# Patient Record
Sex: MALE | Race: WHITE | NOT HISPANIC OR LATINO | Employment: OTHER | URBAN - NONMETROPOLITAN AREA
[De-identification: names, ages, dates, MRNs, and addresses within clinical notes are randomized per-mention and may not be internally consistent; named-entity substitution may affect disease eponyms.]

---

## 2017-05-02 ENCOUNTER — OFFICE VISIT (OUTPATIENT)
Dept: CHIROPRACTIC MEDICINE | Facility: OTHER | Age: 77
End: 2017-05-02
Attending: CHIROPRACTOR
Payer: MEDICARE

## 2017-05-02 DIAGNOSIS — M99.03 SEGMENTAL AND SOMATIC DYSFUNCTION OF LUMBAR REGION: Primary | ICD-10-CM

## 2017-05-02 DIAGNOSIS — M54.50 ACUTE BILATERAL LOW BACK PAIN WITHOUT SCIATICA: ICD-10-CM

## 2017-05-02 DIAGNOSIS — M99.02 SEGMENTAL AND SOMATIC DYSFUNCTION OF THORACIC REGION: ICD-10-CM

## 2017-05-02 PROCEDURE — 98940 CHIROPRACT MANJ 1-2 REGIONS: CPT | Mod: AT | Performed by: CHIROPRACTOR

## 2017-05-02 NOTE — MR AVS SNAPSHOT
"              After Visit Summary   2017    Anthony Dyer    MRN: [id number]           Patient Information     Date Of Birth          1940        Visit Information        Provider Department      2017 10:40 AM Kurt Dye DC  [facility]        Today's Diagnoses     Segmental and somatic dysfunction of lumbar region    -  1    Acute bilateral low back pain without sciatica        Segmental and somatic dysfunction of thoracic region           Follow-ups after your visit        Who to contact     If you have questions or need follow up information about today's clinic visit or your schedule please contact  [facility] directly at [phone].  Normal or non-critical lab and imaging results will be communicated to you by Wattagehart, letter or phone within 4 business days after the clinic has received the results. If you do not hear from us within 7 days, please contact the clinic through Wattagehart or phone. If you have a critical or abnormal lab result, we will notify you by phone as soon as possible.  Submit refill requests through Genomera or call your pharmacy and they will forward the refill request to us. Please allow 3 business days for your refill to be completed.          Additional Information About Your Visit        MyChart Information     Genomera lets you send messages to your doctor, view your test results, renew your prescriptions, schedule appointments and more. To sign up, go to [email or web address] . Click on \"Log in\" on the left side of the screen, which will take you to the Welcome page. Then click on \"Sign up Now\" on the right side of the page.     You will be asked to enter the access code listed below, as well as some personal information. Please follow the directions to create your username and password.     Your access code is: [email or web address]  Expires: 2017 11:02 AM     Your access code will  in 90 days. If you need help or a new code, please call " your [place] clinic or [phone].        Care EveryWhere ID     This is your Care EveryWhere ID. This could be used by other organizations to access your [place] medical records  [id number]         Blood Pressure from Last 3 Encounters:   No data found for BP    Weight from Last 3 Encounters:   No data found for Wt              We Performed the Following     CHIROPRAC MANIP,SPINAL,1-2 REGIONS        Primary Care Provider Office Phone # Fax #    Blaze Prather [phone]       [facility] [address] [address]        Thank you!     Thank you for choosing  [facility]  for your care. Our goal is always to provide you with excellent care. Hearing back from our patients is one way we can continue to improve our services. Please take a few minutes to complete the written survey that you may receive in the mail after your visit with us. Thank you!             Your Updated Medication List - Protect others around you: Learn how to safely use, store and throw away your medicines at www.disposemymeds.org.      Notice  As of 5/2/2017 11:02 AM    You have not been prescribed any medications.

## 2017-10-25 ENCOUNTER — RESULTS ONLY (OUTPATIENT)
Dept: LAB | Age: 77
End: 2017-10-25

## 2017-10-25 LAB — NT-PROBNP SERPL-MCNC: 295 PG/ML (ref 0–450)

## 2017-10-25 PROCEDURE — 83880 ASSAY OF NATRIURETIC PEPTIDE: CPT | Performed by: INTERNAL MEDICINE

## 2017-11-14 ENCOUNTER — HOSPITAL ENCOUNTER (OUTPATIENT)
Dept: RESPIRATORY THERAPY | Facility: HOSPITAL | Age: 77
Discharge: HOME | End: 2017-11-14
Attending: INTERNAL MEDICINE | Admitting: INTERNAL MEDICINE
Payer: MEDICARE

## 2017-11-14 PROCEDURE — 25000125 ZZHC RX 250: Performed by: INTERNAL MEDICINE

## 2017-11-14 PROCEDURE — 94060 EVALUATION OF WHEEZING: CPT

## 2017-11-14 PROCEDURE — 94060 EVALUATION OF WHEEZING: CPT | Mod: 26 | Performed by: INTERNAL MEDICINE

## 2017-11-14 RX ORDER — ALBUTEROL SULFATE 0.83 MG/ML
2.5 SOLUTION RESPIRATORY (INHALATION)
Status: COMPLETED | OUTPATIENT
Start: 2017-11-14 | End: 2017-11-14

## 2017-11-14 RX ADMIN — ALBUTEROL SULFATE 2.5 MG: 2.5 SOLUTION RESPIRATORY (INHALATION) at 16:30

## 2018-02-05 ENCOUNTER — OFFICE VISIT (OUTPATIENT)
Dept: CHIROPRACTIC MEDICINE | Facility: OTHER | Age: 78
End: 2018-02-05
Attending: CHIROPRACTOR
Payer: MEDICARE

## 2018-02-05 DIAGNOSIS — M99.02 SEGMENTAL AND SOMATIC DYSFUNCTION OF THORACIC REGION: ICD-10-CM

## 2018-02-05 DIAGNOSIS — M99.03 SEGMENTAL AND SOMATIC DYSFUNCTION OF LUMBAR REGION: ICD-10-CM

## 2018-02-05 DIAGNOSIS — M54.2 CERVICALGIA: ICD-10-CM

## 2018-02-05 DIAGNOSIS — M99.01 SEGMENTAL AND SOMATIC DYSFUNCTION OF CERVICAL REGION: Primary | ICD-10-CM

## 2018-02-05 PROCEDURE — 98941 CHIROPRACT MANJ 3-4 REGIONS: CPT | Mod: AT | Performed by: CHIROPRACTOR

## 2018-02-05 NOTE — MR AVS SNAPSHOT
"              After Visit Summary   2018    Anthony Dyer    MRN: [id number]           Patient Information     Date Of Birth          1940        Visit Information        Provider Department      2018 11:20 AM Kurt Dye DC  [facility]        Today's Diagnoses     Segmental and somatic dysfunction of cervical region    -  1    Cervicalgia        Segmental and somatic dysfunction of lumbar region        Segmental and somatic dysfunction of thoracic region           Follow-ups after your visit        Who to contact     If you have questions or need follow up information about today's clinic visit or your schedule please contact  [facility]INE[facility] directly at [phone].  Normal or non-critical lab and imaging results will be communicated to you by Leadformancehart, letter or phone within 4 business days after the clinic has received the results. If you do not hear from us within 7 days, please contact the clinic through Leadformancehart or phone. If you have a critical or abnormal lab result, we will notify you by phone as soon as possible.  Submit refill requests through New Leaf Paper or call your pharmacy and they will forward the refill request to us. Please allow 3 business days for your refill to be completed.          Additional Information About Your Visit        MyChart Information     New Leaf Paper lets you send messages to your doctor, view your test results, renew your prescriptions, schedule appointments and more. To sign up, go to [email or web address] . Click on \"Log in\" on the left side of the screen, which will take you to the Welcome page. Then click on \"Sign up Now\" on the right side of the page.     You will be asked to enter the access code listed below, as well as some personal information. Please follow the directions to create your username and password.     Your access code is: [email or web address]  Expires: 2018 11:59 AM     Your access code will  in 90 days. If you need help or a " new code, please call your [place] clinic or [phone].        Care EveryWhere ID     This is your Care EveryWhere ID. This could be used by other organizations to access your [place] medical records  [id number]         Blood Pressure from Last 3 Encounters:   No data found for BP    Weight from Last 3 Encounters:   No data found for Wt              We Performed the Following     CHIROPRAC MANIP,SPINAL,3-4 REGIONS        Primary Care Provider Office Phone # Fax #    Blaze Prather [phone]       [facility] [address] [address]        Equal Access to Services     [facility]: Hadii aad ku hadasho Soomaali, waaxda luqadaha, qaybta kaalmada adeegyada, corry haider adelaura cuevas . So [facility] [place] [phone].    ATENCIÓN: Si habla español, tiene a weston disposición servicios gratuitos de asistencia lingüística. [facility] [phone].    We comply with applicable federal civil rights laws and [place] laws. We do not discriminate on the basis of race, color, national origin, age, disability, sex, sexual orientation, or gender identity.            Thank you!     Thank you for choosing  [facility] \[facility] [place] [facility]""[facility] [place]  for your care. Our goal is always to provide you with excellent care. Hearing back from our patients is one way we can continue to improve our services. Please take a few minutes to complete the written survey that you may receive in the mail after your visit with us. Thank you!             Your Updated Medication List - Protect others around you: Learn how to safely use, store and throw away your medicines at www.disposemymeds.org.      Notice  As of 2/5/2018 11:59 AM    You have not been prescribed any medications.

## 2018-02-05 NOTE — PROGRESS NOTES
Subjective Finding:    Chief compalint: Patient presents with:  Neck Pain: with elbow pain  Back Pain  , Pain Scale: 6/10, Intensity: sharp, Duration: 1 weeks, Radiating:     Date of injury:     Activities that the pain restricts:   Home/household/hobbies/social activities: no.  Work duties: no.  Sleep: no.  Makes symptoms better: rest.  Makes symptoms worse: activity.  Have you seen anyone else for the symptoms? no.  Work related: no.  Automobile related injury: no.    Objective and Assessment:    Posture Analysis:   High shoulder: .  Head tilt: .  High iliac crest: .  Head carriage: neutral.  Thoracic Kyphosis: neutral.  Lumbar Lordosis: forward.    Lumbar Range of Motion: .  Cervical Range of Motion: Rot dec  Thoracic Range of Motion: extension decreased.  Extremity Range of Motion: .    Palpation:   Trap    Segmental dysfunction pre-treatment and treatment area:   T3.  L3   C567    Assessment post-treatment:  Cervical: .  Thoracic: ROM increased.  Lumbar: ROM increased and pain and tenderness decreased.    Comments: .      Complicating Factors: .    Plan / Procedure:    Treatment plan: PRN.  Instructed patient: stretch as instructed at visit and walk 10 minutes.  Short term goals: reduce pain and increase ROM.  Long term goals: restore normal function.  Prognosis: excellent.

## 2018-12-19 ENCOUNTER — OFFICE VISIT (OUTPATIENT)
Dept: CHIROPRACTIC MEDICINE | Facility: OTHER | Age: 78
End: 2018-12-19
Attending: CHIROPRACTOR
Payer: MEDICARE

## 2018-12-19 DIAGNOSIS — M54.2 CERVICALGIA: ICD-10-CM

## 2018-12-19 DIAGNOSIS — M99.01 SEGMENTAL AND SOMATIC DYSFUNCTION OF CERVICAL REGION: Primary | ICD-10-CM

## 2018-12-19 DIAGNOSIS — M99.02 SEGMENTAL AND SOMATIC DYSFUNCTION OF THORACIC REGION: ICD-10-CM

## 2018-12-19 DIAGNOSIS — M99.03 SEGMENTAL AND SOMATIC DYSFUNCTION OF LUMBAR REGION: ICD-10-CM

## 2018-12-19 PROCEDURE — 98941 CHIROPRACT MANJ 3-4 REGIONS: CPT | Mod: AT | Performed by: CHIROPRACTOR

## 2018-12-19 NOTE — PROGRESS NOTES
Subjective Finding:    Chief compalint: Patient presents with:  Back Pain  Neck Pain: right elbow pain  , Pain Scale: 6/10, Intensity: sharp, Duration: 1 weeks, Radiating:     Date of injury:     Activities that the pain restricts:   Home/household/hobbies/social activities: no.  Work duties: no.  Sleep: no.  Makes symptoms better: rest.  Makes symptoms worse: activity.  Have you seen anyone else for the symptoms? no.  Work related: no.  Automobile related injury: no.    Objective and Assessment:    Posture Analysis:   High shoulder: .  Head tilt: .  High iliac crest: .  Head carriage: neutral.  Thoracic Kyphosis: neutral.  Lumbar Lordosis: forward.    Lumbar Range of Motion: .  Cervical Range of Motion: Rot dec  Thoracic Range of Motion: extension decreased.  Extremity Range of Motion: .    Palpation:   Trap    Segmental dysfunction pre-treatment and treatment area:   T3.  L3   C567    Assessment post-treatment:  Cervical: .  Thoracic: ROM increased.  Lumbar: ROM increased and pain and tenderness decreased.    Comments: .      Complicating Factors: .    Plan / Procedure:    Treatment plan: PRN.  Instructed patient: stretch as instructed at visit and walk 10 minutes.  Short term goals: reduce pain and increase ROM.  Long term goals: restore normal function.  Prognosis: excellent.

## 2019-09-18 ENCOUNTER — OFFICE VISIT (OUTPATIENT)
Dept: CHIROPRACTIC MEDICINE | Facility: OTHER | Age: 79
End: 2019-09-18
Attending: CHIROPRACTOR
Payer: MEDICARE

## 2019-09-18 DIAGNOSIS — M99.01 SEGMENTAL AND SOMATIC DYSFUNCTION OF CERVICAL REGION: Primary | ICD-10-CM

## 2019-09-18 DIAGNOSIS — M99.02 SEGMENTAL AND SOMATIC DYSFUNCTION OF THORACIC REGION: ICD-10-CM

## 2019-09-18 DIAGNOSIS — M54.2 CERVICALGIA: ICD-10-CM

## 2019-09-18 DIAGNOSIS — M99.03 SEGMENTAL AND SOMATIC DYSFUNCTION OF LUMBAR REGION: ICD-10-CM

## 2019-09-18 PROCEDURE — 98941 CHIROPRACT MANJ 3-4 REGIONS: CPT | Mod: AT | Performed by: CHIROPRACTOR

## 2019-09-18 NOTE — PROGRESS NOTES
Subjective Finding:    Chief compalint: Patient presents with:  Back Pain  , Pain Scale: 6/10, Intensity: sharp, Duration: 1 weeks, Radiating:     Date of injury:     Activities that the pain restricts:   Home/household/hobbies/social activities: no.  Work duties: no.  Sleep: no.  Makes symptoms better: rest.  Makes symptoms worse: activity.  Have you seen anyone else for the symptoms? no.  Work related: no.  Automobile related injury: no.    Objective and Assessment:    Posture Analysis:   High shoulder: .  Head tilt: .  High iliac crest: .  Head carriage: neutral.  Thoracic Kyphosis: neutral.  Lumbar Lordosis: forward.    Lumbar Range of Motion: .  Cervical Range of Motion: Rot dec  Thoracic Range of Motion: extension decreased.  Extremity Range of Motion: .    Palpation:   Trap    Segmental dysfunction pre-treatment and treatment area:   T3.  L3   C567    Assessment post-treatment:  Cervical: .  Thoracic: ROM increased.  Lumbar: ROM increased and pain and tenderness decreased.    Comments: .      Complicating Factors: .    Plan / Procedure:    Treatment plan: PRN.  Instructed patient: stretch as instructed at visit and walk 10 minutes.  Short term goals: reduce pain and increase ROM.  Long term goals: restore normal function.  Prognosis: excellent.

## 2021-04-14 ENCOUNTER — NURSE TRIAGE (OUTPATIENT)
Dept: FAMILY MEDICINE | Facility: OTHER | Age: 81
End: 2021-04-14

## 2021-04-14 ENCOUNTER — TELEPHONE (OUTPATIENT)
Dept: FAMILY MEDICINE | Facility: OTHER | Age: 81
End: 2021-04-14

## 2021-04-14 ENCOUNTER — OFFICE VISIT (OUTPATIENT)
Dept: FAMILY MEDICINE | Facility: OTHER | Age: 81
End: 2021-04-14
Attending: INTERNAL MEDICINE
Payer: MEDICARE

## 2021-04-14 DIAGNOSIS — Z20.822 COVID-19 RULED OUT: Primary | ICD-10-CM

## 2021-04-14 DIAGNOSIS — Z20.822 COVID-19 RULED OUT: ICD-10-CM

## 2021-04-14 LAB
SARS-COV-2 RNA RESP QL NAA+PROBE: NORMAL
SPECIMEN SOURCE: NORMAL

## 2021-04-14 PROCEDURE — U0003 INFECTIOUS AGENT DETECTION BY NUCLEIC ACID (DNA OR RNA); SEVERE ACUTE RESPIRATORY SYNDROME CORONAVIRUS 2 (SARS-COV-2) (CORONAVIRUS DISEASE [COVID-19]), AMPLIFIED PROBE TECHNIQUE, MAKING USE OF HIGH THROUGHPUT TECHNOLOGIES AS DESCRIBED BY CMS-2020-01-R: HCPCS | Mod: ZL | Performed by: FAMILY MEDICINE

## 2021-04-14 PROCEDURE — U0005 INFEC AGEN DETEC AMPLI PROBE: HCPCS | Mod: ZL | Performed by: FAMILY MEDICINE

## 2021-04-14 NOTE — TELEPHONE ENCOUNTER
When the results of the patients covid test comes back he would like to be notified by telephone call by calling cell phone number [phone].         Thank you    Alysha Perez

## 2021-04-14 NOTE — TELEPHONE ENCOUNTER
Experiencing chills, fatigue,body aches/weakness.     Fully vaccinated for covid 19. 2nd shot approximately x 1 month ago. Exposure exposure to positive Covid 19.    covid testing schedule:    Next 5 appointments (look out 90 days)    Apr 14, 2021 10:30 AM  (Arrive by 10:15 AM)  SHORT with HC COLLECTION [facility] [place] ([facility] [place] ) [address]  [place] [address]  [phone]          Reason for Disposition    COVID-19 Home Isolation, questions about    Additional Information    Negative: SEVERE difficulty breathing (e.g., struggling for each breath, speaks in single words)    Negative: Difficult to awaken or acting confused (e.g., disoriented, slurred speech)    Negative: Bluish (or gray) lips or face now    Negative: Shock suspected (e.g., cold/pale/clammy skin, too weak to stand, low BP, rapid pulse)    Negative: Sounds like a life-threatening emergency to the triager    Negative: [1] COVID-19 exposure AND [2] no symptoms    Negative: [1] Lives with someone known to have influenza (flu test positive) AND [2] flu-like symptoms (e.g., cough, runny nose, sore throat, SOB; with or without fever)    Negative: [1] Adult with possible COVID-19 symptoms AND [2] triager concerned about severity of symptoms or other causes    Negative: COVID-19 vaccine reaction suspected (e.g., fever, headache, muscle aches) occurring during days 1-3 after getting vaccine    Negative: COVID-19 vaccine, questions about    Negative: COVID-19 and breastfeeding, questions about    Negative: SEVERE or constant chest pain or pressure (Exception: mild central chest pain, present only when coughing)    Negative: MODERATE difficulty breathing (e.g., speaks in phrases, SOB even at rest, pulse 100-120)    Negative: [1] Headache AND [2] stiff neck (can't touch chin to chest)    Negative: MILD difficulty breathing (e.g., minimal/no SOB at rest, SOB with walking, pulse <100)    Negative:  "Chest pain or pressure    Negative: Patient sounds very sick or weak to the triager    Negative: Fever > 103 F (39.4 C)    Negative: [1] Fever > 101 F (38.3 C) AND [2] age > 60    Negative: [1] Fever > 100.0 F (37.8 C) AND [2] bedridden (e.g., nursing home patient, CVA, chronic illness, recovering from surgery)    Negative: [1] HIGH RISK patient (e.g., age > 64 years, diabetes, heart or lung disease, weak immune system) AND [2] new or worsening symptoms    Negative: [1] HIGH RISK patient AND [2] influenza is widespread in the community AND [3] ONE OR MORE respiratory symptoms: cough, sore throat, runny or stuffy nose    Negative: [1] HIGH RISK patient AND [2] influenza exposure within the last 7 days AND [3] ONE OR MORE respiratory symptoms: cough, sore throat, runny or stuffy nose    Negative: Fever present > 3 days (72 hours)    Negative: [1] Fever returns after gone for over 24 hours AND [2] symptoms worse or not improved    Negative: [1] Continuous (nonstop) coughing interferes with work or school AND [2] no improvement using cough treatment per protocol    Negative: [1] COVID-19 infection suspected by caller or triager AND [2] mild symptoms (cough, fever, or others) AND [3] no complications or SOB    Negative: Cough present > 3 weeks    Negative: [1] COVID-19 diagnosed by positive lab test AND [2] mild symptoms (e.g., cough, fever, others) AND [3] no complications or SOB    Negative: [1] COVID-19 diagnosed by HCP (doctor, NP or PA) AND [2] mild symptoms (e.g., cough, fever, others) AND [3] no complications or SOB    Answer Assessment - Initial Assessment Questions  1. COVID-19 DIAGNOSIS: \"Who made your Coronavirus (COVID-19) diagnosis?\" \"Was it confirmed by a positive lab test?\" If not diagnosed by a HCP, ask \"Are there lots of cases (community spread) where you live?\" (See public health department website, if unsure)      Patient has not been diagnosed with covid 19    2. COVID-19 EXPOSURE: \"Was there any " "known exposure to COVID before the symptoms began?\" [facility] Definition of close contact: within 6 feet (2 meters) for a total of 15 minutes or more over a 24-hour period.       Possible exposure, friend testing positive at bar patient was at    3. ONSET: \"When did the COVID-19 symptoms start?\"       4/13/21    4. WORST SYMPTOM: \"What is your worst symptom?\" (e.g., cough, fever, shortness of breath, muscle aches)      Weakness    5. COUGH: \"Do you have a cough?\" If so, ask: \"How bad is the cough?\"        No     6. FEVER: \"Do you have a fever?\" If so, ask: \"What is your temperature, how was it measured, and when did it start?\"      Unknown, patient reports chills. Temp has not been taken    7. RESPIRATORY STATUS: \"Describe your breathing?\" (e.g., shortness of breath, wheezing, unable to speak)       No     8. BETTER-SAME-WORSE: \"Are you getting better, staying the same or getting worse compared to yesterday?\"  If getting worse, ask, \"In what way?\"      Better     9. HIGH RISK DISEASE: \"Do you have any chronic medical problems?\" (e.g., asthma, heart or lung disease, weak immune system, obesity, etc.)      Diabetes Type 2 , Asthma     10. PREGNANCY: \"Is there any chance you are pregnant?\" \"When was your last menstrual period?\"        Na     11. OTHER SYMPTOMS: \"Do you have any other symptoms?\"  (e.g., chills, fatigue, headache, loss of smell or taste, muscle pain, sore throat; new loss of smell or taste especially support the diagnosis of COVID-19)        Chills, fatigue, body aches/weakness    Protocols used: CORONAVIRUS (COVID-19) DIAGNOSED OR [id number] 1.3      "

## 2021-04-15 ENCOUNTER — HOSPITAL ENCOUNTER (EMERGENCY)
Facility: HOSPITAL | Age: 81
Discharge: HOME | End: 2021-04-16
Attending: INTERNAL MEDICINE | Admitting: INTERNAL MEDICINE
Payer: MEDICARE

## 2021-04-15 ENCOUNTER — APPOINTMENT (OUTPATIENT)
Dept: GENERAL RADIOLOGY | Facility: HOSPITAL | Age: 81
End: 2021-04-15
Attending: INTERNAL MEDICINE
Payer: MEDICARE

## 2021-04-15 ENCOUNTER — APPOINTMENT (OUTPATIENT)
Dept: CT IMAGING | Facility: HOSPITAL | Age: 81
End: 2021-04-15
Attending: INTERNAL MEDICINE
Payer: MEDICARE

## 2021-04-15 DIAGNOSIS — R50.9 FEBRILE ILLNESS: ICD-10-CM

## 2021-04-15 DIAGNOSIS — R33.9 URINARY RETENTION: ICD-10-CM

## 2021-04-15 DIAGNOSIS — M62.81 GENERALIZED MUSCLE WEAKNESS: ICD-10-CM

## 2021-04-15 DIAGNOSIS — E86.0 DEHYDRATION: ICD-10-CM

## 2021-04-15 DIAGNOSIS — N30.90 CYSTITIS: ICD-10-CM

## 2021-04-15 LAB
ALBUMIN SERPL-MCNC: 3.5 G/DL (ref 3.4–5)
ALBUMIN UR-MCNC: 30 MG/DL
ALP SERPL-CCNC: 78 U/L (ref 40–150)
ALT SERPL W P-5'-P-CCNC: 34 U/L (ref 0–70)
ANION GAP SERPL CALCULATED.3IONS-SCNC: 6 MMOL/L (ref 3–14)
APPEARANCE UR: CLEAR
AST SERPL W P-5'-P-CCNC: 26 U/L (ref 0–45)
BACTERIA #/AREA URNS HPF: ABNORMAL /HPF
BASOPHILS # BLD AUTO: 0 10E9/L (ref 0–0.2)
BASOPHILS NFR BLD AUTO: 0.1 %
BILIRUB SERPL-MCNC: 0.8 MG/DL (ref 0.2–1.3)
BILIRUB UR QL STRIP: NEGATIVE
BUN SERPL-MCNC: 19 MG/DL (ref 7–30)
CALCIUM SERPL-MCNC: 8.5 MG/DL (ref 8.5–10.1)
CHLORIDE SERPL-SCNC: 106 MMOL/L (ref 94–109)
CO2 SERPL-SCNC: 25 MMOL/L (ref 20–32)
COLOR UR AUTO: YELLOW
CREAT SERPL-MCNC: 1.34 MG/DL (ref 0.66–1.25)
CRP SERPL-MCNC: 85.7 MG/L (ref 0–8)
DIFFERENTIAL METHOD BLD: ABNORMAL
EOSINOPHIL # BLD AUTO: 0 10E9/L (ref 0–0.7)
EOSINOPHIL NFR BLD AUTO: 0.4 %
ERYTHROCYTE [DISTWIDTH] IN BLOOD BY AUTOMATED COUNT: 12.9 % (ref 10–15)
GFR SERPL CREATININE-BSD FRML MDRD: 50 ML/MIN/{1.73_M2}
GLUCOSE SERPL-MCNC: 129 MG/DL (ref 70–99)
GLUCOSE UR STRIP-MCNC: 30 MG/DL
HCT VFR BLD AUTO: 42.2 % (ref 40–53)
HGB BLD-MCNC: 13.9 G/DL (ref 13.3–17.7)
HGB UR QL STRIP: NEGATIVE
IMM GRANULOCYTES # BLD: 0 10E9/L (ref 0–0.4)
IMM GRANULOCYTES NFR BLD: 0.2 %
KETONES UR STRIP-MCNC: NEGATIVE MG/DL
LABORATORY COMMENT REPORT: NORMAL
LACTATE BLD-SCNC: 1.2 MMOL/L (ref 0.7–2)
LEUKOCYTE ESTERASE UR QL STRIP: NEGATIVE
LYMPHOCYTES # BLD AUTO: 0.5 10E9/L (ref 0.8–5.3)
LYMPHOCYTES NFR BLD AUTO: 5.5 %
MCH RBC QN AUTO: 32 PG (ref 26.5–33)
MCHC RBC AUTO-ENTMCNC: 32.9 G/DL (ref 31.5–36.5)
MCV RBC AUTO: 97 FL (ref 78–100)
MONOCYTES # BLD AUTO: 0.7 10E9/L (ref 0–1.3)
MONOCYTES NFR BLD AUTO: 8.6 %
MUCOUS THREADS #/AREA URNS LPF: PRESENT /LPF
NEUTROPHILS # BLD AUTO: 7 10E9/L (ref 1.6–8.3)
NEUTROPHILS NFR BLD AUTO: 85.2 %
NITRATE UR QL: NEGATIVE
NRBC # BLD AUTO: 0 10*3/UL
NRBC BLD AUTO-RTO: 0 /100
PH UR STRIP: 6 PH (ref 4.7–8)
PLATELET # BLD AUTO: 150 10E9/L (ref 150–450)
POTASSIUM SERPL-SCNC: 3.9 MMOL/L (ref 3.4–5.3)
PROCALCITONIN SERPL-MCNC: 0.99 NG/ML
PROT SERPL-MCNC: 7 G/DL (ref 6.8–8.8)
RBC # BLD AUTO: 4.35 10E12/L (ref 4.4–5.9)
RBC #/AREA URNS AUTO: 2 /HPF (ref 0–2)
SARS-COV-2 RNA RESP QL NAA+PROBE: NEGATIVE
SODIUM SERPL-SCNC: 137 MMOL/L (ref 133–144)
SOURCE: ABNORMAL
SP GR UR STRIP: 1.02 (ref 1–1.03)
SPECIMEN SOURCE: NORMAL
SQUAMOUS #/AREA URNS AUTO: <1 /HPF (ref 0–1)
UROBILINOGEN UR STRIP-MCNC: NORMAL MG/DL (ref 0–2)
WBC # BLD AUTO: 8.2 10E9/L (ref 4–11)
WBC #/AREA URNS AUTO: 2 /HPF (ref 0–5)

## 2021-04-15 PROCEDURE — 250N000009 HC RX 250

## 2021-04-15 PROCEDURE — 71045 X-RAY EXAM CHEST 1 VIEW: CPT

## 2021-04-15 PROCEDURE — 96361 HYDRATE IV INFUSION ADD-ON: CPT

## 2021-04-15 PROCEDURE — 99285 EMERGENCY DEPT VISIT HI MDM: CPT | Performed by: INTERNAL MEDICINE

## 2021-04-15 PROCEDURE — 84145 PROCALCITONIN (PCT): CPT | Performed by: INTERNAL MEDICINE

## 2021-04-15 PROCEDURE — 86140 C-REACTIVE PROTEIN: CPT | Performed by: INTERNAL MEDICINE

## 2021-04-15 PROCEDURE — 87040 BLOOD CULTURE FOR BACTERIA: CPT | Performed by: INTERNAL MEDICINE

## 2021-04-15 PROCEDURE — 80053 COMPREHEN METABOLIC PANEL: CPT | Performed by: INTERNAL MEDICINE

## 2021-04-15 PROCEDURE — 87086 URINE CULTURE/COLONY COUNT: CPT | Performed by: INTERNAL MEDICINE

## 2021-04-15 PROCEDURE — 258N000003 HC RX IP 258 OP 636: Performed by: INTERNAL MEDICINE

## 2021-04-15 PROCEDURE — 99285 EMERGENCY DEPT VISIT HI MDM: CPT | Mod: 25

## 2021-04-15 PROCEDURE — 74177 CT ABD & PELVIS W/CONTRAST: CPT | Mod: ME

## 2021-04-15 PROCEDURE — 93010 ELECTROCARDIOGRAM REPORT: CPT | Performed by: INTERNAL MEDICINE

## 2021-04-15 PROCEDURE — 93005 ELECTROCARDIOGRAM TRACING: CPT

## 2021-04-15 PROCEDURE — 81001 URINALYSIS AUTO W/SCOPE: CPT | Performed by: INTERNAL MEDICINE

## 2021-04-15 PROCEDURE — 85025 COMPLETE CBC W/AUTO DIFF WBC: CPT | Performed by: INTERNAL MEDICINE

## 2021-04-15 PROCEDURE — 83605 ASSAY OF LACTIC ACID: CPT | Performed by: INTERNAL MEDICINE

## 2021-04-15 PROCEDURE — 36415 COLL VENOUS BLD VENIPUNCTURE: CPT | Performed by: INTERNAL MEDICINE

## 2021-04-15 RX ORDER — LIDOCAINE HYDROCHLORIDE 20 MG/ML
JELLY TOPICAL
Status: COMPLETED
Start: 2021-04-15 | End: 2021-04-15

## 2021-04-15 RX ORDER — METFORMIN HCL 500 MG
1000 TABLET, EXTENDED RELEASE 24 HR ORAL 2 TIMES DAILY WITH MEALS
COMMUNITY
Start: 2021-04-03 | End: 2022-01-13

## 2021-04-15 RX ORDER — GLIMEPIRIDE 2 MG/1
2 TABLET ORAL
COMMUNITY
Start: 2021-04-11 | End: 2022-03-14

## 2021-04-15 RX ORDER — ATORVASTATIN CALCIUM 80 MG/1
40 TABLET, FILM COATED ORAL EVERY EVENING
COMMUNITY
Start: 2021-04-13 | End: 2022-08-25

## 2021-04-15 RX ORDER — SILDENAFIL 100 MG/1
50-100 TABLET, FILM COATED ORAL DAILY PRN
Status: ON HOLD | COMMUNITY
Start: 2020-01-22 | End: 2021-04-26

## 2021-04-15 RX ORDER — AMLODIPINE BESYLATE 2.5 MG/1
2.5 TABLET ORAL DAILY
COMMUNITY
Start: 2021-04-12 | End: 2022-08-18

## 2021-04-15 RX ORDER — CARVEDILOL 25 MG/1
25 TABLET ORAL 2 TIMES DAILY
COMMUNITY
Start: 2021-04-03 | End: 2022-08-17

## 2021-04-15 RX ORDER — ALBUTEROL SULFATE 90 UG/1
2 AEROSOL, METERED RESPIRATORY (INHALATION) EVERY 4 HOURS PRN
COMMUNITY
Start: 2020-07-28 | End: 2024-03-25

## 2021-04-15 RX ORDER — SEMAGLUTIDE 1.34 MG/ML
0.5 INJECTION, SOLUTION SUBCUTANEOUS
COMMUNITY
Start: 2020-12-18 | End: 2022-01-14

## 2021-04-15 RX ORDER — ALLOPURINOL 100 MG/1
100 TABLET ORAL DAILY
COMMUNITY
Start: 2021-04-13 | End: 2022-10-12

## 2021-04-15 RX ORDER — IOPAMIDOL 612 MG/ML
100 INJECTION, SOLUTION INTRAVASCULAR ONCE
Status: COMPLETED | OUTPATIENT
Start: 2021-04-15 | End: 2021-04-16

## 2021-04-15 RX ORDER — TAMSULOSIN HYDROCHLORIDE 0.4 MG/1
0.4 CAPSULE ORAL EVERY EVENING
COMMUNITY
Start: 2021-02-09 | End: 2022-05-04

## 2021-04-15 RX ORDER — GABAPENTIN 300 MG/1
300 CAPSULE ORAL 3 TIMES DAILY
COMMUNITY
Start: 2020-11-26 | End: 2023-02-14

## 2021-04-15 RX ADMIN — SODIUM CHLORIDE 1000 ML: 9 INJECTION, SOLUTION INTRAVENOUS at 22:21

## 2021-04-15 RX ADMIN — LIDOCAINE HYDROCHLORIDE: 20 JELLY TOPICAL at 23:47

## 2021-04-15 ASSESSMENT — MIFFLIN-ST. JEOR: SCORE: 1707.83

## 2021-04-16 ENCOUNTER — APPOINTMENT (OUTPATIENT)
Dept: ULTRASOUND IMAGING | Facility: HOSPITAL | Age: 81
End: 2021-04-16
Attending: INTERNAL MEDICINE
Payer: MEDICARE

## 2021-04-16 VITALS
HEART RATE: 89 BPM | WEIGHT: 207 LBS | TEMPERATURE: 99.3 F | HEIGHT: 73 IN | OXYGEN SATURATION: 93 % | SYSTOLIC BLOOD PRESSURE: 118 MMHG | RESPIRATION RATE: 24 BRPM | BODY MASS INDEX: 27.43 KG/M2 | DIASTOLIC BLOOD PRESSURE: 72 MMHG

## 2021-04-16 PROCEDURE — 250N000011 HC RX IP 250 OP 636: Performed by: INTERNAL MEDICINE

## 2021-04-16 PROCEDURE — 76705 ECHO EXAM OF ABDOMEN: CPT

## 2021-04-16 PROCEDURE — 96365 THER/PROPH/DIAG IV INF INIT: CPT

## 2021-04-16 PROCEDURE — 255N000002 HC RX 255 OP 636: Performed by: INTERNAL MEDICINE

## 2021-04-16 PROCEDURE — 250N000013 HC RX MED GY IP 250 OP 250 PS 637: Performed by: INTERNAL MEDICINE

## 2021-04-16 PROCEDURE — 258N000003 HC RX IP 258 OP 636: Performed by: INTERNAL MEDICINE

## 2021-04-16 RX ORDER — LEVOFLOXACIN 250 MG/1
250 TABLET, FILM COATED ORAL DAILY
Qty: 5 TABLET | Refills: 0 | Status: ON HOLD | OUTPATIENT
Start: 2021-04-16 | End: 2021-04-22

## 2021-04-16 RX ORDER — LEVOFLOXACIN 500 MG/1
500 TABLET, FILM COATED ORAL DAILY
Qty: 7 TABLET | Refills: 0 | Status: SHIPPED | OUTPATIENT
Start: 2021-04-16 | End: 2021-04-16

## 2021-04-16 RX ORDER — ACETAMINOPHEN 325 MG/1
650 TABLET ORAL ONCE
Status: COMPLETED | OUTPATIENT
Start: 2021-04-16 | End: 2021-04-16

## 2021-04-16 RX ORDER — LEVOFLOXACIN 5 MG/ML
750 INJECTION, SOLUTION INTRAVENOUS ONCE
Status: COMPLETED | OUTPATIENT
Start: 2021-04-16 | End: 2021-04-16

## 2021-04-16 RX ADMIN — IOPAMIDOL 100 ML: 612 INJECTION, SOLUTION INTRAVENOUS at 00:36

## 2021-04-16 RX ADMIN — SODIUM CHLORIDE 1000 ML: 9 INJECTION, SOLUTION INTRAVENOUS at 01:54

## 2021-04-16 RX ADMIN — ACETAMINOPHEN 650 MG: 325 TABLET, FILM COATED ORAL at 01:53

## 2021-04-16 RX ADMIN — LEVOFLOXACIN 750 MG: 5 INJECTION, SOLUTION INTRAVENOUS at 01:54

## 2021-04-16 ASSESSMENT — ENCOUNTER SYMPTOMS
ABDOMINAL DISTENTION: 0
DIZZINESS: 0
ABDOMINAL PAIN: 0
COLOR CHANGE: 0
BACK PAIN: 0
DIAPHORESIS: 0
DYSURIA: 0
MYALGIAS: 0
VOMITING: 0
FREQUENCY: 0
CHILLS: 1
ANAL BLEEDING: 0
CHEST TIGHTNESS: 0
BLOOD IN STOOL: 0
PALPITATIONS: 0
CONFUSION: 0
HEADACHES: 0
WHEEZING: 0
FATIGUE: 1
FLANK PAIN: 0
LIGHT-HEADEDNESS: 0
NECK PAIN: 0
NUMBNESS: 0
FEVER: 1
COUGH: 0
SHORTNESS OF BREATH: 0
WEAKNESS: 1
SLEEP DISTURBANCE: 0
VOICE CHANGE: 0
NAUSEA: 0

## 2021-04-16 NOTE — ED NOTES
Pt given hard copy of antibiotic prescription. Discharge teaching done, AVS reviewed with pt, questions answered. Pt verbalized understanding and is agreeable with discharge plan. Pt discharged to home.

## 2021-04-16 NOTE — ED NOTES
Extensive teaching done with pt re: home care of to catheter, use of leg bag, cleaning of catheter bags and prevention of infection. Pt able to teach back.

## 2021-04-16 NOTE — ED NOTES
"Per Dr Taveras, pt got self out of bed ambulated in the leonardo with stand by assistance only, tolerated activity well, Dr Taveras aware. Pt requesting to be allowed to sleep for now and call his sister, \"in a little while.\"  "

## 2021-04-16 NOTE — ED TRIAGE NOTES
Found by neighbors today. Legs are weak. Patient was wearing 3 layers of sweatshirts. Normally moves around well. Having problems since Tuesday

## 2021-04-16 NOTE — ED PROVIDER NOTES
History     Chief Complaint   Patient presents with     Generalized Weakness     The history is provided by the patient.   Fatigue  Severity:  Severe  Onset quality:  Gradual  Duration:  3 days  Timing:  Constant  Progression:  Worsening  Chronicity:  New  Associated symptoms: fever    Associated symptoms: no abdominal pain, no chest pain, no cough, no dizziness, no dysuria, no frequency, no headaches, no myalgias, no nausea, no shortness of breath and no vomiting        Allergies:  Allergies   Allergen Reactions     Ace Inhibitors      Per [facility]: hyperkalemia.  Pt doesn't know if he is allergic     Ceclor [Cefaclor] Hives     Sulfa Drugs      Pt unsure.        Problem List:    Patient Active Problem List    Diagnosis Date Noted     Diabetes mellitus, type 2 (H) 04/20/2021     Priority: Medium     Urinary retention 04/19/2021     Priority: Medium     Generalized weakness 04/19/2021     Priority: Medium        Past Medical History:    Past Medical History:   Diagnosis Date     Diabetes (H)      Hypertension        Past Surgical History:    Past Surgical History:   Procedure Laterality Date     HERNIA REPAIR         Family History:    No family history on file.    Social History:  Marital Status:  Single [1]  Social History     Tobacco Use     Smoking status: Unknown If Ever Smoked   Substance Use Topics     Alcohol use: Not Currently     Drug use: Never        Medications:    No current outpatient medications on file.        Review of Systems   Constitutional: Positive for chills, fatigue and fever. Negative for diaphoresis.   HENT: Negative for voice change.    Eyes: Negative for visual disturbance.   Respiratory: Negative for cough, chest tightness, shortness of breath and wheezing.    Cardiovascular: Negative for chest pain, palpitations and leg swelling.   Gastrointestinal: Negative for abdominal distention, abdominal pain, anal bleeding, blood in stool, nausea and vomiting.   Genitourinary: Negative for  "decreased urine volume, dysuria, flank pain and frequency.   Musculoskeletal: Negative for back pain, gait problem, myalgias and neck pain.   Skin: Negative for color change, pallor and rash.   Neurological: Positive for weakness. Negative for dizziness, syncope, light-headedness, numbness and headaches.   Psychiatric/Behavioral: Negative for confusion, sleep disturbance and suicidal ideas.       Physical Exam   BP: 139/82  Pulse: 109  Temp: (!) 102.2  F (39  C)  Resp: 24  Height: 185.4 cm (6' 1\")  Weight: 93.9 kg (207 lb)  SpO2: 92 %      Physical Exam  Vitals signs and nursing note reviewed.   Constitutional:       Appearance: He is well-developed.   HENT:      Head: Normocephalic and atraumatic.   Eyes:      Conjunctiva/sclera: Conjunctivae normal.      Pupils: Pupils are equal, round, and reactive to light.   Neck:      Musculoskeletal: Normal range of motion and neck supple.      Thyroid: No thyromegaly.      Vascular: No JVD.      Trachea: No tracheal deviation.   Cardiovascular:      Rate and Rhythm: Regular rhythm. Tachycardia present.      Heart sounds: Normal heart sounds. No murmur. No gallop.    Pulmonary:      Effort: Pulmonary effort is normal. No respiratory distress.      Breath sounds: Normal breath sounds. No stridor. No wheezing or rales.   Chest:      Chest wall: No tenderness.   Abdominal:      General: Bowel sounds are normal. There is no distension.      Palpations: Abdomen is soft. There is no mass.      Tenderness: There is no abdominal tenderness. There is no guarding or rebound.   Musculoskeletal: Normal range of motion.         General: No tenderness.   Lymphadenopathy:      Cervical: No cervical adenopathy.   Skin:     General: Skin is warm.      Coloration: Skin is not pale.      Findings: No erythema or rash.   Neurological:      Mental Status: He is alert and oriented to person, place, and time.   Psychiatric:         Behavior: Behavior normal.         ED Course        Procedures      "              No results found for this or any previous visit (from the past 24 hour(s)).    Medications   0.9% sodium chloride BOLUS (0 mLs Intravenous Stopped 4/15/21 2321)   lidocaine (XYLOCAINE) 2 % external gel (  Given 4/15/21 2347)   iopamidol (ISOVUE-300) IV solution 61% 100 mL (100 mLs Intravenous Given 4/16/21 0036)   sodium chloride (PF) 0.9% PF flush 60 mL (60 mLs Intravenous Given 4/16/21 0036)   acetaminophen (TYLENOL) tablet 650 mg (650 mg Oral Given 4/16/21 0153)   0.9% sodium chloride BOLUS (0 mLs Intravenous Stopped 4/16/21 0248)   levofloxacin (LEVAQUIN) infusion 750 mg (0 mg Intravenous Stopped 4/16/21 0330)       Assessments & Plan (with Medical Decision Making)   Felt very weak for 3 days, febrile today, could not stand up due to severe weakness.   Febrile in ER  Sepsis workup was done  Labs reviewed  Pt was not able to void, bladder scanner showed more than 100  CT abd vrad: distended gallbladder, cystitis  US RUQ: no cholecystitis  Pt does not have any pain or tenderness on RUQ or abdomen.    Pt observed in ER, after receiving IVF and IV Abx he felt much better, he walked with walker in ER without problem  Fever most likely from UTI due to urinary retention that resolved after to placement.  I advised him to return to ER if felt weak, had fever, vomiting or any other unusual symptoms, follow-up with urology clinic and PCP for reevaluation.   He understood and agreed.     I have reviewed the nursing notes.    I have reviewed the findings, diagnosis, plan and need for follow up with the patient.      Discharge Medication List as of 4/16/2021  5:16 AM          Final diagnoses:   Urinary retention   Febrile illness   Generalized muscle weakness   Cystitis   Dehydration       4/15/2021   [facility]     Brandon Taveras MD  04/21/21 5195

## 2021-04-16 NOTE — ED NOTES
Post void residual bladder scan 975 ml, Dr Taveras notified, plan to cath insertion, pt agreeable.

## 2021-04-18 LAB
BACTERIA SPEC CULT: NORMAL
SPECIMEN SOURCE: NORMAL

## 2021-04-19 ENCOUNTER — TELEPHONE (OUTPATIENT)
Dept: UROLOGY | Facility: OTHER | Age: 81
End: 2021-04-19

## 2021-04-19 ENCOUNTER — HOSPITAL ENCOUNTER (OUTPATIENT)
Facility: HOSPITAL | Age: 81
Setting detail: OBSERVATION
Discharge: NURSING, REHAB OR LONG-TERM CARE | End: 2021-04-22
Attending: EMERGENCY MEDICINE | Admitting: INTERNAL MEDICINE
Payer: MEDICARE

## 2021-04-19 DIAGNOSIS — R53.1 GENERALIZED WEAKNESS: ICD-10-CM

## 2021-04-19 DIAGNOSIS — R33.9 URINARY RETENTION: ICD-10-CM

## 2021-04-19 LAB
ALBUMIN UR-MCNC: 30 MG/DL
ANION GAP SERPL CALCULATED.3IONS-SCNC: 8 MMOL/L (ref 3–14)
APPEARANCE UR: CLEAR
BACTERIA #/AREA URNS HPF: ABNORMAL /HPF
BASOPHILS # BLD AUTO: 0 10E9/L (ref 0–0.2)
BASOPHILS NFR BLD AUTO: 0.4 %
BILIRUB UR QL STRIP: NEGATIVE
BUN SERPL-MCNC: 24 MG/DL (ref 7–30)
CALCIUM SERPL-MCNC: 9 MG/DL (ref 8.5–10.1)
CHLORIDE SERPL-SCNC: 106 MMOL/L (ref 94–109)
CO2 SERPL-SCNC: 24 MMOL/L (ref 20–32)
COLOR UR AUTO: YELLOW
CREAT SERPL-MCNC: 1.39 MG/DL (ref 0.66–1.25)
DIFFERENTIAL METHOD BLD: ABNORMAL
EOSINOPHIL # BLD AUTO: 0.1 10E9/L (ref 0–0.7)
EOSINOPHIL NFR BLD AUTO: 1.1 %
ERYTHROCYTE [DISTWIDTH] IN BLOOD BY AUTOMATED COUNT: 12.5 % (ref 10–15)
GFR SERPL CREATININE-BSD FRML MDRD: 48 ML/MIN/{1.73_M2}
GLUCOSE SERPL-MCNC: 143 MG/DL (ref 70–99)
GLUCOSE UR STRIP-MCNC: NEGATIVE MG/DL
HCT VFR BLD AUTO: 41.3 % (ref 40–53)
HGB BLD-MCNC: 14 G/DL (ref 13.3–17.7)
HGB UR QL STRIP: ABNORMAL
HYALINE CASTS #/AREA URNS LPF: 1 /LPF
IMM GRANULOCYTES # BLD: 0 10E9/L (ref 0–0.4)
IMM GRANULOCYTES NFR BLD: 0.5 %
KETONES UR STRIP-MCNC: NEGATIVE MG/DL
LABORATORY COMMENT REPORT: NORMAL
LEUKOCYTE ESTERASE UR QL STRIP: ABNORMAL
LYMPHOCYTES # BLD AUTO: 0.6 10E9/L (ref 0.8–5.3)
LYMPHOCYTES NFR BLD AUTO: 6.4 %
MCH RBC QN AUTO: 32.6 PG (ref 26.5–33)
MCHC RBC AUTO-ENTMCNC: 33.9 G/DL (ref 31.5–36.5)
MCV RBC AUTO: 96 FL (ref 78–100)
MONOCYTES # BLD AUTO: 0.5 10E9/L (ref 0–1.3)
MONOCYTES NFR BLD AUTO: 5.7 %
MUCOUS THREADS #/AREA URNS LPF: PRESENT /LPF
NEUTROPHILS # BLD AUTO: 7.4 10E9/L (ref 1.6–8.3)
NEUTROPHILS NFR BLD AUTO: 85.9 %
NITRATE UR QL: NEGATIVE
NRBC # BLD AUTO: 0 10*3/UL
NRBC BLD AUTO-RTO: 0 /100
PH UR STRIP: 5.5 PH (ref 4.7–8)
PLATELET # BLD AUTO: 209 10E9/L (ref 150–450)
POTASSIUM SERPL-SCNC: 4.4 MMOL/L (ref 3.4–5.3)
RBC # BLD AUTO: 4.3 10E12/L (ref 4.4–5.9)
RBC #/AREA URNS AUTO: 93 /HPF (ref 0–2)
SARS-COV-2 RNA RESP QL NAA+PROBE: NEGATIVE
SODIUM SERPL-SCNC: 138 MMOL/L (ref 133–144)
SOURCE: ABNORMAL
SP GR UR STRIP: 1.03 (ref 1–1.03)
SPECIMEN SOURCE: NORMAL
SQUAMOUS #/AREA URNS AUTO: 0 /HPF (ref 0–1)
TROPONIN I SERPL-MCNC: <0.015 UG/L (ref 0–0.04)
UROBILINOGEN UR STRIP-MCNC: NORMAL MG/DL (ref 0–2)
WBC # BLD AUTO: 8.6 10E9/L (ref 4–11)
WBC #/AREA URNS AUTO: 5 /HPF (ref 0–5)

## 2021-04-19 PROCEDURE — 36415 COLL VENOUS BLD VENIPUNCTURE: CPT | Performed by: INTERNAL MEDICINE

## 2021-04-19 PROCEDURE — G0378 HOSPITAL OBSERVATION PER HR: HCPCS

## 2021-04-19 PROCEDURE — 96360 HYDRATION IV INFUSION INIT: CPT | Performed by: INTERNAL MEDICINE

## 2021-04-19 PROCEDURE — 81001 URINALYSIS AUTO W/SCOPE: CPT | Performed by: EMERGENCY MEDICINE

## 2021-04-19 PROCEDURE — 258N000003 HC RX IP 258 OP 636: Performed by: EMERGENCY MEDICINE

## 2021-04-19 PROCEDURE — C9803 HOPD COVID-19 SPEC COLLECT: HCPCS | Performed by: INTERNAL MEDICINE

## 2021-04-19 PROCEDURE — 93010 ELECTROCARDIOGRAM REPORT: CPT | Performed by: INTERNAL MEDICINE

## 2021-04-19 PROCEDURE — 51702 INSERT TEMP BLADDER CATH: CPT | Performed by: INTERNAL MEDICINE

## 2021-04-19 PROCEDURE — 80048 BASIC METABOLIC PNL TOTAL CA: CPT | Performed by: EMERGENCY MEDICINE

## 2021-04-19 PROCEDURE — U0005 INFEC AGEN DETEC AMPLI PROBE: HCPCS | Performed by: EMERGENCY MEDICINE

## 2021-04-19 PROCEDURE — U0003 INFECTIOUS AGENT DETECTION BY NUCLEIC ACID (DNA OR RNA); SEVERE ACUTE RESPIRATORY SYNDROME CORONAVIRUS 2 (SARS-COV-2) (CORONAVIRUS DISEASE [COVID-19]), AMPLIFIED PROBE TECHNIQUE, MAKING USE OF HIGH THROUGHPUT TECHNOLOGIES AS DESCRIBED BY CMS-2020-01-R: HCPCS | Performed by: EMERGENCY MEDICINE

## 2021-04-19 PROCEDURE — 99285 EMERGENCY DEPT VISIT HI MDM: CPT | Performed by: EMERGENCY MEDICINE

## 2021-04-19 PROCEDURE — 99285 EMERGENCY DEPT VISIT HI MDM: CPT | Mod: 25 | Performed by: INTERNAL MEDICINE

## 2021-04-19 PROCEDURE — 93005 ELECTROCARDIOGRAM TRACING: CPT | Mod: 59 | Performed by: INTERNAL MEDICINE

## 2021-04-19 PROCEDURE — 85025 COMPLETE CBC W/AUTO DIFF WBC: CPT | Performed by: EMERGENCY MEDICINE

## 2021-04-19 PROCEDURE — 84484 ASSAY OF TROPONIN QUANT: CPT | Performed by: EMERGENCY MEDICINE

## 2021-04-19 RX ADMIN — SODIUM CHLORIDE 500 ML: 9 INJECTION, SOLUTION INTRAVENOUS at 21:12

## 2021-04-19 NOTE — TELEPHONE ENCOUNTER
Pt's sister in law, Grazyna, called this morning stating pt was seen in the ER on 4/15/21 and is ready to have his catheter taken out. I informed her that I could not take out the catheter unless the pt was seen by Dr. De Leon first and assessed.  Grazyna understood and I made an appt for tomorrow 4/20/21 at 7:30 AM for pt to be seen.  Grazyna called back a few hours later stating that the pt had the catheter removed by a nurse at [facility] 2 hours ago and he cannot void.  I advised pt to push fluids, walk around and if unable to void, feeling distended and/or abdominal pain pt needs to go back into the ER.  Pt will still come see Dr. De Leon tomorrow morning for an assessment.  Grazyna verbalized understanding.  MICHELLE HILL, EJN

## 2021-04-20 ENCOUNTER — APPOINTMENT (OUTPATIENT)
Dept: OCCUPATIONAL THERAPY | Facility: HOSPITAL | Age: 81
End: 2021-04-20
Attending: INTERNAL MEDICINE
Payer: MEDICARE

## 2021-04-20 ENCOUNTER — APPOINTMENT (OUTPATIENT)
Dept: PHYSICAL THERAPY | Facility: HOSPITAL | Age: 81
End: 2021-04-20
Attending: INTERNAL MEDICINE
Payer: MEDICARE

## 2021-04-20 PROBLEM — E11.9 DIABETES MELLITUS, TYPE 2 (H): Status: ACTIVE | Noted: 2021-04-20

## 2021-04-20 LAB
ANION GAP SERPL CALCULATED.3IONS-SCNC: 6 MMOL/L (ref 3–14)
BUN SERPL-MCNC: 21 MG/DL (ref 7–30)
CALCIUM SERPL-MCNC: 9.1 MG/DL (ref 8.5–10.1)
CHLORIDE SERPL-SCNC: 106 MMOL/L (ref 94–109)
CO2 SERPL-SCNC: 25 MMOL/L (ref 20–32)
CREAT SERPL-MCNC: 1.26 MG/DL (ref 0.66–1.25)
ERYTHROCYTE [DISTWIDTH] IN BLOOD BY AUTOMATED COUNT: 12.5 % (ref 10–15)
GFR SERPL CREATININE-BSD FRML MDRD: 54 ML/MIN/{1.73_M2}
GLUCOSE BLDC GLUCOMTR-MCNC: 110 MG/DL (ref 70–99)
GLUCOSE BLDC GLUCOMTR-MCNC: 112 MG/DL (ref 70–99)
GLUCOSE BLDC GLUCOMTR-MCNC: 156 MG/DL (ref 70–99)
GLUCOSE SERPL-MCNC: 70 MG/DL (ref 70–99)
HCT VFR BLD AUTO: 38.2 % (ref 40–53)
HGB BLD-MCNC: 13.3 G/DL (ref 13.3–17.7)
MCH RBC QN AUTO: 32.8 PG (ref 26.5–33)
MCHC RBC AUTO-ENTMCNC: 34.8 G/DL (ref 31.5–36.5)
MCV RBC AUTO: 94 FL (ref 78–100)
PLATELET # BLD AUTO: 173 10E9/L (ref 150–450)
POTASSIUM SERPL-SCNC: 3.9 MMOL/L (ref 3.4–5.3)
RBC # BLD AUTO: 4.06 10E12/L (ref 4.4–5.9)
SODIUM SERPL-SCNC: 137 MMOL/L (ref 133–144)
WBC # BLD AUTO: 6.7 10E9/L (ref 4–11)

## 2021-04-20 PROCEDURE — G0378 HOSPITAL OBSERVATION PER HR: HCPCS

## 2021-04-20 PROCEDURE — 999N001017 HC STATISTIC GLUCOSE BY METER IP

## 2021-04-20 PROCEDURE — 85027 COMPLETE CBC AUTOMATED: CPT | Performed by: INTERNAL MEDICINE

## 2021-04-20 PROCEDURE — 97161 PT EVAL LOW COMPLEX 20 MIN: CPT | Mod: GP

## 2021-04-20 PROCEDURE — 99219 PR INITIAL OBSERVATION CARE,LEVEL II: CPT | Performed by: INTERNAL MEDICINE

## 2021-04-20 PROCEDURE — 80048 BASIC METABOLIC PNL TOTAL CA: CPT | Performed by: INTERNAL MEDICINE

## 2021-04-20 PROCEDURE — 36415 COLL VENOUS BLD VENIPUNCTURE: CPT | Performed by: INTERNAL MEDICINE

## 2021-04-20 PROCEDURE — 250N000013 HC RX MED GY IP 250 OP 250 PS 637: Performed by: INTERNAL MEDICINE

## 2021-04-20 PROCEDURE — 97530 THERAPEUTIC ACTIVITIES: CPT | Mod: GO

## 2021-04-20 PROCEDURE — 97165 OT EVAL LOW COMPLEX 30 MIN: CPT | Mod: GO

## 2021-04-20 RX ORDER — IBUPROFEN 400 MG/1
400 TABLET, FILM COATED ORAL EVERY 6 HOURS PRN
Status: DISCONTINUED | OUTPATIENT
Start: 2021-04-20 | End: 2021-04-22 | Stop reason: HOSPADM

## 2021-04-20 RX ORDER — ACETAMINOPHEN 325 MG/1
650 TABLET ORAL EVERY 4 HOURS PRN
Status: DISCONTINUED | OUTPATIENT
Start: 2021-04-20 | End: 2021-04-22 | Stop reason: HOSPADM

## 2021-04-20 RX ORDER — TAMSULOSIN HYDROCHLORIDE 0.4 MG/1
0.4 CAPSULE ORAL AT BEDTIME
Status: DISCONTINUED | OUTPATIENT
Start: 2021-04-20 | End: 2021-04-22 | Stop reason: HOSPADM

## 2021-04-20 RX ORDER — ONDANSETRON 4 MG/1
4 TABLET, ORALLY DISINTEGRATING ORAL EVERY 6 HOURS PRN
Status: DISCONTINUED | OUTPATIENT
Start: 2021-04-20 | End: 2021-04-22 | Stop reason: HOSPADM

## 2021-04-20 RX ORDER — MULTIVITAMIN,THERAPEUTIC
1 TABLET ORAL DAILY
COMMUNITY

## 2021-04-20 RX ORDER — ONDANSETRON 2 MG/ML
4 INJECTION INTRAMUSCULAR; INTRAVENOUS EVERY 6 HOURS PRN
Status: DISCONTINUED | OUTPATIENT
Start: 2021-04-20 | End: 2021-04-22 | Stop reason: HOSPADM

## 2021-04-20 RX ORDER — NICOTINE POLACRILEX 4 MG
15-30 LOZENGE BUCCAL
Status: DISCONTINUED | OUTPATIENT
Start: 2021-04-20 | End: 2021-04-22 | Stop reason: HOSPADM

## 2021-04-20 RX ORDER — ASPIRIN 325 MG
325 TABLET ORAL DAILY
Status: ON HOLD | COMMUNITY
End: 2021-04-29

## 2021-04-20 RX ORDER — FINASTERIDE 5 MG/1
5 TABLET, FILM COATED ORAL DAILY
Status: DISCONTINUED | OUTPATIENT
Start: 2021-04-21 | End: 2021-04-22 | Stop reason: HOSPADM

## 2021-04-20 RX ORDER — GABAPENTIN 300 MG/1
300 CAPSULE ORAL AT BEDTIME
Status: DISCONTINUED | OUTPATIENT
Start: 2021-04-20 | End: 2021-04-22 | Stop reason: HOSPADM

## 2021-04-20 RX ORDER — ACETAMINOPHEN 500 MG
1000 TABLET ORAL 2 TIMES DAILY
COMMUNITY

## 2021-04-20 RX ORDER — AMLODIPINE BESYLATE 2.5 MG/1
2.5 TABLET ORAL DAILY
Status: DISCONTINUED | OUTPATIENT
Start: 2021-04-20 | End: 2021-04-22 | Stop reason: HOSPADM

## 2021-04-20 RX ORDER — LEVOFLOXACIN 250 MG/1
250 TABLET, FILM COATED ORAL DAILY
Status: DISCONTINUED | OUTPATIENT
Start: 2021-04-20 | End: 2021-04-22 | Stop reason: HOSPADM

## 2021-04-20 RX ORDER — METFORMIN HCL 500 MG
500 TABLET, EXTENDED RELEASE 24 HR ORAL 2 TIMES DAILY WITH MEALS
Status: DISCONTINUED | OUTPATIENT
Start: 2021-04-20 | End: 2021-04-22 | Stop reason: HOSPADM

## 2021-04-20 RX ORDER — GLIMEPIRIDE 2 MG/1
2 TABLET ORAL
Status: DISCONTINUED | OUTPATIENT
Start: 2021-04-20 | End: 2021-04-22 | Stop reason: HOSPADM

## 2021-04-20 RX ORDER — CARVEDILOL 25 MG/1
25 TABLET ORAL 2 TIMES DAILY WITH MEALS
Status: DISCONTINUED | OUTPATIENT
Start: 2021-04-20 | End: 2021-04-22 | Stop reason: HOSPADM

## 2021-04-20 RX ORDER — DEXTROSE MONOHYDRATE 25 G/50ML
25-50 INJECTION, SOLUTION INTRAVENOUS
Status: DISCONTINUED | OUTPATIENT
Start: 2021-04-20 | End: 2021-04-22 | Stop reason: HOSPADM

## 2021-04-20 RX ORDER — ALLOPURINOL 100 MG/1
100 TABLET ORAL DAILY
Status: DISCONTINUED | OUTPATIENT
Start: 2021-04-20 | End: 2021-04-22 | Stop reason: HOSPADM

## 2021-04-20 RX ADMIN — ALLOPURINOL 100 MG: 100 TABLET ORAL at 08:53

## 2021-04-20 RX ADMIN — TAMSULOSIN HYDROCHLORIDE 0.4 MG: 0.4 CAPSULE ORAL at 18:21

## 2021-04-20 RX ADMIN — GABAPENTIN 300 MG: 300 CAPSULE ORAL at 00:30

## 2021-04-20 RX ADMIN — CARVEDILOL 25 MG: 25 TABLET, FILM COATED ORAL at 16:52

## 2021-04-20 RX ADMIN — METFORMIN ER 500 MG 500 MG: 500 TABLET ORAL at 13:05

## 2021-04-20 RX ADMIN — CARVEDILOL 25 MG: 25 TABLET, FILM COATED ORAL at 08:52

## 2021-04-20 RX ADMIN — AMLODIPINE BESYLATE 2.5 MG: 2.5 TABLET ORAL at 08:52

## 2021-04-20 RX ADMIN — METFORMIN ER 500 MG 500 MG: 500 TABLET ORAL at 16:52

## 2021-04-20 RX ADMIN — LEVOFLOXACIN 250 MG: 250 TABLET, FILM COATED ORAL at 16:51

## 2021-04-20 RX ADMIN — GLIMEPIRIDE 2 MG: 2 TABLET ORAL at 13:05

## 2021-04-20 RX ADMIN — GABAPENTIN 300 MG: 300 CAPSULE ORAL at 20:55

## 2021-04-20 ASSESSMENT — MIFFLIN-ST. JEOR: SCORE: 1649.88

## 2021-04-20 NOTE — PLAN OF CARE
Face to face report given with opportunity to observe patient.    Report given to Komal Parker RN   4/20/2021  3:25 PM

## 2021-04-20 NOTE — PROGRESS NOTES
"Inpatient Occupational Therapy Evaluation    Name: Anthony Dyer MRN# [id number]   Age: 80 year old YOB: 1940     Date of Consultation: 2021  Consultation is requested by: Dr. Bustos  Specific Consultation Request: Self-care deficits/confusion; discharge recommendations and therapy to facilitate a safe discharge  Primary care provider: Blaze Prather    General Information:   Onset Date: 2021    History of Current Problem/Admission: Urinary retention [R33.9]  Generalized weakness [R53.1]    Prior Level of Function: Pt reports he was previously independent in ADLs and used a cane for functional mobility. He recently had a to catheter placed due to retention, and his sister-in-law and girlfriend were managing the catheter.   Ambulation: 1 - Assistive Equipment   Transferrin - Independent   Toiletin - Independent    Bathin - Assistive Equipment   Dressin - Independent   Eatin - Independent   Communication: 0 - Understands/communicates without difficulty  Swallowin - swallows foods/liquids without difficulty  Cognition: 0 - no cognitive issues reported    Fall history within the last 6 months: No    Current Living Situation: Pt lives alone in a 1-story house with as basement. Pt reports \"a couple steps to enter\". Pt was going to the basement to do his laundry. Bathroom has a shower/tub combo with a chair. No grab bars in the BR. Toilet is regular height.      Current Equipment Used at Home: Walking stick, shower chair     Patient & Family Goals: Go home     Past Medical History:   Past Medical History:   Diagnosis Date     Diabetes (H)      Hypertension        Past Surgical History:  Past Surgical History:   Procedure Laterality Date     HERNIA REPAIR         Medications:   Current Facility-Administered Medications   Medication     acetaminophen (TYLENOL) tablet 650 mg     allopurinol (ZYLOPRIM) tablet 100 mg     amLODIPine (NORVASC) tablet 2.5 mg     " carvedilol (COREG) tablet 25 mg     glucose gel 15-30 g    Or     dextrose 50 % injection 25-50 mL    Or     glucagon injection 1 mg     [START ON 2021] finasteride (PROSCAR) tablet 5 mg     gabapentin (NEURONTIN) capsule 300 mg     glimepiride (AMARYL) tablet 2 mg     ibuprofen (ADVIL/MOTRIN) tablet 400 mg     magnesium hydroxide (MILK OF MAGNESIA) suspension 30 mL     melatonin tablet 5 mg     metFORMIN (GLUCOPHAGE-XR) 24 hr tablet 500 mg     ondansetron (ZOFRAN-ODT) ODT tab 4 mg    Or     ondansetron (ZOFRAN) injection 4 mg     tamsulosin (FLOMAX) capsule 0.4 mg       Weight Bearing Status: WBAT     Cognitive Status Examination:  Orientation: oriented to time, place and person  Level of Consciousness: alert  Follows Commands and Answers Questions: 100% of the time  Personal Safety and Judgement: At Risk Behaviors Demonstrated  Memory: Immediate recall intact  Comments: Pt demonstrated some safety concerns when performing ADLs, especially with functional transfers. Pt confused about catheter, too.    Pain:   Currently in pain? No  Pain Location?   Pain Ratin (No pain)    Occupational Therapy Evaluation:   Integumentary/Edema: No concerns noted  Range of Motion: BUE's WNL   Strength: BUE's grossly 4/5  Hand Strength: Bilateral gross grasp good  Muscle Tone Assessment: No issues observed  Coordination: Normal    Mobility:   Transfer Skills: CGA sit-stand with verbal cues for safe hand placement  Bed to Chair/Chair to Bed: CGA with use of FWW and verbal cues for safety when transitioning to sitting  Toilet Transfer: CGA sit-stand and verbal cues for safety   Balance: Fair with support from the walker     ADLs:   Lower Body Dressing: Pt able to doff socks with SBA/no issues. Pt unable to redon due to discomfort with catheter when bending, therefore requiring maxA  Toileting: N/A did not have to use the toilet with OT  Grooming: SBA standing at the sink to wash his hands    IADLs:   Previous Responsibilities  of the Patient: Meal Prep, Laundry, Shopping, Medication Management, Finances  and Driving   Comments: Pt has a  who also assists with yard work.      Activities of Daily Living Analysis:   Impairments Contributing to Impaired Activities of Daily Living: generalized weakness, impaired balance, decreased activity tolerance, impaired cognition    Occupational Therapy Interventions: ADL Retraining , IADL retraining, cognition , strengthening , progressive activity/exercise and risk factor education     Clinical Impressions:  Criteria for Skilled Therapeutic Intervention Met: Yes, treatment indicated  OT Diagnosis: Decreased safety and independence with ADLs  Influenced by the following impairments: weakness, impaired balance, decreased activity tolerance, impaired cognition   Functional limitations due to impairment: LB dressing, toileting/transfers, bathing, functional mobility   Clinical presentation: Stable/Uncomplicated  Clinical presentation rationale: Clinical judgement  Clinical Decision making (complexity): Low Complexity  Frequency: 5 times/week  Predicted Duration of Therapy Intervention (days/wks): 5 days    Anticipated Discharge Disposition: Home with Assist, Home with Home Therapy and Transitional Care Facility   Anticipated Equipment Needs at Discharge:  Risks and Benefits of therapy have been explained: Yes  Patient, Family & other staff in agreement with plan of care: Yes  Clinical Impression Comments: Pt demonstrates decreased safety and independence in ADLs due to weakness, impaired balance, decreased activity tolerance, and impaired cognition. Pt would benefit from SNF for short term rehab however when this was discussed, pt was not agreeable. Furthermore, discussed assistance at home due to safety concerns, but pt could not be sure that he was have all that much assist from family. Ultimately, pt would benefit for further skilled OT intervention to address his deficits and maximize his  safety and independence in ADLs/IADLs and 24 hour assist/supervision at first if discharged home. Plan to treat pt during his acute care stay to optimize safety and independence in ADLs.     Total Eval Time: 20

## 2021-04-20 NOTE — ED NOTES
Pt presents to ED after being discharged from hospital on Saturday.  Pt went to [facility] today and had a to catheter pulled, pt reports becoming more fatigued t/o the day.  Pt has been able to take in PO fluids but has not had urinated since 1100 today.  Pt is requesting to have to placed back in.  Pt denies pain, SOB, fevers.

## 2021-04-20 NOTE — ED PROVIDER NOTES
EMERGENCY DEPARTMENT ENCOUNTER      NAME: Anthony Dyer  AGE: 80 year old male  YOB: 1940  MRN: [id number]  EVALUATION DATE & TIME: 4/19/2021  8:29 PM    PCP: Blaze Prather    ED PROVIDER: Blaze Mcdowell D.O.      Chief Complaint   Patient presents with     Urinary Retention     Generalized Weakness         HPI    Patient information was obtained from: patient       Anthony Dyer is a 80 year old male with known history of stroke, hypertension, diabetes, who presents with complaint of urinary retention.  Patient had a urinary catheter removed today.  Has not been able to urinate since 11 AM today.  Was recently hospitalized for urinary retention with infection and weakness.  Patient was discharged 2 days ago based on the patient's report.  He reports that he is feeling somewhat tired unusually again today.  Patient denies any fevers.  He denies cough, congestion, sore throat, chest pain, shortness of breath, nausea, vomiting, diarrhea, abdominal pain, numbness, tingling, lightheadedness, change in vision, headache.      REVIEW OF SYSTEMS  Constitutional:  Denies fever, chills, weight loss, reports generalized tiredness feeling  Eyes:  No pain, discharge, redness  HENT:  Denies sore throat, ear pain, congestion  Respiratory: No SOB, wheeze or cough  Cardiovascular:  No CP, palpitations  GI:  Denies abdominal pain, nausea, vomiting, diarrhea  : Denies dysuria, hematuria, reports urinary retention  Musculoskeletal:  Denies any new muscle/joint pain, swelling or loss of function.  Skin:  Denies rash, pallor  Neurologic:  Denies headache, focal weakness or sensory changes  Lymph: Denies swollen nodes    All other systems negative unless noted in HPI.    PAST MEDICAL HISTORY:  Past Medical History:   Diagnosis Date     Diabetes (H)      Hypertension        PAST SURGICAL HISTORY:  Past Surgical History:   Procedure Laterality Date     HERNIA REPAIR           ALLERGIES:  Allergies   Allergen Reactions      Manisha [Cefaclor] Hives       FAMILY HISTORY:  History reviewed. No pertinent family history.    SOCIAL HISTORY:  Social History     Socioeconomic History     Marital status: Single     Spouse name: Not on file     Number of children: Not on file     Years of education: Not on file     Highest education level: Not on file   Occupational History     Not on file   Social Needs     Financial resource strain: Not on file     Food insecurity     Worry: Not on file     Inability: Not on file     Transportation needs     Medical: Not on file     Non-medical: Not on file   Tobacco Use     Smoking status: Not on file   Substance and Sexual Activity     Alcohol use: Not on file     Drug use: Not on file     Sexual activity: Not on file   Lifestyle     Physical activity     Days per week: Not on file     Minutes per session: Not on file     Stress: Not on file   Relationships     Social connections     Talks on phone: Not on file     Gets together: Not on file     Attends Zoroastrian service: Not on file     Active member of club or organization: Not on file     Attends meetings of clubs or organizations: Not on file     Relationship status: Not on file     Intimate partner violence     Fear of current or ex partner: Not on file     Emotionally abused: Not on file     Physically abused: Not on file     Forced sexual activity: Not on file   Other Topics Concern     Not on file   Social History Narrative     Not on file       VITALS:  Patient Vitals for the past 24 hrs:   BP Temp Temp src Pulse Resp SpO2   04/19/21 2200 127/78 -- -- 87 -- --   04/19/21 2130 122/76 -- -- 90 -- --   04/19/21 2115 123/76 -- -- 91 -- --   04/19/21 1922 123/77 99.4  F (37.4  C) Tympanic 98 16 95 %       PHYSICAL EXAM    VITAL SIGNS: /78   Pulse 87   Temp 99.4  F (37.4  C) (Tympanic)   Resp 16   SpO2 95%     General Appearance: Well-appearing, well-nourished, no acute distress   Head:  Normocephalic, without obvious abnormality,  atraumatic  Eyes:  PERRL, conjunctiva/corneas clear, EOM's intact,  ENT:  Lips, mucosa, and tongue normal, membranes are moist without pallor  Neck:  Normal ROM, symmetrical, trachea midline    Cardio:  Regular rate and rhythm, no murmur, rub or gallop, 2+ pulses symmetric in all extremities  Pulm:  Clear to auscultation bilaterally, respirations unlabored,  Abdomen:  Soft, non-tender, no rebound or guarding.  Musculoskeletal: Full ROM, no edema, no cyanosis, good ROM of major joints  Integument:  Warm, Dry, No erythema, No rash.    Neurologic:  Alert & oriented.  No focal deficits appreciated.  Ambulatory.  Psychiatric:  Affect normal, Judgment normal, Mood normal.      Results for orders placed or performed during the hospital encounter of 04/19/21 (from the past 24 hour(s))   CBC with platelets differential   Result Value Ref Range    WBC 8.6 4.0 - 11.0 10e9/L    RBC Count 4.30 (L) 4.4 - 5.9 10e12/L    Hemoglobin 14.0 13.3 - 17.7 g/dL    Hematocrit 41.3 40.0 - 53.0 %    MCV 96 78 - 100 fl    MCH 32.6 26.5 - 33.0 pg    MCHC 33.9 31.5 - 36.5 g/dL    RDW 12.5 10.0 - 15.0 %    Platelet Count 209 150 - 450 10e9/L    Diff Method Automated Method     % Neutrophils 85.9 %    % Lymphocytes 6.4 %    % Monocytes 5.7 %    % Eosinophils 1.1 %    % Basophils 0.4 %    % Immature Granulocytes 0.5 %    Nucleated RBCs 0 0 /100    Absolute Neutrophil 7.4 1.6 - 8.3 10e9/L    Absolute Lymphocytes 0.6 (L) 0.8 - 5.3 10e9/L    Absolute Monocytes 0.5 0.0 - 1.3 10e9/L    Absolute Eosinophils 0.1 0.0 - 0.7 10e9/L    Absolute Basophils 0.0 0.0 - 0.2 10e9/L    Abs Immature Granulocytes 0.0 0 - 0.4 10e9/L    Absolute Nucleated RBC 0.0    Basic metabolic panel   Result Value Ref Range    Sodium 138 133 - 144 mmol/L    Potassium 4.4 3.4 - 5.3 mmol/L    Chloride 106 94 - 109 mmol/L    Carbon Dioxide 24 20 - 32 mmol/L    Anion Gap 8 3 - 14 mmol/L    Glucose 143 (H) 70 - 99 mg/dL    Urea Nitrogen 24 7 - 30 mg/dL    Creatinine 1.39 (H) 0.66 - 1.25  mg/dL    GFR Estimate 48 (L) >60 mL/min/[1.73_m2]    GFR Estimate If Black 55 (L) >60 mL/min/[1.73_m2]    Calcium 9.0 8.5 - 10.1 mg/dL   Troponin I   Result Value Ref Range    Troponin I ES <0.015 0.000 - 0.045 ug/L   UA reflex to Microscopic and Culture    Specimen: Catheterized Urine   Result Value Ref Range    Color Urine Yellow     Appearance Urine Clear     Glucose Urine Negative NEG^Negative mg/dL    Bilirubin Urine Negative NEG^Negative    Ketones Urine Negative NEG^Negative mg/dL    Specific Gravity Urine 1.028 1.003 - 1.035    Blood Urine Moderate (A) NEG^Negative    pH Urine 5.5 4.7 - 8.0 pH    Protein Albumin Urine 30 (A) NEG^Negative mg/dL    Urobilinogen mg/dL Normal 0.0 - 2.0 mg/dL    Nitrite Urine Negative NEG^Negative    Leukocyte Esterase Urine Trace (A) NEG^Negative    Source Catheterized Urine     RBC Urine 93 (H) 0 - 2 /HPF    WBC Urine 5 0 - 5 /HPF    Bacteria Urine Few (A) NEG^Negative /HPF    Squamous Epithelial /HPF Urine 0 0 - 1 /HPF    Mucous Urine Present (A) NEG^Negative /LPF    Hyaline Casts 1 (A) OTO2^O - 2 /LPF         EKG:    Rate: 90 bpm  Rhythm: Normal Sinus Rhythm  Axis: Normal  Interval: Normal  Conduction: Normal  QRS: Normal  ST: Normal  T-wave: Normal  QT: Not prolonged  Comparison EKG: PVCs are no longer present when compared to 15 April 2021, otherwise no significant change  Impression:  No acute ischemic change   I have independently reviewed and interpreted today's EKG, pending Cardiologist read      FINAL IMPRESSION:  1. Urinary retention    2. Generalized weakness          ED COURSE & MEDICAL DECISION MAKIN:44 PM I met with the patient to gather history and to perform my initial exam. I discussed the plan for care while in the Emergency Department.  10:23 PM discussed admission versus discharge with the patient, patient reports that he is unable to do ADLs or care for himself at home based on his weakness, and therefore plan is for admission under observation with  hopeful discharge in the morning.  10:27 PM spoke with Dr. Bustos, Hospitalist, for observation admission        Pertinent Labs & Imaging studies reviewed. (See chart for details)  80 year old male presents to the Emergency Department for evaluation of urinary retention and fatigue.  Patient reports that he has had recently been treated for a urinary retention, and was discharged on antibiotics.  At this time he also had significant weakness.  Was seen in the emergency department, and discharged in the morning.  Returns today as the catheter was removed earlier today and has not been able to urinate since.  He has also complained of increased fatigue and weakness again.  The patient has no evidence of infection tonight.  Urinary catheter was replaced.  However the patient is still reporting significant weakness despite IV rehydration.  EKG did not show any evidence of ischemia, first troponin is negative.  No additional emergent process has been found on this patient.  However with this fatigue, I do not believe the patient can be discharged home as he is unable to perform ADLs.  Therefore after discussion with the hospitalist was decided the patient will be admitted under observation for further management and hopefully will be able to be discharged tomorrow.    At the conclusion of the encounter I discussed the results of all of the tests and the disposition. The questions were answered. The patient or family acknowledged understanding and was agreeable with the care plan.      MEDICATIONS GIVEN IN THE EMERGENCY:  Medications   0.9% sodium chloride BOLUS (0 mLs Intravenous Stopped 4/19/21 2216)       Blaze Mcdowell D.O.  Emergency Medicine         Blaze Mcdowell,   04/19/21 0932

## 2021-04-20 NOTE — PROGRESS NOTES
Pt and family state patient cannot go home due to weakness and to catheter.  Patient was provided with the Medicare Compare list for SNF.  Discussed associated Medicare star ratings to assist with choice for referrals/discharge planning Yes    Education was given to pt that star ratings are updated/maintained by Medicare and can be reviewed by visiting www.medicare.gov Yes    Patient/family choices for facility in priority are:   First Choice : Skilled Nursing Facility [facility]: [facility] [place]     [phone]    Second Choice: Skilled Nursing Facility [facility]    [phone]    Third Choice: Skilled Nursing Facility Oliver[facility]: [facility]         [phone]

## 2021-04-20 NOTE — PLAN OF CARE
[facility] Inpatient Admission Note:    Patient admitted to 3112/3112-1 at approximately 0000 via cart accompanied by transport tech from emergency room . Report received from Rosette in SBAR format at 2350 via telephone. Patient ambulated to bed via self.. Patient is alert and oriented X 3, denies pain; rates at 0 on 0-10 scale.  Patient oriented to room, unit, hourly rounding, and plan of care. Explained admission packet and patient handbook with patient bill of rights brochure. Will continue to monitor and document as needed.     Inpatient Nursing criteria listed below was met:    Health care directives status obtained and documented: Yes    Care Everywhere authorization obtained No    MRSA swab completed for patient 65 years and older: N/A    Patient identifies a surrogate decision maker: Yes If yes, who:Grazyna Contact Information:See facesheet     If initial lactic acid >2.0, repeat lactic acid drawn within one hour of arrival to unit: NA. If no, state reason: n/a    Vaccination assessment and education completed:  n/a   Vaccinations received prior to admission: Pneumovax n/a  Influenza(seasonal)  N/A   Vaccination(s) ordered: n/a    Clergy visit ordered if patient requests: N/A    Skin issues/needs documented: Yes    Isolation Patient: no Education given, correct sign in place and documentation row added to PCS:   n/a    Fall Prevention N/A: Care plan updated, education given and documented, sticker and magnet in place: N/A    Care Plan initiated: Yes    Education Documented (including assessment): Yes    Patient has discharge needs : Yes If yes, please explain:homecare    VSS. A1 for transfers d/t weakness. Schmitt patent, draining straw colored urine. A/O calls for assist appropriately

## 2021-04-20 NOTE — PLAN OF CARE
Patient is vitally stable. Denies pain. Urinary output adequate. Appetite good. There appears to be a knowledge deficit regarding the Schmitt catheter. Patient has been provided with written, verbal, and demonstrated information about caring for the catheter. He continues to need repeated instruction and has not fully demonstrated an understanding. Patient is pleasant and compliant. Makes his needs known well.

## 2021-04-20 NOTE — ED NOTES
Patient transferred to acute care via stretcher at this time. Personal belongings sent up with pt.

## 2021-04-20 NOTE — PLAN OF CARE
Up in chair with assist of 2. PAS on. Reminded pt to use call light if he needs assist. Was agreeable. 45 minutes later, pt noted to be in bed. Pt states his nephew helped him get into bed. Bed alarm was not on. Discussed with pt that nephew/visitors are not allowed to get pt out of snow and into bed.

## 2021-04-20 NOTE — PROGRESS NOTES
Patient was ready for discharge.  However his sister-in-law who basically looks after him will be unavailable help him out regarding his Schmitt catheter.  He is not capable of taking care of this by himself.  Therefore his discharge will be canceled.  He is requesting [facility] for temporary stay until he is seen by urology once again.  He will remain in the hospital this evening.  Case management is working on admission there tomorrow morning.

## 2021-04-20 NOTE — PLAN OF CARE
This writer long with student nurse did go in to give patient stringent education regarding to catheter care for at home.

## 2021-04-20 NOTE — PROGRESS NOTES
Assessment completed by Amanda Tejada RN with Anthony.    LOC: alert     Dx: urinary retention  Chronic Disease Management: Diabetes mellitus type II and HTN    Lives with: alone  Living at:  home  Transportation: YES Was driving independently    Primary PCP: Blaze Prather  Insurance:  Medicare and [facility]  Medicare RDZ letter reviewed with Anthony.    Support System:  Yes, sister and girlfriend  Homecare/PCA: Currently does not have any  /County Services:   No  : NO    Pharmacy: [facility]  Meds management: Ind    Adequate Resources for needs (housing, utilities, food/med): YES  Household chores: Ind  Work/community/social activity: YES     ADLs: Ind  Ambulation:Ind  Falls: Reports no recent falls  Nutrition: Reports no concerns  Sleep: Reports no concerns    Equipment used: None reported       Mental health: No diagnosis  Substance abuse: Denies  Exposure to violence/abuse: Denies  Stressors: No concerns noted    Able to Return to Prior Living Arrangements: YES    MARCELA: low    Plan: Return home upon discharge.  Patient states he will consider staying with sister in law.  Patient states he will consider homecare nursing, PT and OT as he is currently homebound.

## 2021-04-20 NOTE — ED NOTES
Face to face report given with opportunity to observe patient.    Report given to TONY Reynoso RN   4/19/2021  11:04 PM

## 2021-04-20 NOTE — PROGRESS NOTES
[facility]    Hospitalist Progress Note      Assessment & Plan   Anthony Dyer is a 80 year old male who was admitted on 4/19/2021.      1.  Urinary retention: Patient with a significant history of BPH.  Was started on Flomax by primary care provider while back.  He initially presented to the ER on 415 with inability to void.  Bladder scan showed roughly 100 L of fluid.  He had a Schmitt catheter placed.  Did have a fever of 102 degrees.  Was started on Levaquin.  Was given a dose of IV followed by 5 days of 250 mg daily.  The urine culture from that visit had just less than 10,000 colonies of mixed gen.  Blood cultures were unremarkable.  CT scan showed abnormally thickened bladder wall with enlarged prostate.  There were left sided renal calculi noted largest was 11 mm.  Patient apparently presented to the [facility] on Monday the 19th.  And had the Schmitt catheter removed.  He then presented to the ER later that day and was admitted because of inability to void had a repeat Schmitt catheter placed.  Urinalysis was unremarkable he had no fevers at all.  They do not say what his urine volume was at that time.  He was admitted due to weakness.  At this time he is not having any significant discomfort.  He has had no fevers.  White count this morning 6700.  Renal function is stable at 1.26 for his creatinine.  He had been set up for a urology appointment with Dr. Morataya this morning.  At this point I did let her staff know that the patient was here.  She recommended just just keeping the catheter in and follow-up within a week or 2 in the clinic for attempt at removal.  However I did warn the patient that this may not result in the catheter being discontinued.  If he is unable to void they will replace it and likely set him up for a follow-up procedure for possible TURP.  We will ambulate patient this morning if does well likely discharge later today.    2.  Generalized weakness: Patient feels better  currently.  With PT just evaluate him to see that he is safe to return home.  If so then will discharge later today.    3.  Diabetes mellitus type 2: He is on oral agents.  Sugar this morning was 70.  He did eat breakfast.  Currently he is getting Metformin and glipizide at home.  We will reorder both of them this morning.    4.  Hypertension: Blood pressures adequate currently.  We will continue with his Coreg and amlodipine.            Diet: Regular Diet Adult  Fluids: None    DVT Prophylaxis: Low Risk/Ambulatory with no VTE prophylaxis indicated  Code Status: Full Code    Disposition: Expected discharge for later today    Osmar Martins    Interval History   Patient alert pleasant no distress.  Somewhat disappointed that catheter will not be removed.  I explained in detail why.  Reluctantly agrees.  We will ambulate.  We will see how he does.  Hopefully discharge later today.    -Data reviewed today: I reviewed all new labs and imaging results over the last 24 hours. I personally reviewed no images or EKG's today.    Physical Exam   Temp: 98.6  F (37  C) Temp src: Tympanic BP: 153/81 Pulse: 97   Resp: 16 SpO2: 94 % O2 Device: None (Room air)    Vitals:    04/20/21 0017   Weight: 88.1 kg (194 lb 3.6 oz)     Vital Signs with Ranges  Temp:  [98.6  F (37  C)-99.4  F (37.4  C)] 98.6  F (37  C)  Pulse:  [87-98] 97  Resp:  [14-16] 16  BP: (122-153)/(72-92) 153/81  SpO2:  [94 %-96 %] 94 %  I/O last 3 completed shifts:  In: 500 [I.V.:500]  Out: 800 [Urine:800]    Peripheral IV 04/19/21 Right Wrist (Active)   Site Assessment WDL 04/20/21 0022   Line Status Saline locked 04/20/21 0022   Phlebitis Scale 0-->no symptoms 04/20/21 0022   Infiltration Scale 0 04/20/21 0022   Number of days: 1       Urethral Catheter Latex 16 fr (Active)   Tube Description Positional 04/20/21 0018   Collection Container Standard 04/20/21 0018   Securement Method Securing device (Describe) 04/20/21 0018   Rationale for Continued Use Retention  04/20/21 0018   Urine Output 150 mL 04/20/21 0621   Number of days: 1     No line/device    Constitutional: Alert and oriented x3. No distress    HEENT: Normocephalic/atraumatic, PERRL, EOMI, mouth moist, neck supple, no LN.     Cardiovascular: RRR.  No murmur, no  rubs, or gallops.  JVD flat.  Bruits no.  Pulses 2+    Respiratory: Clear to auscultation bilaterally    Abdomen: Soft, nontender, nondistended, no organomegaly. Bowel sounds present    Extremities: Warm/dry.  No edema    Neuro:   Non focal, cranial nerves intact, Moves all extremities.  Medications       allopurinol  100 mg Oral Daily     amLODIPine  2.5 mg Oral Daily     carvedilol  25 mg Oral BID w/meals     [START ON 4/21/2021] finasteride  5 mg Oral Daily     gabapentin  300 mg Oral At Bedtime     tamsulosin  0.4 mg Oral At Bedtime       Data   Recent Labs   Lab 04/20/21  0517 04/19/21  2111 04/15/21  2225   WBC 6.7 8.6 8.2   HGB 13.3 14.0 13.9   MCV 94 96 97    209 150    138 137   POTASSIUM 3.9 4.4 3.9   CHLORIDE 106 106 106   CO2 25 24 25   BUN 21 24 19   CR 1.26* 1.39* 1.34*   ANIONGAP 6 8 6   JODI 9.1 9.0 8.5   GLC 70 143* 129*   ALBUMIN  --   --  3.5   PROTTOTAL  --   --  7.0   BILITOTAL  --   --  0.8   ALKPHOS  --   --  78   ALT  --   --  34   AST  --   --  26   TROPI  --  <0.015  --        No results found for this or any previous visit (from the past 24 hour(s)).

## 2021-04-20 NOTE — PROGRESS NOTES
Inpatient Physical Therapy Evaluation    Name: Anthony Dyer MRN# [id number]   Age: 80 year old YOB: 1940     Date of Consultation: 2021  Consultation is requested by:  Dr. Martins  Specific Consultation Request:  Weakness, home safety  Primary care provider: Blaze Prather    General Information:   Onset Date: 21    History of Current Problem/Admission: Urinary retention [R33.9]  Generalized weakness [R53.1]    Prior Level of Function: Pt reports he is mod I with mobility at home using a walking stick   Ambulation: 1 - Assistive Equipment   Transferrin - Assistive Equipment   Toiletin - Independent    Bathin - Independent   Dressin - Not assessed  Eatin - Not assessed  Communication: 0 - Understands/communicates without difficulty  Swallowing:   Cognition: 0 - no cognitive issues reported    Fall history within the last 6 months: No    Current Living Situation: Patient reports he lives at home independently. Reports home has a small step to enter. Pt reports he has stayed with his sister in law before and she helps him if he needs it     Current Equipment Used at Home: walking stick     Patient & Family Goals: Go home     Past Medical History:   Past Medical History:   Diagnosis Date     Diabetes (H)      Hypertension        Past Surgical History:  Past Surgical History:   Procedure Laterality Date     HERNIA REPAIR         Medications:   Current Facility-Administered Medications   Medication     acetaminophen (TYLENOL) tablet 650 mg     allopurinol (ZYLOPRIM) tablet 100 mg     amLODIPine (NORVASC) tablet 2.5 mg     carvedilol (COREG) tablet 25 mg     [START ON 2021] finasteride (PROSCAR) tablet 5 mg     gabapentin (NEURONTIN) capsule 300 mg     glimepiride (AMARYL) tablet 2 mg     ibuprofen (ADVIL/MOTRIN) tablet 400 mg     magnesium hydroxide (MILK OF MAGNESIA) suspension 30 mL     melatonin tablet 5 mg     metFORMIN (GLUCOPHAGE-XR) 24 hr tablet 500 mg      ondansetron (ZOFRAN-ODT) ODT tab 4 mg    Or     ondansetron (ZOFRAN) injection 4 mg     tamsulosin (FLOMAX) capsule 0.4 mg       Weight Bearing Status: WBAT     Cognitive Status Examination:  Orientation: oriented to time, place and person  Level of Consciousness: alert  Follows Commands and Answers Questions: 100% of the time  Personal Safety and Judgement: At Risk Behaviors Demonstrated  Memory: Immediate recall intact  Comments: Pt had some confusion about his catheter. Thought it needed to be unattached before he could ambulate     Pain:   Currently in pain? No  Pain Location?   Pain Rating:     Physical Therapy Evaluation:   Integumentary/Edema: NT  ROM: WFL  Strength: BLE strength grossly 4/5   Bed Mobility: Independent with extra time  Transfers: CGA for sit to stand transfer  Gait: Initially tried ambulating with walking stick however pt was unsteady and reached for walls. Pt did much better with FWW and was able to ambulated 125' c FWW and SBA  Balance: Impaired, recommend pt use walker at all times when ambulating   Sensory: NT  Coordination: Normal    Physical Therapy Interventions: Balance, Bed Mobility, Gait Training , Strengthening and Progressive Activity/Exercise     Clinical Impressions:  Criteria for Skilled Therapeutic Intervention Met: Yes, treatment indicated  PT Diagnosis: Weakness  Influenced by the following impairments: LE weakness, impaired gait, impaired balance, decreased activity tolerance  Functional limitations due to impairment: increased risk for falls  Clinical presentation: Stable/Uncomplicated  Clinical presentation rationale: Clinical judgement  Clinical Decision making (complexity): Low Complexity  Frequency: 6 times/week  Predicted Duration of Therapy Intervention (days/wks): 5 days    Anticipated Discharge Disposition: Home with Assist, Home with Home Therapy and Transitional Care Facility   Anticipated Equipment Needs at Discharge: walker  Risks and Benefits of therapy  have been explained: Yes  Patient, Family & other staff in agreement with plan of care: Yes  Clinical Impression Comments: Pt evaluation completed for weakness and home safety. Pt demonstrates with LE weakness and impaired balance. Recommend pt use walker at all times at home and would also recommend 24hr supervision at first when pt discharges home. Discussed SNF with patient and he is not interested in that at this time. Home PT would be beneficial for pt for home safety assessment and to work on balance. Plan to treat pt during his stay.     Total Eval Time: 15          \

## 2021-04-20 NOTE — PLAN OF CARE
Prior to Admission Medication Reconciliation:     Medications added:   [] None  [x] As listed below:    Asa    Multivitamin    apap    b12    Medications deleted:   [x] None  [] As listed below:    Medications marked for review/removal by attending:  [x] None  [] As listed below:    Changes made to existing medications:   [] None  [x] As listed below:    Updated strengths, time of day and frequencies    Last times/dates taken verified with patient:  [x] Yes- completed myself  [] Prepared PTA medlist for review only. (will not be available to review personally)  [] Did not review with patient. Rx verification only. Review completed by nursing.    [] Nurse completed no changes made (double checked entries)  [] Unable to review with patient at this time:  [] Nurse completed/changes made:       Allergies listed at another location:  []Not applicable   [x]See below: [facility]  Active Allergy Reactions Severity Noted Date Comments   Ace Inhibitors Other High 10/24/2018 Hyperkalemia     Cefaclor Hives   02/16/2001     Sulfa Drugs     08/23/2006 UV rays- (Sun)       Allergy review:    []Did not review: reviewed by nursing  []Did not review: pt unable at this time  []Patient/MAR verified NKDA  [x]Reviewed with patient, input notes    Medication reconciliation sources:   [x]Patient  []Patient family member/emergency contact: **  [][facility] Report Review  []Epic Chart Review  [x]Care Everywhere review:  Medication Sig Dispensed Refills Start Date End Date Status   [x]MULTIVITAMINS OR TABS   one tablet one time a day   0 10/29/2004   Active   [x]acetaminophen (TYLENOL) 325 MG tablet   Take 2 Tabs by mouth every four hours as needed for Pain. Acetaminophen should be limited to 4000 mg per day. 100 Tab   0 05/23/2012   Active   [x]Cyanocobalamin (B-12) 250 MCG Tab   Take 1 Tab by mouth one time a day. 90 Tab   3 09/29/2016   Active   lancets (ACCU-CHEK SOFTCLIX)   Use to monitor blood sugars twice daily 100 Each   3 10/05/2016    Active   ACCU-CHEK GRECIA PLUS STRIP   Use to test blood sugars twice daily 100 Strip   3 10/05/2016   Active   Blood Glucose Monitoring Suppl (BLOOD GLUCOSE SYSTEM LUIS) Kit   by Does not apply route. Patient needs a new BG meter. Please supply him one that will be covered by his insurance 1 Kit   0 10/25/2017   Active   [x]aspirin 325 MG tablet   Take 0.5 Tabs by mouth one time a day. 100 Tab   3 01/24/2018   Active   Insulin Pen Needle (PEN NEEDLES) 31G X 6 MM device   For use on Ozempic pen 30 Box   2 10/23/2019   Active   [x]sildenafil citrate (Viagra) 100 MG tablet   Take 0.5-1 Tabs by mouth as needed for Erectile dysfunction. Take orally 30 minutes to 4 hours before sexual activity. 8 Tab     01/22/2020   Active   [x]albuterol HFA (Ventolin HFA) 108 (90 Base) MCG/ACT inhalation aerosol   Inhale 2 Puffs into the lungs every four hours as needed for Shortness of Breath. Shake before using. 18 g   11 07/28/2020   Active   [x]atorvaSTATin (Lipitor) 80 MG tablet   TAKE ONE-HALF TABLET BY MOUTH EVERY EVENING 45 Tab   3 08/30/2020   Active   [x]allopurinol (Zyloprim) 100 MG tablet   TAKE 1 TABLET BY MOUTH ONCE DAILY 90 Tab   2 11/26/2020   Active   [x]gabapentin (Neurontin) 300 MG capsule   Take 1 Cap by mouth three times a day. 270 Cap   3 11/26/2020   Active   [x]amLODIPine (Norvasc) 2.5 MG tablet   TAKE 1 TABLET BY MOUTH ONCE DAILY 90 Tab   2 11/26/2020   Active   [x]Ozempic 2 MG/1.5ML Solution Pen-injector   INJECT 0.5 MG UNDER THE SKIN EVERY SEVEN DAYS FOR 28 DAYS. 1.5 mL   11 12/18/2020   Active   [x]tamsulosin (Flomax) 0.4 MG 24 hour capsule   Take 1 Capsule by mouth one time a day. Capsules should be swallowed whole; do not crush, chew, or open 90 Capsule   3 02/09/2021   Active   [x]carvedilol (Coreg) 25 MG tablet   TAKE 1 TABLET BY MOUTH TWICE A  Tablet   3 04/03/2021   Active   glimepiride (Amaryl) 4 MG tablet   TAKE 1 TAB BY MOUTH EVERY MORNING WITH BREAKFAST. 90 Tablet   1 04/11/2021   Active    [x]metFORMIN-XR (Glucophage-XR) 500 MG 24 hour tablet   TAKE 2 TABLETS BY MOUTH TWICE DAILY WITH MEALS. SWALLOW TABLET WHOLE; DO NOT CRUSH, DIVIDE OR CHEW. 360 Tablet   1 04/03/2021   Active   [x]glimepiride (Amaryl) 4 MG tablet   Take 0.5 Tablets by mouth every morning with breakfast. 45 Tablet   1 04/11/2021   Active       []Pharmacy med list: **  []Pharmacy phone call  [x]Outside meds dispense report: see below  []Nursing home or Assisted Living MAR:  []Other: **    [facility]     Is patient on coumadin?  [x]No      Requests for consultation by provider or pharmacist:   [x] Patient understands why all of their meds were prescribed and how to take them. No questions.   [] Managing party has no questions.   [] Patient/ managing party has questions about the following:  [] Did not review with patient. Cannot assess.     Fill dates and reported compliancy:  [x] Fill dates coincide with compliancy for all/most maintenance meds.   [] Fill dates do not coincide with compliancy with maintenance meds. See notes in PTA medlist and in comments.    [x] Fill dates do not coincide with the following medications but pt reports compliancy: gabapentin- states he sometimes misses doses. Fill dates reflect non-compliancy  [] Did not review with patient. Cannot assess.     Historian accuracy:  [x] Excellent- alert and oriented, understands why meds were prescribed and how to take, able to answer specifics  [] Good- alert and oriented, understands why meds were prescribed and how to take, some confusion   [] Fair- alert and oriented, doesn't know medications without list, cannot answer specifics about medications, but has a decent process for which to take at home  [] Poor- does not know medications, may not have a process to take at home, may be cognitively unable to review at this time  []Medication management done by family member or facility, no concerns about historian accuracy.   [] Did not review  with patient. Cannot assess.     Medication Management:  [x] Manages meds independently  [] Family member/ other party manages meds:  [] Meds managed by staff at facility  [] Meds set up by home care, family/other party helps administer  [] Meds set up by home care, self administers  [] Did not review with patient. Cannot assess.     Other medications aside from PTA:  [x] Denies taking any medications aside from those listed in PTA meds  [] Reports taking another medication(s) but cannot recall the name(s)  [] Refuses to say.  [] Did not review with patient. Cannot assess.     Comments: none- gabapentin only medication that has fill dates that do not reflect compliancy.     Myriam Talley on 4/20/2021 at 9:48 AM       Discrepancies: [x] No []Not Applicable []Yes: listed below    Time spent on medication reconciliation:   []5-20 mins  []20-40 mins  []> 40 mins    Issues completing PTA medication reconciliation:  [] On hold for a long time  [] Waited for a call back  [] Fax didn't come through  [] Fax took a long time  [] Other:    Notifying appropriate party of changes/additions/discrepancies:  []No pertinent changes made, notification not necessary.   [x] Notified attending provider via text page/phone call  [] Notified attending provider in person  [] Notified pharmacy  [] Notified nurse  [] Attending provider not available, left detailed notes  [] Pt is not admitted to floor yet, PTA meds completed before admission.   Medications Prior to Admission   Medication Sig Dispense Refill Last Dose     acetaminophen (TYLENOL) 325 MG tablet Take 650 mg by mouth every 4 hours as needed for pain . Limit acetaminophen to 4000 mg per day from all sources.   4/19/2021 at Unknown time     albuterol (PROAIR HFA/PROVENTIL HFA/VENTOLIN HFA) 108 (90 Base) MCG/ACT inhaler Inhale 2 puffs into the lungs every 4 hours as needed   4/19/2021 at Unknown time     allopurinol (ZYLOPRIM) 100 MG tablet Take 100 mg by mouth daily    4/19/2021 at  am     amLODIPine (NORVASC) 2.5 MG tablet Take 2.5 mg by mouth daily   4/19/2021 at am     aspirin (ASA) 325 MG tablet Take 162.5 mg by mouth daily   4/19/2021 at am     atorvastatin (LIPITOR) 80 MG tablet Take 40 mg by mouth every evening    4/19/2021 at PM     carvedilol (COREG) 25 MG tablet Take 25 mg by mouth 2 times daily    4/19/2021 at PM     gabapentin (NEURONTIN) 300 MG capsule Take 300 mg by mouth 3 times daily    4/19/2021 at AM     glimepiride (AMARYL) 4 MG tablet Take 2 mg by mouth every morning (before breakfast)    4/19/2021 at AM     levofloxacin (LEVAQUIN) 250 MG tablet Take 1 tablet (250 mg) by mouth daily for 5 days 5 tablet 0 4/19/2021 at AM     metFORMIN (GLUCOPHAGE-XR) 500 MG 24 hr tablet Take 1,000 mg by mouth 2 times daily (with meals)    4/19/2021 at PM     multivitamin, therapeutic (THERA-VIT) TABS tablet Take 1 tablet by mouth daily   Past Week at Unknown time     tamsulosin (FLOMAX) 0.4 MG capsule Take 0.4 mg by mouth every evening    4/19/2021 at PM     vitamin B-12 (CYANOCOBALAMIN) 250 MCG tablet Take 250 mcg by mouth daily   4/19/2021 at AM     Semaglutide,0.25 or 0.5MG/DOS, (OZEMPIC, 0.25 OR 0.5 MG/DOSE,) 2 MG/1.5ML SOPN Inject 0.5 mg Subcutaneous every 7 days    4/13/2021     sildenafil (VIAGRA) 100 MG tablet Take  mg by mouth daily as needed (erectile dysfunction) . Take orally 30 minutes to 4 hours before sexual activity.   More than a month at Unknown time         Medication Dispense History (from 4/20/2020 to 4/19/2021)  Expand All  Collapse All  Albuterol Sulfate   Dispensed Days Supply Quantity Provider Pharmacy   ALBUTEROL HFA 90MCG/ACT INH 03/12/2021 [facility]...   ALBUTEROL HFA 90MCG/ACT INH 07/28/2020 17 8.5 g [facility]...   ALBUTEROL HFA 90MCG/ACT INH 06/17/2020 17 8.5 g MCKENNA CHERY [facility]...         Allopurinol   Dispensed Days Supply Quantity Provider Pharmacy   ALLOPURINOL 100MG  TABLET [facility]13/2021 [facility]   ALLOPURINOL 100MG TABLET 01/14/2021 30 30 Units [facility]""FAVIAN [facility]...   ALLOPURINOL 100MG TABLET 01/13/2021 90 90 Units \[facility]\""FAVIAN [facility]...   ALLOPURINOL  TAB 100MG 09/04/2020 90 90 each MON[place]NA,MCKENNA [facility]...   ALLOPURINOL 100MG TABLET 09/04/2020 90 90 Units [facility]...   ALLOPURINOL  TAB 100MG 06/07/2020 90 90 each MONTANA,MCKENNA [facility]...   ALLOPURINOL 100MG TABLET 06/07/2020 90 90 Units [facility]TANA,MCKENNA [facility] - Fa...         Amoxicillin   Dispensed Days Supply Quantity Provider Pharmacy   AMOXICILLIN 500MG CAPSULE 03/05/2021 7 21 Units ASHWINI JACOBSON JR [facility]...   AMOXICILLIN 500MG CAPSULE 02/24/2021 7 28 Units ASHWINI NAILS JR [facility]...         Atorvastatin Calcium   Dispensed Days Supply Quantity Provider Pharmacy   ATORVASTATIN 80MG TABLET 04/13/2021 90 45 Units MONTANA,MCKENNA [facility] - Fa...   ATORVASTATIN TAB 80MG 04/13/2021 90 45 each MONTANA,MCKENNA [facility]...   ATORVASTATIN 80MG TABLET 01/20/2021 90 45 Units MONTANA,MCKENNA [facility]...   ATORVASTATIN 80MG TABLET 09/04/2020 90 45 Units MONTANA,MCKENNA [facility] - Fa...   ATORVASTATIN TAB 80MG 09/04/2020 90 45 each MONTANA,MCKENNA [facility]...   ATORVASTATIN 80MG TABLET 06/07/2020 90 45 Units [facility]...   ATORVASTATIN TAB 80MG 06/07/2020 90 45 each MONTANA,MCKENNA [facility]...         Carvedilol   Dispensed Days Supply Quantity Provider Pharmacy   CARVEDILOL   TAB 25MG 04/13/2021 90 180 each MONTANA,MCKENNA [facility]...   CARVEDILOL 25MG TABLET 04/13/2021 90 180 Units MONTANA,MCKENNA [facility] - Fa...   CARVEDILOL 25MG TABLET 01/20/2021 90 180 Units [facility]NA,MCKENNA [facility]...   CARVEDILOL   TAB 25MG 09/04/2020 90 180 each  MONTANA,MCKENNA [facility]...   CARVEDILOL 25MG TABLET 09/04/2020 90 180 Units MONTANA,MCKENNA [facility] #[facility] - Fa...   CARVEDILOL   TAB 25MG 06/07/2020 90 180 each MONTANA,MCKENNA [facility]...   CARVEDILOL 25MG TABLET 06/07/2020 90 180 Units [facility] #61 - Fa...         Gabapentin   Dispensed Days Supply Quantity Provider Pharmacy   GABAPENTIN   CAP 300MG 09/04/2020 90 270 each MONTANA,MCKENNA [facility]...   GABAPENTIN 300MG CAPSULE 09/04/2020 90 270 Units MONTANA,MCKENNA [facility] #61 - Fa...   GABAPENTIN   CAP 300MG 06/07/2020 90 270 each MONTANA,MCKENNA [facility]...   GABAPENTIN 300MG CAPSULE 06/07/2020 90 270 Units [facility]...         Glimepiride   Dispensed Days Supply Quantity Provider Pharmacy   GLIMEPIRIDE  TAB 4MG 04/13/2021 90 90 each MONTANA,MCKENNA [facility]...   GLIMEPIRIDE 4MG TABLET 04/13/2021 90 90 Units [facility] #61 - Fa...   GLIMEPIRIDE 4MG TABLET 01/20/2021 90 90 Units MONTANA,MCKENNA [facility]...   GLIMEPIRIDE  TAB 4MG 09/04/2020 90 90 each MONTANA,MCKENNA [facility]...   GLIMEPIRIDE 4MG TABLET 09/04/2020 90 90 Units [facility] Fa...   GLIMEPIRIDE  TAB 4MG 06/07/2020 90 90 each MONTANA,MCKENNA [facility]...   GLIMEPIRIDE 4MG TABLET 06/07/2020 90 90 Units MONTANA,MCKENNA [facility] - Fa...         Semaglutide   Dispensed Days Supply Quantity Provider Pharmacy   OZEMPIC      INJ 2/1.5ML 04/19/2021 28 1.5 each MONTANA,MCKENNA [facility]...   OZEMPIC      INJ 2/1.5ML 03/22/2021 28 1.5 each MONTANA,MCKENNA [facility]...   OZEMPIC 0.25 OR 0.50/DOSE INJ 03/22/2021 28 1.5 mL MCKENNA [facility] #[facility] - Fa...   OZEMPIC      INJ 2/1.5ML 02/22/2021 28 1.5 each MCKENNA CHERY Pharmacy...   OZEMPIC 0.25 OR 0.50/DOSE INJ 02/22/2021 28 1.5 mL MCKENNA [facility] #[facility] - Fa...   OZEMPIC       INJ 2/1.5ML 01/25/2021 28 1.5 each MONTANA,MCKENNA [facility]...   OZEMPIC 0.25 OR 0.50/DOSE INJ 01/25/2021 28 1.5 mL MONTANA,MCKENNA [facility] - Fa...   OZEMPIC      INJ 2/1.5ML 12/26/2020 28 1.5 each MONTANA,MCKENNA [facility]...   OZEMPIC 0.25 OR 0.50/DOSE INJ 12/26/2020 28 1.5 mL MONTANA,MCKENNA [facility] - Fa...   OZEMPIC      INJ 2/1.5ML 11/27/2020 28 1.5 each [facility]   OZEMPIC 0.25 OR 0.50/DOSE INJ 11/27/2020 28 1.5 mL MONTANA,MCKENNA [facility] - Fa...   OZEMPIC      INJ 2/1.5ML 10/30/2020 28 1.5 each [facility]NA,[facility]CK[facility]NA [facility]...   OZEMPIC 0.25 OR 0.50/DOSE INJ 10/30/2020 28 1.5 mL MONTANA,MCKENNA [facility] - Fa...   OZEMPIC 0.25 OR 0.50/DOSE INJ 10/27/2020 28 1.5 mL MONTANA,MCKENNA [facility] - Fa...   OZEMPIC 0.25 OR 0.50/DOSE INJ 10/09/2020 28 1.5 mL [facility]NA,MCKENNA [facility]...   OZEMPIC 0.25 OR 0.50/DOSE INJ 09/10/2020 28 1.5 mL MONTANA,MCKENNA [facility]...   OZEMPIC 0.25 OR 0.50/DOSE INJ 08/05/2020 28 1.5 mL MONTANA,MCKENNA [facility]...   OZEMPIC 0.25 OR 0.50/DOSE INJ 08/03/2020 28 1.5 mL MONTANA,MCKENNA [facility]...   OZEMPIC 0.25 OR 0.50/DOSE INJ 07/15/2020 28 1.5 mL MONTANA,MCKENNA [facility]...   OZEMPIC 0.25 OR 0.50/DOSE INJ 06/17/2020 28 1.5 mL [facility]...   OZEMPIC 0.25 OR 0.50/DOSE INJ 05/20/2020 28 1.5 mL [facility]...         Tamsulosin HCl   Dispensed Days Supply Quantity Provider Pharmacy   TAMSULOSIN 0.4MG CAPSULE 03/12/2021 90 90 Units [facility]...   TAMSULOSIN 0.4MG CAPSULE 02/09/2021 34 34 Units [facility]...         amLODIPine Besylate   Dispensed Days Supply Quantity Provider Pharmacy   AMLODIPINE 2.5MG TABLET 04/12/2021 90 90 Units FAVIAN WILSON [facility]...   AMLODIPINE 2.5MG TABLET 01/20/2021 90 90 Units FAVIAN WILSON  [facility]...   AMLODIPINE   TAB 2.5MG 09/04/2020 90 90 each MONTANA,MCKENNA [facility]...   AMLODIPINE 2.5MG TABLET 09/04/2020 90 90 Units MON[facility]NAMCKENNA [facility] - Fa...   AMLODIPINE   TAB 2.5MG 06/07/2020 90 90 each MONTANA,MCKENNA [facility]...   AMLODIPINE 2.5MG TABLET 06/07/2020 90 90 Units MONTANAMCKENNA [facility] - Fa...         levoFLOXacin   Dispensed Days Supply Quantity Provider Pharmacy   LEVOFLOXACIN 250MG TABLET 04/16/2021 5 5 Units [facility]...         metFORMIN HCl   Dispensed Days Supply Quantity Provider Pharmacy   METFORMIN    TAB 500MG ER 04/13/2021 90 360 each MONTANA,MCKENNA [facility]...   METFORMIN 500MG ER TABLET 04/13/2021 90 360 Units [facility]NAMCKENNA [facility] - Fa...   METFORMIN 500MG ER TABLET 01/20/2021 90 360 Units [facility]NA,MCKENNA [facility]...   METFORMIN    TAB 500MG ER 09/04/2020 90 360 each MONTANA,MCKENNA [facility]...   METFORMIN 500MG ER TABLET 09/04/2020 90 360 Units MON[facility]NAMCKENNA #61 - Fa...   METFORMIN    TAB 500MG ER 06/07/2020 90 360 each [facility]NAMCKENNA Pharmacy...   METFORMIN 500MG ER TABLET 06/07/2020 90 360 Units MONTANAMCKENNA [facility] - Fa...

## 2021-04-20 NOTE — PLAN OF CARE
Face to face report given with opportunity to observe patient.    Report given to Karely William RN   4/20/2021  6:55 AM

## 2021-04-20 NOTE — PROGRESS NOTES
04/20/21 1500   Signing Clinician's Name / Credentials   Signing clinician's name / credentials Rebekah Jones, OTR/L   Quick Adds   Rehab Discipline OT   Quick Adds Therapeutic Activity   Therapeutic Activity   Minutes of Treatment 10 minutes   Symptoms Noted During/After Treatment Fatigue   Treatment Detail Pt transferred off the toilet with CGA. He went to the sink and washed his hands in standing with SBA. Pt ambulated to the chair using FWW with CGA-SBA. He transferred sit-stand from the chair with CGA and verbal cues for safety. Pt educated in current performance and d/c recommendations. Pt wants to return home, however, was not able to confirm how much assist his family can provide. Pt requested to lie down. He transferred chair to bed using FWW with CGA and verbal cues for safety. Pt able to transfer sit to supine with SBA, mild difficulties. Pt also required assist to hold/move/manage his catheter. Pt was left resting in the bed with the call light within reach and bed alarm activated.   OT Discharge Planning    OT Discharge Recommendation (DC Rec) Transitional Care Facility;Home with assist;home with home care occupational therapy   OT Rationale for DC Rec Pt demonstrates decreased safety and independence in ADLs due to weakness, impaired balance, decreased activity tolerance, and impaired cognition. Pt would benefit from SNF for short term rehab however when this was discussed, pt was not agreeable. Furthermore, discussed assistance at home due to safety concerns, but pt could not be sure that he was have all that much assist from family. Ultimately, pt would benefit for further skilled OT intervention to address his deficits and maximize his safety and independence in ADLs/IADLs and 24 hour assist/supervision at first if discharged home. Plan to treat pt during his acute care stay to optimize safety and independence in ADLs.   OT Brief overview of current status  Pt required CGA to transfer sit<>stand  from the chair with verbal cues for safe hand placement. He transferred chair to bed with CGA with use of FWW and verbal cues for safety when transitioning to sitting. He transferred sit to supine with SBA but mild difficulties. Pt was able to complete a toilet transfer with CGA but also cues for safety. Pt ambulated to/from the BR using FWW with CGA-SBA. Pt was able to doff socks with SBA/no issues. Pt unable to redon due to discomfort with catheter when bending, therefore requiring maxA. Lastly, pt stood at the sink to wash his hands with SBA. Pt also needed assist to move/hold/manage positioning of catheter during entire OT assessment.   Additional Documentation   Rehab Comments Pt presents with weakness, impaired balance, decreased activity tolerance, and decreased cognition all presenting safety concerns with d/cing home alone. At time of OT assessment, pt was not wanting to go to SNF nor was able to confirm with complete certaintly that family can provide much assist.   OT Plan Progress safety and independence in ADLs, as able   Total Session Time   Total Session Time (minutes) 10 minutes

## 2021-04-20 NOTE — ED NOTES
Update given to Grazyna at [phone], Grazyna would like to be called when disposition is decided.

## 2021-04-20 NOTE — PHARMACY-MEDICATION REGIMEN REVIEW
Pharmacy Antimicrobial Stewardship Assessment     Current Antimicrobial Therapy:  Anti-infectives (From now, onward)    Start     Dose/Rate Route Frequency Ordered Stop    04/20/21 1530  levofloxacin (LEVAQUIN) tablet 250 mg      250 mg Oral DAILY 04/20/21 1521            Indication: UTI    Days of Therapy: 5     Pertinent Labs:  WBC   Date Value Ref Range Status   04/20/2021 6.7 4.0 - 11.0 10e9/L Final   04/19/2021 8.6 4.0 - 11.0 10e9/L Final   04/15/2021 8.2 4.0 - 11.0 10e9/L Final     Procalcitonin   Date Value Ref Range Status   04/15/2021 0.99 ng/ml Final     Comment:     0.50-1.99 ng/ml  Moderate risk of systemic infection.  Recommendation:   Recommend antibiotics. Evaluate culture results and clinical features to   target antibacterial therapy. Obtain blood cultures and other relevant   cultures if not done.  If empiric antibiotics were started, recheck PCT in: 2 days to guide   antibiotic de-escalation.  Discontinue or de-escalate antibiotics when PCT   concentration is <80% of peak or abs PCT <0.5. If empiric antibiotics were NOT   started, recheck PCT in 6-24 hours to re-evaluate need for antibiotics.       CRP Inflammation   Date Value Ref Range Status   04/15/2021 85.7 (H) 0.0 - 8.0 mg/L Final       Culture Results:   4/15/21 Urine = <10k mixed gen    4/16/21: Levaquin started as outpatient x 5 days     Recommendations/Interventions:  1. Stop antibiotics as urine culture negative       Kapil Dave, [facility]  April 20, 2021

## 2021-04-20 NOTE — PLAN OF CARE
This writer did call patients sister in law to update on the plan to discharge, patients sister in law stated patient is unable to care for himself independently and requested patient go to the nursing home, MD was updated, and the sister in law requested to speak with MD, her number was given to Dr. Martins, and he stated he would contact the sister in law, discharge plan is suspended at this time.

## 2021-04-20 NOTE — ED TRIAGE NOTES
Pt states he had a to removed at 1100 today and has not been able to void since. C/O feeling very weak.

## 2021-04-20 NOTE — H&P
[facility]    History and Physical - Hospitalist Service       Date of Admission:  4/19/2021    Assessment & Plan   Anthony Dyer is a 80 year old male admitted on 4/19/2021.     Urinary retention: Schmitt replaced after urination trial failure; continue Flomax, start Proscar    Weakness: this might be related to the retention and the Levaquin that was prescribed when the Schmitt was first placed, 4/15/21, at this ER     Diet: Regular Diet Adult    DVT Prophylaxis: Pneumatic Compression Devices  Schmitt Catheter: in place, indication:    Code Status: Full Code           Disposition Plan   Expected discharge: Tomorrow, recommended to prior living arrangement once therapy evaluation is satisfactory for discharge.  Entered: Markell Bustos DO 04/20/2021, 12:23 AM     The patient's care was discussed with the Patient.    Markell Bustos DO  [facility]  Contact information available via [facility] Paging/Directory      ______________________________________________________________________    Chief Complaint   Unable to urinate and leg weakness    History is obtained from the patient, ER Provider, EHR review    History of Present Illness   Anthony Dyer is a 80 year old male who presented similarly to this ER 4/15/21 and was found to have urinary retention (975 ml bladder scan). He slept overnight in the ER and was prescribed Levaquin for a possible UTI    He presented to his clinic today and had the Schmitt removed at about 11:00 am. He went home and was not able to urinate and he became weak.    ER Course: he had a low-grade fever (99.4); otherwise unremarkable vitals. He appeared tired and dry (oral mucosa). Lab diagnostics resulted an unremarkable heme profile and a chemistry profile with preserved stage 3 renal function. The repeat UA showed no infection, similar to the UA on 4/15. When the Schmitt ws placed, there was large return. As with the ER visit 4/15, he was too weak to return directly to  home.        Review of Systems       Constitutional: No fever or chills,  Generalized leg weakness, no unintentional weight change, less appetite  Ears, Nose & Throat: no sore throat, no nasal drainage, no congestion. No ear pain, no ear drainage, no particular change in hearing  Eyes: no particular change in vision, no redness, no drainage  Cardiovascular: No chest pain at rest, no chest pain with familiar activities.  Pulmonary: No cough, no particular change in work of breathing, no particular change in shortness of breath with position changes or familiar activites  Gastrointestinal: No abdominal pain, no nausea, no vomiting, no diarrhea. No particular change in bowel movement pattern, no black stools, no bloody stools  Genitourinary:  Unable to urinate  Skin: No particular change in bruising, no rashes  Musculoskeletal: no particular change in strength, no particular change in muscle development  Neurological: no numbness and tingling, no headache, no particular change in balance, no dizziness  Psychologic: No particular change in depression and/or anxiety  Endocrine: No particular change in heat or cold intolerance        Past Medical History    I have reviewed this patient's medical history and updated it with pertinent information if needed.   Past Medical History:   Diagnosis Date     Diabetes (H)      Hypertension        Past Surgical History   I have reviewed this patient's surgical history and updated it with pertinent information if needed.  Past Surgical History:   Procedure Laterality Date     HERNIA REPAIR         Social History   I have reviewed this patient's social history and updated it with pertinent information if needed.  Social History     Tobacco Use     Smoking status: Unknown If Ever Smoked   Substance Use Topics     Alcohol use: Not Currently     Drug use: Never       Family History         Prior to Admission Medications   Prior to Admission Medications   Prescriptions Last Dose Informant  "Patient Reported? Taking?   Semaglutide,0.25 or 0.5MG/DOS, (OZEMPIC, 0.25 OR 0.5 MG/DOSE,) 2 MG/1.5ML SOPN   Yes No   Sig: INJECT 0.5 MG UNDER THE SKIN EVERY SEVEN DAYS FOR 28 DAYS.   albuterol (PROAIR HFA/PROVENTIL HFA/VENTOLIN HFA) 108 (90 Base) MCG/ACT inhaler   Yes No   Sig: Inhale 2 puffs into the lungs every 4 hours as needed   allopurinol (ZYLOPRIM) 100 MG tablet   Yes No   Sig: Take 100 mg by mouth daily    amLODIPine (NORVASC) 2.5 MG tablet   Yes No   Sig: Take 2.5 mg by mouth daily   atorvastatin (LIPITOR) 80 MG tablet   Yes No   Sig: TAKE ONE-HALF TABLET BY MOUTH EVERY EVENING   carvedilol (COREG) 25 MG tablet   Yes No   Sig: TAKE 1 TABLET BY MOUTH TWICE A DAY   gabapentin (NEURONTIN) 300 MG capsule   Yes No   Sig: Take 300 mg by mouth   glimepiride (AMARYL) 4 MG tablet   Yes No   Si/2 tab daily   levofloxacin (LEVAQUIN) 250 MG tablet   No No   Sig: Take 1 tablet (250 mg) by mouth daily for 5 days   metFORMIN (GLUCOPHAGE-XR) 500 MG 24 hr tablet   Yes No   Sig: TAKE 2 TABLETS BY MOUTH TWICE DAILY WITH MEALS. SWALLOW TABLET WHOLE; DO NOT CRUSH, DIVIDE OR CHEW.   sildenafil (VIAGRA) 100 MG tablet   Yes No   Sig: Take  mg by mouth   tamsulosin (FLOMAX) 0.4 MG capsule   Yes No   Sig: Take 0.4 mg by mouth      Facility-Administered Medications: None     Allergies   Allergies   Allergen Reactions     Ceclor [Cefaclor] Hives       Physical Exam   Vital Signs: Temp: 98.6  F (37  C) Temp src: Tympanic BP: 147/81 Pulse: 88   Resp: 16 SpO2: 96 % O2 Device: None (Room air)    Weight: 194 lbs 3.6 oz     Case reviewed with the ER Provider, EHR reviewed; patient seen in ER room 1    Vital signs:  Temp: 98.6  F (37  C) Temp src: Tympanic BP: 147/81 Pulse: 88   Resp: 16 SpO2: 96 % O2 Device: None (Room air)   Height: 185.4 cm (6' 1\") Weight: 88.1 kg (194 lb 3.6 oz)  Estimated body mass index is 25.62 kg/m  as calculated from the following:    Height as of this encounter: 1.854 m (6' 1\").    Weight as of this " encounter: 88.1 kg (194 lb 3.6 oz).      General: No distress, interactive  Head: normocephalic, no obvious trauma  Eyes: Gaze directed normally, sclera clear, no discharge, no abnormal ocular movements  Ears: Normal appearing age-related external ears, no discharge, stable hearing acuity loss  Nose: Normal age-related appearance  Mouth: Normal appearing oral mucosa, Gums and throat appear age-related normal  Neck: Normal age-related appearance, age-related range of motion, supple, no adenopathy  Pulmonary: Normal work of breathing, no expiratory delay, no coarseness, no wheezing  Cardiovascular: Distant heart sounds, regular rhythm   Abdomen: No obvious distention, soft, bowel sounds present with normal frequency and pitch  Rectal: Deferred  Back: Age-related normal  Skin: Age-related normal, no rashes  Extremities: Not tender, no lower extremity edema. Moving upper and lower extremities  Neurological: Grossly in tact  Psychiatric: Mood is stable, appropriately interactive          Data   Data reviewed today: I reviewed all medications, new labs and imaging results over the last 24 hours.

## 2021-04-21 ENCOUNTER — APPOINTMENT (OUTPATIENT)
Dept: OCCUPATIONAL THERAPY | Facility: HOSPITAL | Age: 81
End: 2021-04-21
Payer: MEDICARE

## 2021-04-21 ENCOUNTER — APPOINTMENT (OUTPATIENT)
Dept: PHYSICAL THERAPY | Facility: HOSPITAL | Age: 81
End: 2021-04-21
Payer: MEDICARE

## 2021-04-21 LAB
BACTERIA SPEC CULT: NORMAL
BACTERIA SPEC CULT: NORMAL
GLUCOSE BLDC GLUCOMTR-MCNC: 136 MG/DL (ref 70–99)
GLUCOSE BLDC GLUCOMTR-MCNC: 189 MG/DL (ref 70–99)
SPECIMEN SOURCE: NORMAL
SPECIMEN SOURCE: NORMAL

## 2021-04-21 PROCEDURE — 250N000013 HC RX MED GY IP 250 OP 250 PS 637: Performed by: INTERNAL MEDICINE

## 2021-04-21 PROCEDURE — 999N001017 HC STATISTIC GLUCOSE BY METER IP

## 2021-04-21 PROCEDURE — 97530 THERAPEUTIC ACTIVITIES: CPT | Mod: GP | Performed by: PHYSICAL THERAPIST

## 2021-04-21 PROCEDURE — 97110 THERAPEUTIC EXERCISES: CPT | Mod: GO

## 2021-04-21 PROCEDURE — 99225 PR SUBSEQUENT OBSERVATION CARE,LEVEL II: CPT | Performed by: INTERNAL MEDICINE

## 2021-04-21 PROCEDURE — G0378 HOSPITAL OBSERVATION PER HR: HCPCS

## 2021-04-21 RX ADMIN — CARVEDILOL 25 MG: 25 TABLET, FILM COATED ORAL at 08:55

## 2021-04-21 RX ADMIN — GABAPENTIN 300 MG: 300 CAPSULE ORAL at 20:39

## 2021-04-21 RX ADMIN — GLIMEPIRIDE 2 MG: 2 TABLET ORAL at 08:55

## 2021-04-21 RX ADMIN — LEVOFLOXACIN 250 MG: 250 TABLET, FILM COATED ORAL at 08:55

## 2021-04-21 RX ADMIN — METFORMIN ER 500 MG 500 MG: 500 TABLET ORAL at 17:31

## 2021-04-21 RX ADMIN — CARVEDILOL 25 MG: 25 TABLET, FILM COATED ORAL at 17:31

## 2021-04-21 RX ADMIN — METFORMIN ER 500 MG 500 MG: 500 TABLET ORAL at 08:55

## 2021-04-21 RX ADMIN — FINASTERIDE 5 MG: 5 TABLET, FILM COATED ORAL at 08:55

## 2021-04-21 RX ADMIN — ALLOPURINOL 100 MG: 100 TABLET ORAL at 08:55

## 2021-04-21 RX ADMIN — AMLODIPINE BESYLATE 2.5 MG: 2.5 TABLET ORAL at 08:55

## 2021-04-21 RX ADMIN — TAMSULOSIN HYDROCHLORIDE 0.4 MG: 0.4 CAPSULE ORAL at 17:31

## 2021-04-21 NOTE — PLAN OF CARE
Patient has been alert and oriented today. VSS. Declines pain. He is very fixated on his Schmitt catheter and concerned about moving with it. With a lot of encouragement throughout the day, the patient has been more mobile. He was able to ambulate in the hallway with PT and in his room to use the commode with assist x1, gait belt, and walker. Patient did not want lunch this afternoon. He uses his call light and makes his needs known appropriately.

## 2021-04-21 NOTE — PLAN OF CARE
No falls or injuries this shift. Up in chair brief time for dinner, then returned to bed by family member. Worries about catheter. Plan for NHP but did not screen with any nursing home this shift. Bed alarm on at all times.     Face to face report given with opportunity to observe patient.    Report given to Iva Toro RN   4/20/2021  11:06 PM

## 2021-04-21 NOTE — PROGRESS NOTES
"   04/21/21 1143   Signing Clinician's Name / Credentials   Signing clinician's name / credentials Rebekah Jones, OTR/L   Quick Adds   Rehab Discipline OT   Quick Adds Therapeutic Exercise   Therapeutic Exercise   Minutes of Treatment 10   Symptoms Noted During/After Treatment fatigue   Treatment Detail Pt sitting in the chair upon OT arrival. He reports just working with PT and already completing his morning ADLs. Pt asked, \"do you remember I have a catheter?\" Pt reassured yes and how that won't affect UB exercises, which he was then agreeable to as long as he did not have to get up. Pt completed 15 reps of the following: shoulder flexion, punch outs, elbow flex/ext, forearm pro/sup, wrist flex/ext, and finger flex/ext. Lastly, pt completed chair pushups x5 reps, again with reassurance that OT would monitor catheter/tubing as pt was worried about it being pulled. Pt declined completing a 2nd set of exercises. Pt left resting in the chair with the call light within reach and clip alert attached.   OT Discharge Planning    OT Discharge Recommendation (DC Rec) Transitional Care Facility   OT Rationale for DC Rec Pt demonstrates decreased safety and independence in ADLs due to weakness, impaired balance, decreased activity tolerance, and impaired cognition/safety. Pt would benefit from SNF for short term rehab to address his deficits and maximize his safety and independence in ADLs/IADLs. If pt were to return home, he would at minimum require 24 hour assist/supervision. Pt would also be at risk for further functional decline if he were to return home, as he is worried/nervous to mobilize with the catheter.   OT Brief overview of current status  Pt sitting in the chair upon OT arrival. He reports just working with PT and already completing his morning ADLs. Pt asked, \"do you remember I have a catheter?\" Pt reassured yes and how that won't affect UB exercises, which he was then agreeable to as long as he did not have to " get up. Pt completed 15 reps of the following: shoulder flexion, punch outs, elbow flex/ext, forearm pro/sup, wrist flex/ext, and finger flex/ext. Lastly, pt completed chair pushups x5 reps, again with reassurance that OT would monitor catheter/tubing as pt was worried about it being pulled. Pt declined completing a 2nd set of exercises. Pt left resting in the chair with the call light within reach and clip alert attached.   Additional Documentation   Rehab Comments Pt continues to demonstrates significant concerns with the catheter, even moving around with having it. Pt would be at risk for functional decline if he returns home.   OT Plan Progress safety and independence in ADLs, as able   Total Session Time   Total Session Time (minutes) 10 minutes

## 2021-04-21 NOTE — PROGRESS NOTES
[facility]    Hospitalist Progress Note      Assessment & Plan   Anthony Dyer is a 80 year old male who was admitted on 4/19/2021.      1.  Urinary retention: Schmitt catheter in place draining without difficulty.  Plan is to send until sees Dr. De Leon in follow-up for trial of removal in the clinic.  No evidence of any infection.  Did have initial fever treated empirically with Levaquin.    2.  Generalized weakness: Feels better PT is working with him he has some pulse of movements.  Feel that he should only be up with walker at this point.  We will get physical therapy at discharge to the nursing home.    3.  Diabetes mellitus type 2.  Sugars in acceptable range.  He is on his usual oral medications.    4.  Hypertension on amlodipine and Coreg.  Pressures adequate    5.  Disposition: Patient was declined by [facility].  Has been accepted to [facility].  Tentative plan is for discharge tomorrow morning.    Diet: Regular Diet Adult  Diet  Fluids: None    DVT Prophylaxis: Pneumatic Compression Devices  Code Status: Full Code    [facility]    Interval History   Patient feels well.  No shortness of breath chest pain nausea vomiting.  Treatment dictation from Schmitt catheter.  Declined by [facility].  Will be going to [facility] tomorrow.  Physical therapy will be important part of that hospital stay.    -Data reviewed today: I reviewed all new labs and imaging results over the last 24 hours. I personally reviewed no images or EKG's today.    Physical Exam   Temp: 99.1  F (37.3  C) Temp src: Tympanic BP: 138/73 Pulse: 93   Resp: 16 SpO2: 94 % O2 Device: None (Room air)    Vitals:    04/20/21 0017   Weight: 88.1 kg (194 lb 3.6 oz)     Vital Signs with Ranges  Temp:  [97.6  F (36.4  C)-99.5  F (37.5  C)] 99.1  F (37.3  C)  Pulse:  [89-93] 93  Resp:  [16] 16  BP: (134-164)/(73-82) 138/73  SpO2:  [93 %-95 %] 94 %  I/O last 3 completed shifts:  In:  720 [P.O.:720]  Out: 2100 [Urine:2100]    Peripheral IV 04/19/21 Right Wrist (Active)   Site Assessment WDL 04/21/21 0759   Line Status Saline locked 04/21/21 0759   Phlebitis Scale 0-->no symptoms 04/21/21 0759   Infiltration Scale 0 04/21/21 0759   Number of days: 2       Urethral Catheter Latex 16 fr (Active)   Tube Description Positional 04/21/21 0759   Catheter Care Catheter wipes 04/20/21 1630   Collection Container Standard 04/21/21 0759   Securement Method Securing device (Describe) 04/21/21 0759   Rationale for Continued Use Retention 04/21/21 0759   Urine Output 350 mL 04/21/21 1445   Number of days: 2     No line/device    Constitutional: Alert and oriented x3. No distress    HEENT: Normocephalic/atraumatic, PERRL, EOMI, mouth moist, neck supple, no LN.     Cardiovascular: RRR. no Murmur, no  rubs, or gallops.  JVD no.  Bruits no.  Pulses 3+    Respiratory: Clear to auscultation bilaterally    Abdomen: Soft, nontender, nondistended, no organomegaly. Bowel sounds present    Extremities: Warm/dry. No edema    Neuro:   Non focal, cranial nerves intact, Moves all extremities.  Medications       allopurinol  100 mg Oral Daily     amLODIPine  2.5 mg Oral Daily     carvedilol  25 mg Oral BID w/meals     finasteride  5 mg Oral Daily     gabapentin  300 mg Oral At Bedtime     glimepiride  2 mg Oral Daily with breakfast     levofloxacin  250 mg Oral Daily     metFORMIN  500 mg Oral BID w/meals     tamsulosin  0.4 mg Oral At Bedtime       Data   Recent Labs   Lab 04/20/21  0517 04/19/21  2111 04/15/21  2225   WBC 6.7 8.6 8.2   HGB 13.3 14.0 13.9   MCV 94 96 97    209 150    138 137   POTASSIUM 3.9 4.4 3.9   CHLORIDE 106 106 106   CO2 25 24 25   BUN 21 24 19   CR 1.26* 1.39* 1.34*   ANIONGAP 6 8 6   JODI 9.1 9.0 8.5   GLC 70 143* 129*   ALBUMIN  --   --  3.5   PROTTOTAL  --   --  7.0   BILITOTAL  --   --  0.8   ALKPHOS  --   --  78   ALT  --   --  34   AST  --   --  26   TROPI  --  <0.015  --        No  results found for this or any previous visit (from the past 24 hour(s)).

## 2021-04-21 NOTE — PROGRESS NOTES
04/21/21 1104   Signing Clinician's Name / Credentials   Signing clinician's name / credentials Janette Bella DPT   Quick Adds   Rehab Discipline PT   Therapeutic Activity   Minutes of Treatment 24 minutes   Symptoms Noted During/After Treatment Fatigue   Treatment Detail Pt resting in bed upon PT arrival, agrees to PT with encouragement and reassurance that we would monitor catheter closely.  Pt transferred sup>sit min A needing verbal cues on how to sequence.  Pt fixated on placement of catheter while seated at EOB.  Pt transferred sit>stand CGA from EOB with cues for safe hand placement.  Pt ambulated 150' with FWW CGA, needs verbal cues for safety to avoid objects in hallway at times.  Upon returning to room with chair, pt needing cues to position self safely in front of chair before sitting as he was attempting to transfer unsafely.  Once seated pt completed repeated sit<>stands x10 reps with use of UEs.  Did discuss short term rehab and benefit of that for strengthening and safety with catheter.     PT Discharge Planning    PT Discharge Recommendation (DC Rec) Transitional Care Facility   PT Rationale for DC Rec Pt demonstrates impaired safety awareness in regards to transfers and mobility.  Pt also very fixated on catheter.  Pt would require at a minimum 24 hour supervision but ultimately would benefit from short term rehab to work on strengthening as well as provide safety due to impaired cognition.   PT Brief overview of current status  Pt resting in bed upon PT arrival, agrees to PT with encouragement and reassurance that we would monitor catheter closely.  Pt transferred sup>sit min A needing verbal cues on how to sequence.  Pt fixated on placement of catheter while seated at EOB.  Pt transferred sit>stand CGA from EOB with cues for safe hand placement.  Pt ambulated 150' with FWW CGA, needs verbal cues for safety to avoid objects in hallway at times.  Upon returning to room with chair, pt needing  cues to position self safely in front of chair before sitting as he was attempting to transfer unsafely.  Once seated pt completed repeated sit<>stands x10 reps with use of UEs.  Did discuss short term rehab and benefit of that for strengthening and safety with catheter.     Additional Documentation   Rehab Comments impaired safety with mobility, requires cues   PT Plan Progress strength and mobility   Total Session Time   Total Session Time (minutes) 24 minutes

## 2021-04-21 NOTE — PLAN OF CARE
VSS. Schmitt patent draining straw colored urine. Encouraged fluid intake. A/O but some forgetfulness noted, concerned about catheter and about getting ride to SNF in AM, reassured everything would be taken care of.

## 2021-04-21 NOTE — UTILIZATION REVIEW
Concurrent stay review; Secondary Review Determination     Under the authority of the Utilization Management Committee, the utilization review process indicated a secondary review on the above patient.  The review outcome is based on review of the medical records, discussions with staff, and applying clinical experience noted on the date of the review.          (x) Observation Status Appropriate - Concurrent stay review    RATIONALE FOR DETERMINATION   79 yo man with weakness found to have urinary retention. Schmitt catheter placed. Arrangements have been made for him to discharge to a transitional care facility when a bed is available tomorrow.      Patient is clinically improving and there is no clear indication to change patient's status to inpatient. The severity of illness, intensity of service provided, expected LOS and risk for adverse outcome make the care appropriate for observation.    This document was produced using voice recognition software     The information on this document is developed by the utilization review team in order for the business office to ensure compliance.  This only denotes the appropriateness of proper admission status and does not reflect the quality of care rendered.         The definitions of Inpatient Status and Observation Status used in making the determination above are those provided in the CMS Coverage Manual, Chapter 1 and Chapter 6, section 70.4.      Sincerely,   Cassi Gomez MD  Utilization Review  Physician Advisor  [facility].

## 2021-04-21 NOTE — PROGRESS NOTES
Pt refused PT this morning due to waiting for meds breakfast and he is leaving today. Pt. States this is not a good time for me right now very upset about the catheter in. States he does not want to get up and walk or sit in the recliner.

## 2021-04-21 NOTE — PLAN OF CARE
Face to face report given with opportunity to observe patient.    Report given to Karely William RN   4/21/2021  7:01 AM

## 2021-04-21 NOTE — PROGRESS NOTES
"   04/21/21 1143   Signing Clinician's Name / Credentials   Signing clinician's name / credentials Rebekah Jones, OTR/L   Quick Adds   Rehab Discipline OT   Quick Adds Therapeutic Exercise   Therapeutic Exercise   Minutes of Treatment 10   Symptoms Noted During/After Treatment fatigue   Treatment Detail Pt sitting in the chair upon OT arrival. He reports just working with PT and already completing his morning ADLs. Pt asked, \"do you remember I have a catheter?\" Pt reassured yes and how that won't affect UB exercises, which he was then agreeable to as long as he did not have to get up. Pt completed 15 reps of the following: shoulder flexion, punch outs, elbow flex/ext, forearm pro/sup, wrist flex/ext, and finger flex/ext. Lastly, pt completed chair pushups x5 reps, again with reassurance that OT would monitor catheter/tubing as pt was worried about it being pulled. Pt declined completing a 2nd set of exercises. Pt left resting in the chair with the call light within reach and clip alert attached.   OT Discharge Planning    OT Discharge Recommendation (DC Rec) Transitional Care Facility;Home with assist;home with home care occupational therapy   OT Rationale for DC Rec Pt demonstrates decreased safety and independence in ADLs due to weakness, impaired balance, decreased activity tolerance, and impaired cognition/safety. Pt would benefit from SNF for short term rehab to address his deficits and maximize his safety and independence in ADLs/IADLs. If pt were to return home, he would at minimum require 24 hour assist/supervision. Pt would also be at risk for further functional decline if he were to return home, as he is worried/nervous to mobilize with the catheter.   OT Brief overview of current status  Pt sitting in the chair upon OT arrival. He reports just working with PT and already completing his morning ADLs. Pt asked, \"do you remember I have a catheter?\" Pt reassured yes and how that won't affect UB exercises, " which he was then agreeable to as long as he did not have to get up. Pt completed 15 reps of the following: shoulder flexion, punch outs, elbow flex/ext, forearm pro/sup, wrist flex/ext, and finger flex/ext. Lastly, pt completed chair pushups x5 reps, again with reassurance that OT would monitor catheter/tubing as pt was worried about it being pulled. Pt declined completing a 2nd set of exercises. Pt left resting in the chair with the call light within reach and clip alert attached.   Additional Documentation   Rehab Comments Pt continues to demonstrates significant concerns with the catheter, even moving around with having it. Pt would be at risk for functional decline if he returns home.   OT Plan Progress safety and independence in ADLs, as able   Total Session Time   Total Session Time (minutes) 10 minutes

## 2021-04-21 NOTE — PROGRESS NOTES
Referrals:  [facility] denied due to concerns of receiving payment.  [facility] accepted placement for 4/22.   Patient will be transferred to [facility] at 1330 on 4/22/21 via [facility] transportation.

## 2021-04-21 NOTE — PLAN OF CARE
Face to face report given with opportunity to observe patient.    Report given to Komal Parker RN   4/21/2021  3:13 PM

## 2021-04-22 VITALS
SYSTOLIC BLOOD PRESSURE: 155 MMHG | RESPIRATION RATE: 16 BRPM | HEIGHT: 73 IN | DIASTOLIC BLOOD PRESSURE: 80 MMHG | OXYGEN SATURATION: 93 % | BODY MASS INDEX: 25.74 KG/M2 | WEIGHT: 194.22 LBS | TEMPERATURE: 97.9 F | HEART RATE: 90 BPM

## 2021-04-22 PROCEDURE — 250N000013 HC RX MED GY IP 250 OP 250 PS 637: Performed by: INTERNAL MEDICINE

## 2021-04-22 PROCEDURE — G0378 HOSPITAL OBSERVATION PER HR: HCPCS

## 2021-04-22 PROCEDURE — 99217 PR OBSERVATION CARE DISCHARGE: CPT | Performed by: INTERNAL MEDICINE

## 2021-04-22 RX ADMIN — CARVEDILOL 25 MG: 25 TABLET, FILM COATED ORAL at 07:29

## 2021-04-22 RX ADMIN — FINASTERIDE 5 MG: 5 TABLET, FILM COATED ORAL at 07:28

## 2021-04-22 RX ADMIN — ALLOPURINOL 100 MG: 100 TABLET ORAL at 07:29

## 2021-04-22 RX ADMIN — ACETAMINOPHEN 650 MG: 325 TABLET, FILM COATED ORAL at 09:51

## 2021-04-22 RX ADMIN — GLIMEPIRIDE 2 MG: 2 TABLET ORAL at 07:28

## 2021-04-22 RX ADMIN — METFORMIN ER 500 MG 500 MG: 500 TABLET ORAL at 07:29

## 2021-04-22 RX ADMIN — AMLODIPINE BESYLATE 2.5 MG: 2.5 TABLET ORAL at 07:29

## 2021-04-22 RX ADMIN — LEVOFLOXACIN 250 MG: 250 TABLET, FILM COATED ORAL at 07:29

## 2021-04-22 NOTE — DISCHARGE INSTRUCTIONS
You have a hospital follow up appointment scheduled for you on Tuesday April 27 at 10:40 AM with Dr. Prather.   You have a follow up urology appointment with Dr. De Leon scheduled for you on Wednesday April 28 at 8:15 AM.

## 2021-04-22 NOTE — PLAN OF CARE
Patient discharged at 1:22 PM via wheel chair accompanied by staff. Prescriptions sent with patient to fill . All belongings sent with patient.     Discharge instructions reviewed with [facility]. Listed belongings gathered and returned to patient. yes    Patient discharged to [facility].   Report called to Nursing Home:  [facility]    Core Measures and Vaccines  Core Measures applicable during stay: No. If yes, state diagnosis: n/a  Pneumonia and Influenza given prior to discharge, if indicated: N/A    Surgical Patient   Surgical Procedures during stay: no  Did patient receive discharge instruction on wound care and recognition of infection symptoms? N/A    MISC  Follow up appointment made:  Yes  Home and hospital aquired medications returned to patient: Yes  Patient reports pain was well managed at discharge: Yes

## 2021-04-22 NOTE — PROGRESS NOTES
PAS-RR    Per DHS regulation, CTS team completed and submitted PAS-RR to [place] [employer] via the [employer]. CTS team advised SNF and they are aware a PAS-RR has been submitted.     CTS team reviewed with pt or health care agent that they may be contacted for a follow up appointment within 10 days of hospital discharge if SNF stay is <30 days. Contact information for [employer] was also provided.     Pt or health care agent verbalized understanding.     PAS-RR # [id number]

## 2021-04-22 NOTE — PLAN OF CARE
Occupational Therapy Discharge Summary    Reason for therapy discharge:    Discharged to transitional care facility.    Progress towards therapy goal(s). See goals on Care Plan in [facility] electronic health record for goal details.  Goals partially met.  Barriers to achieving goals:   discharge from facility.    Therapy recommendation(s):    Continued therapy is recommended.  Rationale/Recommendations:  to improve pt's safety and independence in ADLs.

## 2021-04-22 NOTE — PLAN OF CARE
VSS. To patent, draining clear yellow urine. Alert et orientated with some short term forgetfulness noted, becomes obsessive with placement et status of to et process of getting to SNF. Pt pleasant et cooperative with cares et assessments.

## 2021-04-22 NOTE — PHARMACY-MEDICATION REGIMEN REVIEW
Pharmacy Antimicrobial Stewardship Assessment     Current Antimicrobial Therapy:  Anti-infectives (From now, onward)    Start     Dose/Rate Route Frequency Ordered Stop    04/20/21 1530  levofloxacin (LEVAQUIN) tablet 250 mg      250 mg Oral DAILY 04/20/21 1521             Indication: UTI     Days of Therapy: 7 [started course PTA]     Pertinent Labs:  WBC   Date Value Ref Range Status   04/20/2021 6.7 4.0 - 11.0 10e9/L Final   04/19/2021 8.6 4.0 - 11.0 10e9/L Final   04/15/2021 8.2 4.0 - 11.0 10e9/L Final     Procalcitonin   Date Value Ref Range Status   04/15/2021 0.99 ng/ml Final     Comment:     0.50-1.99 ng/ml  Moderate risk of systemic infection.  Recommendation:   Recommend antibiotics. Evaluate culture results and clinical features to   target antibacterial therapy. Obtain blood cultures and other relevant   cultures if not done.  If empiric antibiotics were started, recheck PCT in: 2 days to guide   antibiotic de-escalation.  Discontinue or de-escalate antibiotics when PCT   concentration is <80% of peak or abs PCT <0.5. If empiric antibiotics were NOT   started, recheck PCT in 6-24 hours to re-evaluate need for antibiotics.       CRP Inflammation   Date Value Ref Range Status   04/15/2021 85.7 (H) 0.0 - 8.0 mg/L Final     Culture Results:   4/19/21 COVID = negative  4/15/21 Blood = negative  4/15/21 Urine = <10k mixed gen  4/14/21 COVID = negative     4/16/21: Levaquin started as outpatient x 5 days  3/5/21: Amoxicillin course     Recommendations/Interventions:  1. Stop antibiotics as urine culture negative and today is Day #7 of antibiotics    Kapil Dave, [facility]  April 22, 2021

## 2021-04-22 NOTE — PLAN OF CARE
Has remained free from falls/injuries this shift.Obsesses about to catheter. Cooperative with cares. SL intact. Plan: [facility] tomorrow at 1:30pm    Face to face report given with opportunity to observe patient.    Report given to Iva Toro RN   4/21/2021  11:20 PM

## 2021-04-23 NOTE — DISCHARGE SUMMARY
[facility]  Hospitalist Discharge Summary      Date of Admission:  4/19/2021  Date of Discharge:  4/22/2021  1:25 PM  Discharging Provider: Osmar Martins MD      Discharge Diagnoses     Urinary retention  Benign prostatic hypertrophy  Diabetes mellitus type 2  Generalized weakness  History of stroke  Hypertension  Chronic kidney disease stage III    Follow-ups Needed After Discharge   Follow-up Appointments     Follow Up and recommended labs and tests      Follow up with Nursing home physician.  No follow up labs or test are   needed.         Follow-up and recommended labs and tests       Follow up with primary care provider, Blaze Prather, within 7 -14days   for hospital follow- up.  No follow up labs or test are needed.      Please make a follow-up appointment with Dr. De Leon for removal of Schmitt   catheter plan 7-14 days         None    Unresulted Labs Ordered in the Past 30 Days of this Admission     No orders found from 3/20/2021 to 4/20/2021.      These results will be followed up by not needed    Discharge Disposition   Discharged to nursing home  Condition at discharge: Stable      Hospital Course   Patient is a 80-year-old gentleman has significant history of diabetes with previous stroke in the past also from BPH.  He initially presented to the emergency room on 4/15 with inability to void.  A Schmitt catheter was placed.  He did initially have a fever of 102 degrees.  Was started on IV Levaquin and then 5 days of 20 mg daily.  His urine culture eventually grew out just 10,000 colonies of mixed gen.  Blood cultures were negative.  CT scan at that time showed just a thickened bladder wall with enlarged prostate.  Did have some left-sided renal calculi largest 11 mm.  But no signs of hydronephrosis.  Patient did have some difficulty with handling his Schmitt catheter.  Likely there was a friend of his who did look things up on YouTube and follow how to change the bag for him.  But then he  basically demanded to have the catheter removed he went into the [facility] on Monday the 19th and the catheter was removed.  He presented later in the day with inability to void.  A Schmitt catheter was replaced.  He had no signs of infection.  No fevers at all.  Renal function stable at 1.26.  He was admitted to the hospital.  He had had an appointment scheduled with urology Dr. Frazier actually at same morning for evaluation.  However after discussing with urology they felt that we should just leave the catheter in and follow-up as an outpatient within the next 7 to 10 days.  An attempt at removal be time.  Initially we had thought he was going to be able to return home with help from family however that was not feasible as family members will be unavailable.  Patient had a lot of reservations about the catheter.  And after observing him it was felt that he would be unable to handle this independently.  Reluctantly agreed to go to a structured nursing facility.  It took couple of days to find an accepting facility.  And finally [facility] accepted the patient.  Expect this could be a short-term stay until his Schmitt catheter is removed.    Patient just has had generalized weakness he does have a previous history of a stroke in the distant past.  His ambulatory capabilities was somewhat marginal.  He did do relatively well with a walker.  However given the fact he is going to a nursing home I think physical therapy would be of markedly beneficial for the patient.    Diabetes mellitus type 2 on oral agents.  We continued those medications during his stay his sugars were adequately controlled.    Hypertension blood pressure stable continue with this his Coreg and amlodipine.    Consultations This Hospital Stay   PHYSICAL THERAPY ADULT IP CONSULT  OCCUPATIONAL THERAPY ADULT IP CONSULT  PHYSICAL THERAPY ADULT IP CONSULT  PHYSICAL THERAPY ADULT IP CONSULT  OCCUPATIONAL THERAPY ADULT IP CONSULT    Code Status   Full  Code    Time Spent on this Encounter   I, Osmar Martins MD, personally saw the patient today and spent greater than 30 minutes discharging this patient.       Osmar Martins MD  [facility]  [address] [place] [id number]  Phone: [phone]  Fax: [phone]  ______________________________________________________________________    Physical Exam   Vital Signs: Temp: 97.9  F (36.6  C) Temp src: Tympanic BP: 155/80 Pulse: 90   Resp: 16 SpO2: 93 %      Weight: 194 lbs 3.6 oz  Constitutional: awake, alert, cooperative, no apparent distress, and appears stated age  Respiratory: No increased work of breathing, good air exchange, clear to auscultation bilaterally, no crackles or wheezing  Cardiovascular: Normal apical impulse, regular rate and rhythm, normal S1 and S2, no S3 or S4, and no murmur noted  GI: No scars, normal bowel sounds, soft, non-distended, non-tender, no masses palpated, no hepatosplenomegally  Genitounirinary: {To catheter in place  Musculoskeletal: There is no redness, warmth, or swelling of the joints.  Full range of motion noted.  Motor strength is 5 out of 5 all extremities bilaterally.  Tone is normal.       Primary Care Physician   Blaze Prather    Discharge Orders      Reason for your hospital stay    Patient admitted with inability to void.  To catheter placed.  No signs of infection.     Activity    Your activity upon discharge: activity as tolerated up with walker     Tubes and drains    You are going home with the following tubes or drains: to catheter.  Tube cares per hospital or home care instructions     General info for SNF    Length of Stay Estimate: Short Term Care: Estimated # of Days <30  Condition at Discharge: Stable  Level of care:skilled   Rehabilitation Potential: Excellent  Admission H&P remains valid and up-to-date: Yes  Recent Chemotherapy: N/A  Use Nursing Home Standing Orders: Yes     Reason for your hospital stay    Patient was admitted  with urinary retention requiring Schmitt cath placement.  Unable to care for catheter at this time.  No family members are available.  Not felt to be safe to return home.     Schmitt catheter    To straight gravity drainage. Change catheter every 2 weeks and PRN for leaking or decreased uring output with signs of bladder distention. DO NOT change catheter without a specific MD order IF diagnosis of benign prostatic hypertrophy (BPH), neurogenic bladder, or other urological conditions     Follow Up and recommended labs and tests    Follow up with Nursing home physician.  No follow up labs or test are needed.     Activity - Up with nursing assistance     Follow-up and recommended labs and tests     Follow up with primary care provider, Blaze Prather, within 7 -14days for hospital follow- up.  No follow up labs or test are needed.      Please make a follow-up appointment with Dr. De Leon for removal of Schmitt catheter plan 7-14 days     Full Code     Physical Therapy Adult Consult    Evaluate and treat as clinically indicated.    Reason: Weakness     Occupational Therapy Adult Consult    Evaluate and treat as clinically indicated.    Reason: Weakness     Diet    Follow this diet upon discharge: Orders Placed This Encounter      Moderate consistent carb       Significant Results and Procedures   Most Recent 3 CBC's:  Recent Labs   Lab Test 04/20/21  0517 04/19/21  2111 04/15/21  2225   WBC 6.7 8.6 8.2   HGB 13.3 14.0 13.9   MCV 94 96 97    209 150     Most Recent 3 BMP's:  Recent Labs   Lab Test 04/20/21  0517 04/19/21  2111 04/15/21  2225    138 137   POTASSIUM 3.9 4.4 3.9   CHLORIDE 106 106 106   CO2 25 24 25   BUN 21 24 19   CR 1.26* 1.39* 1.34*   ANIONGAP 6 8 6   JODI 9.1 9.0 8.5   GLC 70 143* 129*     Most Recent 2 LFT's:  Recent Labs   Lab Test 04/15/21  2225   AST 26   ALT 34   ALKPHOS 78   BILITOTAL 0.8     Most Recent 6 Bacteria Isolates From Any Culture (See EPIC Reports for Culture Details):  Recent  Labs   Lab Test 04/15/21  2320 04/15/21  2224   CULT <10,000 colonies/mL  mixed urogenital gen  No further identification or sensitivity done   No growth after 6 days  No growth after 6 days     Most Recent 6 glucoses:  Recent Labs   Lab Test 04/20/21  0517 04/19/21  2111 04/15/21  2225   GLC 70 143* 129*     Most Recent Urinalysis:  Recent Labs   Lab Test 04/19/21 2114   COLOR Yellow   APPEARANCE Clear   URINEGLC Negative   URINEBILI Negative   URINEKETONE Negative   SG 1.028   UBLD Moderate*   URINEPH 5.5   PROTEIN 30*   NITRITE Negative   LEUKEST Trace*   RBCU 93*   WBCU 5   ,   Results for orders placed or performed during the hospital encounter of 04/15/21   XR Chest Port 1 View    Narrative    XR CHEST PORT 1 VW    HISTORY: 80 yearsMale fever    TECHNIQUE: A single view of the chest was performed    COMPARISON: None    FINDINGS: Heart size and pulmonary vascularity are within normal  limits. Lungs are clear. No consolidating airspace opacities are  present.        Impression    IMPRESSION: Clear chest    ROMARIO BARRERA MD   Abd/pelvis CT - IV contrast only TRAUMA / AAA    Narrative    EXAMINATION: CT ABDOMEN PELVIS W CONTRAST, 4/16/2021 12:54 AM    TECHNIQUE:  Helical CT images from the lung bases through the  symphysis pubis were obtained  with IV contrast. Contrast dose: Isovue  300 (100 mL)    COMPARISON: none    HISTORY: Abdominal pain, fever    FINDINGS:    There is dependent atelectasis at the lung bases.    The liver is free of masses or biliary ductal enlargement. There is  mild fatty infiltration of the liver. No calcified gallstones are  seen.    The the spleen and pancreas appear normal.    The adrenal glands are normal.    There are benign appearing cysts seen in both kidneys. There are left  intrarenal calculi noted. The largest lies within the left renal  pelvis and measures 11 mm in maximal diameter. No ureteric calculi are  seen.    The periaortic lymph nodes are normal in caliber.    No  intraperitoneal masses or inflammatory changes are noted.    There is a Schmitt catheter seen within the bladder. The bladder wall  appears thickened. Prostate is enlarged. The rectum appears normal.    The regional skeleton is intact      Impression    IMPRESSION: Left intrarenal calculi. Abnormally thickened bladder  wall. A Schmitt catheter seen within the bladder. The prostate is  enlarged    No intraperitoneal masses or inflammatory changes are noted     ANGELINA MACKEY MD   Abdomen US, limited (RUQ only)    Narrative    PROCEDURES: US ABDOMEN LIMITED    HISTORY: ruq pain, ruq pain    TECHNIQUE: Grayscale ultrasound of the upper abdomen was performed.    COMPARISON: none     FINDINGS:    MEASUREMENTS:   Liver length:  16 cm.   Common duct diameter:  0.6 cm.    LIVER: Liver is free of masses or biliary ductal enlargement.    GALLBLADDER: No gallstones are seen.    ABDOMINAL AORTA AND IVC: The abdominal aorta is normally tapering. The  inferior vena cava is patent.    PANCREAS: The pancreas was largely obscured by bowel gas.    Right KIDNEY: There is a benign-appearing cyst seen in the right  kidney. No hydronephrosis is noted        Impression    IMPRESSION:     No gallstones seen    ANGELINA MACKEY MD       Discharge Medications   Discharge Medication List as of 4/22/2021  1:08 PM      CONTINUE these medications which have NOT CHANGED    Details   acetaminophen (TYLENOL) 325 MG tablet Take 650 mg by mouth every 4 hours as needed for pain . Limit acetaminophen to 4000 mg per day from all sources., Historical      albuterol (PROAIR HFA/PROVENTIL HFA/VENTOLIN HFA) 108 (90 Base) MCG/ACT inhaler Inhale 2 puffs into the lungs every 4 hours as needed, HistoricalPharmacy may dispense brand covered by insurance (Proair, or proventil or ventolin or generic albuterol inhaler)      allopurinol (ZYLOPRIM) 100 MG tablet Take 100 mg by mouth daily , Historical      amLODIPine (NORVASC) 2.5 MG tablet Take 2.5 mg by mouth daily,  Historical      aspirin (ASA) 325 MG tablet Take 162.5 mg by mouth daily, Historical      atorvastatin (LIPITOR) 80 MG tablet Take 40 mg by mouth every evening , Historical      carvedilol (COREG) 25 MG tablet Take 25 mg by mouth 2 times daily , Historical      gabapentin (NEURONTIN) 300 MG capsule Take 300 mg by mouth 3 times daily , Historical      glimepiride (AMARYL) 4 MG tablet Take 2 mg by mouth every morning (before breakfast) , Historical      metFORMIN (GLUCOPHAGE-XR) 500 MG 24 hr tablet Take 1,000 mg by mouth 2 times daily (with meals) , Historical      multivitamin, therapeutic (THERA-VIT) TABS tablet Take 1 tablet by mouth daily, Historical      tamsulosin (FLOMAX) 0.4 MG capsule Take 0.4 mg by mouth every evening , Historical      vitamin B-12 (CYANOCOBALAMIN) 250 MCG tablet Take 250 mcg by mouth daily, Historical      Semaglutide,0.25 or 0.5MG/DOS, (OZEMPIC, 0.25 OR 0.5 MG/DOSE,) 2 MG/1.5ML SOPN Inject 0.5 mg Subcutaneous every 7 days , Historical      sildenafil (VIAGRA) 100 MG tablet Take  mg by mouth daily as needed (erectile dysfunction) . Take orally 30 minutes to 4 hours before sexual activity., Historical         STOP taking these medications       levofloxacin (LEVAQUIN) 250 MG tablet Comments:   Reason for Stopping:             Allergies   Allergies   Allergen Reactions     Ace Inhibitors      Per [facility]: hyperkalemia.  Pt doesn't know if he is allergic     Ceclor [Cefaclor] Hives     Sulfa Drugs      Pt unsure.

## 2021-04-26 ENCOUNTER — HOSPITAL ENCOUNTER (INPATIENT)
Facility: HOSPITAL | Age: 81
LOS: 3 days | Discharge: NURSING, REHAB OR LONG-TERM CARE | DRG: 175 | End: 2021-04-29
Attending: EMERGENCY MEDICINE | Admitting: INTERNAL MEDICINE
Payer: MEDICARE

## 2021-04-26 ENCOUNTER — APPOINTMENT (OUTPATIENT)
Dept: GENERAL RADIOLOGY | Facility: HOSPITAL | Age: 81
DRG: 175 | End: 2021-04-26
Attending: EMERGENCY MEDICINE
Payer: MEDICARE

## 2021-04-26 ENCOUNTER — HOSPITAL ENCOUNTER (INPATIENT)
Dept: CARDIOLOGY | Facility: HOSPITAL | Age: 81
DRG: 175 | End: 2021-04-26
Attending: INTERNAL MEDICINE
Payer: MEDICARE

## 2021-04-26 ENCOUNTER — APPOINTMENT (OUTPATIENT)
Dept: CT IMAGING | Facility: HOSPITAL | Age: 81
DRG: 175 | End: 2021-04-26
Attending: EMERGENCY MEDICINE
Payer: MEDICARE

## 2021-04-26 DIAGNOSIS — R09.02 HYPOXIA: ICD-10-CM

## 2021-04-26 DIAGNOSIS — R06.00 DYSPNEA, UNSPECIFIED TYPE: ICD-10-CM

## 2021-04-26 DIAGNOSIS — R00.0 TACHYCARDIA: ICD-10-CM

## 2021-04-26 DIAGNOSIS — I26.09 OTHER ACUTE PULMONARY EMBOLISM WITH ACUTE COR PULMONALE (H): ICD-10-CM

## 2021-04-26 LAB
ALBUMIN UR-MCNC: 30 MG/DL
ANION GAP SERPL CALCULATED.3IONS-SCNC: 6 MMOL/L (ref 3–14)
APPEARANCE UR: CLEAR
BACTERIA #/AREA URNS HPF: ABNORMAL /HPF
BASOPHILS # BLD AUTO: 0 10E9/L (ref 0–0.2)
BASOPHILS NFR BLD AUTO: 0.2 %
BILIRUB UR QL STRIP: NEGATIVE
BUN SERPL-MCNC: 29 MG/DL (ref 7–30)
CALCIUM SERPL-MCNC: 9.6 MG/DL (ref 8.5–10.1)
CHLORIDE SERPL-SCNC: 102 MMOL/L (ref 94–109)
CO2 SERPL-SCNC: 26 MMOL/L (ref 20–32)
COLOR UR AUTO: YELLOW
CREAT SERPL-MCNC: 1.54 MG/DL (ref 0.66–1.25)
DIFFERENTIAL METHOD BLD: ABNORMAL
EOSINOPHIL # BLD AUTO: 0.1 10E9/L (ref 0–0.7)
EOSINOPHIL NFR BLD AUTO: 0.4 %
ERYTHROCYTE [DISTWIDTH] IN BLOOD BY AUTOMATED COUNT: 12 % (ref 10–15)
FLUAV RNA RESP QL NAA+PROBE: NEGATIVE
FLUBV RNA RESP QL NAA+PROBE: NEGATIVE
GFR SERPL CREATININE-BSD FRML MDRD: 42 ML/MIN/{1.73_M2}
GLUCOSE BLDC GLUCOMTR-MCNC: 131 MG/DL (ref 70–99)
GLUCOSE SERPL-MCNC: 301 MG/DL (ref 70–99)
GLUCOSE UR STRIP-MCNC: 300 MG/DL
GRAN CASTS #/AREA URNS LPF: 6 /LPF
HCT VFR BLD AUTO: 43.9 % (ref 40–53)
HGB BLD-MCNC: 14.9 G/DL (ref 13.3–17.7)
HGB UR QL STRIP: ABNORMAL
HYALINE CASTS #/AREA URNS LPF: 16 /LPF
IMM GRANULOCYTES # BLD: 0.1 10E9/L (ref 0–0.4)
IMM GRANULOCYTES NFR BLD: 0.6 %
KETONES UR STRIP-MCNC: NEGATIVE MG/DL
LABORATORY COMMENT REPORT: NORMAL
LACTATE BLD-SCNC: 2.1 MMOL/L (ref 0.7–2)
LACTATE BLD-SCNC: 2.6 MMOL/L (ref 0.7–2)
LEUKOCYTE ESTERASE UR QL STRIP: NEGATIVE
LYMPHOCYTES # BLD AUTO: 0.7 10E9/L (ref 0.8–5.3)
LYMPHOCYTES NFR BLD AUTO: 4.3 %
MCH RBC QN AUTO: 32.5 PG (ref 26.5–33)
MCHC RBC AUTO-ENTMCNC: 33.9 G/DL (ref 31.5–36.5)
MCV RBC AUTO: 96 FL (ref 78–100)
MONOCYTES # BLD AUTO: 0.7 10E9/L (ref 0–1.3)
MONOCYTES NFR BLD AUTO: 4.2 %
MUCOUS THREADS #/AREA URNS LPF: PRESENT /LPF
NEUTROPHILS # BLD AUTO: 14.1 10E9/L (ref 1.6–8.3)
NEUTROPHILS NFR BLD AUTO: 90.3 %
NITRATE UR QL: NEGATIVE
NRBC # BLD AUTO: 0 10*3/UL
NRBC BLD AUTO-RTO: 0 /100
NT-PROBNP SERPL-MCNC: 1237 PG/ML (ref 0–1800)
PH UR STRIP: 5.5 PH (ref 4.7–8)
PLATELET # BLD AUTO: 261 10E9/L (ref 150–450)
POTASSIUM SERPL-SCNC: 4.9 MMOL/L (ref 3.4–5.3)
PROCALCITONIN SERPL-MCNC: <0.05 NG/ML
RADIOLOGIST FLAGS: ABNORMAL
RBC # BLD AUTO: 4.58 10E12/L (ref 4.4–5.9)
RBC #/AREA URNS AUTO: 12 /HPF (ref 0–2)
RSV RNA SPEC QL NAA+PROBE: NEGATIVE
SARS-COV-2 RNA RESP QL NAA+PROBE: NEGATIVE
SODIUM SERPL-SCNC: 134 MMOL/L (ref 133–144)
SOURCE: ABNORMAL
SP GR UR STRIP: 1.03 (ref 1–1.03)
SPECIMEN SOURCE: NORMAL
SQUAMOUS #/AREA URNS AUTO: 0 /HPF (ref 0–1)
TROPONIN I SERPL-MCNC: 0.11 UG/L (ref 0–0.04)
UFH PPP CHRO-ACNC: 0.55 IU/ML
UFH PPP CHRO-ACNC: 0.66 IU/ML
UROBILINOGEN UR STRIP-MCNC: NORMAL MG/DL (ref 0–2)
WBC # BLD AUTO: 15.6 10E9/L (ref 4–11)
WBC #/AREA URNS AUTO: 6 /HPF (ref 0–5)

## 2021-04-26 PROCEDURE — 85520 HEPARIN ASSAY: CPT | Performed by: INTERNAL MEDICINE

## 2021-04-26 PROCEDURE — 250N000013 HC RX MED GY IP 250 OP 250 PS 637: Performed by: INTERNAL MEDICINE

## 2021-04-26 PROCEDURE — 250N000011 HC RX IP 250 OP 636: Performed by: RADIOLOGY

## 2021-04-26 PROCEDURE — 99223 1ST HOSP IP/OBS HIGH 75: CPT | Mod: AI | Performed by: INTERNAL MEDICINE

## 2021-04-26 PROCEDURE — 258N000003 HC RX IP 258 OP 636: Performed by: INTERNAL MEDICINE

## 2021-04-26 PROCEDURE — 81001 URINALYSIS AUTO W/SCOPE: CPT | Performed by: EMERGENCY MEDICINE

## 2021-04-26 PROCEDURE — 71275 CT ANGIOGRAPHY CHEST: CPT | Mod: ME

## 2021-04-26 PROCEDURE — 83880 ASSAY OF NATRIURETIC PEPTIDE: CPT | Performed by: EMERGENCY MEDICINE

## 2021-04-26 PROCEDURE — 200N000001 HC R&B ICU

## 2021-04-26 PROCEDURE — 71045 X-RAY EXAM CHEST 1 VIEW: CPT

## 2021-04-26 PROCEDURE — 80048 BASIC METABOLIC PNL TOTAL CA: CPT | Performed by: EMERGENCY MEDICINE

## 2021-04-26 PROCEDURE — 93005 ELECTROCARDIOGRAM TRACING: CPT

## 2021-04-26 PROCEDURE — 84145 PROCALCITONIN (PCT): CPT | Performed by: EMERGENCY MEDICINE

## 2021-04-26 PROCEDURE — 255N000002 HC RX 255 OP 636: Performed by: INTERNAL MEDICINE

## 2021-04-26 PROCEDURE — 83605 ASSAY OF LACTIC ACID: CPT | Performed by: INTERNAL MEDICINE

## 2021-04-26 PROCEDURE — 85025 COMPLETE CBC W/AUTO DIFF WBC: CPT | Performed by: EMERGENCY MEDICINE

## 2021-04-26 PROCEDURE — 87636 SARSCOV2 & INF A&B AMP PRB: CPT | Performed by: EMERGENCY MEDICINE

## 2021-04-26 PROCEDURE — 99285 EMERGENCY DEPT VISIT HI MDM: CPT | Mod: 25

## 2021-04-26 PROCEDURE — 93306 TTE W/DOPPLER COMPLETE: CPT | Mod: 26 | Performed by: INTERNAL MEDICINE

## 2021-04-26 PROCEDURE — 83605 ASSAY OF LACTIC ACID: CPT | Performed by: EMERGENCY MEDICINE

## 2021-04-26 PROCEDURE — 96372 THER/PROPH/DIAG INJ SC/IM: CPT

## 2021-04-26 PROCEDURE — 36415 COLL VENOUS BLD VENIPUNCTURE: CPT | Performed by: INTERNAL MEDICINE

## 2021-04-26 PROCEDURE — 999N001017 HC STATISTIC GLUCOSE BY METER IP

## 2021-04-26 PROCEDURE — 99291 CRITICAL CARE FIRST HOUR: CPT | Performed by: EMERGENCY MEDICINE

## 2021-04-26 PROCEDURE — 84484 ASSAY OF TROPONIN QUANT: CPT | Performed by: EMERGENCY MEDICINE

## 2021-04-26 PROCEDURE — C9803 HOPD COVID-19 SPEC COLLECT: HCPCS

## 2021-04-26 PROCEDURE — 250N000011 HC RX IP 250 OP 636: Performed by: EMERGENCY MEDICINE

## 2021-04-26 PROCEDURE — 999N000157 HC STATISTIC RCP TIME EA 10 MIN

## 2021-04-26 RX ORDER — ACETAMINOPHEN 325 MG/1
650-975 TABLET ORAL EVERY 4 HOURS PRN
Status: DISCONTINUED | OUTPATIENT
Start: 2021-04-26 | End: 2021-04-29 | Stop reason: HOSPADM

## 2021-04-26 RX ORDER — NALOXONE HYDROCHLORIDE 0.4 MG/ML
0.2 INJECTION, SOLUTION INTRAMUSCULAR; INTRAVENOUS; SUBCUTANEOUS
Status: DISCONTINUED | OUTPATIENT
Start: 2021-04-26 | End: 2021-04-29 | Stop reason: HOSPADM

## 2021-04-26 RX ORDER — ONDANSETRON 2 MG/ML
4 INJECTION INTRAMUSCULAR; INTRAVENOUS EVERY 6 HOURS PRN
Status: DISCONTINUED | OUTPATIENT
Start: 2021-04-26 | End: 2021-04-29 | Stop reason: HOSPADM

## 2021-04-26 RX ORDER — MORPHINE SULFATE 2 MG/ML
1 INJECTION, SOLUTION INTRAMUSCULAR; INTRAVENOUS
Status: DISCONTINUED | OUTPATIENT
Start: 2021-04-26 | End: 2021-04-29 | Stop reason: HOSPADM

## 2021-04-26 RX ORDER — POLYETHYLENE GLYCOL 3350 17 G/17G
17 POWDER, FOR SOLUTION ORAL DAILY PRN
Status: DISCONTINUED | OUTPATIENT
Start: 2021-04-26 | End: 2021-04-29 | Stop reason: HOSPADM

## 2021-04-26 RX ORDER — NALOXONE HYDROCHLORIDE 0.4 MG/ML
0.4 INJECTION, SOLUTION INTRAMUSCULAR; INTRAVENOUS; SUBCUTANEOUS
Status: DISCONTINUED | OUTPATIENT
Start: 2021-04-26 | End: 2021-04-29 | Stop reason: HOSPADM

## 2021-04-26 RX ORDER — ATORVASTATIN CALCIUM 40 MG/1
40 TABLET, FILM COATED ORAL EVERY EVENING
Status: DISCONTINUED | OUTPATIENT
Start: 2021-04-26 | End: 2021-04-29 | Stop reason: HOSPADM

## 2021-04-26 RX ORDER — DEXTROSE MONOHYDRATE 25 G/50ML
25-50 INJECTION, SOLUTION INTRAVENOUS
Status: DISCONTINUED | OUTPATIENT
Start: 2021-04-26 | End: 2021-04-29 | Stop reason: HOSPADM

## 2021-04-26 RX ORDER — IOPAMIDOL 755 MG/ML
100 INJECTION, SOLUTION INTRAVASCULAR ONCE
Status: COMPLETED | OUTPATIENT
Start: 2021-04-26 | End: 2021-04-26

## 2021-04-26 RX ORDER — LIDOCAINE 40 MG/G
CREAM TOPICAL
Status: DISCONTINUED | OUTPATIENT
Start: 2021-04-26 | End: 2021-04-29 | Stop reason: HOSPADM

## 2021-04-26 RX ORDER — NICOTINE POLACRILEX 4 MG
15-30 LOZENGE BUCCAL
Status: DISCONTINUED | OUTPATIENT
Start: 2021-04-26 | End: 2021-04-29 | Stop reason: HOSPADM

## 2021-04-26 RX ORDER — TAMSULOSIN HYDROCHLORIDE 0.4 MG/1
0.4 CAPSULE ORAL EVERY EVENING
Status: DISCONTINUED | OUTPATIENT
Start: 2021-04-26 | End: 2021-04-29 | Stop reason: HOSPADM

## 2021-04-26 RX ORDER — SODIUM CHLORIDE 9 MG/ML
INJECTION, SOLUTION INTRAVENOUS CONTINUOUS
Status: DISCONTINUED | OUTPATIENT
Start: 2021-04-26 | End: 2021-04-28

## 2021-04-26 RX ORDER — CARVEDILOL 25 MG/1
25 TABLET ORAL 2 TIMES DAILY
Status: DISCONTINUED | OUTPATIENT
Start: 2021-04-26 | End: 2021-04-29 | Stop reason: HOSPADM

## 2021-04-26 RX ORDER — ALLOPURINOL 100 MG/1
100 TABLET ORAL DAILY
Status: DISCONTINUED | OUTPATIENT
Start: 2021-04-27 | End: 2021-04-29 | Stop reason: HOSPADM

## 2021-04-26 RX ORDER — GABAPENTIN 300 MG/1
300 CAPSULE ORAL 3 TIMES DAILY
Status: DISCONTINUED | OUTPATIENT
Start: 2021-04-26 | End: 2021-04-29 | Stop reason: HOSPADM

## 2021-04-26 RX ORDER — ONDANSETRON 4 MG/1
4 TABLET, ORALLY DISINTEGRATING ORAL EVERY 6 HOURS PRN
Status: DISCONTINUED | OUTPATIENT
Start: 2021-04-26 | End: 2021-04-29 | Stop reason: HOSPADM

## 2021-04-26 RX ADMIN — CARVEDILOL 25 MG: 25 TABLET, FILM COATED ORAL at 20:18

## 2021-04-26 RX ADMIN — IOPAMIDOL 100 ML: 755 INJECTION, SOLUTION INTRAVENOUS at 12:07

## 2021-04-26 RX ADMIN — SODIUM CHLORIDE: 9 INJECTION, SOLUTION INTRAVENOUS at 17:06

## 2021-04-26 RX ADMIN — GABAPENTIN 300 MG: 300 CAPSULE ORAL at 20:18

## 2021-04-26 RX ADMIN — TAMSULOSIN HYDROCHLORIDE 0.4 MG: 0.4 CAPSULE ORAL at 20:18

## 2021-04-26 RX ADMIN — ENOXAPARIN SODIUM 90 MG: 100 INJECTION SUBCUTANEOUS at 12:40

## 2021-04-26 RX ADMIN — ATORVASTATIN CALCIUM 40 MG: 40 TABLET, FILM COATED ORAL at 20:18

## 2021-04-26 RX ADMIN — ACETAMINOPHEN 650 MG: 325 TABLET, FILM COATED ORAL at 17:07

## 2021-04-26 RX ADMIN — PERFLUTREN 2 ML: 6.52 INJECTION, SUSPENSION INTRAVENOUS at 15:29

## 2021-04-26 ASSESSMENT — ACTIVITIES OF DAILY LIVING (ADL)
DRESSING/BATHING: BATHING DIFFICULTY, ASSISTANCE 1 PERSON;BATHING DIFFICULTY, REQUIRES EQUIPMENT;DRESSING DIFFICULTY, REQUIRES EQUIPMENT;DRESSING DIFFICULTY, ASSISTANCE 1 PERSON
WALKING_OR_CLIMBING_STAIRS: AMBULATION DIFFICULTY, REQUIRES EQUIPMENT
ADLS_ACUITY_SCORE: 18
TOILETING_ISSUES: YES
WALKING_OR_CLIMBING_STAIRS_DIFFICULTY: YES
EQUIPMENT_CURRENTLY_USED_AT_HOME: WALKER, STANDARD
ADLS_ACUITY_SCORE: 18
WHICH_OF_THE_ABOVE_FUNCTIONAL_RISKS_HAD_A_RECENT_ONSET_OR_CHANGE?: AMBULATION;TRANSFERRING;TOILETING;BATHING;DRESSING
TOILETING_ASSISTANCE: TOILETING DIFFICULTY, ASSISTANCE 1 PERSON;TOILETING DIFFICULTY, REQUIRES EQUIPMENT
DRESSING/BATHING_DIFFICULTY: YES

## 2021-04-26 ASSESSMENT — MIFFLIN-ST. JEOR: SCORE: 1634.88

## 2021-04-26 NOTE — ED TRIAGE NOTES
Pt sent in from  in [place] for evaluation of syncopal episode this am. Pt reports he was attempting to have a BM and just after he experienced sx when up with therapy. Pt alert and oriented. Denies dizziness at this time. Reports chronic arthritis pain at this time.

## 2021-04-26 NOTE — ED NOTES
Pt transferred to ICU via this RN while being monitored. Report given. Belongings sent home with pt's family friend per his request.

## 2021-04-26 NOTE — PLAN OF CARE
Updated Komal on pt status and admission to the ICU. Komal and Grazyna will be discussing who the designated visitor will be. Notified that only one of them is able to be the visitor for the entire stay.

## 2021-04-26 NOTE — ED NOTES
DATE:  4/26/2021   TIME OF RECEIPT FROM LAB:  1112  LAB TEST:  lactate  LAB VALUE:  2.6  RESULTS GIVEN WITH READ-BACK TO (PROVIDER):  Data Unavailable  TIME LAB VALUE REPORTED TO PROVIDER:   1114

## 2021-04-26 NOTE — ED NOTES
Nursing home called for an update on patient as a family friend had called stating the patient would be staying. Jacqui asking for an update from nurse.   Phone # [phone]

## 2021-04-26 NOTE — PLAN OF CARE
[facility] Inpatient Admission Note:    Patient admitted to 3128/3128-1 at approximately 1430 via cart accompanied by nurse from emergency room . Report received from TONY Carballo in SBAR format at 1400 via telephone. Patient transferred to bed via slide board.. Patient is alert and oriented X 3, denies pain; rates at 0 on 0-10 scale.  Patient oriented to room, unit, hourly rounding, and plan of care. Explained admission packet and patient handbook with patient bill of rights brochure. Will continue to monitor and document as needed.     Inpatient Nursing criteria listed below was met:    Health care directives status obtained and documented: No    Care Everywhere authorization obtained Yes    MRSA swab completed for patient 65 years and older: N/A    Patient identifies a surrogate decision maker: Yes If yes, who:Grazyna Dyer and Komal Porter Contact Information: See facesheet     If initial lactic acid >2.0, repeat lactic acid drawn within one hour of arrival to unit: NA. If no, state reason:     Vaccination assessment and education completed: Yes   Vaccinations received prior to admission: Pneumovax yes  Influenza(seasonal)  YES   Vaccination(s) ordered: not given today because already vaccinated    Clergy visit ordered if patient requests: No    Skin issues/needs documented: No    Isolation Patient: no Education given, correct sign in place and documentation row added to PCS:  No    Fall Prevention Yes: Care plan updated, education given and documented, sticker and magnet in place: Yes    Care Plan initiated: Yes    Education Documented (including assessment): Yes    Patient has discharge needs : No If yes, please explain:

## 2021-04-26 NOTE — ED NOTES
Notified Healther Odalys WILL on the floor.  Someone from Care Transitions will follow on the floor.    RIYA Regan

## 2021-04-26 NOTE — ED NOTES
Met with patient.  Signed authorization to release info to Grazyna Dyer, Komal Porter, and rubi macias    Form copied and sent to scanning and copy will go to the floor.    RIYA Regan

## 2021-04-26 NOTE — PLAN OF CARE
Prior to Admission Medication Reconciliation:     Medications added:   [x] None  [] As listed below:    Medications deleted:   [x] None  [] As listed below:    Medications marked for review/removal by attending:  [x] None  [] As listed below:    Changes made to existing medications:   [x] None  [] As listed below:    Last times/dates taken verified with patient:  [x] Yes- completed myself  [] Prepared PTA medlist for review only. (will not be available to review personally)  [] Did not review with patient. Rx verification only. Review completed by nursing.    [] Nurse completed no changes made (double checked entries)  [] Unable to review with patient at this time:  [] Nurse completed/changes made:     Allergies listed at another location:  []Not applicable   [x]See below:ace inhibitors, ceclor, sulfa    Allergy review:    []Did not review: reviewed by nursing  []Did not review: pt unable at this time  []Patient/MAR verified NKDA  [x]Patient/MAR verified current existing allergies: no changes made    Medication reconciliation sources:   []Patient  []Patient family member/emergency contact: **  [][facility] Report Review  []Epic Chart Review  []Care Everywhere review  [x][facility]   Name Strength Instructions Last Fill Date QTY/DS Notes   [x] Allopurinol  100 mg Daily at 0800 4/25/21 0800    [x] amaryl  2 mg  daily at 0700 4/25/21 0700    [x] amlodipine 2.5 mg  Daily at 0800 4/25/21 0800    [x] Asa  325 mg daily at 0800 4/25/21 0800    [x] atorvastatin 40 mg  Daily at 1600 4/25/21 1600    [x] carvedilol 25 mg  BID  4/25/21 2000    [x] Metformin  ER 500mg  2 tabs BID 4/25/21 1600    [x] multivitamin  Daily  4/25/21 0800    [x] Gabapentin  300 mg  TID  4/25/21 2000    [x] tamsulosin 0.4 mg  At bedtime  4/25/21 2000    [x] Vit B 12  250 mcg  Daily  4/25/21 0800    [x] apap  325 mg  2 tabs q4h prn 4/22/21     [x] proair 90 mcg  2 puffs q4h prn      [x] ozempic  0.5 mg weekly Thurs 4/26/21 0/800    []Pharmacy phone  call  [x]Outside meds dispense report: see below  []Nursing home or Assisted Living MAR:  []Other: **    Pharmacy desired at discharge: [facility] while at facility    Is patient on coumadin?  [x]No      Requests for consultation by provider or pharmacist:   [] Patient understands why all of their meds were prescribed and how to take them. No questions.   [x] Managing party has no questions.   [] Patient/ managing party has questions about the following:  [] Did not review with patient. Cannot assess.     Fill dates and reported compliancy:  [x] Fill dates coincide with compliancy for all/most maintenance meds.   [] Fill dates do not coincide with compliancy with maintenance meds. See notes in PTA medlist and in comments.    [] Fill dates do not coincide with the following medications but pt reports compliancy:  [] Did not review with patient. Cannot assess.     Historian accuracy:  [] Excellent- alert and oriented, understands why meds were prescribed and how to take, able to answer specifics  [] Good- alert and oriented, understands why meds were prescribed and how to take, some confusion   [] Fair- alert and oriented, doesn't know medications without list, cannot answer specifics about medications, but has a decent process for which to take at home  [] Poor- does not know medications, may not have a process to take at home, may be cognitively unable to review at this time  [x]Medication management done by family member or facility, no concerns about historian accuracy.   [] Did not review with patient. Cannot assess.     Medication Management:  [] Manages meds independently  [] Family member/ other party manages meds:  [x] Meds managed by staff at facility  [] Meds set up by home care, family/other party helps administer  [] Meds set up by home care, self administers  [] Did not review with patient. Cannot assess.     Other medications aside from PTA:  [x] Denies taking any medications aside from those listed in  PTA meds  [] Reports taking another medication(s) but cannot recall the name(s)  [] Refuses to say.  [] Did not review with patient. Cannot assess.     Comments: none.     Myriam Talley on 4/26/2021 at 2:41 PM       Discrepancies: [x] No []Not Applicable []Yes: listed below    Time spent on medication reconciliation:   []5-20 mins  [x]20-40 mins  []> 40 mins    Issues completing PTA medication reconciliation:  [] On hold for a long time  [] Waited for a call back  [] Fax didn't come through  [] Fax took a long time  [] Other:    Notifying appropriate party of changes/additions/discrepancies:  [x]No pertinent changes made, notification not necessary.   [] Notified attending provider via text page/phone call  [] Notified attending provider in person  [] Notified pharmacy  [] Notified nurse  [] Attending provider not available, left detailed notes  [] Pt is not admitted to floor yet, PTA meds completed before admission.     Medication Dispense History (from 4/26/2020 to 4/26/2021)  Expand All  Collapse All  Albuterol Sulfate   Dispensed Days Supply Quantity Provider Pharmacy   ALBUTEROL HFA 90MCG/ACT INH [facility]20[facility]   ALBUTEROL HFA 90MCG/ACT INH 07/28/2020 17 8.5 g MONTANA,MCKENNA [facility]...   ALBUTEROL HFA 90MCG/ACT INH 06/17/2020 17 8.5 g MONTANA,MCKENNA [facility]...         Allopurinol   Dispensed Days Supply Quantity Provider Pharmacy   ALLOPURINOL 100MG TABLET 04/13/2021 30 30 Units FAVIAN WILSON #61 - Fa...   ALLOPURINOL 100MG TABLET 01/14/2021 30 30 Units FAVIAN WILSON Pharmacy...   ALLOPURINOL 100MG TABLET 01/13/2021 90 90 Units FAVIAN WILSON [facility]...   ALLOPURINOL  TAB 100MG 09/04/2020 90 90 each MONTANAMCKENNA Pharmacy...   ALLOPURINOL 100MG TABLET [facility]04/2020 [facility]   ALLOPURINOL  TAB 100MG 06/07/2020 90 90 each MCKENNA CHERY  [place] [facility]...   ALLOPURINOL 100MG TABLET 06/07/2020 90 90 Units MONTANA,MCKENNA [facility] #61 - Fa...         Amoxicillin   Dispensed Days Supply Quantity Provider Pharmacy   AMOXICILLIN 500MG CAPSULE 03/05/2021 7 21 Units ASHWINI JACOBSON JR [facility]...   AMOXICILLIN 500MG CAPSULE 02/24/2021 7 28 Units ASHWINI JACOBSON JR [facility]...         Atorvastatin Calcium   Dispensed Days Supply Quantity Provider Pharmacy   ATORVASTATIN 80MG TABLET 04/13/2021 90 45 Units MONTANA,MCKENNA [facility] - Fa...   ATORVASTATIN TAB 80MG 04/13/2021 90 45 each MONTANA,MCKENNA [facility]...   ATORVASTATIN 80MG TABLET 01/20/2021 90 45 Units MONTANA,MCKENNA [facility]...   ATORVASTATIN 80MG TABLET 09/04/2020 90 45 Units MONTANA,MCKENNA [facility] #61 - Fa...   ATORVASTATIN TAB 80MG 09/04/2020 90 45 each MONTANA,MCKENNA [facility]...   ATORVASTATIN 80MG TABLET 06/07/2020 90 45 Units MONTANA,MCKENNA [facility] #61 - Fa...   ATORVASTATIN TAB 80MG 06/07/2020 90 45 each [facility]...         Carvedilol   Dispensed Days Supply Quantity Provider Pharmacy   CARVEDILOL   TAB 25MG 04/13/2021 90 180 each MONTANA,MCKENNA [facility]...   CARVEDILOL 25MG TABLET 04/13/2021 90 180 Units [facility] [place] [facility]...   CARVEDILOL 25MG TABLET 01/20/2021 90 180 Units MONTANA,MCKENNA [facility]...   CARVEDILOL   TAB 25MG 09/04/2020 90 180 each [facility]...   CARVEDILOL 25MG TABLET 09/04/2020 90 180 Units [facility]...   CARVEDILOL   TAB 25MG 06/07/2020 90 180 each MONTANA,MCKENNA [facility]...   CARVEDILOL 25MG TABLET 06/07/2020 90 180 Units MONTANA,MCKENNA [facility] #61 - Fa...         Gabapentin   Dispensed Days Supply Quantity Provider Pharmacy   GABAPENTIN   CAP 300MG 09/04/2020 90 865 each MONTANAMCKENNA [place] [facility]...   GABAPENTIN 300MG CAPSULE 09/04/2020  90 270 Units MONTANAMCKENNA #61 - Fa...   GABAPENTIN   CAP 300MG 06/07/2020 90 270 each MONTANA,MCKENNA [facility]...   GABAPENTIN 300MG CAPSULE 06/07/2020 90 270 Units MONTANAMCKENNA #61 - Fa...         Glimepiride   Dispensed Days Supply Quantity Provider Pharmacy   GLIMEPIRIDE  TAB 4MG 04/13/2021 90 90 each MONTANA,MCKENNA [facility]...   GLIMEPIRIDE 4MG TABLET 04/13/2021 90 90 Units MONTANA,MCKENNA [facility] - Fa...   GLIMEPIRIDE 4MG TABLET 01/20/2021 90 90 Units MONTANA,MCKENNA [facility]...   GLIMEPIRIDE  TAB 4MG 09/04/2020 90 90 each MONTANA,MCKENNA [facility]...   GLIMEPIRIDE 4MG TABLET 09/04/2020 90 90 Units MONTANAMCKENNA #61 - Fa...   GLIMEPIRIDE  TAB 4MG 06/07/2020 90 90 each MONTANA,MCKENNA [facility]...   GLIMEPIRIDE 4MG TABLET 06/07/2020 90 90 Units MONTANA,MCKENNA [facility] #61 - Fa...         Semaglutide   Dispensed Days Supply Quantity Provider Pharmacy   OZEMPIC      INJ 2/1.5ML 04/19/2021 28 1.5 each MONTANAMCKENNA Pharmacy...   OZEMPIC 0.25 OR 0.50/DOSE INJ 04/19/2021 28 1.5 mL MONTANAMCKENNA #61 - Fa...   OZEMPIC      INJ 2/1.5ML 03/22/2021 28 1.5 each MONTANA,MCKENNA [facility]...   OZEMPIC 0.25 OR 0.50/DOSE INJ 03/22/2021 28 1.5 mL MONTANAMCKENNA #61 - Fa...   OZEMPIC      INJ 2/1.5ML 02/22/2021 28 1.5 each MONTANAMCKENNA Pharmacy...   OZEMPIC 0.25 OR 0.50/DOSE INJ 02/22/2021 28 1.5 mL MONTANAMCKENNA #61 - Fa...   OZEMPIC      INJ 2/1.5ML 01/25/2021 28 1.5 each MONTANAMCKENNA Pharmacy...   OZEMPIC 0.25 OR 0.50/DOSE INJ 01/25/2021 28 1.5 mL MONTANAMCKENNA #61 - Fa...   OZEMPIC      INJ 2/1.5ML 12/26/2020 28 1.5 each MONTANAMCKENNA Pharmacy...   OZEMPIC 0.25 OR 0.50/DOSE INJ 12/26/2020 28 1.5 mL MONTANAMCKENNA #61 - Fa...   OZEMPIC      INJ 2/1.5ML 11/27/2020 28 1.5 each  [facility]...   OZEMPIC 0.25 OR 0.50/DOSE INJ 11/27/2020 28 1.5 mL MONTANA,MCKENNA [facility] #61 - Fa...   OZEMPIC      INJ 2/1.5ML 10/30/2020 28 1.5 each [facility]...   OZEMPIC 0.25 OR 0.50/DOSE INJ 10/30/2020 28 1.5 mL MONTANA,MCKENNA [facility] #61 - Fa...   OZEMPIC 0.25 OR 0.50/DOSE INJ 10/27/2020 28 1.5 mL MONTANA,MCKENNA [facility] #61 - Fa...   OZEMPIC 0.25 OR 0.50/DOSE INJ 10/09/2020 28 1.5 mL [facility] Pharm[facility]...   OZEMPIC 0.25 OR 0.50/DOSE INJ 09/10/2020 28 1.5 mL [facility]...   OZEMPIC 0.25 OR 0.50/DOSE INJ 08/05/2020 28 1.5 mL [facility]...   OZEMPIC 0.25 OR 0.50/DOSE INJ 08/03/2020 28 1.5 mL [facility] Pharm[facility]...   OZEMPIC 0.25 OR 0.50/DOSE INJ 07/15/2020 28 1.5 mL [facility] Pharm[facility]...   OZEMPIC 0.25 OR 0.50/DOSE INJ 06/17/2020 28 1.5 mL [facility] Pharm[facility]...   OZEMPIC 0.25 OR 0.50/DOSE INJ 05/20/2020 28 1.5 mL [facility] Pharm[facility]...         Tamsulosin HCl   Dispensed Days Supply Quantity Provider Pharmacy   TAMSULOSIN 0.4MG CAPSULE 03/12/2021 90 90 Units MONTANA,MCKENNA [facility]...   TAMSULOSIN 0.4MG CAPSULE 02/09/2021 34 34 Units [facility]...         amLODIPine Besylate   Dispensed Days Supply Quantity Provider Pharmacy   AMLODIPINE 2.5MG TABLET 04/12/2021 90 90 Units FAVIAN WILSON [facility]...   AMLODIPINE 2.5MG TABLET 01/20/2021 90 [facility]   AMLODIPINE   TAB 2.5MG 09/04/2020 90 90 [facility]   AMLODIPINE 2.5MG TABLET 09/04/2020 90 90 Units MONTANAMCKENNA #61 - Fa...   AMLODIPINE   TAB 2.5MG 06/07/2020 90 90 each MONTANAMCKENNA Pharmacy...   AMLODIPINE 2.5MG TABLET 06/07/2020 90 90 Units MONTANAMCKENNA #61 - Fa...         levoFLOXacin    Dispensed Days Supply Quantity Provider Pharmacy   LEVOFLOXACIN 250MG TABLET 04/16/2021 5 5 Units YAZDAN,BERONICA [facility]...         metFORMIN HCl   Dispensed Days Supply Quantity Provider Pharmacy   METFORMIN    TAB 500MG ER 04/13/2021 90 360 each MONTANA,MCKENNA [facility]...   METFORMIN 500MG ER TABLET 04/13/2021 90 360 Units MONTANAMCKENNA #61 - Fa...   METFORMIN 500MG ER TABLET 01/20/2021 90 360 Units MONTANA,MCKENNA [facility]...   METFORMIN    TAB 500MG ER 09/04/2020 90 360 each MONTANA,MCKENNA [facility]...   METFORMIN 500MG ER TABLET 09/04/2020 90 360 Units MONTANAMCKENNA #61 - Fa...   METFORMIN    TAB 500MG ER 06/07/2020 90 360 each MONTANA,MCKENNA [facility]...   METFORMIN 500MG ER TABLET 06/07/2020 90 360 Units MONTANAMCKENNA #61 - Fa...

## 2021-04-26 NOTE — PLAN OF CARE
Pt alert and oriented. Admitted this afternoon for P.E's, receiving lovenox. Blood pressure stable. Has been sinus tachycardic 110-120s with frequent PVCs. SpO2 93-94% on airvo at 25lpm and 73%. Lung sounds diminished. Chronic to catheter in place with adequate urine output. Buttock red and blanchable. Turn and reposition. IV infusing NS at 50ml/hr. Tylenol given once for pain.     Face to face report given with opportunity to observe patient.    Report given to TONY Ham RN   4/26/2021  7:17 PM

## 2021-04-26 NOTE — ED PROVIDER NOTES
History     Chief Complaint   Patient presents with     Syncope     HPI  Anthony Dyer is a 80 year old male who presents with shortness of breath, requiring oxygen, syncopal event and tachycardia, recent hospitalization, indwelling Schmitt, no fever, no chills, sudden onset, no chest pain, no shortness of breath reported though is short of breath when off oxygen.  Duration ongoing.  Character persistent.  Onset this morning.  No other associated symptoms.    Allergies:  Allergies   Allergen Reactions     Ace Inhibitors      Per [facility]: hyperkalemia.  Pt doesn't know if he is allergic     Ceclor [Cefaclor] Hives     Sulfa Drugs      Pt unsure.        Problem List:    Patient Active Problem List    Diagnosis Date Noted     Diabetes mellitus, type 2 (H) 04/20/2021     Priority: Medium     Urinary retention 04/19/2021     Priority: Medium     Generalized weakness 04/19/2021     Priority: Medium        Past Medical History:    Past Medical History:   Diagnosis Date     Diabetes (H)      Hypertension        Past Surgical History:    Past Surgical History:   Procedure Laterality Date     HERNIA REPAIR         Family History:    No family history on file.    Social History:  Marital Status:  Single [1]  Social History     Tobacco Use     Smoking status: Unknown If Ever Smoked   Substance Use Topics     Alcohol use: Not Currently     Drug use: Never        Medications:    acetaminophen (TYLENOL) 325 MG tablet  albuterol (PROAIR HFA/PROVENTIL HFA/VENTOLIN HFA) 108 (90 Base) MCG/ACT inhaler  allopurinol (ZYLOPRIM) 100 MG tablet  amLODIPine (NORVASC) 2.5 MG tablet  aspirin (ASA) 325 MG tablet  atorvastatin (LIPITOR) 80 MG tablet  carvedilol (COREG) 25 MG tablet  gabapentin (NEURONTIN) 300 MG capsule  glimepiride (AMARYL) 4 MG tablet  metFORMIN (GLUCOPHAGE-XR) 500 MG 24 hr tablet  multivitamin, therapeutic (THERA-VIT) TABS tablet  Semaglutide,0.25 or 0.5MG/DOS, (OZEMPIC, 0.25 OR 0.5 MG/DOSE,) 2 MG/1.5ML SOPN  sildenafil  (VIAGRA) 100 MG tablet  tamsulosin (FLOMAX) 0.4 MG capsule  vitamin B-12 (CYANOCOBALAMIN) 250 MCG tablet          Review of Systems   Respiratory per HPI.  Cardiovascular per HPI.  GI denies.   denies.  Neurologic the patient notes he did pass out but is unsure for how long.  Remainder of complete 10 point review of systems negative.    Physical Exam   BP: 109/75  Pulse: 119  Temp: 99.2  F (37.3  C)  Resp: 18  SpO2: (!) 83 %      Physical Exam  Constitutional:       Appearance: Normal appearance.   HENT:      Head: Normocephalic and atraumatic.      Nose: Nose normal. No congestion.   Eyes:      Extraocular Movements: Extraocular movements intact.      Conjunctiva/sclera: Conjunctivae normal.      Pupils: Pupils are equal, round, and reactive to light.   Neck:      Musculoskeletal: Normal range of motion and neck supple.   Cardiovascular:      Rate and Rhythm: Tachycardia present.      Pulses: Normal pulses.   Pulmonary:      Effort: Pulmonary effort is normal. No respiratory distress.      Breath sounds: No wheezing.   Abdominal:      General: There is no distension.      Palpations: Abdomen is soft.      Tenderness: There is no abdominal tenderness.   Genitourinary:     Comments: Indwelling Schmitt catheter  Musculoskeletal: Normal range of motion.   Skin:     General: Skin is warm and dry.   Neurological:      General: No focal deficit present.      Mental Status: He is alert and oriented to person, place, and time.   Psychiatric:         Mood and Affect: Mood normal.       EKG read in real-time by the emergency physician shows sinus tachycardia.  No A. fib.  Rate 121, AL interval 170, QRS 98, .  No acute ST-T wave changes.       Results for orders placed or performed during the hospital encounter of 04/26/21 (from the past 24 hour(s))   CBC with platelets differential   Result Value Ref Range    WBC 15.6 (H) 4.0 - 11.0 10e9/L    RBC Count 4.58 4.4 - 5.9 10e12/L    Hemoglobin 14.9 13.3 - 17.7 g/dL     Hematocrit 43.9 40.0 - 53.0 %    MCV 96 78 - 100 fl    MCH 32.5 26.5 - 33.0 pg    MCHC 33.9 31.5 - 36.5 g/dL    RDW 12.0 10.0 - 15.0 %    Platelet Count 261 150 - 450 10e9/L    Diff Method Automated Method     % Neutrophils 90.3 %    % Lymphocytes 4.3 %    % Monocytes 4.2 %    % Eosinophils 0.4 %    % Basophils 0.2 %    % Immature Granulocytes 0.6 %    Nucleated RBCs 0 0 /100    Absolute Neutrophil 14.1 (H) 1.6 - 8.3 10e9/L    Absolute Lymphocytes 0.7 (L) 0.8 - 5.3 10e9/L    Absolute Monocytes 0.7 0.0 - 1.3 10e9/L    Absolute Eosinophils 0.1 0.0 - 0.7 10e9/L    Absolute Basophils 0.0 0.0 - 0.2 10e9/L    Abs Immature Granulocytes 0.1 0 - 0.4 10e9/L    Absolute Nucleated RBC 0.0    Basic metabolic panel   Result Value Ref Range    Sodium 134 133 - 144 mmol/L    Potassium 4.9 3.4 - 5.3 mmol/L    Chloride 102 94 - 109 mmol/L    Carbon Dioxide 26 20 - 32 mmol/L    Anion Gap 6 3 - 14 mmol/L    Glucose 301 (H) 70 - 99 mg/dL    Urea Nitrogen 29 7 - 30 mg/dL    Creatinine 1.54 (H) 0.66 - 1.25 mg/dL    GFR Estimate 42 (L) >60 mL/min/[1.73_m2]    GFR Estimate If Black 49 (L) >60 mL/min/[1.73_m2]    Calcium 9.6 8.5 - 10.1 mg/dL   Lactate for Sepsis Protocol   Result Value Ref Range    Lactate for Sepsis Protocol 2.6 (H) 0.7 - 2.0 mmol/L   Procalcitonin   Result Value Ref Range    Procalcitonin <0.05 ng/ml   Troponin I   Result Value Ref Range    Troponin I ES 0.109 (H) 0.000 - 0.045 ug/L   Nt probnp inpatient   Result Value Ref Range    N-Terminal Pro BNP Inpatient 1,237 0 - 1,800 pg/mL   XR Chest Port 1 View    Narrative    PROCEDURE:  XR CHEST PORT 1 VIEW    HISTORY:  sob.     COMPARISON:  April 15, 2021    FINDINGS:   The cardiac silhouette is normal in size. The pulmonary vasculature is  normal.  The lungs are clear. No pleural effusion or pneumothorax.      Impression    IMPRESSION:  No acute cardiopulmonary disease.      ANGELINA MACKEY MD   CTA Chest with Contrast   Result Value Ref Range    Radiologist flags Pulmonary  emboli (AA)     Narrative    PROCEDURE: CTA CHEST WITH CONTRAST 4/26/2021 12:13 PM    HISTORY: PE suspected, high prob chest pain    COMPARISONS: None.    Meds/Dose Given: Isovue 370, 100 ml    TECHNIQUE: CT angiogram the chest with sagittal coronal mip  reconstructions    FINDINGS: There are large bilateral pulmonary emboli. There is right  ventricular strain. The thoracic aorta has some atherosclerotic  plaquing.    There is dependent atelectasis at the lung bases. Axillary and  supraclavicular lymph nodes appear normal. The hilar and mediastinal  lymph nodes appear normal. The upper portion of the liver spleen and  pancreas appear normal. The adrenal glands are normal. Degenerative  changes are present in the thoracic spine.         Impression    IMPRESSION: Large bilateral pulmonary emboli with right ventricular  strain   [Critical Result: Pulmonary emboli]    Finding was identified on 4/26/2021 12:22 PM.     The emergency room was contacted by me on 4/26/2021 12:27 PM and  verbalized understanding of the critical result.     ANGELINA MACKEY MD       Medications   sodium chloride (PF) 0.9% PF flush 100 mL (100 mLs Intravenous Given 4/26/21 1207)   iopamidol (ISOVUE-370) solution 100 mL (100 mLs Intravenous Given 4/26/21 1207)       Assessments & Plan (with Medical Decision Making)   80-year-old presenting with tachycardia, hypoxia and syncope status post recent hospitalization with mildly elevated troponin and significant bilateral pulmonary embolisms on CTA with suggestion of right heart strain.  Patient maintaining oxygen saturations with O2.  Conversant.  Discussed plan of care with hospitalist and plan for admission.  Patient initiated on 1 mg/kg twice daily dosing of Lovenox.  90 mg given.  No history of bleeding per report.  Patient to be admitted to the intensive care unit.    Critical care time: 50 minutes.  No procedures    New Prescriptions    No medications on file       Final diagnoses:   Other  acute pulmonary embolism with acute cor pulmonale (H)   Dyspnea, unspecified type   Hypoxia   Tachycardia       4/26/2021   [facility]     Larry Willson MD  04/26/21 [id number]

## 2021-04-26 NOTE — ED NOTES
Care Transitions focused note:      Chart reviewed, patient is accompanied by a friend Ricardo Porter [phone].  Ricardo stated that patient has a sister in law, Grazyna Dyer in the area and a sister Sheila Carl.    Ricardo stated she is the one who sets up his medications and helps with his appointments catheter etc. She stated she would like a person to person release signed as to be able to assist with patient's medical issues.  Also want Grazyna on it as well.    Unable to see patient as he was in multiple scans, lab, tests etc.  Pt will be admitted to the hospital, so I will have care transitions follow up with Ricardo.    Pt also will need to establish care with Dr Orourke at discharge as he is following him at the nursing home and he agreed to take him on as a patient.  Pt sees Dr Prather at [facility] but he only comes once a month to the [facility] and primarily practices in [place].      Will send release up to be completed on the floor.    RIYA Regan

## 2021-04-27 LAB
ALBUMIN SERPL-MCNC: 3 G/DL (ref 3.4–5)
ALP SERPL-CCNC: 93 U/L (ref 40–150)
ALT SERPL W P-5'-P-CCNC: 25 U/L (ref 0–70)
ANION GAP SERPL CALCULATED.3IONS-SCNC: 4 MMOL/L (ref 3–14)
AST SERPL W P-5'-P-CCNC: 19 U/L (ref 0–45)
BASOPHILS # BLD AUTO: 0 10E9/L (ref 0–0.2)
BASOPHILS NFR BLD AUTO: 0.4 %
BILIRUB SERPL-MCNC: 0.7 MG/DL (ref 0.2–1.3)
BUN SERPL-MCNC: 30 MG/DL (ref 7–30)
CALCIUM SERPL-MCNC: 9.1 MG/DL (ref 8.5–10.1)
CHLORIDE SERPL-SCNC: 108 MMOL/L (ref 94–109)
CO2 SERPL-SCNC: 25 MMOL/L (ref 20–32)
CREAT SERPL-MCNC: 1.27 MG/DL (ref 0.66–1.25)
DIFFERENTIAL METHOD BLD: ABNORMAL
EOSINOPHIL # BLD AUTO: 0.1 10E9/L (ref 0–0.7)
EOSINOPHIL NFR BLD AUTO: 1.3 %
ERYTHROCYTE [DISTWIDTH] IN BLOOD BY AUTOMATED COUNT: 12.1 % (ref 10–15)
GFR SERPL CREATININE-BSD FRML MDRD: 53 ML/MIN/{1.73_M2}
GLUCOSE BLDC GLUCOMTR-MCNC: 123 MG/DL (ref 70–99)
GLUCOSE BLDC GLUCOMTR-MCNC: 160 MG/DL (ref 70–99)
GLUCOSE BLDC GLUCOMTR-MCNC: 227 MG/DL (ref 70–99)
GLUCOSE SERPL-MCNC: 134 MG/DL (ref 70–99)
HCT VFR BLD AUTO: 41 % (ref 40–53)
HGB BLD-MCNC: 13.6 G/DL (ref 13.3–17.7)
IMM GRANULOCYTES # BLD: 0 10E9/L (ref 0–0.4)
IMM GRANULOCYTES NFR BLD: 0.4 %
LYMPHOCYTES # BLD AUTO: 1.1 10E9/L (ref 0.8–5.3)
LYMPHOCYTES NFR BLD AUTO: 11.7 %
MCH RBC QN AUTO: 31.9 PG (ref 26.5–33)
MCHC RBC AUTO-ENTMCNC: 33.2 G/DL (ref 31.5–36.5)
MCV RBC AUTO: 96 FL (ref 78–100)
MONOCYTES # BLD AUTO: 0.7 10E9/L (ref 0–1.3)
MONOCYTES NFR BLD AUTO: 7.2 %
NEUTROPHILS # BLD AUTO: 7.5 10E9/L (ref 1.6–8.3)
NEUTROPHILS NFR BLD AUTO: 79 %
NRBC # BLD AUTO: 0 10*3/UL
NRBC BLD AUTO-RTO: 0 /100
PLATELET # BLD AUTO: 221 10E9/L (ref 150–450)
POTASSIUM SERPL-SCNC: 4.3 MMOL/L (ref 3.4–5.3)
PROT SERPL-MCNC: 6.8 G/DL (ref 6.8–8.8)
RBC # BLD AUTO: 4.26 10E12/L (ref 4.4–5.9)
SODIUM SERPL-SCNC: 137 MMOL/L (ref 133–144)
UFH PPP CHRO-ACNC: 1.07 IU/ML
WBC # BLD AUTO: 9.5 10E9/L (ref 4–11)

## 2021-04-27 PROCEDURE — 120N000001 HC R&B MED SURG/OB

## 2021-04-27 PROCEDURE — 250N000011 HC RX IP 250 OP 636: Performed by: INTERNAL MEDICINE

## 2021-04-27 PROCEDURE — 85520 HEPARIN ASSAY: CPT | Performed by: INTERNAL MEDICINE

## 2021-04-27 PROCEDURE — 250N000012 HC RX MED GY IP 250 OP 636 PS 637: Performed by: INTERNAL MEDICINE

## 2021-04-27 PROCEDURE — 36415 COLL VENOUS BLD VENIPUNCTURE: CPT | Performed by: INTERNAL MEDICINE

## 2021-04-27 PROCEDURE — 250N000013 HC RX MED GY IP 250 OP 250 PS 637: Performed by: INTERNAL MEDICINE

## 2021-04-27 PROCEDURE — 80053 COMPREHEN METABOLIC PANEL: CPT | Performed by: INTERNAL MEDICINE

## 2021-04-27 PROCEDURE — 99233 SBSQ HOSP IP/OBS HIGH 50: CPT | Performed by: INTERNAL MEDICINE

## 2021-04-27 PROCEDURE — 85025 COMPLETE CBC W/AUTO DIFF WBC: CPT | Performed by: INTERNAL MEDICINE

## 2021-04-27 PROCEDURE — 999N001017 HC STATISTIC GLUCOSE BY METER IP

## 2021-04-27 PROCEDURE — 258N000003 HC RX IP 258 OP 636: Performed by: INTERNAL MEDICINE

## 2021-04-27 RX ORDER — METFORMIN HCL 500 MG
1000 TABLET, EXTENDED RELEASE 24 HR ORAL 2 TIMES DAILY WITH MEALS
Status: DISCONTINUED | OUTPATIENT
Start: 2021-04-27 | End: 2021-04-29 | Stop reason: HOSPADM

## 2021-04-27 RX ORDER — GLIMEPIRIDE 2 MG/1
2 TABLET ORAL
Status: DISCONTINUED | OUTPATIENT
Start: 2021-04-27 | End: 2021-04-29 | Stop reason: HOSPADM

## 2021-04-27 RX ADMIN — GABAPENTIN 300 MG: 300 CAPSULE ORAL at 21:14

## 2021-04-27 RX ADMIN — GABAPENTIN 300 MG: 300 CAPSULE ORAL at 08:11

## 2021-04-27 RX ADMIN — METFORMIN HYDROCHLORIDE 1000 MG: 500 TABLET, EXTENDED RELEASE ORAL at 08:11

## 2021-04-27 RX ADMIN — CARVEDILOL 25 MG: 25 TABLET, FILM COATED ORAL at 08:11

## 2021-04-27 RX ADMIN — GLIMEPIRIDE 2 MG: 2 TABLET ORAL at 08:11

## 2021-04-27 RX ADMIN — ENOXAPARIN SODIUM 90 MG: 100 INJECTION SUBCUTANEOUS at 00:18

## 2021-04-27 RX ADMIN — METFORMIN HYDROCHLORIDE 1000 MG: 500 TABLET, EXTENDED RELEASE ORAL at 17:10

## 2021-04-27 RX ADMIN — TAMSULOSIN HYDROCHLORIDE 0.4 MG: 0.4 CAPSULE ORAL at 21:13

## 2021-04-27 RX ADMIN — ALLOPURINOL 100 MG: 100 TABLET ORAL at 08:11

## 2021-04-27 RX ADMIN — SODIUM CHLORIDE: 9 INJECTION, SOLUTION INTRAVENOUS at 11:46

## 2021-04-27 RX ADMIN — CARVEDILOL 25 MG: 25 TABLET, FILM COATED ORAL at 21:13

## 2021-04-27 RX ADMIN — ATORVASTATIN CALCIUM 40 MG: 40 TABLET, FILM COATED ORAL at 21:13

## 2021-04-27 RX ADMIN — ENOXAPARIN SODIUM 90 MG: 100 INJECTION SUBCUTANEOUS at 12:07

## 2021-04-27 RX ADMIN — GABAPENTIN 300 MG: 300 CAPSULE ORAL at 13:20

## 2021-04-27 RX ADMIN — INSULIN ASPART 2 UNITS: 100 INJECTION, SOLUTION INTRAVENOUS; SUBCUTANEOUS at 12:05

## 2021-04-27 ASSESSMENT — ACTIVITIES OF DAILY LIVING (ADL)
ADLS_ACUITY_SCORE: 18
ADLS_ACUITY_SCORE: 19
ADLS_ACUITY_SCORE: 19
ADLS_ACUITY_SCORE: 18

## 2021-04-27 ASSESSMENT — MIFFLIN-ST. JEOR: SCORE: 1629.88

## 2021-04-27 NOTE — H&P
"[facility]    History and Physical  Hospitalist       Date of Admission:  4/26/2021  Date of Service (when I saw the patient): 04/26/21    Assessment & Plan   Anthony Dyer is a 80 year old man who presents from [facility] after he had an episode of syncope while attempting a bowel movement after some dizziness doing physical therapy.  On evaluation emergency department he was noted to be mildly hypoxemic requiring high flow oxygen with tachycardia.  Further evaluation with CT of the chest and \"angiogram\" protocol demonstrated pulmonary embolism.  He was treated with subcutaneous enoxaparin and admitted for further evaluation and management.  Recent history is significant for several episodes of urinary retention beginning 4/15.  After an attempt at catheter removal at his request after several days he again developed retention and had been at [facility] for management of his Schmitt catheter.  He was admitted for further evaluation and management.    1.  Acute pulmonary embolism  Sources not certain although overall his physical activity likely has been decreased over the past week or so.  On presentation imaging did confirm pulmonary embolism.  Echocardiogram obtained at the bedside on his arrival to ICU which I was able to review does show right sided chamber enlargement.  He has moderate sinus tachycardia somewhat moderated after initial treatment.  He does show persistent hypoxemia requiring relatively high oxygen flow.  This would be consistent with elevated dead space.  Have begun treatment with enoxaparin as benefit is likely as great as with alternative agents with red less risk of bleeding.  10 a level after a number of hours has already risen although not to the level desired for higher intensity anticoagulation.  We will repeat this in another 4 hours to ensure there is can continue to increase.  He does show cardiac dysfunction this not unexpected.  Blood pressure however is well " maintained.  While he is at risk of decompensation he does not meet criteria for thrombolysis.  2.  Elevated lactate  Almost certainly on the basis of right heart strain.  There is no evidence of any infectious process.  Serial measurements will not be of benefit.  3.  Urinary retention  This is been a persistent problem for this patient with several ER visits or hospitalizations because of that.  He does have an appointment to see Dr. De Leon of urology in several days; discussed with him that almost certainly this will need to be deferred until other more acute issues are resolved.  4.  Hypoxemic respiratory failure  Primarily on the basis of pulmonary embolism.  Suspect there may be some component of chronic airflow obstruction as well.  Bronchodilators if required.  5.  Leukocytosis  Doubt this represents an infectious process.  Inflammation alone.  6.  Acute kidney injury  Chronic kidney disease  Chronic kidney disease approximately 3 be at baseline.  Some increase in his creatinine which likely represents decreased perfusion as well as some volume shifts.  Continue usual monitoring and ensure volume is adequate however he is at high risk of overloading his right ventricle and caution with fluid repletion is warranted.            DVT Prophylaxis: Enoxaparin subcutaneously; full dose anticoagulation  Code Status: Full Code; he does not wish any prolonged efforts but is agreeable at initial resuscitation.  He wishes no prolonged mechanical ventilatory support but short course of mechanical ventilation would be in accord with his wishes.    Disposition: Expected discharge in 3-4 days depending on his course.    Jabari Kingsley    Primary Care Physician   Blaze Prather    Chief Complaint   Dizziness and subsequent syncope this morning at his skilled nursing facility    History is obtained from the patient and review of available record    History of Present Illness   Anthony Dyer is a 80 year old man who  "presents after developing syncope while attempting a bowel movement this morning.  Shortly before that during physical therapy had noted that he was dizzier than usual.  Emergency department evaluation including CT in \"angiogram\" protocol demonstrated pulmonary embolism.  On presentation to emergency department he was tachycardic and hypoxemic relatively resistant to moderate flow oxygen.  Blood pressure however was preserved.  Treated with enoxaparin and admitted for further evaluation and management.  He does not recall any other prodromal symptoms.  He has had no particular dyspnea although does note that he has some degree of baseline dyspnea and occasionally uses albuterol.  No fevers or chills.  He has had no leg pains or swelling.  No chest pain.  No unilateral weakness or paresthesias.  Interval history is significant for urinary retention beginning approximately 4/15 when he presented to emergency department for evaluation.  Schmitt catheter was placed.  At that time he had a fever of 102.  He was preemptively treated with intravenous and then oral levofloxacin although culture was subsequently unrevealing showing only mixed gen.  He presented to clinic at [facility] insisting on removal of his catheter but then returned to the emergency department here later that day because of recurrent urinary retention.  Since then Schmitt catheter has remained in place.  Plans for urologic of reevaluation were in place in several days.  He has been at [facility] because of difficulties on the wise in management of his Schmitt catheter.    Past Medical History    I have reviewed this patient's medical history and updated it with pertinent information if needed.   Past Medical History:   Diagnosis Date     Diabetes (H)      Hypertension        Past Surgical History   I have reviewed this patient's surgical history and updated it with pertinent information if needed.  Past Surgical History:   Procedure Laterality Date     " HERNIA REPAIR         Prior to Admission Medications   Prior to Admission Medications   Prescriptions Last Dose Informant Patient Reported? Taking?   Semaglutide,0.25 or 0.5MG/DOS, (OZEMPIC, 0.25 OR 0.5 MG/DOSE,) 2 MG/1.5ML SOPN 4/23/2021 at 0800  Yes No   Sig: Inject 0.5 mg Subcutaneous every 7 days on Thursdays.   acetaminophen (TYLENOL) 325 MG tablet 4/22/2021  Yes No   Sig: Take 650 mg by mouth every 4 hours as needed for pain . Limit acetaminophen to 4000 mg per day from all sources.   albuterol (PROAIR HFA/PROVENTIL HFA/VENTOLIN HFA) 108 (90 Base) MCG/ACT inhaler Unknown at Unknown time  Yes No   Sig: Inhale 2 puffs into the lungs every 4 hours as needed   allopurinol (ZYLOPRIM) 100 MG tablet 4/25/2021 at 0800  Yes Yes   Sig: Take 100 mg by mouth daily    amLODIPine (NORVASC) 2.5 MG tablet 4/25/2021 at 0800  Yes Yes   Sig: Take 2.5 mg by mouth daily   aspirin (ASA) 325 MG tablet 4/25/2021 at 0800  Yes Yes   Sig: Take 325 mg by mouth daily    atorvastatin (LIPITOR) 80 MG tablet 4/25/2021 at 1600  Yes Yes   Sig: Take 40 mg by mouth every evening    carvedilol (COREG) 25 MG tablet 4/25/2021 at 2000  Yes Yes   Sig: Take 25 mg by mouth 2 times daily    gabapentin (NEURONTIN) 300 MG capsule 4/25/2021 at 2000  Yes Yes   Sig: Take 300 mg by mouth 3 times daily    glimepiride (AMARYL) 2 MG tablet 4/25/2021 at 0700  Yes Yes   Sig: Take 2 mg by mouth every morning (before breakfast)    metFORMIN (GLUCOPHAGE-XR) 500 MG 24 hr tablet 4/25/2021 at 1600  Yes Yes   Sig: Take 1,000 mg by mouth 2 times daily (with meals)    multivitamin, therapeutic (THERA-VIT) TABS tablet 4/25/2021 at 0800  Yes Yes   Sig: Take 1 tablet by mouth daily   tamsulosin (FLOMAX) 0.4 MG capsule 4/25/2021 at 2000  Yes Yes   Sig: Take 0.4 mg by mouth every evening    vitamin B-12 (CYANOCOBALAMIN) 250 MCG tablet 4/25/2021 at 0800  Yes Yes   Sig: Take 250 mcg by mouth daily      Facility-Administered Medications: None     Allergies   Allergies   Allergen  Reactions     Ace Inhibitors      Per [facility]: hyperkalemia.  Pt doesn't know if he is allergic     Ceclor [Cefaclor] Hives     Sulfa Drugs      Pt unsure.        Social History   I have reviewed this patient's social history and updated it with pertinent information if needed. Anthony Dyer  reports previous alcohol use. He reports that he does not use drugs.    Family History   I have reviewed this patient's family history and updated it with pertinent information if needed.   No family history on file.    Review of Systems   The 10 point Review of Systems is negative other than noted in the HPI.    Physical Exam   Temp: 98.9  F (37.2  C) Temp src: Tympanic BP: 124/82 Pulse: 117   Resp: 21 SpO2: 92 % O2 Device: Other (Comments)(airvo) Oxygen Delivery: 25 LPM  Vital Signs with Ranges  Temp:  [98.9  F (37.2  C)-99.2  F (37.3  C)] 98.9  F (37.2  C)  Pulse:  [114-122] 117  Resp:  [9-34] 21  BP: (109-146)/(75-97) 124/82  FiO2 (%):  [73 %] 73 %  SpO2:  [83 %-94 %] 92 %  190 lbs 14.69 oz      Awake, alert, somewhat apprehensive man lying in bed in ICU.  Speech is clear.  HEENT: Pupils equal, conjugate. No icterus or nystagmus. Oral mucosa moist. No facial asymmetry.   Neck: Supple, jugular veins 2 cm of water. Trachea midline   Chest: No chest wall movement asymmetry. Aeration globally diminished. Accessory muscles not in use. Expiratory time not increased. No tidal wheezes. No rhonchi. No discrete crackles.   Cardiac: PMI not displaced. S1, S2 unremarkable. No S3, S4. P2 markedly accentuated. No murmurs. Carotid upstroke preserved. Carotid amplitude preserved.   Abdomen: Soft. No palpation or percussion tenderness. No distention. Normoactive bowel sounds. Liver and spleen not increased in size. No bruits, masses, or pulsations.   Extremities: No lower extremity edema.  Minimally cool distally.  Brisk capillary refill.  No eccymoses, clubbing.   Neurologic: Mental state above. Motor 5/5 and bilaterally equal. Tone  "preserved. No fasiculations or tremors. Sensation intact to light touch. DTR 2/4 and bilaterally equal.   Data   Data reviewed today:  I personally reviewed CT of the chest and \"angiogram\" protocol showing significant clot burden in pulmonary vasculature.  EKG demonstrates sinus tachycardia.  Recent Labs   Lab 04/26/21  1033 04/20/21  0517 04/19/21  2111   WBC 15.6* 6.7 8.6   HGB 14.9 13.3 14.0   MCV 96 94 96    173 209    137 138   POTASSIUM 4.9 3.9 4.4   CHLORIDE 102 106 106   CO2 26 25 24   BUN 29 21 24   CR 1.54* 1.26* 1.39*   ANIONGAP 6 6 8   JODI 9.6 9.1 9.0   * 70 143*   TROPI 0.109*  --  <0.015       Lactic Acid   Date Value Ref Range Status   04/15/2021 1.2 0.7 - 2.0 mmol/L Final       Recent Results (from the past 24 hour(s))   XR Chest Port 1 View    Narrative    PROCEDURE:  XR CHEST PORT 1 VIEW    HISTORY:  sob.     COMPARISON:  April 15, 2021    FINDINGS:   The cardiac silhouette is normal in size. The pulmonary vasculature is  normal.  The lungs are clear. No pleural effusion or pneumothorax.      Impression    IMPRESSION:  No acute cardiopulmonary disease.      ANGELINA MACKEY MD   CTA Chest with Contrast   Result Value    Radiologist flags Pulmonary emboli (AA)    Narrative    PROCEDURE: CTA CHEST WITH CONTRAST 4/26/2021 12:13 PM    HISTORY: PE suspected, high prob chest pain    COMPARISONS: None.    Meds/Dose Given: Isovue 370, 100 ml    TECHNIQUE: CT angiogram the chest with sagittal coronal mip  reconstructions    FINDINGS: There are large bilateral pulmonary emboli. There is right  ventricular strain. The thoracic aorta has some atherosclerotic  plaquing.    There is dependent atelectasis at the lung bases. Axillary and  supraclavicular lymph nodes appear normal. The hilar and mediastinal  lymph nodes appear normal. The upper portion of the liver spleen and  pancreas appear normal. The adrenal glands are normal. Degenerative  changes are present in the thoracic spine.         " Impression    IMPRESSION: Large bilateral pulmonary emboli with right ventricular  strain   [Critical Result: Pulmonary emboli]    Finding was identified on 2021 12:22 PM.     The emergency room was contacted by me on 2021 12:27 PM and  verbalized understanding of the critical result.     ANGELINA MACKEY MD   Echocardiogram Complete    Narrative    [id number]  [id number]  [id number]  [id number]^PAULO^HARMAN^NOAM     [facility]  Echocardiography Laboratory  [address] [place] [id number]     Name: AJMES HUSSEIN  MRN: [id number]  : 1940  Study Date: 2021 03:01 PM  Age: 80 yrs  Gender: Male  [facility]  Reason For Study: Pulmonary Emboli  History: PE  Ordering Physician: HARMAN BATES  Referring Physician: HARMAN BATES  Performed By: Ellen Sun RDCS     BSA: 2.1 m2  Height: 73 in  Weight: 190 lb  ______________________________________________________________________________  Procedure  Echocardiogram with two-dimensional, color and spectral Doppler performed.  Contrast Definity.  ______________________________________________________________________________  Interpretation Summary  Mild concentric wall thickening consistent with left ventricular hypertrophy  is present.  Abnormal non-specific septal motion is present.  Moderate right ventricular dilation is present.  Global right ventricular function is mildly reduced.  Mild right atrial enlargement is present.  Aortic valve is normal in structure and function.  Mild tricuspid insufficiency is present.  Right ventricular systolic pressure is 46mmHg above the right atrial pressure.  All of the segments of the aorta are normal except sinuses of Valsalva.  Global and regional left ventricular function is hyperkinetic with an EF of  65-70%.  ______________________________________________________________________________  Left Ventricle  Left ventricular size is normal. Global and regional left  ventricular function  is hyperkinetic with an EF of 65-70%. Mild concentric wall thickening  consistent with left ventricular hypertrophy is present. Abnormal non-specific  septal motion is present.     Right Ventricle  Moderate right ventricular dilation is present. Global right ventricular  function is mildly reduced.     Atria  Mild right atrial enlargement is present.     Aortic Valve  Aortic valve is normal in structure and function. The mean AoV pressure  gradient is 2.0 mmHg. The peak AoV pressure gradient is 3.3 mmHg.     Tricuspid Valve  Mild tricuspid insufficiency is present. Mild (pulmonary artery systolic  pressure<50mmHg) pulmonary hypertension is present. Right ventricular systolic  pressure is 46mmHg above the right atrial pressure.     Pulmonic Valve  The pulmonic valve is normal.     Vessels  All of the segments of the aorta are normal except sinuses of Valsalva.     Pericardium  No pericardial effusion is present.  ______________________________________________________________________________  MMode/2D Measurements & Calculations  IVSd: 1.3 cm  LVIDd: 3.9 cm  LVIDs: 2.5 cm  LVPWd: 1.2 cm  FS: 35.9 %  LV mass(C)d: 174.8 grams  LV mass(C)dI: 83.0 grams/m2  Ao root diam: 4.1 cm  LVOT diam: 2.4 cm  LVOT area: 4.5 cm2  RWT: 0.62     Time Measurements  Aortic HR: 342.0 BPM     Doppler Measurements & Calculations  Ao V2 max: 90.5 cm/sec  Ao max PG: 3.3 mmHg  Ao V2 mean: 66.2 cm/sec  Ao mean P.0 mmHg  Ao V2 VTI: 11.6 cm  GAGANDEEP(I,D): 2.8 cm2  GAGANDEEP(V,D): 2.5 cm2  LV V1 max P.0 mmHg  LV V1 max: 50.9 cm/sec  LV V1 VTI: 7.1 cm  CO(LVOT): 10.9 l/min  CI(LVOT): 5.2 l/min/m2  SV(LVOT): 31.9 ml  SI(LVOT): 15.2 ml/m2  TR max edmar: 337.6 cm/sec  TR max P.6 mmHg  AV Edmar Ratio (DI): 0.56  GAGANDEEP Index (cm2/m2): 1.3     ______________________________________________________________________________  Report approved by: Alethea Finch 2021 06:43 PM

## 2021-04-27 NOTE — PLAN OF CARE
Pt is alert and oriented, forgetful. VSS to baseline/afebrile. Requires 2 LPM via NC to maintain sats > 92%. RR- 20's, breathing unlabored, reports SALOMON. Lungs are dim/clear throughout. Denies pain. Tele reading SR 90's. Up with assist of 2 using GB/walker. Did ambulate about 50 ft in hallway. Caretaker, Komal, at bedside several times through shift. Per pt, okay to give pt information to Komal. Advanced to consistent carb diet per MD order, tolerating well. Schmitt patent with adequate output. NS infusing @ 50 ml/hr. Pt transferred to step down status this afternoon. Pt makes needs known. Will continue to monitor.

## 2021-04-27 NOTE — PROGRESS NOTES
Assessment completed by Amanda Tejada, RN with Anthony.    LOC: alert and oriented    Dx: PE  Chronic Disease Management: Urinary retention and Diabetes Mellitus type II    Lives with: was living alone at home prior to NH admission  Living at:  [facility]  Transportation: YES     Primary PCP: Blaze Prather - Patient requesting to switch to Dr. Orourke who has agreed to take patient on  Insurance:  Medicare and [facility]  Medicare inpatient letter reviewed with Anthony.    Support System:  Yes, friend and FREDRICK  Homecare/PCA: No  /County Services:   No  : NO     Health Care Directive: No  Guardian: No  POA: No    Pharmacy: [facility] while at [facility]  Meds management: Staff currently manage    Adequate Resources for needs (housing, utilities, food/med): YES  Household chores: Staff currently manage  Work/community/social activity: YES     ADLs: Assist of 1-2  Ambulation:Assist of 1 with walker  Falls: Reports fall with the syncopal event yesterday  Nutrition: Reports no concerns  Sleep: Reports no concerns    Equipment used: Walker       Mental health: Reports no concerns  Substance abuse: Denies  Exposure to violence/abuse: Denies  Stressors: Worried about missing his appt tomorrow with Dr. De Leon     Able to Return to Prior Living Arrangements: YES    Choice of Vendor: Would like to go back to [facility]    Barriers: None noted    MARCELA: low    Plan: Return to [facility] upon discharge

## 2021-04-27 NOTE — PROGRESS NOTES
"CLINICAL NUTRITION SERVICES  -  ASSESSMENT NOTE    Anthony Dyer : MST with a score of 2 or greater. Score 3, 14-23 lb loss in 6 months, decreased appetite.     80 yom admitted for tachycardia. Pt has a hx of type 2 diabetes, CKD stage 3, HTN, HLD, CVA. Most recent A1C was 6.6 on 2/9/21 indicating good control. At home he is on Ozempic 2mg every 7 days, Metformin- XR 1000 mg BID and Glimepiride 4 mg with breakfast. Noted on 2/9/21 office visit pt was working on having smaller portions, reducing alcohol intake and increasing exercise to promote weight loss. Pt reports he tends to have smaller portions but no change in intake. He has no concerns about his nutrition.     Diet Order: Moderate CHO  Intake: no intake documented this admission so far. Last admission earlier this month intake was 100% of 5 meals documented    Height: 6' 1\"  Weight: 189 lbs 13.06 oz  Body mass index is 25.04 kg/m .  Weight Status:  Overweight BMI 25-29.9  IBW: 79.9 kg (176 lb 2.4 oz)  Weight History: 2.5% weight loss in 1 week-  Wt Readings from Last 10 Encounters:   04/27/21 86.1 kg (189 lb 13.1 oz)- bed scale   04/20/21 88.1 kg (194 lb 3.6 oz)- standing scale   04/15/21 93.9 kg (207 lb)- estimated   02/09/21 92.2 kg (203 lb 4.2 oz)*   07/28/20 96.8 kg (213 lb 6.5 oz)*   01/22/20 96.5 kg (212 lb 11.9 oz)*   * weight from care everywhere    Malnutrition Diagnosis: None suspected    NUTRITION RECOMMENDATIONS  - Encourage intake as needed    MONITORING AND EVALUATION:  RD will monitor intake, weight, labs  "

## 2021-04-27 NOTE — PROGRESS NOTES
[facility]    Hospitalist Progress Note      Assessment & Plan   Anthony Dyer is a 80 year old male who was admitted on 4/26/2021. Patient was at nursing home after having Schmitt catheter placed for significant urinary retention. He presented with acute shortness of breath hypoxia tachycardia and syncope. His heart rate on presentation was 120, blood pressure 110/70, 83% saturations on 4 L. Afebrile. Chest x-ray was unremarkable. CT angiogram showed significant bilateral pulmonary emboli with some right ventricular strain noted.    1. Acute pulmonary embolus: Patient presented with some tachycardia significant hypotension. No infiltrates however bilateral PEs. Been started on Lovenox. Right ventricle enlarged on echocardiogram. Normal LV function. Perhaps some mild evidence of flattening of the interventricular septum. He did require initially Airvo, however that now is been weaned down to 6 L of nasal cannula with O2 sats at 97 percent. We will see how he does through today we will likely switch over to oral anti-coagulant.    2. Acute respiratory failure with hypoxia: All appears to be due to pulmonary embolism. He required high flow O2 for a while however is back to nasal cannula. We will ultrasound his lower extremities just given the significant clot that he had.  No complaints of dyspnea currently.  Getting up in the chair and moving and see how he does.    3. Urinary retention: Schmitt cath remains in place with adequate urine output renal function at his baseline. No bleeding at this time.  This will need to be in place until he can be evaluated by urology at a later date.    4. Diabetes mellitus type 2.:  We will advance his diet to a consistent carb.  Restart his Metformin and glipizide.   Ozempic is due for him today.  At this point given his current status we will just hold sugars have been acceptable.    5.  Hypertension: Continue his usual medications blood pressure stable.  Diet: Advance  Diet as Tolerated: Clear Liquid Diet  Fluids: Saline 50 cc    DVT Prophylaxis: Enoxaparin (Lovenox) SQ  Code Status: Full Code    Disposition: Expected discharge in 2 to 3 days    [facility]    Interval History   Patient is alert.  Just feels tired.  No shortness of breath chest pain nausea vomiting.  He has not had any peripheral edema or calf pain at all.  No hematuria with Schmitt catheter in.  Oxygenation has improved and is down to 6 L.  Is currently tolerating Lovenox.  We will get him up out of bed.  Ambulate see how he feels.  Ultrasound his legs.  Continue to wean oxygen.  Will probably switch to oral anticoagulant today or tomorrow we will see how he does this morning.    -Data reviewed today: I reviewed all new labs and imaging results over the last 24 hours. I personally reviewed no images or EKG's today.    Physical Exam   Temp: 98.7  F (37.1  C) Temp src: Tympanic BP: 116/77 Pulse: 97   Resp: 18 SpO2: 92 % O2 Device: Nasal cannula Oxygen Delivery: 6 LPM  Vitals:    04/26/21 1430 04/27/21 0542   Weight: 86.6 kg (190 lb 14.7 oz) 86.1 kg (189 lb 13.1 oz)     Vital Signs with Ranges  Temp:  [98.7  F (37.1  C)-99.6  F (37.6  C)] 98.7  F (37.1  C)  Pulse:  [] 97  Resp:  [9-34] 18  BP: (102-146)/(74-97) 116/77  FiO2 (%):  [50 %-77 %] 50 %  SpO2:  [83 %-99 %] 92 %  I/O last 3 completed shifts:  In: 240 [P.O.:240]  Out: 335 [Urine:335]    Peripheral IV 04/26/21 Left Upper forearm (Active)   Site Assessment WDL 04/27/21 0400   Line Status Infusing 04/27/21 0400   Phlebitis Scale 0-->no symptoms 04/27/21 0400   Infiltration Scale 0 04/27/21 0400   Number of days: 1       Urethral Catheter (Active)   Tube Description Positional 04/27/21 0455   Catheter Care Done 04/27/21 0020   Collection Container Standard 04/27/21 0455   Securement Method Securing device (Describe) 04/27/21 0453   Rationale for Continued Use Retention 04/27/21 6985   Urine Output 40 mL 04/27/21 0588   Number of days:      No  line/device    Constitutional: Alert and oriented x3. No distress    HEENT: Normocephalic/atraumatic, PERRL, EOMI, mouth moist, neck supple, no LN.     Cardiovascular: RRR. no Murmur, no  rubs, or gallops.  JVD elevated.  Bruits no.  Pulses 3+    Respiratory: Clear to auscultation bilaterally    Abdomen: Soft, nontender, nondistended, no organomegaly. Bowel sounds present    Extremities: Warm/dry. No calf pain or edema    Neuro:   Non focal, cranial nerves intact, Moves all extremities.  Medications     - MEDICATION INSTRUCTIONS -       sodium chloride 50 mL/hr at 04/26/21 1706       allopurinol  100 mg Oral Daily     atorvastatin  40 mg Oral QPM     carvedilol  25 mg Oral BID     enoxaparin ANTICOAGULANT  1 mg/kg Subcutaneous Q12H     gabapentin  300 mg Oral TID     insulin aspart  1-7 Units Subcutaneous TID AC     insulin aspart  1-5 Units Subcutaneous At Bedtime     sodium chloride (PF)  3 mL Intracatheter Q8H     tamsulosin  0.4 mg Oral QPM       Data   Recent Labs   Lab 04/27/21  0450 04/26/21  1033   WBC 9.5 15.6*   HGB 13.6 14.9   MCV 96 96    261    134   POTASSIUM 4.3 4.9   CHLORIDE 108 102   CO2 25 26   BUN 30 29   CR 1.27* 1.54*   ANIONGAP 4 6   JODI 9.1 9.6   * 301*   ALBUMIN 3.0*  --    PROTTOTAL 6.8  --    BILITOTAL 0.7  --    ALKPHOS 93  --    ALT 25  --    AST 19  --    TROPI  --  0.109*       Recent Results (from the past 24 hour(s))   XR Chest Port 1 View    Narrative    PROCEDURE:  XR CHEST PORT 1 VIEW    HISTORY:  sob.     COMPARISON:  April 15, 2021    FINDINGS:   The cardiac silhouette is normal in size. The pulmonary vasculature is  normal.  The lungs are clear. No pleural effusion or pneumothorax.      Impression    IMPRESSION:  No acute cardiopulmonary disease.      ANGELINA MACKEY MD   CTA Chest with Contrast   Result Value    Radiologist flags Pulmonary emboli (AA)    Narrative    PROCEDURE: CTA CHEST WITH CONTRAST 4/26/2021 12:13 PM    HISTORY: PE suspected, high prob  chest pain    COMPARISONS: None.    Meds/Dose Given: Isovue 370, 100 ml    TECHNIQUE: CT angiogram the chest with sagittal coronal mip  reconstructions    FINDINGS: There are large bilateral pulmonary emboli. There is right  ventricular strain. The thoracic aorta has some atherosclerotic  plaquing.    There is dependent atelectasis at the lung bases. Axillary and  supraclavicular lymph nodes appear normal. The hilar and mediastinal  lymph nodes appear normal. The upper portion of the liver spleen and  pancreas appear normal. The adrenal glands are normal. Degenerative  changes are present in the thoracic spine.         Impression    IMPRESSION: Large bilateral pulmonary emboli with right ventricular  strain   [Critical Result: Pulmonary emboli]    Finding was identified on 2021 12:22 PM.     The emergency room was contacted by me on 2021 12:27 PM and  verbalized understanding of the critical result.     ANGELINA MACKEY MD   Echocardiogram Complete    Narrative    [id number]  [id number]  [id number]  [id number]^PAULO^HARMAN^NOAM     [facility]  Echocardiography Laboratory  [address] [place] [address]     Name: JAMES HUSSEIN  MRN: [id number]  : 1940  Study Date: 2021 03:01 PM  Age: 80 yrs  Gender: Male  Patient Location: [facility]  Reason For Study: Pulmonary Emboli  History: PE  Ordering Physician: HARMAN BATES  Referring Physician: HARMAN BATES  Performed By: Ellen Sun RDCS     BSA: 2.1 m2  Height: 73 in  Weight: 190 lb  ______________________________________________________________________________  Procedure  Echocardiogram with two-dimensional, color and spectral Doppler performed.  Contrast Definity.  ______________________________________________________________________________  Interpretation Summary  Mild concentric wall thickening consistent with left ventricular hypertrophy  is present.  Abnormal non-specific septal motion is  present.  Moderate right ventricular dilation is present.  Global right ventricular function is mildly reduced.  Mild right atrial enlargement is present.  Aortic valve is normal in structure and function.  Mild tricuspid insufficiency is present.  Right ventricular systolic pressure is 46mmHg above the right atrial pressure.  All of the segments of the aorta are normal except sinuses of Valsalva.  Global and regional left ventricular function is hyperkinetic with an EF of  65-70%.  ______________________________________________________________________________  Left Ventricle  Left ventricular size is normal. Global and regional left ventricular function  is hyperkinetic with an EF of 65-70%. Mild concentric wall thickening  consistent with left ventricular hypertrophy is present. Abnormal non-specific  septal motion is present.     Right Ventricle  Moderate right ventricular dilation is present. Global right ventricular  function is mildly reduced.     Atria  Mild right atrial enlargement is present.     Aortic Valve  Aortic valve is normal in structure and function. The mean AoV pressure  gradient is 2.0 mmHg. The peak AoV pressure gradient is 3.3 mmHg.     Tricuspid Valve  Mild tricuspid insufficiency is present. Mild (pulmonary artery systolic  pressure<50mmHg) pulmonary hypertension is present. Right ventricular systolic  pressure is 46mmHg above the right atrial pressure.     Pulmonic Valve  The pulmonic valve is normal.     Vessels  All of the segments of the aorta are normal except sinuses of Valsalva.     Pericardium  No pericardial effusion is present.  ______________________________________________________________________________  MMode/2D Measurements & Calculations  IVSd: 1.3 cm  LVIDd: 3.9 cm  LVIDs: 2.5 cm  LVPWd: 1.2 cm  FS: 35.9 %  LV mass(C)d: 174.8 grams  LV mass(C)dI: 83.0 grams/m2  Ao root diam: 4.1 cm  LVOT diam: 2.4 cm  LVOT area: 4.5 cm2  RWT: 0.62     Time Measurements  Aortic HR: 342.0  BPM     Doppler Measurements & Calculations  Ao V2 max: 90.5 cm/sec  Ao max PG: 3.3 mmHg  Ao V2 mean: 66.2 cm/sec  Ao mean P.0 mmHg  Ao V2 VTI: 11.6 cm  GAGANDEEP(I,D): 2.8 cm2  GAGANDEEP(V,D): 2.5 cm2  LV V1 max P.0 mmHg  LV V1 max: 50.9 cm/sec  LV V1 VTI: 7.1 cm  CO(LVOT): 10.9 l/min  CI(LVOT): 5.2 l/min/m2  SV(LVOT): 31.9 ml  SI(LVOT): 15.2 ml/m2  TR max edmar: 337.6 cm/sec  TR max P.6 mmHg  AV Edmar Ratio (DI): 0.56  GAGANDEEP Index (cm2/m2): 1.3     ______________________________________________________________________________  Report approved by: Alethea Finch 2021 06:43 PM

## 2021-04-27 NOTE — PLAN OF CARE
Face to face report given with opportunity to observe patient.    Report given to TONY Matos   4/27/2021  7:13 AM

## 2021-04-27 NOTE — PLAN OF CARE
Pt A&O x4, VSS on Airvo throughout the night at 20LPM and 50% FiO2, just placed the pt on a NC at 6lpm. Denies any pain. HRR, tele reading SR-ST 90s-110s with PVCs. Lungs are clear and diminished. BS active, denies any N&V. Pt not oob overnight, have offered turns. He is able to weight shift himself in bed. Schmitt in place that has been chronic for retention and output is adequate. IV running NS at 50mls/hr. Clear liquid diet in place. Pt slept throughout the night. No other significant events.

## 2021-04-28 ENCOUNTER — APPOINTMENT (OUTPATIENT)
Dept: PHYSICAL THERAPY | Facility: HOSPITAL | Age: 81
DRG: 175 | End: 2021-04-28
Attending: INTERNAL MEDICINE
Payer: MEDICARE

## 2021-04-28 ENCOUNTER — OFFICE VISIT (OUTPATIENT)
Dept: UROLOGY | Facility: OTHER | Age: 81
DRG: 175 | End: 2021-04-28
Attending: UROLOGY
Payer: MEDICARE

## 2021-04-28 VITALS
RESPIRATION RATE: 20 BRPM | SYSTOLIC BLOOD PRESSURE: 110 MMHG | DIASTOLIC BLOOD PRESSURE: 68 MMHG | OXYGEN SATURATION: 97 % | TEMPERATURE: 97.4 F | HEART RATE: 93 BPM

## 2021-04-28 DIAGNOSIS — R33.9 URINARY RETENTION: Primary | ICD-10-CM

## 2021-04-28 LAB
ALBUMIN UR-MCNC: 30 MG/DL
ANION GAP SERPL CALCULATED.3IONS-SCNC: 3 MMOL/L (ref 3–14)
APPEARANCE UR: CLEAR
BACTERIA #/AREA URNS HPF: ABNORMAL /HPF
BILIRUB UR QL STRIP: NEGATIVE
BUN SERPL-MCNC: 31 MG/DL (ref 7–30)
CALCIUM SERPL-MCNC: 8.6 MG/DL (ref 8.5–10.1)
CHLORIDE SERPL-SCNC: 109 MMOL/L (ref 94–109)
CO2 SERPL-SCNC: 26 MMOL/L (ref 20–32)
COLOR UR AUTO: YELLOW
CREAT SERPL-MCNC: 1.32 MG/DL (ref 0.66–1.25)
ERYTHROCYTE [DISTWIDTH] IN BLOOD BY AUTOMATED COUNT: 12.2 % (ref 10–15)
GFR SERPL CREATININE-BSD FRML MDRD: 51 ML/MIN/{1.73_M2}
GLUCOSE BLDC GLUCOMTR-MCNC: 107 MG/DL (ref 70–99)
GLUCOSE BLDC GLUCOMTR-MCNC: 133 MG/DL (ref 70–99)
GLUCOSE BLDC GLUCOMTR-MCNC: 160 MG/DL (ref 70–99)
GLUCOSE BLDC GLUCOMTR-MCNC: 55 MG/DL (ref 70–99)
GLUCOSE BLDC GLUCOMTR-MCNC: 75 MG/DL (ref 70–99)
GLUCOSE SERPL-MCNC: 119 MG/DL (ref 70–99)
GLUCOSE UR STRIP-MCNC: NEGATIVE MG/DL
HCT VFR BLD AUTO: 36.9 % (ref 40–53)
HGB BLD-MCNC: 12.3 G/DL (ref 13.3–17.7)
HGB UR QL STRIP: ABNORMAL
HYALINE CASTS #/AREA URNS LPF: 1 /LPF
KETONES UR STRIP-MCNC: NEGATIVE MG/DL
LEUKOCYTE ESTERASE UR QL STRIP: NEGATIVE
MCH RBC QN AUTO: 32.1 PG (ref 26.5–33)
MCHC RBC AUTO-ENTMCNC: 33.3 G/DL (ref 31.5–36.5)
MCV RBC AUTO: 96 FL (ref 78–100)
MUCOUS THREADS #/AREA URNS LPF: PRESENT /LPF
NITRATE UR QL: NEGATIVE
PH UR STRIP: 5.5 PH (ref 4.7–8)
PLATELET # BLD AUTO: 224 10E9/L (ref 150–450)
POTASSIUM SERPL-SCNC: 4.3 MMOL/L (ref 3.4–5.3)
RBC # BLD AUTO: 3.83 10E12/L (ref 4.4–5.9)
RBC #/AREA URNS AUTO: 53 /HPF (ref 0–2)
SODIUM SERPL-SCNC: 138 MMOL/L (ref 133–144)
SOURCE: ABNORMAL
SP GR UR STRIP: 1.02 (ref 1–1.03)
SQUAMOUS #/AREA URNS AUTO: 0 /HPF (ref 0–1)
UROBILINOGEN UR STRIP-MCNC: NORMAL MG/DL (ref 0–2)
WBC # BLD AUTO: 8.5 10E9/L (ref 4–11)
WBC #/AREA URNS AUTO: 2 /HPF (ref 0–5)

## 2021-04-28 PROCEDURE — 81001 URINALYSIS AUTO W/SCOPE: CPT | Performed by: INTERNAL MEDICINE

## 2021-04-28 PROCEDURE — 250N000011 HC RX IP 250 OP 636: Performed by: INTERNAL MEDICINE

## 2021-04-28 PROCEDURE — 250N000013 HC RX MED GY IP 250 OP 250 PS 637: Performed by: INTERNAL MEDICINE

## 2021-04-28 PROCEDURE — 97530 THERAPEUTIC ACTIVITIES: CPT | Mod: GP

## 2021-04-28 PROCEDURE — 999N001017 HC STATISTIC GLUCOSE BY METER IP: Mod: ZL

## 2021-04-28 PROCEDURE — 99232 SBSQ HOSP IP/OBS MODERATE 35: CPT | Performed by: INTERNAL MEDICINE

## 2021-04-28 PROCEDURE — 80048 BASIC METABOLIC PNL TOTAL CA: CPT | Performed by: INTERNAL MEDICINE

## 2021-04-28 PROCEDURE — G0463 HOSPITAL OUTPT CLINIC VISIT: HCPCS

## 2021-04-28 PROCEDURE — 52000 CYSTOURETHROSCOPY: CPT

## 2021-04-28 PROCEDURE — 97161 PT EVAL LOW COMPLEX 20 MIN: CPT | Mod: GP

## 2021-04-28 PROCEDURE — 36415 COLL VENOUS BLD VENIPUNCTURE: CPT | Performed by: INTERNAL MEDICINE

## 2021-04-28 PROCEDURE — 250N000013 HC RX MED GY IP 250 OP 250 PS 637: Performed by: UROLOGY

## 2021-04-28 PROCEDURE — 99204 OFFICE O/P NEW MOD 45 MIN: CPT | Mod: 25 | Performed by: UROLOGY

## 2021-04-28 PROCEDURE — 120N000001 HC R&B MED SURG/OB

## 2021-04-28 PROCEDURE — 52000 CYSTOURETHROSCOPY: CPT | Performed by: UROLOGY

## 2021-04-28 PROCEDURE — 85027 COMPLETE CBC AUTOMATED: CPT | Performed by: INTERNAL MEDICINE

## 2021-04-28 RX ORDER — FINASTERIDE 5 MG/1
5 TABLET, FILM COATED ORAL DAILY
Qty: 30 TABLET | Refills: 11 | Status: SHIPPED | OUTPATIENT
Start: 2021-04-28 | End: 2022-05-26

## 2021-04-28 RX ORDER — CIPROFLOXACIN 500 MG/1
500 TABLET, FILM COATED ORAL ONCE
Status: DISCONTINUED | OUTPATIENT
Start: 2021-04-28 | End: 2021-04-28

## 2021-04-28 RX ORDER — FINASTERIDE 5 MG/1
5 TABLET, FILM COATED ORAL DAILY
Status: DISCONTINUED | OUTPATIENT
Start: 2021-04-28 | End: 2021-04-29 | Stop reason: HOSPADM

## 2021-04-28 RX ADMIN — CARVEDILOL 25 MG: 25 TABLET, FILM COATED ORAL at 08:15

## 2021-04-28 RX ADMIN — ATORVASTATIN CALCIUM 40 MG: 40 TABLET, FILM COATED ORAL at 20:30

## 2021-04-28 RX ADMIN — GABAPENTIN 300 MG: 300 CAPSULE ORAL at 08:07

## 2021-04-28 RX ADMIN — GABAPENTIN 300 MG: 300 CAPSULE ORAL at 12:43

## 2021-04-28 RX ADMIN — RIVAROXABAN 15 MG: 15 TABLET, FILM COATED ORAL at 12:44

## 2021-04-28 RX ADMIN — METFORMIN HYDROCHLORIDE 1000 MG: 500 TABLET, EXTENDED RELEASE ORAL at 16:19

## 2021-04-28 RX ADMIN — GLIMEPIRIDE 2 MG: 2 TABLET ORAL at 08:14

## 2021-04-28 RX ADMIN — FINASTERIDE 5 MG: 5 TABLET, FILM COATED ORAL at 16:19

## 2021-04-28 RX ADMIN — GABAPENTIN 300 MG: 300 CAPSULE ORAL at 20:30

## 2021-04-28 RX ADMIN — CIPROFLOXACIN HYDROCHLORIDE 500 MG: 500 TABLET, FILM COATED ORAL at 11:30

## 2021-04-28 RX ADMIN — TAMSULOSIN HYDROCHLORIDE 0.4 MG: 0.4 CAPSULE ORAL at 20:30

## 2021-04-28 RX ADMIN — ENOXAPARIN SODIUM 90 MG: 100 INJECTION SUBCUTANEOUS at 00:07

## 2021-04-28 RX ADMIN — METFORMIN HYDROCHLORIDE 1000 MG: 500 TABLET, EXTENDED RELEASE ORAL at 08:15

## 2021-04-28 RX ADMIN — RIVAROXABAN 15 MG: 15 TABLET, FILM COATED ORAL at 22:15

## 2021-04-28 RX ADMIN — ACETAMINOPHEN 650 MG: 325 TABLET, FILM COATED ORAL at 12:47

## 2021-04-28 RX ADMIN — ACETAMINOPHEN 650 MG: 325 TABLET, FILM COATED ORAL at 00:07

## 2021-04-28 RX ADMIN — ALLOPURINOL 100 MG: 100 TABLET ORAL at 08:15

## 2021-04-28 RX ADMIN — CARVEDILOL 25 MG: 25 TABLET, FILM COATED ORAL at 20:30

## 2021-04-28 ASSESSMENT — ACTIVITIES OF DAILY LIVING (ADL)
ADLS_ACUITY_SCORE: 18

## 2021-04-28 ASSESSMENT — MIFFLIN-ST. JEOR: SCORE: 1660.88

## 2021-04-28 ASSESSMENT — ENCOUNTER SYMPTOMS
WEAKNESS: 1
SHORTNESS OF BREATH: 1
DIFFICULTY URINATING: 1

## 2021-04-28 ASSESSMENT — PAIN SCALES - GENERAL: PAINLEVEL: NO PAIN (0)

## 2021-04-28 NOTE — PROGRESS NOTES
04/28/21 1400   Signing Clinician's Name / Credentials   Signing clinician's name / credentials Kishan Causey DPT   Quick Adds   Rehab Discipline PT   Therapeutic Activity   Minutes of Treatment 15 minutes   Symptoms Noted During/After Treatment Fatigue   Treatment Detail Patient in bed at start of therapy. Patient agreeable to Tx. Patient performed bed mobility CGA x 1 to sit EOB. Patient then ambulated roughly 150 feet Kofi x 1 with w/c follow if needing to sit. Patient reports feeling fatigued with ambulation. No LOB with ambulation. Patient ended therapy in chair with call light in reach.   PT Discharge Planning    PT Discharge Recommendation (DC Rec) Transitional Care Facility   PT Rationale for DC Rec Patient currently needing assist with functional movement and ambulation at this time. Not safe to return to prior living situation. Patient also in agreement that short term rehab would be safest option at this time.   PT Brief overview of current status  Patient in bed at start of therapy. Patient agreeable to Tx. Patient performed bed mobility CGA x 1 to sit EOB. Patient then ambulated roughly 150 feet Kofi x 1 with w/c follow if needing to sit. Patient reports feeling fatigued with ambulation. No LOB with ambulation. Patient ended therapy in chair with call light in reach.   Additional Documentation   Rehab Comments Reduced strength from baseline   PT Plan Progress strength and functional ability   Total Session Time   Total Session Time (minutes) 15 minutes

## 2021-04-28 NOTE — NURSING NOTE
"Chief Complaint   Patient presents with     Cystoscopy       Initial /68   Pulse 93   Temp 97.4  F (36.3  C) (Tympanic)   Resp 20   SpO2 97%  Estimated body mass index is 25.94 kg/m  as calculated from the following:    Height as of 4/26/21: 1.854 m (6' 1\").    Weight as of an earlier encounter on 4/28/21: 89.2 kg (196 lb 10.4 oz).  Medication Reconciliation: complete  Carolina Gray LPN    "

## 2021-04-28 NOTE — PLAN OF CARE
Pt A&O x4, VSS on 1/2L of O2 via NC. C/o 5/10 lower back pain, prn tylenol given x1. HRR, tele reading SR-ST 80s-100s with PVCs. Lungs are clear and dim in the bases. BS active, denies any N&V. Schmitt in place and output is adequate. Pt up in chair in beginning of shift. Offered turns while he was in bed. Pt slept throughout the night. No other significant events.

## 2021-04-28 NOTE — PROGRESS NOTES
Inpatient Physical Therapy Evaluation    Name: Anthony Dyer MRN# [id number]   Age: 80 year old YOB: 1940     Date of Consultation: 2021  Consultation is requested by:  Dr. Martins  Specific Consultation Request:  Discharge recommendations  Primary care provider: George Orourke    General Information:   Onset Date: 21    History of Current Problem/Admission: Tachycardia [R00.0]  Hypoxia [R09.02]  Other acute pulmonary embolism with acute cor pulmonale (H) [I26.09]  Dyspnea, unspecified type [R06.00]    Prior Level of Function: Patient reports that he was previously using a SPC for ambulation at home. Does have stairs in his home down into his basement and a couple of small stoop steps up to his home  Ambulation: 1 - Assistive Equipment   Transferrin - Assistive Equipment   Toiletin - Independent    Bathin - Independent   Dressin - Independent   Eatin - Independent   Communication: 0 - Understands/communicates without difficulty  Swallowin - swallows foods/liquids without difficulty  Cognition: 0 - no cognitive issues reported    Fall history within the last 6 months: No    Current Living Situation: Reports that he lives at home independently. Does have small step to enter his home.    Current Equipment Used at Home: walking stick/cane     Patient & Family Goals: Return home     Past Medical History:   Past Medical History:   Diagnosis Date     Diabetes (H)      Hypertension        Past Surgical History:  Past Surgical History:   Procedure Laterality Date     HERNIA REPAIR         Medications:   Current Facility-Administered Medications   Medication     acetaminophen (TYLENOL) tablet 650-975 mg     allopurinol (ZYLOPRIM) tablet 100 mg     atorvastatin (LIPITOR) tablet 40 mg     carvedilol (COREG) tablet 25 mg     glucose gel 15-30 g    Or     dextrose 50 % injection 25-50 mL    Or     glucagon injection 1 mg     gabapentin (NEURONTIN) capsule 300 mg      glimepiride (AMARYL) tablet 2 mg     insulin aspart (NovoLOG) injection (RAPID ACTING)     insulin aspart (NovoLOG) injection (RAPID ACTING)     lidocaine (LMX4) kit     lidocaine 1 % 0.1-1 mL     melatonin tablet 1 mg     metFORMIN (GLUCOPHAGE-XR) 24 hr tablet 1,000 mg     morphine (PF) injection 1 mg     naloxone (NARCAN) injection 0.2 mg    Or     naloxone (NARCAN) injection 0.4 mg    Or     naloxone (NARCAN) injection 0.2 mg    Or     naloxone (NARCAN) injection 0.4 mg     ondansetron (ZOFRAN-ODT) ODT tab 4 mg    Or     ondansetron (ZOFRAN) injection 4 mg     Patient is already receiving anticoagulation with heparin, enoxaparin (LOVENOX), warfarin (COUMADIN)  or other anticoagulant medication     polyethylene glycol (MIRALAX) Packet 17 g     rivaroxaban ANTICOAGULANT (XARELTO) tablet 15 mg     sodium chloride (PF) 0.9% PF flush 3 mL     sodium chloride (PF) 0.9% PF flush 3 mL     tamsulosin (FLOMAX) capsule 0.4 mg       Weight Bearing Status: no restrictions noted     Cognitive Status Examination:  Orientation: oriented to time, place and person  Level of Consciousness: alert  Follows Commands and Answers Questions: 100% of the time  Personal Safety and Judgement: Intact  Memory: Immediate recall intact, Short-term memory intact and Long-term memory intact  Comments:     Pain:   Currently in pain? No    Physical Therapy Evaluation:   Integumentary/Edema: NT  ROM: WFL  Strength: BLE strength 4/5 MMT  Bed Mobility: CGA x 1 for bed mobility  Transfers: Kofi x 1 for sit to stand  Gait: min A x 1 for ambulation roughly 150 feet  Stairs: NT  Balance: NT  Sensory: NT  Coordination: NT    Physical Therapy Interventions: Gait Training , Strengthening and Transfer Training    Clinical Impressions:  Criteria for Skilled Therapeutic Intervention Met: Yes, treatment indicated  PT Diagnosis: LE weakness and deconditioning  Influenced by the following impairments: LE weakness, impaired gait, impaired balacne  Functional  limitations due to impairment: increased risk for falls  Clinical presentation: Stable/Uncomplicated  Clinical presentation rationale: clinical judgement  Clinical Decision making (complexity): Low Complexity  Frequency: 6 times/week  Predicted Duration of Therapy Intervention (days/wks): 5 days  Anticipated Discharge Disposition: Transitional Care Facility   Anticipated Equipment Needs at Discharge: walker  Risks and Benefits of therapy have been explained: Yes  Patient, Family & other staff in agreement with plan of care: Yes  Clinical Impression Comments: Patient currently needing assist with functional movement and ambulation at this time. Not safe to return to prior living situation. Patient also in agreement that short term rehab would be safest option at this time.    Total Eval Time: 20 minutes

## 2021-04-28 NOTE — PLAN OF CARE
VSS. No acute changes this shift. Remains on RA with SaO2 mid 90's. Mild SALOMON persists. Down to urology this shift, to was unable to be removed. Draining adequate amounts of clear yellow urine. Pt is alert and oriented but is forgetful. Requires heavy assist of 1, walker, and gait belt for transfers, as well as frequent cues to complete transfers. Refused lunch. BG low at 55 before dinner. Pt refused glucose gel, requested to eat dinner tray instead. Recheck after dinner 75, then recheck 1 hour later 133. Will monitor.     Face to face report given with opportunity to observe patient.    Report given to TONY Jade RN   4/28/2021  6:55 PM

## 2021-04-28 NOTE — PROGRESS NOTES
[facility]    Hospitalist Progress Note      Assessment & Plan   Anthony Dyer is a 80 year old male who was admitted on 4/26/2021.      1.  Acute pulmonary embolus: Currently doing well in terms of oxygenation.  No complaints of dyspnea.  Currently he is on Lovenox every 12 hours.  We will switch him over to Xarelto.  Likely be anywhere between 3 to 6 months treatment.  Hemodynamically stable.    2.  Acute respiratory failure with hypoxia: This was all secondary to his PE.  Currently on room air.    3.  Urinary retention: Patient's had Schmitt catheter in.  He was scheduled to see Dr. De Leon today in the clinic for voiding trial.  I did contact her that he was in the hospital.  And she is actually seeing him currently for evaluation.    4.  Diabetes mellitus type 2: Sugars are fine he is on his usual medications.  Unable to get his Ozempic currently.    5.  Hypertension: Vital signs stable on his current medications.    6.  Disposition: Patient was hoping to return home.  He was at the nursing home due to the fact that he could not really handle his Schmitt catheter and no family was available.  But given his current weakness.  Unlikely he will be able to safely return home.  Culture at this point if he will still have the catheter in until Dr. De Leon is finished with him.  Anticipate he will probably return either later today or tomorrow.  Pending possible voiding trial.    Diet: Advance Diet as Tolerated: Fully Advanced to diet(s) per Provider order; 8934-7639 Calories: Moderate Consistent CHO (4-6 CHO units/meal)  Fluids: none    DVT Prophylaxis: Enoxaparin (Lovenox) SQ  Code Status: Full Code    Disposition: Expected discharge either today or tomorrow  Osmar Martins    Interval History   Patient is alert pleasant no complaints no dyspnea at all.  Stable sats on room air.  No evidence of any bleeding from the Lovenox.  Schmitt catheter without difficulty.  UA is negative.  We will plan on switching him  over to Xarelto.  Also is getting a voiding trial through urology currently.  Waiting the results whether or not he will continue with the catheter.  He is extremely weak.  I think he will need to return to the nursing home short-term longer-term if has a catheter.    -Data reviewed today: I reviewed all new labs and imaging results over the last 24 hours. I personally reviewed no images or EKG's today.    Physical Exam   Temp: 98.5  F (36.9  C) Temp src: Tympanic BP: 112/69 Pulse: 91   Resp: 18 SpO2: 94 % O2 Device: None (Room air) Oxygen Delivery: 1/2 LPM  Vitals:    04/26/21 1430 04/27/21 0542 04/28/21 0527   Weight: 86.6 kg (190 lb 14.7 oz) 86.1 kg (189 lb 13.1 oz) 89.2 kg (196 lb 10.4 oz)     Vital Signs with Ranges  Temp:  [97.2  F (36.2  C)-99.3  F (37.4  C)] 97.4  F (36.3  C)  Pulse:  [] 93  Resp:  [10-22] 20  BP: (106-131)/(64-81) 110/68  SpO2:  [93 %-97 %] 97 %  I/O last 3 completed shifts:  In: 1046 [P.O.:480; I.V.:566]  Out: 850 [Urine:850]    Peripheral IV 04/26/21 Left Upper forearm (Active)   Site Assessment WDL 04/28/21 0900   Line Status Infusing 04/28/21 0900   Phlebitis Scale 0-->no symptoms 04/28/21 0900   Infiltration Scale 0 04/28/21 0900   Number of days: 2       Urethral Catheter (Active)   Tube Description Positional 04/28/21 0810   Catheter Care Done;Catheter wipes 04/28/21 0810   Collection Container Standard 04/28/21 0810   Securement Method Securing device (Describe) 04/28/21 0810   Rationale for Continued Use Retention 04/28/21 0810   Urine Output 150 mL 04/28/21 0530   Number of days:      No line/device    Constitutional: Alert and oriented x3. No distress    HEENT: Normocephalic/atraumatic, PERRL, EOMI, mouth moist, neck supple, no LN.     Cardiovascular: RRR. no Murmur, no  rubs, or gallops.  JVD no.  Bruits no.  Pulses 2+    Respiratory: Clear to auscultation bilaterally    Abdomen: Soft, nontender, nondistended, no organomegaly. Bowel sounds present    Extremities: Warm/dry.  No edema    Neuro:   Non focal, cranial nerves intact, Moves all extremities.  Medications     - MEDICATION INSTRUCTIONS -       sodium chloride 50 mL/hr at 04/27/21 1146       allopurinol  100 mg Oral Daily     atorvastatin  40 mg Oral QPM     carvedilol  25 mg Oral BID     [Held by provider] enoxaparin ANTICOAGULANT  1 mg/kg Subcutaneous Q12H     gabapentin  300 mg Oral TID     glimepiride  2 mg Oral Daily with breakfast     insulin aspart  1-7 Units Subcutaneous TID AC     insulin aspart  1-5 Units Subcutaneous At Bedtime     metFORMIN  1,000 mg Oral BID w/meals     sodium chloride (PF)  3 mL Intracatheter Q8H     tamsulosin  0.4 mg Oral QPM       Data   Recent Labs   Lab 04/28/21  0532 04/27/21  0450 04/26/21  1033   WBC 8.5 9.5 15.6*   HGB 12.3* 13.6 14.9   MCV 96 96 96    221 261    137 134   POTASSIUM 4.3 4.3 4.9   CHLORIDE 109 108 102   CO2 26 25 26   BUN 31* 30 29   CR 1.32* 1.27* 1.54*   ANIONGAP 3 4 6   JODI 8.6 9.1 9.6   * 134* 301*   ALBUMIN  --  3.0*  --    PROTTOTAL  --  6.8  --    BILITOTAL  --  0.7  --    ALKPHOS  --  93  --    ALT  --  25  --    AST  --  19  --    TROPI  --   --  0.109*       No results found for this or any previous visit (from the past 24 hour(s)).

## 2021-04-28 NOTE — PLAN OF CARE
UA obtained, Dr. De eLon updated that results are available. Plan for patient to be transported to urology for consultation later today.     1020: Dr. Martins updated that pt will be leaving floor for urology consult.     1028: Pt transported to urology via w/c.

## 2021-04-28 NOTE — PROGRESS NOTES
[facility]  Catheter insertion documentation on 4/28/2021:    Anthony Dyer presents to the clinic for catheter insertion.  Reason for insertion: replacement  Order has been verified. Yes  Catheter successfully inserted into the urethral meatus in the usual sterile fashion without immediate complication.  Type of catheter placed: 18 Ecuadorean Coude catheter  Urine is clear in color.  400 cc's of urine output returned.  Balloon was filled with 10 cc's of sterile water.  Securement device placed for the catheter.  The patient tolerated the procedure and was instructed to monitor for catheter dysfunction, monitor for pain or discomfort, return or call for pain, fever, leakage or decreased urine flow, watch for signs of infection and return to the clinic for follow up appt.    MICHELLE HILL LPN

## 2021-04-28 NOTE — PLAN OF CARE
Face to face report given with opportunity to observe patient.    Report given to TONY Mcdonald   4/28/2021  6:52 AM

## 2021-04-28 NOTE — PROGRESS NOTES
History     Chief Complaint:      Cystoscopy      HPI   Anthony Dyer is a 80 year old male who presents with a recent history of urinary retention.  Anthony was seen initially in the emergency room on 4/15/2021 and a Schmitt catheter was placed.  My nurse received a phone call about 4 days later from family members of Anthony who wanted his catheter removed.  Because we have not seen him she did not feel comfortable doing that.  The patient ended up going to [facility] and they removed his catheter and then he was unable to urinate and had to be readmitted on 4/19/2021 and have the catheter replaced.  At that time he was treated for possible urosepsis.  His urine culture grew out 10,000 colonies of mixed gen and blood cultures were negative.  He did have a CT scan that showed a thickened bladder but also an 11 mm stone in the left kidney and he was scheduled to have a follow-up with me in approximately 7 to 10 days.  He was discharged to nursing home because he was quite weak and he was not able to manage his catheter.  He was readmitted on 4/26/2021 with an acute pulmonary emboli and is now on anticoagulation.  The plan is to discharge him today.  Since he is in the hospital rather than bring him back for urologic evaluation we elected to see him today for his urinary retention.  This is the first time he has had problems with urinary retention and he also has a CT scan showing a large 11 mm stone in the left renal pelvis which is quite concerning but not obstructing at this time.  He has no history of having a previous kidney stone.  Given his acute pulmonary emboli he is not a surgical candidate.  We did do a urinalysis today and there was no evidence of bacteria so we elected to proceed with his evaluation down in the urology department.    Allergies:    Allergies   Allergen Reactions     Ace Inhibitors      [facility]: hyperkalemia.  Pt doesn't know if he is allergic     Ceclor [Cefaclor] Hives     Sulfa Drugs       Pt unsure.         Medications:      No current outpatient medications on file.      Problem List:      Patient Active Problem List    Diagnosis Date Noted     Tachycardia 04/26/2021     Priority: Medium     Hypoxia 04/26/2021     Priority: Medium     Other acute pulmonary embolism with acute cor pulmonale (H) 04/26/2021     Priority: Medium     Dyspnea, unspecified type 04/26/2021     Priority: Medium     Diabetes mellitus, type 2 (H) 04/20/2021     Priority: Medium     Urinary retention 04/19/2021     Priority: Medium     Generalized weakness 04/19/2021     Priority: Medium        Past Medical History:      Past Medical History:   Diagnosis Date     Diabetes (H)      Hypertension        Past Surgical History:      Past Surgical History:   Procedure Laterality Date     HERNIA REPAIR         Family History:      History reviewed. No pertinent family history.    Social History:    Marital Status:  Single [1]  Social History     Tobacco Use     Smoking status: Never Smoker     Smokeless tobacco: Former User     Types: Chew   Substance Use Topics     Alcohol use: Not Currently     Drug use: Never        Review of Systems   Respiratory: Positive for shortness of breath.    Genitourinary: Positive for difficulty urinating.   Neurological: Positive for weakness.   All other systems reviewed and are negative.        Physical Exam   Vitals:  /68   Pulse 93   Temp 97.4  F (36.3  C) (Tympanic)   Resp 20   SpO2 97%       Physical Exam  Constitutional:       Appearance: He is normal weight. He is ill-appearing.   Pulmonary:      Effort: Pulmonary effort is normal.   Abdominal:      General: Abdomen is flat. There is no distension.      Palpations: Abdomen is soft. There is no mass.      Tenderness: There is no abdominal tenderness.      Hernia: No hernia is present.   Genitourinary:     Comments: There is an indwelling Schmitt catheter and there is some light-colored cherry urine coming out of the tubing.  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  The right testicle is smooth normal without any mass and there is no right inguinal hernia.  External rectal area is normal normal rectal tone prostate is probably 40 g for the most part feels smooth I do not feel anything overly concerning for us of prostate cancer.  Neurological:      Mental Status: He is alert.     Cystoscopy: Patient received 500 of ciprofloxacin.  Patient's Schmitt catheter was removed and he was prepped and draped sterilely lidocaine jelly was injected into the urethra.  The flexible cystoscope was inserted into the urethra.  The anterior urethra is normal posterior urethra shows some bilobar hypertrophy with an elevated bladder neck.  Once inside the bladder both ureteral orifice ease are seen but there is severe catheter cystitis throughout the bladder.  I do not see any bladder tumors or stones and when you retroflexed the scope the bladder neck shows a small amount of intravesicular prostate but no significant median lobe.  Patient's bladder was filled with about 450 to 500 mL of fluid.  Cystoscope was then removed and we tried to get the patient to void but he was unable to void.  He had the urge to have a bowel movement so he was placed on the toilet and he still was unable to void so we replaced a 18 Guyanese coudé catheter into the bladder without any difficulty.    Results for orders placed or performed during the hospital encounter of 04/26/21   XR Chest Port 1 View     Status: None    Narrative    PROCEDURE:  XR CHEST PORT 1 VIEW    HISTORY:  sob.     COMPARISON:  April 15, 2021    FINDINGS:   The cardiac silhouette is normal in size. The pulmonary vasculature is  normal.  The lungs are clear. No pleural effusion or pneumothorax.      Impression    IMPRESSION:  No acute cardiopulmonary disease.      ANGELINA MACKEY MD   CTA Chest with Contrast     Status: Abnormal   Result Value Ref Range     Radiologist flags Pulmonary emboli (AA)     Narrative    PROCEDURE: CTA CHEST WITH CONTRAST 4/26/2021 12:13 PM    HISTORY: PE suspected, high prob chest pain    COMPARISONS: None.    Meds/Dose Given: Isovue 370, 100 ml    TECHNIQUE: CT angiogram the chest with sagittal coronal mip  reconstructions    FINDINGS: There are large bilateral pulmonary emboli. There is right  ventricular strain. The thoracic aorta has some atherosclerotic  plaquing.    There is dependent atelectasis at the lung bases. Axillary and  supraclavicular lymph nodes appear normal. The hilar and mediastinal  lymph nodes appear normal. The upper portion of the liver spleen and  pancreas appear normal. The adrenal glands are normal. Degenerative  changes are present in the thoracic spine.         Impression    IMPRESSION: Large bilateral pulmonary emboli with right ventricular  strain   [Critical Result: Pulmonary emboli]    Finding was identified on 4/26/2021 12:22 PM.     The emergency room was contacted by me on 4/26/2021 12:27 PM and  verbalized understanding of the critical result.     ANGELINA MACKEY MD   CBC with platelets differential     Status: Abnormal   Result Value Ref Range    WBC 15.6 (H) 4.0 - 11.0 10e9/L    RBC Count 4.58 4.4 - 5.9 10e12/L    Hemoglobin 14.9 13.3 - 17.7 g/dL    Hematocrit 43.9 40.0 - 53.0 %    MCV 96 78 - 100 fl    MCH 32.5 26.5 - 33.0 pg    MCHC 33.9 31.5 - 36.5 g/dL    RDW 12.0 10.0 - 15.0 %    Platelet Count 261 150 - 450 10e9/L    Diff Method Automated Method     % Neutrophils 90.3 %    % Lymphocytes 4.3 %    % Monocytes 4.2 %    % Eosinophils 0.4 %    % Basophils 0.2 %    % Immature Granulocytes 0.6 %    Nucleated RBCs 0 0 /100    Absolute Neutrophil 14.1 (H) 1.6 - 8.3 10e9/L    Absolute Lymphocytes 0.7 (L) 0.8 - 5.3 10e9/L    Absolute Monocytes 0.7 0.0 - 1.3 10e9/L    Absolute Eosinophils 0.1 0.0 - 0.7 10e9/L    Absolute Basophils 0.0 0.0 - 0.2 10e9/L    Abs Immature Granulocytes 0.1 0 - 0.4 10e9/L     Absolute Nucleated RBC 0.0    Basic metabolic panel     Status: Abnormal   Result Value Ref Range    Sodium 134 133 - 144 mmol/L    Potassium 4.9 3.4 - 5.3 mmol/L    Chloride 102 94 - 109 mmol/L    Carbon Dioxide 26 20 - 32 mmol/L    Anion Gap 6 3 - 14 mmol/L    Glucose 301 (H) 70 - 99 mg/dL    Urea Nitrogen 29 7 - 30 mg/dL    Creatinine 1.54 (H) 0.66 - 1.25 mg/dL    GFR Estimate 42 (L) >60 mL/min/[1.73_m2]    GFR Estimate If Black 49 (L) >60 mL/min/[1.73_m2]    Calcium 9.6 8.5 - 10.1 mg/dL   UA reflex to Microscopic and Culture     Status: Abnormal    Specimen: Catheterized Urine   Result Value Ref Range    Color Urine Yellow     Appearance Urine Clear     Glucose Urine 300 (A) NEG^Negative mg/dL    Bilirubin Urine Negative NEG^Negative    Ketones Urine Negative NEG^Negative mg/dL    Specific Gravity Urine 1.032 1.003 - 1.035    Blood Urine Small (A) NEG^Negative    pH Urine 5.5 4.7 - 8.0 pH    Protein Albumin Urine 30 (A) NEG^Negative mg/dL    Urobilinogen mg/dL Normal 0.0 - 2.0 mg/dL    Nitrite Urine Negative NEG^Negative    Leukocyte Esterase Urine Negative NEG^Negative    Source Catheterized Urine     RBC Urine 12 (H) 0 - 2 /HPF    WBC Urine 6 (H) 0 - 5 /HPF    Bacteria Urine Few (A) NEG^Negative /HPF    Squamous Epithelial /HPF Urine 0 0 - 1 /HPF    Mucous Urine Present (A) NEG^Negative /LPF    Hyaline Casts 16 (A) OTO2^O - 2 /LPF    Granular Casts 6 (A) NEG^Negative /LPF   Lactate for Sepsis Protocol     Status: Abnormal   Result Value Ref Range    Lactate for Sepsis Protocol 2.6 (H) 0.7 - 2.0 mmol/L   Procalcitonin     Status: None   Result Value Ref Range    Procalcitonin <0.05 ng/ml   Troponin I     Status: Abnormal   Result Value Ref Range    Troponin I ES 0.109 (H) 0.000 - 0.045 ug/L   Nt probnp inpatient     Status: None   Result Value Ref Range    N-Terminal Pro BNP Inpatient 1,237 0 - 1,800 pg/mL   Symptomatic Influenza A/B & SARS-CoV2 (COVID-19) Virus PCR Multiplex     Status: None    Specimen:  Nasopharyngeal   Result Value Ref Range    Flu A/B & SARS-COV-2 PCR Source Nasopharyngeal     SARS-CoV-2 PCR Result NEGATIVE     Influenza A PCR Negative NEG^Negative    Influenza B PCR Negative NEG^Negative    Respiratory Syncytial Virus PCR Negative NEG^Negative    Flu A/B & SARS-CoV-2 PCR Comment       Testing was performed using the Xpert Xpress SARS-CoV2/Flu/RSV Assay on the In Flow   GeneXpert Instrument. Additional information about the Emergency Use Authorization (EUA)   assay can be found via the Lab Guide.     Heparin Unfractionated Anti Xa Level     Status: None   Result Value Ref Range    Heparin Unfractionated Anti Xa Level 0.66 IU/mL   Lactic acid level STAT     Status: Abnormal   Result Value Ref Range    Lactate for Sepsis Protocol 2.1 (H) 0.7 - 2.0 mmol/L   Heparin Unfractionated Anti Xa Level     Status: None   Result Value Ref Range    Heparin Unfractionated Anti Xa Level 0.55 IU/mL   Comprehensive metabolic panel     Status: Abnormal   Result Value Ref Range    Sodium 137 133 - 144 mmol/L    Potassium 4.3 3.4 - 5.3 mmol/L    Chloride 108 94 - 109 mmol/L    Carbon Dioxide 25 20 - 32 mmol/L    Anion Gap 4 3 - 14 mmol/L    Glucose 134 (H) 70 - 99 mg/dL    Urea Nitrogen 30 7 - 30 mg/dL    Creatinine 1.27 (H) 0.66 - 1.25 mg/dL    GFR Estimate 53 (L) >60 mL/min/[1.73_m2]    GFR Estimate If Black 62 >60 mL/min/[1.73_m2]    Calcium 9.1 8.5 - 10.1 mg/dL    Bilirubin Total 0.7 0.2 - 1.3 mg/dL    Albumin 3.0 (L) 3.4 - 5.0 g/dL    Protein Total 6.8 6.8 - 8.8 g/dL    Alkaline Phosphatase 93 40 - 150 U/L    ALT 25 0 - 70 U/L    AST 19 0 - 45 U/L   CBC with platelets differential     Status: Abnormal   Result Value Ref Range    WBC 9.5 4.0 - 11.0 10e9/L    RBC Count 4.26 (L) 4.4 - 5.9 10e12/L    Hemoglobin 13.6 13.3 - 17.7 g/dL    Hematocrit 41.0 40.0 - 53.0 %    MCV 96 78 - 100 fl    MCH 31.9 26.5 - 33.0 pg    MCHC 33.2 31.5 - 36.5 g/dL    RDW 12.1 10.0 - 15.0 %    Platelet Count 221 150 - 450 10e9/L    Diff  Method Automated Method     % Neutrophils 79.0 %    % Lymphocytes 11.7 %    % Monocytes 7.2 %    % Eosinophils 1.3 %    % Basophils 0.4 %    % Immature Granulocytes 0.4 %    Nucleated RBCs 0 0 /100    Absolute Neutrophil 7.5 1.6 - 8.3 10e9/L    Absolute Lymphocytes 1.1 0.8 - 5.3 10e9/L    Absolute Monocytes 0.7 0.0 - 1.3 10e9/L    Absolute Eosinophils 0.1 0.0 - 0.7 10e9/L    Absolute Basophils 0.0 0.0 - 0.2 10e9/L    Abs Immature Granulocytes 0.0 0 - 0.4 10e9/L    Absolute Nucleated RBC 0.0    Heparin Unfractionated Anti Xa Level     Status: None   Result Value Ref Range    Heparin Unfractionated Anti Xa Level 1.07 IU/mL   Glucose by meter     Status: Abnormal   Result Value Ref Range    Glucose 227 (H) 70 - 99 mg/dL   Glucose by meter     Status: Abnormal   Result Value Ref Range    Glucose 123 (H) 70 - 99 mg/dL   Glucose by meter     Status: Abnormal   Result Value Ref Range    Glucose 160 (H) 70 - 99 mg/dL   Basic metabolic panel     Status: Abnormal   Result Value Ref Range    Sodium 138 133 - 144 mmol/L    Potassium 4.3 3.4 - 5.3 mmol/L    Chloride 109 94 - 109 mmol/L    Carbon Dioxide 26 20 - 32 mmol/L    Anion Gap 3 3 - 14 mmol/L    Glucose 119 (H) 70 - 99 mg/dL    Urea Nitrogen 31 (H) 7 - 30 mg/dL    Creatinine 1.32 (H) 0.66 - 1.25 mg/dL    GFR Estimate 51 (L) >60 mL/min/[1.73_m2]    GFR Estimate If Black 59 (L) >60 mL/min/[1.73_m2]    Calcium 8.6 8.5 - 10.1 mg/dL   CBC with platelets     Status: Abnormal   Result Value Ref Range    WBC 8.5 4.0 - 11.0 10e9/L    RBC Count 3.83 (L) 4.4 - 5.9 10e12/L    Hemoglobin 12.3 (L) 13.3 - 17.7 g/dL    Hematocrit 36.9 (L) 40.0 - 53.0 %    MCV 96 78 - 100 fl    MCH 32.1 26.5 - 33.0 pg    MCHC 33.3 31.5 - 36.5 g/dL    RDW 12.2 10.0 - 15.0 %    Platelet Count 224 150 - 450 10e9/L   UA with Microscopic reflex to Culture     Status: Abnormal    Specimen: Catheterized Urine   Result Value Ref Range    Color Urine Yellow     Appearance Urine Clear     Glucose Urine Negative  NEG^Negative mg/dL    Bilirubin Urine Negative NEG^Negative    Ketones Urine Negative NEG^Negative mg/dL    Specific Gravity Urine 1.025 1.003 - 1.035    Blood Urine Moderate (A) NEG^Negative    pH Urine 5.5 4.7 - 8.0 pH    Protein Albumin Urine 30 (A) NEG^Negative mg/dL    Urobilinogen mg/dL Normal 0.0 - 2.0 mg/dL    Nitrite Urine Negative NEG^Negative    Leukocyte Esterase Urine Negative NEG^Negative    Source Catheterized Urine     WBC Urine 2 0 - 5 /HPF    RBC Urine 53 (H) 0 - 2 /HPF    Bacteria Urine None (A) NEG^Negative /HPF    Squamous Epithelial /HPF Urine 0 0 - 1 /HPF    Mucous Urine Present (A) NEG^Negative /LPF    Hyaline Casts 1 (A) OTO2^O - 2 /LPF   Echocardiogram Complete     Status: None    Narrative    [id number]  [id number]  [id number]^[email or web address]^NOAM     [facility]  Echocardiography Laboratory  [address] [place] [address]     Name: JAMES HUSSEIN  MRN: [id number]  : 1940  Study Date: 2021 03:01 PM  Age: 80 yrs  Gender: Male  Patient Location: [facility]  Reason For Study: Pulmonary Emboli  History: PE  Ordering Physician: HARMAN BATES  Referring Physician: HARMAN BATES  Performed By: Ellen Sun RDCS     BSA: 2.1 m2  Height: 73 in  Weight: 190 lb  ______________________________________________________________________________  Procedure  Echocardiogram with two-dimensional, color and spectral Doppler performed.  Contrast Definity.  ______________________________________________________________________________  Interpretation Summary  Mild concentric wall thickening consistent with left ventricular hypertrophy  is present.  Abnormal non-specific septal motion is present.  Moderate right ventricular dilation is present.  Global right ventricular function is mildly reduced.  Mild right atrial enlargement is present.  Aortic valve is normal in structure and function.  Mild tricuspid insufficiency is present.  Right ventricular systolic  pressure is 46mmHg above the right atrial pressure.  All of the segments of the aorta are normal except sinuses of Valsalva.  Global and regional left ventricular function is hyperkinetic with an EF of  65-70%.  ______________________________________________________________________________  Left Ventricle  Left ventricular size is normal. Global and regional left ventricular function  is hyperkinetic with an EF of 65-70%. Mild concentric wall thickening  consistent with left ventricular hypertrophy is present. Abnormal non-specific  septal motion is present.     Right Ventricle  Moderate right ventricular dilation is present. Global right ventricular  function is mildly reduced.     Atria  Mild right atrial enlargement is present.     Aortic Valve  Aortic valve is normal in structure and function. The mean AoV pressure  gradient is 2.0 mmHg. The peak AoV pressure gradient is 3.3 mmHg.     Tricuspid Valve  Mild tricuspid insufficiency is present. Mild (pulmonary artery systolic  pressure<50mmHg) pulmonary hypertension is present. Right ventricular systolic  pressure is 46mmHg above the right atrial pressure.     Pulmonic Valve  The pulmonic valve is normal.     Vessels  All of the segments of the aorta are normal except sinuses of Valsalva.     Pericardium  No pericardial effusion is present.  ______________________________________________________________________________  MMode/2D Measurements & Calculations  IVSd: 1.3 cm  LVIDd: 3.9 cm  LVIDs: 2.5 cm  LVPWd: 1.2 cm  FS: 35.9 %  LV mass(C)d: 174.8 grams  LV mass(C)dI: 83.0 grams/m2  Ao root diam: 4.1 cm  LVOT diam: 2.4 cm  LVOT area: 4.5 cm2  RWT: 0.62     Time Measurements  Aortic HR: 342.0 BPM     Doppler Measurements & Calculations  Ao V2 max: 90.5 cm/sec  Ao max PG: 3.3 mmHg  Ao V2 mean: 66.2 cm/sec  Ao mean P.0 mmHg  Ao V2 VTI: 11.6 cm  GAGANDEEP(I,D): 2.8 cm2  GAGANDEEP(V,D): 2.5 cm2  LV V1 max P.0 mmHg  LV V1 max: 50.9 cm/sec  LV V1 VTI: 7.1 cm  CO(LVOT): 10.9  l/min  CI(LVOT): 5.2 l/min/m2  SV(LVOT): 31.9 ml  SI(LVOT): 15.2 ml/m2  TR max edmar: 337.6 cm/sec  TR max P.6 mmHg  AV Edmar Ratio (DI): 0.56  GAGANDEEP Index (cm2/m2): 1.3     ______________________________________________________________________________  Report approved by: Alethea Finch 2021 06:43 PM             Impression: #1 urinary retention #2 left nephrolithiasis #3 hematuria    Plan   Plan: At this time the patient just needs to get stronger.  The catheter will remain indwelling and we will see him back in about 3 to 4 weeks and we can talk about another voiding trial.  Nursing home can also do a voiding trial if they elect to do that if his strength increases significantly.  I am can add some finasteride at this time.  He does have a large kidney stone in the left renal pelvis which is very concerning location.  There is no obstruction at this time but at any time that stone could move and obstruct his left ureteral pelvic junction.  At some point this is going to have to get managed but again with his acute pulmonary emboli he has not a surgical candidate at this time.  Follow-up in 1 month      No follow-ups on file.    Indu De Leon MD  [facility]

## 2021-04-28 NOTE — PLAN OF CARE
Face to face report given with opportunity to observe patient.    Report given to Jitendra Chao, TONY   4/27/2021  7:09 PM

## 2021-04-29 VITALS
HEIGHT: 73 IN | TEMPERATURE: 98.6 F | WEIGHT: 196.65 LBS | DIASTOLIC BLOOD PRESSURE: 62 MMHG | HEART RATE: 74 BPM | RESPIRATION RATE: 20 BRPM | OXYGEN SATURATION: 94 % | BODY MASS INDEX: 26.06 KG/M2 | SYSTOLIC BLOOD PRESSURE: 158 MMHG

## 2021-04-29 PROBLEM — J96.01 ACUTE RESPIRATORY FAILURE WITH HYPOXIA (H): Status: ACTIVE | Noted: 2021-04-29

## 2021-04-29 PROBLEM — N40.1 BENIGN PROSTATIC HYPERPLASIA WITH URINARY RETENTION: Status: ACTIVE | Noted: 2021-04-29

## 2021-04-29 PROBLEM — R33.8 BENIGN PROSTATIC HYPERPLASIA WITH URINARY RETENTION: Status: ACTIVE | Noted: 2021-04-29

## 2021-04-29 LAB
ANION GAP SERPL CALCULATED.3IONS-SCNC: 7 MMOL/L (ref 3–14)
BUN SERPL-MCNC: 19 MG/DL (ref 7–30)
CALCIUM SERPL-MCNC: 8.7 MG/DL (ref 8.5–10.1)
CHLORIDE SERPL-SCNC: 112 MMOL/L (ref 94–109)
CO2 SERPL-SCNC: 23 MMOL/L (ref 20–32)
CREAT SERPL-MCNC: 1.06 MG/DL (ref 0.66–1.25)
ERYTHROCYTE [DISTWIDTH] IN BLOOD BY AUTOMATED COUNT: 12 % (ref 10–15)
GFR SERPL CREATININE-BSD FRML MDRD: 66 ML/MIN/{1.73_M2}
GLUCOSE BLDC GLUCOMTR-MCNC: 147 MG/DL (ref 70–99)
GLUCOSE BLDC GLUCOMTR-MCNC: 95 MG/DL (ref 70–99)
GLUCOSE SERPL-MCNC: 96 MG/DL (ref 70–99)
HCT VFR BLD AUTO: 37 % (ref 40–53)
HGB BLD-MCNC: 12.3 G/DL (ref 13.3–17.7)
MCH RBC QN AUTO: 31.8 PG (ref 26.5–33)
MCHC RBC AUTO-ENTMCNC: 33.2 G/DL (ref 31.5–36.5)
MCV RBC AUTO: 96 FL (ref 78–100)
PLATELET # BLD AUTO: 200 10E9/L (ref 150–450)
POTASSIUM SERPL-SCNC: 3.9 MMOL/L (ref 3.4–5.3)
RBC # BLD AUTO: 3.87 10E12/L (ref 4.4–5.9)
SODIUM SERPL-SCNC: 142 MMOL/L (ref 133–144)
WBC # BLD AUTO: 7.1 10E9/L (ref 4–11)

## 2021-04-29 PROCEDURE — 250N000013 HC RX MED GY IP 250 OP 250 PS 637: Performed by: INTERNAL MEDICINE

## 2021-04-29 PROCEDURE — 36415 COLL VENOUS BLD VENIPUNCTURE: CPT | Performed by: INTERNAL MEDICINE

## 2021-04-29 PROCEDURE — 85027 COMPLETE CBC AUTOMATED: CPT | Performed by: INTERNAL MEDICINE

## 2021-04-29 PROCEDURE — 80048 BASIC METABOLIC PNL TOTAL CA: CPT | Performed by: INTERNAL MEDICINE

## 2021-04-29 PROCEDURE — 999N001017 HC STATISTIC GLUCOSE BY METER IP: Mod: ZL

## 2021-04-29 PROCEDURE — 99239 HOSP IP/OBS DSCHRG MGMT >30: CPT | Performed by: INTERNAL MEDICINE

## 2021-04-29 RX ADMIN — GABAPENTIN 300 MG: 300 CAPSULE ORAL at 07:56

## 2021-04-29 RX ADMIN — ALLOPURINOL 100 MG: 100 TABLET ORAL at 07:56

## 2021-04-29 RX ADMIN — METFORMIN HYDROCHLORIDE 1000 MG: 500 TABLET, EXTENDED RELEASE ORAL at 07:56

## 2021-04-29 RX ADMIN — CARVEDILOL 25 MG: 25 TABLET, FILM COATED ORAL at 07:56

## 2021-04-29 RX ADMIN — RIVAROXABAN 15 MG: 15 TABLET, FILM COATED ORAL at 07:57

## 2021-04-29 RX ADMIN — FINASTERIDE 5 MG: 5 TABLET, FILM COATED ORAL at 07:57

## 2021-04-29 RX ADMIN — GLIMEPIRIDE 2 MG: 2 TABLET ORAL at 07:56

## 2021-04-29 ASSESSMENT — ACTIVITIES OF DAILY LIVING (ADL)
ADLS_ACUITY_SCORE: 18

## 2021-04-29 NOTE — DISCHARGE SUMMARY
[facility]  Hospitalist Discharge Summary      Date of Admission:  4/26/2021  Date of Discharge:  4/29/2021  Discharging Provider: Osmar Martins MD      Discharge Diagnoses      Acute respiratory failure with hypoxia  Acute pulmonary embolus with cor pulmonale  BPH with urinary retention  Diabetes mellitus type 2  Hypertension      Follow-ups Needed After Discharge   Follow-up Appointments     Follow Up and recommended labs and tests      Follow up with Nursing home physician.  No follow up labs or test are   needed.    Patient should have follow-up with Dr. De Leon in 4 weeks         None    Unresulted Labs Ordered in the Past 30 Days of this Admission     No orders found from 3/27/2021 to 4/27/2021.      These results will be followed up by hospitalist    Discharge Disposition   Discharged to nursing home  Condition at discharge: Stable      Hospital Course   Patient is a 80-year-old gentleman who was currently residing at [facility].  He was there because of significant urinary retention Schmitt requirements.  He apparently was attempting a bowel movement had an episode of syncope.  He was brought to the ER for evaluation.  He was mildly hypoxic hemodynamically stable slightly tachycardia.  CT angiogram was performed and did show significant bilateral pulmonary emboli.  He was hemodynamically stable.  Bedside echo in the ER did show some right ventricular enlargement.    He was started on subcu Lovenox.  Admitted to our ICU.  Had moderate hypoxia initially.  However by the next day he was back down to room air.  His echo showed normal systolic function.  There was some right ventricular enlargement with no obvious flattening of the diaphragm.There was some evidence of cor pulmonale.   He was maintained on Lovenox.  Eventually was on room air did well decide to switch him over from Lovenox to Xarelto.  He will be on the Xarelto 15 mg twice daily for the next 20 days.  And then after that 30 mg  daily.  Likely etiology of this is due to his more sedentary lifestyle since his urinary retention.  Terms of how long to keep him on this it may be anywhere between other 3 months to 6 months.    Chronic kidney disease stage III: Baseline been between 1.26 and 1.5.  Came in at 1.54 at discharge he is down to 1.06.    His diabetes was well controlled during his hospital stay we will continue with his glipizide Ozempic and Metformin.    Urinary retention: Patient has significant BPH.  Schmitt catheter is been in place.  Was seen by urology Dr. De Leon she attempted a trial of pulling the catheter.  I was unsuccessful.  The catheter was replaced.  He has been started on Proscar will be seen back in the clinic in roughly 30 days for another attempt.    Hypertension: Maintained on his usual medications blood pressure has been good 140/80.  Heart rates in the 90s.    Consultations This Hospital Stay   CARE MANAGEMENT / SOCIAL WORK IP CONSULT  PHYSICAL THERAPY ADULT IP CONSULT  PHYSICAL THERAPY ADULT IP CONSULT  OCCUPATIONAL THERAPY ADULT IP CONSULT  PHYSICAL THERAPY ADULT IP CONSULT    Code Status   Full Code    Time Spent on this Encounter   I, Osmar Martins MD, personally saw the patient today and spent greater than 30 minutes discharging this patient.       Osmar Martins MD  [facility]  [address] [place] [id number]  Phone: [phone]  Fax: [phone]  ______________________________________________________________________    Physical Exam   Vital Signs: Temp: 98.1  F (36.7  C) Temp src: Tympanic BP: 148/83 Pulse: 93   Resp: 18 SpO2: 95 % O2 Device: None (Room air)    Weight: 196 lbs 10.41 oz  Constitutional: awake, alert, cooperative, no apparent distress, and appears stated age  Hematologic / Lymphatic: no cervical lymphadenopathy and no supraclavicular lymphadenopathy  Respiratory: No increased work of breathing, good air exchange, clear to auscultation bilaterally, no crackles or  wheezing  Cardiovascular: Normal apical impulse, regular rate and rhythm, normal S1 and S2, no S3 or S4, and no murmur noted  Musculoskeletal: There is no redness, warmth, or swelling of the joints.  Full range of motion noted.  Motor strength is 5 out of 5 all extremities bilaterally.  Tone is normal.       Primary Care Physician   George Orourke    Discharge Orders      General info for SNF    Length of Stay Estimate: Short Term Care: Estimated # of Days 31-90  Condition at Discharge: Stable  Level of care:skilled   Rehabilitation Potential: Good  Admission H&P remains valid and up-to-date: Yes  Recent Chemotherapy: N/A  Use Nursing Home Standing Orders: Yes     Reason for your hospital stay    He came in with basically syncopal episode and dyspnea was rather hypoxic.  Found to have bilateral pulmonary emboli.  Was hemodynamically stable.  Started on anticoagulation     Glucose monitor nursing POCT    Before meals and at bedtime     Schmitt catheter    To straight gravity drainage. Change catheter every 2 weeks and PRN for leaking or decreased uring output with signs of bladder distention. DO NOT change catheter without a specific MD order IF diagnosis of benign prostatic hypertrophy (BPH), neurogenic bladder, or other urological conditions     Follow Up and recommended labs and tests    Follow up with Nursing home physician.  No follow up labs or test are needed.    Patient should have follow-up with Dr. De Leon in 4 weeks     Activity - Up with nursing assistance     Activity - Ambulate in hallway    Every shift     Full Code     Physical Therapy Adult Consult    Evaluate and treat as clinically indicated.    Reason:  weakness     Occupational Therapy Adult Consult    Evaluate and treat as clinically indicated.    Reason:  weakness     Advance Diet as Tolerated    Follow this diet upon discharge: Orders Placed This Encounter      Advance Diet as Tolerated: Fully Advanced to diet(s) per Provider order; 9023-1715  Calories: Moderate Consistent CHO (4-6 CHO units/meal)       Significant Results and Procedures   Most Recent 3 CBC's:  Recent Labs   Lab Test 04/29/21  0528 04/28/21  0532 04/27/21  0450   WBC 7.1 8.5 9.5   HGB 12.3* 12.3* 13.6   MCV 96 96 96    224 221     Most Recent 3 BMP's:  Recent Labs   Lab Test 04/29/21  0528 04/28/21  0532 04/27/21  0450    138 137   POTASSIUM 3.9 4.3 4.3   CHLORIDE 112* 109 108   CO2 23 26 25   BUN 19 31* 30   CR 1.06 1.32* 1.27*   ANIONGAP 7 3 4   JODI 8.7 8.6 9.1   GLC 96 119* 134*     Most Recent 2 LFT's:  Recent Labs   Lab Test 04/27/21  0450 04/15/21  2225   AST 19 26   ALT 25 34   ALKPHOS 93 78   BILITOTAL 0.7 0.8     Most Recent 3 Troponin's:  Recent Labs   Lab Test 04/26/21  1033 04/19/21  2111 03/10/15  1021   TROPI 0.109* <0.015 <0.015  The 99th percentile for upper reference range is 0.045 ug/L.  Troponin values in   the range of 0.045 - 0.120 ug/L may be associated with risks of adverse   clinical events.   Effective 7/30/2014, the reference range for this assay has changed to reflect   new instrumentation/methodology.       Most Recent 3 BNP's:  Recent Labs   Lab Test 04/26/21  1033 10/25/17  1145 03/10/15  1021   NTBNPI 1,237  --   --    NTBNP  --  295 194*     Most Recent D-dimer:No lab results found.  Most Recent 6 Bacteria Isolates From Any Culture (See EPIC Reports for Culture Details):  Recent Labs   Lab Test 04/15/21  2320 04/15/21  2224   CULT <10,000 colonies/mL  mixed urogenital gen  No further identification or sensitivity done   No growth after 6 days  No growth after 6 days     Most Recent TSH and T4:No lab results found.  Most Recent Urinalysis:  Recent Labs   Lab Test 04/28/21  0922   COLOR Yellow   APPEARANCE Clear   URINEGLC Negative   URINEBILI Negative   URINEKETONE Negative   SG 1.025   UBLD Moderate*   URINEPH 5.5   PROTEIN 30*   NITRITE Negative   LEUKEST Negative   RBCU 53*   WBCU 2     Most Recent ESR & CRP:  Recent Labs   Lab Test  04/15/21  2225   CRP 85.7*   ,   Results for orders placed or performed during the hospital encounter of 21   XR Chest Port 1 View    Narrative    PROCEDURE:  XR CHEST PORT 1 VIEW    HISTORY:  sob.     COMPARISON:  April 15, 2021    FINDINGS:   The cardiac silhouette is normal in size. The pulmonary vasculature is  normal.  The lungs are clear. No pleural effusion or pneumothorax.      Impression    IMPRESSION:  No acute cardiopulmonary disease.      ANGELINA MACKEY MD   CTA Chest with Contrast     Value    Radiologist flags Pulmonary emboli (AA)    Narrative    PROCEDURE: CTA CHEST WITH CONTRAST 2021 12:13 PM    HISTORY: PE suspected, high prob chest pain    COMPARISONS: None.    Meds/Dose Given: Isovue 370, 100 ml    TECHNIQUE: CT angiogram the chest with sagittal coronal mip  reconstructions    FINDINGS: There are large bilateral pulmonary emboli. There is right  ventricular strain. The thoracic aorta has some atherosclerotic  plaquing.    There is dependent atelectasis at the lung bases. Axillary and  supraclavicular lymph nodes appear normal. The hilar and mediastinal  lymph nodes appear normal. The upper portion of the liver spleen and  pancreas appear normal. The adrenal glands are normal. Degenerative  changes are present in the thoracic spine.         Impression    IMPRESSION: Large bilateral pulmonary emboli with right ventricular  strain   [Critical Result: Pulmonary emboli]    Finding was identified on 2021 12:22 PM.     The emergency room was contacted by me on 2021 12:27 PM and  verbalized understanding of the critical result.     ANGELINA MACKEY MD   Echocardiogram Complete    Narrative    [id number]  [id number]  [id number]  [id number]^PAULO^HARMAN^NOAM     [facility]  Echocardiography Laboratory  [address] [place] [id number]     Name: JAMES HUSSEIN  MRN: [id number]  : 1940  Study Date: 2021 03:01 PM  Age: 80 yrs  Gender: Male  Patient  Location: [facility]  Reason For Study: Pulmonary Emboli  History: PE  Ordering Physician: HARMAN BATES  Referring Physician: HARMAN BATES  Performed By: Ellen Sun RDCS     BSA: 2.1 m2  Height: 73 in  Weight: 190 lb  ______________________________________________________________________________  Procedure  Echocardiogram with two-dimensional, color and spectral Doppler performed.  Contrast Definity.  ______________________________________________________________________________  Interpretation Summary  Mild concentric wall thickening consistent with left ventricular hypertrophy  is present.  Abnormal non-specific septal motion is present.  Moderate right ventricular dilation is present.  Global right ventricular function is mildly reduced.  Mild right atrial enlargement is present.  Aortic valve is normal in structure and function.  Mild tricuspid insufficiency is present.  Right ventricular systolic pressure is 46mmHg above the right atrial pressure.  All of the segments of the aorta are normal except sinuses of Valsalva.  Global and regional left ventricular function is hyperkinetic with an EF of  65-70%.  ______________________________________________________________________________  Left Ventricle  Left ventricular size is normal. Global and regional left ventricular function  is hyperkinetic with an EF of 65-70%. Mild concentric wall thickening  consistent with left ventricular hypertrophy is present. Abnormal non-specific  septal motion is present.     Right Ventricle  Moderate right ventricular dilation is present. Global right ventricular  function is mildly reduced.     Atria  Mild right atrial enlargement is present.     Aortic Valve  Aortic valve is normal in structure and function. The mean AoV pressure  gradient is 2.0 mmHg. The peak AoV pressure gradient is 3.3 mmHg.     Tricuspid Valve  Mild tricuspid insufficiency is present. Mild (pulmonary artery systolic  pressure<50mmHg) pulmonary  hypertension is present. Right ventricular systolic  pressure is 46mmHg above the right atrial pressure.     Pulmonic Valve  The pulmonic valve is normal.     Vessels  All of the segments of the aorta are normal except sinuses of Valsalva.     Pericardium  No pericardial effusion is present.  ______________________________________________________________________________  MMode/2D Measurements & Calculations  IVSd: 1.3 cm  LVIDd: 3.9 cm  LVIDs: 2.5 cm  LVPWd: 1.2 cm  FS: 35.9 %  LV mass(C)d: 174.8 grams  LV mass(C)dI: 83.0 grams/m2  Ao root diam: 4.1 cm  LVOT diam: 2.4 cm  LVOT area: 4.5 cm2  RWT: 0.62     Time Measurements  Aortic HR: 342.0 BPM     Doppler Measurements & Calculations  Ao V2 max: 90.5 cm/sec  Ao max PG: 3.3 mmHg  Ao V2 mean: 66.2 cm/sec  Ao mean P.0 mmHg  Ao V2 VTI: 11.6 cm  GAGANDEEP(I,D): 2.8 cm2  GAGANDEEP(V,D): 2.5 cm2  LV V1 max P.0 mmHg  LV V1 max: 50.9 cm/sec  LV V1 VTI: 7.1 cm  CO(LVOT): 10.9 l/min  CI(LVOT): 5.2 l/min/m2  SV(LVOT): 31.9 ml  SI(LVOT): 15.2 ml/m2  TR max edmar: 337.6 cm/sec  TR max P.6 mmHg  AV Edmar Ratio (DI): 0.56  GAGANDEEP Index (cm2/m2): 1.3     ______________________________________________________________________________  Report approved by: Alethea Finch 2021 06:43 PM               Discharge Medications   Current Discharge Medication List      START taking these medications    Details   !! rivaroxaban ANTICOAGULANT (XARELTO) 15 MG TABS tablet Take 1 tablet (15 mg) by mouth 2 times daily (with meals) for 20 days  Qty: 40 tablet, Refills: 0    Associated Diagnoses: Other acute pulmonary embolism with acute cor pulmonale (H)      !! rivaroxaban ANTICOAGULANT (XARELTO) 20 MG TABS tablet Take 1 tablet (20 mg) by mouth daily (with dinner)  Qty: 30 tablet, Refills: 3    Associated Diagnoses: Other acute pulmonary embolism with acute cor pulmonale (H)       !! - Potential duplicate medications found. Please discuss with provider.      CONTINUE these medications which  have NOT CHANGED    Details   allopurinol (ZYLOPRIM) 100 MG tablet Take 100 mg by mouth daily       amLODIPine (NORVASC) 2.5 MG tablet Take 2.5 mg by mouth daily      atorvastatin (LIPITOR) 80 MG tablet Take 40 mg by mouth every evening       carvedilol (COREG) 25 MG tablet Take 25 mg by mouth 2 times daily       gabapentin (NEURONTIN) 300 MG capsule Take 300 mg by mouth 3 times daily       glimepiride (AMARYL) 2 MG tablet Take 2 mg by mouth every morning (before breakfast)       metFORMIN (GLUCOPHAGE-XR) 500 MG 24 hr tablet Take 1,000 mg by mouth 2 times daily (with meals)       multivitamin, therapeutic (THERA-VIT) TABS tablet Take 1 tablet by mouth daily      tamsulosin (FLOMAX) 0.4 MG capsule Take 0.4 mg by mouth every evening       vitamin B-12 (CYANOCOBALAMIN) 250 MCG tablet Take 250 mcg by mouth daily      acetaminophen (TYLENOL) 325 MG tablet Take 650 mg by mouth every 4 hours as needed for pain . Limit acetaminophen to 4000 mg per day from all sources.      albuterol (PROAIR HFA/PROVENTIL HFA/VENTOLIN HFA) 108 (90 Base) MCG/ACT inhaler Inhale 2 puffs into the lungs every 4 hours as needed    Comments: Pharmacy may dispense brand covered by insurance (Proair, or proventil or ventolin or generic albuterol inhaler)      finasteride (PROSCAR) 5 MG tablet Take 1 tablet (5 mg) by mouth daily  Qty: 30 tablet, Refills: 11    Associated Diagnoses: Urinary retention      Semaglutide,0.25 or 0.5MG/DOS, (OZEMPIC, 0.25 OR 0.5 MG/DOSE,) 2 MG/1.5ML SOPN Inject 0.5 mg Subcutaneous every 7 days on Thursdays.         STOP taking these medications       aspirin (ASA) 325 MG tablet Comments:   Reason for Stopping:             Allergies   Allergies   Allergen Reactions     Ace Inhibitors      Per [facility]: hyperkalemia.  Pt doesn't know if he is allergic     Ceclor [Cefaclor] Hives     Sulfa Drugs      Pt unsure.

## 2021-04-29 NOTE — PLAN OF CARE
VSS. Remains on RA. Mild SALOMON. Remains alert and oriented, pleasant.Schmitt patent with adequate output. Denies pain this shift. BG's as charted. Refused lunch so Novolog not administered. Calls appropriately. Discharging back to [facility].

## 2021-04-29 NOTE — PLAN OF CARE
VSS on room air. Denies pain. Lung sounds diminished and clear.  before bed. Snack given with xarelto before bed. BG 95 at 0330. Pt alert and oriented but forgetful. Schmitt catheter remains in place with adequate urine output.     Face to face report given with opportunity to observe patient.    Report given to TONY Mcdonald RN   4/29/2021  7:02 AM

## 2021-04-29 NOTE — PLAN OF CARE
Report called to Bryson at [facility]. Pt refusing to order lunch, [facility] made aware of this.

## 2021-04-29 NOTE — PLAN OF CARE
Physical Therapy Discharge Summary    Reason for therapy discharge:    Discharged to transitional care facility.    Progress towards therapy goal(s). See goals on Care Plan in [facility] electronic health record for goal details.  Goals not met.  Barriers to achieving goals:   discharge from facility.    Therapy recommendation(s):    Continued therapy is recommended.  Rationale/Recommendations:  to resume short term rehab to improve functional mobility and independence.

## 2021-04-29 NOTE — PLAN OF CARE
Patient discharged at 1335 PM via wheel chair accompanied by staff. Prescriptions sent to patients preferred pharmacy (nursing home patient) All belongings sent with patient.     Discharge instructions reviewed with [facility] and patient. Listed belongings gathered and returned to patient.     Patient discharged to [facility].   Report called to Nursing Home:  [facility]    Core Measures and Vaccines  Core Measures applicable during stay: No. If yes, state diagnosis: n/a  Pneumonia and Influenza given prior to discharge, if indicated: N/A    Surgical Patient   Surgical Procedures during stay:   Did patient receive discharge instruction on wound care and recognition of infection symptoms? N/A    MISC  Follow up appointment made:  Yes  Home and hospital aquired medications returned to patient: N/A  Patient reports pain was well managed at discharge: Yes

## 2021-04-29 NOTE — PROGRESS NOTES
Name: Anthony Dyer    MRN#: [id number]    Reason for Hospitalization: Tachycardia [R00.0]  Hypoxia [R09.02]  Other acute pulmonary embolism with acute cor pulmonale (H) [I26.09]  Dyspnea, unspecified type [R06.00]    Discharge Date: 4/29/2021    Patient / Family response to discharge plan: accepting    Follow-Up Appt:   Future Appointments   Date Time Provider Department Center   5/25/2021  8:30 AM Indu De Leon MD [facility]       Other Providers (Care Coordinator, County Services, PCA services etc): No    Discharge Disposition: transitional care unit Vipul Tejada CM

## 2021-05-03 ENCOUNTER — DOCUMENTATION ONLY (OUTPATIENT)
Dept: OTHER | Facility: CLINIC | Age: 81
End: 2021-05-03

## 2021-05-05 ENCOUNTER — TELEPHONE (OUTPATIENT)
Dept: FAMILY MEDICINE | Facility: OTHER | Age: 81
End: 2021-05-05

## 2021-05-06 ENCOUNTER — TRANSFERRED RECORDS (OUTPATIENT)
Dept: HEALTH INFORMATION MANAGEMENT | Facility: CLINIC | Age: 81
End: 2021-05-06

## 2021-05-06 LAB — HBA1C MFR BLD: 6.5 % (ref 4–5.6)

## 2021-05-21 PROBLEM — I51.89 DIASTOLIC DYSFUNCTION WITHOUT HEART FAILURE: Status: RESOLVED | Noted: 2017-11-22 | Resolved: 2021-05-21

## 2021-05-21 PROBLEM — E11.40 CHRONIC PAINFUL DIABETIC NEUROPATHY (H): Status: ACTIVE | Noted: 2018-10-24

## 2021-05-21 PROBLEM — I51.7 MILD CONCENTRIC LEFT VENTRICULAR HYPERTROPHY (LVH): Status: ACTIVE | Noted: 2017-11-22

## 2021-05-21 PROBLEM — F33.1 MODERATE EPISODE OF RECURRENT MAJOR DEPRESSIVE DISORDER (H): Status: ACTIVE | Noted: 2018-10-24

## 2021-05-21 PROBLEM — Z78.9 NURSING HOME RESIDENT: Status: ACTIVE | Noted: 2021-05-21

## 2021-05-21 PROBLEM — I51.89 DIASTOLIC DYSFUNCTION WITHOUT HEART FAILURE: Status: ACTIVE | Noted: 2017-11-22

## 2021-05-21 PROBLEM — J96.01 ACUTE RESPIRATORY FAILURE WITH HYPOXIA (H): Status: RESOLVED | Noted: 2021-04-29 | Resolved: 2021-05-21

## 2021-05-22 ENCOUNTER — HEALTH MAINTENANCE LETTER (OUTPATIENT)
Age: 81
End: 2021-05-22

## 2021-05-25 ENCOUNTER — OFFICE VISIT (OUTPATIENT)
Dept: UROLOGY | Facility: OTHER | Age: 81
End: 2021-05-25
Attending: UROLOGY
Payer: MEDICARE

## 2021-05-25 VITALS
WEIGHT: 194.8 LBS | HEART RATE: 93 BPM | DIASTOLIC BLOOD PRESSURE: 66 MMHG | SYSTOLIC BLOOD PRESSURE: 138 MMHG | HEIGHT: 71 IN | OXYGEN SATURATION: 96 % | TEMPERATURE: 97.8 F | BODY MASS INDEX: 27.27 KG/M2

## 2021-05-25 DIAGNOSIS — R33.9 URINARY RETENTION: Primary | ICD-10-CM

## 2021-05-25 PROCEDURE — 51700 IRRIGATION OF BLADDER: CPT | Performed by: UROLOGY

## 2021-05-25 PROCEDURE — 99213 OFFICE O/P EST LOW 20 MIN: CPT | Mod: 25 | Performed by: UROLOGY

## 2021-05-25 PROCEDURE — G0463 HOSPITAL OUTPT CLINIC VISIT: HCPCS | Mod: 25

## 2021-05-25 PROCEDURE — 51700 IRRIGATION OF BLADDER: CPT

## 2021-05-25 ASSESSMENT — MIFFLIN-ST. JEOR: SCORE: 1620.74

## 2021-05-25 ASSESSMENT — PAIN SCALES - GENERAL: PAINLEVEL: NO PAIN (0)

## 2021-05-25 ASSESSMENT — ENCOUNTER SYMPTOMS: DIFFICULTY URINATING: 1

## 2021-05-25 NOTE — PROGRESS NOTES
n    History     Chief Complaint:      Procedure (Voiding Trial)      ASNA   Anthony Dyer is a 80 year old male who presents for voiding trial today.  I initially saw Anthony about a month ago.  He had urinary retention and then also suffered an acute pulmonary emboli and is now on anticoagulation.  Patient underwent cystoscopy which showed some bilobar hypertrophy with an elevated bladder neck.  We filled his bladder to 450 mL and the patient was unable to void so thus a catheter was placed.  According to the patient the nursing home change the catheter about 2 weeks ago.  He is scheduled to possibly go home soon.  He has gotten stronger and we elected to do a voiding trial today.  She is already is on maximum medical therapy.    Allergies:    Allergies   Allergen Reactions     Ace Inhibitors      Per [facility]: hyperkalemia.  Pt doesn't know if he is allergic     Ceclor [Cefaclor] Hives     Sulfa Drugs      Pt unsure.         Medications:      acetaminophen (TYLENOL) 325 MG tablet  albuterol (PROAIR HFA/PROVENTIL HFA/VENTOLIN HFA) 108 (90 Base) MCG/ACT inhaler  allopurinol (ZYLOPRIM) 100 MG tablet  amLODIPine (NORVASC) 2.5 MG tablet  atorvastatin (LIPITOR) 80 MG tablet  carvedilol (COREG) 25 MG tablet  finasteride (PROSCAR) 5 MG tablet  gabapentin (NEURONTIN) 300 MG capsule  glimepiride (AMARYL) 2 MG tablet  metFORMIN (GLUCOPHAGE-XR) 500 MG 24 hr tablet  multivitamin, therapeutic (THERA-VIT) TABS tablet  rivaroxaban ANTICOAGULANT (XARELTO) 20 MG TABS tablet  Semaglutide,0.25 or 0.5MG/DOS, (OZEMPIC, 0.25 OR 0.5 MG/DOSE,) 2 MG/1.5ML SOPN  tamsulosin (FLOMAX) 0.4 MG capsule  vitamin B-12 (CYANOCOBALAMIN) 250 MCG tablet        Problem List:      Patient Active Problem List    Diagnosis Date Noted     Nursing Home Visit 05/21/2021     Priority: Medium     Benign prostatic hyperplasia with urinary retention 04/29/2021     Priority: Medium     Tachycardia 04/26/2021     Priority: Medium     Hypoxia 04/26/2021     Priority:  Medium     Other acute pulmonary embolism with acute cor pulmonale (H) 04/26/2021     Priority: Medium     Dyspnea, unspecified type 04/26/2021     Priority: Medium     Diabetes mellitus, type 2 (H) 04/20/2021     Priority: Medium     Urinary retention 04/19/2021     Priority: Medium     Generalized weakness 04/19/2021     Priority: Medium     Chronic painful diabetic neuropathy (H) 10/24/2018     Priority: Medium     Moderate episode of recurrent major depressive disorder (H) 10/24/2018     Priority: Medium     Formatting of this note might be different from the original.  Patient does not want anti-depressant medication       Chronic obstructive lung disease (H) 11/22/2017     Priority: Medium     Formatting of this note might be different from the original.  Mild       Mild concentric left ventricular hypertrophy (LVH) 11/22/2017     Priority: Medium     Uncomplicated alcohol dependence (H) 09/28/2016     Priority: Medium     IBS (irritable bowel syndrome) 12/23/2015     Priority: Medium     Spinal stenosis of lumbar region without neurogenic claudication 12/23/2015     Priority: Medium     Erectile dysfunction 05/28/2014     Priority: Medium     Chronic kidney disease, stage III (moderate) 05/22/2013     Priority: Medium     Formatting of this note might be different from the original.  Since 2011       History of CVA (cerebrovascular accident) 02/03/2011     Priority: Medium     Formatting of this note might be different from the original.  Jan 2011  IMPRESSION: Interval development of a diffusion abnormality consistent with acute to early subacute infarct of less than 3 days of age in the left thalamus and the medial left cerebral peduncle. No associated abnormal enhancement or hydrocephalus.       Hyperlipidemia 02/03/2011     Priority: Medium     Essential hypertension 11/28/2006     Priority: Medium     Gout, unspecified 02/16/2001     Priority: Medium     Formatting of this note might be different from the  "original.  On Allopurinol       Lumbar spondylosis 02/22/1995     Priority: Medium     Formatting of this note might be different from the original.  S/P RFN L2-L5 facets          Past Medical History:      Past Medical History:   Diagnosis Date     Diabetes (H)      Hypertension        Past Surgical History:      Past Surgical History:   Procedure Laterality Date     HERNIA REPAIR         Family History:      History reviewed. No pertinent family history.    Social History:    Marital Status:  Single [1]  Social History     Tobacco Use     Smoking status: Never Smoker     Smokeless tobacco: Former User     Types: Chew   Substance Use Topics     Alcohol use: Not Currently     Drug use: Never        Review of Systems   Genitourinary: Positive for difficulty urinating.   Musculoskeletal: Positive for gait problem.   All other systems reviewed and are negative.        Physical Exam   Vitals:  /66 (Cuff Size: Adult Regular)   Pulse 93   Temp 97.8  F (36.6  C) (Tympanic)   Ht 1.803 m (5' 11\")   Wt 88.4 kg (194 lb 12.8 oz)   SpO2 96%   BMI 27.17 kg/m        Physical Exam    Procedure: Eating trial: Patient's bladder was instilled to 300 mL of sterile water.  His catheter was removed and the patient was able to void only about 50 mL.  After discussing this with the patient we elected to replace the catheter at this time.  A 16 Citizen of [place] catheter was inserted by nursing staff without any difficulty.    Impression: Urinary retention with a failed voiding trial  Plan   Plan: Catheter is replaced at this time.  We will continue to follow Anthony.  He is not a surgical candidate because of the acute pulmonary emboli and his anticoagulation status.  He wants to continue to try again so we will see him back in a month for another voiding trial.  I do not think he is going to be able to do intermittent catheterizations at home.  Follow-up in 1 month. 25 minutes spent in discussing current status and options of intervention " as well as followup plans chart review and documentation.       No follow-ups on file.    Indu De Leon MD  [facility]

## 2021-05-25 NOTE — NURSING NOTE
"Chief Complaint   Patient presents with     Procedure     Voiding Trial       Initial /66 (Cuff Size: Adult Regular)   Pulse 93   Temp 97.8  F (36.6  C) (Tympanic)   Ht 1.803 m (5' 11\")   Wt 88.4 kg (194 lb 12.8 oz)   SpO2 96%   BMI 27.17 kg/m   Estimated body mass index is 27.17 kg/m  as calculated from the following:    Height as of this encounter: 1.803 m (5' 11\").    Weight as of this encounter: 88.4 kg (194 lb 12.8 oz).  Medication Reconciliation: complete   Josefina Amador LPN      Review of Systems:    Weight loss:    No     Recent fever/chills:  No   Night sweats:   No  Current skin rash:  No   Recent hair loss:  No  Heat intolerance:  No   Cold intolerance:  No  Chest pain:   No   Palpitations:   No  Shortness of breath:  No   Wheezing:   No  Constipation:    No   Diarrhea:   No   Nausea:   No   Vomiting:   No   Kidney/side pain:  No   Back pain:   No  Frequent headaches:  No   Dizziness:     No  Leg swelling:   No   Calf pain:    No    Parents, brothers or sisters with history of kidney cancer:   No  Parents, brothers or sisters with history of bladder cancer: No      "

## 2021-05-25 NOTE — LETTER
5/25/2021      RE: Anthony Dyer  [address] [place] [address]       n    History     Chief Complaint:      Procedure (Voiding Trial)      HPI   Anthony Dyer is a 80 year old male who presents for voiding trial today.  I initially saw Anthony about a month ago.  He had urinary retention and then also suffered an acute pulmonary emboli and is now on anticoagulation.  Patient underwent cystoscopy which showed some bilobar hypertrophy with an elevated bladder neck.  We filled his bladder to 450 mL and the patient was unable to void so thus a catheter was placed.  According to the patient the nursing home change the catheter about 2 weeks ago.  He is scheduled to possibly go home soon.  He has gotten stronger and we elected to do a voiding trial today.  She is already is on maximum medical therapy.    Allergies:    Allergies   Allergen Reactions     Ace Inhibitors      Per [facility]: hyperkalemia.  Pt doesn't know if he is allergic     Ceclor [Cefaclor] Hives     Sulfa Drugs      Pt unsure.         Medications:      acetaminophen (TYLENOL) 325 MG tablet  albuterol (PROAIR HFA/PROVENTIL HFA/VENTOLIN HFA) 108 (90 Base) MCG/ACT inhaler  allopurinol (ZYLOPRIM) 100 MG tablet  amLODIPine (NORVASC) 2.5 MG tablet  atorvastatin (LIPITOR) 80 MG tablet  carvedilol (COREG) 25 MG tablet  finasteride (PROSCAR) 5 MG tablet  gabapentin (NEURONTIN) 300 MG capsule  glimepiride (AMARYL) 2 MG tablet  metFORMIN (GLUCOPHAGE-XR) 500 MG 24 hr tablet  multivitamin, therapeutic (THERA-VIT) TABS tablet  rivaroxaban ANTICOAGULANT (XARELTO) 20 MG TABS tablet  Semaglutide,0.25 or 0.5MG/DOS, (OZEMPIC, 0.25 OR 0.5 MG/DOSE,) 2 MG/1.5ML SOPN  tamsulosin (FLOMAX) 0.4 MG capsule  vitamin B-12 (CYANOCOBALAMIN) 250 MCG tablet        Problem List:      Patient Active Problem List    Diagnosis Date Noted     Nursing Home Visit 05/21/2021     Priority: Medium     Benign prostatic hyperplasia with urinary retention 04/29/2021     Priority: Medium      Tachycardia 04/26/2021     Priority: Medium     Hypoxia 04/26/2021     Priority: Medium     Other acute pulmonary embolism with acute cor pulmonale (H) 04/26/2021     Priority: Medium     Dyspnea, unspecified type 04/26/2021     Priority: Medium     Diabetes mellitus, type 2 (H) 04/20/2021     Priority: Medium     Urinary retention 04/19/2021     Priority: Medium     Generalized weakness 04/19/2021     Priority: Medium     Chronic painful diabetic neuropathy (H) 10/24/2018     Priority: Medium     Moderate episode of recurrent major depressive disorder (H) 10/24/2018     Priority: Medium     Formatting of this note might be different from the original.  Patient does not want anti-depressant medication       Chronic obstructive lung disease (H) 11/22/2017     Priority: Medium     Formatting of this note might be different from the original.  Mild       Mild concentric left ventricular hypertrophy (LVH) 11/22/2017     Priority: Medium     Uncomplicated alcohol dependence (H) 09/28/2016     Priority: Medium     IBS (irritable bowel syndrome) 12/23/2015     Priority: Medium     Spinal stenosis of lumbar region without neurogenic claudication 12/23/2015     Priority: Medium     Erectile dysfunction 05/28/2014     Priority: Medium     Chronic kidney disease, stage III (moderate) 05/22/2013     Priority: Medium     Formatting of this note might be different from the original.  Since 2011       History of CVA (cerebrovascular accident) 02/03/2011     Priority: Medium     Formatting of this note might be different from the original.  Jan 2011  IMPRESSION: Interval development of a diffusion abnormality consistent with acute to early subacute infarct of less than 3 days of age in the left thalamus and the medial left cerebral peduncle. No associated abnormal enhancement or hydrocephalus.       Hyperlipidemia 02/03/2011     Priority: Medium     Essential hypertension 11/28/2006     Priority: Medium     Gout, unspecified  "02/16/2001     Priority: Medium     Formatting of this note might be different from the original.  On Allopurinol       Lumbar spondylosis 02/22/1995     Priority: Medium     Formatting of this note might be different from the original.  S/P RFN L2-L5 facets          Past Medical History:      Past Medical History:   Diagnosis Date     Diabetes (H)      Hypertension        Past Surgical History:      Past Surgical History:   Procedure Laterality Date     HERNIA REPAIR         Family History:      History reviewed. No pertinent family history.    Social History:    Marital Status:  Single [1]  Social History     Tobacco Use     Smoking status: Never Smoker     Smokeless tobacco: Former User     Types: Chew   Substance Use Topics     Alcohol use: Not Currently     Drug use: Never        Review of Systems   Genitourinary: Positive for difficulty urinating.   Musculoskeletal: Positive for gait problem.   All other systems reviewed and are negative.        Physical Exam   Vitals:  /66 (Cuff Size: Adult Regular)   Pulse 93   Temp 97.8  F (36.6  C) (Tympanic)   Ht 1.803 m (5' 11\")   Wt 88.4 kg (194 lb 12.8 oz)   SpO2 96%   BMI 27.17 kg/m        Physical Exam    Procedure: Eating trial: Patient's bladder was instilled to 300 mL of sterile water.  His catheter was removed and the patient was able to void only about 50 mL.  After discussing this with the patient we elected to replace the catheter at this time.  A 16 [place] catheter was inserted by nursing staff without any difficulty.    Impression: Urinary retention with a failed voiding trial  Plan   Plan: Catheter is replaced at this time.  We will continue to follow Anthony.  He is not a surgical candidate because of the acute pulmonary emboli and his anticoagulation status.  He wants to continue to try again so we will see him back in a month for another voiding trial.  I do not think he is going to be able to do intermittent catheterizations at home.  Follow-up " in 1 month. 25 minutes spent in discussing current status and options of intervention as well as followup plans chart review and documentation.       No follow-ups on file.    Indu De Leon MD  [facility]              Indu De Leon MD

## 2021-05-27 ENCOUNTER — TELEPHONE (OUTPATIENT)
Dept: FAMILY MEDICINE | Facility: OTHER | Age: 81
End: 2021-05-27

## 2021-05-27 NOTE — TELEPHONE ENCOUNTER
[facility] for [facility] to admit patient to agency and for PT and OT evaluation once patient is admitted. PcP to advise.    Ellyn with [facility]:  [phone]

## 2021-06-01 ENCOUNTER — TELEPHONE (OUTPATIENT)
Dept: FAMILY MEDICINE | Facility: OTHER | Age: 81
End: 2021-06-01

## 2021-06-01 NOTE — TELEPHONE ENCOUNTER
Mykel from [facility] called, requesting verbal orders for skilled nursing for 9 weeks. Please advise. Thanks!    Mykel's phone number is [phone]

## 2021-06-01 NOTE — TELEPHONE ENCOUNTER
[facility] manager called waiting for these orders to be approved.  Call Mykel with [facility] phone number is [phone]

## 2021-06-01 NOTE — TELEPHONE ENCOUNTER
Mykel called back regarding verbal orders. Also need clarification on whether indwelling to should be changed every two weeks. Patient does have appt on 6/30 with Dr De Leon for voiding trial. Please advise. Thank you!

## 2021-06-02 NOTE — TELEPHONE ENCOUNTER
I spoke with Mykel and informed her that Anthony's catheter does not need to be changed every two weeks.  I placed this catheter on 5/25/21 and it should stay in place until pt is seeing in clinic on 6/30/21.  Mykel verbalized understanding.  MICHELLE HILL, ARTURO

## 2021-06-02 NOTE — TELEPHONE ENCOUNTER
Dr. De Leon and/or Urology nurse to address the following question.  Mykel needing clarification on whether indwelling to should be changed every two weeks? Patient does have appt on 6/30 with Dr De Leon for voiding trial. Should catheter be changed prior to this appt?     Call Mykel  phone number is [phone].

## 2021-06-02 NOTE — TELEPHONE ENCOUNTER
George Orourke MD  You 15 hours ago (5:01 PM)     Approve    Message text      Mykel called and updated.

## 2021-06-07 ENCOUNTER — TELEPHONE (OUTPATIENT)
Dept: FAMILY MEDICINE | Facility: OTHER | Age: 81
End: 2021-06-07

## 2021-06-07 ENCOUNTER — NURSE TRIAGE (OUTPATIENT)
Dept: FAMILY MEDICINE | Facility: OTHER | Age: 81
End: 2021-06-07

## 2021-06-07 DIAGNOSIS — R31.9 HEMATURIA, UNSPECIFIED TYPE: Primary | ICD-10-CM

## 2021-06-07 DIAGNOSIS — N30.01 ACUTE CYSTITIS WITH HEMATURIA: Primary | ICD-10-CM

## 2021-06-07 DIAGNOSIS — R31.9 HEMATURIA, UNSPECIFIED TYPE: ICD-10-CM

## 2021-06-07 DIAGNOSIS — R82.90 ABNORMAL URINE FINDINGS: Primary | ICD-10-CM

## 2021-06-07 LAB
ALBUMIN UR-MCNC: 100 MG/DL
APPEARANCE UR: ABNORMAL
BACTERIA #/AREA URNS HPF: ABNORMAL /HPF
BILIRUB UR QL STRIP: NEGATIVE
COLOR UR AUTO: YELLOW
GLUCOSE UR STRIP-MCNC: NEGATIVE MG/DL
HGB UR QL STRIP: ABNORMAL
KETONES UR STRIP-MCNC: NEGATIVE MG/DL
LEUKOCYTE ESTERASE UR QL STRIP: ABNORMAL
NITRATE UR QL: NEGATIVE
PH UR STRIP: 5 PH (ref 4.7–8)
RBC #/AREA URNS AUTO: 46 /HPF (ref 0–2)
SOURCE: ABNORMAL
SP GR UR STRIP: 1.01 (ref 1–1.03)
SQUAMOUS #/AREA URNS AUTO: 0 /HPF (ref 0–1)
UROBILINOGEN UR STRIP-MCNC: NORMAL MG/DL (ref 0–2)
WBC #/AREA URNS AUTO: 144 /HPF (ref 0–5)
WBC CLUMPS #/AREA URNS HPF: PRESENT /HPF

## 2021-06-07 PROCEDURE — 87088 URINE BACTERIA CULTURE: CPT | Mod: ZL | Performed by: FAMILY MEDICINE

## 2021-06-07 PROCEDURE — 81001 URINALYSIS AUTO W/SCOPE: CPT | Mod: ZL | Performed by: FAMILY MEDICINE

## 2021-06-07 PROCEDURE — 87186 SC STD MICRODIL/AGAR DIL: CPT | Mod: ZL | Performed by: FAMILY MEDICINE

## 2021-06-07 PROCEDURE — 87086 URINE CULTURE/COLONY COUNT: CPT | Mod: ZL | Performed by: FAMILY MEDICINE

## 2021-06-07 RX ORDER — LEVOFLOXACIN 500 MG/1
500 TABLET, FILM COATED ORAL DAILY
Qty: 5 TABLET | Refills: 0 | Status: SHIPPED | OUTPATIENT
Start: 2021-06-07 | End: 2021-06-12

## 2021-06-07 NOTE — TELEPHONE ENCOUNTER
"Mykel [phone] let her know if they can bring in a sample for UA    Reason for Disposition    Side (flank) or back pain present    Answer Assessment - Initial Assessment Questions  1. COLOR of URINE: \"Describe the color of the urine.\"  (e.g., tea-colored, pink, red, blood clots, bloody)      Orange tinge  2. ONSET: \"When did the bleeding start?\"       3 days ago  3. EPISODES: \"How many times has there been blood in the urine?\" or \"How many times today?\"      Every time he goes he has a catheter  4. PAIN with URINATION: \"Is there any pain with passing your urine?\" If so, ask: \"How bad is the pain?\"  (Scale 1-10; or mild, moderate, severe)     - MILD - complains slightly about urination hurting     - MODERATE - interferes with normal activities       - SEVERE - excruciating, unwilling or unable to urinate because of the pain       Back pain  5. FEVER: \"Do you have a fever?\" If so, ask: \"What is your temperature, how was it measured, and when did it start?\"      no  6. ASSOCIATED SYMPTOMS: \"Are you passing urine more frequently than usual?\"      Has a catheter  7. OTHER SYMPTOMS: \"Do you have any other symptoms?\" (e.g., back/flank pain, abdominal pain, vomiting)      Back paib  8. PREGNANCY: \"Is there any chance you are pregnant?\" \"When was your last menstrual period?\"      no    Protocols used: URINE - BLOOD IN-A-OH      "

## 2021-06-07 NOTE — TELEPHONE ENCOUNTER
Patient's sister in law, Grazyna, calling in regards to UA results. Wanting to know if patient will be prescribed medication.  States patient uses [facility] in [place] for pharmacy. Please advise, thank you.    Grazyna's phone number is [phone]

## 2021-06-08 ENCOUNTER — MEDICAL CORRESPONDENCE (OUTPATIENT)
Dept: HEALTH INFORMATION MANAGEMENT | Facility: CLINIC | Age: 81
End: 2021-06-08

## 2021-06-08 DIAGNOSIS — Z53.9 PERSONS ENCOUNTERING HEALTH SERVICES FOR SPECIFIC PROCEDURES, NOT CARRIED OUT: Primary | ICD-10-CM

## 2021-06-08 PROCEDURE — G0180 MD CERTIFICATION HHA PATIENT: HCPCS | Performed by: FAMILY MEDICINE

## 2021-06-09 DIAGNOSIS — N30.00 ACUTE CYSTITIS WITHOUT HEMATURIA: Primary | ICD-10-CM

## 2021-06-09 LAB
BACTERIA SPEC CULT: ABNORMAL
SPECIMEN SOURCE: ABNORMAL

## 2021-06-09 RX ORDER — NITROFURANTOIN 25; 75 MG/1; MG/1
100 CAPSULE ORAL 2 TIMES DAILY
Qty: 14 CAPSULE | Refills: 0 | Status: SHIPPED | OUTPATIENT
Start: 2021-06-09 | End: 2021-06-16

## 2021-06-23 ENCOUNTER — HOSPITAL ENCOUNTER (EMERGENCY)
Facility: HOSPITAL | Age: 81
Discharge: HOME | End: 2021-06-23
Attending: NURSE PRACTITIONER | Admitting: NURSE PRACTITIONER
Payer: MEDICARE

## 2021-06-23 VITALS
DIASTOLIC BLOOD PRESSURE: 76 MMHG | TEMPERATURE: 97.7 F | SYSTOLIC BLOOD PRESSURE: 116 MMHG | OXYGEN SATURATION: 97 % | RESPIRATION RATE: 18 BRPM | HEART RATE: 99 BPM

## 2021-06-23 DIAGNOSIS — T83.9XXA FOLEY CATHETER PROBLEM (H): Primary | ICD-10-CM

## 2021-06-23 DIAGNOSIS — I26.09 OTHER ACUTE PULMONARY EMBOLISM WITH ACUTE COR PULMONALE (H): ICD-10-CM

## 2021-06-23 PROCEDURE — 51702 INSERT TEMP BLADDER CATH: CPT

## 2021-06-23 PROCEDURE — 51702 INSERT TEMP BLADDER CATH: CPT | Performed by: NURSE PRACTITIONER

## 2021-06-23 PROCEDURE — 999N000104 HC STATISTIC NO CHARGE

## 2021-06-23 RX ORDER — RIVAROXABAN 20 MG/1
TABLET, FILM COATED ORAL
Qty: 30 TABLET | Refills: 0 | Status: SHIPPED | OUTPATIENT
Start: 2021-06-23 | End: 2021-06-30

## 2021-06-23 ASSESSMENT — ENCOUNTER SYMPTOMS
SHORTNESS OF BREATH: 0
VOMITING: 0
NAUSEA: 0
DIFFICULTY URINATING: 1
FLANK PAIN: 0
HEMATURIA: 0
CHILLS: 0
DIZZINESS: 0
PSYCHIATRIC NEGATIVE: 1
FEVER: 0
DIARRHEA: 0

## 2021-06-23 NOTE — ED TRIAGE NOTES
Patient presents with complaints of catheter pain, states he thinks the catheter has come out some. Is unable to tell me if it's still emptying.

## 2021-06-23 NOTE — ED PROVIDER NOTES
History     Chief Complaint   Patient presents with     Catheter Problem     HPI  Anthony Dyer is a 80 year old male who presents to urgent care today (ambulatory) accompanied by sister-in-law for complaints of a catheter problem. Patient states it feels like it got pulled out and finds that the taping has been an issue with keeping it in the correct placement.  Patients next follow up appointment with Dr. De Leon on Wednesday 6/30/2021.  Denies any fever, chills, nausea, vomiting, diarrhea, SOB or chest pain.  Denies pain.  Denies any hematuria.  No other concerns.       Allergies:  Allergies   Allergen Reactions     Ace Inhibitors      Per [facility]: hyperkalemia.  Pt doesn't know if he is allergic     Ceclor [Cefaclor] Hives     Sulfa Drugs      Pt unsure.        Problem List:    Patient Active Problem List    Diagnosis Date Noted     Nursing Home Visit 05/21/2021     Priority: Medium     Benign prostatic hyperplasia with urinary retention 04/29/2021     Priority: Medium     Tachycardia 04/26/2021     Priority: Medium     Hypoxia 04/26/2021     Priority: Medium     Other acute pulmonary embolism with acute cor pulmonale (H) 04/26/2021     Priority: Medium     Dyspnea, unspecified type 04/26/2021     Priority: Medium     Diabetes mellitus, type 2 (H) 04/20/2021     Priority: Medium     Urinary retention 04/19/2021     Priority: Medium     Generalized weakness 04/19/2021     Priority: Medium     Chronic painful diabetic neuropathy (H) 10/24/2018     Priority: Medium     Moderate episode of recurrent major depressive disorder (H) 10/24/2018     Priority: Medium     Formatting of this note might be different from the original.  Patient does not want anti-depressant medication       Chronic obstructive lung disease (H) 11/22/2017     Priority: Medium     Formatting of this note might be different from the original.  Mild       Mild concentric left ventricular hypertrophy (LVH) 11/22/2017     Priority: Medium      Uncomplicated alcohol dependence (H) 09/28/2016     Priority: Medium     IBS (irritable bowel syndrome) 12/23/2015     Priority: Medium     Spinal stenosis of lumbar region without neurogenic claudication 12/23/2015     Priority: Medium     Erectile dysfunction 05/28/2014     Priority: Medium     Chronic kidney disease, stage III (moderate) 05/22/2013     Priority: Medium     Formatting of this note might be different from the original.  Since 2011       History of CVA (cerebrovascular accident) 02/03/2011     Priority: Medium     Formatting of this note might be different from the original.  Jan 2011  IMPRESSION: Interval development of a diffusion abnormality consistent with acute to early subacute infarct of less than 3 days of age in the left thalamus and the medial left cerebral peduncle. No associated abnormal enhancement or hydrocephalus.       Hyperlipidemia 02/03/2011     Priority: Medium     Essential hypertension 11/28/2006     Priority: Medium     Gout, unspecified 02/16/2001     Priority: Medium     Formatting of this note might be different from the original.  On Allopurinol       Lumbar spondylosis 02/22/1995     Priority: Medium     Formatting of this note might be different from the original.  S/P RFN L2-L5 facets          Past Medical History:    Past Medical History:   Diagnosis Date     Diabetes (H)      Hypertension        Past Surgical History:    Past Surgical History:   Procedure Laterality Date     HERNIA REPAIR         Family History:    No family history on file.    Social History:  Marital Status:  Single [1]  Social History     Tobacco Use     Smoking status: Never Smoker     Smokeless tobacco: Former User     Types: Chew   Substance Use Topics     Alcohol use: Not Currently     Drug use: Never        Medications:    acetaminophen (TYLENOL) 325 MG tablet  albuterol (PROAIR HFA/PROVENTIL HFA/VENTOLIN HFA) 108 (90 Base) MCG/ACT inhaler  allopurinol (ZYLOPRIM) 100 MG tablet  amLODIPine  (NORVASC) 2.5 MG tablet  atorvastatin (LIPITOR) 80 MG tablet  carvedilol (COREG) 25 MG tablet  finasteride (PROSCAR) 5 MG tablet  gabapentin (NEURONTIN) 300 MG capsule  glimepiride (AMARYL) 2 MG tablet  metFORMIN (GLUCOPHAGE-XR) 500 MG 24 hr tablet  multivitamin, therapeutic (THERA-VIT) TABS tablet  rivaroxaban ANTICOAGULANT (XARELTO) 20 MG TABS tablet  Semaglutide,0.25 or 0.5MG/DOS, (OZEMPIC, 0.25 OR 0.5 MG/DOSE,) 2 MG/1.5ML SOPN  tamsulosin (FLOMAX) 0.4 MG capsule  vitamin B-12 (CYANOCOBALAMIN) 250 MCG tablet      Review of Systems   Constitutional: Negative for chills and fever.   Respiratory: Negative for shortness of breath.    Cardiovascular: Negative for chest pain.   Gastrointestinal: Negative for diarrhea, nausea and vomiting.   Genitourinary: Positive for difficulty urinating (Has to catheter placed). Negative for decreased urine volume, discharge, flank pain, hematuria, penile pain, penile swelling, scrotal swelling and testicular pain.   Musculoskeletal: Positive for gait problem (ambulates with cane).   Neurological: Negative for dizziness.   Psychiatric/Behavioral: Negative.      Physical Exam   BP: 116/76  Pulse: 99  Temp: 97.7  F (36.5  C)  Resp: 18  SpO2: 97 %    Physical Exam  Vitals signs and nursing note reviewed.   Constitutional:       General: He is not in acute distress.     Appearance: He is not ill-appearing.   Cardiovascular:      Rate and Rhythm: Normal rate and regular rhythm.      Pulses: Normal pulses.      Heart sounds: Normal heart sounds.   Pulmonary:      Effort: Pulmonary effort is normal.      Breath sounds: Normal breath sounds.   Genitourinary:     Penis: Normal.       Testes: Normal.   Neurological:      Mental Status: He is alert.   Psychiatric:         Mood and Affect: Mood normal.       ED Course     No results found for this or any previous visit (from the past 24 hour(s)).    Medications - No data to display    Assessments & Plan (with Medical Decision Making)     I  have reviewed the nursing notes.    I have reviewed the findings, diagnosis, plan and need for follow up with the patient.  (T83.9XXA) To catheter problem (H)  (primary encounter diagnosis)  Plan:  To catheter got semidislodged.  New to catheter inserted.  Patient denies any pulling/tugging.  Denies any pain.  Tube is patent and free-flowing clear yellow urine into the leg bag.  Patient has no other questions or concerns at this time.  Patient to follow-up with Dr. Morataya for scheduled appointment.  Patient to return to urgent care-ED with any worsening in condition or additional concerns. Patient denies any fever, chills, nausea, vomiting, diarrhea, shortness of breath or chest pain.  No other questions/concerns.  Patient in agreement with treatment plan.    New Prescriptions    No medications on file     Final diagnoses:   To catheter problem (H)     6/23/2021   [facility]     [place] Ob[place] NP  06/23/21 1101

## 2021-06-23 NOTE — DISCHARGE INSTRUCTIONS
Follow up with Dr. De Leon at next scheduled appointment and return to urgent care/ED with  any worsening in condition or additional concerns.

## 2021-06-23 NOTE — ED TRIAGE NOTES
Pt presents with tenderness in penis. Pt states that the tubing has slid out about an inch. Pain started this morning. Pt states that it got pulled but unsure how. Schmitt is a 16 but unable to see balloon size. States he is having a little bit of output. Pt has had tylenol and is on blood thinners.

## 2021-06-23 NOTE — TELEPHONE ENCOUNTER
XARELTO 20MG TABLET  Last Written Prescription Date:  5/20/21  Last Fill Quantity: 30,   # refills: 3  Last Office Visit: 5/25/21  Future Office visit:    Next 5 appointments (look out 90 days)    Jun 30, 2021  8:00 AM  (Arrive by 7:45 AM)  Return Visit with Indu De Leon MD  [facility] ([facility] [place] ) [address]  [place] [address]  [phone]   Jun 30, 2021 10:00 AM  (Arrive by 9:45 AM)  SHORT with George Orourke MD  [facility] ([facility] [place] ) [address]  [place] [address]  [phone]           Routing refill request to provider for review/approval because:

## 2021-06-28 NOTE — PROGRESS NOTES
Assessment & Plan     Other acute pulmonary embolism with acute cor pulmonale (H)  Discussed ongoing management and follow up.  Continue Xarelto.  Follow up one month  - rivaroxaban ANTICOAGULANT (XARELTO ANTICOAGULANT) 20 MG TABS tablet; TAKE 1 TABLET DAILY BY MOUTH WITH DINNER    Diabetic polyneuropathy associated with type 2 diabetes mellitus (H)  Recommend [facility] referral.  Appointment with Podiatry scheduled for evaluation and recommendations for further management.   - Orthopedic  Referral; Future    Benign prostatic hyperplasia with urinary retention  Reviewed Urology notes.  UA order placed should he develop urinary symptoms.   - UA reflex to Microscopic and Culture - ALEXA; [id number]     See Patient Instructions    No follow-ups on file.    George Orourke MD  [facility] - ALEXA Cruz is a 80 year old who presents for the following health issues     HPI     New Patient/Transfer of Care  Diabetes Follow-up      How often are you checking your blood sugar? Not at all    What concerns do you have today about your diabetes? None     Do you have any of these symptoms? (Select all that apply)  Numbness in feet and Burning in feet    Have you had a diabetic eye exam in the last 12 months? No          Hyperlipidemia Follow-Up      Are you regularly taking any medication or supplement to lower your cholesterol?   Yes- atorvastatin 80 mg daily    Are you having muscle aches or other side effects that you think could be caused by your cholesterol lowering medication?  No    Hypertension Follow-up      Do you check your blood pressure regularly outside of the clinic? No     Are you following a low salt diet? Yes    Are your blood pressures ever more than 140 on the top number (systolic) OR more   than 90 on the bottom number (diastolic), for example 140/90? No    BP Readings from Last 2 Encounters:   06/30/21 122/50   06/30/21 124/60     Hemoglobin  "A1C (%)   Date Value   05/06/2021 6.5 (H)     Review of Systems   Constitutional, HEENT, cardiovascular, pulmonary, gi and gu systems are negative, except as otherwise noted.      Objective    /50 (BP Location: Right arm, Patient Position: Sitting, Cuff Size: Adult Regular)   Pulse 86   Temp 98.1  F (36.7  C) (Tympanic)   Resp 17   Ht 1.854 m (6' 1\")   Wt 80.7 kg (178 lb)   SpO2 96%   BMI 23.48 kg/m    Body mass index is 23.48 kg/m .  Physical Exam  Vitals signs and nursing note reviewed.   Constitutional:       Appearance: He is well-developed.   Neurological:      Mental Status: He is alert and oriented to person, place, and time.   Psychiatric:         Mood and Affect: Mood normal.         Behavior: Behavior normal.         Thought Content: Thought content normal.      Other exam not completed    Orders Only on 06/07/2021   Component Date Value Ref Range Status     Color Urine 06/07/2021 Yellow   Final     Appearance Urine 06/07/2021 Slightly Cloudy   Final     Glucose Urine 06/07/2021 Negative  NEG^Negative mg/dL Final     Bilirubin Urine 06/07/2021 Negative  NEG^Negative Final     Ketones Urine 06/07/2021 Negative  NEG^Negative mg/dL Final     Specific Gravity Urine 06/07/2021 1.013  1.003 - 1.035 Final     Blood Urine 06/07/2021 Large* NEG^Negative Final     pH Urine 06/07/2021 5.0  4.7 - 8.0 pH Final     Protein Albumin Urine 06/07/2021 100* NEG^Negative mg/dL Final     Urobilinogen mg/dL 06/07/2021 Normal  0.0 - 2.0 mg/dL Final     Nitrite Urine 06/07/2021 Negative  NEG^Negative Final     Leukocyte Esterase Urine 06/07/2021 Large* NEG^Negative Final     Source 06/07/2021 Midstream Urine   Final     RBC Urine 06/07/2021 46* 0 - 2 /HPF Final     WBC Urine 06/07/2021 144* 0 - 5 /HPF Final     WBC Clumps 06/07/2021 Present* NEG^Negative /HPF Final     Bacteria Urine 06/07/2021 Few* NEG^Negative /HPF Final     Squamous Epithelial /HPF Urine 06/07/2021 0  0 - 1 /HPF Final     Specimen Description " 06/07/2021 Midstream Urine   Final     Culture Micro 06/07/2021 *  Final                    Value:>100,000 colonies/mL  Enterococcus faecalis

## 2021-06-30 ENCOUNTER — OFFICE VISIT (OUTPATIENT)
Dept: FAMILY MEDICINE | Facility: OTHER | Age: 81
End: 2021-06-30
Attending: FAMILY MEDICINE
Payer: MEDICARE

## 2021-06-30 ENCOUNTER — OFFICE VISIT (OUTPATIENT)
Dept: UROLOGY | Facility: OTHER | Age: 81
End: 2021-06-30
Attending: UROLOGY
Payer: MEDICARE

## 2021-06-30 VITALS
WEIGHT: 178 LBS | DIASTOLIC BLOOD PRESSURE: 60 MMHG | TEMPERATURE: 97.4 F | BODY MASS INDEX: 23.59 KG/M2 | OXYGEN SATURATION: 96 % | HEART RATE: 51 BPM | SYSTOLIC BLOOD PRESSURE: 124 MMHG | HEIGHT: 73 IN

## 2021-06-30 VITALS
TEMPERATURE: 98.1 F | RESPIRATION RATE: 17 BRPM | BODY MASS INDEX: 23.59 KG/M2 | OXYGEN SATURATION: 96 % | HEIGHT: 73 IN | HEART RATE: 86 BPM | DIASTOLIC BLOOD PRESSURE: 50 MMHG | SYSTOLIC BLOOD PRESSURE: 122 MMHG | WEIGHT: 178 LBS

## 2021-06-30 DIAGNOSIS — I26.09 OTHER ACUTE PULMONARY EMBOLISM WITH ACUTE COR PULMONALE (H): ICD-10-CM

## 2021-06-30 DIAGNOSIS — R33.8 BENIGN PROSTATIC HYPERPLASIA WITH URINARY RETENTION: ICD-10-CM

## 2021-06-30 DIAGNOSIS — E11.42 DIABETIC POLYNEUROPATHY ASSOCIATED WITH TYPE 2 DIABETES MELLITUS (H): Primary | ICD-10-CM

## 2021-06-30 DIAGNOSIS — N40.1 BENIGN PROSTATIC HYPERPLASIA WITH URINARY RETENTION: ICD-10-CM

## 2021-06-30 DIAGNOSIS — R33.9 URINARY RETENTION: Primary | ICD-10-CM

## 2021-06-30 PROBLEM — L60.8 PINCER NAIL DEFORMITY: Status: ACTIVE | Noted: 2021-02-09

## 2021-06-30 PROBLEM — B35.1 ONYCHOMYCOSIS OF LEFT GREAT TOE: Status: ACTIVE | Noted: 2021-02-09

## 2021-06-30 PROCEDURE — 99213 OFFICE O/P EST LOW 20 MIN: CPT | Mod: 25 | Performed by: UROLOGY

## 2021-06-30 PROCEDURE — G0463 HOSPITAL OUTPT CLINIC VISIT: HCPCS

## 2021-06-30 PROCEDURE — 51702 INSERT TEMP BLADDER CATH: CPT | Performed by: UROLOGY

## 2021-06-30 PROCEDURE — 99214 OFFICE O/P EST MOD 30 MIN: CPT | Performed by: FAMILY MEDICINE

## 2021-06-30 PROCEDURE — 51700 IRRIGATION OF BLADDER: CPT | Performed by: UROLOGY

## 2021-06-30 PROCEDURE — G0463 HOSPITAL OUTPT CLINIC VISIT: HCPCS | Mod: 25

## 2021-06-30 ASSESSMENT — ANXIETY QUESTIONNAIRES
3. WORRYING TOO MUCH ABOUT DIFFERENT THINGS: NOT AT ALL
4. TROUBLE RELAXING: NOT AT ALL
IF YOU CHECKED OFF ANY PROBLEMS ON THIS QUESTIONNAIRE, HOW DIFFICULT HAVE THESE PROBLEMS MADE IT FOR YOU TO DO YOUR WORK, TAKE CARE OF THINGS AT HOME, OR GET ALONG WITH OTHER PEOPLE: SOMEWHAT DIFFICULT
2. NOT BEING ABLE TO STOP OR CONTROL WORRYING: SEVERAL DAYS
GAD7 TOTAL SCORE: 8
1. FEELING NERVOUS, ANXIOUS, OR ON EDGE: MORE THAN HALF THE DAYS
7. FEELING AFRAID AS IF SOMETHING AWFUL MIGHT HAPPEN: SEVERAL DAYS
6. BECOMING EASILY ANNOYED OR IRRITABLE: NEARLY EVERY DAY
5. BEING SO RESTLESS THAT IT IS HARD TO SIT STILL: SEVERAL DAYS

## 2021-06-30 ASSESSMENT — PATIENT HEALTH QUESTIONNAIRE - PHQ9: SUM OF ALL RESPONSES TO PHQ QUESTIONS 1-9: 7

## 2021-06-30 ASSESSMENT — ENCOUNTER SYMPTOMS
UNEXPECTED WEIGHT CHANGE: 1
BACK PAIN: 1
DIFFICULTY URINATING: 1
SHORTNESS OF BREATH: 1

## 2021-06-30 ASSESSMENT — PAIN SCALES - GENERAL
PAINLEVEL: NO PAIN (0)
PAINLEVEL: NO PAIN (0)

## 2021-06-30 ASSESSMENT — MIFFLIN-ST. JEOR
SCORE: 1576.28
SCORE: 1576.28

## 2021-06-30 NOTE — LETTER
My Depression Action Plan  Name: Anthony Dyer   Date of Birth 1940  Date: 6/30/2021    My doctor: George Orourke   My clinic: [facility] [place]  [address] [place] [address]  [phone]          GREEN    ZONE   Good Control    What it looks like:     Things are going generally well. You have normal ups and downs. You may even feel depressed from time to time, but bad moods usually last less than a day.   What you need to do:  1. Continue to care for yourself (see self care plan)  2. Check your depression survival kit and update it as needed  3. Follow your physician s recommendations including any medication.  4. Do not stop taking medication unless you consult with your physician first.           YELLOW         ZONE Getting Worse    What it looks like:     Depression is starting to interfere with your life.     It may be hard to get out of bed; you may be starting to isolate yourself from others.    Symptoms of depression are starting to last most all day and this has happened for several days.     You may have suicidal thoughts but they are not constant.   What you need to do:     1. Call your care team. Your response to treatment will improve if you keep your care team informed of your progress. Yellow periods are signs an adjustment may need to be made.     2. Continue your self-care.  Just get dressed and ready for the day.  Don't give yourself time to talk yourself out of it.    3. Talk to someone in your support network.    4. Open up your Depression Self-Care Plan/Wellness Kit.           RED    ZONE Medical Alert - Get Help    What it looks like:     Depression is seriously interfering with your life.     You may experience these or other symptoms: You can t get out of bed most days, can t work or engage in other necessary activities, you have trouble taking care of basic hygiene, or basic responsibilities, thoughts of suicide or death that will not go away,  self-injurious behavior.     What you need to do:  1. Call your care team and request a same-day appointment. If they are not available (weekends or after hours) call your local crisis line, emergency room or 911.          Depression Self-Care Plan / Wellness Kit    Many people find that medication and therapy are helpful treatments for managing depression. In addition, making small changes to your everyday life can help to boost your mood and improve your wellbeing. Below are some tips for you to consider. Be sure to talk with your medical provider and/or behavioral health consultant if your symptoms are worsening or not improving.     Sleep   Sleep hygiene  means all of the habits that support good, restful sleep. It includes maintaining a consistent bedtime and wake time, using your bedroom only for sleeping or sex, and keeping the bedroom dark and free of distractions like a computer, smartphone, or television.     Develop a Healthy Routine  Maintain good hygiene. Get out of bed in the morning, make your bed, brush your teeth, take a shower, and get dressed. Don t spend too much time viewing media that makes you feel stressed. Find time to relax each day.    Exercise  Get some form of exercise every day. This will help reduce pain and release endorphins, the  feel good  chemicals in your brain. It can be as simple as just going for a walk or doing some gardening, anything that will get you moving.      Diet  Strive to eat healthy foods, including fruits and vegetables. Drink plenty of water. Avoid excessive sugar, caffeine, alcohol, and other mood-altering substances.     Stay Connected with Others  Stay in touch with friends and family members.    Manage Your Mood  Try deep breathing, massage therapy, biofeedback, or meditation. Take part in fun activities when you can. Try to find something to smile about each day.     Psychotherapy  Be open to working with a therapist if your provider recommends it.      Medication  Be sure to take your medication as prescribed. Most anti-depressants need to be taken every day. It usually takes several weeks for medications to work. Not all medicines work for all people. It is important to follow-up with your provider to make sure you have a treatment plan that is working for you. Do not stop your medication abruptly without first discussing it with your provider.    Crisis Resources   These hotlines are for both adults and children. They and are open 24 hours a day, 7 days a week unless noted otherwise.      National Suicide Prevention Lifeline   6-922-623-TALK (5956)      Crisis Text Line    www.crisistextline.org  Text HOME to 596294 from anywhere in the [place], anytime, about any type of crisis. A live, trained crisis counselor will receive the text and respond quickly.      Be Lifeline for LGBTQ Youth  A national crisis intervention and suicide lifeline for LGBTQ youth under 25. Provides a safe place to talk without judgement. Call 1-458.538.6507; text START to 097790 or visit www.thetrevorproject.org to talk to a trained counselor.      For [facility] crisis numbers, visit the [facility] website at:  https://[place].gov/dhs/people-we-serve/adults/health-care/mental-health/resources/crisis-contacts.jsp

## 2021-06-30 NOTE — NURSING NOTE
"Chief Complaint   Patient presents with     RECHECK     Voiding trial-Dr Orourke is primary       Initial /60 (BP Location: Left arm, Patient Position: Chair, Cuff Size: Adult Large)   Pulse 51   Temp 97.4  F (36.3  C) (Tympanic)   Ht 1.854 m (6' 1\")   Wt 80.7 kg (178 lb)   SpO2 96%   BMI 23.48 kg/m   Estimated body mass index is 23.48 kg/m  as calculated from the following:    Height as of this encounter: 1.854 m (6' 1\").    Weight as of this encounter: 80.7 kg (178 lb).  Medication Reconciliation: complete  GREGORY WRAY LPN    Review of Systems:    Weight loss:    yes     Recent fever/chills:  No   Night sweats:   No  Current skin rash:  No   Recent hair loss:  No  Heat intolerance:  No   Cold intolerance:  No  Chest pain:   No   Palpitations:   No  Shortness of breath:  yes   Wheezing:   No  Constipation:    No   Diarrhea:   No   Nausea:   No   Vomiting:   No   Kidney/side pain:  No   Back pain:   yes  Frequent headaches:  No   Dizziness:     No  Leg swelling:   No   Calf pain:    No    Parents, brothers or sisters with history of kidney cancer?   No  Parents, brothers or sisters with history of bladder cancer: No    "

## 2021-06-30 NOTE — PROGRESS NOTES
[facility]  Catheter insertion documentation on 6/30/2021:    Anthony Dyer presents to the clinic for catheter insertion.  Reason for insertion: scheduled insertion  Order has been verified. Yes  Catheter successfully inserted into the urethral meatus in the usual sterile fashion without immediate complication.  Type of catheter placed: 16 German Coude catheter  Urine is yellow in color.  150 cc's of urine output returned.  Balloon was filled with 10 cc's of sterile water.  Securement device placed for the catheter.  The patient tolerated the procedure and was instructed to monitor for catheter dysfunction, monitor for pain or discomfort, return or call for pain, fever, leakage or decreased urine flow, watch for signs of infection and return to the clinic in one month.    MICHELLE HILL, EJN

## 2021-06-30 NOTE — LETTER
6/30/2021      RE: Anthony Dyer  [address] [place] [address]         History     Chief Complaint:      RECHECK (Voiding trial-Dr Orourke is primary)      HPI   Anthony Dyer is a 80 year old male who presents for further discussion of his urinary retention.  Anthony went into urinary retention about mid April and then he subsequently suffered a pulmonary emboli the end of April for which she is on anticoagulation.  He has had an indwelling catheter since the end of April and has failed multiple voiding trials.  He is currently out of the nursing home and now living at home but really frustrated with the fact that he has to have this catheter.    Allergies:    Allergies   Allergen Reactions     Ace Inhibitors      Per [facility]: hyperkalemia.  Pt doesn't know if he is allergic     Ceclor [Cefaclor] Hives     Sulfa Drugs      Pt unsure.         Medications:      acetaminophen (TYLENOL) 325 MG tablet  albuterol (PROAIR HFA/PROVENTIL HFA/VENTOLIN HFA) 108 (90 Base) MCG/ACT inhaler  allopurinol (ZYLOPRIM) 100 MG tablet  amLODIPine (NORVASC) 2.5 MG tablet  atorvastatin (LIPITOR) 80 MG tablet  carvedilol (COREG) 25 MG tablet  finasteride (PROSCAR) 5 MG tablet  gabapentin (NEURONTIN) 300 MG capsule  glimepiride (AMARYL) 2 MG tablet  metFORMIN (GLUCOPHAGE-XR) 500 MG 24 hr tablet  multivitamin, therapeutic (THERA-VIT) TABS tablet  Semaglutide,0.25 or 0.5MG/DOS, (OZEMPIC, 0.25 OR 0.5 MG/DOSE,) 2 MG/1.5ML SOPN  tamsulosin (FLOMAX) 0.4 MG capsule  vitamin B-12 (CYANOCOBALAMIN) 250 MCG tablet  XARELTO ANTICOAGULANT 20 MG TABS tablet        Problem List:      Patient Active Problem List    Diagnosis Date Noted     Nursing Home Visit 05/21/2021     Priority: Medium     Benign prostatic hyperplasia with urinary retention 04/29/2021     Priority: Medium     Tachycardia 04/26/2021     Priority: Medium     Hypoxia 04/26/2021     Priority: Medium     Other acute pulmonary embolism with acute cor pulmonale (H) 04/26/2021      Priority: Medium     Dyspnea, unspecified type 04/26/2021     Priority: Medium     Diabetes mellitus, type 2 (H) 04/20/2021     Priority: Medium     Urinary retention 04/19/2021     Priority: Medium     Generalized weakness 04/19/2021     Priority: Medium     Chronic painful diabetic neuropathy (H) 10/24/2018     Priority: Medium     Moderate episode of recurrent major depressive disorder (H) 10/24/2018     Priority: Medium     Formatting of this note might be different from the original.  Patient does not want anti-depressant medication       Chronic obstructive lung disease (H) 11/22/2017     Priority: Medium     Formatting of this note might be different from the original.  Mild       Mild concentric left ventricular hypertrophy (LVH) 11/22/2017     Priority: Medium     Uncomplicated alcohol dependence (H) 09/28/2016     Priority: Medium     IBS (irritable bowel syndrome) 12/23/2015     Priority: Medium     Spinal stenosis of lumbar region without neurogenic claudication 12/23/2015     Priority: Medium     Erectile dysfunction 05/28/2014     Priority: Medium     Chronic kidney disease, stage III (moderate) 05/22/2013     Priority: Medium     Formatting of this note might be different from the original.  Since 2011       History of CVA (cerebrovascular accident) 02/03/2011     Priority: Medium     Formatting of this note might be different from the original.  Jan 2011  IMPRESSION: Interval development of a diffusion abnormality consistent with acute to early subacute infarct of less than 3 days of age in the left thalamus and the medial left cerebral peduncle. No associated abnormal enhancement or hydrocephalus.       Hyperlipidemia 02/03/2011     Priority: Medium     Essential hypertension 11/28/2006     Priority: Medium     Gout, unspecified 02/16/2001     Priority: Medium     Formatting of this note might be different from the original.  On Allopurinol       Lumbar spondylosis 02/22/1995     Priority: Medium     " Formatting of this note might be different from the original.  S/P RFN L2-L5 facets          Past Medical History:      Past Medical History:   Diagnosis Date     Diabetes (H)      Hypertension        Past Surgical History:      Past Surgical History:   Procedure Laterality Date     HERNIA REPAIR         Family History:      History reviewed. No pertinent family history.    Social History:    Marital Status:  Single [1]  Social History     Tobacco Use     Smoking status: Never Smoker     Smokeless tobacco: Former User     Types: Chew   Substance Use Topics     Alcohol use: Not Currently     Drug use: Never        Review of Systems   Constitutional: Positive for unexpected weight change.   Respiratory: Positive for shortness of breath.    Genitourinary: Positive for difficulty urinating.   Musculoskeletal: Positive for back pain.   All other systems reviewed and are negative.        Physical Exam   Vitals:  /60 (BP Location: Left arm, Patient Position: Chair, Cuff Size: Adult Large)   Pulse 51   Temp 97.4  F (36.3  C) (Tympanic)   Ht 1.854 m (6' 1\")   Wt 80.7 kg (178 lb)   SpO2 96%   BMI 23.48 kg/m        Physical Exam  Voiding trial: My nurse instilled 250 mL of sterile water into the bladder and the catheter was removed.  Patient was unable to void.  Catheter was replaced.    Impression: Urinary retention, recent acute pulmonary emboli    Plan   Plan: Again I discussed with Anthony that the risk is too high for surgical intervention at this time because of the recent pulmonary emboli so a catheter is replaced and we will see him in a month for another voiding trial and then at that point we can discuss this further with his primary care and pulmonology care to evaluate him for possible surgical intervention.  He also has a large stone in the left renal pelvis that is 11 mm that likely would need to be addressed at the same time.      No follow-ups on file.    Indu De Leon MD  [facility] - " ALEXA De Leon MD

## 2021-06-30 NOTE — PROGRESS NOTES
History     Chief Complaint:      RECHECK (Voiding trial-Dr Orourke is primary)      HPI   Anthony Dyer is a 80 year old male who presents for further discussion of his urinary retention.  Anthony went into urinary retention about mid April and then he subsequently suffered a pulmonary emboli the end of April for which she is on anticoagulation.  He has had an indwelling catheter since the end of April and has failed multiple voiding trials.  He is currently out of the nursing home and now living at home but really frustrated with the fact that he has to have this catheter.    Allergies:    Allergies   Allergen Reactions     Ace Inhibitors      Per [facility]: hyperkalemia.  Pt doesn't know if he is allergic     Ceclor [Cefaclor] Hives     Sulfa Drugs      Pt unsure.         Medications:      acetaminophen (TYLENOL) 325 MG tablet  albuterol (PROAIR HFA/PROVENTIL HFA/VENTOLIN HFA) 108 (90 Base) MCG/ACT inhaler  allopurinol (ZYLOPRIM) 100 MG tablet  amLODIPine (NORVASC) 2.5 MG tablet  atorvastatin (LIPITOR) 80 MG tablet  carvedilol (COREG) 25 MG tablet  finasteride (PROSCAR) 5 MG tablet  gabapentin (NEURONTIN) 300 MG capsule  glimepiride (AMARYL) 2 MG tablet  metFORMIN (GLUCOPHAGE-XR) 500 MG 24 hr tablet  multivitamin, therapeutic (THERA-VIT) TABS tablet  Semaglutide,0.25 or 0.5MG/DOS, (OZEMPIC, 0.25 OR 0.5 MG/DOSE,) 2 MG/1.5ML SOPN  tamsulosin (FLOMAX) 0.4 MG capsule  vitamin B-12 (CYANOCOBALAMIN) 250 MCG tablet  XARELTO ANTICOAGULANT 20 MG TABS tablet        Problem List:      Patient Active Problem List    Diagnosis Date Noted     Nursing Home Visit 05/21/2021     Priority: Medium     Benign prostatic hyperplasia with urinary retention 04/29/2021     Priority: Medium     Tachycardia 04/26/2021     Priority: Medium     Hypoxia 04/26/2021     Priority: Medium     Other acute pulmonary embolism with acute cor pulmonale (H) 04/26/2021     Priority: Medium     Dyspnea, unspecified type 04/26/2021     Priority: Medium      Diabetes mellitus, type 2 (H) 04/20/2021     Priority: Medium     Urinary retention 04/19/2021     Priority: Medium     Generalized weakness 04/19/2021     Priority: Medium     Chronic painful diabetic neuropathy (H) 10/24/2018     Priority: Medium     Moderate episode of recurrent major depressive disorder (H) 10/24/2018     Priority: Medium     Formatting of this note might be different from the original.  Patient does not want anti-depressant medication       Chronic obstructive lung disease (H) 11/22/2017     Priority: Medium     Formatting of this note might be different from the original.  Mild       Mild concentric left ventricular hypertrophy (LVH) 11/22/2017     Priority: Medium     Uncomplicated alcohol dependence (H) 09/28/2016     Priority: Medium     IBS (irritable bowel syndrome) 12/23/2015     Priority: Medium     Spinal stenosis of lumbar region without neurogenic claudication 12/23/2015     Priority: Medium     Erectile dysfunction 05/28/2014     Priority: Medium     Chronic kidney disease, stage III (moderate) 05/22/2013     Priority: Medium     Formatting of this note might be different from the original.  Since 2011       History of CVA (cerebrovascular accident) 02/03/2011     Priority: Medium     Formatting of this note might be different from the original.  Jan 2011  IMPRESSION: Interval development of a diffusion abnormality consistent with acute to early subacute infarct of less than 3 days of age in the left thalamus and the medial left cerebral peduncle. No associated abnormal enhancement or hydrocephalus.       Hyperlipidemia 02/03/2011     Priority: Medium     Essential hypertension 11/28/2006     Priority: Medium     Gout, unspecified 02/16/2001     Priority: Medium     Formatting of this note might be different from the original.  On Allopurinol       Lumbar spondylosis 02/22/1995     Priority: Medium     Formatting of this note might be different from the original.  S/P RFN L2-L5  "facets          Past Medical History:      Past Medical History:   Diagnosis Date     Diabetes (H)      Hypertension        Past Surgical History:      Past Surgical History:   Procedure Laterality Date     HERNIA REPAIR         Family History:      History reviewed. No pertinent family history.    Social History:    Marital Status:  Single [1]  Social History     Tobacco Use     Smoking status: Never Smoker     Smokeless tobacco: Former User     Types: Chew   Substance Use Topics     Alcohol use: Not Currently     Drug use: Never        Review of Systems   Constitutional: Positive for unexpected weight change.   Respiratory: Positive for shortness of breath.    Genitourinary: Positive for difficulty urinating.   Musculoskeletal: Positive for back pain.   All other systems reviewed and are negative.        Physical Exam   Vitals:  /60 (BP Location: Left arm, Patient Position: Chair, Cuff Size: Adult Large)   Pulse 51   Temp 97.4  F (36.3  C) (Tympanic)   Ht 1.854 m (6' 1\")   Wt 80.7 kg (178 lb)   SpO2 96%   BMI 23.48 kg/m        Physical Exam  Voiding trial: My nurse instilled 250 mL of sterile water into the bladder and the catheter was removed.  Patient was unable to void.  Catheter was replaced.    Impression: Urinary retention, recent acute pulmonary emboli    Plan   Plan: Again I discussed with Anthony that the risk is too high for surgical intervention at this time because of the recent pulmonary emboli so a catheter is replaced and we will see him in a month for another voiding trial and then at that point we can discuss this further with his primary care and pulmonology care to evaluate him for possible surgical intervention.  He also has a large stone in the left renal pelvis that is 11 mm that likely would need to be addressed at the same time.      No follow-ups on file.    Indu De Leon MD  [facility] [place]          "

## 2021-07-01 ASSESSMENT — ANXIETY QUESTIONNAIRES: GAD7 TOTAL SCORE: 8

## 2021-07-06 DIAGNOSIS — N40.1 BENIGN PROSTATIC HYPERPLASIA WITH URINARY RETENTION: ICD-10-CM

## 2021-07-06 DIAGNOSIS — R33.8 BENIGN PROSTATIC HYPERPLASIA WITH URINARY RETENTION: ICD-10-CM

## 2021-07-06 DIAGNOSIS — R82.90 ABNORMAL URINE FINDINGS: Primary | ICD-10-CM

## 2021-07-06 LAB
ALBUMIN UR-MCNC: 30 MG/DL
APPEARANCE UR: ABNORMAL
BACTERIA #/AREA URNS HPF: ABNORMAL /HPF
BILIRUB UR QL STRIP: NEGATIVE
COLOR UR AUTO: YELLOW
GLUCOSE UR STRIP-MCNC: NEGATIVE MG/DL
HGB UR QL STRIP: ABNORMAL
KETONES UR STRIP-MCNC: NEGATIVE MG/DL
LEUKOCYTE ESTERASE UR QL STRIP: ABNORMAL
MUCOUS THREADS #/AREA URNS LPF: PRESENT /LPF
NITRATE UR QL: NEGATIVE
PH UR STRIP: 5.5 PH (ref 4.7–8)
RBC #/AREA URNS AUTO: >182 /HPF (ref 0–2)
SOURCE: ABNORMAL
SP GR UR STRIP: 1.02 (ref 1–1.03)
SQUAMOUS #/AREA URNS AUTO: 0 /HPF (ref 0–1)
UROBILINOGEN UR STRIP-MCNC: NORMAL MG/DL (ref 0–2)
WBC #/AREA URNS AUTO: >182 /HPF (ref 0–5)
WBC CLUMPS #/AREA URNS HPF: PRESENT /HPF

## 2021-07-06 PROCEDURE — 81001 URINALYSIS AUTO W/SCOPE: CPT | Mod: ZL | Performed by: FAMILY MEDICINE

## 2021-07-06 PROCEDURE — 87088 URINE BACTERIA CULTURE: CPT | Mod: ZL | Performed by: FAMILY MEDICINE

## 2021-07-06 PROCEDURE — 87186 SC STD MICRODIL/AGAR DIL: CPT | Mod: ZL | Performed by: FAMILY MEDICINE

## 2021-07-06 PROCEDURE — 87086 URINE CULTURE/COLONY COUNT: CPT | Mod: ZL | Performed by: FAMILY MEDICINE

## 2021-07-07 ENCOUNTER — TELEPHONE (OUTPATIENT)
Dept: FAMILY MEDICINE | Facility: OTHER | Age: 81
End: 2021-07-07

## 2021-07-07 DIAGNOSIS — N39.0 URINARY TRACT INFECTION WITHOUT HEMATURIA, SITE UNSPECIFIED: Primary | ICD-10-CM

## 2021-07-07 RX ORDER — CEPHALEXIN 500 MG/1
500 CAPSULE ORAL 2 TIMES DAILY
Qty: 10 CAPSULE | Refills: 0 | Status: SHIPPED | OUTPATIENT
Start: 2021-07-07 | End: 2021-07-12

## 2021-07-07 NOTE — TELEPHONE ENCOUNTER
Call from Grazyna, Sister in law. Consent to Communicate reviewed and is on file. Inquiring on UA results, if patient will be in need of antibiotic.     Notified Grazyna Orourke has reviewed UA result and is awaiting culture to result, a message will be sent to Dr. Orourke to notify Grazyna of the results when culture result is received.     Grazyna verbalized understanding.

## 2021-07-07 NOTE — TELEPHONE ENCOUNTER
Preliminary results are back. Start empiric abx as below. May need to be switched based on sensitivities. Alternative would be to wait until tomorrow for final read.     Cathy Hirsch MD

## 2021-07-07 NOTE — TELEPHONE ENCOUNTER
Patient's sister in law, Grazyna, calling back for results of urine culture. Requesting if the results could be looked at and medication prescribed before clinic is closed. Patient uses [facility] for pharmacy. Grazyna's phone number is [phone].

## 2021-07-08 ENCOUNTER — TELEPHONE (OUTPATIENT)
Dept: UROLOGY | Facility: OTHER | Age: 81
End: 2021-07-08

## 2021-07-08 LAB
BACTERIA SPEC CULT: ABNORMAL
SPECIMEN SOURCE: ABNORMAL

## 2021-07-08 NOTE — TELEPHONE ENCOUNTER
Call from patients sister in law, Grazyna.     Patient in need of a new urinary leg bag kit.     DME: [facility] - Paul Geronimo can be reached at [phone].    Call placed to Margaertte in Urology. Margarette will leave a new leg bag kit at the  for patient to .     Margarette notified.

## 2021-07-30 ENCOUNTER — OFFICE VISIT (OUTPATIENT)
Dept: UROLOGY | Facility: OTHER | Age: 81
End: 2021-07-30
Attending: UROLOGY
Payer: MEDICARE

## 2021-07-30 ENCOUNTER — MYC MEDICAL ADVICE (OUTPATIENT)
Dept: FAMILY MEDICINE | Facility: OTHER | Age: 81
End: 2021-07-30

## 2021-07-30 VITALS
OXYGEN SATURATION: 96 % | DIASTOLIC BLOOD PRESSURE: 60 MMHG | BODY MASS INDEX: 25.06 KG/M2 | HEART RATE: 92 BPM | HEIGHT: 72 IN | SYSTOLIC BLOOD PRESSURE: 110 MMHG | TEMPERATURE: 97.8 F | WEIGHT: 185 LBS

## 2021-07-30 DIAGNOSIS — E11.42 DIABETIC POLYNEUROPATHY ASSOCIATED WITH TYPE 2 DIABETES MELLITUS (H): Primary | ICD-10-CM

## 2021-07-30 DIAGNOSIS — R33.9 URINARY RETENTION: Primary | ICD-10-CM

## 2021-07-30 PROCEDURE — 51702 INSERT TEMP BLADDER CATH: CPT | Performed by: UROLOGY

## 2021-07-30 PROCEDURE — G0463 HOSPITAL OUTPT CLINIC VISIT: HCPCS | Mod: 25

## 2021-07-30 PROCEDURE — 51700 IRRIGATION OF BLADDER: CPT

## 2021-07-30 PROCEDURE — 99213 OFFICE O/P EST LOW 20 MIN: CPT | Mod: 25 | Performed by: UROLOGY

## 2021-07-30 PROCEDURE — 51700 IRRIGATION OF BLADDER: CPT | Performed by: UROLOGY

## 2021-07-30 ASSESSMENT — ENCOUNTER SYMPTOMS: DIFFICULTY URINATING: 1

## 2021-07-30 ASSESSMENT — MIFFLIN-ST. JEOR: SCORE: 1592.94

## 2021-07-30 ASSESSMENT — PAIN SCALES - GENERAL: PAINLEVEL: NO PAIN (0)

## 2021-07-30 NOTE — PROGRESS NOTES
[facility]  Catheter insertion documentation on 7/30/2021:    Anthony Dyer presents to the clinic for catheter insertion.  Reason for insertion: urinary retention  Order has been verified. Yes  Catheter successfully inserted into the urethral meatus in the usual sterile fashion without immediate complication.  Type of catheter placed: 18 [place]ian Coude catheter  Urine is yellow in color.  300 cc's of urine output returned.  Balloon was filled with 10 cc's of sterile water.  Securement device placed for the catheter.  The patient tolerated the procedure and was instructed to monitor for catheter dysfunction, monitor for pain or discomfort, return or call for pain, fever, leakage or decreased urine flow and watch for signs of infection.    MICHELLE HILL, EJN

## 2021-07-30 NOTE — PROGRESS NOTES
History     Chief Complaint:      Urinary Retention (voiding trial)      HPI   Anthony Dyer is a 80 year old male who presents for a voiding trial.  Anthony has been in urinary retention since suffering a pulmonary emboli approximately 3 months ago.  He is currently on anticoagulation and not a surgical candidate because of his acute pulmonary emboli.  He really is frustrated with this catheter and every time he comes in for a catheter change wants to do a voiding trial to see if he can urinate on his own.  He has not been able to do the intermittent catheterizations.    Allergies:    Allergies   Allergen Reactions     Ace Inhibitors      Per [facility]: hyperkalemia.  Pt doesn't know if he is allergic     Ceclor [Cefaclor] Hives     Sulfa Drugs      Pt unsure.         Medications:      acetaminophen (TYLENOL) 325 MG tablet  albuterol (PROAIR HFA/PROVENTIL HFA/VENTOLIN HFA) 108 (90 Base) MCG/ACT inhaler  allopurinol (ZYLOPRIM) 100 MG tablet  amLODIPine (NORVASC) 2.5 MG tablet  atorvastatin (LIPITOR) 80 MG tablet  carvedilol (COREG) 25 MG tablet  finasteride (PROSCAR) 5 MG tablet  gabapentin (NEURONTIN) 300 MG capsule  glimepiride (AMARYL) 2 MG tablet  metFORMIN (GLUCOPHAGE-XR) 500 MG 24 hr tablet  multivitamin, therapeutic (THERA-VIT) TABS tablet  nitroFURantoin macrocrystal-monohydrate (MACROBID) 100 MG capsule  rivaroxaban ANTICOAGULANT (XARELTO ANTICOAGULANT) 20 MG TABS tablet  Semaglutide,0.25 or 0.5MG/DOS, (OZEMPIC, 0.25 OR 0.5 MG/DOSE,) 2 MG/1.5ML SOPN  tamsulosin (FLOMAX) 0.4 MG capsule  vitamin B-12 (CYANOCOBALAMIN) 250 MCG tablet        Problem List:      Patient Active Problem List    Diagnosis Date Noted     Nursing Home Visit 05/21/2021     Priority: Medium     Benign prostatic hyperplasia with urinary retention 04/29/2021     Priority: Medium     Tachycardia 04/26/2021     Priority: Medium     Hypoxia 04/26/2021     Priority: Medium     Other acute pulmonary embolism with acute cor pulmonale (H)  04/26/2021     Priority: Medium     Dyspnea, unspecified type 04/26/2021     Priority: Medium     Urinary retention 04/19/2021     Priority: Medium     Generalized weakness 04/19/2021     Priority: Medium     Onychomycosis of left great toe 02/09/2021     Priority: Medium     Pincer nail deformity 02/09/2021     Priority: Medium     Chronic painful diabetic neuropathy (H) 10/24/2018     Priority: Medium     Moderate episode of recurrent major depressive disorder (H) 10/24/2018     Priority: Medium     Formatting of this note might be different from the original.  Patient does not want anti-depressant medication       Chronic obstructive lung disease (H) 11/22/2017     Priority: Medium     Formatting of this note might be different from the original.  Mild       Mild concentric left ventricular hypertrophy (LVH) 11/22/2017     Priority: Medium     Uncomplicated alcohol dependence (H) 09/28/2016     Priority: Medium     IBS (irritable bowel syndrome) 12/23/2015     Priority: Medium     Spinal stenosis of lumbar region without neurogenic claudication 12/23/2015     Priority: Medium     Erectile dysfunction 05/28/2014     Priority: Medium     Chronic kidney disease, stage III (moderate) 05/22/2013     Priority: Medium     Formatting of this note might be different from the original.  Since 2011       History of CVA (cerebrovascular accident) 02/03/2011     Priority: Medium     Formatting of this note might be different from the original.  Jan 2011  IMPRESSION: Interval development of a diffusion abnormality consistent with acute to early subacute infarct of less than 3 days of age in the left thalamus and the medial left cerebral peduncle. No associated abnormal enhancement or hydrocephalus.       Hyperlipidemia 02/03/2011     Priority: Medium     Essential hypertension 11/28/2006     Priority: Medium     Type 2 diabetes mellitus with stage 3 chronic kidney disease, without long-term current use of insulin (H)  "02/16/2001     Priority: Medium     Gout, unspecified 02/16/2001     Priority: Medium     Formatting of this note might be different from the original.  On Allopurinol       Lumbar spondylosis 02/22/1995     Priority: Medium     Formatting of this note might be different from the original.  S/P RFN L2-L5 facets          Past Medical History:      Past Medical History:   Diagnosis Date     Diabetes (H)      Hypertension        Past Surgical History:      Past Surgical History:   Procedure Laterality Date     HERNIA REPAIR         Family History:      History reviewed. No pertinent family history.    Social History:    Marital Status:  Single [1]  Social History     Tobacco Use     Smoking status: Never Smoker     Smokeless tobacco: Former User     Types: Chew   Substance Use Topics     Alcohol use: Not Currently     Drug use: Never        Review of Systems   Genitourinary: Positive for difficulty urinating.   All other systems reviewed and are negative.        Physical Exam   Vitals:  /60 (BP Location: Right arm, Patient Position: Chair, Cuff Size: Adult Regular)   Pulse 92   Temp 97.8  F (36.6  C) (Tympanic)   Ht 1.83 m (6' 0.05\")   Wt 83.9 kg (185 lb)   SpO2 96%   BMI 25.06 kg/m        Physical Exam  Constitutional:       Appearance: Normal appearance. He is normal weight.   Pulmonary:      Effort: Pulmonary effort is normal.   Abdominal:      General: Abdomen is flat. There is no distension.      Palpations: Abdomen is soft. There is no mass.      Tenderness: There is no abdominal tenderness.   Genitourinary:     Comments: Penis is an indwelling Schmitt catheter otherwise is unchanged from previous exam.  Neurological:      Mental Status: He is alert.       Voiding trial: 375 mL of fluid was instilled into the bladder and patient was able to void 50 mL.  18 Bhutanese coudé catheter was replaced and put to drainage.    Impression: Urinary retention with failed voiding trial    Plan   Plan: Patient is very " frustrated with this catheter so I will reach out to his primary care to see when or if he can be a surgical candidate for a TURP.  We will tentatively schedule him for follow-up in a month.  He continues on Proscar and tamsulosin.      No follow-ups on file.    Indu De Leon MD  [facility]

## 2021-08-05 DIAGNOSIS — E11.42 DIABETIC POLYNEUROPATHY ASSOCIATED WITH TYPE 2 DIABETES MELLITUS (H): ICD-10-CM

## 2021-08-05 LAB
ANION GAP SERPL CALCULATED.3IONS-SCNC: 7 MMOL/L (ref 3–14)
BASOPHILS # BLD AUTO: 0 10E3/UL (ref 0–0.2)
BASOPHILS NFR BLD AUTO: 0 %
BUN SERPL-MCNC: 14 MG/DL (ref 7–30)
CALCIUM SERPL-MCNC: 9.1 MG/DL (ref 8.5–10.1)
CHLORIDE BLD-SCNC: 107 MMOL/L (ref 94–109)
CO2 SERPL-SCNC: 24 MMOL/L (ref 20–32)
CREAT SERPL-MCNC: 1.09 MG/DL (ref 0.66–1.25)
EOSINOPHIL # BLD AUTO: 0.1 10E3/UL (ref 0–0.7)
EOSINOPHIL NFR BLD AUTO: 1 %
ERYTHROCYTE [DISTWIDTH] IN BLOOD BY AUTOMATED COUNT: 13.7 % (ref 10–15)
EST. AVERAGE GLUCOSE BLD GHB EST-MCNC: 105 MG/DL
GFR SERPL CREATININE-BSD FRML MDRD: 64 ML/MIN/1.73M2
GLUCOSE BLD-MCNC: 131 MG/DL (ref 70–99)
HBA1C MFR BLD: 5.3 % (ref 0–5.6)
HCT VFR BLD AUTO: 42.3 % (ref 40–53)
HGB BLD-MCNC: 14 G/DL (ref 13.3–17.7)
IMM GRANULOCYTES # BLD: 0 10E3/UL
IMM GRANULOCYTES NFR BLD: 0 %
LYMPHOCYTES # BLD AUTO: 1.1 10E3/UL (ref 0.8–5.3)
LYMPHOCYTES NFR BLD AUTO: 15 %
MCH RBC QN AUTO: 31.5 PG (ref 26.5–33)
MCHC RBC AUTO-ENTMCNC: 33.1 G/DL (ref 31.5–36.5)
MCV RBC AUTO: 95 FL (ref 78–100)
MONOCYTES # BLD AUTO: 0.5 10E3/UL (ref 0–1.3)
MONOCYTES NFR BLD AUTO: 7 %
NEUTROPHILS # BLD AUTO: 5.7 10E3/UL (ref 1.6–8.3)
NEUTROPHILS NFR BLD AUTO: 77 %
NRBC # BLD AUTO: 0 10E3/UL
NRBC BLD AUTO-RTO: 0 /100
PLATELET # BLD AUTO: 256 10E3/UL (ref 150–450)
POTASSIUM BLD-SCNC: 4.6 MMOL/L (ref 3.4–5.3)
RBC # BLD AUTO: 4.45 10E6/UL (ref 4.4–5.9)
SODIUM SERPL-SCNC: 138 MMOL/L (ref 133–144)
WBC # BLD AUTO: 7.5 10E3/UL (ref 4–11)

## 2021-08-05 PROCEDURE — 36415 COLL VENOUS BLD VENIPUNCTURE: CPT | Mod: ZL

## 2021-08-05 PROCEDURE — 80048 BASIC METABOLIC PNL TOTAL CA: CPT | Mod: ZL

## 2021-08-05 PROCEDURE — 85025 COMPLETE CBC W/AUTO DIFF WBC: CPT | Mod: ZL

## 2021-08-05 PROCEDURE — 83036 HEMOGLOBIN GLYCOSYLATED A1C: CPT | Mod: ZL

## 2021-08-17 ENCOUNTER — OFFICE VISIT (OUTPATIENT)
Dept: PODIATRY | Facility: OTHER | Age: 81
End: 2021-08-17
Attending: FAMILY MEDICINE
Payer: MEDICARE

## 2021-08-17 VITALS
OXYGEN SATURATION: 95 % | WEIGHT: 185 LBS | DIASTOLIC BLOOD PRESSURE: 60 MMHG | HEART RATE: 106 BPM | BODY MASS INDEX: 25.06 KG/M2 | SYSTOLIC BLOOD PRESSURE: 122 MMHG | TEMPERATURE: 98.2 F | HEIGHT: 72 IN

## 2021-08-17 DIAGNOSIS — E11.9 DIABETES MELLITUS TYPE 2, NONINSULIN DEPENDENT (H): ICD-10-CM

## 2021-08-17 DIAGNOSIS — L85.3 XEROSIS OF SKIN: ICD-10-CM

## 2021-08-17 DIAGNOSIS — R20.8 LOSS OF PROTECTIVE SENSATION OF SKIN OF DEFORMED FOOT: ICD-10-CM

## 2021-08-17 DIAGNOSIS — E11.42 DIABETIC POLYNEUROPATHY ASSOCIATED WITH TYPE 2 DIABETES MELLITUS (H): ICD-10-CM

## 2021-08-17 DIAGNOSIS — M21.969 LOSS OF PROTECTIVE SENSATION OF SKIN OF DEFORMED FOOT: ICD-10-CM

## 2021-08-17 DIAGNOSIS — L84 CALLUS OF FOOT: ICD-10-CM

## 2021-08-17 DIAGNOSIS — M47.816 LUMBAR SPONDYLOSIS: ICD-10-CM

## 2021-08-17 DIAGNOSIS — L60.3 ONYCHODYSTROPHY: Primary | ICD-10-CM

## 2021-08-17 PROCEDURE — G0463 HOSPITAL OUTPT CLINIC VISIT: HCPCS

## 2021-08-17 PROCEDURE — 11055 PARING/CUTG B9 HYPRKER LES 1: CPT | Performed by: PODIATRIST

## 2021-08-17 PROCEDURE — 11721 DEBRIDE NAIL 6 OR MORE: CPT | Mod: 59 | Performed by: PODIATRIST

## 2021-08-17 PROCEDURE — G0463 HOSPITAL OUTPT CLINIC VISIT: HCPCS | Mod: 25

## 2021-08-17 PROCEDURE — 99203 OFFICE O/P NEW LOW 30 MIN: CPT | Mod: 25 | Performed by: PODIATRIST

## 2021-08-17 ASSESSMENT — MIFFLIN-ST. JEOR: SCORE: 1592.94

## 2021-08-17 ASSESSMENT — PAIN SCALES - GENERAL: PAINLEVEL: NO PAIN (0)

## 2021-08-17 NOTE — PROGRESS NOTES
"Chief complaint: Patient presents with:  dfe      History of Present Illness: This 80 year old NIDDM II male is seen at the request of Dr. Orourke for evaluation and suggestions of management of elongated toenails. He says he has difficulty trimming his toenails and that his family encouraged him to see podiatry. He gets burning, tingling, and numbness in his feet every once in awhile. He does not have DM shoes. He says he has had painful calluses in the past.    Last HbA1C was 5.3% on 08/05/2021.        No further pedal complaints today.         /60   Pulse 106   Temp 98.2  F (36.8  C) (Tympanic)   Ht 1.83 m (6' 0.05\")   Wt 83.9 kg (185 lb)   SpO2 95%   BMI 25.06 kg/m      Patient Active Problem List   Diagnosis     Urinary retention     Generalized weakness     Type 2 diabetes mellitus with stage 3 chronic kidney disease, without long-term current use of insulin (H)     Tachycardia     Hypoxia     Other acute pulmonary embolism with acute cor pulmonale (H)     Dyspnea, unspecified type     Benign prostatic hyperplasia with urinary retention     Chronic kidney disease, stage III (moderate)     Chronic obstructive lung disease (H)     Chronic painful diabetic neuropathy (H)     Erectile dysfunction     Essential hypertension     Gout, unspecified     History of CVA (cerebrovascular accident)     Hyperlipidemia     IBS (irritable bowel syndrome)     Lumbar spondylosis     Mild concentric left ventricular hypertrophy (LVH)     Moderate episode of recurrent major depressive disorder (H)     Spinal stenosis of lumbar region without neurogenic claudication     Uncomplicated alcohol dependence (H)     Nursing Home Visit     Onychomycosis of left great toe     Pincer nail deformity       Past Surgical History:   Procedure Laterality Date     HERNIA REPAIR         Current Outpatient Medications   Medication     acetaminophen (TYLENOL) 325 MG tablet     albuterol (PROAIR HFA/PROVENTIL HFA/VENTOLIN HFA) 108 (90 " Base) MCG/ACT inhaler     allopurinol (ZYLOPRIM) 100 MG tablet     amLODIPine (NORVASC) 2.5 MG tablet     atorvastatin (LIPITOR) 80 MG tablet     carvedilol (COREG) 25 MG tablet     finasteride (PROSCAR) 5 MG tablet     gabapentin (NEURONTIN) 300 MG capsule     glimepiride (AMARYL) 2 MG tablet     metFORMIN (GLUCOPHAGE-XR) 500 MG 24 hr tablet     multivitamin, therapeutic (THERA-VIT) TABS tablet     nitroFURantoin macrocrystal-monohydrate (MACROBID) 100 MG capsule     rivaroxaban ANTICOAGULANT (XARELTO ANTICOAGULANT) 20 MG TABS tablet     Semaglutide,0.25 or 0.5MG/DOS, (OZEMPIC, 0.25 OR 0.5 MG/DOSE,) 2 MG/1.5ML SOPN     tamsulosin (FLOMAX) 0.4 MG capsule     vitamin B-12 (CYANOCOBALAMIN) 250 MCG tablet     No current facility-administered medications for this visit.          Allergies   Allergen Reactions     Ace Inhibitors      Per [facility]: hyperkalemia.  Pt doesn't know if he is allergic     Ceclor [Cefaclor] Hives     Sulfa Drugs      Pt unsure.        History reviewed. No pertinent family history.    Social History     Socioeconomic History     Marital status: Single     Spouse name: None     Number of children: None     Years of education: None     Highest education level: None   Occupational History     None   Tobacco Use     Smoking status: Never Smoker     Smokeless tobacco: Former User     Types: Chew   Substance and Sexual Activity     Alcohol use: Not Currently     Drug use: Never     Sexual activity: None   Other Topics Concern     Parent/sibling w/ CABG, MI or angioplasty before 65F 55M? Not Asked   Social History Narrative     None     Social Determinants of Health     Financial Resource Strain:      Difficulty of Paying Living Expenses:    Food Insecurity:      Worried About Running Out of Food in the Last Year:      Ran Out of Food in the Last Year:    Transportation Needs:      Lack of Transportation (Medical):      Lack of Transportation (Non-Medical):    Physical Activity:      Days of Exercise  per Week:      Minutes of Exercise per Session:    Stress:      Feeling of Stress :    Social Connections:      Frequency of Communication with Friends and Family:      Frequency of Social Gatherings with Friends and Family:      Attends Jain Services:      Active Member of Clubs or Organizations:      Attends Club or Organization Meetings:      Marital Status:    Intimate Partner Violence:      Fear of Current or Ex-Partner:      Emotionally Abused:      Physically Abused:      Sexually Abused:        ROS: 10 point ROS neg other than the symptoms noted above in the HPI.  EXAM  Constitutional: healthy, alert and no distress    Psychiatric: mentation appears normal and affect normal/bright    VASCULAR:  -Dorsalis pedis pulse +2/4 b/l  -Posterior tibial pulse +1/4 b/l  -Capillary refill time < 3 seconds to b/l hallux  -Hair growth absent to b/l anterior legs and ankles  NEURO:  -Protective sensation diminished with SWM +0/10 RIGHT and +1/10 LEFT on 08/17/2021  -Light touch sensation intact to b/l plantar forefoot  DERM:  -Skin temperature within normal limits to bilateral foot  -Moderately dry skin with flaking of skin to bilateral plantar foot and mild rubor to bilateral plantar foot  -Toenails elongated, thickened, dystrophic and discolored x 10  ---Moderate incurvation of bilateral nail borders of the RIGHT hallux without a break in the skin, without an open wound, and without drainage  MSK:  -Muscle strength of ankles +5/5 for dorsiflexion, plantarflexion, ABDUction and ADDuction b/l    ============================================================    ASSESSMENT:  (L60.3) Onychodystrophy  (primary encounter diagnosis)    (L84) Callus of foot    (L85.3) Xerosis of skin    (E11.9) Diabetes mellitus type 2, noninsulin dependent (H)    (E11.42) Diabetic polyneuropathy associated with type 2 diabetes mellitus (H)    (M21.969,  R20.8) Loss of protective sensation of skin of deformed foot    (M47.816) Lumbar  spondylosis      PLAN:  -Patient evaluated and examined. Treatment options discussed with no educational barriers noted.    -High risk toenail debridement x 10 toenails without incident    -Callus pared x 1 to the RIGHT lateral 5th digit without incident  ---Patient reminded that the callus will likely return due to the underlying, prominent bone causing the callus while the patient is walking.    -Diabetic Foot Education provided. This included checking the feet daily looking for new new blisters or wounds, wearing shoes at all times when walking including around the house, and avoiding lotion application between the toes. If there are any signs of infection, the patient should present to the ED as soon as possible. Infections of the foot can be life threatening or lead to amputations of the foot or leg.    -DM shoes:  ---Discussed DM shoes and advantages to DM shoes. DM shoes have a thicker sole which helps protect the feet from puncture wounds of sharp objects that can puncture through shoes. They also have more depth to the toe box and a wider toe box in attempt to prevent the shoes from rubbing on the toes and causing blisters/wounds. Additionally, the shoes come with a custom molded insert that is designed to also attempt to prevent blisters or ulcers of the foot. Blisters and ulcers can still occur while wearing DM shoes (espeically when the shoes are new), but they are aimed at helping prevent blisters and ulcers.   ---Patient has declined this option at this time. He says he may consider this after he no longer has a colostomy because it is one too many appointments for him right now.    -Patient in agreement with the above treatment plan and all of patient's questions were answered.      RTC 63+ days for diabetic foot exam and high risk nail debridement and callus ang Orourke DPM

## 2021-08-17 NOTE — NURSING NOTE
"Chief Complaint   Patient presents with     dfe       Initial /60   Pulse 106   Temp 98.2  F (36.8  C) (Tympanic)   Ht 1.83 m (6' 0.05\")   Wt 83.9 kg (185 lb)   SpO2 95%   BMI 25.06 kg/m   Estimated body mass index is 25.06 kg/m  as calculated from the following:    Height as of this encounter: 1.83 m (6' 0.05\").    Weight as of this encounter: 83.9 kg (185 lb).  Medication Reconciliation: complete  Gilda Khanna LPN    "

## 2021-08-18 ENCOUNTER — TRANSFERRED RECORDS (OUTPATIENT)
Dept: HEALTH INFORMATION MANAGEMENT | Facility: CLINIC | Age: 81
End: 2021-08-18

## 2021-08-18 LAB — RETINOPATHY: NEGATIVE

## 2021-09-01 ENCOUNTER — OFFICE VISIT (OUTPATIENT)
Dept: UROLOGY | Facility: OTHER | Age: 81
End: 2021-09-01
Attending: UROLOGY
Payer: MEDICARE

## 2021-09-01 VITALS
HEIGHT: 73 IN | WEIGHT: 185 LBS | OXYGEN SATURATION: 90 % | HEART RATE: 105 BPM | TEMPERATURE: 97.2 F | SYSTOLIC BLOOD PRESSURE: 120 MMHG | DIASTOLIC BLOOD PRESSURE: 50 MMHG | BODY MASS INDEX: 24.52 KG/M2

## 2021-09-01 DIAGNOSIS — R33.9 URINARY RETENTION: Primary | ICD-10-CM

## 2021-09-01 PROCEDURE — G0463 HOSPITAL OUTPT CLINIC VISIT: HCPCS | Mod: 25

## 2021-09-01 PROCEDURE — G0463 HOSPITAL OUTPT CLINIC VISIT: HCPCS

## 2021-09-01 PROCEDURE — 51700 IRRIGATION OF BLADDER: CPT | Performed by: UROLOGY

## 2021-09-01 PROCEDURE — 99213 OFFICE O/P EST LOW 20 MIN: CPT | Mod: 25 | Performed by: UROLOGY

## 2021-09-01 PROCEDURE — 51700 IRRIGATION OF BLADDER: CPT

## 2021-09-01 ASSESSMENT — ENCOUNTER SYMPTOMS: SHORTNESS OF BREATH: 1

## 2021-09-01 ASSESSMENT — PAIN SCALES - GENERAL: PAINLEVEL: NO PAIN (0)

## 2021-09-01 ASSESSMENT — MIFFLIN-ST. JEOR: SCORE: 1608.03

## 2021-09-01 NOTE — LETTER
9/1/2021      RE: Anthony Dyer  [address] [place] [address]         History     Chief Complaint:      Urinary Retention      HPI   Anthony Dyer is a 80 year old male who presents for the treatment of his urinary retention.  Anthony had an acute onset of urinary retention in mid April 2021.  He also suffered a pulmonary emboli and has been on anticoagulation since that time.  He comes in monthly for catheter changes and really is frustrated with the fact that he has this catheter in.  Each time we do a voiding trial and he has been unsuccessful in being able to void.  He is on maximum medical therapy for his prostate and he continues on Xarelto.  He is now about 4-1/2 months post pulmonary emboli.    Allergies:    Allergies   Allergen Reactions     Ace Inhibitors      Per [facility]: hyperkalemia.  Pt doesn't know if he is allergic     Ceclor [Cefaclor] Hives     Sulfa Drugs      Pt unsure.         Medications:      acetaminophen (TYLENOL) 325 MG tablet  albuterol (PROAIR HFA/PROVENTIL HFA/VENTOLIN HFA) 108 (90 Base) MCG/ACT inhaler  allopurinol (ZYLOPRIM) 100 MG tablet  amLODIPine (NORVASC) 2.5 MG tablet  atorvastatin (LIPITOR) 80 MG tablet  carvedilol (COREG) 25 MG tablet  finasteride (PROSCAR) 5 MG tablet  gabapentin (NEURONTIN) 300 MG capsule  glimepiride (AMARYL) 2 MG tablet  metFORMIN (GLUCOPHAGE-XR) 500 MG 24 hr tablet  multivitamin, therapeutic (THERA-VIT) TABS tablet  nitroFURantoin macrocrystal-monohydrate (MACROBID) 100 MG capsule  rivaroxaban ANTICOAGULANT (XARELTO ANTICOAGULANT) 20 MG TABS tablet  Semaglutide,0.25 or 0.5MG/DOS, (OZEMPIC, 0.25 OR 0.5 MG/DOSE,) 2 MG/1.5ML SOPN  tamsulosin (FLOMAX) 0.4 MG capsule  vitamin B-12 (CYANOCOBALAMIN) 250 MCG tablet        Problem List:      Patient Active Problem List    Diagnosis Date Noted     Nursing Home Visit 05/21/2021     Priority: Medium     Benign prostatic hyperplasia with urinary retention 04/29/2021     Priority: Medium     Tachycardia  04/26/2021     Priority: Medium     Hypoxia 04/26/2021     Priority: Medium     Other acute pulmonary embolism with acute cor pulmonale (H) 04/26/2021     Priority: Medium     Dyspnea, unspecified type 04/26/2021     Priority: Medium     Urinary retention 04/19/2021     Priority: Medium     Generalized weakness 04/19/2021     Priority: Medium     Onychomycosis of left great toe 02/09/2021     Priority: Medium     Pincer nail deformity 02/09/2021     Priority: Medium     Chronic painful diabetic neuropathy (H) 10/24/2018     Priority: Medium     Moderate episode of recurrent major depressive disorder (H) 10/24/2018     Priority: Medium     Formatting of this note might be different from the original.  Patient does not want anti-depressant medication       Chronic obstructive lung disease (H) 11/22/2017     Priority: Medium     Formatting of this note might be different from the original.  Mild       Mild concentric left ventricular hypertrophy (LVH) 11/22/2017     Priority: Medium     Uncomplicated alcohol dependence (H) 09/28/2016     Priority: Medium     IBS (irritable bowel syndrome) 12/23/2015     Priority: Medium     Spinal stenosis of lumbar region without neurogenic claudication 12/23/2015     Priority: Medium     Erectile dysfunction 05/28/2014     Priority: Medium     Chronic kidney disease, stage III (moderate) 05/22/2013     Priority: Medium     Formatting of this note might be different from the original.  Since 2011       History of CVA (cerebrovascular accident) 02/03/2011     Priority: Medium     Formatting of this note might be different from the original.  Jan 2011  IMPRESSION: Interval development of a diffusion abnormality consistent with acute to early subacute infarct of less than 3 days of age in the left thalamus and the medial left cerebral peduncle. No associated abnormal enhancement or hydrocephalus.       Hyperlipidemia 02/03/2011     Priority: Medium     Essential hypertension 11/28/2006  "    Priority: Medium     Type 2 diabetes mellitus with stage 3 chronic kidney disease, without long-term current use of insulin (H) 02/16/2001     Priority: Medium     Gout, unspecified 02/16/2001     Priority: Medium     Formatting of this note might be different from the original.  On Allopurinol       Lumbar spondylosis 02/22/1995     Priority: Medium     Formatting of this note might be different from the original.  S/P RFN L2-L5 facets          Past Medical History:      Past Medical History:   Diagnosis Date     Diabetes (H)      Hypertension        Past Surgical History:      Past Surgical History:   Procedure Laterality Date     HERNIA REPAIR         Family History:      History reviewed. No pertinent family history.    Social History:    Marital Status:  Single [1]  Social History     Tobacco Use     Smoking status: Never Smoker     Smokeless tobacco: Former User     Types: Chew   Substance Use Topics     Alcohol use: Not Currently     Drug use: Never        Review of Systems   Respiratory: Positive for shortness of breath.    All other systems reviewed and are negative.        Physical Exam   Vitals:  /50 (BP Location: Left arm, Patient Position: Chair, Cuff Size: Adult Regular)   Pulse 105   Temp 97.2  F (36.2  C) (Tympanic)   Ht 1.854 m (6' 1\")   Wt 83.9 kg (185 lb)   SpO2 90%   BMI 24.41 kg/m        Physical Exam  Voiding trial: Patient's bladder was filled until the patient expressed urgency to void.  The catheter was removed and the patient was unable to void.  The catheter was replaced without any difficulty.    Impression: Urinary retention    Plan   Plan: Anthony barone is asking about when he can have surgery for his prostate.  I will reach out to his primary care to see when that is possible to get him off of his anticoagulation safely because of his recent pulmonary emboli.  I went over the procedure of transurethral resection of the prostate.  We discussed risks of bleeding, infection, " transfusion, urinary incontinence injury to bladder bowel.  We also discussed cardiac and pulmonary complications including blood clots and recurrent pulmonary emboli that can lead to death.  Patient had his catheter in and we will tentatively schedule him for a follow-up in a month for another catheter change and I will await any further instructions or recommendations from Dr. Orourke.  30 minutes were spent in counseling, coordination of care and documentation.      No follow-ups on file.    Indu De Leon MD  [facility] [place]              Indu De Leon MD

## 2021-09-01 NOTE — PROGRESS NOTES
[facility]  Catheter insertion documentation on 9/1/2021:    Anthony Dyer presents to the clinic for catheter insertion.  Reason for insertion: replacement  Order has been verified. Yes  Catheter successfully inserted into the urethral meatus in the usual sterile fashion without immediate complication.  Type of catheter placed: 18 Polish Coude catheter  Urine is yellow in color.  100 cc's of urine output returned.  Balloon was filled with 10 cc's of sterile water.  Securement device placed for the catheter.  The patient tolerated the procedure and was instructed to monitor for catheter dysfunction, monitor for pain or discomfort, return or call for pain, fever, leakage or decreased urine flow, watch for signs of infection and return to clinic in one month for catheter change.    MICHELLE HILL LPN      Voiding trial performed per Dr. De Leon, 300 CC of sterile water inserted into the bladder.  Pt only able to void out 25 CC of urine.  Catheter replaced.  MICHELLE HILL LPN

## 2021-09-01 NOTE — NURSING NOTE
"Chief Complaint   Patient presents with     Urinary Retention       Initial /50 (BP Location: Left arm, Patient Position: Chair, Cuff Size: Adult Regular)   Pulse 105   Temp 97.2  F (36.2  C) (Tympanic)   Ht 1.854 m (6' 1\")   Wt 83.9 kg (185 lb)   SpO2 90%   BMI 24.41 kg/m   Estimated body mass index is 24.41 kg/m  as calculated from the following:    Height as of this encounter: 1.854 m (6' 1\").    Weight as of this encounter: 83.9 kg (185 lb).  Medication Reconciliation: complete  MICHELLE HILL LPN      Review of Systems:    Weight loss:    No     Recent fever/chills:  No   Night sweats:   No  Current skin rash:  No   Recent hair loss:  No  Heat intolerance:  No   Cold intolerance:  No  Chest pain:   No   Palpitations:   No  Shortness of breath:  yes   Wheezing:   No  Constipation:    No   Diarrhea:   No   Nausea:   No   Vomiting:   No   Kidney/side pain:  No   Back pain:   No  Frequent headaches:  No   Dizziness:     No  Leg swelling:   No   Calf pain:    No    MICHELLE HILL LPN      "

## 2021-09-01 NOTE — PROGRESS NOTES
History     Chief Complaint:      Urinary Retention      HPI   Anthony Dyer is a 80 year old male who presents for the treatment of his urinary retention.  Anthony had an acute onset of urinary retention in mid April 2021.  He also suffered a pulmonary emboli and has been on anticoagulation since that time.  He comes in monthly for catheter changes and really is frustrated with the fact that he has this catheter in.  Each time we do a voiding trial and he has been unsuccessful in being able to void.  He is on maximum medical therapy for his prostate and he continues on Xarelto.  He is now about 4-1/2 months post pulmonary emboli.    Allergies:    Allergies   Allergen Reactions     Ace Inhibitors      Per [facility]: hyperkalemia.  Pt doesn't know if he is allergic     Ceclor [Cefaclor] Hives     Sulfa Drugs      Pt unsure.         Medications:      acetaminophen (TYLENOL) 325 MG tablet  albuterol (PROAIR HFA/PROVENTIL HFA/VENTOLIN HFA) 108 (90 Base) MCG/ACT inhaler  allopurinol (ZYLOPRIM) 100 MG tablet  amLODIPine (NORVASC) 2.5 MG tablet  atorvastatin (LIPITOR) 80 MG tablet  carvedilol (COREG) 25 MG tablet  finasteride (PROSCAR) 5 MG tablet  gabapentin (NEURONTIN) 300 MG capsule  glimepiride (AMARYL) 2 MG tablet  metFORMIN (GLUCOPHAGE-XR) 500 MG 24 hr tablet  multivitamin, therapeutic (THERA-VIT) TABS tablet  nitroFURantoin macrocrystal-monohydrate (MACROBID) 100 MG capsule  rivaroxaban ANTICOAGULANT (XARELTO ANTICOAGULANT) 20 MG TABS tablet  Semaglutide,0.25 or 0.5MG/DOS, (OZEMPIC, 0.25 OR 0.5 MG/DOSE,) 2 MG/1.5ML SOPN  tamsulosin (FLOMAX) 0.4 MG capsule  vitamin B-12 (CYANOCOBALAMIN) 250 MCG tablet        Problem List:      Patient Active Problem List    Diagnosis Date Noted     Nursing Home Visit 05/21/2021     Priority: Medium     Benign prostatic hyperplasia with urinary retention 04/29/2021     Priority: Medium     Tachycardia 04/26/2021     Priority: Medium     Hypoxia 04/26/2021     Priority: Medium      Other acute pulmonary embolism with acute cor pulmonale (H) 04/26/2021     Priority: Medium     Dyspnea, unspecified type 04/26/2021     Priority: Medium     Urinary retention 04/19/2021     Priority: Medium     Generalized weakness 04/19/2021     Priority: Medium     Onychomycosis of left great toe 02/09/2021     Priority: Medium     Pincer nail deformity 02/09/2021     Priority: Medium     Chronic painful diabetic neuropathy (H) 10/24/2018     Priority: Medium     Moderate episode of recurrent major depressive disorder (H) 10/24/2018     Priority: Medium     Formatting of this note might be different from the original.  Patient does not want anti-depressant medication       Chronic obstructive lung disease (H) 11/22/2017     Priority: Medium     Formatting of this note might be different from the original.  Mild       Mild concentric left ventricular hypertrophy (LVH) 11/22/2017     Priority: Medium     Uncomplicated alcohol dependence (H) 09/28/2016     Priority: Medium     IBS (irritable bowel syndrome) 12/23/2015     Priority: Medium     Spinal stenosis of lumbar region without neurogenic claudication 12/23/2015     Priority: Medium     Erectile dysfunction 05/28/2014     Priority: Medium     Chronic kidney disease, stage III (moderate) 05/22/2013     Priority: Medium     Formatting of this note might be different from the original.  Since 2011       History of CVA (cerebrovascular accident) 02/03/2011     Priority: Medium     Formatting of this note might be different from the original.  Jan 2011  IMPRESSION: Interval development of a diffusion abnormality consistent with acute to early subacute infarct of less than 3 days of age in the left thalamus and the medial left cerebral peduncle. No associated abnormal enhancement or hydrocephalus.       Hyperlipidemia 02/03/2011     Priority: Medium     Essential hypertension 11/28/2006     Priority: Medium     Type 2 diabetes mellitus with stage 3 chronic kidney  "disease, without long-term current use of insulin (H) 02/16/2001     Priority: Medium     Gout, unspecified 02/16/2001     Priority: Medium     Formatting of this note might be different from the original.  On Allopurinol       Lumbar spondylosis 02/22/1995     Priority: Medium     Formatting of this note might be different from the original.  S/P RFN L2-L5 facets          Past Medical History:      Past Medical History:   Diagnosis Date     Diabetes (H)      Hypertension        Past Surgical History:      Past Surgical History:   Procedure Laterality Date     HERNIA REPAIR         Family History:      History reviewed. No pertinent family history.    Social History:    Marital Status:  Single [1]  Social History     Tobacco Use     Smoking status: Never Smoker     Smokeless tobacco: Former User     Types: Chew   Substance Use Topics     Alcohol use: Not Currently     Drug use: Never        Review of Systems   Respiratory: Positive for shortness of breath.    All other systems reviewed and are negative.        Physical Exam   Vitals:  /50 (BP Location: Left arm, Patient Position: Chair, Cuff Size: Adult Regular)   Pulse 105   Temp 97.2  F (36.2  C) (Tympanic)   Ht 1.854 m (6' 1\")   Wt 83.9 kg (185 lb)   SpO2 90%   BMI 24.41 kg/m        Physical Exam  Voiding trial: Patient's bladder was filled with approximately 300 mL of sterile water.  Until the patient expressed urgency to void.  The catheter was removed and the patient was able to void approximately 50 mL..  The catheter was replaced without any difficulty.    Impression: Urinary retention    Plan   Plan: Anthony again is asking about when he can have surgery for his prostate.  I will reach out to his primary care to see when that is possible to get him off of his anticoagulation safely because of his recent pulmonary emboli.  I went over the procedure of transurethral resection of the prostate.  We discussed risks of bleeding, infection, transfusion, " urinary incontinence injury to bladder bowel.  We also discussed cardiac and pulmonary complications including blood clots and recurrent pulmonary emboli that can lead to death.  Patient had his catheter in and we will tentatively schedule him for a follow-up in a month for another catheter change and I will await any further instructions or recommendations from Dr. Orourke.  30 minutes were spent in counseling, coordination of care and documentation.      No follow-ups on file.    Indu De Leon MD  [facility]

## 2021-09-11 ENCOUNTER — HEALTH MAINTENANCE LETTER (OUTPATIENT)
Age: 81
End: 2021-09-11

## 2021-10-04 ENCOUNTER — OFFICE VISIT (OUTPATIENT)
Dept: UROLOGY | Facility: OTHER | Age: 81
End: 2021-10-04
Attending: UROLOGY
Payer: MEDICARE

## 2021-10-04 VITALS
DIASTOLIC BLOOD PRESSURE: 68 MMHG | WEIGHT: 185 LBS | BODY MASS INDEX: 24.52 KG/M2 | OXYGEN SATURATION: 95 % | HEART RATE: 89 BPM | HEIGHT: 73 IN | SYSTOLIC BLOOD PRESSURE: 124 MMHG | TEMPERATURE: 97.4 F

## 2021-10-04 DIAGNOSIS — R33.9 URINARY RETENTION: Primary | ICD-10-CM

## 2021-10-04 PROCEDURE — 87086 URINE CULTURE/COLONY COUNT: CPT | Mod: ZL | Performed by: UROLOGY

## 2021-10-04 PROCEDURE — 51702 INSERT TEMP BLADDER CATH: CPT | Performed by: UROLOGY

## 2021-10-04 PROCEDURE — G0463 HOSPITAL OUTPT CLINIC VISIT: HCPCS

## 2021-10-04 ASSESSMENT — MIFFLIN-ST. JEOR: SCORE: 1595.09

## 2021-10-04 ASSESSMENT — ENCOUNTER SYMPTOMS
BACK PAIN: 1
DIFFICULTY URINATING: 1

## 2021-10-04 ASSESSMENT — PAIN SCALES - GENERAL: PAINLEVEL: NO PAIN (0)

## 2021-10-04 NOTE — LETTER
10/4/2021      RE: Anthony Dyer  [address] [place] [address]         History     Chief Complaint:      RECHECK (Cath change and discuss TURP)      HPI   Anthony Dyer is a 81 year old male who presents for further discussion of a transurethral resection of the prostate.  Anthony has been in urinary retention since he struggled with bilateral pulmonary emboli which occurred in April 2021.  He has been on Xarelto since that time.  He has been extremely frustrated and has failed multiple voiding trials.  He really does not like his catheter and wants to get this out.  I have discussed this with primary care and at this point they feel we can move forward.  He would need to be off of his Xarelto for the surgery and then restarted after is its recommended he be on this for a year.  He is here to discuss this today.    Allergies:    Allergies   Allergen Reactions     Ace Inhibitors      Per [facility]: hyperkalemia.  Pt doesn't know if he is allergic     Ceclor [Cefaclor] Hives     Sulfa Drugs      Pt unsure.         Medications:      acetaminophen (TYLENOL) 325 MG tablet  albuterol (PROAIR HFA/PROVENTIL HFA/VENTOLIN HFA) 108 (90 Base) MCG/ACT inhaler  allopurinol (ZYLOPRIM) 100 MG tablet  amLODIPine (NORVASC) 2.5 MG tablet  atorvastatin (LIPITOR) 80 MG tablet  carvedilol (COREG) 25 MG tablet  finasteride (PROSCAR) 5 MG tablet  gabapentin (NEURONTIN) 300 MG capsule  glimepiride (AMARYL) 2 MG tablet  metFORMIN (GLUCOPHAGE-XR) 500 MG 24 hr tablet  multivitamin, therapeutic (THERA-VIT) TABS tablet  rivaroxaban ANTICOAGULANT (XARELTO ANTICOAGULANT) 20 MG TABS tablet  Semaglutide,0.25 or 0.5MG/DOS, (OZEMPIC, 0.25 OR 0.5 MG/DOSE,) 2 MG/1.5ML SOPN  tamsulosin (FLOMAX) 0.4 MG capsule  vitamin B-12 (CYANOCOBALAMIN) 250 MCG tablet        Problem List:      Patient Active Problem List    Diagnosis Date Noted     Nursing Home Visit 05/21/2021     Priority: Medium     Benign prostatic hyperplasia with urinary retention  04/29/2021     Priority: Medium     Tachycardia 04/26/2021     Priority: Medium     Hypoxia 04/26/2021     Priority: Medium     Other acute pulmonary embolism with acute cor pulmonale (H) 04/26/2021     Priority: Medium     Dyspnea, unspecified type 04/26/2021     Priority: Medium     Urinary retention 04/19/2021     Priority: Medium     Generalized weakness 04/19/2021     Priority: Medium     Onychomycosis of left great toe 02/09/2021     Priority: Medium     Pincer nail deformity 02/09/2021     Priority: Medium     Chronic painful diabetic neuropathy (H) 10/24/2018     Priority: Medium     Moderate episode of recurrent major depressive disorder (H) 10/24/2018     Priority: Medium     Formatting of this note might be different from the original.  Patient does not want anti-depressant medication       Chronic obstructive lung disease (H) 11/22/2017     Priority: Medium     Formatting of this note might be different from the original.  Mild       Mild concentric left ventricular hypertrophy (LVH) 11/22/2017     Priority: Medium     Uncomplicated alcohol dependence (H) 09/28/2016     Priority: Medium     IBS (irritable bowel syndrome) 12/23/2015     Priority: Medium     Spinal stenosis of lumbar region without neurogenic claudication 12/23/2015     Priority: Medium     Erectile dysfunction 05/28/2014     Priority: Medium     Chronic kidney disease, stage III (moderate) (H) 05/22/2013     Priority: Medium     Formatting of this note might be different from the original.  Since 2011       History of CVA (cerebrovascular accident) 02/03/2011     Priority: Medium     Formatting of this note might be different from the original.  Jan 2011  IMPRESSION: Interval development of a diffusion abnormality consistent with acute to early subacute infarct of less than 3 days of age in the left thalamus and the medial left cerebral peduncle. No associated abnormal enhancement or hydrocephalus.       Hyperlipidemia 02/03/2011      "Priority: Medium     Essential hypertension 11/28/2006     Priority: Medium     Type 2 diabetes mellitus with stage 3 chronic kidney disease, without long-term current use of insulin (H) 02/16/2001     Priority: Medium     Gout, unspecified 02/16/2001     Priority: Medium     Formatting of this note might be different from the original.  On Allopurinol       Lumbar spondylosis 02/22/1995     Priority: Medium     Formatting of this note might be different from the original.  S/P RFN L2-L5 facets          Past Medical History:      Past Medical History:   Diagnosis Date     Diabetes (H)      Hypertension        Past Surgical History:      Past Surgical History:   Procedure Laterality Date     HERNIA REPAIR         Family History:      No family history on file.    Social History:    Marital Status:  Single [1]  Social History     Tobacco Use     Smoking status: Never Smoker     Smokeless tobacco: Former User     Types: Chew   Substance Use Topics     Alcohol use: Not Currently     Drug use: Never        Review of Systems   Genitourinary: Positive for difficulty urinating.   Musculoskeletal: Positive for back pain.   All other systems reviewed and are negative.        Physical Exam   Vitals:  /68 (BP Location: Left arm, Patient Position: Chair, Cuff Size: Adult Large)   Pulse 89   Temp 97.4  F (36.3  C) (Tympanic)   Ht 1.842 m (6' 0.5\")   Wt 83.9 kg (185 lb)   SpO2 95%   BMI 24.75 kg/m        Physical Exam    Impression: Urinary retention  Plan   Plan: I talked with Anthony and we can go ahead and get him scheduled for transurethral resection of the prostate.  He will need a covid test, preop.  We will get a urine culture 10 days prior.  If we can get him scheduled for October 15 we can get the urine culture today.  I went over the risks of bleeding, transfusion, infection, urinary incontinence, injury to bladder bowel which is a rare complication, we also discussed complications of the anesthesia, pulmonary " emboli, cardiac complications.  He is aware that there is a 30% chance that this may not work and he will still require a catheter after that.  We did attempt to do intermittent catheterizations and he was never really able to do that.  He understands all of these risks and is willing to proceed.  25 minutes spent on counseling, coordination of care and documentation.      No follow-ups on file.    Indu De Leon MD  [facility] [place]              Indu De Leon MD

## 2021-10-04 NOTE — PROGRESS NOTES
[facility]  Catheter insertion documentation on 10/4/2021:    Anthony Dyer presents to the clinic for catheter insertion.  Reason for insertion: replacement  Order has been verified. Yes  Catheter successfully inserted into the urethral meatus in the usual sterile fashion without immediate complication.  Type of catheter placed: 18 Filipino Coude catheter  Urine is yellow in color.  50 cc's of urine output returned.  Balloon was filled with 10 cc's of sterile water.  Securement device placed for the catheter.  The patient tolerated the procedure and was instructed to monitor for catheter dysfunction, monitor for pain or discomfort, return or call for pain, fever, leakage or decreased urine flow and watch for signs of infection.    MICHELLE HILL, [place]

## 2021-10-04 NOTE — PROGRESS NOTES
History     Chief Complaint:      RECHECK (Cath change and discuss TURP)      HPI   Anthony Dyer is a 81 year old male who presents for further discussion of a transurethral resection of the prostate.  Anthony has been in urinary retention since he struggled with bilateral pulmonary emboli which occurred in April 2021.  He has been on Xarelto since that time.  He has been extremely frustrated and has failed multiple voiding trials.  He really does not like his catheter and wants to get this out.  I have discussed this with primary care and at this point they feel we can move forward.  He would need to be off of his Xarelto for the surgery and then restarted after is its recommended he be on this for a year.  He is here to discuss this today.    Allergies:    Allergies   Allergen Reactions     Ace Inhibitors      Per [facility]: hyperkalemia.  Pt doesn't know if he is allergic     Ceclor [Cefaclor] Hives     Sulfa Drugs      Pt unsure.         Medications:      acetaminophen (TYLENOL) 325 MG tablet  albuterol (PROAIR HFA/PROVENTIL HFA/VENTOLIN HFA) 108 (90 Base) MCG/ACT inhaler  allopurinol (ZYLOPRIM) 100 MG tablet  amLODIPine (NORVASC) 2.5 MG tablet  atorvastatin (LIPITOR) 80 MG tablet  carvedilol (COREG) 25 MG tablet  finasteride (PROSCAR) 5 MG tablet  gabapentin (NEURONTIN) 300 MG capsule  glimepiride (AMARYL) 2 MG tablet  metFORMIN (GLUCOPHAGE-XR) 500 MG 24 hr tablet  multivitamin, therapeutic (THERA-VIT) TABS tablet  rivaroxaban ANTICOAGULANT (XARELTO ANTICOAGULANT) 20 MG TABS tablet  Semaglutide,0.25 or 0.5MG/DOS, (OZEMPIC, 0.25 OR 0.5 MG/DOSE,) 2 MG/1.5ML SOPN  tamsulosin (FLOMAX) 0.4 MG capsule  vitamin B-12 (CYANOCOBALAMIN) 250 MCG tablet        Problem List:      Patient Active Problem List    Diagnosis Date Noted     Nursing Home Visit 05/21/2021     Priority: Medium     Benign prostatic hyperplasia with urinary retention 04/29/2021     Priority: Medium     Tachycardia 04/26/2021     Priority: Medium      Hypoxia 04/26/2021     Priority: Medium     Other acute pulmonary embolism with acute cor pulmonale (H) 04/26/2021     Priority: Medium     Dyspnea, unspecified type 04/26/2021     Priority: Medium     Urinary retention 04/19/2021     Priority: Medium     Generalized weakness 04/19/2021     Priority: Medium     Onychomycosis of left great toe 02/09/2021     Priority: Medium     Pincer nail deformity 02/09/2021     Priority: Medium     Chronic painful diabetic neuropathy (H) 10/24/2018     Priority: Medium     Moderate episode of recurrent major depressive disorder (H) 10/24/2018     Priority: Medium     Formatting of this note might be different from the original.  Patient does not want anti-depressant medication       Chronic obstructive lung disease (H) 11/22/2017     Priority: Medium     Formatting of this note might be different from the original.  Mild       Mild concentric left ventricular hypertrophy (LVH) 11/22/2017     Priority: Medium     Uncomplicated alcohol dependence (H) 09/28/2016     Priority: Medium     IBS (irritable bowel syndrome) 12/23/2015     Priority: Medium     Spinal stenosis of lumbar region without neurogenic claudication 12/23/2015     Priority: Medium     Erectile dysfunction 05/28/2014     Priority: Medium     Chronic kidney disease, stage III (moderate) (H) 05/22/2013     Priority: Medium     Formatting of this note might be different from the original.  Since 2011       History of CVA (cerebrovascular accident) 02/03/2011     Priority: Medium     Formatting of this note might be different from the original.  Jan 2011  IMPRESSION: Interval development of a diffusion abnormality consistent with acute to early subacute infarct of less than 3 days of age in the left thalamus and the medial left cerebral peduncle. No associated abnormal enhancement or hydrocephalus.       Hyperlipidemia 02/03/2011     Priority: Medium     Essential hypertension 11/28/2006     Priority: Medium     Type 2  "diabetes mellitus with stage 3 chronic kidney disease, without long-term current use of insulin (H) 02/16/2001     Priority: Medium     Gout, unspecified 02/16/2001     Priority: Medium     Formatting of this note might be different from the original.  On Allopurinol       Lumbar spondylosis 02/22/1995     Priority: Medium     Formatting of this note might be different from the original.  S/P RFN L2-L5 facets          Past Medical History:      Past Medical History:   Diagnosis Date     Diabetes (H)      Hypertension        Past Surgical History:      Past Surgical History:   Procedure Laterality Date     HERNIA REPAIR         Family History:      No family history on file.    Social History:    Marital Status:  Single [1]  Social History     Tobacco Use     Smoking status: Never Smoker     Smokeless tobacco: Former User     Types: Chew   Substance Use Topics     Alcohol use: Not Currently     Drug use: Never        Review of Systems   Genitourinary: Positive for difficulty urinating.   Musculoskeletal: Positive for back pain.   All other systems reviewed and are negative.        Physical Exam   Vitals:  /68 (BP Location: Left arm, Patient Position: Chair, Cuff Size: Adult Large)   Pulse 89   Temp 97.4  F (36.3  C) (Tympanic)   Ht 1.842 m (6' 0.5\")   Wt 83.9 kg (185 lb)   SpO2 95%   BMI 24.75 kg/m        Physical Exam    Impression: Urinary retention  Plan   Plan: I talked with Anthony and we can go ahead and get him scheduled for transurethral resection of the prostate.  He will need a covid test, preop.  We will get a urine culture 10 days prior.  If we can get him scheduled for October 15 we can get the urine culture today.  I went over the risks of bleeding, transfusion, infection, urinary incontinence, injury to bladder bowel which is a rare complication, we also discussed complications of the anesthesia, pulmonary emboli, cardiac complications.  He is aware that there is a 30% chance that this may not " work and he will still require a catheter after that.  We did attempt to do intermittent catheterizations and he was never really able to do that.  He understands all of these risks and is willing to proceed.  25 minutes spent on counseling, coordination of care and documentation.      No follow-ups on file.    Indu De Leon MD  [facility]

## 2021-10-04 NOTE — NURSING NOTE
"Chief Complaint   Patient presents with     RECHECK     Cath change and discuss TURP       Initial /68 (BP Location: Left arm, Patient Position: Chair, Cuff Size: Adult Large)   Pulse 89   Temp 97.4  F (36.3  C) (Tympanic)   Ht 1.842 m (6' 0.5\")   Wt 83.9 kg (185 lb)   SpO2 95%   BMI 24.75 kg/m   Estimated body mass index is 24.75 kg/m  as calculated from the following:    Height as of this encounter: 1.842 m (6' 0.5\").    Weight as of this encounter: 83.9 kg (185 lb).  Medication Reconciliation: complete  GREGORY WRAY LPN    Review of Systems:    Weight loss:    No     Recent fever/chills:  No   Night sweats:   No  Current skin rash:  No   Recent hair loss:  No  Heat intolerance:  No   Cold intolerance:  No  Chest pain:   No   Palpitations:   No  Shortness of breath:  No   Wheezing:   No  Constipation:    No   Diarrhea:   No   Nausea:   No   Vomiting:   No   Kidney/side pain:  No   Back pain:   yes  Frequent headaches:  No   Dizziness:     No  Leg swelling:   No   Calf pain:    No    Parents, brothers or sisters with history of kidney cancer?   No  Parents, brothers or sisters with history of bladder cancer: No      "

## 2021-10-06 ENCOUNTER — OFFICE VISIT (OUTPATIENT)
Dept: FAMILY MEDICINE | Facility: OTHER | Age: 81
End: 2021-10-06
Attending: FAMILY MEDICINE
Payer: MEDICARE

## 2021-10-06 VITALS
OXYGEN SATURATION: 97 % | SYSTOLIC BLOOD PRESSURE: 134 MMHG | DIASTOLIC BLOOD PRESSURE: 62 MMHG | BODY MASS INDEX: 24.48 KG/M2 | HEART RATE: 82 BPM | WEIGHT: 183 LBS | TEMPERATURE: 97.1 F

## 2021-10-06 DIAGNOSIS — E11.22 TYPE 2 DIABETES MELLITUS WITH STAGE 3 CHRONIC KIDNEY DISEASE, WITHOUT LONG-TERM CURRENT USE OF INSULIN, UNSPECIFIED WHETHER STAGE 3A OR 3B CKD (H): ICD-10-CM

## 2021-10-06 DIAGNOSIS — R33.8 BENIGN PROSTATIC HYPERPLASIA WITH URINARY RETENTION: ICD-10-CM

## 2021-10-06 DIAGNOSIS — E78.5 HYPERLIPIDEMIA, UNSPECIFIED HYPERLIPIDEMIA TYPE: ICD-10-CM

## 2021-10-06 DIAGNOSIS — Z01.818 PREOP GENERAL PHYSICAL EXAM: Primary | ICD-10-CM

## 2021-10-06 DIAGNOSIS — N18.30 TYPE 2 DIABETES MELLITUS WITH STAGE 3 CHRONIC KIDNEY DISEASE, WITHOUT LONG-TERM CURRENT USE OF INSULIN, UNSPECIFIED WHETHER STAGE 3A OR 3B CKD (H): ICD-10-CM

## 2021-10-06 DIAGNOSIS — Z79.01 CHRONIC ANTICOAGULATION: ICD-10-CM

## 2021-10-06 DIAGNOSIS — Z86.711 HISTORY OF PULMONARY EMBOLISM: ICD-10-CM

## 2021-10-06 DIAGNOSIS — N40.1 BENIGN PROSTATIC HYPERPLASIA WITH URINARY RETENTION: ICD-10-CM

## 2021-10-06 DIAGNOSIS — I10 ESSENTIAL HYPERTENSION: ICD-10-CM

## 2021-10-06 DIAGNOSIS — M10.9 GOUT, UNSPECIFIED CAUSE, UNSPECIFIED CHRONICITY, UNSPECIFIED SITE: ICD-10-CM

## 2021-10-06 LAB
ALBUMIN SERPL-MCNC: 3.6 G/DL (ref 3.4–5)
ALP SERPL-CCNC: 85 U/L (ref 40–150)
ALT SERPL W P-5'-P-CCNC: 18 U/L (ref 0–70)
ANION GAP SERPL CALCULATED.3IONS-SCNC: 3 MMOL/L (ref 3–14)
AST SERPL W P-5'-P-CCNC: 15 U/L (ref 0–45)
BASOPHILS # BLD AUTO: 0 10E3/UL (ref 0–0.2)
BASOPHILS NFR BLD AUTO: 0 %
BILIRUB SERPL-MCNC: 0.7 MG/DL (ref 0.2–1.3)
BUN SERPL-MCNC: 13 MG/DL (ref 7–30)
CALCIUM SERPL-MCNC: 9.4 MG/DL (ref 8.5–10.1)
CHLORIDE BLD-SCNC: 106 MMOL/L (ref 94–109)
CO2 SERPL-SCNC: 29 MMOL/L (ref 20–32)
CREAT SERPL-MCNC: 1.16 MG/DL (ref 0.66–1.25)
CREAT UR-MCNC: 115 MG/DL
EOSINOPHIL # BLD AUTO: 0.1 10E3/UL (ref 0–0.7)
EOSINOPHIL NFR BLD AUTO: 1 %
ERYTHROCYTE [DISTWIDTH] IN BLOOD BY AUTOMATED COUNT: 13.1 % (ref 10–15)
GFR SERPL CREATININE-BSD FRML MDRD: 59 ML/MIN/1.73M2
GLUCOSE BLD-MCNC: 191 MG/DL (ref 70–99)
HCT VFR BLD AUTO: 42.1 % (ref 40–53)
HGB BLD-MCNC: 14 G/DL (ref 13.3–17.7)
IMM GRANULOCYTES # BLD: 0 10E3/UL
IMM GRANULOCYTES NFR BLD: 0 %
LYMPHOCYTES # BLD AUTO: 0.8 10E3/UL (ref 0.8–5.3)
LYMPHOCYTES NFR BLD AUTO: 10 %
MCH RBC QN AUTO: 31.7 PG (ref 26.5–33)
MCHC RBC AUTO-ENTMCNC: 33.3 G/DL (ref 31.5–36.5)
MCV RBC AUTO: 96 FL (ref 78–100)
MICROALBUMIN UR-MCNC: 916 MG/L
MICROALBUMIN/CREAT UR: 796.52 MG/G CR (ref 0–17)
MONOCYTES # BLD AUTO: 0.5 10E3/UL (ref 0–1.3)
MONOCYTES NFR BLD AUTO: 6 %
NEUTROPHILS # BLD AUTO: 6.2 10E3/UL (ref 1.6–8.3)
NEUTROPHILS NFR BLD AUTO: 83 %
NRBC # BLD AUTO: 0 10E3/UL
NRBC BLD AUTO-RTO: 0 /100
PLATELET # BLD AUTO: 196 10E3/UL (ref 150–450)
POTASSIUM BLD-SCNC: 4.8 MMOL/L (ref 3.4–5.3)
PROT SERPL-MCNC: 7.2 G/DL (ref 6.8–8.8)
RBC # BLD AUTO: 4.41 10E6/UL (ref 4.4–5.9)
SODIUM SERPL-SCNC: 138 MMOL/L (ref 133–144)
URATE SERPL-MCNC: 5.3 MG/DL (ref 3.5–7.2)
WBC # BLD AUTO: 7.5 10E3/UL (ref 4–11)

## 2021-10-06 PROCEDURE — 82040 ASSAY OF SERUM ALBUMIN: CPT | Mod: ZL | Performed by: FAMILY MEDICINE

## 2021-10-06 PROCEDURE — 84550 ASSAY OF BLOOD/URIC ACID: CPT | Mod: ZL | Performed by: FAMILY MEDICINE

## 2021-10-06 PROCEDURE — 85004 AUTOMATED DIFF WBC COUNT: CPT | Mod: ZL | Performed by: FAMILY MEDICINE

## 2021-10-06 PROCEDURE — 99215 OFFICE O/P EST HI 40 MIN: CPT | Performed by: FAMILY MEDICINE

## 2021-10-06 PROCEDURE — 93005 ELECTROCARDIOGRAM TRACING: CPT

## 2021-10-06 PROCEDURE — 36415 COLL VENOUS BLD VENIPUNCTURE: CPT | Mod: ZL | Performed by: FAMILY MEDICINE

## 2021-10-06 PROCEDURE — 82043 UR ALBUMIN QUANTITATIVE: CPT | Mod: ZL | Performed by: FAMILY MEDICINE

## 2021-10-06 PROCEDURE — G0463 HOSPITAL OUTPT CLINIC VISIT: HCPCS

## 2021-10-06 PROCEDURE — 93010 ELECTROCARDIOGRAM REPORT: CPT | Performed by: INTERNAL MEDICINE

## 2021-10-06 ASSESSMENT — PAIN SCALES - GENERAL: PAINLEVEL: NO PAIN (0)

## 2021-10-06 NOTE — PATIENT INSTRUCTIONS
Hold your Xarelto 1 day before surgery (don't take it day before surgery or day of surgery).  Hold your diabetic medications - Metformin/Glucophage and Amaryl/Glimepiride day of surgery - not eating that morning.  Victoza can be continued, as long is you do not do the injection the day of your surgery.  Hold vitamins/supplements now until after surgery.    COVID testing as scheduled.    Results for orders placed or performed in visit on 10/06/21 (from the past 24 hour(s))   Extra Tube    Narrative    The following orders were created for panel order Extra Tube.  Procedure                               Abnormality         Status                     ---------                               -----------         ------                     Extra Green Top (Lithium...[[id number]]                                                 Extra Purple Top Tube[[id number]]                                                       Extra Urine Collection[[id number]]                                                        Please view results for these tests on the individual orders.   CBC with platelets and differential    Narrative    The following orders were created for panel order CBC with platelets and differential.  Procedure                               Abnormality         Status                     ---------                               -----------         ------                     CBC with platelets and d...[[id number]]                      Final result                 Please view results for these tests on the individual orders.   Comprehensive metabolic panel (BMP + Alb, Alk Phos, ALT, AST, Total. Bili, TP)   Result Value Ref Range    Sodium 138 133 - 144 mmol/L    Potassium 4.8 3.4 - 5.3 mmol/L    Chloride 106 94 - 109 mmol/L    Carbon Dioxide (CO2) 29 20 - 32 mmol/L    Anion Gap 3 3 - 14 mmol/L    Urea Nitrogen 13 7 - 30 mg/dL    Creatinine 1.16 0.66 - 1.25 mg/dL    Calcium 9.4 8.5 - 10.1 mg/dL    Glucose 191 (H) 70 - 99 mg/dL     Alkaline Phosphatase 85 40 - 150 U/L    AST 15 0 - 45 U/L    ALT 18 0 - 70 U/L    Protein Total 7.2 6.8 - 8.8 g/dL    Albumin 3.6 3.4 - 5.0 g/dL    Bilirubin Total 0.7 0.2 - 1.3 mg/dL    GFR Estimate 59 (L) >60 mL/min/1.73m2   Uric acid   Result Value Ref Range    Uric Acid 5.3 3.5 - 7.2 mg/dL   CBC with platelets and differential   Result Value Ref Range    WBC Count 7.5 4.0 - 11.0 10e3/uL    RBC Count 4.41 4.40 - 5.90 10e6/uL    Hemoglobin 14.0 13.3 - 17.7 g/dL    Hematocrit 42.1 40.0 - 53.0 %    MCV 96 78 - 100 fL    MCH 31.7 26.5 - 33.0 pg    MCHC 33.3 31.5 - 36.5 g/dL    RDW 13.1 10.0 - 15.0 %    Platelet Count 196 150 - 450 10e3/uL    % Neutrophils 83 %    % Lymphocytes 10 %    % Monocytes 6 %    % Eosinophils 1 %    % Basophils 0 %    % Immature Granulocytes 0 %    NRBCs per 100 WBC 0 <1 /100    Absolute Neutrophils 6.2 1.6 - 8.3 10e3/uL    Absolute Lymphocytes 0.8 0.8 - 5.3 10e3/uL    Absolute Monocytes 0.5 0.0 - 1.3 10e3/uL    Absolute Eosinophils 0.1 0.0 - 0.7 10e3/uL    Absolute Basophils 0.0 0.0 - 0.2 10e3/uL    Absolute Immature Granulocytes 0.0 <=0.0 10e3/uL    Absolute NRBCs 0.0 10e3/uL       Preparing for Your Surgery  Getting started  A nurse will call you to review your health history and instructions. They will give you an arrival time based on your scheduled surgery time.  Please be ready to share the following:    Your doctor's clinic name and phone number    Your medical, surgical and anesthesia history    A list of allergies and sensitivities    A list of medicines, including herbal treatments and over-the-counter drugs    Whether the patient has a legal guardian (ask how to send us the papers in advance)  If you have a child who's having surgery, please ask for a copy of Preparing for Your Child's Surgery.    Preparing for surgery    Within 30 days of surgery: Have a pre-op exam (sometimes called an H&P, or History and Physical). This can be done at a clinic or pre-operative center.  ? If  you're having a , you may not need this exam. Talk to your care team    At your pre-op exam, talk to your care team about all medicines you take. If you need to stop any medicines before surgery, ask when to start taking them again.  ? We do this for your safety. Many medicines can make you bleed too much during surgery. Some change how well surgery (anesthesia) drugs work.    Call your insurance company to let them know you're having surgery. (If you don't have insurance, call [phone].)    Call your clinic if there's any change in your health. This includes signs of a cold or flu (sore throat, runny nose, cough, rash, fever). It also includes a scrape or scratch near the surgery site.    If you have questions on the day of surgery, call your hospital or surgery center.  Eating and drinking guidelines  For your safety: Unless your surgeon tells you otherwise, follow the guidelines below.    Eat and drink as usual until 8 hours before surgery. After that, no food or milk.    Drink clear liquids until 2 hours before surgery. These are liquids you can see through, like water, Gatorade and Propel Water. You may also have black coffee and tea (no cream or milk).    Nothing by mouth within 2 hours of surgery. This includes gum, candy and breath mints.    If you drink, stop drinking alcohol the night before surgery.    If your care team tells you to take medicine on the morning of surgery, it's okay to take it with a sip of water.  Preventing infection    Shower or bathe the night before and morning of your surgery. Follow the instructions your clinic gave you. (If no instructions, use regular soap.)    Don't shave or clip hair near your surgery site. We'll remove the hair if needed.    Don't smoke or vape the morning of surgery. You may chew nicotine gum up to 2 hours before surgery. A nicotine patch is okay.  ? Note: Some surgeries require you to completely quit smoking and nicotine. Check with your  surgeon.    Your care team will make every effort to keep you safe from infection. We will:  ? Clean our hands often with soap and water (or an alcohol-based hand rub).  ? Clean the skin at your surgery site with a special soap that kills germs.  ? Give you a special gown to keep you warm. (Cold raises the risk of infection.)  ? Wear special hair covers, masks, gowns and gloves during surgery.  ? Give antibiotic medicine, if prescribed. Not all surgeries need antibiotics.  What to bring on the day of surgery    Photo ID and insurance card    Copy of your health care directive, if you have one    Glasses and hearing aides (bring cases)  ? You can't wear contacts during surgery    Inhaler and eye drops, if you use them (tell us about these when you arrive)    CPAP machine or breathing device, if you use them    A few personal items, if spending the night    If you have . . .  ? A pacemaker or ICD (cardiac defibrillator): Bring the ID card.  ? An implanted stimulator: Bring the remote control.  ? A legal guardian: Bring a copy of the certified (court-stamped) guardianship papers.  Please remove any jewelry, including body piercings. Leave jewelry and other valuables at home.  If you're going home the day of surgery  Important: If you don't follow the rules below, we must cancel your surgery.     Arrange for someone to drive you home after surgery. You may not drive, take a taxi or take public transportation by yourself (unless you'll have local anesthesia only).    Arrange for a responsible adult to stay with you overnight. If you don't, we may keep you in the hospital overnight, and you may need to pay the costs yourself.  Questions?   If you have any questions for your care team, list them here:  _________________________________________________________________________________________________________________________________________________________________________________________________________________________________________________________________________________________________________________________  For informational purposes only. Not to replace the advice of your health care provider. Copyright   2003, 2019 [facility]. All rights reserved. Clinically reviewed by Madonna Johnson MD. [employer] [id number] - REV 4/20.

## 2021-10-06 NOTE — H&P (VIEW-ONLY)
[facility] - [place]  [address]  [place] [address]  Phone: [phone]  Primary Provider: George Orourke  Pre-op Performing Provider: TAMIE RIBEIRO      PREOPERATIVE EVALUATION:  Today's date: 10/6/2021    Anthony Dyer is a 81 year old male who presents for a preoperative evaluation.    Surgical Information:  Surgery/Procedure: TURP = transurethral resection of the prostate  Surgery Location: [facility]  Surgeon: Dr De Leon  Surgery Date: 10/15/21  Time of Surgery: TBD  Where patient plans to recover: At home with family after night in hospital; sister will be in town from [place]; lives alone in house  Fax number for surgical facility: Note does not need to be faxed, will be available electronically in Epic.    Type of Anesthesia Anticipated: to be determined    Assessment & Plan     The proposed surgical procedure is considered INTERMEDIATE risk.    Preop general physical exam  Proceed as planned.  - EKG 12-lead complete w/read - (Clinic Performed)  - CBC with platelets and differential; Future  - Comprehensive metabolic panel (BMP + Alb, Alk Phos, ALT, AST, Total. Bili, TP); Future  - Albumin Random Urine Quantitative with Creat Ratio; Future  - Uric acid; Future  - CBC with platelets and differential  - Comprehensive metabolic panel (BMP + Alb, Alk Phos, ALT, AST, Total. Bili, TP)  - Albumin Random Urine Quantitative with Creat Ratio  - Uric acid    Benign prostatic hyperplasia with urinary retention  Planning TURP.    Type 2 diabetes mellitus with stage 3 chronic kidney disease, without long-term current use of insulin, unspecified whether stage 3a or 3b CKD (H)  Stable.  a1c at goal.    Hyperlipidemia, unspecified hyperlipidemia type  Not fasting today - due for fasting labs.  Continue statin.    Gout, unspecified cause, unspecified chronicity, unspecified site  No recent flares.  Uric acid level <6.     Essential hypertension  At goal.  Continue current regimen.      History of  pulmonary embolism  Chronic anticoagulation  As above - bilateral PE - on Xarelto.  With low risk procedure, and CrCl >50, plan to hold at least 24 hours prior.              Risks and Recommendations:  The patient has the following additional risks and recommendations for perioperative complications:   - Consult Hospitalist / IM to assist with post-op medical management   - History of urinary retention  Diabetes:  - Patient is not on insulin therapy: regular NPO guidelines can be followed.     Medication Instructions:   - apixaban (Eliquis), edoxaban (Savaysa), rivaroxaban (Xarelto): Bleeding risk is low for this procedure AND CrCl (>=) 50 mL/min. HOLD 1 day before surgery.     - Beta Blockers: Continue taking on the day of surgery.   - Calcium Channel Blockers: May be continued on the day of surgery.   - Statins: Continue taking on the day of surgery.    - GLP-1 Injectable (exenitide, liraglutide, semaglutide, dulaglutide, etc.): HOLD day of surgery    - pregabalin, gabapentin: Continue without modification.   - rescue Inhaler: Continue PRN. Bring to hospital on the day of surgery.    RECOMMENDATION:  APPROVAL GIVEN to proceed with proposed procedure, without further diagnostic evaluation.    46 minutes spent on the date of the encounter doing chart review, history and exam, documentation and further activities per the note        Subjective     HPI related to upcoming procedure: Patient with urinary retention.  Has failed multiple voiding trials.  Does not like having catheter in place.  Planning TURP.      Preop Questions 10/6/2021   1. Have you ever had a heart attack or stroke? YES - stroke in 2011   2. Have you ever had surgery on your heart or blood vessels, such as a stent placement, a coronary artery bypass, or surgery on an artery in your head, neck, heart, or legs? No   3. Do you have chest pain with activity? No   4. Do you have a history of  heart failure? No   5. Do you currently have a cold,  bronchitis or symptoms of other infection? No   6. Do you have a cough, shortness of breath, or wheezing? No   7. Do you or anyone in your family have previous history of blood clots? YES - patient and brother had PE; patients was 4/26/21   8. Do you or does anyone in your family have a serious bleeding problem such as prolonged bleeding following surgeries or cuts? No   9. Have you ever had problems with anemia or been told to take iron pills? No   10. Have you had any abnormal blood loss such as black, tarry or bloody stools? No   11. Have you ever had a blood transfusion? No   12. Are you willing to have a blood transfusion if it is medically needed before, during, or after your surgery? Yes   13. Have you or any of your relatives ever had problems with anesthesia? No   14. Do you have sleep apnea, excessive snoring or daytime drowsiness? No   15. Do you have any artifical heart valves or other implanted medical devices like a pacemaker, defibrillator, or continuous glucose monitor? No   16. Do you have artificial joints? No   17. Are you allergic to latex? No     Health Care Directive:  Patient has a Health Care Directive on file    Preoperative Review of :   reviewed - no record of controlled substances prescribed.      Status of Chronic Conditions:  See problem list for active medical problems.  Problems all longstanding and stable, except as noted/documented.  See ROS for pertinent symptoms related to these conditions.    COPD - Patient has a longstanding history of moderate-severe COPD . Patient has been doing well overall noting NO SYMPTOMS and continues on medication regimen consisting of Albuterol without adverse reactions or side effects.  Uses sporadically.    DEPRESSION -historical - not active.    DIABETES - Patient has a longstanding history of DiabetesType Type II . Patient is being treated with diet, oral agents and insulin injections and denies significant side effects. Control has been  good. Complicating factors include but are not limited to: hypertension, hyperlipidemia and neuropathy. ozdempic on Tuesdays.     HYPERLIPIDEMIA - Patient has a long history of significant Hyperlipidemia requiring medication for treatment with recent unable to assess control. No recent lipid panel.    Patient reports no problems or side effects with the medication.   Had cereal, oranges, almond yamil.    HYPERTENSION - Patient has longstanding history of HTN , currently denies any symptoms referable to elevated blood pressure. Specifically denies chest pain, palpitations, dyspnea, orthopnea, PND or peripheral edema. Blood pressure readings have been in normal range. Current medication regimen is as listed below. Patient denies any side effects of medication.     CHRONIC ANTICOAGULATION - Xarelto for PE.  4/26/21 large bilateral PE with RV strain.  Felt to be related to limited activity since developing urinary retention.  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  Estimated Creatinine Clearance: 59.6 mL/min (based on SCr of 1.16 mg/dL).  No other prior clots.  Unclear duration - hospital discharge 3-6 months.    Per Dr Orourke, case was discussed with Dr Seo, cardiology, as well.  Ok to hold for procedure and then when resume - plan for total of 1 year.    BPH - see above - reason for surgery/procedure    GOUT - stable on allopurinol; no recent flares    Estimated Creatinine Clearance: 59.6 mL/min (based on SCr of 1.16 mg/dL).    Review of Systems  Constitutional, neuro, ENT, endocrine, pulmonary, cardiac, gastrointestinal, genitourinary, musculoskeletal, integument and psychiatric systems are negative, except as otherwise noted.    Patient Active Problem List    Diagnosis Date Noted     Nursing Home Visit 05/21/2021     Priority: Medium     Benign prostatic hyperplasia with urinary retention 04/29/2021     Priority: Medium     Tachycardia 04/26/2021     Priority: Medium     Hypoxia 04/26/2021     Priority: Medium      Other acute pulmonary embolism with acute cor pulmonale (H) 04/26/2021     Priority: Medium     Dyspnea, unspecified type 04/26/2021     Priority: Medium     Urinary retention 04/19/2021     Priority: Medium     Generalized weakness 04/19/2021     Priority: Medium     Onychomycosis of left great toe 02/09/2021     Priority: Medium     Pincer nail deformity 02/09/2021     Priority: Medium     Chronic painful diabetic neuropathy (H) 10/24/2018     Priority: Medium     Moderate episode of recurrent major depressive disorder (H) 10/24/2018     Priority: Medium     Formatting of this note might be different from the original.  Patient does not want anti-depressant medication       Chronic obstructive lung disease (H) 11/22/2017     Priority: Medium     Formatting of this note might be different from the original.  Mild       Mild concentric left ventricular hypertrophy (LVH) 11/22/2017     Priority: Medium     Uncomplicated alcohol dependence (H) 09/28/2016     Priority: Medium     IBS (irritable bowel syndrome) 12/23/2015     Priority: Medium     Spinal stenosis of lumbar region without neurogenic claudication 12/23/2015     Priority: Medium     Erectile dysfunction 05/28/2014     Priority: Medium     Chronic kidney disease, stage III (moderate) (H) 05/22/2013     Priority: Medium     Formatting of this note might be different from the original.  Since 2011       History of CVA (cerebrovascular accident) 02/03/2011     Priority: Medium     Formatting of this note might be different from the original.  Jan 2011  IMPRESSION: Interval development of a diffusion abnormality consistent with acute to early subacute infarct of less than 3 days of age in the left thalamus and the medial left cerebral peduncle. No associated abnormal enhancement or hydrocephalus.       Hyperlipidemia 02/03/2011     Priority: Medium     Essential hypertension 11/28/2006     Priority: Medium     Type 2 diabetes mellitus with stage 3 chronic kidney  disease, without long-term current use of insulin (H) 02/16/2001     Priority: Medium     Gout, unspecified 02/16/2001     Priority: Medium     Formatting of this note might be different from the original.  On Allopurinol       Lumbar spondylosis 02/22/1995     Priority: Medium     Formatting of this note might be different from the original.  S/P RFN L2-L5 facets        Past Medical History:   Diagnosis Date     Diabetes (H)      Hypertension      Past Surgical History:   Procedure Laterality Date     HERNIA REPAIR       Current Outpatient Medications   Medication Sig Dispense Refill     acetaminophen (TYLENOL) 325 MG tablet Take 650 mg by mouth every 4 hours as needed for pain . Limit acetaminophen to 4000 mg per day from all sources.       albuterol (PROAIR HFA/PROVENTIL HFA/VENTOLIN HFA) 108 (90 Base) MCG/ACT inhaler Inhale 2 puffs into the lungs every 4 hours as needed       allopurinol (ZYLOPRIM) 100 MG tablet Take 100 mg by mouth daily        amLODIPine (NORVASC) 2.5 MG tablet Take 2.5 mg by mouth daily       atorvastatin (LIPITOR) 80 MG tablet Take 40 mg by mouth every evening        carvedilol (COREG) 25 MG tablet Take 25 mg by mouth 2 times daily        finasteride (PROSCAR) 5 MG tablet Take 1 tablet (5 mg) by mouth daily 30 tablet 11     gabapentin (NEURONTIN) 300 MG capsule Take 300 mg by mouth 3 times daily        glimepiride (AMARYL) 2 MG tablet Take 2 mg by mouth every morning (before breakfast)        metFORMIN (GLUCOPHAGE-XR) 500 MG 24 hr tablet Take 1,000 mg by mouth 2 times daily (with meals)        multivitamin, therapeutic (THERA-VIT) TABS tablet Take 1 tablet by mouth daily       rivaroxaban ANTICOAGULANT (XARELTO ANTICOAGULANT) 20 MG TABS tablet TAKE 1 TABLET DAILY BY MOUTH WITH DINNER 30 tablet 10     Semaglutide,0.25 or 0.5MG/DOS, (OZEMPIC, 0.25 OR 0.5 MG/DOSE,) 2 MG/1.5ML SOPN Inject 0.5 mg Subcutaneous every 7 days on Thursdays.       tamsulosin (FLOMAX) 0.4 MG capsule Take 0.4 mg by mouth  every evening        vitamin B-12 (CYANOCOBALAMIN) 250 MCG tablet Take 250 mcg by mouth daily         Allergies   Allergen Reactions     Ace Inhibitors      Per [facility]: hyperkalemia.  Pt doesn't know if he is allergic     Ceclor [Cefaclor] Hives     Sulfa Drugs      Pt unsure.         Social History     Tobacco Use     Smoking status: Never Smoker     Smokeless tobacco: Former User     Types: Chew   Substance Use Topics     Alcohol use: Not Currently     History reviewed. No pertinent family history.  History   Drug Use Unknown         Objective     /62   Pulse 82   Temp 97.1  F (36.2  C)   Wt 83 kg (183 lb)   SpO2 97%   BMI 24.48 kg/m      Physical Exam    GENERAL APPEARANCE: healthy, alert and no distress     EYES: EOMI,  PERRL     HENT: ear canals and TM's normal and nose and mouth without ulcers or lesions     NECK: no adenopathy, no asymmetry, masses, or scars and thyroid normal to palpation     RESP: lungs clear to auscultation - no rales, rhonchi or wheezes     CV: irregular regular rhythm, rate, normal S1 S2, no S3 or S4 and no murmur, click or rub     ABDOMEN:  soft, nontender, no HSM or masses and bowel sounds normal  : catheter with leg bag in place     MS: extremities normal- no gross deformities noted, no evidence of inflammation in joints, FROM in all extremities.     SKIN: no suspicious lesions or rashes     NEURO: Normal strength and tone, sensory exam grossly normal, mentation intact and speech normal     PSYCH: mentation appears normal. and affect normal/bright     LYMPHATICS: No cervical adenopathy    Recent Labs   Lab Test 08/05/21  1055 05/06/21  0000 04/29/21  0528   HGB 14.0  --  12.3*     --  200     --  142   POTASSIUM 4.6  --  3.9   CR 1.09  --  1.06   A1C 5.3 6.5*  --         Diagnostics:  Recent Results (from the past 24 hour(s))   Comprehensive metabolic panel (BMP + Alb, Alk Phos, ALT, AST, Total. Bili, TP)    Collection Time: 10/06/21  9:43 AM   Result Value  Ref Range    Sodium 138 133 - 144 mmol/L    Potassium 4.8 3.4 - 5.3 mmol/L    Chloride 106 94 - 109 mmol/L    Carbon Dioxide (CO2) 29 20 - 32 mmol/L    Anion Gap 3 3 - 14 mmol/L    Urea Nitrogen 13 7 - 30 mg/dL    Creatinine 1.16 0.66 - 1.25 mg/dL    Calcium 9.4 8.5 - 10.1 mg/dL    Glucose 191 (H) 70 - 99 mg/dL    Alkaline Phosphatase 85 40 - 150 U/L    AST 15 0 - 45 U/L    ALT 18 0 - 70 U/L    Protein Total 7.2 6.8 - 8.8 g/dL    Albumin 3.6 3.4 - 5.0 g/dL    Bilirubin Total 0.7 0.2 - 1.3 mg/dL    GFR Estimate 59 (L) >60 mL/min/1.73m2   Uric acid    Collection Time: 10/06/21  9:43 AM   Result Value Ref Range    Uric Acid 5.3 3.5 - 7.2 mg/dL   CBC with platelets and differential    Collection Time: 10/06/21  9:43 AM   Result Value Ref Range    WBC Count 7.5 4.0 - 11.0 10e3/uL    RBC Count 4.41 4.40 - 5.90 10e6/uL    Hemoglobin 14.0 13.3 - 17.7 g/dL    Hematocrit 42.1 40.0 - 53.0 %    MCV 96 78 - 100 fL    MCH 31.7 26.5 - 33.0 pg    MCHC 33.3 31.5 - 36.5 g/dL    RDW 13.1 10.0 - 15.0 %    Platelet Count 196 150 - 450 10e3/uL    % Neutrophils 83 %    % Lymphocytes 10 %    % Monocytes 6 %    % Eosinophils 1 %    % Basophils 0 %    % Immature Granulocytes 0 %    NRBCs per 100 WBC 0 <1 /100    Absolute Neutrophils 6.2 1.6 - 8.3 10e3/uL    Absolute Lymphocytes 0.8 0.8 - 5.3 10e3/uL    Absolute Monocytes 0.5 0.0 - 1.3 10e3/uL    Absolute Eosinophils 0.1 0.0 - 0.7 10e3/uL    Absolute Basophils 0.0 0.0 - 0.2 10e3/uL    Absolute Immature Granulocytes 0.0 <=0.0 10e3/uL    Absolute NRBCs 0.0 10e3/uL      EKG: appears normal, NSR, normal axis, normal intervals, no acute ST/T changes c/w ischemia, no LVH by voltage criteria, unchanged from previous tracings, 4/2021    Revised Cardiac Risk Index (RCRI):  The patient has the following serious cardiovascular risks for perioperative complications:   - No serious cardiac risks = 0 points     RCRI Interpretation: 0 points: Class I (very low risk - 0.4% complication rate)           Signed  Electronically by: Zoila Azul MD  Copy of this evaluation report is provided to requesting physician.

## 2021-10-06 NOTE — NURSING NOTE
"Chief Complaint   Patient presents with     Pre-Op Exam       Initial /62   Pulse 82   Temp 97.1  F (36.2  C)   Wt 83 kg (183 lb)   SpO2 97%   BMI 24.48 kg/m   Estimated body mass index is 24.48 kg/m  as calculated from the following:    Height as of 10/4/21: 1.842 m (6' 0.5\").    Weight as of this encounter: 83 kg (183 lb).  Medication Reconciliation: complete  Kait Chan LPN  "

## 2021-10-06 NOTE — PROGRESS NOTES
[facility] [place]  [address]  [place] [address]  Phone: [phone]  Primary Provider: George Orourke  Pre-op Performing Provider: TAMIE RIBEIRO      PREOPERATIVE EVALUATION:  Today's date: 10/6/2021    Anthony Dyer is a 81 year old male who presents for a preoperative evaluation.    Surgical Information:  Surgery/Procedure: TURP = transurethral resection of the prostate  Surgery Location: [facility]  Surgeon: Dr De Leon  Surgery Date: 10/15/21  Time of Surgery: TBD  Where patient plans to recover: At home with family after night in hospital; sister will be in town from [place]; lives alone in house  Fax number for surgical facility: Note does not need to be faxed, will be available electronically in Epic.    Type of Anesthesia Anticipated: to be determined    Assessment & Plan     The proposed surgical procedure is considered INTERMEDIATE risk.    Preop general physical exam  Proceed as planned.  - EKG 12-lead complete w/read - (Clinic Performed)  - CBC with platelets and differential; Future  - Comprehensive metabolic panel (BMP + Alb, Alk Phos, ALT, AST, Total. Bili, TP); Future  - Albumin Random Urine Quantitative with Creat Ratio; Future  - Uric acid; Future  - CBC with platelets and differential  - Comprehensive metabolic panel (BMP + Alb, Alk Phos, ALT, AST, Total. Bili, TP)  - Albumin Random Urine Quantitative with Creat Ratio  - Uric acid    Benign prostatic hyperplasia with urinary retention  Planning TURP.    Type 2 diabetes mellitus with stage 3 chronic kidney disease, without long-term current use of insulin, unspecified whether stage 3a or 3b CKD (H)  Stable.  a1c at goal.    Hyperlipidemia, unspecified hyperlipidemia type  Not fasting today - due for fasting labs.  Continue statin.    Gout, unspecified cause, unspecified chronicity, unspecified site  No recent flares.  Uric acid level <6.     Essential hypertension  At goal.  Continue current regimen.      History of  pulmonary embolism  Chronic anticoagulation  As above - bilateral PE - on Xarelto.  With low risk procedure, and CrCl >50, plan to hold at least 24 hours prior.              Risks and Recommendations:  The patient has the following additional risks and recommendations for perioperative complications:   - Consult Hospitalist / IM to assist with post-op medical management   - History of urinary retention  Diabetes:  - Patient is not on insulin therapy: regular NPO guidelines can be followed.     Medication Instructions:   - apixaban (Eliquis), edoxaban (Savaysa), rivaroxaban (Xarelto): Bleeding risk is low for this procedure AND CrCl (>=) 50 mL/min. HOLD 1 day before surgery.     - Beta Blockers: Continue taking on the day of surgery.   - Calcium Channel Blockers: May be continued on the day of surgery.   - Statins: Continue taking on the day of surgery.    - GLP-1 Injectable (exenitide, liraglutide, semaglutide, dulaglutide, etc.): HOLD day of surgery    - pregabalin, gabapentin: Continue without modification.   - rescue Inhaler: Continue PRN. Bring to hospital on the day of surgery.    RECOMMENDATION:  APPROVAL GIVEN to proceed with proposed procedure, without further diagnostic evaluation.    46 minutes spent on the date of the encounter doing chart review, history and exam, documentation and further activities per the note        Subjective     HPI related to upcoming procedure: Patient with urinary retention.  Has failed multiple voiding trials.  Does not like having catheter in place.  Planning TURP.      Preop Questions 10/6/2021   1. Have you ever had a heart attack or stroke? YES - stroke in 2011   2. Have you ever had surgery on your heart or blood vessels, such as a stent placement, a coronary artery bypass, or surgery on an artery in your head, neck, heart, or legs? No   3. Do you have chest pain with activity? No   4. Do you have a history of  heart failure? No   5. Do you currently have a cold,  bronchitis or symptoms of other infection? No   6. Do you have a cough, shortness of breath, or wheezing? No   7. Do you or anyone in your family have previous history of blood clots? YES - patient and brother had PE; patients was 4/26/21   8. Do you or does anyone in your family have a serious bleeding problem such as prolonged bleeding following surgeries or cuts? No   9. Have you ever had problems with anemia or been told to take iron pills? No   10. Have you had any abnormal blood loss such as black, tarry or bloody stools? No   11. Have you ever had a blood transfusion? No   12. Are you willing to have a blood transfusion if it is medically needed before, during, or after your surgery? Yes   13. Have you or any of your relatives ever had problems with anesthesia? No   14. Do you have sleep apnea, excessive snoring or daytime drowsiness? No   15. Do you have any artifical heart valves or other implanted medical devices like a pacemaker, defibrillator, or continuous glucose monitor? No   16. Do you have artificial joints? No   17. Are you allergic to latex? No     Health Care Directive:  Patient has a Health Care Directive on file    Preoperative Review of :   reviewed - no record of controlled substances prescribed.      Status of Chronic Conditions:  See problem list for active medical problems.  Problems all longstanding and stable, except as noted/documented.  See ROS for pertinent symptoms related to these conditions.    COPD - Patient has a longstanding history of moderate-severe COPD . Patient has been doing well overall noting NO SYMPTOMS and continues on medication regimen consisting of Albuterol without adverse reactions or side effects.  Uses sporadically.    DEPRESSION -historical - not active.    DIABETES - Patient has a longstanding history of DiabetesType Type II . Patient is being treated with diet, oral agents and insulin injections and denies significant side effects. Control has been  good. Complicating factors include but are not limited to: hypertension, hyperlipidemia and neuropathy. ozdempic on Tuesdays.     HYPERLIPIDEMIA - Patient has a long history of significant Hyperlipidemia requiring medication for treatment with recent unable to assess control. No recent lipid panel.    Patient reports no problems or side effects with the medication.   Had cereal, oranges, almond yamil.    HYPERTENSION - Patient has longstanding history of HTN , currently denies any symptoms referable to elevated blood pressure. Specifically denies chest pain, palpitations, dyspnea, orthopnea, PND or peripheral edema. Blood pressure readings have been in normal range. Current medication regimen is as listed below. Patient denies any side effects of medication.     CHRONIC ANTICOAGULATION - Xarelto for PE.  4/26/21 large bilateral PE with RV strain.  Felt to be related to limited activity since developing urinary retention.  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  Estimated Creatinine Clearance: 59.6 mL/min (based on SCr of 1.16 mg/dL).  No other prior clots.  Unclear duration - hospital discharge 3-6 months.    Per Dr Orourke, case was discussed with Dr Seo, cardiology, as well.  Ok to hold for procedure and then when resume - plan for total of 1 year.    BPH - see above - reason for surgery/procedure    GOUT - stable on allopurinol; no recent flares    Estimated Creatinine Clearance: 59.6 mL/min (based on SCr of 1.16 mg/dL).    Review of Systems  Constitutional, neuro, ENT, endocrine, pulmonary, cardiac, gastrointestinal, genitourinary, musculoskeletal, integument and psychiatric systems are negative, except as otherwise noted.    Patient Active Problem List    Diagnosis Date Noted     Nursing Home Visit 05/21/2021     Priority: Medium     Benign prostatic hyperplasia with urinary retention 04/29/2021     Priority: Medium     Tachycardia 04/26/2021     Priority: Medium     Hypoxia 04/26/2021     Priority: Medium      Other acute pulmonary embolism with acute cor pulmonale (H) 04/26/2021     Priority: Medium     Dyspnea, unspecified type 04/26/2021     Priority: Medium     Urinary retention 04/19/2021     Priority: Medium     Generalized weakness 04/19/2021     Priority: Medium     Onychomycosis of left great toe 02/09/2021     Priority: Medium     Pincer nail deformity 02/09/2021     Priority: Medium     Chronic painful diabetic neuropathy (H) 10/24/2018     Priority: Medium     Moderate episode of recurrent major depressive disorder (H) 10/24/2018     Priority: Medium     Formatting of this note might be different from the original.  Patient does not want anti-depressant medication       Chronic obstructive lung disease (H) 11/22/2017     Priority: Medium     Formatting of this note might be different from the original.  Mild       Mild concentric left ventricular hypertrophy (LVH) 11/22/2017     Priority: Medium     Uncomplicated alcohol dependence (H) 09/28/2016     Priority: Medium     IBS (irritable bowel syndrome) 12/23/2015     Priority: Medium     Spinal stenosis of lumbar region without neurogenic claudication 12/23/2015     Priority: Medium     Erectile dysfunction 05/28/2014     Priority: Medium     Chronic kidney disease, stage III (moderate) (H) 05/22/2013     Priority: Medium     Formatting of this note might be different from the original.  Since 2011       History of CVA (cerebrovascular accident) 02/03/2011     Priority: Medium     Formatting of this note might be different from the original.  Jan 2011  IMPRESSION: Interval development of a diffusion abnormality consistent with acute to early subacute infarct of less than 3 days of age in the left thalamus and the medial left cerebral peduncle. No associated abnormal enhancement or hydrocephalus.       Hyperlipidemia 02/03/2011     Priority: Medium     Essential hypertension 11/28/2006     Priority: Medium     Type 2 diabetes mellitus with stage 3 chronic kidney  disease, without long-term current use of insulin (H) 02/16/2001     Priority: Medium     Gout, unspecified 02/16/2001     Priority: Medium     Formatting of this note might be different from the original.  On Allopurinol       Lumbar spondylosis 02/22/1995     Priority: Medium     Formatting of this note might be different from the original.  S/P RFN L2-L5 facets        Past Medical History:   Diagnosis Date     Diabetes (H)      Hypertension      Past Surgical History:   Procedure Laterality Date     HERNIA REPAIR       Current Outpatient Medications   Medication Sig Dispense Refill     acetaminophen (TYLENOL) 325 MG tablet Take 650 mg by mouth every 4 hours as needed for pain . Limit acetaminophen to 4000 mg per day from all sources.       albuterol (PROAIR HFA/PROVENTIL HFA/VENTOLIN HFA) 108 (90 Base) MCG/ACT inhaler Inhale 2 puffs into the lungs every 4 hours as needed       allopurinol (ZYLOPRIM) 100 MG tablet Take 100 mg by mouth daily        amLODIPine (NORVASC) 2.5 MG tablet Take 2.5 mg by mouth daily       atorvastatin (LIPITOR) 80 MG tablet Take 40 mg by mouth every evening        carvedilol (COREG) 25 MG tablet Take 25 mg by mouth 2 times daily        finasteride (PROSCAR) 5 MG tablet Take 1 tablet (5 mg) by mouth daily 30 tablet 11     gabapentin (NEURONTIN) 300 MG capsule Take 300 mg by mouth 3 times daily        glimepiride (AMARYL) 2 MG tablet Take 2 mg by mouth every morning (before breakfast)        metFORMIN (GLUCOPHAGE-XR) 500 MG 24 hr tablet Take 1,000 mg by mouth 2 times daily (with meals)        multivitamin, therapeutic (THERA-VIT) TABS tablet Take 1 tablet by mouth daily       rivaroxaban ANTICOAGULANT (XARELTO ANTICOAGULANT) 20 MG TABS tablet TAKE 1 TABLET DAILY BY MOUTH WITH DINNER 30 tablet 10     Semaglutide,0.25 or 0.5MG/DOS, (OZEMPIC, 0.25 OR 0.5 MG/DOSE,) 2 MG/1.5ML SOPN Inject 0.5 mg Subcutaneous every 7 days on Thursdays.       tamsulosin (FLOMAX) 0.4 MG capsule Take 0.4 mg by mouth  every evening        vitamin B-12 (CYANOCOBALAMIN) 250 MCG tablet Take 250 mcg by mouth daily         Allergies   Allergen Reactions     Ace Inhibitors      Per [facility]: hyperkalemia.  Pt doesn't know if he is allergic     Ceclor [Cefaclor] Hives     Sulfa Drugs      Pt unsure.         Social History     Tobacco Use     Smoking status: Never Smoker     Smokeless tobacco: Former User     Types: Chew   Substance Use Topics     Alcohol use: Not Currently     History reviewed. No pertinent family history.  History   Drug Use Unknown         Objective     /62   Pulse 82   Temp 97.1  F (36.2  C)   Wt 83 kg (183 lb)   SpO2 97%   BMI 24.48 kg/m      Physical Exam    GENERAL APPEARANCE: healthy, alert and no distress     EYES: EOMI,  PERRL     HENT: ear canals and TM's normal and nose and mouth without ulcers or lesions     NECK: no adenopathy, no asymmetry, masses, or scars and thyroid normal to palpation     RESP: lungs clear to auscultation - no rales, rhonchi or wheezes     CV: irregular regular rhythm, rate, normal S1 S2, no S3 or S4 and no murmur, click or rub     ABDOMEN:  soft, nontender, no HSM or masses and bowel sounds normal  : catheter with leg bag in place     MS: extremities normal- no gross deformities noted, no evidence of inflammation in joints, FROM in all extremities.     SKIN: no suspicious lesions or rashes     NEURO: Normal strength and tone, sensory exam grossly normal, mentation intact and speech normal     PSYCH: mentation appears normal. and affect normal/bright     LYMPHATICS: No cervical adenopathy    Recent Labs   Lab Test 08/05/21  1055 05/06/21  0000 04/29/21  0528   HGB 14.0  --  12.3*     --  200     --  142   POTASSIUM 4.6  --  3.9   CR 1.09  --  1.06   A1C 5.3 6.5*  --         Diagnostics:  Recent Results (from the past 24 hour(s))   Comprehensive metabolic panel (BMP + Alb, Alk Phos, ALT, AST, Total. Bili, TP)    Collection Time: 10/06/21  9:43 AM   Result Value  Ref Range    Sodium 138 133 - 144 mmol/L    Potassium 4.8 3.4 - 5.3 mmol/L    Chloride 106 94 - 109 mmol/L    Carbon Dioxide (CO2) 29 20 - 32 mmol/L    Anion Gap 3 3 - 14 mmol/L    Urea Nitrogen 13 7 - 30 mg/dL    Creatinine 1.16 0.66 - 1.25 mg/dL    Calcium 9.4 8.5 - 10.1 mg/dL    Glucose 191 (H) 70 - 99 mg/dL    Alkaline Phosphatase 85 40 - 150 U/L    AST 15 0 - 45 U/L    ALT 18 0 - 70 U/L    Protein Total 7.2 6.8 - 8.8 g/dL    Albumin 3.6 3.4 - 5.0 g/dL    Bilirubin Total 0.7 0.2 - 1.3 mg/dL    GFR Estimate 59 (L) >60 mL/min/1.73m2   Uric acid    Collection Time: 10/06/21  9:43 AM   Result Value Ref Range    Uric Acid 5.3 3.5 - 7.2 mg/dL   CBC with platelets and differential    Collection Time: 10/06/21  9:43 AM   Result Value Ref Range    WBC Count 7.5 4.0 - 11.0 10e3/uL    RBC Count 4.41 4.40 - 5.90 10e6/uL    Hemoglobin 14.0 13.3 - 17.7 g/dL    Hematocrit 42.1 40.0 - 53.0 %    MCV 96 78 - 100 fL    MCH 31.7 26.5 - 33.0 pg    MCHC 33.3 31.5 - 36.5 g/dL    RDW 13.1 10.0 - 15.0 %    Platelet Count 196 150 - 450 10e3/uL    % Neutrophils 83 %    % Lymphocytes 10 %    % Monocytes 6 %    % Eosinophils 1 %    % Basophils 0 %    % Immature Granulocytes 0 %    NRBCs per 100 WBC 0 <1 /100    Absolute Neutrophils 6.2 1.6 - 8.3 10e3/uL    Absolute Lymphocytes 0.8 0.8 - 5.3 10e3/uL    Absolute Monocytes 0.5 0.0 - 1.3 10e3/uL    Absolute Eosinophils 0.1 0.0 - 0.7 10e3/uL    Absolute Basophils 0.0 0.0 - 0.2 10e3/uL    Absolute Immature Granulocytes 0.0 <=0.0 10e3/uL    Absolute NRBCs 0.0 10e3/uL      EKG: appears normal, NSR, normal axis, normal intervals, no acute ST/T changes c/w ischemia, no LVH by voltage criteria, unchanged from previous tracings, 4/2021    Revised Cardiac Risk Index (RCRI):  The patient has the following serious cardiovascular risks for perioperative complications:   - No serious cardiac risks = 0 points     RCRI Interpretation: 0 points: Class I (very low risk - 0.4% complication rate)           Signed  Electronically by: Zoila Azul MD  Copy of this evaluation report is provided to requesting physician.

## 2021-10-08 ENCOUNTER — PREP FOR PROCEDURE (OUTPATIENT)
Dept: UROLOGY | Facility: OTHER | Age: 81
End: 2021-10-08

## 2021-10-08 DIAGNOSIS — R33.9 URINARY RETENTION: Primary | ICD-10-CM

## 2021-10-08 DIAGNOSIS — N30.00 ACUTE CYSTITIS WITHOUT HEMATURIA: Primary | ICD-10-CM

## 2021-10-08 LAB
BACTERIA UR CULT: ABNORMAL

## 2021-10-08 RX ORDER — CIPROFLOXACIN 2 MG/ML
400 INJECTION, SOLUTION INTRAVENOUS EVERY 12 HOURS
Status: CANCELLED | OUTPATIENT
Start: 2021-10-15

## 2021-10-08 RX ORDER — AMOXICILLIN 500 MG/1
500 CAPSULE ORAL 2 TIMES DAILY
Qty: 14 CAPSULE | Refills: 0 | Status: ON HOLD | OUTPATIENT
Start: 2021-10-08 | End: 2021-10-15

## 2021-10-11 ENCOUNTER — OFFICE VISIT (OUTPATIENT)
Dept: FAMILY MEDICINE | Facility: OTHER | Age: 81
End: 2021-10-11
Attending: UROLOGY
Payer: MEDICARE

## 2021-10-11 DIAGNOSIS — R33.9 URINARY RETENTION: ICD-10-CM

## 2021-10-11 PROCEDURE — U0005 INFEC AGEN DETEC AMPLI PROBE: HCPCS | Mod: ZL

## 2021-10-12 ENCOUNTER — ANESTHESIA EVENT (OUTPATIENT)
Dept: SURGERY | Facility: HOSPITAL | Age: 81
DRG: 713 | End: 2021-10-12
Payer: MEDICARE

## 2021-10-12 LAB — SARS-COV-2 RNA RESP QL NAA+PROBE: NEGATIVE

## 2021-10-13 ASSESSMENT — COPD QUESTIONNAIRES: COPD: 1

## 2021-10-13 ASSESSMENT — LIFESTYLE VARIABLES: TOBACCO_USE: 1

## 2021-10-13 NOTE — ANESTHESIA PREPROCEDURE EVALUATION
Anesthesia Pre-Procedure Evaluation    Patient: Anthony Dyer   MRN: [id number] : 1940        Preoperative Diagnosis: Urinary retention [R33.9]    Procedure : Procedure(s):  CYSTOSCOPY, WITH TRANSURETHRAL RESECTION PROSTATE          Past Medical History:   Diagnosis Date     Diabetes (H)      Hypertension       Past Surgical History:   Procedure Laterality Date     HERNIA REPAIR        Allergies   Allergen Reactions     Ace Inhibitors      Per [facility]: hyperkalemia.  Pt doesn't know if he is allergic     Ceclor [Cefaclor] Hives     Sulfa Drugs      Pt unsure.       Social History     Tobacco Use     Smoking status: Never Smoker     Smokeless tobacco: Former User     Types: Chew   Substance Use Topics     Alcohol use: Not Currently      Wt Readings from Last 1 Encounters:   10/06/21 83 kg (183 lb)        Anesthesia Evaluation   Pt has had prior anesthetic. Type: General.    No history of anesthetic complications       ROS/MED HX  ENT/Pulmonary: Comment: Former tobacco chew user    (+) tobacco use, Past use, mild,  COPD,     Neurologic:     (+) CVA, date: , without deficits,     Cardiovascular:     (+) Dyslipidemia hypertension-----Previous cardiac testing   Echo: Date: 21 Results:  EF 65-70%  Stress Test: Date: Results:    ECG Reviewed: Date: 10/06/21 Results:  SR  Cath: Date: Results:      METS/Exercise Tolerance: 3 - Able to walk 1-2 blocks without stopping    Hematologic: Comments: Xarelto to be held.  Acute PE 21 this year with cor pulmonale.  3-6 month hospital stay, unclear exact duration of hospital stay.    (+) History of blood clots, pt is anticoagulated,     Musculoskeletal: Comment: Generalized weakness  Lumbar spondylosis with neurogenic claudication      GI/Hepatic: Comment: IBS      Renal/Genitourinary: Comment: Most recent GFR 59, Creatinine WNL  ED    (+) renal disease, type: CRI, BPH,     Endo:     (+) type II DM, Not using insulin, - not using insulin pump. Diabetic  complications: neuropathy.     Psychiatric/Substance Use: Comment: Doesn't drink currently    (+) psychiatric history depression alcohol abuse Recreational drug usage: Cannabis (not for years).    Infectious Disease:  - neg infectious disease ROS     Malignancy:  - neg malignancy ROS     Other: Comment: Gout  Onychomycosis left great toe - neg other ROS          Physical Exam    Airway        Mallampati: III   TM distance: > 3 FB   Neck ROM: full   Mouth opening: < 3 cm    Respiratory Devices and Support         Dental       (+) missing      Cardiovascular          Rhythm and rate: irregular and normal     Pulmonary           (+) decreased breath sounds           OUTSIDE LABS:  CBC:   Lab Results   Component Value Date    WBC 7.5 10/06/2021    WBC 7.5 08/05/2021    HGB 14.0 10/06/2021    HGB 14.0 08/05/2021    HCT 42.1 10/06/2021    HCT 42.3 08/05/2021     10/06/2021     08/05/2021     BMP:   Lab Results   Component Value Date     10/06/2021     08/05/2021    POTASSIUM 4.8 10/06/2021    POTASSIUM 4.6 08/05/2021    CHLORIDE 106 10/06/2021    CHLORIDE 107 08/05/2021    CO2 29 10/06/2021    CO2 24 08/05/2021    BUN 13 10/06/2021    BUN 14 08/05/2021    CR 1.16 10/06/2021    CR 1.09 08/05/2021     (H) 10/06/2021     (H) 08/05/2021     COAGS: No results found for: PTT, INR, FIBR  POC:   Lab Results   Component Value Date     (H) 04/29/2021     HEPATIC:   Lab Results   Component Value Date    ALBUMIN 3.6 10/06/2021    PROTTOTAL 7.2 10/06/2021    ALT 18 10/06/2021    AST 15 10/06/2021    ALKPHOS 85 10/06/2021    BILITOTAL 0.7 10/06/2021     OTHER:   Lab Results   Component Value Date    LACT 1.2 04/15/2021    A1C 5.3 08/05/2021    JODI 9.4 10/06/2021    CRP 85.7 (H) 04/15/2021       Anesthesia Plan    ASA Status:  3   NPO Status:  NPO Appropriate    Anesthesia Type: General.     - Airway: ETT   Induction: Propofol, Intravenous.   Maintenance: Balanced.         Consents    Anesthesia Plan(s) and associated risks, benefits, and realistic alternatives discussed. Questions answered and patient/representative(s) expressed understanding.     - Discussed with:  Patient      - Extended Intubation/Ventilatory Support Discussed: Yes.      - Patient is DNR/DNI Status: No    Use of blood products discussed: No .     Postoperative Care    Pain management: Oral pain medications, IV analgesics, Multi-modal analgesia.   PONV prophylaxis: Ondansetron (or other 5HT-3)     Comments:                MARTELL Rodriguez CRNA

## 2021-10-15 ENCOUNTER — ANESTHESIA (OUTPATIENT)
Dept: SURGERY | Facility: HOSPITAL | Age: 81
DRG: 713 | End: 2021-10-15
Payer: MEDICARE

## 2021-10-15 ENCOUNTER — HOSPITAL ENCOUNTER (INPATIENT)
Facility: HOSPITAL | Age: 81
LOS: 3 days | Discharge: HOME | DRG: 713 | End: 2021-10-20
Attending: UROLOGY | Admitting: HOSPITALIST
Payer: MEDICARE

## 2021-10-15 DIAGNOSIS — R31.9 URINARY TRACT INFECTION WITH HEMATURIA, SITE UNSPECIFIED: ICD-10-CM

## 2021-10-15 DIAGNOSIS — I26.09 OTHER ACUTE PULMONARY EMBOLISM WITH ACUTE COR PULMONALE (H): Primary | ICD-10-CM

## 2021-10-15 DIAGNOSIS — N39.0 URINARY TRACT INFECTION WITH HEMATURIA, SITE UNSPECIFIED: ICD-10-CM

## 2021-10-15 DIAGNOSIS — R33.9 URINARY RETENTION: ICD-10-CM

## 2021-10-15 DIAGNOSIS — R53.1 GENERALIZED WEAKNESS: ICD-10-CM

## 2021-10-15 LAB
ANION GAP SERPL CALCULATED.3IONS-SCNC: 5 MMOL/L (ref 3–14)
BUN SERPL-MCNC: 17 MG/DL (ref 7–30)
CALCIUM SERPL-MCNC: 8.7 MG/DL (ref 8.5–10.1)
CHLORIDE BLD-SCNC: 110 MMOL/L (ref 94–109)
CO2 SERPL-SCNC: 24 MMOL/L (ref 20–32)
CREAT SERPL-MCNC: 0.98 MG/DL (ref 0.66–1.25)
GFR SERPL CREATININE-BSD FRML MDRD: 73 ML/MIN/1.73M2
GLUCOSE BLD-MCNC: 147 MG/DL (ref 70–99)
HGB BLD-MCNC: 13.4 G/DL (ref 13.3–17.7)
POTASSIUM BLD-SCNC: 4 MMOL/L (ref 3.4–5.3)
SODIUM SERPL-SCNC: 139 MMOL/L (ref 133–144)

## 2021-10-15 PROCEDURE — 250N000013 HC RX MED GY IP 250 OP 250 PS 637: Performed by: HOSPITALIST

## 2021-10-15 PROCEDURE — 258N000003 HC RX IP 258 OP 636: Performed by: NURSE ANESTHETIST, CERTIFIED REGISTERED

## 2021-10-15 PROCEDURE — 85018 HEMOGLOBIN: CPT | Performed by: UROLOGY

## 2021-10-15 PROCEDURE — 88305 TISSUE EXAM BY PATHOLOGIST: CPT | Mod: TC | Performed by: UROLOGY

## 2021-10-15 PROCEDURE — 80048 BASIC METABOLIC PNL TOTAL CA: CPT | Performed by: UROLOGY

## 2021-10-15 PROCEDURE — 250N000013 HC RX MED GY IP 250 OP 250 PS 637: Performed by: UROLOGY

## 2021-10-15 PROCEDURE — 370N000017 HC ANESTHESIA TECHNICAL FEE, PER MIN: Performed by: UROLOGY

## 2021-10-15 PROCEDURE — 99100 ANES PT EXTEME AGE<1 YR&>70: CPT | Performed by: NURSE ANESTHETIST, CERTIFIED REGISTERED

## 2021-10-15 PROCEDURE — 272N000001 HC OR GENERAL SUPPLY STERILE: Performed by: UROLOGY

## 2021-10-15 PROCEDURE — 0VT08ZZ RESECTION OF PROSTATE, VIA NATURAL OR ARTIFICIAL OPENING ENDOSCOPIC: ICD-10-PCS | Performed by: UROLOGY

## 2021-10-15 PROCEDURE — 710N000010 HC RECOVERY PHASE 1, LEVEL 2, PER MIN: Performed by: UROLOGY

## 2021-10-15 PROCEDURE — 250N000025 HC SEVOFLURANE, PER MIN: Performed by: UROLOGY

## 2021-10-15 PROCEDURE — 999N000141 HC STATISTIC PRE-PROCEDURE NURSING ASSESSMENT: Performed by: UROLOGY

## 2021-10-15 PROCEDURE — 250N000009 HC RX 250: Performed by: NURSE ANESTHETIST, CERTIFIED REGISTERED

## 2021-10-15 PROCEDURE — 52500 TRURL RESECTION BLADDER NECK: CPT | Performed by: NURSE ANESTHETIST, CERTIFIED REGISTERED

## 2021-10-15 PROCEDURE — 360N000076 HC SURGERY LEVEL 3, PER MIN: Performed by: UROLOGY

## 2021-10-15 PROCEDURE — 250N000009 HC RX 250: Performed by: UROLOGY

## 2021-10-15 PROCEDURE — 250N000011 HC RX IP 250 OP 636: Performed by: UROLOGY

## 2021-10-15 PROCEDURE — 250N000011 HC RX IP 250 OP 636: Performed by: NURSE ANESTHETIST, CERTIFIED REGISTERED

## 2021-10-15 PROCEDURE — 52601 PROSTATECTOMY (TURP): CPT | Performed by: UROLOGY

## 2021-10-15 PROCEDURE — 99222 1ST HOSP IP/OBS MODERATE 55: CPT | Mod: 24 | Performed by: HOSPITALIST

## 2021-10-15 PROCEDURE — 36415 COLL VENOUS BLD VENIPUNCTURE: CPT | Performed by: UROLOGY

## 2021-10-15 PROCEDURE — C1758 CATHETER, URETERAL: HCPCS | Performed by: UROLOGY

## 2021-10-15 RX ORDER — SODIUM CHLORIDE, SODIUM LACTATE, POTASSIUM CHLORIDE, CALCIUM CHLORIDE 600; 310; 30; 20 MG/100ML; MG/100ML; MG/100ML; MG/100ML
INJECTION, SOLUTION INTRAVENOUS CONTINUOUS
Status: DISCONTINUED | OUTPATIENT
Start: 2021-10-15 | End: 2021-10-15 | Stop reason: HOSPADM

## 2021-10-15 RX ORDER — TAMSULOSIN HYDROCHLORIDE 0.4 MG/1
0.4 CAPSULE ORAL EVERY EVENING
Status: DISCONTINUED | OUTPATIENT
Start: 2021-10-15 | End: 2021-10-20 | Stop reason: HOSPADM

## 2021-10-15 RX ORDER — ONDANSETRON 2 MG/ML
4 INJECTION INTRAMUSCULAR; INTRAVENOUS EVERY 30 MIN PRN
Status: DISCONTINUED | OUTPATIENT
Start: 2021-10-15 | End: 2021-10-15 | Stop reason: HOSPADM

## 2021-10-15 RX ORDER — FENTANYL CITRATE 50 UG/ML
INJECTION, SOLUTION INTRAMUSCULAR; INTRAVENOUS PRN
Status: DISCONTINUED | OUTPATIENT
Start: 2021-10-15 | End: 2021-10-15

## 2021-10-15 RX ORDER — CIPROFLOXACIN 2 MG/ML
400 INJECTION, SOLUTION INTRAVENOUS EVERY 12 HOURS
Status: DISCONTINUED | OUTPATIENT
Start: 2021-10-15 | End: 2021-10-15 | Stop reason: HOSPADM

## 2021-10-15 RX ORDER — CARVEDILOL 12.5 MG/1
25 TABLET ORAL 2 TIMES DAILY
Status: DISCONTINUED | OUTPATIENT
Start: 2021-10-15 | End: 2021-10-20 | Stop reason: HOSPADM

## 2021-10-15 RX ORDER — NALOXONE HYDROCHLORIDE 0.4 MG/ML
0.4 INJECTION, SOLUTION INTRAMUSCULAR; INTRAVENOUS; SUBCUTANEOUS
Status: DISCONTINUED | OUTPATIENT
Start: 2021-10-15 | End: 2021-10-20 | Stop reason: HOSPADM

## 2021-10-15 RX ORDER — ONDANSETRON 4 MG/1
4 TABLET, ORALLY DISINTEGRATING ORAL EVERY 30 MIN PRN
Status: DISCONTINUED | OUTPATIENT
Start: 2021-10-15 | End: 2021-10-15 | Stop reason: HOSPADM

## 2021-10-15 RX ORDER — ACETAMINOPHEN 325 MG/1
325 TABLET ORAL EVERY 4 HOURS PRN
Status: DISCONTINUED | OUTPATIENT
Start: 2021-10-15 | End: 2021-10-15 | Stop reason: DRUGHIGH

## 2021-10-15 RX ORDER — SODIUM CHLORIDE, CALCIUM CHLORIDE, AND POTASSIUM CHLORIDE .86; .033; .03 G/100ML; G/100ML; G/100ML
INJECTION, SOLUTION INTRAVENOUS CONTINUOUS
Status: DISCONTINUED | OUTPATIENT
Start: 2021-10-15 | End: 2021-10-20 | Stop reason: HOSPADM

## 2021-10-15 RX ORDER — ACETAMINOPHEN 325 MG/1
650 TABLET ORAL EVERY 4 HOURS PRN
Status: DISCONTINUED | OUTPATIENT
Start: 2021-10-15 | End: 2021-10-17

## 2021-10-15 RX ORDER — LIDOCAINE HYDROCHLORIDE 20 MG/ML
INJECTION, SOLUTION INFILTRATION; PERINEURAL PRN
Status: DISCONTINUED | OUTPATIENT
Start: 2021-10-15 | End: 2021-10-15

## 2021-10-15 RX ORDER — GABAPENTIN 300 MG/1
300 CAPSULE ORAL 3 TIMES DAILY
Status: DISCONTINUED | OUTPATIENT
Start: 2021-10-15 | End: 2021-10-20 | Stop reason: HOSPADM

## 2021-10-15 RX ORDER — NALOXONE HYDROCHLORIDE 0.4 MG/ML
0.2 INJECTION, SOLUTION INTRAMUSCULAR; INTRAVENOUS; SUBCUTANEOUS
Status: DISCONTINUED | OUTPATIENT
Start: 2021-10-15 | End: 2021-10-20 | Stop reason: HOSPADM

## 2021-10-15 RX ORDER — ATORVASTATIN CALCIUM 40 MG/1
40 TABLET, FILM COATED ORAL EVERY EVENING
Status: DISCONTINUED | OUTPATIENT
Start: 2021-10-16 | End: 2021-10-20 | Stop reason: HOSPADM

## 2021-10-15 RX ORDER — AMLODIPINE BESYLATE 2.5 MG/1
2.5 TABLET ORAL DAILY
Status: DISCONTINUED | OUTPATIENT
Start: 2021-10-16 | End: 2021-10-20 | Stop reason: HOSPADM

## 2021-10-15 RX ORDER — HYDROMORPHONE HYDROCHLORIDE 1 MG/ML
0.4 INJECTION, SOLUTION INTRAMUSCULAR; INTRAVENOUS; SUBCUTANEOUS EVERY 5 MIN PRN
Status: DISCONTINUED | OUTPATIENT
Start: 2021-10-15 | End: 2021-10-15 | Stop reason: HOSPADM

## 2021-10-15 RX ORDER — ALLOPURINOL 100 MG/1
100 TABLET ORAL DAILY
Status: DISCONTINUED | OUTPATIENT
Start: 2021-10-15 | End: 2021-10-20 | Stop reason: HOSPADM

## 2021-10-15 RX ORDER — PROPOFOL 10 MG/ML
INJECTION, EMULSION INTRAVENOUS PRN
Status: DISCONTINUED | OUTPATIENT
Start: 2021-10-15 | End: 2021-10-15

## 2021-10-15 RX ORDER — DEXAMETHASONE SODIUM PHOSPHATE 10 MG/ML
INJECTION, SOLUTION INTRAMUSCULAR; INTRAVENOUS PRN
Status: DISCONTINUED | OUTPATIENT
Start: 2021-10-15 | End: 2021-10-15

## 2021-10-15 RX ORDER — FINASTERIDE 5 MG/1
5 TABLET, FILM COATED ORAL DAILY
Status: DISCONTINUED | OUTPATIENT
Start: 2021-10-15 | End: 2021-10-20 | Stop reason: HOSPADM

## 2021-10-15 RX ORDER — ATROPA BELLADONNA AND OPIUM 16.2; 3 MG/1; MG/1
30 SUPPOSITORY RECTAL EVERY 6 HOURS PRN
Status: DISCONTINUED | OUTPATIENT
Start: 2021-10-15 | End: 2021-10-20 | Stop reason: HOSPADM

## 2021-10-15 RX ORDER — FENTANYL CITRATE 50 UG/ML
50 INJECTION, SOLUTION INTRAMUSCULAR; INTRAVENOUS EVERY 5 MIN PRN
Status: DISCONTINUED | OUTPATIENT
Start: 2021-10-15 | End: 2021-10-15 | Stop reason: HOSPADM

## 2021-10-15 RX ORDER — CALCIUM CARBONATE/VITAMIN D3 600 MG-10
250 TABLET ORAL DAILY
Status: DISCONTINUED | OUTPATIENT
Start: 2021-10-15 | End: 2021-10-20 | Stop reason: HOSPADM

## 2021-10-15 RX ORDER — OXYCODONE HYDROCHLORIDE 5 MG/1
5 TABLET ORAL EVERY 4 HOURS PRN
Status: DISCONTINUED | OUTPATIENT
Start: 2021-10-15 | End: 2021-10-15

## 2021-10-15 RX ORDER — NITROFURANTOIN 25; 75 MG/1; MG/1
100 CAPSULE ORAL EVERY 12 HOURS SCHEDULED
Status: DISCONTINUED | OUTPATIENT
Start: 2021-10-15 | End: 2021-10-18

## 2021-10-15 RX ORDER — LIDOCAINE 40 MG/G
CREAM TOPICAL
Status: DISCONTINUED | OUTPATIENT
Start: 2021-10-15 | End: 2021-10-15 | Stop reason: HOSPADM

## 2021-10-15 RX ORDER — ALBUTEROL SULFATE 90 UG/1
2 AEROSOL, METERED RESPIRATORY (INHALATION) EVERY 4 HOURS PRN
Status: DISCONTINUED | OUTPATIENT
Start: 2021-10-15 | End: 2021-10-20 | Stop reason: HOSPADM

## 2021-10-15 RX ORDER — ONDANSETRON 2 MG/ML
INJECTION INTRAMUSCULAR; INTRAVENOUS PRN
Status: DISCONTINUED | OUTPATIENT
Start: 2021-10-15 | End: 2021-10-15

## 2021-10-15 RX ADMIN — FINASTERIDE 5 MG: 5 TABLET, FILM COATED ORAL at 17:01

## 2021-10-15 RX ADMIN — CARVEDILOL 25 MG: 12.5 TABLET, FILM COATED ORAL at 20:23

## 2021-10-15 RX ADMIN — Medication 100 MG: at 08:02

## 2021-10-15 RX ADMIN — GABAPENTIN 300 MG: 300 CAPSULE ORAL at 20:23

## 2021-10-15 RX ADMIN — GABAPENTIN 300 MG: 300 CAPSULE ORAL at 17:01

## 2021-10-15 RX ADMIN — ROCURONIUM BROMIDE 10 MG: 10 INJECTION INTRAVENOUS at 08:02

## 2021-10-15 RX ADMIN — SODIUM CHLORIDE, POTASSIUM CHLORIDE, SODIUM LACTATE AND CALCIUM CHLORIDE: 600; 310; 30; 20 INJECTION, SOLUTION INTRAVENOUS at 07:46

## 2021-10-15 RX ADMIN — LIDOCAINE HYDROCHLORIDE 40 MG: 20 INJECTION, SOLUTION INFILTRATION; PERINEURAL at 08:02

## 2021-10-15 RX ADMIN — SODIUM CHLORIDE, CALCIUM CHLORIDE, AND POTASSIUM CHLORIDE: .86; .033; .03 INJECTION, SOLUTION INTRAVENOUS at 12:51

## 2021-10-15 RX ADMIN — DEXAMETHASONE SODIUM PHOSPHATE 10 MG: 10 INJECTION, SOLUTION INTRAMUSCULAR; INTRAVENOUS at 08:10

## 2021-10-15 RX ADMIN — ONDANSETRON 4 MG: 2 INJECTION INTRAMUSCULAR; INTRAVENOUS at 09:22

## 2021-10-15 RX ADMIN — SODIUM CHLORIDE, POTASSIUM CHLORIDE, SODIUM LACTATE AND CALCIUM CHLORIDE: 600; 310; 30; 20 INJECTION, SOLUTION INTRAVENOUS at 09:23

## 2021-10-15 RX ADMIN — CIPROFLOXACIN 400 MG: 2 INJECTION, SOLUTION INTRAVENOUS at 07:47

## 2021-10-15 RX ADMIN — ALLOPURINOL 100 MG: 100 TABLET ORAL at 17:01

## 2021-10-15 RX ADMIN — ATROPA BELLADONNA AND OPIUM 1 SUPPOSITORY: 16.2; 3 SUPPOSITORY RECTAL at 11:37

## 2021-10-15 RX ADMIN — SODIUM CHLORIDE, POTASSIUM CHLORIDE, SODIUM LACTATE AND CALCIUM CHLORIDE: 600; 310; 30; 20 INJECTION, SOLUTION INTRAVENOUS at 07:20

## 2021-10-15 RX ADMIN — FENTANYL CITRATE 50 MCG: 50 INJECTION, SOLUTION INTRAMUSCULAR; INTRAVENOUS at 08:02

## 2021-10-15 RX ADMIN — SODIUM CHLORIDE, CALCIUM CHLORIDE, AND POTASSIUM CHLORIDE: .86; .033; .03 INJECTION, SOLUTION INTRAVENOUS at 22:26

## 2021-10-15 RX ADMIN — PROPOFOL 150 MG: 10 INJECTION, EMULSION INTRAVENOUS at 08:02

## 2021-10-15 RX ADMIN — NITROFURANTOIN (MONOHYDRATE/MACROCRYSTALS) 100 MG: 75; 25 CAPSULE ORAL at 12:51

## 2021-10-15 RX ADMIN — FENTANYL CITRATE 50 MCG: 50 INJECTION, SOLUTION INTRAMUSCULAR; INTRAVENOUS at 07:56

## 2021-10-15 RX ADMIN — Medication 250 MCG: at 17:01

## 2021-10-15 RX ADMIN — NITROFURANTOIN (MONOHYDRATE/MACROCRYSTALS) 100 MG: 75; 25 CAPSULE ORAL at 20:23

## 2021-10-15 RX ADMIN — TAMSULOSIN HYDROCHLORIDE 0.4 MG: 0.4 CAPSULE ORAL at 20:23

## 2021-10-15 ASSESSMENT — COPD QUESTIONNAIRES: CAT_SEVERITY: MILD

## 2021-10-15 ASSESSMENT — MIFFLIN-ST. JEOR: SCORE: 1595.09

## 2021-10-15 NOTE — PROVIDER NOTIFICATION
Spoke to Dr De Leon regarding patient activity order; MD verbally stated patient can be up as tolerated.

## 2021-10-15 NOTE — PLAN OF CARE
Temp: 97.9  F (36.6  C) Temp src: Tympanic BP: 126/61 Pulse: 102   Resp: 18 SpO2: 92 % O2 Device: None (Room air)     Patient A&O x 4; up SBA to commode. Full assessment as charted and unremarkable. Urine output remains clear to dark pink. Dr. De Leon informed that there is bleeding @ meatus; unconcerned. IV patent fluids @ 100. Call light remains in reach @ all times; no events issues this shift.     Face to face report given with opportunity to observe patient.    Report given to TONY Mesa RN   10/15/2021  7:22 PM

## 2021-10-15 NOTE — ANESTHESIA POSTPROCEDURE EVALUATION
Patient: Anthony Dyer    Procedure: Procedure(s):  CYSTOSCOPY, WITH TRANSURETHRAL RESECTION PROSTATE       Diagnosis:Urinary retention [R33.9]  Diagnosis Additional Information: No value filed.    Anesthesia Type:  General    Note:  Disposition: Outpatient   Postop Pain Control: Uneventful            Sign Out: Well controlled pain   PONV: No   Neuro/Psych: Uneventful            Sign Out: Acceptable/Baseline neuro status   Airway/Respiratory: Uneventful            Sign Out: Acceptable/Baseline resp. status   CV/Hemodynamics: Uneventful            Sign Out: Acceptable CV status; No obvious hypovolemia; No obvious fluid overload   Other NRE: NONE   DID A NON-ROUTINE EVENT OCCUR? No           Last vitals:  Vitals Value Taken Time   /77 10/15/21 1046   Temp 97.9  F (36.6  C) 10/15/21 0938   Pulse 81 10/15/21 1046   Resp 16 10/15/21 1045   SpO2 92 % 10/15/21 1047   Vitals shown include unvalidated device data.    Electronically Signed By: MARTELL Rodriguez CRNA  October 15, 2021  12:14 PM

## 2021-10-15 NOTE — PLAN OF CARE
Prior to Admission Medication Reconciliation:     Medications added:   [x] None  [] As listed below:    Medications deleted:   [] None  [x] As listed below:    Amoxicillin- therapy completed yesterday    Medications marked for review/removal by attending:  [x] None  [] As listed below:    Changes made to existing medications:   [] None  [] Updated strengths and frequencies to most current  [x] As listed below:    ozempic- pt takes on Tuesdays now    Last times/dates taken verified with patient:  [] Yes- completed myself  [] Prepared PTA medlist for review only. (will not be available to review personally)  [] Did not review with patient. Rx verification only. Review completed by nursing.    [] Nurse completed no changes made (double checked entries)  [] Unable to review with patient at this time:  [x] Nurse completed/changes made:     Input am/pm    Allergies listed at another location:  []Allergies match allergies listed in Epic  []Other allergies listed:    Allergy review:    [x]Did not review: reviewed by nursing  []Did not review: pt unable at this time  []Patient/MAR verified NKDA  []Patient/MAR verified current existing allergies: no changes made  []Patient confirmed current existing allergies and new allergies added:    Medication reconciliation sources:   [x]Patient  []Patient family member/emergency contact: **  [x][facility] Report Review  [x]Epic Chart Review  [x]Care Everywhere review  []Pharmacy med list: **  []Pharmacy phone call  [x]Outside meds dispense report: see below  []Nursing home or Assisted Living MAR:  []Other: **    Pharmacy [facility] at discharge[facility]    Is patient on coumadin?  [x]No    Requests for consultation by provider or pharmacist:   [x] Patient understands why all of their meds were prescribed and how to take them. No questions.   [] Managing party has no questions.   [] Patient/ managing party has questions about the following:  [] Did not review with patient. Cannot assess.      Fill dates and reported compliancy:  [x] Fill dates coincide with reported compliancy for all/most maintenance meds.   [] Fill dates do not coincide with compliancy with maintenance meds. See notes in PTA medlist and in comments.    [x] Fill dates do not coincide with the following medications but pt reports compliancy: glimepiride  [] Did not review with patient. Cannot assess.     Historian accuracy:  [] Excellent- alert and oriented, understands why meds were prescribed and how to take, able to answer specifics  [x] Good- alert and oriented, understands why meds were prescribed and how to take, some confusion   [] Fair- alert and oriented, doesn't know medications without list, cannot answer specifics about medications, but has a decent process for which to take at home  [] Poor- does not know medications, may not have a process to take at home, may be cognitively unable to review at this time  []Medication management done by family member or facility, no concerns about historian accuracy.   [] Did not review with patient. Cannot assess.     Medication Management:  [x] Manages meds independently  [] Family member/ other party manages meds/assists:  [] Meds managed by staff at facility  [] Meds set up by home care, family/other party helps administer  [] Meds set up by home care, self administers  [] Did not review with patient. Cannot assess.     Other medications aside from PTA:  [x] Denies taking any medications aside from those listed in PTA meds  [] Reports taking another medication(s) but cannot recall the name(s)  [] Refuses to say.  [] Did not review with patient. Cannot assess.     Comments: none    Myriam Talley on 10/15/2021 at 1:38 PM       Discrepancies: [x] No []Not Applicable [x]Yes: listed below    Issues completing PTA medication reconciliation:  [] On hold for a long time  [] Waited for a call back  [] Fax didn't come through  [] Fax took a long time  [] Other:    Notifying appropriate party  of changes/additions/discrepancies:  [x]No pertinent changes made, notification not necessary.   [] Notified attending provider via text page/phone call  [] Notified attending provider in person  [] Notified pharmacy  [] Notified nurse  [] Medications have not been reconciled by a provider yet, notification not necessary  [] Pt is not admitted to floor yet, PTA meds completed before admission.         Medications Prior to Admission   Medication Sig Dispense Refill Last Dose     acetaminophen (TYLENOL) 325 MG tablet Take 650 mg by mouth every 4 hours as needed for pain . Limit acetaminophen to 4000 mg per day from all sources.   10/14/2021 at Unknown time     albuterol (PROAIR HFA/PROVENTIL HFA/VENTOLIN HFA) 108 (90 Base) MCG/ACT inhaler Inhale 2 puffs into the lungs every 4 hours as needed   Past Week at Unknown time     allopurinol (ZYLOPRIM) 100 MG tablet Take 100 mg by mouth daily    10/14/2021 at AM     amLODIPine (NORVASC) 2.5 MG tablet Take 2.5 mg by mouth daily   10/15/2021 at 0500     atorvastatin (LIPITOR) 80 MG tablet Take 40 mg by mouth every evening    10/15/2021 at 0500     carvedilol (COREG) 25 MG tablet Take 25 mg by mouth 2 times daily    10/14/2021 at 2000     finasteride (PROSCAR) 5 MG tablet Take 1 tablet (5 mg) by mouth daily 30 tablet 11 10/14/2021 at AM     gabapentin (NEURONTIN) 300 MG capsule Take 300 mg by mouth 3 times daily    10/14/2021     glimepiride (AMARYL) 2 MG tablet Take 2 mg by mouth every morning (before breakfast)    10/14/2021 at AM     metFORMIN (GLUCOPHAGE-XR) 500 MG 24 hr tablet Take 1,000 mg by mouth 2 times daily (with meals)    10/14/2021 at AM/PM     multivitamin, therapeutic (THERA-VIT) TABS tablet Take 1 tablet by mouth daily   Past Month at Unknown time     rivaroxaban ANTICOAGULANT (XARELTO ANTICOAGULANT) 20 MG TABS tablet TAKE 1 TABLET DAILY BY MOUTH WITH DINNER 30 tablet 10 10/13/2021 at Unknown time     Semaglutide,0.25 or 0.5MG/DOS, (OZEMPIC, 0.25 OR 0.5  MG/DOSE,) 2 MG/1.5ML SOPN Inject 0.5 mg Subcutaneous every 7 days on Tuesdays   10/12/2021 at Unknown time     tamsulosin (FLOMAX) 0.4 MG capsule Take 0.4 mg by mouth every evening    10/14/2021 at PM     vitamin B-12 (CYANOCOBALAMIN) 250 MCG tablet Take 250 mcg by mouth daily   Past Month at Unknown time           Medication Dispense History (from 10/15/2020 to 10/15/2021)  Expand All  Collapse All    Acetaminophen     Dispensed Days Supply Quantity Provider Pharmacy   ACETAMINOPHEN 325MG TABLET 05/28/2021 9 100 Units TC HARRISON [facility]...           Albuterol Sulfate     Dispensed Days Supply Quantity Provider Pharmacy   ALBUTEROL HFA 90MCG/ACT INH 05/28/2021 17 8.5 g MONTANAMCKENNA [facility]...   ALBUTEROL HFA 90MCG/ACT INH [facility]           Allopurinol     Dispensed Days Supply Quantity Provider Pharmacy   ALLOPURINOL 100MG TABLET [facility]19/2021 [facility]   ALLOPURINOL 100MG TABLET 05/28/2021 [facility]   ALLOPURINOL 100MG TABLET 05/12/2021 30 30 [facility] Fa...   ALLOPURINOL 100MG TABLET 04/13/2021 30 30 [facility] Fa...   ALLOPURINOL 100MG TABLET 01/14/2021 30 [facility]   ALLOPURINOL 100MG TABLET 01/13/2021 90 90 [facility]           Amoxicillin     Dispensed Days Supply Quantity Provider Pharmacy   AMOXICILLIN 500MG CAPSULE 10/08/2021 7 14 Units TUCKER LUNA [facility]...   AMOXICILLIN 500MG CAPSULE 03/05/2021 7 21 Units ASHWINI JACOBSON JR [facility]...   AMOXICILLIN 500MG CAPSULE 02/24/2021 7 28 Units ASHWINI JACOBSON JR [facility]...           Atorvastatin Calcium     Dispensed Days Supply Quantity Provider Pharmacy   ATORVASTATIN 80MG TABLET 08/09/2021 90 45 Units MCKENNA CHERY  [facility]...   ATORVASTATIN 80MG TABLET 05/28/2021 90 45 Units [facility]...   ATORVASTATIN 80MG TABLET 04/13/2021 90 45 Units [facility]61 - Fa...   ATORVASTATIN TAB 80MG 04/13/2021 90 45 each [facility]...   ATORVASTATIN 80MG TABLET 01/20/2021 90 45 Units [facility]...           Carvedilol     Dispensed Days Supply Quantity Provider Pharmacy   CARVEDILOL   TAB 25M 08/18/2021 90 180 tablet [facility]...   CARVEDILOL 25MG TABLET 08/18/2021 90 180 Units [facility] - Fa...   CARVEDILOL 25MG TABLET 05/28/2021 90 180 Units [facility]...   CARVEDILOL   TAB 25MG 04/13/2021 90 180 each [facility]...   CARVEDILOL 25MG TABLET 04/13/2021 90 180 Units [facility]61 - Fa...   CARVEDILOL 25MG TABLET 01/20/2021 90 180 Units [facility]...           Cephalexin     Dispensed Days Supply Quantity Provider Pharmacy   CEPHALEXIN 500MG CAPSULE 07/07/2021 5 10 Units EDGAR ARROYO [facility]...           Cyanocobalamin     Dispensed Days Supply Quantity Provider Pharmacy   VITAMIN B-12 250MCG TABLET 05/28/2021 30 30 Units TC HARRISON [facility]...           Finasteride     Dispensed Days Supply Quantity Provider Pharmacy   FINASTERIDE  TAB 5MG 09/18/2021 30 30 tablet CHERYLTUCKER [facility]...   FINASTERIDE 5MG TABLET 09/18/2021 30 30 Units TUCKER LUNA #61 - Fa...   FINASTERIDE  TAB 5MG 08/18/2021 30 30 tablet TUCKER LUNA [facility]...   FINASTERIDE 5MG TABLET 08/18/2021 30 30 Units TUCKER LUNA [facility] #61 - Fa...   FINASTERIDE  TAB 5MG 07/20/2021 30 30 tablet TUCKER LUNA Pharmacy...   FINASTERIDE 5MG TABLET 07/20/2021 30 30 Units TUCKER LUNA #61 - Fa...   FINASTERIDE  TAB 5MG  06/20/2021 30 30 tablet [facility]...   FINASTERIDE 5MG TABLET 06/20/2021 30 30 Units [facility] #61 - Fa...   FINASTERIDE 5MG TABLET 05/28/2021 30 30 Units [facility]...   FINASTERIDE 5MG TABLET 04/28/2021 30 30 Units [facility]...           Gabapentin     Dispensed Days Supply Quantity Provider Pharmacy   GABAPENTIN    08/18/2021 90 270 capsule [facility]...   GABAPENTIN 300MG CAPSULE 08/18/2021 90 270 Units [facility] #61 - Fa...   GABAPENTIN 300MG CAPSULE 05/28/2021 90 270 Units [facility]...           Glimepiride     Dispensed Days Supply Quantity Provider Pharmacy   GLIMEPIRIDE 2MG TABLET 05/28/2021 30 30 Units SALMATrumanTC [facility]...   GLIMEPIRIDE  TAB 4MG 04/13/2021 90 90 each [facility]...   GLIMEPIRIDE 4MG TABLET 04/13/2021 90 90 Units [facility]61 - Fa...   GLIMEPIRIDE 4MG TABLET 01/20/2021 90 90 Units [facility]...           Ketorolac Tromethamine     Dispensed Days Supply Quantity Provider Pharmacy   KETOROLAC 0.5% OPHTH SOLUTION 10/06/2021 45 10 mL THO GARCIA Pharmacy...           Magnesium Hydroxide     Dispensed Days Supply Quantity Provider Pharmacy   MILK OF MAG 400MG/5ML SUSP 05/28/2021 16 473 mL [facility]           Moxifloxacin HCl     Dispensed Days Supply Quantity Provider Pharmacy   MOXIFLOXACIN 0.5% OPHTH SOLN 10/06/2021 10 3 mL [facility]           Multiple Vitamin     Dispensed Days Supply Quantity Provider Pharmacy   TAB-A-REGINA MULTIVITAMIN TABLET 05/28/2021 30 30 Units TC HARRISON [facility]...           Nitrofurantoin Monohyd Macro     Dispensed Days Supply Quantity Provider Pharmacy   NITROFURANTOIN M/M 100MG CAP 07/09/2021 7 14 Units  TC HARRISON [facility]...   NITROFURANTOIN M/M 100MG CAP 06/09/2021 7 14 Units MISHA BONDS [facility]...           Rivaroxaban     Dispensed Days Supply Quantity Provider Pharmacy   XARELTO      TAB 20M 09/18/2021 30 30 tablet TC HARRISON [facility]   XARELTO 20MG TABLET 09/18/2021 30 30 Units TC HARRISON #61 - Fa...   XARELTO      TAB 20M 08/18/2021 30 30 tablet TC HARRISON [facility]   XARELTO 20MG TABLET 08/18/2021 30 30 Units TC HARRISON #61 - Fa...   XARELTO 20MG TABLET 07/21/2021 30 30 Units TC HARRISON [facility]   XARELTO 20MG TABLET 06/24/2021 30 30 Units TAMIE RIBEIRO [facility]...   XARELTO 20MG TABLET 05/28/2021 30 30 Units TC HARRISON [facility]...           Semaglutide     Dispensed Days Supply Quantity Provider Pharmacy   OZEMPIC      INJ 2/1 10/12/2021 28 1.5 mL MONTANA,MCKENNA [facility]...   OZEMPIC 0.25 OR 0.50/DOSE INJ 10/12/2021 28 1.5 mL MONTANA,MCKENNA [facility] #61 - Fa...   OZEMPIC      INJ 2/1 09/15/2021 28 1.5 mL MONTANA,MCKENNA [facility]...   OZEMPIC 0.25 OR 0.50/DOSE INJ 09/15/2021 28 1.5 mL MONTANA,MCKENNA [facility] #61 - Fa...   OZEMPIC      INJ 2/1 08/18/2021 28 1.5 mL MONTANA,MCKENNA [facility]...   OZEMPIC 0.25 OR 0.50/DOSE INJ 08/18/2021 28 1.5 mL MONTANA,MCKENNA [facility] #61 - Fa...   OZEMPIC      INJ 2/1 07/15/2021 28 1.5 mL MCKENNA CHERY Pharmacy...   OZEMPIC 0.25 OR 0.50/DOSE INJ 07/15/2021 28 1.5 mL MCKENNA CH[facility] #61 - Fa...   OZEMPIC      INJ 2/1.5ML 06/17/2021 28 1.5 mL MONTANAMCKENNA Pharmacy...   OZEMPIC 0.25 OR 0.50/DOSE INJ 06/17/2021 28 1.5 mL MCKENNA CH[facility] #61 - Fa...   OZEMPIC      INJ 2/1.5ML 05/17/2021 28 1.5 each MONTANAMCKENNA Pharmacy...   OZEMPIC 0.25 OR 0.50/DOSE INJ 05/17/2021 28 1.5 mL  MONTANAMCKENNAynessp [facility] - Fa...   OZEMPIC      INJ 2/1.5ML 04/19/2021 28 1.5 each MONTANA,MCKENNA [facility]   OZEMPIC 0.25 OR 0.50/DOSE INJ 04/19/2021 28 1.5 mL MONTANA,MCKENNA [facility] - Fa...   OZEMPIC      INJ 2/1.5ML 03/22/2021 28 1.5 each MONTANA,MCEKNNA [facility]...   OZEMPIC 0.25 OR 0.50/DOSE INJ 03/22/2021 28 1.5 mL MONTANA,MCKENNA [facility] - Fa...   OZEMPIC      INJ 2/1.5ML 02/22/2021 28 1.5 each MONTANA,MCKENNA [facility]   OZEMPIC 0.25 OR 0.50/DOSE INJ 02/22/2021 28 1.5 mL MONTANA,MCKENNA [facility] - Fa...   OZEMPIC      INJ 2/1.5ML 01/25/2021 28 1.5 each MONTANA,MCKENNA [facility]...   OZEMPIC 0.25 OR 0.50/DOSE INJ 01/25/2021 28 1.5 mL MONTANA,MCKENNA Bl[facility]ela [facility] - Fa...   OZEMPIC      INJ 2/1.5ML 12/26/2020 28 1.5 each MONTANA,MCKENNA [facility]...   OZEMPIC 0.25 OR 0.50/DOSE INJ 12/26/2020 28 1.5 mL MONTANA,MCKENNA [facility] - Fa...   OZEMPIC      INJ 2/1.5ML 11/27/2020 28 1.5 each MONTANA,MCKENNA [facility]...   OZEMPIC 0.25 OR 0.50/DOSE INJ 11/27/2020 28 1.5 mL MONTANA,MCKENNA [facility] - Fa...   OZEMPIC      INJ 2/1.5ML 10/30/2020 28 1.5 each MONTANA,MCKENNA [facility]...   OZEMPIC 0.25 OR 0.50/DOSE INJ 10/30/2020 28 1.5 mL MONTANAMCKENNA #61 - Fa...   OZEMPIC 0.25 OR 0.50/DOSE INJ 10/27/2020 28 1.5 mL MCKENNA CHERY #61 - Fa...           Tamsulosin HCl     Dispensed Days Supply Quantity Provider Pharmacy   TAMSULOSIN   CAP 0.4 08/18/2021 90 90 capsule MCKENNA CHERY Pharmacy...   TAMSULOSIN 0.4MG CAPSULE 08/18/2021 90 90 Units MCKENNA CHERY #61 - Fa...   TAMSULOSIN 0.4MG CAPSULE 05/28/2021 90 90 Units MCKENNA CHERY Pharmacy...   TAMSULOSIN 0.4MG CAPSULE 03/12/2021 90 90 Units MCKENNA CHERY Pharmacy...   TAMSULOSIN 0.4MG CAPSULE 02/09/2021 34 34 Units MCKENNA CHERY Pharmacy...            amLODIPine Besylate     Dispensed Days Supply Quantity Provider Pharmacy   AMLODIPINE   TAB 2.5 08/18/2021 90 90 tablet [facility]...   AMLODIPINE 2.5MG TABLET 08/18/2021 90 90 Units [facility] #61 - Fa...   AMLODIPINE 2.5MG TABLET 05/28/2021 90 90 [facility]   AMLODIPINE 2.5MG TABLET 04/12/2021 90 90 [facility]   AMLODIPINE 2.5MG TABLET 01/20/2021 90 90 Units [facility]...           levoFLOXacin     Dispensed Days Supply Quantity Provider Pharmacy   LEVOFLOXACIN 500MG TABLET 06/07/2021 5 5 Units MISHA BONDS [facility]...   LEVOFLOXACIN 250MG TABLET 04/16/2021 5 5 Units BERONICA KWON [facility]...           metFORMIN HCl     Dispensed Days Supply Quantity Provider Pharmacy   METFORMIN     08/18/2021 90 360 tablet [facility]...   METFORMIN 500MG ER TABLET 08/18/2021 90 360 Units [facility] #61 - Fa...   METFORMIN 500MG ER TABLET 05/28/2021 90 360 Units [facility]...   METFORMIN    TAB 500MG ER 04/13/2021 90 360 each [facility]...   METFORMIN 500MG ER TABLET 04/13/2021 90 360 Units MONTANA,MCKENNA [facility] #61 - Fa...   METFORMIN 500MG ER TABLET 01/20/2021 90 360 Units [facility]...           prednisoLONE Acetate     Dispensed Days Supply Quantity Provider Pharmacy   PREDNISOLONE 1% OPHTH SUSP 10/06/2021 42 15 mL THO GARCIA [facility]...           Disclaimer    Certain dispenses may not be available or accurate in this report, including over-the-counter medications, low cost prescriptions, prescriptions paid for by the patient or non-participating sources, or errors in insurance claims information. The provider should independently verify medication history with the patient.     External Sources

## 2021-10-15 NOTE — ANESTHESIA PROCEDURE NOTES
Airway       Patient location during procedure: OR       Procedure Start/Stop Times: 10/15/2021 8:02 AM and 10/15/2021 8:04 AM  Staff -        CRNA: Aleksey Pedroza APRN CRNA       Performed By: CRNA       Referred By: Dr. De Leon  Consent for Airway        Urgency: elective  Indications and Patient Condition       Indications for airway management: edin-procedural       Induction type:intravenous       Mask difficulty assessment: 1 - vent by mask    Final Airway Details       Final airway type: endotracheal airway       Successful airway: ETT - single  Endotracheal Airway Details        ETT size (mm): 8.0       Cuffed: yes       Successful intubation technique: direct laryngoscopy       DL Blade Type: MAC 3       Grade View of Cords: 1       Adjucts: stylet       Position: Center       Measured from: lips       Secured at (cm): 24    Post intubation assessment        Placement verified by: capnometry, equal breath sounds and chest rise        Number of attempts at approach: 1       Secured with: plastic tape       Ease of procedure: easy       Dentition: Intact and Unchanged

## 2021-10-15 NOTE — CONSULTS
[facility]  Consult Note - Hospitalist Service     Date of Admission:  10/15/2021  Consult Requested by:  Dr De Loen   Reason for Consult: Medical management     Assessment & Plan   Anthony Dyer is a 81 year old male admitted on 10/15/2021.      Urinary retention  BPH  S/P TURP on 10/15 with Dr De Leon  -Management as per urology  -CBI  -On flomax and finasteride     CKD stage III  -Daily labs  -avoid nephrotic meds     COPD  -home prn albuterol     PE diagnosis 4/26/2021   Xarelto held for surgery should be restarted when able   Will continue to hold    History of CVA  -continue home medications    Irritable bowel syndrome  -continue home diet    Essential hypertension  -continue home medications    Depression   -continue home mediations        The patient's care was discussed with the Primary team.    Jennifer Awan, [facility]  Securely message with the Vocera Web Console (learn more here)  Text page via [employer] Paging/Directory      # Coagulation Defect: home medication list includes an anticoagulant medication       ______________________________________________________________________    Chief Complaint    BPH  S/P TURP    History is obtained from the patient and Dr De Leon    History of Present Illness   Anthony Dyer is a 81 year old male who medical history of BPH, urinary retention.  Patient also had a diagnosis of pulmonary embolism with clot burden in April 2021.  Patient has been on Xarelto this was held on 1013 as he was having TURP done today.  Patient had minimal blood loss and will be monitored overnight in the hospital.  Hospital medicine was consulted for medication management.   Patient is Full Code     Review of Systems   The 10 point Review of Systems is negative other than noted in the HPI or here.      Past Medical History    I have reviewed this patient's medical history and updated it with pertinent information if needed.   Past Medical History:   Diagnosis  Date     Diabetes (H)      Hypertension        Past Surgical History   I have reviewed this patient's surgical history and updated it with pertinent information if needed.  Past Surgical History:   Procedure Laterality Date     HERNIA REPAIR         Social History   I have reviewed this patient's social history and updated it with pertinent information if needed.  Social History     Tobacco Use     Smoking status: Never Smoker     Smokeless tobacco: Former User     Types: Chew   Substance Use Topics     Alcohol use: Not Currently     Drug use: Never       Family History     No significant family history     Medications   I have reviewed this patient's current medications    Allergies   Allergies   Allergen Reactions     Ace Inhibitors      Per [facility]: hyperkalemia.  Pt doesn't know if he is allergic     Ceclor [Cefaclor] Hives     Sulfa Drugs      Pt unsure.        Physical Exam   Vital Signs: Temp: 97.9  F (36.6  C) Temp src: Temporal BP: 134/77 Pulse: 81   Resp: 16 SpO2: 92 % O2 Device: None (Room air) Oxygen Delivery: 3 LPM  Weight: 185 lbs 0 oz    General Appearance: frail appearing stated age male  Eyes: EOMI  HEENT: clear mucous membranes   Respiratory: CTA b/l  Cardiovascular: RRR  GI: no abd distention  Gu has to cath with CBI, pink tinged urine   Skin: no rashes   Musculoskeletal: no swelling   Neurologic: alert and oriented       Data   Most Recent 3 CBC's:Recent Labs   Lab Test 10/15/21  1212 10/06/21  0943 08/05/21  1055 04/29/21  0528   WBC  --  7.5 7.5 7.1   HGB 13.4 14.0 14.0 12.3*   MCV  --  96 95 96   PLT  --  196 256 200     Most Recent 3 BMP's:Recent Labs   Lab Test 10/15/21  1212 10/06/21  0943 08/05/21  1055    138 138   POTASSIUM 4.0 4.8 4.6   CHLORIDE 110* 106 107   CO2 24 29 24   BUN 17 13 14   CR 0.98 1.16 1.09   ANIONGAP 5 3 7   JODI 8.7 9.4 9.1   * 191* 131*

## 2021-10-15 NOTE — OP NOTE
Preoperative diagnosis: Urinary retention    Postoperative diagnosis: Same    Procedure: TURP    Surgeon: LANNY MCKINNEY    Indications for procedure: This is an 81-year-old male who is been in urinary retention since being hospitalized for pulmonary emboli approximately 6 months ago.  Because of his acute hypercoagulability and being on anticoagulation we have delayed any surgical intervention until this time.  He now presents for a TURP.  Risks and benefits were all discussed in the office he appeared to understand agreed to proceed.    After adequate consent was obtained from the patient he was brought to the operative theater.  After adequate general anesthesia was induced he was placed in the dorsolithotomy position genitalia were prepped and draped in usual sterile fashion.  A 22 Vietnamese cystoscopy sheath with a 12 degree lens was inserted into the urethra the anterior urethra is normal posterior urethra shows bilobar hypertrophy with an elevated bladder neck once inside the bladder the bladder is trabeculated with small crystals of stone material in the base of the bladder from having a chronic catheter both ureteral orifices are identified and no other abnormalities are noted.  I then removed the cystoscope and went in with a 26 continuous-flow resectoscope sheath and using a bipolar resectoscope I started resecting at the bladder neck at the 6 o'clock position from just inside the bladder neck to in front of the verumontanum and I resected from 6:00 to 12:00 on the right side and then I went from 12:00 to 6:00 on the left side again from just inside the bladder neck to in front of the verumontanum.  Both ureteral orifice ease were identified throughout the procedure and were unharmed.  Once I had completed the resection I went in and evacuated out all of the tissue.  I went back and looked at the prostate fossa any further resection was done all of those chips were retrieved.  I felt I had a nice open fossa and then  I went with a rollerball and cauterized the entire prostate fossa.  Blood loss was approximately 150 mL.  The scope was removed and a 22 Urdu three-way irrigating catheter was easily placed in the bladder and 40 mL of sterile water placed in the balloon.  It irrigated fine and the patient was awakened and taken recovery in stable condition no complications.

## 2021-10-15 NOTE — ANESTHESIA CARE TRANSFER NOTE
Patient: Anthony Dyer    Procedure: Procedure(s):  CYSTOSCOPY, WITH TRANSURETHRAL RESECTION PROSTATE       Diagnosis: Urinary retention [R33.9]  Diagnosis Additional Information: No value filed.    Anesthesia Type:   General     Note:    Oropharynx: spontaneously breathing  Level of Consciousness: awake and drowsy  Oxygen Supplementation: nasal cannula    Independent Airway: airway patency satisfactory and stable  Dentition: dentition unchanged  Vital Signs Stable: post-procedure vital signs reviewed and stable  Report to RN Given: handoff report given  Patient transferred to: PACU    Handoff Report: Identifed the Patient, Identified the Reponsible Provider, Reviewed the pertinent medical history, Discussed the surgical course, Reviewed Intra-OP anesthesia mangement and issues during anesthesia, Set expectations for post-procedure period and Allowed opportunity for questions and acknowledgement of understanding      Vitals:  Vitals Value Taken Time   /97 10/15/21 0945   Temp 97.9  F (36.6  C) 10/15/21 0938   Pulse 80 10/15/21 0948   Resp 16 10/15/21 0940   SpO2 93 % 10/15/21 0948   Vitals shown include unvalidated device data.    Electronically Signed By: MARTELL Jean CRNA  October 15, 2021  9:49 AM

## 2021-10-15 NOTE — OR NURSING
Patient transferred to Acute Care room 3216 via cart. Mariano score 10. Pain level 2/10. Continuous bladder irrigation going, urine light pink in color. Hand off report given to TONY Shipman.

## 2021-10-16 ENCOUNTER — APPOINTMENT (OUTPATIENT)
Dept: GENERAL RADIOLOGY | Facility: HOSPITAL | Age: 81
DRG: 713 | End: 2021-10-16
Attending: INTERNAL MEDICINE
Payer: MEDICARE

## 2021-10-16 LAB
HGB BLD-MCNC: 12.6 G/DL (ref 13.3–17.7)
LACTATE SERPL-SCNC: 1.9 MMOL/L (ref 0.7–2)

## 2021-10-16 PROCEDURE — 83605 ASSAY OF LACTIC ACID: CPT | Performed by: UROLOGY

## 2021-10-16 PROCEDURE — 36415 COLL VENOUS BLD VENIPUNCTURE: CPT | Performed by: HOSPITALIST

## 2021-10-16 PROCEDURE — 36415 COLL VENOUS BLD VENIPUNCTURE: CPT | Performed by: UROLOGY

## 2021-10-16 PROCEDURE — G0378 HOSPITAL OBSERVATION PER HR: HCPCS

## 2021-10-16 PROCEDURE — 99232 SBSQ HOSP IP/OBS MODERATE 35: CPT | Mod: 24 | Performed by: HOSPITALIST

## 2021-10-16 PROCEDURE — U0005 INFEC AGEN DETEC AMPLI PROBE: HCPCS | Performed by: INTERNAL MEDICINE

## 2021-10-16 PROCEDURE — 250N000013 HC RX MED GY IP 250 OP 250 PS 637: Performed by: UROLOGY

## 2021-10-16 PROCEDURE — 71045 X-RAY EXAM CHEST 1 VIEW: CPT

## 2021-10-16 PROCEDURE — 250N000013 HC RX MED GY IP 250 OP 250 PS 637: Performed by: HOSPITALIST

## 2021-10-16 PROCEDURE — 85018 HEMOGLOBIN: CPT | Performed by: HOSPITALIST

## 2021-10-16 RX ORDER — LIDOCAINE 40 MG/G
CREAM TOPICAL
Status: DISCONTINUED | OUTPATIENT
Start: 2021-10-16 | End: 2021-10-20 | Stop reason: HOSPADM

## 2021-10-16 RX ADMIN — CARVEDILOL 25 MG: 12.5 TABLET, FILM COATED ORAL at 20:32

## 2021-10-16 RX ADMIN — Medication 250 MCG: at 10:04

## 2021-10-16 RX ADMIN — ACETAMINOPHEN 650 MG: 325 TABLET, FILM COATED ORAL at 17:18

## 2021-10-16 RX ADMIN — NITROFURANTOIN (MONOHYDRATE/MACROCRYSTALS) 100 MG: 75; 25 CAPSULE ORAL at 20:31

## 2021-10-16 RX ADMIN — CARVEDILOL 25 MG: 12.5 TABLET, FILM COATED ORAL at 10:04

## 2021-10-16 RX ADMIN — ACETAMINOPHEN 650 MG: 325 TABLET, FILM COATED ORAL at 07:03

## 2021-10-16 RX ADMIN — ALLOPURINOL 100 MG: 100 TABLET ORAL at 10:06

## 2021-10-16 RX ADMIN — GABAPENTIN 300 MG: 300 CAPSULE ORAL at 10:05

## 2021-10-16 RX ADMIN — ACETAMINOPHEN 650 MG: 325 TABLET, FILM COATED ORAL at 21:24

## 2021-10-16 RX ADMIN — NITROFURANTOIN (MONOHYDRATE/MACROCRYSTALS) 100 MG: 75; 25 CAPSULE ORAL at 10:05

## 2021-10-16 RX ADMIN — ATORVASTATIN CALCIUM 40 MG: 40 TABLET, FILM COATED ORAL at 20:31

## 2021-10-16 RX ADMIN — FINASTERIDE 5 MG: 5 TABLET, FILM COATED ORAL at 10:07

## 2021-10-16 RX ADMIN — TAMSULOSIN HYDROCHLORIDE 0.4 MG: 0.4 CAPSULE ORAL at 20:31

## 2021-10-16 RX ADMIN — GABAPENTIN 300 MG: 300 CAPSULE ORAL at 14:24

## 2021-10-16 RX ADMIN — AMLODIPINE BESYLATE 2.5 MG: 2.5 TABLET ORAL at 10:05

## 2021-10-16 RX ADMIN — SODIUM CHLORIDE, CALCIUM CHLORIDE, AND POTASSIUM CHLORIDE: .86; .033; .03 INJECTION, SOLUTION INTRAVENOUS at 07:06

## 2021-10-16 RX ADMIN — GABAPENTIN 300 MG: 300 CAPSULE ORAL at 20:31

## 2021-10-16 NOTE — PROVIDER NOTIFICATION
Pt requiring oxygen while sleeping as SaO2 dipping into 80s on RA. MD notified, orders for oxygen placed.

## 2021-10-16 NOTE — PROGRESS NOTES
This writer spoke with patient about potential to receive hospital bill due to pt classification status. Pt verbalized understanding and states that he prefers to wait until tomorrow to ensure he is not bleeding or having blood clots from surgical procedure. Re-assured pt that nursing would do some education prior to going home.

## 2021-10-16 NOTE — PLAN OF CARE
"Temp: 97.7  F (36.5  C) Temp src: Tympanic BP: 101/62 Pulse: 77   Resp: 18 SpO2: 95 % O2 Device: None (Room air)     Patient A&O x 4; up SBA to commode d/t tubes (however used to having a to intact; had indwelling for 6 months prior to this procedure). Patient was cleared to go home from surgeon. VSS, afebrile, labs WDL; denies pain- except when to tube gets repositioned. Urine return clear yellow with tiny clots, cleared up from pink/and larger clots this morning. Patient stated he did not feel \"ready\" to go home today; hospitalist agreed to keep him for observation one more night. Care management discussed possibility of 2nd night not being covered by insurance - patient reported no concerns with this. No events/issues this shift. Call light remains in reach @ all times.     Face to face report given with opportunity to observe patient.    Report given to Marjan Webber RN   10/16/2021  3:34 PM      "

## 2021-10-16 NOTE — PLAN OF CARE
Face to face report given with opportunity to observe patient.    Report given to OTNY Abraham RN   10/16/2021  5:52 PM

## 2021-10-16 NOTE — PLAN OF CARE
"/63   Pulse 97   Temp 97.6  F (36.4  C) (Tympanic)   Resp 16   Ht 1.842 m (6' 0.5\")   Wt 83.9 kg (185 lb)   SpO2 94%   BMI 24.75 kg/m      PT A&O but forgetful at times. Denied pain. CBI ran through night with light pink colored output, no blood clots noted. Stopped this AM per orders, urine red in color once CBI stopped. VSS. Tolerating sips of water. IV running at 100 mL/hr. Pt requiring 1L of O2 via nasal cannula due to dipping into 80s while asleep.     Face to face report given with opportunity to observe patient.    Report given to Tierra Iverson RN   10/16/2021  7:12 AM      "

## 2021-10-16 NOTE — PROGRESS NOTES
Assessment completed by face to face with patient.    LOC: alert and oriented    Dx: Urinary retention  Chronic Disease Management: Generalized weakness, DM II, CKD, stage III, HTN, GOUT, HX of CVA, BPH w/urinary retention    Lives with: alone  Living at:  Home in [place]  Transportation: YES , drives independently    Primary PCP: George Orourke  Insurance:  Medicare, BCBS  Medicare IMM letter reviewed with patient.    Support System: Family, girlfriend  Homecare/PCA: not connected  /County Services:   Not connected  [place]: NO      How was the VA notification completed: N/A    Health Care Directive: not discussed  Guardian: N/A  POA: N/A    Pharmacy: [facility], [place]  Meds management: states independently    Adequate Resources for needs (housing, utilities, food/med): YES  Household chores: recieves help from Grazyna/sister-in-law and Komal/girlfriend  Work/community/social activity: YES     ADLs: independent  Ambulation:Uses a cane  Falls: Denies any in last three months    Equipment used: cane      Oxygen supplier: N/A      Does the supplier have valid oxygen orders: N/A    Mental health: states has general anxiety and depression due to medical concerns  Substance abuse: states that he has one shot of alcohol daily  Exposure to violence/abuse: no concerns expressed  Stressors: catheter    Able to Return to Prior Living Arrangements: YES    Choice of Vendor: N/A    Barriers: none anticipated      Plan: Return to home via sister/Sheila to provide personal vehicle transportation.

## 2021-10-16 NOTE — PLAN OF CARE
Pt A&O, forgetful. VSS on RA. Denies pain. CBI at moderate rate to keep urine pink, no clots seen. LR at 100ml/hr to PIV. Turn self in bed, able to make needs known, call light in reach. Tolerating lots of sips of water. Denies nausea.     Face to face report given with opportunity to observe patient.    Report given to TONY Pierce RN   10/16/2021  3:13 AM

## 2021-10-16 NOTE — PROGRESS NOTES
[facility]    Medicine Progress Note - Hospitalist Service       Date of Admission:  10/15/2021    Assessment & Plan               Anthony Dyer is a 81 year old male admitted on 10/15/2021.      Urinary retention  BPH  S/P TURP on 10/15 with Dr De Leon  -Management as per urology  -CBI  -On flomax and finasteride    -holding xarelto     CKD stage III  -Daily labs  -avoid nephrotic meds      COPD  -home prn albuterol      PE diagnosis 4/26/2021   Xarelto held for surgery should be restarted when able   Will continue to hold     History of CVA  -continue home medications     Irritable bowel syndrome  -continue home diet     Essential hypertension  -continue home medications     Depression   -continue home mediations            The patient's care was discussed with the Primary team.     Jennifer Awan, [facility]  Securely message with the Vocera Web Console (learn more here)  Text page via [employer] Paging/Directory       # Coagulation Defect: home medication list includes an anticoagulant medication        ______________________________________________________________________    Interval History    patient seen today for medical rounds, patient did not feel safe discharging, his urine did clear up and I did not see any clots. No fevers chills or sweating.     Data reviewed today: I reviewed all medications, new labs and imaging results over the last 24 hours. I personally reviewed no images or EKG's today.    Physical Exam   Vital Signs: Temp: 97.7  F (36.5  C) Temp src: Tympanic BP: 101/62 Pulse: 77   Resp: 18 SpO2: 95 % O2 Device: None (Room air) Oxygen Delivery: 1 LPM  Weight: 185 lbs 0 oz  Physical Exam  Constitutional:       Comments: Frail appearing stated age male    HENT:      Head: Normocephalic.      Mouth/Throat:      Pharynx: Oropharynx is clear.   Eyes:      Conjunctiva/sclera: Conjunctivae normal.   Cardiovascular:      Rate and Rhythm: Normal rate.   Pulmonary:       Effort: Pulmonary effort is normal.   Abdominal:      General: Abdomen is flat.   Genitourinary:     Comments: Schmitt cath present, has some pink urine but improved   Musculoskeletal:         General: Normal range of motion.   Skin:     General: Skin is warm.   Neurological:      Mental Status: Mental status is at baseline.           Data   Recent Labs   Lab 10/16/21  0946 10/15/21  1212   HGB 12.6* 13.4   NA  --  139   POTASSIUM  --  4.0   CHLORIDE  --  110*   CO2  --  24   BUN  --  17   CR  --  0.98   ANIONGAP  --  5   JODI  --  8.7   GLC  --  147*     No results found for this or any previous visit (from the past 24 hour(s)).  Medications     ringers Stopped (10/16/21 1200)     sodium chloride 0.9% (bag) Stopped (10/16/21 0630)       allopurinol  100 mg Oral Daily     amLODIPine  2.5 mg Oral Daily     atorvastatin  40 mg Oral QPM     carvedilol  25 mg Oral BID     finasteride  5 mg Oral Daily     gabapentin  300 mg Oral TID     nitroFURantoin macrocrystal-monohydrate  100 mg Oral Q12H MARIA M     tamsulosin  0.4 mg Oral QPM     vitamin B-12  250 mcg Oral Daily

## 2021-10-16 NOTE — SIGNIFICANT EVENT
Significant Event Note    Time of event: 3 AM    Description of event:  Pt was noted with nocturnal hypoxia    Plan:  Supplemental oxygen  Will check ABG on room air for possible hypoventilation syndrome   Sleep apnea? --> may need a referral for sleep study    Discussed with: bedside nurse and Dr. Awan.    Hair Heath MD

## 2021-10-16 NOTE — PROGRESS NOTES
S:  Pod #1 TURP.  Patient without any sig. C/o pain.  Some meatal discomfort  Reports from nursing staff regarding some desaturation during sleep.     O:    10/14 0700   10/15 0659 10/15 0700   10/16 0659 10/16 0700   10/16 1151  Most Recent    Temp ( F)   97.2-98.6 97.7  97.7 (36.5)    Pulse    77-93  77    Resp   16-20 18  18    BP   111//97 101//55  101/62    SpO2 (%)   88Important -99 95-96        Abd. Soft and non tender.  Urine is pink in the tubing without CBI running.  LE's: no pain or edema in lower extremities            HB.6  Sodium 139       Potassium 4.0       Chloride 110High        Carbon Dioxide 24       Urea Nitrogen 17       Creatinine 0.98        Imp: POD #1 TURP  Plan: patient doing well.  Urine currently pink.  Recommend holding xerelto until Monday if it is recommended he restart. Dischg. Home with the catheter and followup on wed. For a voiding trial.  Encourage PO fluids. Home if OK with the hospitalist.

## 2021-10-17 LAB
ABO/RH(D): NORMAL
ALBUMIN SERPL-MCNC: 2.8 G/DL (ref 3.4–5)
ALBUMIN UR-MCNC: 100 MG/DL
ALP SERPL-CCNC: 60 U/L (ref 40–150)
ALT SERPL W P-5'-P-CCNC: 14 U/L (ref 0–70)
ANION GAP SERPL CALCULATED.3IONS-SCNC: 5 MMOL/L (ref 3–14)
ANTIBODY SCREEN: NEGATIVE
APPEARANCE UR: ABNORMAL
AST SERPL W P-5'-P-CCNC: 11 U/L (ref 0–45)
BASE EXCESS BLDV CALC-SCNC: 3.4 MMOL/L (ref -7.7–1.9)
BASOPHILS # BLD AUTO: 0 10E3/UL (ref 0–0.2)
BASOPHILS NFR BLD AUTO: 0 %
BILIRUB SERPL-MCNC: 0.4 MG/DL (ref 0.2–1.3)
BILIRUB UR QL STRIP: NEGATIVE
BUN SERPL-MCNC: 11 MG/DL (ref 7–30)
CALCIUM SERPL-MCNC: 7.7 MG/DL (ref 8.5–10.1)
CHLORIDE BLD-SCNC: 110 MMOL/L (ref 94–109)
CO2 SERPL-SCNC: 27 MMOL/L (ref 20–32)
COLOR UR AUTO: ABNORMAL
CREAT SERPL-MCNC: 1.11 MG/DL (ref 0.66–1.25)
EOSINOPHIL # BLD AUTO: 0.1 10E3/UL (ref 0–0.7)
EOSINOPHIL NFR BLD AUTO: 1 %
ERYTHROCYTE [DISTWIDTH] IN BLOOD BY AUTOMATED COUNT: 13.2 % (ref 10–15)
GFR SERPL CREATININE-BSD FRML MDRD: 62 ML/MIN/1.73M2
GLUCOSE BLD-MCNC: 152 MG/DL (ref 70–99)
GLUCOSE UR STRIP-MCNC: NEGATIVE MG/DL
HCO3 BLDV-SCNC: 27 MMOL/L (ref 21–28)
HCT VFR BLD AUTO: 34.8 % (ref 40–53)
HGB BLD-MCNC: 11.5 G/DL (ref 13.3–17.7)
HGB BLD-MCNC: 11.7 G/DL (ref 13.3–17.7)
HGB UR QL STRIP: ABNORMAL
IMM GRANULOCYTES # BLD: 0 10E3/UL
IMM GRANULOCYTES NFR BLD: 0 %
KETONES UR STRIP-MCNC: NEGATIVE MG/DL
LACTATE SERPL-SCNC: 1.7 MMOL/L (ref 0.7–2)
LEUKOCYTE ESTERASE UR QL STRIP: ABNORMAL
LYMPHOCYTES # BLD AUTO: 0.4 10E3/UL (ref 0.8–5.3)
LYMPHOCYTES NFR BLD AUTO: 3 %
MCH RBC QN AUTO: 32.1 PG (ref 26.5–33)
MCHC RBC AUTO-ENTMCNC: 33.6 G/DL (ref 31.5–36.5)
MCV RBC AUTO: 96 FL (ref 78–100)
MONOCYTES # BLD AUTO: 0.7 10E3/UL (ref 0–1.3)
MONOCYTES NFR BLD AUTO: 6 %
MUCOUS THREADS #/AREA URNS LPF: PRESENT /LPF
NEUTROPHILS # BLD AUTO: 10.7 10E3/UL (ref 1.6–8.3)
NEUTROPHILS NFR BLD AUTO: 90 %
NITRATE UR QL: NEGATIVE
NRBC # BLD AUTO: 0 10E3/UL
NRBC BLD AUTO-RTO: 0 /100
O2/TOTAL GAS SETTING VFR VENT: 24 %
OXYHGB MFR BLDV: 98 % (ref 70–75)
PCO2 BLDV: 34 MM HG (ref 40–50)
PH BLDV: 7.5 [PH] (ref 7.32–7.43)
PH UR STRIP: 5.5 [PH] (ref 4.7–8)
PLATELET # BLD AUTO: 148 10E3/UL (ref 150–450)
PO2 BLDV: 221 MM HG (ref 25–47)
POTASSIUM BLD-SCNC: 3.5 MMOL/L (ref 3.4–5.3)
PROCALCITONIN SERPL-MCNC: <0.05 NG/ML
PROT SERPL-MCNC: 5.7 G/DL (ref 6.8–8.8)
RBC # BLD AUTO: 3.64 10E6/UL (ref 4.4–5.9)
RBC URINE: >182 /HPF
SARS-COV-2 RNA RESP QL NAA+PROBE: NEGATIVE
SODIUM SERPL-SCNC: 142 MMOL/L (ref 133–144)
SP GR UR STRIP: 1.02 (ref 1–1.03)
SPECIMEN EXPIRATION DATE: NORMAL
SQUAMOUS EPITHELIAL: 0 /HPF
UROBILINOGEN UR STRIP-MCNC: NORMAL MG/DL
WBC # BLD AUTO: 11.9 10E3/UL (ref 4–11)
WBC URINE: >182 /HPF

## 2021-10-17 PROCEDURE — 250N000013 HC RX MED GY IP 250 OP 250 PS 637: Performed by: HOSPITALIST

## 2021-10-17 PROCEDURE — 36415 COLL VENOUS BLD VENIPUNCTURE: CPT | Performed by: INTERNAL MEDICINE

## 2021-10-17 PROCEDURE — 83605 ASSAY OF LACTIC ACID: CPT | Performed by: INTERNAL MEDICINE

## 2021-10-17 PROCEDURE — 87040 BLOOD CULTURE FOR BACTERIA: CPT | Performed by: INTERNAL MEDICINE

## 2021-10-17 PROCEDURE — 84145 PROCALCITONIN (PCT): CPT | Performed by: INTERNAL MEDICINE

## 2021-10-17 PROCEDURE — 36415 COLL VENOUS BLD VENIPUNCTURE: CPT | Performed by: HOSPITALIST

## 2021-10-17 PROCEDURE — 258N000003 HC RX IP 258 OP 636: Performed by: INTERNAL MEDICINE

## 2021-10-17 PROCEDURE — 85025 COMPLETE CBC W/AUTO DIFF WBC: CPT | Performed by: INTERNAL MEDICINE

## 2021-10-17 PROCEDURE — 86900 BLOOD TYPING SEROLOGIC ABO: CPT | Performed by: HOSPITALIST

## 2021-10-17 PROCEDURE — 80053 COMPREHEN METABOLIC PANEL: CPT | Performed by: INTERNAL MEDICINE

## 2021-10-17 PROCEDURE — 81001 URINALYSIS AUTO W/SCOPE: CPT | Performed by: INTERNAL MEDICINE

## 2021-10-17 PROCEDURE — 250N000013 HC RX MED GY IP 250 OP 250 PS 637: Performed by: UROLOGY

## 2021-10-17 PROCEDURE — 85018 HEMOGLOBIN: CPT | Performed by: HOSPITALIST

## 2021-10-17 PROCEDURE — 120N000001 HC R&B MED SURG/OB

## 2021-10-17 PROCEDURE — 250N000011 HC RX IP 250 OP 636: Performed by: INTERNAL MEDICINE

## 2021-10-17 PROCEDURE — 87086 URINE CULTURE/COLONY COUNT: CPT | Performed by: INTERNAL MEDICINE

## 2021-10-17 PROCEDURE — 99232 SBSQ HOSP IP/OBS MODERATE 35: CPT | Mod: 24 | Performed by: HOSPITALIST

## 2021-10-17 PROCEDURE — 82805 BLOOD GASES W/O2 SATURATION: CPT | Performed by: INTERNAL MEDICINE

## 2021-10-17 PROCEDURE — G0378 HOSPITAL OBSERVATION PER HR: HCPCS

## 2021-10-17 RX ORDER — SODIUM CHLORIDE 9 MG/ML
INJECTION, SOLUTION INTRAVENOUS
Status: DISPENSED
Start: 2021-10-17 | End: 2021-10-17

## 2021-10-17 RX ORDER — ACETAMINOPHEN 325 MG/1
975 TABLET ORAL EVERY 6 HOURS PRN
Status: DISCONTINUED | OUTPATIENT
Start: 2021-10-17 | End: 2021-10-20 | Stop reason: HOSPADM

## 2021-10-17 RX ORDER — AMPICILLIN AND SULBACTAM 1; .5 G/1; G/1
INJECTION, POWDER, FOR SOLUTION INTRAMUSCULAR; INTRAVENOUS
Status: DISPENSED
Start: 2021-10-17 | End: 2021-10-17

## 2021-10-17 RX ADMIN — AMPICILLIN SODIUM AND SULBACTAM SODIUM 3 G: 2; 1 INJECTION, POWDER, FOR SOLUTION INTRAMUSCULAR; INTRAVENOUS at 13:39

## 2021-10-17 RX ADMIN — ACETAMINOPHEN 650 MG: 325 TABLET, FILM COATED ORAL at 13:43

## 2021-10-17 RX ADMIN — Medication 250 MCG: at 09:06

## 2021-10-17 RX ADMIN — AMPICILLIN SODIUM AND SULBACTAM SODIUM 3 G: 2; 1 INJECTION, POWDER, FOR SOLUTION INTRAMUSCULAR; INTRAVENOUS at 02:04

## 2021-10-17 RX ADMIN — TAMSULOSIN HYDROCHLORIDE 0.4 MG: 0.4 CAPSULE ORAL at 21:05

## 2021-10-17 RX ADMIN — AMLODIPINE BESYLATE 2.5 MG: 2.5 TABLET ORAL at 08:41

## 2021-10-17 RX ADMIN — GABAPENTIN 300 MG: 300 CAPSULE ORAL at 21:04

## 2021-10-17 RX ADMIN — ATORVASTATIN CALCIUM 40 MG: 40 TABLET, FILM COATED ORAL at 21:04

## 2021-10-17 RX ADMIN — CARVEDILOL 25 MG: 12.5 TABLET, FILM COATED ORAL at 08:41

## 2021-10-17 RX ADMIN — GABAPENTIN 300 MG: 300 CAPSULE ORAL at 08:41

## 2021-10-17 RX ADMIN — NITROFURANTOIN (MONOHYDRATE/MACROCRYSTALS) 100 MG: 75; 25 CAPSULE ORAL at 08:41

## 2021-10-17 RX ADMIN — ACETAMINOPHEN 650 MG: 325 TABLET, FILM COATED ORAL at 02:11

## 2021-10-17 RX ADMIN — ACETAMINOPHEN 650 MG: 325 TABLET, FILM COATED ORAL at 06:14

## 2021-10-17 RX ADMIN — GABAPENTIN 300 MG: 300 CAPSULE ORAL at 13:38

## 2021-10-17 RX ADMIN — AMPICILLIN SODIUM AND SULBACTAM SODIUM 3 G: 2; 1 INJECTION, POWDER, FOR SOLUTION INTRAMUSCULAR; INTRAVENOUS at 08:49

## 2021-10-17 RX ADMIN — ALLOPURINOL 100 MG: 100 TABLET ORAL at 08:41

## 2021-10-17 RX ADMIN — CARVEDILOL 25 MG: 12.5 TABLET, FILM COATED ORAL at 21:05

## 2021-10-17 RX ADMIN — FINASTERIDE 5 MG: 5 TABLET, FILM COATED ORAL at 08:41

## 2021-10-17 RX ADMIN — NITROFURANTOIN (MONOHYDRATE/MACROCRYSTALS) 100 MG: 75; 25 CAPSULE ORAL at 20:08

## 2021-10-17 RX ADMIN — AMPICILLIN SODIUM AND SULBACTAM SODIUM 3 G: 2; 1 INJECTION, POWDER, FOR SOLUTION INTRAMUSCULAR; INTRAVENOUS at 20:09

## 2021-10-17 RX ADMIN — ACETAMINOPHEN 975 MG: 325 TABLET, FILM COATED ORAL at 20:08

## 2021-10-17 ASSESSMENT — ACTIVITIES OF DAILY LIVING (ADL)
ADLS_ACUITY_SCORE: 16

## 2021-10-17 NOTE — PLAN OF CARE
Pt oriented but forgetful. Febrile all shift, tmax 102F, low of 99.9F. Prn tylenol given as available. Fan, cool wash cloth, IS also attempted. Became tachy and tachypneic last evening, lactic triggered with 1.9 as result. Labs, UA/UC, CXR, BCx2 done. Unasyn started. Schmitt with red urine output, scant meatal drainage. Regular diet. Denies pain. Using call light appropriately.     Face to face report given with opportunity to observe patient.    Report given to TONY Phillip RN   10/17/2021  7:15 AM

## 2021-10-17 NOTE — PROVIDER NOTIFICATION
"Pt's temp continues to increased through this evening. Now 101.9F, has receive 2 doses of tylenol. Also tried fan, cool wash cloths, and IS with no improvement. Paged Dr. Heath, Dr. Heath requested I call urology.    Dr. Mackenzie called and left message.  Paged Dr. Mackenzie's pager at about [phone] with also no response. Dr. Yoon called writer back to check on pt, then ordered sepsis workup.    Charge nurse suggested writer page Dr De Leon. Dr De Leon answered and suggested Dr. Heath \"look at last cultures and possibly start on IV antibiotics, such as amp and gent, until new blood cultures back\". ok'd to turn CBI back on if necessary. Passed this on to Dr. Heath.  "

## 2021-10-17 NOTE — PLAN OF CARE
Urine with small clots, red in color, flushed with CBI infusion with pink tinged returns and small clots, CBI restarted at a slow rate with urine return now light pink in color.

## 2021-10-17 NOTE — PHARMACY
Pharmacy Antimicrobial Stewardship Assessment     Current Antimicrobial Therapy:  Anti-infectives (From now, onward)    Start     Dose/Rate Route Frequency Ordered Stop    10/17/21 0130  ampicillin-sulbactam (UNASYN) 3 g in sodium chloride 0.9 % 100 mL intermittent infusion      3 g  200-400 mL/hr over 15-30 Minutes Intravenous EVERY 6 HOURS 10/17/21 0125      10/15/21 1130  nitroFURantoin macrocrystal-monohydrate (MACROBID) capsule 100 mg      100 mg Oral EVERY 12 HOURS SCHEDULED 10/15/21 1127          Indication: UTI (Unasyn), perioperative pharmacoprophylaxis (Macrobid)    Days of Therapy: 1 (Unasyn), 3 (Macrobid)     Pertinent Labs:  Recent Labs   Lab Test 10/17/21  0019 10/06/21  0943 21  1055   WBC 11.9* 7.5 7.5     Recent Labs   Lab Test 10/17/21  0019 10/16/21  2050 21  1033 04/15/21  2225 04/15/21  2225   LACT 1.7 1.9  --   --  1.2   CRP  --   --   --   --  85.7*   PCAL <0.05  --  <0.05   < > 0.99    < > = values in this interval not displayed.        Temperature:  Temp (24hrs), Av.6  F (38.1  C), Min:98.1  F (36.7  C), Max:102  F (38.9  C)    Culture Results:   Collected Updated Procedure Result Status    10/17/2021 0019 10/17/2021 0028 Blood Culture Arm, Right [[id number]]   Blood from Arm, Right    In process Component Value   No component results              10/17/2021 0011 10/17/2021 0023 Blood Culture Peripheral Blood [[id number]]   Peripheral Blood    In process Component Value   No component results              10/17/2021 0002 10/17/2021 0127 Urine Culture [[id number]]   Urine, Schmitt Catheter    In process Component Value   No component results              10/16/2021 2329 10/17/2021 1227 Asymptomatic COVID-19 Virus (Coronavirus) by PCR Nasopharyngeal [[id number]]    Swab from Nasopharyngeal    Final result Component Value   SARS CoV2 PCR Negative   NEGATIVE: SARS-CoV-2 (COVID-19) RNA not detected, presumed negative.           10/11/2021 0848 10/12/2021 1357 Asymptomatic  COVID-19 Virus (Coronavirus) by PCR Nose [[id number]]    Swab from Nose    Final result Component Value   SARS CoV2 PCR Negative   NEGATIVE: SARS-CoV-2 (COVID-19) RNA not detected, presumed negative.           10/04/2021 0859 10/08/2021 0705 Urine Culture Aerobic Bacterial [[id number]]    (Abnormal)   Urine, Schmitt Catheter    Final result Component Value   Culture Culture in progress    50,000-100,000 CFU/mL Enterococcus faecalisAbnormal     50,000-100,000 CFU/mL Gram positive bacilli, resembling diphtheroidsAbnormal     No further ID for susceptibilities   Probable normal gen         Susceptibility     Enterococcus faecalis     ZULMA     Ampicillin <=2  Susceptible     Gentamicin Synergy Susceptible  Susceptible *     Linezolid 2  Susceptible *     Penicillin 4  Susceptible     Quinupristin/Dalfopristin 4  Resistant *     Streptomycin Synergy Susceptible  Susceptible *     Vancomycin 1  Susceptible           * Suppressed Antibiotic                  Recommendations/Interventions:  1. Consider stopping Macrobid now that Unasyn started.     Cassi Vizcarra, [facility]  October 17, 2021

## 2021-10-17 NOTE — PROGRESS NOTES
[facility]    Medicine Progress Note - Hospitalist Service       Date of Admission:  10/15/2021    Assessment & Plan                Anthony Dyer is a 81 year old male admitted on 10/15/2021.      Urinary retention  BPH  S/P TURP on 10/15 with Dr De Leon  -Management as per urology  -CBI  -On flomax and finasteride    -holding xarelto      CKD stage III  -Daily labs  -avoid nephrotic meds      COPD  -home prn albuterol      PE diagnosis 4/26/2021   Xarelto held for surgery should be restarted when able   Will continue to hold     History of CVA  -continue home medications     Irritable bowel syndrome  -continue home diet     Essential hypertension  -continue home medications     Depression   -continue home mediations       # Coagulation Defect:   Acute blood loss anemia hgb 13->11  Monitor hgb   Type and screen     UTI POA,  Started on Unasyn as hx of enterococcus   Monitor cultures  continue Unasyn      The patient's care was discussed with the Primary team.     Jennifer Awan, [facility]  Securely message with the Vocera Web Console (learn more here)  Text page via [employer] Paging/Directory       # Coagulation Defect: home medication list includes an anticoagulant medication        ______________________________________________________________________       _______________________________________________________________    Interval History    seen this morning , feels weak. No chest pain or shortness of breath   overnight had fever, uti suspected. Did complain of cough but chest xray no pneumonia     Data reviewed today: I reviewed all medications, new labs and imaging results over the last 24 hours. I personally reviewed no images or EKG's today.    Physical Exam   Vital Signs: Temp: (!) 100.6  F (38.1  C) Temp src: Tympanic BP: 150/70 Pulse: 105   Resp: 20 SpO2: 93 % O2 Device: None (Room air) Oxygen Delivery: 1 LPM (placed on RA)  Weight: 185 lbs 0 oz   Physical  Exam  Constitutional:       Comments: Frail appearing stated age male    HENT:      Head: Normocephalic.      Nose: Nose normal.      Mouth/Throat:      Pharynx: Oropharynx is clear.   Eyes:      Conjunctiva/sclera: Conjunctivae normal.   Cardiovascular:      Rate and Rhythm: Normal rate.   Pulmonary:      Effort: Pulmonary effort is normal.   Abdominal:      General: Abdomen is flat. There is no distension.   Skin:     General: Skin is warm.   Neurological:      Mental Status: Mental status is at baseline.           Data   Recent Labs   Lab 10/17/21  0019 10/16/21  0946 10/15/21  1212   WBC 11.9*  --   --    HGB 11.7* 12.6* 13.4   MCV 96  --   --    *  --   --      --  139   POTASSIUM 3.5  --  4.0   CHLORIDE 110*  --  110*   CO2 27  --  24   BUN 11  --  17   CR 1.11  --  0.98   ANIONGAP 5  --  5   JODI 7.7*  --  8.7   *  --  147*   ALBUMIN 2.8*  --   --    PROTTOTAL 5.7*  --   --    BILITOTAL 0.4  --   --    ALKPHOS 60  --   --    ALT 14  --   --    AST 11  --   --      Recent Results (from the past 24 hour(s))   XR Chest Port 1 View    Narrative    Procedure:XR CHEST PORT 1 VIEW    Clinical history:Male, 81 years, Sepsis    Technique: Single view was obtained.    Comparison: 4/26/2021    Findings: The cardiac silhouette is normal. The pulmonary vasculature  is normal.    The lungs are clear. Bony structures are unremarkable.      Impression    Impression:   No acute abnormality. No evidence of acute or active cardiopulmonary  disease.     This report is in agreement with the preliminary report.    ASHWINI ALBRIGHT MD         SYSTEM ID:  [id number]     Medications     ringers Stopped (10/16/21 1200)     sodium chloride 0.9% (bag) Stopped (10/16/21 0630)       allopurinol  100 mg Oral Daily     amLODIPine  2.5 mg Oral Daily     ampicillin-sulbactam         ampicillin-sulbactam (UNASYN) IV  3 g Intravenous Q6H     atorvastatin  40 mg Oral QPM     carvedilol  25 mg Oral BID     finasteride  5 mg  Oral Daily     gabapentin  300 mg Oral TID     nitroFURantoin macrocrystal-monohydrate  100 mg Oral Q12H MARIA M     sodium chloride (PF)  3 mL Intracatheter Q8H     sodium chloride         tamsulosin  0.4 mg Oral QPM     vitamin B-12  250 mcg Oral Daily

## 2021-10-17 NOTE — PROVIDER NOTIFICATION
Dr. De Leon called for update. CBI restarted this morning and continues at a slow rate with pink tinged urine, per Dr. De Leon ok to keep CBI going since he will not be discharging today.

## 2021-10-17 NOTE — UTILIZATION REVIEW
Admission Status; Secondary Review Determination       Under the authority of the Utilization Management Committee, the utilization review process indicated a secondary review on the above patient. The review outcome is based on review of the medical records, discussions with staff, and applying clinical experience noted on the date of the review.     (x) Inpatient Status Appropriate - This patient's medical care is consistent with medical management for inpatient care and reasonable inpatient medical practice.     RATIONALE FOR DETERMINATION    81-year-old male who underwent TURP on October 15, patient has already received more than 2 midnights of hospital care, still having fever 101.5 this morning, started on intravenous antibiotic expected to require ongoing hospital care still multiple abnormality on his vital signs this morning also requiring oxygen supplementation for hypoxia.  Last night fever as high as 102.  Also still receiving continuous bladder irrigation.  The expected length of stay at the time of admission was more than 2 nights because of the severity of illness, intensity of service provided, and risk for adverse outcome. Inpatient admission is appropriate.   Dr. Awan notified  This document was produced using voice recognition software       The information on this document is developed by the utilization review team in order for the business office to ensure compliance. This only denotes the appropriateness of proper admission status and does not reflect the quality of care rendered.   The definitions of Inpatient Status and Observation Status used in making the determination above are those provided in the CMS Coverage Manual, Chapter 1 and Chapter 6, section 70.4.   Sincerely,   JENNIFER JUDGE MD   System Medical Director   Utilization Management   [facility].

## 2021-10-17 NOTE — PLAN OF CARE
Medicated with 650 mg tylenol now for temp of 100.6. Continues to have CBI with clear yellow urine noted in tubing. Will stop and see if urine gets reds. IV antibiotic hung. Offers no complaints.

## 2021-10-17 NOTE — PLAN OF CARE
Face to face report given with opportunity to observe patient.    Report given to TONY Ramos RN   10/17/2021  10:45 AM

## 2021-10-17 NOTE — PLAN OF CARE
Patient continues to have a low grade temp of 100.6 tympanic. Medicated with tylenol. IV lock patent. CBI stopped around 1430 today. Urine output via to is clear rylee in color. To catheter remains patent.VSS. face is flushed. Appetite good. Ambulated in hallway with staff and pushing wheelchair. Had visitors today. Will continue to monitor.

## 2021-10-17 NOTE — SIGNIFICANT EVENT
Significant Event Note    Time of event: 10/16/2021 11 PM    Description of event:  Called to see patient for spiking fever to 102.  81-year-old male who underwent TURP on October 15 by Dr. De Leon.  Postop patient has been maintained on Macrobid antibiotics.  With fever, patient does not offer any specific symptoms but just feels bad.  Patient denies any increased cough, chest pain, nausea, vomiting, abdominal pain, diarrhea, dysuria.  Exam showed clear to lungs bilaterally benign abdomen.  Labs showed mild leukocytosis with WBC 11.9, lactic acid 1.7, normal renal function.    Plan:  Dr. De Leon was contacted, who suggested checking past urine culture and adjust antibiotic selection.  It appears the patient's past urine culture grew Enterococcus species multiple times.  We will start Unasyn to cover for that and follow blood culture as well as urine culture.    Discussed with: bedside nurse    Hair Heath MD

## 2021-10-17 NOTE — PLAN OF CARE
Face to face report given with opportunity to observe patient.    Report given to Tsering Van RN   10/17/2021  6:58 PM

## 2021-10-18 ENCOUNTER — APPOINTMENT (OUTPATIENT)
Dept: PHYSICAL THERAPY | Facility: HOSPITAL | Age: 81
DRG: 713 | End: 2021-10-18
Attending: HOSPITALIST
Payer: MEDICARE

## 2021-10-18 LAB
ANION GAP SERPL CALCULATED.3IONS-SCNC: 5 MMOL/L (ref 3–14)
BASOPHILS # BLD AUTO: 0 10E3/UL (ref 0–0.2)
BASOPHILS NFR BLD AUTO: 0 %
BUN SERPL-MCNC: 14 MG/DL (ref 7–30)
CALCIUM SERPL-MCNC: 7.8 MG/DL (ref 8.5–10.1)
CHLORIDE BLD-SCNC: 109 MMOL/L (ref 94–109)
CO2 SERPL-SCNC: 27 MMOL/L (ref 20–32)
CREAT SERPL-MCNC: 1.1 MG/DL (ref 0.66–1.25)
EOSINOPHIL # BLD AUTO: 0.3 10E3/UL (ref 0–0.7)
EOSINOPHIL NFR BLD AUTO: 4 %
ERYTHROCYTE [DISTWIDTH] IN BLOOD BY AUTOMATED COUNT: 13.2 % (ref 10–15)
GFR SERPL CREATININE-BSD FRML MDRD: 63 ML/MIN/1.73M2
GLUCOSE BLD-MCNC: 138 MG/DL (ref 70–99)
HCT VFR BLD AUTO: 37.6 % (ref 40–53)
HGB BLD-MCNC: 12.1 G/DL (ref 13.3–17.7)
IMM GRANULOCYTES # BLD: 0 10E3/UL
IMM GRANULOCYTES NFR BLD: 0 %
LYMPHOCYTES # BLD AUTO: 0.4 10E3/UL (ref 0.8–5.3)
LYMPHOCYTES NFR BLD AUTO: 5 %
MCH RBC QN AUTO: 31 PG (ref 26.5–33)
MCHC RBC AUTO-ENTMCNC: 32.2 G/DL (ref 31.5–36.5)
MCV RBC AUTO: 96 FL (ref 78–100)
MONOCYTES # BLD AUTO: 0.6 10E3/UL (ref 0–1.3)
MONOCYTES NFR BLD AUTO: 6 %
NEUTROPHILS # BLD AUTO: 7.7 10E3/UL (ref 1.6–8.3)
NEUTROPHILS NFR BLD AUTO: 85 %
NRBC # BLD AUTO: 0 10E3/UL
NRBC BLD AUTO-RTO: 0 /100
PLATELET # BLD AUTO: 153 10E3/UL (ref 150–450)
POTASSIUM BLD-SCNC: 3.5 MMOL/L (ref 3.4–5.3)
RBC # BLD AUTO: 3.9 10E6/UL (ref 4.4–5.9)
SODIUM SERPL-SCNC: 141 MMOL/L (ref 133–144)
WBC # BLD AUTO: 9.1 10E3/UL (ref 4–11)

## 2021-10-18 PROCEDURE — 250N000013 HC RX MED GY IP 250 OP 250 PS 637: Performed by: HOSPITALIST

## 2021-10-18 PROCEDURE — 36415 COLL VENOUS BLD VENIPUNCTURE: CPT | Performed by: HOSPITALIST

## 2021-10-18 PROCEDURE — 250N000013 HC RX MED GY IP 250 OP 250 PS 637: Performed by: UROLOGY

## 2021-10-18 PROCEDURE — 258N000003 HC RX IP 258 OP 636: Performed by: INTERNAL MEDICINE

## 2021-10-18 PROCEDURE — 80048 BASIC METABOLIC PNL TOTAL CA: CPT | Performed by: HOSPITALIST

## 2021-10-18 PROCEDURE — 97116 GAIT TRAINING THERAPY: CPT | Mod: GP | Performed by: PHYSICAL THERAPIST

## 2021-10-18 PROCEDURE — 93010 ELECTROCARDIOGRAM REPORT: CPT | Performed by: INTERNAL MEDICINE

## 2021-10-18 PROCEDURE — 99232 SBSQ HOSP IP/OBS MODERATE 35: CPT | Mod: 24 | Performed by: HOSPITALIST

## 2021-10-18 PROCEDURE — 97161 PT EVAL LOW COMPLEX 20 MIN: CPT | Mod: GP | Performed by: PHYSICAL THERAPIST

## 2021-10-18 PROCEDURE — 97530 THERAPEUTIC ACTIVITIES: CPT | Mod: GP | Performed by: PHYSICAL THERAPIST

## 2021-10-18 PROCEDURE — 85004 AUTOMATED DIFF WBC COUNT: CPT | Performed by: HOSPITALIST

## 2021-10-18 PROCEDURE — 120N000001 HC R&B MED SURG/OB

## 2021-10-18 PROCEDURE — 93005 ELECTROCARDIOGRAM TRACING: CPT

## 2021-10-18 PROCEDURE — 250N000011 HC RX IP 250 OP 636: Performed by: INTERNAL MEDICINE

## 2021-10-18 RX ADMIN — NITROFURANTOIN (MONOHYDRATE/MACROCRYSTALS) 100 MG: 75; 25 CAPSULE ORAL at 08:51

## 2021-10-18 RX ADMIN — GABAPENTIN 300 MG: 300 CAPSULE ORAL at 08:51

## 2021-10-18 RX ADMIN — AMLODIPINE BESYLATE 2.5 MG: 2.5 TABLET ORAL at 08:51

## 2021-10-18 RX ADMIN — TAMSULOSIN HYDROCHLORIDE 0.4 MG: 0.4 CAPSULE ORAL at 20:34

## 2021-10-18 RX ADMIN — ACETAMINOPHEN 975 MG: 325 TABLET, FILM COATED ORAL at 20:39

## 2021-10-18 RX ADMIN — ALLOPURINOL 100 MG: 100 TABLET ORAL at 08:51

## 2021-10-18 RX ADMIN — CARVEDILOL 25 MG: 12.5 TABLET, FILM COATED ORAL at 20:34

## 2021-10-18 RX ADMIN — FINASTERIDE 5 MG: 5 TABLET, FILM COATED ORAL at 08:51

## 2021-10-18 RX ADMIN — AMPICILLIN SODIUM AND SULBACTAM SODIUM 3 G: 2; 1 INJECTION, POWDER, FOR SOLUTION INTRAMUSCULAR; INTRAVENOUS at 08:47

## 2021-10-18 RX ADMIN — GABAPENTIN 300 MG: 300 CAPSULE ORAL at 14:11

## 2021-10-18 RX ADMIN — Medication 250 MCG: at 14:11

## 2021-10-18 RX ADMIN — AMPICILLIN SODIUM AND SULBACTAM SODIUM 3 G: 2; 1 INJECTION, POWDER, FOR SOLUTION INTRAMUSCULAR; INTRAVENOUS at 20:30

## 2021-10-18 RX ADMIN — CARVEDILOL 25 MG: 12.5 TABLET, FILM COATED ORAL at 08:51

## 2021-10-18 RX ADMIN — ATORVASTATIN CALCIUM 40 MG: 40 TABLET, FILM COATED ORAL at 20:34

## 2021-10-18 RX ADMIN — GABAPENTIN 300 MG: 300 CAPSULE ORAL at 20:34

## 2021-10-18 RX ADMIN — AMPICILLIN SODIUM AND SULBACTAM SODIUM 3 G: 2; 1 INJECTION, POWDER, FOR SOLUTION INTRAMUSCULAR; INTRAVENOUS at 14:11

## 2021-10-18 RX ADMIN — RIVAROXABAN 20 MG: 20 TABLET, FILM COATED ORAL at 16:32

## 2021-10-18 RX ADMIN — AMPICILLIN SODIUM AND SULBACTAM SODIUM 3 G: 2; 1 INJECTION, POWDER, FOR SOLUTION INTRAMUSCULAR; INTRAVENOUS at 01:34

## 2021-10-18 ASSESSMENT — ACTIVITIES OF DAILY LIVING (ADL)
ADLS_ACUITY_SCORE: 15

## 2021-10-18 NOTE — PLAN OF CARE
Pt alert and orientated.  Resting in bed.  Temp.- 101.0, tylenol given. Saline lock. Bed alarm on.

## 2021-10-18 NOTE — PLAN OF CARE
Pt is A&O x4. Afebrile. Denies pain. After having a small, hard BM this morning, pt had a small amount of bloody discharge around meatus, and again after ambulating in the leonardo. Urine output is dark rylee. Pt ambulated in leonardo x2. Did well with walker this afternoon. Continues to receive Unasyn q6h. Call light in reach.     Face to face report given with opportunity to observe patient.    Report given to Brittni & Leta, Franklin Hutton RN   10/18/2021  3:32 PM

## 2021-10-18 NOTE — PLAN OF CARE
"/59 (BP Location: Left arm)   Pulse 102   Temp 98.8  F (37.1  C) (Tympanic)   Resp 18   Ht 1.842 m (6' 0.5\")   Wt 83.9 kg (185 lb)   SpO2 (!) 90%   BMI 24.75 kg/m  . Schmitt patent, clear rylee output. Denies pain or discomfort. Irritable with having to have abx run. Lung sounds clear throughout, sats 90% on room air.    "

## 2021-10-18 NOTE — PLAN OF CARE
Patients afternoon was uneventful, denies pain and discomforts, temp 99.1. Schmitt draining rylee urine. Up in chair eating supper tolerating well. Assist of 1 back to bed tolerated well.     Face to face report given with opportunity to observe patient.    Report given to TONY Phillip RN   10/18/2021  6:59 PM

## 2021-10-18 NOTE — PROGRESS NOTES
10/18/21 1410   Signing Clinician's Name / Credentials   Signing clinician's name / credentials Janette Bella, MALIKA   Quick Adds   Rehab Discipline PT   Stair, Performance   Minutes of Treatment 8   Symptoms Noted During/After Treatment fatigue   Treatment Detail Pt managed 4 steps with single rail on left with min A, some weakness noted.  Pt instructed to use bilateral UEs on single rail to help stabilize.     Therapeutic Activity   Minutes of Treatment 10 minutes   Symptoms Noted During/After Treatment Fatigue   Treatment Detail Pt ambulated additional 200' with walking stick CGA-min A, mild instability noted but no major LOB.  Pt does demonstrate fatigue and weakness with activity.  Spent time discussing recommendation for home vs outpatient PT however pt states he has had that in the past.  Discussed current level of weakness and how this could put him at increased risk for falls however pt feels he will do fine at home.   PT Discharge Planning    PT Discharge Recommendation (DC Rec) home with home care physical therapy;home with outpatient physical therapy   PT Rationale for DC Rec PT evaluation completed.  Pt demonstrates instability with gait with use of walking stick and generalized weakness.  Pt would benefit from home PT vs outpatient PT for ongoing work on strength and functional mobility however pt declining both of those recommendations stating he will be fine when he gets home and has had both previously.  Will see during acute care stay to progress mobility and strength.     PT Brief overview of current status  Pt ambulated additional 200' with walking stick CGA-min A, mild instability noted but no major LOB.  Pt does demonstrate fatigue and weakness with activity.  Spent time discussing recommendation for home vs outpatient PT however pt states he has had that in the past.  Discussed current level of weakness and how this could put him at increased risk for falls however pt feels he will do fine at  home.  Pt managed 4 steps with single rail on left with min A, some weakness noted.  Pt instructed to use bilateral UEs on single rail to help stabilize.     Additional Documentation   Rehab Comments weakness present, fall risk   PT Plan Progress strength and safety with functional mobility   Total Session Time   Total Session Time (minutes) 18 minutes

## 2021-10-18 NOTE — PLAN OF CARE
Face to face report given with opportunity to observe patient.    Report given to errol Feliz RN   10/17/2021  11:12 PM

## 2021-10-18 NOTE — PHARMACY
Pharmacy Antimicrobial Stewardship Assessment     Current Antimicrobial Therapy:          Days of Therapy: day 2 of Unasyn, day 4 of Macrobid     Pertinent Labs:  Recent Labs   Lab Test 10/18/21  0626 10/17/21  0019 10/06/21  0943   WBC 9.1 11.9* 7.5     Lab Test 10/17/21  0019 10/16/21  2050   LACT 1.7 1.9   CRP  --   --    PCAL <0.05  --         Temperature:  Temp (24hrs), Av  F (37.8  C), Min:98.8  F (37.1  C), Max:101  F (38.3  C)      Culture Results:       Recommendations/Interventions:  1. Recommend discontinuing Macrobid.    Carolina De León, [facility]  2021

## 2021-10-18 NOTE — PROGRESS NOTES
[facility]    Medicine Progress Note - Hospitalist Service       Date of Admission:  10/15/2021    Assessment & Plan                           Anthony Dyer is a 81 year old male admitted on 10/15/2021.      Urinary retention  BPH  S/P TURP on 10/15 with Dr De Leon  -Management as per urology  -CBI  -On flomax and finasteride    -holding xarelto      CKD stage III  -Daily labs  -avoid nephrotic meds      COPD  -home prn albuterol      PE diagnosis 4/26/2021   Xarelto held for surgery should be restarted when able   Will continue to hold     History of CVA  -continue home medications     Irritable bowel syndrome  -continue home diet     Essential hypertension  -continue home medications     Depression   -continue home mediations       # Coagulation Defect:   Acute blood loss anemia hgb 13->11  Monitor hgb   Type and screen     UTI POA,  Started on Unasyn as hx of enterococcus   Monitor cultures  continue Unasyn      The patient's care was discussed with the Primary team.     Jennifer Awan DO  [facility]  Securely message with the Vocera Web Console (learn more here)  Text page via [employer]       # Coagulation Defect: home medication list includes an anticoagulant medication          Addendum  Spoke with Dr De Leon at this time will stop Macrobid, restart Xarelto and monitor hgb and urine color.   PT was ordered    Diet: Regular Diet Adult    DVT Prophylaxis: SCD's   Schmitt Catheter: PRESENT, indication: Retention  Central Lines: None  Code Status: Full Code      Disposition Plan   Expected discharge:  1-2 days pending further infectious work up pending cultures and resolution of fevers.       The patient's care was discussed with the Patient.    Jennifer Awan DO  Hospitalist Service  [facility]  Securely message with the Vocera Web Console (learn more here)  Text page via [employer]          ______________________________________________________________________    Interval History   Patient seen this morning for medical rounds. No chest pain or shortness of breath. He denied any pain. He did fever  of 101 last night,. awaiting cultures still. His cough has improved. He feels weak still. No blood in to.    Data reviewed today: I reviewed all medications, new labs and imaging results over the last 24 hours. I personally reviewed no images or EKG's today.    Physical Exam   Vital Signs: Temp: 98.8  F (37.1  C) Temp src: Tympanic BP: 118/59 Pulse: 102   Resp: 18 SpO2: (!) 90 % O2 Device: None (Room air)    Weight: 185 lbs 0 oz  Physical Exam  Constitutional:       Comments: Frail appearing stated age male    HENT:      Head: Normocephalic.      Mouth/Throat:      Pharynx: Oropharynx is clear.   Eyes:      Conjunctiva/sclera: Conjunctivae normal.   Cardiovascular:      Rate and Rhythm: Normal rate.   Pulmonary:      Effort: Pulmonary effort is normal.   Abdominal:      General: Abdomen is flat.   Musculoskeletal:         General: Normal range of motion.   Skin:     General: Skin is warm.   Neurological:      Mental Status: Mental status is at baseline.              Data   Recent Labs   Lab 10/18/21  0626 10/17/21  1629 10/17/21  0019 10/16/21  0946 10/15/21  1212   WBC 9.1  --  11.9*  --   --    HGB 12.1* 11.5* 11.7*   < > 13.4   MCV 96  --  96  --   --      --  148*  --   --      --  142  --  139   POTASSIUM 3.5  --  3.5  --  4.0   CHLORIDE 109  --  110*  --  110*   CO2 27  --  27  --  24   BUN 14  --  11  --  17   CR 1.10  --  1.11  --  0.98   ANIONGAP 5  --  5  --  5   JODI 7.8*  --  7.7*  --  8.7   *  --  152*  --  147*   ALBUMIN  --   --  2.8*  --   --    PROTTOTAL  --   --  5.7*  --   --    BILITOTAL  --   --  0.4  --   --    ALKPHOS  --   --  60  --   --    ALT  --   --  14  --   --    AST  --   --  11  --   --     < > = values in this interval not displayed.     No results  found for this or any previous visit (from the past 24 hour(s)).  Medications     ringers Stopped (10/16/21 1200)     sodium chloride 0.9% (bag) Stopped (10/16/21 0630)       allopurinol  100 mg Oral Daily     amLODIPine  2.5 mg Oral Daily     ampicillin-sulbactam (UNASYN) IV  3 g Intravenous Q6H     atorvastatin  40 mg Oral QPM     carvedilol  25 mg Oral BID     finasteride  5 mg Oral Daily     gabapentin  300 mg Oral TID     nitroFURantoin macrocrystal-monohydrate  100 mg Oral Q12H MARIA M     sodium chloride (PF)  3 mL Intracatheter Q8H     tamsulosin  0.4 mg Oral QPM     vitamin B-12  250 mcg Oral Daily

## 2021-10-18 NOTE — PROGRESS NOTES
Inpatient Physical Therapy Evaluation    Name: Anthony Dyer MRN# [id number]   Age: 81 year old YOB: 1940     Date of Consultation: 2021  Consultation is requested by:  Dr. Awan  Specific Consultation Request:  weakness  Primary care provider: George Orourke    General Information:   Onset Date: 10/15/2021    History of Current Problem/Admission: Urinary retention [R33.9]    Prior Level of Function: Pt reports he is independent with ADLs at home.  Pt has assistance with cleaning.  Pt ambulates with walking stick at baseline, states he never really walks long distances.  Pt drives.  Ambulation: 1 - Assistive Equipment   Transferrin - Independent   Toiletin - Independent    Bathin - Independent   Dressin - Independent   Eatin - Independent   Communication: 0 - Understands/communicates without difficulty  Swallowin - swallows foods/liquids without difficulty  Cognition: 0 - no cognitive issues reported    Fall history within the last 6 months: Yes, reports at least 2    Current Living Situation: Patient lives in a house alone.  Pt has 3 steps with rail on left to enter home.  Pt's laundry is in basement with rails.      Current Equipment Used at Home: walking stick     Patient & Family Goals: to go home     Past Medical History:   Past Medical History:   Diagnosis Date     Diabetes (H)      Hypertension        Past Surgical History:  Past Surgical History:   Procedure Laterality Date     CYSTOSCOPY, TRANSURETHRAL RESECTION (TUR) PROSTATE, COMBINED N/A 10/15/2021    Procedure: CYSTOSCOPY, WITH TRANSURETHRAL RESECTION PROSTATE;  Surgeon: Indu De Leon MD;  Location: [facility] OR     HERNIA REPAIR         Medications:   Current Facility-Administered Medications   Medication     acetaminophen (TYLENOL) tablet 975 mg     albuterol (PROAIR HFA/PROVENTIL HFA/VENTOLIN HFA) 108 (90 Base) MCG/ACT inhaler 2 puff     allopurinol (ZYLOPRIM) tablet 100 mg     amLODIPine  (NORVASC) tablet 2.5 mg     ampicillin-sulbactam (UNASYN) 3 g in sodium chloride 0.9 % 100 mL intermittent infusion     atorvastatin (LIPITOR) tablet 40 mg     carvedilol (COREG) tablet 25 mg     finasteride (PROSCAR) tablet 5 mg     gabapentin (NEURONTIN) capsule 300 mg     lidocaine (LMX4) cream     lidocaine 1 % 0.1-1 mL     naloxone (NARCAN) injection 0.2 mg    Or     naloxone (NARCAN) injection 0.4 mg    Or     naloxone (NARCAN) injection 0.2 mg    Or     naloxone (NARCAN) injection 0.4 mg     opium-belladonna (B&O SUPPRETTES) 30-16.2 MG per suppository 1 suppository     ringers injection     rivaroxaban ANTICOAGULANT (XARELTO) tablet 20 mg     sodium chloride (PF) 0.9% PF flush 3 mL     sodium chloride (PF) 0.9% PF flush 3 mL     sodium chloride 0.9% (bag) irrigation     tamsulosin (FLOMAX) capsule 0.4 mg     vitamin B-12 (CYANOCOBALAMIN) tablet 250 mcg       Weight Bearing Status: FWB LEs     Cognitive Status Examination:  Orientation: oriented to time, place and person  Level of Consciousness: alert  Follows Commands and Answers Questions: 100% of the time  Personal Safety and Judgement: Intact  Memory: Short-term memory intact  Comments:     Pain:   Currently in pain? No  Pain Location?   Pain Rating:     Physical Therapy Evaluation:   Integumentary/Edema: light bruising noted on LEs  ROM: LE AROM WFL, mild limitations in bilateral knee ext  Strength: LE strength grossly 4/5   Bed Mobility: sit>sup mod I  Transfers: sit<>stand CGA  Gait: ambulated 100' with walking stick CGA-min A with mild instability  Balance: fair with support from walking stick  Sensory: bilateral LE neuropathy  Coordination: NT    Physical Therapy Interventions: Balance, transfers, ambulation, activity tolerance, finalize HEP, progress activity    Clinical Impressions:  Criteria for Skilled Therapeutic Intervention Met: Yes, treatment indicated  PT Diagnosis: gait disturbance  Influenced by the following impairments: weakness,  decreased balance, decreased activity tolerance  Functional limitations due to impairment: decreased safety and stability with functional mobility and ADLs  Clinical presentation: Stable/Uncomplicated  Clinical presentation rationale: clinical judgement  Clinical Decision making (complexity): Low Complexity  Frequency: 6 times/week  Predicted Duration of Therapy Intervention (days/wks): 5 days  Anticipated Discharge Disposition: Home with Home Therapy and Home with Outpatient Therapy   Anticipated Equipment Needs at Discharge:   Risks and Benefits of therapy have been explained: Yes  Patient, Family & other staff in agreement with plan of care: Yes  Clinical Impression Comments: PT evaluation completed.  Pt demonstrates instability with gait with use of walking stick and generalized weakness.  Pt would benefit from home PT vs outpatient PT for ongoing work on strength and functional mobility however pt declining both of those recommendations stating he will be fine when he gets home and has had both previously.  Will see during acute care stay to progress mobility and strength.      Total Eval Time: 13

## 2021-10-19 ENCOUNTER — APPOINTMENT (OUTPATIENT)
Dept: PHYSICAL THERAPY | Facility: HOSPITAL | Age: 81
DRG: 713 | End: 2021-10-19
Attending: UROLOGY
Payer: MEDICARE

## 2021-10-19 LAB
ANION GAP SERPL CALCULATED.3IONS-SCNC: 6 MMOL/L (ref 3–14)
BACTERIA UR CULT: NO GROWTH
BASOPHILS # BLD AUTO: 0 10E3/UL (ref 0–0.2)
BASOPHILS NFR BLD AUTO: 0 %
BUN SERPL-MCNC: 16 MG/DL (ref 7–30)
CALCIUM SERPL-MCNC: 7.8 MG/DL (ref 8.5–10.1)
CHLORIDE BLD-SCNC: 110 MMOL/L (ref 94–109)
CO2 SERPL-SCNC: 26 MMOL/L (ref 20–32)
CREAT SERPL-MCNC: 1.12 MG/DL (ref 0.66–1.25)
EOSINOPHIL # BLD AUTO: 0.3 10E3/UL (ref 0–0.7)
EOSINOPHIL NFR BLD AUTO: 5 %
ERYTHROCYTE [DISTWIDTH] IN BLOOD BY AUTOMATED COUNT: 13 % (ref 10–15)
GFR SERPL CREATININE-BSD FRML MDRD: 62 ML/MIN/1.73M2
GLUCOSE BLD-MCNC: 153 MG/DL (ref 70–99)
HCT VFR BLD AUTO: 36 % (ref 40–53)
HGB BLD-MCNC: 12 G/DL (ref 13.3–17.7)
IMM GRANULOCYTES # BLD: 0 10E3/UL
IMM GRANULOCYTES NFR BLD: 0 %
LYMPHOCYTES # BLD AUTO: 0.6 10E3/UL (ref 0.8–5.3)
LYMPHOCYTES NFR BLD AUTO: 9 %
MCH RBC QN AUTO: 31.7 PG (ref 26.5–33)
MCHC RBC AUTO-ENTMCNC: 33.3 G/DL (ref 31.5–36.5)
MCV RBC AUTO: 95 FL (ref 78–100)
MONOCYTES # BLD AUTO: 0.5 10E3/UL (ref 0–1.3)
MONOCYTES NFR BLD AUTO: 7 %
NEUTROPHILS # BLD AUTO: 4.8 10E3/UL (ref 1.6–8.3)
NEUTROPHILS NFR BLD AUTO: 79 %
NRBC # BLD AUTO: 0 10E3/UL
NRBC BLD AUTO-RTO: 0 /100
PATH REPORT.COMMENTS IMP SPEC: NORMAL
PATH REPORT.COMMENTS IMP SPEC: NORMAL
PATH REPORT.FINAL DX SPEC: NORMAL
PATH REPORT.GROSS SPEC: NORMAL
PATH REPORT.MICROSCOPIC SPEC OTHER STN: NORMAL
PHOTO IMAGE: NORMAL
PLATELET # BLD AUTO: 148 10E3/UL (ref 150–450)
POTASSIUM BLD-SCNC: 3.5 MMOL/L (ref 3.4–5.3)
RBC # BLD AUTO: 3.79 10E6/UL (ref 4.4–5.9)
SODIUM SERPL-SCNC: 142 MMOL/L (ref 133–144)
TROPONIN I SERPL-MCNC: <0.015 UG/L (ref 0–0.04)
WBC # BLD AUTO: 6.2 10E3/UL (ref 4–11)

## 2021-10-19 PROCEDURE — 250N000011 HC RX IP 250 OP 636: Performed by: INTERNAL MEDICINE

## 2021-10-19 PROCEDURE — 88305 TISSUE EXAM BY PATHOLOGIST: CPT | Mod: 26 | Performed by: PATHOLOGY

## 2021-10-19 PROCEDURE — 36415 COLL VENOUS BLD VENIPUNCTURE: CPT | Performed by: INTERNAL MEDICINE

## 2021-10-19 PROCEDURE — 80048 BASIC METABOLIC PNL TOTAL CA: CPT | Performed by: HOSPITALIST

## 2021-10-19 PROCEDURE — 85025 COMPLETE CBC W/AUTO DIFF WBC: CPT | Performed by: HOSPITALIST

## 2021-10-19 PROCEDURE — 97110 THERAPEUTIC EXERCISES: CPT | Mod: GP

## 2021-10-19 PROCEDURE — 258N000003 HC RX IP 258 OP 636: Performed by: INTERNAL MEDICINE

## 2021-10-19 PROCEDURE — 84484 ASSAY OF TROPONIN QUANT: CPT | Performed by: INTERNAL MEDICINE

## 2021-10-19 PROCEDURE — 250N000013 HC RX MED GY IP 250 OP 250 PS 637: Performed by: HOSPITALIST

## 2021-10-19 PROCEDURE — 99232 SBSQ HOSP IP/OBS MODERATE 35: CPT | Mod: 24 | Performed by: HOSPITALIST

## 2021-10-19 PROCEDURE — 120N000001 HC R&B MED SURG/OB

## 2021-10-19 PROCEDURE — 97530 THERAPEUTIC ACTIVITIES: CPT | Mod: GP

## 2021-10-19 RX ORDER — SENNOSIDES 8.6 MG
8.6 TABLET ORAL 2 TIMES DAILY PRN
Status: DISCONTINUED | OUTPATIENT
Start: 2021-10-19 | End: 2021-10-20 | Stop reason: HOSPADM

## 2021-10-19 RX ADMIN — STANDARDIZED SENNA CONCENTRATE 8.6 MG: 8.6 TABLET ORAL at 21:30

## 2021-10-19 RX ADMIN — GABAPENTIN 300 MG: 300 CAPSULE ORAL at 09:29

## 2021-10-19 RX ADMIN — AMPICILLIN SODIUM AND SULBACTAM SODIUM 3 G: 2; 1 INJECTION, POWDER, FOR SOLUTION INTRAMUSCULAR; INTRAVENOUS at 07:39

## 2021-10-19 RX ADMIN — RIVAROXABAN 20 MG: 20 TABLET, FILM COATED ORAL at 16:25

## 2021-10-19 RX ADMIN — FINASTERIDE 5 MG: 5 TABLET, FILM COATED ORAL at 09:29

## 2021-10-19 RX ADMIN — ATORVASTATIN CALCIUM 40 MG: 40 TABLET, FILM COATED ORAL at 20:01

## 2021-10-19 RX ADMIN — STANDARDIZED SENNA CONCENTRATE 8.6 MG: 8.6 TABLET ORAL at 10:42

## 2021-10-19 RX ADMIN — AMPICILLIN SODIUM AND SULBACTAM SODIUM 3 G: 2; 1 INJECTION, POWDER, FOR SOLUTION INTRAMUSCULAR; INTRAVENOUS at 14:24

## 2021-10-19 RX ADMIN — CARVEDILOL 25 MG: 12.5 TABLET, FILM COATED ORAL at 09:28

## 2021-10-19 RX ADMIN — GABAPENTIN 300 MG: 300 CAPSULE ORAL at 14:24

## 2021-10-19 RX ADMIN — AMLODIPINE BESYLATE 2.5 MG: 2.5 TABLET ORAL at 09:29

## 2021-10-19 RX ADMIN — GABAPENTIN 300 MG: 300 CAPSULE ORAL at 20:01

## 2021-10-19 RX ADMIN — AMPICILLIN SODIUM AND SULBACTAM SODIUM 3 G: 2; 1 INJECTION, POWDER, FOR SOLUTION INTRAMUSCULAR; INTRAVENOUS at 01:50

## 2021-10-19 RX ADMIN — TAMSULOSIN HYDROCHLORIDE 0.4 MG: 0.4 CAPSULE ORAL at 20:01

## 2021-10-19 RX ADMIN — AMPICILLIN SODIUM AND SULBACTAM SODIUM 3 G: 2; 1 INJECTION, POWDER, FOR SOLUTION INTRAMUSCULAR; INTRAVENOUS at 20:03

## 2021-10-19 RX ADMIN — CARVEDILOL 25 MG: 12.5 TABLET, FILM COATED ORAL at 20:01

## 2021-10-19 RX ADMIN — Medication 250 MCG: at 09:29

## 2021-10-19 RX ADMIN — ALLOPURINOL 100 MG: 100 TABLET ORAL at 09:29

## 2021-10-19 ASSESSMENT — ACTIVITIES OF DAILY LIVING (ADL)
ADLS_ACUITY_SCORE: 15
ADLS_ACUITY_SCORE: 16
ADLS_ACUITY_SCORE: 15
ADLS_ACUITY_SCORE: 15
ADLS_ACUITY_SCORE: 16
ADLS_ACUITY_SCORE: 15
ADLS_ACUITY_SCORE: 16
ADLS_ACUITY_SCORE: 15
ADLS_ACUITY_SCORE: 16
ADLS_ACUITY_SCORE: 15
ADLS_ACUITY_SCORE: 15
ADLS_ACUITY_SCORE: 16
ADLS_ACUITY_SCORE: 16

## 2021-10-19 NOTE — PLAN OF CARE
VS and assessment as charted, denies pain. T max 100.7- rec'd Tylenol per MAR and recheck temp 99.5. Using IS with reminders, lungs clear with diminished bases. HR irreg- MD updated and pt placed on tele- SR 70's with freq PVC's.  Schmitt with rylee urine output. Independently positioning self in bed, encouraging frequent weight shifting. PIV SL, IV Unasy, Free from falls/ injury, call light within reach and alarms activated .     2300: Pt telemetry rhythm change noted, EKG ordered to confirm Atiral fibrillation  2315: EKG completed and given to Dr. Bustos. Pt in bed clammy to touch but denies any SOB or chest pain. B/P 100/61, HR 76 irregular. Md to place orders    Face to face report given with opportunity to observe patient.    Report given to TONY Yang RN   10/18/2021  11:19 PM

## 2021-10-19 NOTE — PROGRESS NOTES
[facility]    Medicine Progress Note - Hospitalist Service       Date of Admission:  10/15/2021    Assessment & Plan                                  Anthony Dyer is a 81 year old male admitted on 10/15/2021.      Urinary retention  BPH  S/P TURP on 10/15 with Dr De Leon  -Management as per urology  -CBI  -On flomax and finasteride    - xarelto  restarted on 10/18   -Some pink urine on 10/19     CKD stage III  -Daily labs  -avoid nephrotic meds      COPD  -home prn albuterol      PE diagnosis 4/26/2021   Xarelto held for surgery should be restarted when able   Will continue to hold     History of CVA  -continue home medications     Irritable bowel syndrome  -continue home diet     Essential hypertension  -continue home medications     Depression   -continue home mediations       # Coagulation Defect:   Acute blood loss anemia hgb 13->11  Monitor hgb   Type and screen     UTI POA,  Fevers  Leukocytosis  Started on Unasyn as hx of enterococcus   Monitor cultures  continue Unasyn      The patient's care was discussed with the Primary team.     Jennifer Awan DO  [facility]  Securely message with the Vocera Web Console (learn more here)  Text page via [employer]       # Coagulation Defect: home medication list includes an anticoagulant medication        Diet: Regular Diet Adult    DVT Prophylaxis: SCD's   Schmitt Catheter: PRESENT, indication: Retention  Central Lines: None  Code Status: Full Code       Disposition Plan   Expected discharge:  1-2 days pending further infectious work up pending cultures and resolution of fevers.    The patient's care was discussed with the Patient.     Jennifer Awan DO  Hospitalist Service  [facility]  Securely message with the Vocera Web Console (learn more here)  Text page via [employer]           ______________________________________________________________________    Interval History    Patient seen this  morning medical rounds. No chest pain or shortness of breath. He still feels weak. Pt working with him. He does have pink tinged urine after restarting xarelto.     Data reviewed today: I reviewed all medications, new labs and imaging results over the last 24 hours. I personally reviewed no images or EKG's today.    Physical Exam   Vital Signs: Temp: 96.9  F (36.1  C) Temp src: Tympanic BP: 110/59 Pulse: 90   Resp: 20 SpO2: 92 % O2 Device: Nasal cannula Oxygen Delivery: 1 LPM  Weight: 185 lbs 0 oz  Physical Exam  Constitutional:       Comments: Frail appearing stated age male    HENT:      Head: Normocephalic.      Mouth/Throat:      Pharynx: Oropharynx is clear.   Eyes:      Conjunctiva/sclera: Conjunctivae normal.   Cardiovascular:      Rate and Rhythm: Normal rate.   Pulmonary:      Effort: Pulmonary effort is normal.   Abdominal:      General: Abdomen is flat.   Genitourinary:     Comments: Schmitt catheter present has pink tinged urine   Musculoskeletal:         General: No swelling.   Skin:     Coloration: Skin is not jaundiced.           Data   Recent Labs   Lab 10/19/21  0530 10/18/21  0626 10/17/21  1629 10/17/21  0019 10/17/21  0019   WBC 6.2 9.1  --   --  11.9*   HGB 12.0* 12.1* 11.5*   < > 11.7*   MCV 95 96  --   --  96   * 153  --   --  148*    141  --   --  142   POTASSIUM 3.5 3.5  --   --  3.5   CHLORIDE 110* 109  --   --  110*   CO2 26 27  --   --  27   BUN 16 14  --   --  11   CR 1.12 1.10  --   --  1.11   ANIONGAP 6 5  --   --  5   JODI 7.8* 7.8*  --   --  7.7*   * 138*  --   --  152*   ALBUMIN  --   --   --   --  2.8*   PROTTOTAL  --   --   --   --  5.7*   BILITOTAL  --   --   --   --  0.4   ALKPHOS  --   --   --   --  60   ALT  --   --   --   --  14   AST  --   --   --   --  11   TROPONIN <0.015  --   --   --   --     < > = values in this interval not displayed.     No results found for this or any previous visit (from the past 24 hour(s)).  Medications     ringers Stopped  (10/16/21 1200)     sodium chloride 0.9% (bag) Stopped (10/16/21 0630)       allopurinol  100 mg Oral Daily     amLODIPine  2.5 mg Oral Daily     ampicillin-sulbactam (UNASYN) IV  3 g Intravenous Q6H     atorvastatin  40 mg Oral QPM     carvedilol  25 mg Oral BID     finasteride  5 mg Oral Daily     gabapentin  300 mg Oral TID     rivaroxaban ANTICOAGULANT  20 mg Oral Daily with supper     sodium chloride (PF)  3 mL Intracatheter Q8H     tamsulosin  0.4 mg Oral QPM     vitamin B-12  250 mcg Oral Daily

## 2021-10-19 NOTE — PHARMACY
Pharmacy Antimicrobial Stewardship Assessment     Current Antimicrobial Therapy:  Anti-infectives (From now, onward)    Start     Dose/Rate Route Frequency Ordered Stop    10/17/21 0130  ampicillin-sulbactam (UNASYN) 3 g in sodium chloride 0.9 % 100 mL intermittent infusion      3 g  200-400 mL/hr over 15-30 Minutes Intravenous EVERY 6 HOURS 10/17/21 0125          Indication: UTI    Days of Therapy: 3     Pertinent Labs:  Recent Labs   Lab Test 10/19/21  0530 10/18/21  0626 10/17/21  0019   WBC 6.2 9.1 11.9*     Recent Labs   Lab Test 10/17/21  0019 10/16/21  20521  1033 04/15/21  2225 04/15/21  2225   LACT 1.7 1.9  --   --  1.2   CRP  --   --   --   --  85.7*   PCAL <0.05  --  <0.05   < > 0.99    < > = values in this interval not displayed.        Temperature:  Temp (24hrs), Av.7  F (37.1  C), Min:96.9  F (36.1  C), Max:100.7  F (38.2  C)      Culture Results:   Collected Updated Procedure Result Status    10/17/2021 0019 10/17/2021 0028 Blood Culture Arm, Right [[id number]]   Blood from Arm, Right    In process Component Value   No component results              10/17/2021 0011 10/17/2021 0023 Blood Culture Peripheral Blood [[id number]]   Peripheral Blood    In process Component Value   No component results              10/17/2021 0002 10/19/2021 0711 Urine Culture [[id number]]   Urine, Schmitt Catheter    Final result Component Value   Culture No Growth              10/16/2021 2329 10/17/2021 1227 Asymptomatic COVID-19 Virus (Coronavirus) by PCR Nasopharyngeal [[id number]]    Swab from Nasopharyngeal    Final result Component Value   SARS CoV2 PCR Negative   NEGATIVE: SARS-CoV-2 (COVID-19) RNA not detected, presumed negative.             Recommendations/Interventions:  1. None at this time    Cassi Vizcarra, [facility]  2021

## 2021-10-19 NOTE — PROGRESS NOTES
Went into AFIB with controlled rate. No symptoms. Troponin check, telemetry. Anticoagulation to restart

## 2021-10-19 NOTE — PLAN OF CARE
Patient requested assistance from nursing d/t blood coming from meatus while on commode. Small amount of blood was noted on floor and small amount of blood intermittently draining from meatus upon assessment. Schmitt remains in place; urine pink tinged in color. Patient consistently holding onto catheter and slightly tugging on it during assessment. Area cleaned and primary RN notified.

## 2021-10-19 NOTE — PROGRESS NOTES
10/19/21 1134   Signing Clinician's Name / Credentials   Signing clinician's name / credentials Argelia Briscoe PTA   Quick Adds   Rehab Discipline PT   PT Assistant Visit Number 1   Therapeutic Activity   Minutes of Treatment 15 minutes   Symptoms Noted During/After Treatment Fatigue   Treatment Detail Pt walked 200 feet x2 with FWW CGA1 with wheelchair behind. Pt was very weak needed to sit and take a break and would walk back to his room. Sit to stand mina1 standing flexed forward at his hips.    Therapeutic Exercise   Minutes of Treatment 10   Symptoms Noted During/After Treatment fatigue   Treatment Detail Seated exercises 15 reps: GERARDO'S,marching, glut sets, pillow squeezes.    PT Discharge Planning    PT Discharge Recommendation (DC Rec) home with home care physical therapy;home with outpatient physical therapy   PT Rationale for DC Rec   Pt demonstrates instability with gait with use of walking stick and generalized weakness.  Pt would benefit from home PT vs outpatient PT for ongoing work on strength and functional mobility however pt declining both of those recommendations stating he will be fine when he gets home and has had both previously.  Will see during acute care stay to progress mobility and strength.     PT Brief overview of current status  Pt ambulated 200 feet x2 with FWW CGA1 short steps due to weakness in his legs. Seated exercises 15 reps with rest breaks due to fatigue. Pt was minA1 with sit to stand, pivot transfers CGA1.   Additional Documentation   PT Plan Progress strength and safety with functional mobility   Total Session Time   Total Session Time (minutes) 25 minutes

## 2021-10-19 NOTE — PLAN OF CARE
Urethral to catheter patent, with pink tinged urine at times, adequate output.  Afebrile.  SPO2 92% on 1 LPM NC.  Denies pain.  Telemetry A-fib 60's-90's with frequent PVC's.  Slept NOC.      Face to face report given with opportunity to observe patient.    Report given to TONY Parrish.     Celia Cline RN   10/19/2021  7:15 AM

## 2021-10-19 NOTE — PROGRESS NOTES
Care Transitions focused note:      [facility] will be able to admit early next week for PT. They will call Anthony to set up.    Alaina Sotelo CM

## 2021-10-19 NOTE — PLAN OF CARE
Pt is A&O. Afebrile. Denies pain. Urine output from to is koolaid red in color, patent and good output. Pt continues to have bleeding at meatus after ambulation and using the commode. Removed stat lock and placed foam tape instead to help with positioning of catheter. Started prn senna today, given x1, no BMs. Encouraging po intake. Ambulated with PT.   Face to face report given with opportunity to observe patient.    Report given to TONY Geronimo RN   10/19/2021  3:36 PM

## 2021-10-20 ENCOUNTER — TELEPHONE (OUTPATIENT)
Dept: UROLOGY | Facility: OTHER | Age: 81
End: 2021-10-20

## 2021-10-20 ENCOUNTER — OFFICE VISIT (OUTPATIENT)
Dept: UROLOGY | Facility: OTHER | Age: 81
DRG: 713 | End: 2021-10-20
Attending: UROLOGY
Payer: MEDICARE

## 2021-10-20 VITALS
TEMPERATURE: 98.9 F | DIASTOLIC BLOOD PRESSURE: 66 MMHG | SYSTOLIC BLOOD PRESSURE: 138 MMHG | OXYGEN SATURATION: 91 % | BODY MASS INDEX: 24.52 KG/M2 | HEART RATE: 87 BPM | RESPIRATION RATE: 18 BRPM | WEIGHT: 185 LBS | HEIGHT: 73 IN

## 2021-10-20 VITALS
BODY MASS INDEX: 23.61 KG/M2 | HEIGHT: 74 IN | OXYGEN SATURATION: 96 % | DIASTOLIC BLOOD PRESSURE: 70 MMHG | TEMPERATURE: 98 F | SYSTOLIC BLOOD PRESSURE: 130 MMHG | WEIGHT: 184 LBS | HEART RATE: 89 BPM

## 2021-10-20 DIAGNOSIS — R33.9 URINARY RETENTION: Primary | ICD-10-CM

## 2021-10-20 LAB
ANION GAP SERPL CALCULATED.3IONS-SCNC: 6 MMOL/L (ref 3–14)
BASOPHILS # BLD AUTO: 0 10E3/UL (ref 0–0.2)
BASOPHILS NFR BLD AUTO: 0 %
BUN SERPL-MCNC: 12 MG/DL (ref 7–30)
CALCIUM SERPL-MCNC: 8.2 MG/DL (ref 8.5–10.1)
CHLORIDE BLD-SCNC: 109 MMOL/L (ref 94–109)
CO2 SERPL-SCNC: 25 MMOL/L (ref 20–32)
CREAT SERPL-MCNC: 1.04 MG/DL (ref 0.66–1.25)
EOSINOPHIL # BLD AUTO: 0.2 10E3/UL (ref 0–0.7)
EOSINOPHIL NFR BLD AUTO: 4 %
ERYTHROCYTE [DISTWIDTH] IN BLOOD BY AUTOMATED COUNT: 12.8 % (ref 10–15)
GFR SERPL CREATININE-BSD FRML MDRD: 67 ML/MIN/1.73M2
GLUCOSE BLD-MCNC: 165 MG/DL (ref 70–99)
HCT VFR BLD AUTO: 36.5 % (ref 40–53)
HGB BLD-MCNC: 12.4 G/DL (ref 13.3–17.7)
IMM GRANULOCYTES # BLD: 0 10E3/UL
IMM GRANULOCYTES NFR BLD: 0 %
LYMPHOCYTES # BLD AUTO: 0.6 10E3/UL (ref 0.8–5.3)
LYMPHOCYTES NFR BLD AUTO: 11 %
MCH RBC QN AUTO: 32 PG (ref 26.5–33)
MCHC RBC AUTO-ENTMCNC: 34 G/DL (ref 31.5–36.5)
MCV RBC AUTO: 94 FL (ref 78–100)
MONOCYTES # BLD AUTO: 0.4 10E3/UL (ref 0–1.3)
MONOCYTES NFR BLD AUTO: 7 %
NEUTROPHILS # BLD AUTO: 4.6 10E3/UL (ref 1.6–8.3)
NEUTROPHILS NFR BLD AUTO: 78 %
NRBC # BLD AUTO: 0 10E3/UL
NRBC BLD AUTO-RTO: 0 /100
PLATELET # BLD AUTO: 174 10E3/UL (ref 150–450)
POTASSIUM BLD-SCNC: 3.5 MMOL/L (ref 3.4–5.3)
RBC # BLD AUTO: 3.87 10E6/UL (ref 4.4–5.9)
SODIUM SERPL-SCNC: 140 MMOL/L (ref 133–144)
WBC # BLD AUTO: 6 10E3/UL (ref 4–11)

## 2021-10-20 PROCEDURE — 258N000003 HC RX IP 258 OP 636: Performed by: INTERNAL MEDICINE

## 2021-10-20 PROCEDURE — 250N000011 HC RX IP 250 OP 636: Performed by: INTERNAL MEDICINE

## 2021-10-20 PROCEDURE — 36415 COLL VENOUS BLD VENIPUNCTURE: CPT | Performed by: HOSPITALIST

## 2021-10-20 PROCEDURE — 99024 POSTOP FOLLOW-UP VISIT: CPT | Mod: 25 | Performed by: UROLOGY

## 2021-10-20 PROCEDURE — G0463 HOSPITAL OUTPT CLINIC VISIT: HCPCS | Mod: 25

## 2021-10-20 PROCEDURE — 80048 BASIC METABOLIC PNL TOTAL CA: CPT | Performed by: HOSPITALIST

## 2021-10-20 PROCEDURE — 85025 COMPLETE CBC W/AUTO DIFF WBC: CPT | Performed by: HOSPITALIST

## 2021-10-20 RX ADMIN — AMPICILLIN SODIUM AND SULBACTAM SODIUM 3 G: 2; 1 INJECTION, POWDER, FOR SOLUTION INTRAMUSCULAR; INTRAVENOUS at 01:37

## 2021-10-20 ASSESSMENT — ENCOUNTER SYMPTOMS
BACK PAIN: 1
FEVER: 1
DIFFICULTY URINATING: 1

## 2021-10-20 ASSESSMENT — MIFFLIN-ST. JEOR: SCORE: 1606.43

## 2021-10-20 ASSESSMENT — PAIN SCALES - GENERAL: PAINLEVEL: NO PAIN (0)

## 2021-10-20 NOTE — PROGRESS NOTES
Name: Anthony Dyer    MRN#: [id number]    Reason for Hospitalization: Urinary retention [R33.9]    Discharge Date: 10/20/2021    Patient / Family response to discharge plan: in agreement    Follow-Up Appt:   Future Appointments   Date Time Provider Department Center   10/25/2021  1:30 PM Meenu Orourke DPM [facility]   12/17/2021  2:30 PM George Orourke MD [facility]       Other Providers (Care Coordinator, County Services, PCA services etc): Yes: [facility] for PT    Discharge Disposition: home with [facility]    Alaina Sotelo CM

## 2021-10-20 NOTE — LETTER
10/20/2021      RE: Anthony Dyer  [address] [place] [address]         History     Chief Complaint:      Surgical Followup (Voiding trial)      HPI   Anthony Dyer is a 81 year old male who presents for follow-up after his TURP.  Anthony underwent a transurethral resection of the prostate last Friday for urinary retention.  In the hospital postoperatively he spiked a fever and was just released this morning.  His urine culture and blood cultures are no growth this is likely because he was on preoperative antibiotics that were culture sensitive and also was on perioperative and postoperative antibiotics for this procedure given the length of time he had a chronic catheter in.  He is quite weak but he is white adamant about doing a voiding trial today.    Allergies:    Allergies   Allergen Reactions     Ace Inhibitors      Per [facility]: hyperkalemia.  Pt doesn't know if he is allergic     Ceclor [Cefaclor] Hives     Sulfa Drugs      Pt unsure.         Medications:      acetaminophen (TYLENOL) 325 MG tablet  albuterol (PROAIR HFA/PROVENTIL HFA/VENTOLIN HFA) 108 (90 Base) MCG/ACT inhaler  allopurinol (ZYLOPRIM) 100 MG tablet  amLODIPine (NORVASC) 2.5 MG tablet  amoxicillin-clavulanate (AUGMENTIN) 875-125 MG tablet  atorvastatin (LIPITOR) 80 MG tablet  carvedilol (COREG) 25 MG tablet  finasteride (PROSCAR) 5 MG tablet  gabapentin (NEURONTIN) 300 MG capsule  glimepiride (AMARYL) 2 MG tablet  metFORMIN (GLUCOPHAGE-XR) 500 MG 24 hr tablet  multivitamin, therapeutic (THERA-VIT) TABS tablet  rivaroxaban ANTICOAGULANT (XARELTO) 20 MG TABS tablet  Semaglutide,0.25 or 0.5MG/DOS, (OZEMPIC, 0.25 OR 0.5 MG/DOSE,) 2 MG/1.5ML SOPN  tamsulosin (FLOMAX) 0.4 MG capsule  vitamin B-12 (CYANOCOBALAMIN) 250 MCG tablet        Problem List:      Patient Active Problem List    Diagnosis Date Noted     Nursing Home Visit 05/21/2021     Priority: Medium     Benign prostatic hyperplasia with urinary retention 04/29/2021     Priority:  Medium     Tachycardia 04/26/2021     Priority: Medium     Hypoxia 04/26/2021     Priority: Medium     Other acute pulmonary embolism with acute cor pulmonale (H) 04/26/2021     Priority: Medium     Dyspnea, unspecified type 04/26/2021     Priority: Medium     Urinary retention 04/19/2021     Priority: Medium     Generalized weakness 04/19/2021     Priority: Medium     Onychomycosis of left great toe 02/09/2021     Priority: Medium     Pincer nail deformity 02/09/2021     Priority: Medium     Chronic painful diabetic neuropathy (H) 10/24/2018     Priority: Medium     Moderate episode of recurrent major depressive disorder (H) 10/24/2018     Priority: Medium     Formatting of this note might be different from the original.  Patient does not want anti-depressant medication       Chronic obstructive lung disease (H) 11/22/2017     Priority: Medium     Formatting of this note might be different from the original.  Mild       Mild concentric left ventricular hypertrophy (LVH) 11/22/2017     Priority: Medium     Uncomplicated alcohol dependence (H) 09/28/2016     Priority: Medium     IBS (irritable bowel syndrome) 12/23/2015     Priority: Medium     Spinal stenosis of lumbar region without neurogenic claudication 12/23/2015     Priority: Medium     Erectile dysfunction 05/28/2014     Priority: Medium     Chronic kidney disease, stage III (moderate) (H) 05/22/2013     Priority: Medium     Formatting of this note might be different from the original.  Since 2011       History of CVA (cerebrovascular accident) 02/03/2011     Priority: Medium     Formatting of this note might be different from the original.  Jan 2011  IMPRESSION: Interval development of a diffusion abnormality consistent with acute to early subacute infarct of less than 3 days of age in the left thalamus and the medial left cerebral peduncle. No associated abnormal enhancement or hydrocephalus.       Hyperlipidemia 02/03/2011     Priority: Medium      "Essential hypertension 11/28/2006     Priority: Medium     Type 2 diabetes mellitus with stage 3 chronic kidney disease, without long-term current use of insulin (H) 02/16/2001     Priority: Medium     Gout, unspecified 02/16/2001     Priority: Medium     Formatting of this note might be different from the original.  On Allopurinol       Lumbar spondylosis 02/22/1995     Priority: Medium     Formatting of this note might be different from the original.  S/P RFN L2-L5 facets          Past Medical History:      Past Medical History:   Diagnosis Date     Diabetes (H)      Hypertension        Past Surgical History:      Past Surgical History:   Procedure Laterality Date     CYSTOSCOPY, TRANSURETHRAL RESECTION (TUR) PROSTATE, COMBINED N/A 10/15/2021    Procedure: CYSTOSCOPY, WITH TRANSURETHRAL RESECTION PROSTATE;  Surgeon: Indu De Leon MD;  Location: [facility]     HERNIA REPAIR         Family History:      History reviewed. No pertinent family history.    Social History:    Marital Status:  Single [1]  Social History     Tobacco Use     Smoking status: Never Smoker     Smokeless tobacco: Former User     Types: Chew   Substance Use Topics     Alcohol use: Not Currently     Drug use: Never        Review of Systems   Constitutional: Positive for fever.   Genitourinary: Positive for difficulty urinating.   Musculoskeletal: Positive for back pain.   All other systems reviewed and are negative.        Physical Exam   Vitals:  /70 (BP Location: Right arm, Patient Position: Chair, Cuff Size: Adult Regular)   Pulse 89   Temp 98  F (36.7  C) (Tympanic)   Ht 1.867 m (6' 1.5\")   Wt 83.5 kg (184 lb)   SpO2 96%   BMI 23.95 kg/m        Physical Exam  Constitutional:       Appearance: He is normal weight. He is ill-appearing.   Pulmonary:      Effort: Pulmonary effort is normal.   Abdominal:      General: Abdomen is flat.      Palpations: Abdomen is soft.   Genitourinary:     Comments: A three-way catheter is indwelling.  " The urine is nice and clear yellow.  No evidence of any hematuria.  Neurological:      Mental Status: He is alert.       Voiding trial: Patient had an installation of 250 mL at which time he had a strong urge to void.  He then had to have a bowel movement so he went to the restroom and had both a bowel movement and voided urine.  Postvoid residual was 53 mL.      Impression: Successful voiding trial post TURP  Plan   Plan: Anthony knows to call us if he has any trouble with urination and will see him back in approximately 6 weeks with a bladder scan.  He is very weak from his postoperative hospitalization but he does have home services set up with physical therapy so hopefully his strength will improve dramatically.      No follow-ups on file.    Indu De Leon MD  [facility] [place]              Indu De Leon MD

## 2021-10-20 NOTE — TELEPHONE ENCOUNTER
Called to check in with pt after his voiding trial this morning.  Pt states he is doing well, has voided once since being home and was going to go again soon.  Pt is to call with any issues and after clinic hours he will need to go to the ER if he has retention.  Pt verbalized understanding.  MICHELLE HILL LPN

## 2021-10-20 NOTE — PLAN OF CARE
Denies pain.  Pt up to bedside commode x 2 to try to stool, without success.  Pt did have some small amount of red drainage out of meatus both times, MD Awan aware.  Afebrile.  VSS.  Scheduled IV Unasyn given.    Face to face report given with opportunity to observe patient.    Report given to TONY Forrester.     Celia Cline RN   10/20/2021  7:15 AM

## 2021-10-20 NOTE — PLAN OF CARE
"Most recent vitals: /72 (BP Location: Right arm)   Pulse 92   Temp 98.7  F (37.1  C) (Tympanic)   Resp 18   Ht 1.842 m (6' 0.5\")   Wt 83.9 kg (185 lb)   SpO2 93%   BMI 24.75 kg/m      Alert and oriented, able to make needs known. VSS. Afebrile. Denies pain. Lungs clear bilaterally. Bowel sounds active. Senna given for constipation. Schmitt remains in place, draining pink urine. Foam tape remains in place instead of stat lock. Patient states it helps with the discomfort. Small amount of bleeding at meatus with movement. IV SL. Unasyn infused per order. Ate a sandwich for supper tonight. Assist x1. Calls appropriately.    Face to face report given with opportunity to observe patient.    Report given to Celia Ellis RN   10/19/2021  11:06 PM        "

## 2021-10-20 NOTE — PROGRESS NOTES
History     Chief Complaint:      Surgical Followup (Voiding trial)      HPI   Anthony Dyer is a 81 year old male who presents for follow-up after his TURP.  Anthony underwent a transurethral resection of the prostate last Friday for urinary retention.  In the hospital postoperatively he spiked a fever and was just released this morning.  His urine culture and blood cultures are no growth this is likely because he was on preoperative antibiotics that were culture sensitive and also was on perioperative and postoperative antibiotics for this procedure given the length of time he had a chronic catheter in.  He is quite weak but he is white adamant about doing a voiding trial today.    Allergies:    Allergies   Allergen Reactions     Ace Inhibitors      Per [facility]: hyperkalemia.  Pt doesn't know if he is allergic     Ceclor [Cefaclor] Hives     Sulfa Drugs      Pt unsure.         Medications:      acetaminophen (TYLENOL) 325 MG tablet  albuterol (PROAIR HFA/PROVENTIL HFA/VENTOLIN HFA) 108 (90 Base) MCG/ACT inhaler  allopurinol (ZYLOPRIM) 100 MG tablet  amLODIPine (NORVASC) 2.5 MG tablet  amoxicillin-clavulanate (AUGMENTIN) 875-125 MG tablet  atorvastatin (LIPITOR) 80 MG tablet  carvedilol (COREG) 25 MG tablet  finasteride (PROSCAR) 5 MG tablet  gabapentin (NEURONTIN) 300 MG capsule  glimepiride (AMARYL) 2 MG tablet  metFORMIN (GLUCOPHAGE-XR) 500 MG 24 hr tablet  multivitamin, therapeutic (THERA-VIT) TABS tablet  rivaroxaban ANTICOAGULANT (XARELTO) 20 MG TABS tablet  Semaglutide,0.25 or 0.5MG/DOS, (OZEMPIC, 0.25 OR 0.5 MG/DOSE,) 2 MG/1.5ML SOPN  tamsulosin (FLOMAX) 0.4 MG capsule  vitamin B-12 (CYANOCOBALAMIN) 250 MCG tablet        Problem List:      Patient Active Problem List    Diagnosis Date Noted     Nursing Home Visit 05/21/2021     Priority: Medium     Benign prostatic hyperplasia with urinary retention 04/29/2021     Priority: Medium     Tachycardia 04/26/2021     Priority: Medium     Hypoxia 04/26/2021      Priority: Medium     Other acute pulmonary embolism with acute cor pulmonale (H) 04/26/2021     Priority: Medium     Dyspnea, unspecified type 04/26/2021     Priority: Medium     Urinary retention 04/19/2021     Priority: Medium     Generalized weakness 04/19/2021     Priority: Medium     Onychomycosis of left great toe 02/09/2021     Priority: Medium     Pincer nail deformity 02/09/2021     Priority: Medium     Chronic painful diabetic neuropathy (H) 10/24/2018     Priority: Medium     Moderate episode of recurrent major depressive disorder (H) 10/24/2018     Priority: Medium     Formatting of this note might be different from the original.  Patient does not want anti-depressant medication       Chronic obstructive lung disease (H) 11/22/2017     Priority: Medium     Formatting of this note might be different from the original.  Mild       Mild concentric left ventricular hypertrophy (LVH) 11/22/2017     Priority: Medium     Uncomplicated alcohol dependence (H) 09/28/2016     Priority: Medium     IBS (irritable bowel syndrome) 12/23/2015     Priority: Medium     Spinal stenosis of lumbar region without neurogenic claudication 12/23/2015     Priority: Medium     Erectile dysfunction 05/28/2014     Priority: Medium     Chronic kidney disease, stage III (moderate) (H) 05/22/2013     Priority: Medium     Formatting of this note might be different from the original.  Since 2011       History of CVA (cerebrovascular accident) 02/03/2011     Priority: Medium     Formatting of this note might be different from the original.  Jan 2011  IMPRESSION: Interval development of a diffusion abnormality consistent with acute to early subacute infarct of less than 3 days of age in the left thalamus and the medial left cerebral peduncle. No associated abnormal enhancement or hydrocephalus.       Hyperlipidemia 02/03/2011     Priority: Medium     Essential hypertension 11/28/2006     Priority: Medium     Type 2 diabetes mellitus with  "stage 3 chronic kidney disease, without long-term current use of insulin (H) 02/16/2001     Priority: Medium     Gout, unspecified 02/16/2001     Priority: Medium     Formatting of this note might be different from the original.  On Allopurinol       Lumbar spondylosis 02/22/1995     Priority: Medium     Formatting of this note might be different from the original.  S/P RFN L2-L5 facets          Past Medical History:      Past Medical History:   Diagnosis Date     Diabetes (H)      Hypertension        Past Surgical History:      Past Surgical History:   Procedure Laterality Date     CYSTOSCOPY, TRANSURETHRAL RESECTION (TUR) PROSTATE, COMBINED N/A 10/15/2021    Procedure: CYSTOSCOPY, WITH TRANSURETHRAL RESECTION PROSTATE;  Surgeon: Indu De Leon MD;  Location: [facility]     HERNIA REPAIR         Family History:      History reviewed. No pertinent family history.    Social History:    Marital Status:  Single [1]  Social History     Tobacco Use     Smoking status: Never Smoker     Smokeless tobacco: Former User     Types: Chew   Substance Use Topics     Alcohol use: Not Currently     Drug use: Never        Review of Systems   Constitutional: Positive for fever.   Genitourinary: Positive for difficulty urinating.   Musculoskeletal: Positive for back pain.   All other systems reviewed and are negative.        Physical Exam   Vitals:  /70 (BP Location: Right arm, Patient Position: Chair, Cuff Size: Adult Regular)   Pulse 89   Temp 98  F (36.7  C) (Tympanic)   Ht 1.867 m (6' 1.5\")   Wt 83.5 kg (184 lb)   SpO2 96%   BMI 23.95 kg/m        Physical Exam  Constitutional:       Appearance: He is normal weight. He is ill-appearing.   Pulmonary:      Effort: Pulmonary effort is normal.   Abdominal:      General: Abdomen is flat.      Palpations: Abdomen is soft.   Genitourinary:     Comments: A three-way catheter is indwelling.  The urine is nice and clear yellow.  No evidence of any hematuria.  Neurological:      " Mental Status: He is alert.       Voiding trial: Patient had an installation of 250 mL at which time he had a strong urge to void.  He then had to have a bowel movement so he went to the restroom and had both a bowel movement and voided urine.  Postvoid residual was 53 mL.      Impression: Successful voiding trial post TURP  Plan   Plan: Anthony knows to call us if he has any trouble with urination and will see him back in approximately 6 weeks with a bladder scan.  He is very weak from his postoperative hospitalization but he does have home services set up with physical therapy so hopefully his strength will improve dramatically.      No follow-ups on file.    Indu De Leon MD  [facility]

## 2021-10-20 NOTE — PROGRESS NOTES
Physical Therapy Discharge Summary    Reason for therapy discharge:    Discharged to home with home therapy.    Progress towards therapy goal(s). See goals on Care Plan in [facility] electronic health record for goal details.  Goals partially met.  Barriers to achieving goals:   discharge from facility.    Therapy recommendation(s):    Continued therapy is recommended.  Rationale/Recommendations:  with home care.

## 2021-10-20 NOTE — NURSING NOTE
"Chief Complaint   Patient presents with     Surgical Followup     Voiding trial       Initial /70 (BP Location: Right arm, Patient Position: Chair, Cuff Size: Adult Regular)   Pulse 89   Temp 98  F (36.7  C) (Tympanic)   Ht 1.867 m (6' 1.5\")   Wt 83.5 kg (184 lb)   SpO2 96%   BMI 23.95 kg/m   Estimated body mass index is 23.95 kg/m  as calculated from the following:    Height as of this encounter: 1.867 m (6' 1.5\").    Weight as of this encounter: 83.5 kg (184 lb).  Medication Reconciliation: complete  MICHELLE HILL LPN      Review of Systems:    Weight loss:    No     Recent fever/chills:  yes   Night sweats:   No  Current skin rash:  No   Recent hair loss:  No  Heat intolerance:  No   Cold intolerance:  No  Chest pain:   No   Palpitations:   No  Shortness of breath:  No   Wheezing:   No  Constipation:    No   Diarrhea:   No   Nausea:   No   Vomiting:   No   Kidney/side pain:  No   Back pain:   yes  Frequent headaches:  No   Dizziness:     no  Leg swelling:   No   Calf pain:    No        MICHELLE HILL LPN    "

## 2021-10-20 NOTE — PLAN OF CARE
Pt educated and brought to clinic for 0745 AM check appointment with Dr. De Leon. Pt is discharged.

## 2021-10-21 ENCOUNTER — TELEPHONE (OUTPATIENT)
Dept: FAMILY MEDICINE | Facility: OTHER | Age: 81
End: 2021-10-21

## 2021-10-21 NOTE — TELEPHONE ENCOUNTER
Next 5 appointments (look out 90 days)      Oct 22, 2021  9:15 AM  (Arrive by 9:00 AM)  SHORT with George Orourke MD  [facility] [place] ([facility] [place] ) [address]  [place] [address]  [phone]              Patient notified

## 2021-10-21 NOTE — PROGRESS NOTES
"  Assessment & Plan     Benign prostatic hyperplasia with urinary retention  He is status post TURP with Dr. De Leon.  His symptoms are improved.  He will continue urology follow-up.    Type 2 diabetes mellitus with stage 3 chronic kidney disease, without long-term current use of insulin, unspecified whether stage 3a or 3b CKD (H)  Most recent hemoglobin A1c and fasting blood glucoses are reviewed.  Will follow up.       See Patient Instructions    No follow-ups on file.    George Orourke MD  [facility] [place]    Pj Cruz is a 81 year old who presents for the following health issues     HPI       Hospital Follow-up Visit:    Hospital/Nursing Home/IP Rehab Facility: [facility]  Date of Admission: 10-15-21  Date of Discharge: 10-20-21  Reason(s) for Admission: Urinary retention       Was your hospitalization related to COVID-19? No   Problems taking medications regularly:  None  Medication changes since discharge: Augmentin  Problems adhering to non-medication therapy:  None    Summary of hospitalization:  [facility] discharge summary reviewed  Diagnostic Tests/Treatments reviewed.  Follow up needed: none  Other Healthcare Providers Involved in Patient s Care:         None  Update since discharge: improved. Post Discharge Medication Reconciliation: discharge medications reconciled, continue medications without change.  Plan of care communicated with patient and family        Anthony is seen in follow up of TURP and UTI with sepsis.  He was hospitalized and was discharged on Augmentin.  He generally feels well up.  Hospital records are reviewed in detail.    Review of Systems   Constitutional, HEENT, cardiovascular, pulmonary, gi and gu systems are negative, except as otherwise noted.      Objective    /64 (BP Location: Left arm, Cuff Size: Adult Regular)   Pulse 75   Temp 98  F (36.7  C) (Tympanic)   Resp 16   Ht 1.854 m (6' 1\")   Wt 83.5 kg (184 lb)   " SpO2 95%   BMI 24.28 kg/m    Body mass index is 24.28 kg/m .  Physical Exam  Vitals and nursing note reviewed.   Constitutional:       Appearance: He is well-developed.   Neurological:      Mental Status: He is alert and oriented to person, place, and time.   Psychiatric:         Mood and Affect: Mood normal.         Behavior: Behavior normal.         Thought Content: Thought content normal.        Other exam not repeated    Admission on 10/15/2021, Discharged on 10/20/2021   Component Date Value Ref Range Status     Case Report 10/15/2021    Final                    Value:Surgical Pathology Report                         Case: [id number]                                  Authorizing Provider:  Indu De Leon MD        Collected:           10/15/2021 09:13 AM          Ordering Location:     [facility] Main Operating Room     Received:            10/15/2021 09:46 AM          Pathologist:           Tramaine Tapia MD                                                                           Specimen:    Prostate                                                                                    Final Diagnosis 10/15/2021    Final                    Value:This result contains rich text formatting which cannot be displayed here.     Gross Description 10/15/2021    Final                    Value:This result contains rich text formatting which cannot be displayed here.     Microscopic Description 10/15/2021    Final                    Value:This result contains rich text formatting which cannot be displayed here.     Performing Labs 10/15/2021    Final                    Value:This result contains rich text formatting which cannot be displayed here.     Hemoglobin 10/15/2021 13.4  13.3 - 17.7 g/dL Final     Sodium 10/15/2021 139  133 - 144 mmol/L Final     Potassium 10/15/2021 4.0  3.4 - 5.3 mmol/L Final     Chloride 10/15/2021 110* 94 - 109  mmol/L Final     Carbon Dioxide (CO2) 10/15/2021 24  20 - 32 mmol/L Final     Anion Gap 10/15/2021 5  3 - 14 mmol/L Final     Urea Nitrogen 10/15/2021 17  7 - 30 mg/dL Final     Creatinine 10/15/2021 0.98  0.66 - 1.25 mg/dL Final     Calcium 10/15/2021 8.7  8.5 - 10.1 mg/dL Final     Glucose 10/15/2021 147* 70 - 99 mg/dL Final     GFR Estimate 10/15/2021 73  >60 mL/min/1.73m2 Final    As of July 11, 2021, eGFR is calculated by the CKD-EPI creatinine equation, without race adjustment. eGFR can be influenced by muscle mass, exercise, and diet. The reported eGFR is an estimation only and is only applicable if the renal function is stable.     Hemoglobin 10/16/2021 12.6* 13.3 - 17.7 g/dL Final     Lactic Acid 10/16/2021 1.9  0.7 - 2.0 mmol/L Final     SARS CoV2 PCR 10/16/2021 Negative  Negative Final    NEGATIVE: SARS-CoV-2 (COVID-19) RNA not detected, presumed negative.     Lactic Acid 10/17/2021 1.7  0.7 - 2.0 mmol/L Final     Culture 10/17/2021 No growth after 3 days   Preliminary     Culture 10/17/2021 No growth after 3 days   Preliminary     pH Venous 10/17/2021 7.50* 7.32 - 7.43 Final     pCO2 Venous 10/17/2021 34* 40 - 50 mm Hg Final     pO2 Venous 10/17/2021 221* 25 - 47 mm Hg Final     Bicarbonate Venous 10/17/2021 27  21 - 28 mmol/L Final     FIO2 10/17/2021 24   Final     Oxyhemoglobin Venous 10/17/2021 98* 70 - 75 % Final     Base Excess/Deficit (+/-) 10/17/2021 3.4* -7.7 - 1.9 mmol/L Final     Sodium 10/17/2021 142  133 - 144 mmol/L Final     Potassium 10/17/2021 3.5  3.4 - 5.3 mmol/L Final     Chloride 10/17/2021 110* 94 - 109 mmol/L Final     Carbon Dioxide (CO2) 10/17/2021 27  20 - 32 mmol/L Final     Anion Gap 10/17/2021 5  3 - 14 mmol/L Final     Urea Nitrogen 10/17/2021 11  7 - 30 mg/dL Final     Creatinine 10/17/2021 1.11  0.66 - 1.25 mg/dL Final     Calcium 10/17/2021 7.7* 8.5 - 10.1 mg/dL Final     Glucose 10/17/2021 152* 70 - 99 mg/dL Final     Alkaline Phosphatase 10/17/2021 60  40 - 150 U/L  Final     AST 10/17/2021 11  0 - 45 U/L Final     ALT 10/17/2021 14  0 - 70 U/L Final     Protein Total 10/17/2021 5.7* 6.8 - 8.8 g/dL Final     Albumin 10/17/2021 2.8* 3.4 - 5.0 g/dL Final     Bilirubin Total 10/17/2021 0.4  0.2 - 1.3 mg/dL Final     GFR Estimate 10/17/2021 62  >60 mL/min/1.73m2 Final    As of July 11, 2021, eGFR is calculated by the CKD-EPI creatinine equation, without race adjustment. eGFR can be influenced by muscle mass, exercise, and diet. The reported eGFR is an estimation only and is only applicable if the renal function is stable.     Procalcitonin 10/17/2021 <0.05  <0.05 ng/mL Final    Comment: Interpretation and Recommendations  <0.05 ng/mL Normal  Very low risk of bacterial infection.  Strongly discourage antibiotics.    0.05-0.24 ng/mL Low risk of systemic infection. Local bacterial infection possible.  Assess other clinical features of infection.  Discourage antibiotics.    0.25-0.49 ng/mL Possible early systemic infection or localized infection  Encourage antibiotics only in correct clinical context.  Consider obtaining blood cultures or other relevant cultures.  Recheck PCT in 6-12 hours to ensure baseline low level.  If repeat PCT is rising, consider early systemic infection and consider starting antibiotics.    0.50-1.99 ng/mL Moderate risk of systemic infection.  Recommend antibiotics.  Evaluate culture results and clinical features to target antibacterial therapy.  Obtain blood cultures and other relevant cultures if not done.    If empiric antibiotics were started, recheck PCT in:       2 days to guide antibiotic de-escalation.       Discontinue or de-escalate                            antibiotics when PCT concentration is <80% of      peak or abs PCT <0.5.    If empiric antibiotics were NOT started, recheck PCT in:       6-24 hours to re-evaluate need for antibiotics.     2.00-9.99 g/mL High risk for progression to severe sepsis.  Strongly recommend initiating or continuing  antibiotics.  Evaluate culture results and clinical features to target antibacterial therapy.  Obtain blood cultures and other relevant cultures if not done.  Repeat PCT in 2 days to guide antibiotic de-escalation.  Consider de-escalating antibiotics when PCT concentration is <80% or peak or abs PCT < 0.05.    Greater than or equal to 10 ng/mL Very high likelihood of severe sepsis or septic shock.  Strongly recommend initiating or continuing antibiotics.  Evaluate culture results and clinical features to target antibacterial therapy.  Obtain blood cultures and other relevant cultures if not done.  Repeat PCT in 2 days to guide antibiotic de-escalation.  Consider de-escalating antibiotics when PCT                            concentration is <80% of peak or abs PCT < 1.       Color Urine 10/17/2021 Orange* Colorless, Straw, Light Yellow, Yellow Final     Appearance Urine 10/17/2021 Cloudy* Clear Final     Glucose Urine 10/17/2021 Negative  Negative mg/dL Final     Bilirubin Urine 10/17/2021 Negative  Negative Final     Ketones Urine 10/17/2021 Negative  Negative mg/dL Final     Specific Gravity Urine 10/17/2021 1.022  1.003 - 1.035 Final     Blood Urine 10/17/2021 Large* Negative Final     pH Urine 10/17/2021 5.5  4.7 - 8.0 Final     Protein Albumin Urine 10/17/2021 100 * Negative mg/dL Final     Urobilinogen Urine 10/17/2021 Normal  Normal, 2.0 mg/dL Final     Nitrite Urine 10/17/2021 Negative  Negative Final     Leukocyte Esterase Urine 10/17/2021 Large* Negative Final     Mucus Urine 10/17/2021 Present* None Seen /LPF Final     RBC Urine 10/17/2021 >182* <=2 /HPF Final     WBC Urine 10/17/2021 >182* <=5 /HPF Final     Squamous Epithelials Urine 10/17/2021 0  <=1 /HPF Final     WBC Count 10/17/2021 11.9* 4.0 - 11.0 10e3/uL Final     RBC Count 10/17/2021 3.64* 4.40 - 5.90 10e6/uL Final     Hemoglobin 10/17/2021 11.7* 13.3 - 17.7 g/dL Final     Hematocrit 10/17/2021 34.8* 40.0 - 53.0 % Final     MCV 10/17/2021 96  78  - 100 fL Final     MCH 10/17/2021 32.1  26.5 - 33.0 pg Final     MCHC 10/17/2021 33.6  31.5 - 36.5 g/dL Final     RDW 10/17/2021 13.2  10.0 - 15.0 % Final     Platelet Count 10/17/2021 148* 150 - 450 10e3/uL Final     % Neutrophils 10/17/2021 90  % Final     % Lymphocytes 10/17/2021 3  % Final     % Monocytes 10/17/2021 6  % Final     % Eosinophils 10/17/2021 1  % Final     % Basophils 10/17/2021 0  % Final     % Immature Granulocytes 10/17/2021 0  % Final     NRBCs per 100 WBC 10/17/2021 0  <1 /100 Final     Absolute Neutrophils 10/17/2021 10.7* 1.6 - 8.3 10e3/uL Final     Absolute Lymphocytes 10/17/2021 0.4* 0.8 - 5.3 10e3/uL Final     Absolute Monocytes 10/17/2021 0.7  0.0 - 1.3 10e3/uL Final     Absolute Eosinophils 10/17/2021 0.1  0.0 - 0.7 10e3/uL Final     Absolute Basophils 10/17/2021 0.0  0.0 - 0.2 10e3/uL Final     Absolute Immature Granulocytes 10/17/2021 0.0  <=0.0 10e3/uL Final     Absolute NRBCs 10/17/2021 0.0  10e3/uL Final     Culture 10/17/2021 No Growth   Final     ABO/RH(D) 10/17/2021 O NEG   Final     Antibody Screen 10/17/2021 Negative  Negative Final     SPECIMEN EXPIRATION DATE 10/17/2021 [id number]   Final     Hemoglobin 10/17/2021 11.5* 13.3 - 17.7 g/dL Final     Sodium 10/18/2021 141  133 - 144 mmol/L Final     Potassium 10/18/2021 3.5  3.4 - 5.3 mmol/L Final     Chloride 10/18/2021 109  94 - 109 mmol/L Final     Carbon Dioxide (CO2) 10/18/2021 27  20 - 32 mmol/L Final     Anion Gap 10/18/2021 5  3 - 14 mmol/L Final     Urea Nitrogen 10/18/2021 14  7 - 30 mg/dL Final     Creatinine 10/18/2021 1.10  0.66 - 1.25 mg/dL Final     Calcium 10/18/2021 7.8* 8.5 - 10.1 mg/dL Final     Glucose 10/18/2021 138* 70 - 99 mg/dL Final     GFR Estimate 10/18/2021 63  >60 mL/min/1.73m2 Final    As of July 11, 2021, eGFR is calculated by the CKD-EPI creatinine equation, without race adjustment. eGFR can be influenced by muscle mass, exercise, and diet. The reported eGFR is an estimation only and is only  applicable if the renal function is stable.     WBC Count 10/18/2021 9.1  4.0 - 11.0 10e3/uL Final     RBC Count 10/18/2021 3.90* 4.40 - 5.90 10e6/uL Final     Hemoglobin 10/18/2021 12.1* 13.3 - 17.7 g/dL Final     Hematocrit 10/18/2021 37.6* 40.0 - 53.0 % Final     MCV 10/18/2021 96  78 - 100 fL Final     MCH 10/18/2021 31.0  26.5 - 33.0 pg Final     MCHC 10/18/2021 32.2  31.5 - 36.5 g/dL Final     RDW 10/18/2021 13.2  10.0 - 15.0 % Final     Platelet Count 10/18/2021 153  150 - 450 10e3/uL Final     % Neutrophils 10/18/2021 85  % Final     % Lymphocytes 10/18/2021 5  % Final     % Monocytes 10/18/2021 6  % Final     % Eosinophils 10/18/2021 4  % Final     % Basophils 10/18/2021 0  % Final     % Immature Granulocytes 10/18/2021 0  % Final     NRBCs per 100 WBC 10/18/2021 0  <1 /100 Final     Absolute Neutrophils 10/18/2021 7.7  1.6 - 8.3 10e3/uL Final     Absolute Lymphocytes 10/18/2021 0.4* 0.8 - 5.3 10e3/uL Final     Absolute Monocytes 10/18/2021 0.6  0.0 - 1.3 10e3/uL Final     Absolute Eosinophils 10/18/2021 0.3  0.0 - 0.7 10e3/uL Final     Absolute Basophils 10/18/2021 0.0  0.0 - 0.2 10e3/uL Final     Absolute Immature Granulocytes 10/18/2021 0.0  <=0.0 10e3/uL Final     Absolute NRBCs 10/18/2021 0.0  10e3/uL Final     Troponin I 10/19/2021 <0.015  0.000 - 0.045 ug/L Final    The 99th percentile for upper reference range is 0.045ug/L.  Troponin values in the range of 0.045 - 0.120 ug/L may be associated with risks of adverse clinical events.     Sodium 10/19/2021 142  133 - 144 mmol/L Final     Potassium 10/19/2021 3.5  3.4 - 5.3 mmol/L Final     Chloride 10/19/2021 110* 94 - 109 mmol/L Final     Carbon Dioxide (CO2) 10/19/2021 26  20 - 32 mmol/L Final     Anion Gap 10/19/2021 6  3 - 14 mmol/L Final     Urea Nitrogen 10/19/2021 16  7 - 30 mg/dL Final     Creatinine 10/19/2021 1.12  0.66 - 1.25 mg/dL Final     Calcium 10/19/2021 7.8* 8.5 - 10.1 mg/dL Final     Glucose 10/19/2021 153* 70 - 99 mg/dL Final     GFR  Estimate 10/19/2021 62  >60 mL/min/1.73m2 Final    As of July 11, 2021, eGFR is calculated by the CKD-EPI creatinine equation, without race adjustment. eGFR can be influenced by muscle mass, exercise, and diet. The reported eGFR is an estimation only and is only applicable if the renal function is stable.     WBC Count 10/19/2021 6.2  4.0 - 11.0 10e3/uL Final     RBC Count 10/19/2021 3.79* 4.40 - 5.90 10e6/uL Final     Hemoglobin 10/19/2021 12.0* 13.3 - 17.7 g/dL Final     Hematocrit 10/19/2021 36.0* 40.0 - 53.0 % Final     MCV 10/19/2021 95  78 - 100 fL Final     MCH 10/19/2021 31.7  26.5 - 33.0 pg Final     MCHC 10/19/2021 33.3  31.5 - 36.5 g/dL Final     RDW 10/19/2021 13.0  10.0 - 15.0 % Final     Platelet Count 10/19/2021 148* 150 - 450 10e3/uL Final     % Neutrophils 10/19/2021 79  % Final     % Lymphocytes 10/19/2021 9  % Final     % Monocytes 10/19/2021 7  % Final     % Eosinophils 10/19/2021 5  % Final     % Basophils 10/19/2021 0  % Final     % Immature Granulocytes 10/19/2021 0  % Final     NRBCs per 100 WBC 10/19/2021 0  <1 /100 Final     Absolute Neutrophils 10/19/2021 4.8  1.6 - 8.3 10e3/uL Final     Absolute Lymphocytes 10/19/2021 0.6* 0.8 - 5.3 10e3/uL Final     Absolute Monocytes 10/19/2021 0.5  0.0 - 1.3 10e3/uL Final     Absolute Eosinophils 10/19/2021 0.3  0.0 - 0.7 10e3/uL Final     Absolute Basophils 10/19/2021 0.0  0.0 - 0.2 10e3/uL Final     Absolute Immature Granulocytes 10/19/2021 0.0  <=0.0 10e3/uL Final     Absolute NRBCs 10/19/2021 0.0  10e3/uL Final     Sodium 10/20/2021 140  133 - 144 mmol/L Final     Potassium 10/20/2021 3.5  3.4 - 5.3 mmol/L Final     Chloride 10/20/2021 109  94 - 109 mmol/L Final     Carbon Dioxide (CO2) 10/20/2021 25  20 - 32 mmol/L Final     Anion Gap 10/20/2021 6  3 - 14 mmol/L Final     Urea Nitrogen 10/20/2021 12  7 - 30 mg/dL Final     Creatinine 10/20/2021 1.04  0.66 - 1.25 mg/dL Final     Calcium 10/20/2021 8.2* 8.5 - 10.1 mg/dL Final     Glucose 10/20/2021  165* 70 - 99 mg/dL Final     GFR Estimate 10/20/2021 67  >60 mL/min/1.73m2 Final    As of July 11, 2021, eGFR is calculated by the CKD-EPI creatinine equation, without race adjustment. eGFR can be influenced by muscle mass, exercise, and diet. The reported eGFR is an estimation only and is only applicable if the renal function is stable.     WBC Count 10/20/2021 6.0  4.0 - 11.0 10e3/uL Final     RBC Count 10/20/2021 3.87* 4.40 - 5.90 10e6/uL Final     Hemoglobin 10/20/2021 12.4* 13.3 - 17.7 g/dL Final     Hematocrit 10/20/2021 36.5* 40.0 - 53.0 % Final     MCV 10/20/2021 94  78 - 100 fL Final     MCH 10/20/2021 32.0  26.5 - 33.0 pg Final     MCHC 10/20/2021 34.0  31.5 - 36.5 g/dL Final     RDW 10/20/2021 12.8  10.0 - 15.0 % Final     Platelet Count 10/20/2021 174  150 - 450 10e3/uL Final     % Neutrophils 10/20/2021 78  % Final     % Lymphocytes 10/20/2021 11  % Final     % Monocytes 10/20/2021 7  % Final     % Eosinophils 10/20/2021 4  % Final     % Basophils 10/20/2021 0  % Final     % Immature Granulocytes 10/20/2021 0  % Final     NRBCs per 100 WBC 10/20/2021 0  <1 /100 Final     Absolute Neutrophils 10/20/2021 4.6  1.6 - 8.3 10e3/uL Final     Absolute Lymphocytes 10/20/2021 0.6* 0.8 - 5.3 10e3/uL Final     Absolute Monocytes 10/20/2021 0.4  0.0 - 1.3 10e3/uL Final     Absolute Eosinophils 10/20/2021 0.2  0.0 - 0.7 10e3/uL Final     Absolute Basophils 10/20/2021 0.0  0.0 - 0.2 10e3/uL Final     Absolute Immature Granulocytes 10/20/2021 0.0  <=0.0 10e3/uL Final     Absolute NRBCs 10/20/2021 0.0  10e3/uL Final

## 2021-10-21 NOTE — TELEPHONE ENCOUNTER
8:28 AM    Reason for Call: OVERBOOK    Patient had a surgery with Dr. De Leon on 10/15/2021. Sister in law calls stating she was not happy with the discharge plan. Patient instructed to see primary in 7-10 days.    The patient is requesting an appointment for ASAP with .    Was an appointment offered for this call? No  If yes : Appointment type              Date    Preferred method for responding to this message: Telephone Call  What is your phone number ?  [phone]    If we cannot reach you directly, may we leave a detailed response at the number you provided? Yes    Can this message wait until your PCP/provider returns, if unavailable today? Provider is out today    Karely Hernandez

## 2021-10-21 NOTE — TELEPHONE ENCOUNTER
Please schedule patient for date/time: can see PCP tomorrow at 9:15    Have patient go to ER/Urgent Care Center. Urgent Care hours are 9:30 am to 8 pm, open 7 days a week. No.    Provider will call patient.No.    Other:

## 2021-10-22 ENCOUNTER — TELEPHONE (OUTPATIENT)
Dept: CASE MANAGEMENT | Facility: HOSPITAL | Age: 81
End: 2021-10-22

## 2021-10-22 ENCOUNTER — OFFICE VISIT (OUTPATIENT)
Dept: FAMILY MEDICINE | Facility: OTHER | Age: 81
End: 2021-10-22
Attending: FAMILY MEDICINE
Payer: MEDICARE

## 2021-10-22 ENCOUNTER — TELEPHONE (OUTPATIENT)
Dept: FAMILY MEDICINE | Facility: OTHER | Age: 81
End: 2021-10-22

## 2021-10-22 VITALS
RESPIRATION RATE: 16 BRPM | BODY MASS INDEX: 24.39 KG/M2 | DIASTOLIC BLOOD PRESSURE: 64 MMHG | HEART RATE: 75 BPM | OXYGEN SATURATION: 95 % | SYSTOLIC BLOOD PRESSURE: 118 MMHG | TEMPERATURE: 98 F | WEIGHT: 184 LBS | HEIGHT: 73 IN

## 2021-10-22 DIAGNOSIS — E11.22 TYPE 2 DIABETES MELLITUS WITH STAGE 3 CHRONIC KIDNEY DISEASE, WITHOUT LONG-TERM CURRENT USE OF INSULIN, UNSPECIFIED WHETHER STAGE 3A OR 3B CKD (H): ICD-10-CM

## 2021-10-22 DIAGNOSIS — N18.30 TYPE 2 DIABETES MELLITUS WITH STAGE 3 CHRONIC KIDNEY DISEASE, WITHOUT LONG-TERM CURRENT USE OF INSULIN, UNSPECIFIED WHETHER STAGE 3A OR 3B CKD (H): ICD-10-CM

## 2021-10-22 DIAGNOSIS — R33.8 BENIGN PROSTATIC HYPERPLASIA WITH URINARY RETENTION: Primary | ICD-10-CM

## 2021-10-22 DIAGNOSIS — N40.1 BENIGN PROSTATIC HYPERPLASIA WITH URINARY RETENTION: Primary | ICD-10-CM

## 2021-10-22 PROCEDURE — G0463 HOSPITAL OUTPT CLINIC VISIT: HCPCS

## 2021-10-22 PROCEDURE — 99214 OFFICE O/P EST MOD 30 MIN: CPT | Performed by: FAMILY MEDICINE

## 2021-10-22 ASSESSMENT — MIFFLIN-ST. JEOR: SCORE: 1598.5

## 2021-10-22 ASSESSMENT — PAIN SCALES - GENERAL: PAINLEVEL: NO PAIN (0)

## 2021-10-22 NOTE — NURSING NOTE
"Chief Complaint   Patient presents with     Hospital F/U     urinary retention       Initial /64 (BP Location: Left arm, Cuff Size: Adult Regular)   Pulse 75   Temp 98  F (36.7  C) (Tympanic)   Resp 16   Ht 1.854 m (6' 1\")   Wt 83.5 kg (184 lb)   SpO2 95%   BMI 24.28 kg/m   Estimated body mass index is 24.28 kg/m  as calculated from the following:    Height as of this encounter: 1.854 m (6' 1\").    Weight as of this encounter: 83.5 kg (184 lb).  Medication Reconciliation: complete  Felisa Santa LPN  "

## 2021-10-22 NOTE — TELEPHONE ENCOUNTER
Say Nguyen from [facility] called, requesting verbal orders to PT plan of care, 2 visit per week for 4 weeks. Followed by 1 visit per week for 2 weeks. Would like to start 10/25/21.  Please advise. Thanks!    [phone] OK to leave message if no answer.

## 2021-10-23 LAB
BACTERIA BLD CULT: NO GROWTH
BACTERIA BLD CULT: NO GROWTH

## 2021-10-25 ENCOUNTER — OFFICE VISIT (OUTPATIENT)
Dept: PODIATRY | Facility: OTHER | Age: 81
End: 2021-10-25
Attending: PODIATRIST
Payer: MEDICARE

## 2021-10-25 ENCOUNTER — TELEPHONE (OUTPATIENT)
Dept: CASE MANAGEMENT | Facility: HOSPITAL | Age: 81
End: 2021-10-25

## 2021-10-25 VITALS
OXYGEN SATURATION: 95 % | HEART RATE: 89 BPM | SYSTOLIC BLOOD PRESSURE: 132 MMHG | TEMPERATURE: 97 F | DIASTOLIC BLOOD PRESSURE: 72 MMHG

## 2021-10-25 DIAGNOSIS — M47.816 LUMBAR SPONDYLOSIS: ICD-10-CM

## 2021-10-25 DIAGNOSIS — M21.969 LOSS OF PROTECTIVE SENSATION OF SKIN OF DEFORMED FOOT: ICD-10-CM

## 2021-10-25 DIAGNOSIS — L84 CALLUS OF FOOT: ICD-10-CM

## 2021-10-25 DIAGNOSIS — L60.3 ONYCHODYSTROPHY: Primary | ICD-10-CM

## 2021-10-25 DIAGNOSIS — R20.8 LOSS OF PROTECTIVE SENSATION OF SKIN OF DEFORMED FOOT: ICD-10-CM

## 2021-10-25 DIAGNOSIS — E11.42 DIABETIC POLYNEUROPATHY ASSOCIATED WITH TYPE 2 DIABETES MELLITUS (H): ICD-10-CM

## 2021-10-25 DIAGNOSIS — E11.9 DIABETES MELLITUS TYPE 2, NONINSULIN DEPENDENT (H): ICD-10-CM

## 2021-10-25 DIAGNOSIS — L85.3 XEROSIS OF SKIN: ICD-10-CM

## 2021-10-25 PROCEDURE — 11721 DEBRIDE NAIL 6 OR MORE: CPT | Mod: 59 | Performed by: PODIATRIST

## 2021-10-25 PROCEDURE — 99212 OFFICE O/P EST SF 10 MIN: CPT | Mod: 25 | Performed by: PODIATRIST

## 2021-10-25 PROCEDURE — 11056 PARNG/CUTG B9 HYPRKR LES 2-4: CPT | Performed by: PODIATRIST

## 2021-10-25 PROCEDURE — G0463 HOSPITAL OUTPT CLINIC VISIT: HCPCS | Mod: 25

## 2021-10-25 ASSESSMENT — PAIN SCALES - GENERAL: PAINLEVEL: NO PAIN (0)

## 2021-10-25 NOTE — NURSING NOTE
"Chief Complaint   Patient presents with     Toenail     Trimming       Initial /72 (BP Location: Left arm, Patient Position: Sitting, Cuff Size: Adult Regular)   Pulse 89   Temp 97  F (36.1  C) (Tympanic)   SpO2 95%  Estimated body mass index is 24.28 kg/m  as calculated from the following:    Height as of 10/22/21: 1.854 m (6' 1\").    Weight as of 10/22/21: 83.5 kg (184 lb).  Medication Reconciliation: vanna Leon  "

## 2021-10-25 NOTE — TELEPHONE ENCOUNTER
Second/final attempt for follow up phone call. Unable to reach patient at this time.     Destiny Winchester RN on 10/25/2021 at 11:15 AM

## 2021-10-26 NOTE — TELEPHONE ENCOUNTER
Please call patient's sister in law Grazyna Dyer [phone].  States that he sometimes misses his phone calls and she told someone to call her if he does not answer.  She would like a call back today please.

## 2021-10-27 ENCOUNTER — TELEPHONE (OUTPATIENT)
Dept: PODIATRY | Facility: OTHER | Age: 81
End: 2021-10-27

## 2021-10-27 NOTE — TELEPHONE ENCOUNTER
Faxed referral, demographics and note to [facility] in [place], [place]  Fax # [phone]  Phone # [phone]

## 2021-11-04 ENCOUNTER — TELEPHONE (OUTPATIENT)
Dept: FAMILY MEDICINE | Facility: OTHER | Age: 81
End: 2021-11-04
Payer: MEDICARE

## 2021-11-04 NOTE — TELEPHONE ENCOUNTER
Received a call from pt's dentist Chloé Dyer. Pt is scheduled for dental extraction on 11/18. Pt is currently taking xarelto for PE. Dentist wondering if pt should hold xarelto due to upcoming extraction. Please advise. Thank you!    Chloé Dyer- [phone]

## 2021-11-04 NOTE — DISCHARGE SUMMARY
[facility]    Discharge Summary  Urology    Date of Admission:  10/15/2021  Date of Discharge:  10/20/2021  Discharging Provider: Indu De Leon    Discharge Diagnoses   Active Problems:    Urinary retention      Procedure/Surgery Information   Procedure: Procedure(s):  CYSTOSCOPY, WITH TRANSURETHRAL RESECTION PROSTATE   Surgeon(s): Surgeon(s) and Role:     * Indu De Leon MD - Primary   Specimens: ID Type Source Tests Collected by Time Destination   1 :  Tissue Prostate SURGICAL PATHOLOGY EXAM Indu De Leon MD 10/15/2021  9:13 AM       Non-operative procedures None performed     History of Present Illness   Anthony Dyer is a 81 year old male who presented with a history of urinary retention.  He underwent a TURP on 10/15/2021.  He was to be discharged the following day but spiked a fever and so remained in the hospital until culture results were back.    Hospital Course   Anthony Dyer was admitted on 10/15/2021.  The following problems were addressed during his hospitalization: Patient underwent a TURP which he did well.  Postoperative day #1 urine was clear to light pink.  He was going to be discharged that day but spiked a temp later in the day.  Urine and blood cultures were obtained.  Patient was started on Unasyn because of previous history of gram-positive cocci in the urine.  Patient's urine culture and blood cultures 48 hours later did not show any growth.  Patient subsequently was discharged with his Schmitt catheter on 10/20/2021.  He had follow-up later that morning in urology for a voiding trial.  Active Problems:    Urinary retention      Post-operative pain control: included  and will be Tylenol alone on discharge.     Medications discontinued or adjusted during this hospitalization: No change     Antibiotics prescribed at discharge: None prescribed     Imaging study follow up needs:   -None performed    Significant Findings: Patient had a urine culture and blood cultures that  were both negative for growth.    Indu De Leon MD    Discharge Disposition   Discharged to home   Condition at discharge: Stable    Pending Results   Final pathology results: Pending at time of discharge  These results will be followed up by Dr. De Leon  Unresulted Labs Ordered in the Past 30 Days of this Admission     No orders found from 9/15/2021 to 10/16/2021.          Primary Care Physician   George Orourke        Consultations This Hospital Stay   HOSPITALIST IP CONSULT  PHYSICAL THERAPY ADULT IP CONSULT    Time Spent on this Encounter   I have spent less than 30 minutes on this discharge.    Discharge Orders      Home Care PT Referral for Hospital Discharge      Discharge Instructions    Follow-up with Surgeon about when to start anticoagulant medication.  If primary care recommends he continue xerelto I instructed patient to start this on Monday.     No lifting over 15 pounds and no strenuous physical activity    for 2 weeks     Discharge Instructions    Anthony to return to my office wed. ( oct 20) am for a voiding trial.     Reason for your hospital stay    S/P Turp  Acute blood loss anemia  UTI     Follow-up and recommended labs and tests     Follow up with primary care provider, George Orourke, within 7 days to evaluate treatment change, to evaluate after surgery, for hospital follow- up, and to follow up on results.  The following labs/tests are recommended: CBC BMP .     Activity    Your activity upon discharge: activity as tolerated     MD face to face encounter    Documentation of Face to Face and Certification for Home Health Services    I certify that patient: Anthony Dyer is under my care and that I, or a nurse practitioner or physician's assistant working with me, had a face-to-face encounter that meets the physician face-to-face encounter requirements with this patient on: 10/20/2021.    This encounter with the patient was in whole, or in part, for the following medical condition, which is  the primary reason for home health care: Weakness.    I certify that, based on my findings, the following services are medically necessary home health services: Physical Therapy.    My clinical findings support the need for the above services because: Physical Therapy Services are needed to assess and treat the following functional impairments:  weakness.    Further, I certify that my clinical findings support that this patient is homebound (i.e. absences from home require considerable and taxing effort and are for medical reasons or Nonden[employer]ational services or infrequently or of short duration when for other reasons) because: Requires assistance of another person or specialized equipment to access medical services because patient: Has prohibitive pain during ambulation...    Based on the above findings. I certify that this patient is confined to the home and needs intermittent skilled nursing care, physical therapy and/or speech therapy.  The patient is under my care, and I have initiated the establishment of the plan of care.  This patient will be followed by a physician who will periodically review the plan of care.  Physician/Provider to provide follow up care: George Orourke    [facility] [place] physician (the Medicare certified [place] provider): Jennifer Awan DO  Physician Signature: See electronic signature associated with these discharge orders.  Date: 10/20/2021     Diet    Follow this diet upon discharge: Orders Placed This Encounter      Regular Diet Adult     Discharge Medications   Discharge Medication List as of 10/20/2021  7:05 AM      START taking these medications    Details   amoxicillin-clavulanate (AUGMENTIN) 875-125 MG tablet Take 1 tablet by mouth 2 times daily for 10 days, Disp-20 tablet, R-0, E-Prescribe         CONTINUE these medications which have CHANGED    Details   rivaroxaban ANTICOAGULANT (XARELTO) 20 MG TABS tablet Take 1 tablet (20 mg) by mouth daily (with dinner), Disp-30  tablet, R-3, E-Prescribe         CONTINUE these medications which have NOT CHANGED    Details   acetaminophen (TYLENOL) 325 MG tablet Take 650 mg by mouth every 4 hours as needed for pain . Limit acetaminophen to 4000 mg per day from all sources., Historical      albuterol (PROAIR HFA/PROVENTIL HFA/VENTOLIN HFA) 108 (90 Base) MCG/ACT inhaler Inhale 2 puffs into the lungs every 4 hours as needed, HistoricalPharmacy may dispense brand covered by insurance (Proair, or proventil or ventolin or generic albuterol inhaler)      allopurinol (ZYLOPRIM) 100 MG tablet Take 100 mg by mouth daily , Historical      amLODIPine (NORVASC) 2.5 MG tablet Take 2.5 mg by mouth daily, Historical      atorvastatin (LIPITOR) 80 MG tablet Take 40 mg by mouth every evening , Historical      carvedilol (COREG) 25 MG tablet Take 25 mg by mouth 2 times daily , Historical      finasteride (PROSCAR) 5 MG tablet Take 1 tablet (5 mg) by mouth daily, Disp-30 tablet, R-11, E-Prescribe      gabapentin (NEURONTIN) 300 MG capsule Take 300 mg by mouth 3 times daily , Historical      glimepiride (AMARYL) 2 MG tablet Take 2 mg by mouth every morning (before breakfast) , Historical      metFORMIN (GLUCOPHAGE-XR) 500 MG 24 hr tablet Take 1,000 mg by mouth 2 times daily (with meals) , Historical      multivitamin, therapeutic (THERA-VIT) TABS tablet Take 1 tablet by mouth daily, Historical      Semaglutide,0.25 or 0.5MG/DOS, (OZEMPIC, 0.25 OR 0.5 MG/DOSE,) 2 MG/1.5ML SOPN Inject 0.5 mg Subcutaneous every 7 days on Tuesdays, Historical      tamsulosin (FLOMAX) 0.4 MG capsule Take 0.4 mg by mouth every evening , Historical      vitamin B-12 (CYANOCOBALAMIN) 250 MCG tablet Take 250 mcg by mouth daily, Historical           Allergies   Allergies   Allergen Reactions     Ace Inhibitors      Per [facility]: hyperkalemia.  Pt doesn't know if he is allergic     Ceclor [Cefaclor] Hives     Sulfa Drugs      Pt unsure.      Data   Urine culture and blood cultures were no  growth.

## 2021-11-11 NOTE — TELEPHONE ENCOUNTER
ELLA Luevano to call back to notify on provider's recommendation to hold xarelto for 2 days. PCP out today, per TAINA Castillo patient should hold xarelto day before and day of procedure. Dr. Armenta states patient can also hold it day after if she is concerned. This writer called [facility] and left message with staff, Valerie regarding holding xarelto.

## 2021-11-11 NOTE — TELEPHONE ENCOUNTER
George Orourke MD Sprunk, Madelyn, RN 20 hours ago (6:44 PM)     MIGUEL A    Hold for 2 days    Message text

## 2021-12-17 ENCOUNTER — OFFICE VISIT (OUTPATIENT)
Dept: FAMILY MEDICINE | Facility: OTHER | Age: 81
End: 2021-12-17
Attending: FAMILY MEDICINE
Payer: MEDICARE

## 2021-12-17 VITALS
RESPIRATION RATE: 20 BRPM | OXYGEN SATURATION: 95 % | DIASTOLIC BLOOD PRESSURE: 64 MMHG | BODY MASS INDEX: 24.41 KG/M2 | SYSTOLIC BLOOD PRESSURE: 112 MMHG | TEMPERATURE: 98.1 F | HEART RATE: 99 BPM | WEIGHT: 185 LBS

## 2021-12-17 DIAGNOSIS — E11.22 TYPE 2 DIABETES MELLITUS WITH STAGE 3A CHRONIC KIDNEY DISEASE, WITHOUT LONG-TERM CURRENT USE OF INSULIN (H): ICD-10-CM

## 2021-12-17 DIAGNOSIS — H25.013 CORTICAL AGE-RELATED CATARACT OF BOTH EYES: ICD-10-CM

## 2021-12-17 DIAGNOSIS — Z01.818 PREOP GENERAL PHYSICAL EXAM: ICD-10-CM

## 2021-12-17 DIAGNOSIS — R33.9 URINARY RETENTION: Primary | ICD-10-CM

## 2021-12-17 DIAGNOSIS — N18.31 TYPE 2 DIABETES MELLITUS WITH STAGE 3A CHRONIC KIDNEY DISEASE, WITHOUT LONG-TERM CURRENT USE OF INSULIN (H): ICD-10-CM

## 2021-12-17 DIAGNOSIS — J43.2 CENTRILOBULAR EMPHYSEMA (H): ICD-10-CM

## 2021-12-17 DIAGNOSIS — I26.09 OTHER ACUTE PULMONARY EMBOLISM WITH ACUTE COR PULMONALE (H): ICD-10-CM

## 2021-12-17 PROCEDURE — G0463 HOSPITAL OUTPT CLINIC VISIT: HCPCS

## 2021-12-17 PROCEDURE — 99214 OFFICE O/P EST MOD 30 MIN: CPT | Performed by: FAMILY MEDICINE

## 2021-12-17 RX ORDER — PREDNISOLONE ACETATE 10 MG/ML
SUSPENSION/ DROPS OPHTHALMIC
Status: ON HOLD | COMMUNITY
Start: 2021-10-06 | End: 2022-03-09

## 2021-12-17 RX ORDER — NITROFURANTOIN 25; 75 MG/1; MG/1
100 CAPSULE ORAL 2 TIMES DAILY
Qty: 20 CAPSULE | Refills: 0 | Status: ON HOLD | OUTPATIENT
Start: 2021-12-17 | End: 2022-03-09

## 2021-12-17 RX ORDER — MOXIFLOXACIN 5 MG/ML
SOLUTION/ DROPS OPHTHALMIC
Status: ON HOLD | COMMUNITY
Start: 2021-10-06 | End: 2022-03-09

## 2021-12-17 RX ORDER — KETOROLAC TROMETHAMINE 5 MG/ML
SOLUTION OPHTHALMIC
Status: ON HOLD | COMMUNITY
Start: 2021-10-06 | End: 2022-03-09

## 2021-12-17 ASSESSMENT — PAIN SCALES - GENERAL: PAINLEVEL: MILD PAIN (2)

## 2021-12-17 NOTE — PROGRESS NOTES
[facility] [place]  [address] [place] [address]  Phone: [phone]  Primary Provider: Tc Harrison  Pre-op Performing Provider: TC HARRISON      PREOPERATIVE EVALUATION:  Today's date: 12/17/2021    Anthony Dyer is a 81 year old male who presents for a preoperative evaluation.    Surgical Information:  Surgery/Procedure: Cataract surgery   Surgery Location: [facility] [place]  Surgeon: Dr. Maldonado  Surgery Date: 12/27/2021 for LT eye; 1/11/2022 for RT eye  Time of Surgery: TBD  Where patient plans to recover: At home with family  Fax number for surgical facility: Note does not need to be faxed, will be available electronically in Epic.    Type of Anesthesia Anticipated: to be determined    Assessment & Plan     The proposed surgical procedure is considered LOW risk.    Preop general physical exam  Preoperative medical clearance completed    Cortical age-related cataract of both eyes  Surgical management per Ophthalmology    Urinary retention  Resolved.  Has BPH.  Will follow  - nitroFURantoin macrocrystal-monohydrate (MACROBID) 100 MG capsule; Take 1 capsule (100 mg) by mouth 2 times daily    Centrilobular emphysema (H)  Mild.  Stable    Type 2 diabetes mellitus with stage 3a chronic kidney disease, without long-term current use of insulin (H)  Controlled    Other acute pulmonary embolism with acute cor pulmonale (H)  On anticoagulation.  Discussed.       Risks and Recommendations:  The patient has the following additional risks and recommendations for perioperative complications:   - History of urinary retention      RECOMMENDATION:  APPROVAL GIVEN to proceed with proposed procedure, without further diagnostic evaluation.      Subjective     HPI related to upcoming procedure: Anthony has a history of bilateral cataracts and has had progressive vision change.  He was seen by Ophthalmology where it was felt the patient would benefit from surgical management.  I was asked to see hime for  preoperative medical clearance.      Preop Questions 12/17/2021   1. Have you ever had a heart attack or stroke? YES - Stroke in 2011   2. Have you ever had surgery on your heart or blood vessels, such as a stent placement, a coronary artery bypass, or surgery on an artery in your head, neck, heart, or legs? No   3. Do you have chest pain with activity? No   4. Do you have a history of  heart failure? No   5. Do you currently have a cold, bronchitis or symptoms of other infection? No   6. Do you have a cough, shortness of breath, or wheezing? No   7. Do you or anyone in your family have previous history of blood clots? YES - Patient does   8. Do you or does anyone in your family have a serious bleeding problem such as prolonged bleeding following surgeries or cuts? No   9. Have you ever had problems with anemia or been told to take iron pills? No   10. Have you had any abnormal blood loss such as black, tarry or bloody stools? No   11. Have you ever had a blood transfusion? No   12. Are you willing to have a blood transfusion if it is medically needed before, during, or after your surgery? Yes   13. Have you or any of your relatives ever had problems with anesthesia? No   14. Do you have sleep apnea, excessive snoring or daytime drowsiness? YES - daytime drowsiness   14a. Do you have a CPAP machine? No   15. Do you have any artifical heart valves or other implanted medical devices like a pacemaker, defibrillator, or continuous glucose monitor? No   16. Do you have artificial joints? No   17. Are you allergic to latex? No       Health Care Directive:  Patient has a Health Care Directive on file      Preoperative Review of :   reviewed - no record of controlled substances prescribed.      Status of Chronic Conditions:  COPD - Patient has a longstanding history of moderate-severe COPD . Patient has been doing well overall noting NO SYMPTOMS and continues on medication regimen consisting of intermittent  bronchodilators without adverse reactions or side effects.    DIABETES - Patient has a longstanding history of DiabetesType Type II . Patient is being treated with oral agents and denies significant side effects. Control has been good. Complicating factors include but are not limited to: hypertension, neuropathy and CVA.     HYPERTENSION - Patient has longstanding history of HTN , currently denies any symptoms referable to elevated blood pressure. Specifically denies chest pain, palpitations, dyspnea, orthopnea, PND or peripheral edema. Blood pressure readings have been in normal range. Current medication regimen is as listed below. Patient denies any side effects of medication.       Review of Systems  Constitutional, neuro, ENT, endocrine, pulmonary, cardiac, gastrointestinal, genitourinary, musculoskeletal, integument and psychiatric systems are negative, except as otherwise noted.    Patient Active Problem List    Diagnosis Date Noted     Nursing Home Visit 05/21/2021     Priority: Medium     Benign prostatic hyperplasia with urinary retention 04/29/2021     Priority: Medium     Tachycardia 04/26/2021     Priority: Medium     Hypoxia 04/26/2021     Priority: Medium     Other acute pulmonary embolism with acute cor pulmonale (H) 04/26/2021     Priority: Medium     Dyspnea, unspecified type 04/26/2021     Priority: Medium     Urinary retention 04/19/2021     Priority: Medium     Generalized weakness 04/19/2021     Priority: Medium     Onychomycosis of left great toe 02/09/2021     Priority: Medium     Pincer nail deformity 02/09/2021     Priority: Medium     Chronic painful diabetic neuropathy (H) 10/24/2018     Priority: Medium     Moderate episode of recurrent major depressive disorder (H) 10/24/2018     Priority: Medium     Formatting of this note might be different from the original.  Patient does not want anti-depressant medication       Chronic obstructive lung disease (H) 11/22/2017     Priority: Medium      Formatting of this note might be different from the original.  Mild       Mild concentric left ventricular hypertrophy (LVH) 11/22/2017     Priority: Medium     Uncomplicated alcohol dependence (H) 09/28/2016     Priority: Medium     IBS (irritable bowel syndrome) 12/23/2015     Priority: Medium     Spinal stenosis of lumbar region without neurogenic claudication 12/23/2015     Priority: Medium     Erectile dysfunction 05/28/2014     Priority: Medium     Chronic kidney disease, stage III (moderate) (H) 05/22/2013     Priority: Medium     Formatting of this note might be different from the original.  Since 2011       History of CVA (cerebrovascular accident) 02/03/2011     Priority: Medium     Formatting of this note might be different from the original.  Jan 2011  IMPRESSION: Interval development of a diffusion abnormality consistent with acute to early subacute infarct of less than 3 days of age in the left thalamus and the medial left cerebral peduncle. No associated abnormal enhancement or hydrocephalus.       Hyperlipidemia 02/03/2011     Priority: Medium     Essential hypertension 11/28/2006     Priority: Medium     Type 2 diabetes mellitus with stage 3 chronic kidney disease, without long-term current use of insulin (H) 02/16/2001     Priority: Medium     Gout, unspecified 02/16/2001     Priority: Medium     Formatting of this note might be different from the original.  On Allopurinol       Lumbar spondylosis 02/22/1995     Priority: Medium     Formatting of this note might be different from the original.  S/P RFN L2-L5 facets        Past Medical History:   Diagnosis Date     Diabetes (H)      Hypertension      Past Surgical History:   Procedure Laterality Date     CYSTOSCOPY, TRANSURETHRAL RESECTION (TUR) PROSTATE, COMBINED N/A 10/15/2021    Procedure: CYSTOSCOPY, WITH TRANSURETHRAL RESECTION PROSTATE;  Surgeon: Indu De Leon MD;  Location: [facility] OR     HERNIA REPAIR       Current Outpatient Medications    Medication Sig Dispense Refill     acetaminophen (TYLENOL) 325 MG tablet Take 650 mg by mouth every 4 hours as needed for pain . Limit acetaminophen to 4000 mg per day from all sources.       albuterol (PROAIR HFA/PROVENTIL HFA/VENTOLIN HFA) 108 (90 Base) MCG/ACT inhaler Inhale 2 puffs into the lungs every 4 hours as needed       allopurinol (ZYLOPRIM) 100 MG tablet Take 100 mg by mouth daily        amLODIPine (NORVASC) 2.5 MG tablet Take 2.5 mg by mouth daily       atorvastatin (LIPITOR) 80 MG tablet Take 40 mg by mouth every evening        carvedilol (COREG) 25 MG tablet Take 25 mg by mouth 2 times daily        finasteride (PROSCAR) 5 MG tablet Take 1 tablet (5 mg) by mouth daily 30 tablet 11     gabapentin (NEURONTIN) 300 MG capsule Take 300 mg by mouth 3 times daily        glimepiride (AMARYL) 2 MG tablet Take 2 mg by mouth every morning (before breakfast)        metFORMIN (GLUCOPHAGE-XR) 500 MG 24 hr tablet Take 1,000 mg by mouth 2 times daily (with meals)        multivitamin, therapeutic (THERA-VIT) TABS tablet Take 1 tablet by mouth daily       rivaroxaban ANTICOAGULANT (XARELTO) 20 MG TABS tablet Take 1 tablet (20 mg) by mouth daily (with dinner) 30 tablet 3     Semaglutide,0.25 or 0.5MG/DOS, (OZEMPIC, 0.25 OR 0.5 MG/DOSE,) 2 MG/1.5ML SOPN Inject 0.5 mg Subcutaneous every 7 days on Tuesdays       tamsulosin (FLOMAX) 0.4 MG capsule Take 0.4 mg by mouth every evening        vitamin B-12 (CYANOCOBALAMIN) 250 MCG tablet Take 250 mcg by mouth daily       ketorolac (ACULAR) 0.5 % ophthalmic solution INSTILL 1 DROP INTO LEFT EYE 4 TIMES DAILY. START 3 DAYS BEFORE SURGERY, THEN 4 TIMES DAILY FOR 1 WEEK AFTER SURGERY, THEN 3 TIMES DAILY FOR (Patient not taking: Reported on 12/17/2021)       moxifloxacin (VIGAMOX) 0.5 % ophthalmic solution INSTILL 1 DROP INTO LEFT EYE 4 TIMES DAILY AS DIRECTED START 3 DAYS BEFORE SURGERY CONTINUE FOR 1 WEEK AFTER SURGERY (Patient not taking: Reported on 12/17/2021)        prednisoLONE acetate (PRED FORTE) 1 % ophthalmic suspension INSTILL 1 DROP INTO LEFT EYE 4 TIMES DAILY FOR 1 WEEK AFTER SURGERY, THEN TAKE 3 TIMES DAILY FOR 5 WEEKS (Patient not taking: Reported on 12/17/2021)         Allergies   Allergen Reactions     Ace Inhibitors      Per [facility]: hyperkalemia.  Pt doesn't know if he is allergic     Ceclor [Cefaclor] Hives     Sulfa Drugs      Pt unsure.         Social History     Tobacco Use     Smoking status: Never Smoker     Smokeless tobacco: Former User     Types: Chew   Substance Use Topics     Alcohol use: Yes     Comment: shot a day     History reviewed. No pertinent family history.  History   Drug Use Unknown         Objective     /64 (BP Location: Left arm, Patient Position: Sitting, Cuff Size: Adult Regular)   Pulse 99   Temp 98.1  F (36.7  C) (Tympanic)   Resp 20   Wt 83.9 kg (185 lb)   SpO2 95%   BMI 24.41 kg/m      Physical Exam    GENERAL APPEARANCE: healthy, alert and no distress     EYES: EOMI,  PERRL     HENT: ear canals and TM's normal and nose and mouth without ulcers or lesions     NECK: no adenopathy, no asymmetry, masses, or scars and thyroid normal to palpation     RESP: lungs clear to auscultation - no rales, rhonchi or wheezes     CV: regular rates and rhythm, normal S1 S2, no S3 or S4 and no murmur, click or rub     ABDOMEN:  soft, nontender, no HSM or masses and bowel sounds normal     MS: extremities normal- no gross deformities noted, no evidence of inflammation in joints, FROM in all extremities.     SKIN: no suspicious lesions or rashes     NEURO: Normal strength and tone, sensory exam grossly normal, mentation intact and speech normal     PSYCH: mentation appears normal. and affect normal/bright     LYMPHATICS: No cervical adenopathy    Recent Labs   Lab Test 10/20/21  0647 10/19/21  0530 10/06/21  0943 08/05/21  1055 05/06/21  0000   HGB 12.4* 12.0*   < > 14.0  --     148*   < > 256  --     142   < > 138  --     POTASSIUM 3.5 3.5   < > 4.6  --    CR 1.04 1.12   < > 1.09  --    A1C  --   --   --  5.3 6.5*    < > = values in this interval not displayed.        Diagnostics:  No results found for this or any previous visit (from the past 720 hour(s)).   No EKG required for low risk surgery (cataract, skin procedure, breast biopsy, etc).    Revised Cardiac Risk Index (RCRI):  The patient has the following serious cardiovascular risks for perioperative complications:   - Cerebrovascular Disease (TIA or CVA) = 1 point     RCRI Interpretation: 1 point: Class II (low risk - 0.9% complication rate)           Signed Electronically by: George Orourke MD  Copy of this evaluation report is provided to requesting physician.

## 2021-12-17 NOTE — H&P (VIEW-ONLY)
[facility] [place]  [address] [place] [address]  Phone: [phone]  Primary Provider: Tc Harrison  Pre-op Performing Provider: TC HARRISON      PREOPERATIVE EVALUATION:  Today's date: 12/17/2021    Anthony Dyer is a 81 year old male who presents for a preoperative evaluation.    Surgical Information:  Surgery/Procedure: Cataract surgery   [place] [facility] [place]: Dr. Maldonado  Surgery Date: 12/27/2021 for LT eye; 1/11/2022 for RT eye  Time of Surgery: TBD  Where patient plans to recover: At home with family  Fax number for surgical facility: Note does not need to be faxed, will be available electronically in Epic.    Type of Anesthesia Anticipated: to be determined    Assessment & Plan     The proposed surgical procedure is considered LOW risk.    Preop general physical exam  Preoperative medical clearance completed    Cortical age-related cataract of both eyes  Surgical management per Ophthalmology    Urinary retention  Resolved.  Has BPH.  Will follow  - nitroFURantoin macrocrystal-monohydrate (MACROBID) 100 MG capsule; Take 1 capsule (100 mg) by mouth 2 times daily    Centrilobular emphysema (H)  Mild.  Stable    Type 2 diabetes mellitus with stage 3a chronic kidney disease, without long-term current use of insulin (H)  Controlled    Other acute pulmonary embolism with acute cor pulmonale (H)  On anticoagulation.  Discussed.       Risks and Recommendations:  The patient has the following additional risks and recommendations for perioperative complications:   - History of urinary retention      RECOMMENDATION:  APPROVAL GIVEN to proceed with proposed procedure, without further diagnostic evaluation.      Subjective     HPI related to upcoming procedure: Anthony has a history of bilateral cataracts and has had progressive vision change.  He was seen by Ophthalmology where it was felt the patient would benefit from surgical management.  I was asked to see hime for  preoperative medical clearance.      Preop Questions 12/17/2021   1. Have you ever had a heart attack or stroke? YES - Stroke in 2011   2. Have you ever had surgery on your heart or blood vessels, such as a stent placement, a coronary artery bypass, or surgery on an artery in your head, neck, heart, or legs? No   3. Do you have chest pain with activity? No   4. Do you have a history of  heart failure? No   5. Do you currently have a cold, bronchitis or symptoms of other infection? No   6. Do you have a cough, shortness of breath, or wheezing? No   7. Do you or anyone in your family have previous history of blood clots? YES - Patient does   8. Do you or does anyone in your family have a serious bleeding problem such as prolonged bleeding following surgeries or cuts? No   9. Have you ever had problems with anemia or been told to take iron pills? No   10. Have you had any abnormal blood loss such as black, tarry or bloody stools? No   11. Have you ever had a blood transfusion? No   12. Are you willing to have a blood transfusion if it is medically needed before, during, or after your surgery? Yes   13. Have you or any of your relatives ever had problems with anesthesia? No   14. Do you have sleep apnea, excessive snoring or daytime drowsiness? YES - daytime drowsiness   14a. Do you have a CPAP machine? No   15. Do you have any artifical heart valves or other implanted medical devices like a pacemaker, defibrillator, or continuous glucose monitor? No   16. Do you have artificial joints? No   17. Are you allergic to latex? No       Health Care Directive:  Patient has a Health Care Directive on file      Preoperative Review of :   reviewed - no record of controlled substances prescribed.      Status of Chronic Conditions:  COPD - Patient has a longstanding history of moderate-severe COPD . Patient has been doing well overall noting NO SYMPTOMS and continues on medication regimen consisting of intermittent  bronchodilators without adverse reactions or side effects.    DIABETES - Patient has a longstanding history of DiabetesType Type II . Patient is being treated with oral agents and denies significant side effects. Control has been good. Complicating factors include but are not limited to: hypertension, neuropathy and CVA.     HYPERTENSION - Patient has longstanding history of HTN , currently denies any symptoms referable to elevated blood pressure. Specifically denies chest pain, palpitations, dyspnea, orthopnea, PND or peripheral edema. Blood pressure readings have been in normal range. Current medication regimen is as listed below. Patient denies any side effects of medication.       Review of Systems  Constitutional, neuro, ENT, endocrine, pulmonary, cardiac, gastrointestinal, genitourinary, musculoskeletal, integument and psychiatric systems are negative, except as otherwise noted.    Patient Active Problem List    Diagnosis Date Noted     Nursing Home Visit 05/21/2021     Priority: Medium     Benign prostatic hyperplasia with urinary retention 04/29/2021     Priority: Medium     Tachycardia 04/26/2021     Priority: Medium     Hypoxia 04/26/2021     Priority: Medium     Other acute pulmonary embolism with acute cor pulmonale (H) 04/26/2021     Priority: Medium     Dyspnea, unspecified type 04/26/2021     Priority: Medium     Urinary retention 04/19/2021     Priority: Medium     Generalized weakness 04/19/2021     Priority: Medium     Onychomycosis of left great toe 02/09/2021     Priority: Medium     Pincer nail deformity 02/09/2021     Priority: Medium     Chronic painful diabetic neuropathy (H) 10/24/2018     Priority: Medium     Moderate episode of recurrent major depressive disorder (H) 10/24/2018     Priority: Medium     Formatting of this note might be different from the original.  Patient does not want anti-depressant medication       Chronic obstructive lung disease (H) 11/22/2017     Priority: Medium      Formatting of this note might be different from the original.  Mild       Mild concentric left ventricular hypertrophy (LVH) 11/22/2017     Priority: Medium     Uncomplicated alcohol dependence (H) 09/28/2016     Priority: Medium     IBS (irritable bowel syndrome) 12/23/2015     Priority: Medium     Spinal stenosis of lumbar region without neurogenic claudication 12/23/2015     Priority: Medium     Erectile dysfunction 05/28/2014     Priority: Medium     Chronic kidney disease, stage III (moderate) (H) 05/22/2013     Priority: Medium     Formatting of this note might be different from the original.  Since 2011       History of CVA (cerebrovascular accident) 02/03/2011     Priority: Medium     Formatting of this note might be different from the original.  Jan 2011  IMPRESSION: Interval development of a diffusion abnormality consistent with acute to early subacute infarct of less than 3 days of age in the left thalamus and the medial left cerebral peduncle. No associated abnormal enhancement or hydrocephalus.       Hyperlipidemia 02/03/2011     Priority: Medium     Essential hypertension 11/28/2006     Priority: Medium     Type 2 diabetes mellitus with stage 3 chronic kidney disease, without long-term current use of insulin (H) 02/16/2001     Priority: Medium     Gout, unspecified 02/16/2001     Priority: Medium     Formatting of this note might be different from the original.  On Allopurinol       Lumbar spondylosis 02/22/1995     Priority: Medium     Formatting of this note might be different from the original.  S/P RFN L2-L5 facets        Past Medical History:   Diagnosis Date     Diabetes (H)      Hypertension      Past Surgical History:   Procedure Laterality Date     CYSTOSCOPY, TRANSURETHRAL RESECTION (TUR) PROSTATE, COMBINED N/A 10/15/2021    Procedure: CYSTOSCOPY, WITH TRANSURETHRAL RESECTION PROSTATE;  Surgeon: Indu De Leon MD;  Location: [facility] OR     [facility]ERNIA REPAIR       Current Outpatient Medications    Medication Sig Dispense Refill     acetaminophen (TYLENOL) 325 MG tablet Take 650 mg by mouth every 4 hours as needed for pain . Limit acetaminophen to 4000 mg per day from all sources.       albuterol (PROAIR HFA/PROVENTIL HFA/VENTOLIN HFA) 108 (90 Base) MCG/ACT inhaler Inhale 2 puffs into the lungs every 4 hours as needed       allopurinol (ZYLOPRIM) 100 MG tablet Take 100 mg by mouth daily        amLODIPine (NORVASC) 2.5 MG tablet Take 2.5 mg by mouth daily       atorvastatin (LIPITOR) 80 MG tablet Take 40 mg by mouth every evening        carvedilol (COREG) 25 MG tablet Take 25 mg by mouth 2 times daily        finasteride (PROSCAR) 5 MG tablet Take 1 tablet (5 mg) by mouth daily 30 tablet 11     gabapentin (NEURONTIN) 300 MG capsule Take 300 mg by mouth 3 times daily        glimepiride (AMARYL) 2 MG tablet Take 2 mg by mouth every morning (before breakfast)        metFORMIN (GLUCOPHAGE-XR) 500 MG 24 hr tablet Take 1,000 mg by mouth 2 times daily (with meals)        multivitamin, therapeutic (THERA-VIT) TABS tablet Take 1 tablet by mouth daily       rivaroxaban ANTICOAGULANT (XARELTO) 20 MG TABS tablet Take 1 tablet (20 mg) by mouth daily (with dinner) 30 tablet 3     Semaglutide,0.25 or 0.5MG/DOS, (OZEMPIC, 0.25 OR 0.5 MG/DOSE,) 2 MG/1.5ML SOPN Inject 0.5 mg Subcutaneous every 7 days on Tuesdays       tamsulosin (FLOMAX) 0.4 MG capsule Take 0.4 mg by mouth every evening        vitamin B-12 (CYANOCOBALAMIN) 250 MCG tablet Take 250 mcg by mouth daily       ketorolac (ACULAR) 0.5 % ophthalmic solution INSTILL 1 DROP INTO LEFT EYE 4 TIMES DAILY. START 3 DAYS BEFORE SURGERY, THEN 4 TIMES DAILY FOR 1 WEEK AFTER SURGERY, THEN 3 TIMES DAILY FOR (Patient not taking: Reported on 12/17/2021)       moxifloxacin (VIGAMOX) 0.5 % ophthalmic solution INSTILL 1 DROP INTO LEFT EYE 4 TIMES DAILY AS DIRECTED START 3 DAYS BEFORE SURGERY CONTINUE FOR 1 WEEK AFTER SURGERY (Patient not taking: Reported on 12/17/2021)        prednisoLONE acetate (PRED FORTE) 1 % ophthalmic suspension INSTILL 1 DROP INTO LEFT EYE 4 TIMES DAILY FOR 1 WEEK AFTER SURGERY, THEN TAKE 3 TIMES DAILY FOR 5 WEEKS (Patient not taking: Reported on 12/17/2021)         Allergies   Allergen Reactions     Ace Inhibitors      Per [facility]: hyperkalemia.  Pt doesn't know if he is allergic     Ceclor [Cefaclor] Hives     Sulfa Drugs      Pt unsure.         Social History     Tobacco Use     Smoking status: Never Smoker     Smokeless tobacco: Former User     Types: Chew   Substance Use Topics     Alcohol use: Yes     Comment: shot a day     History reviewed. No pertinent family history.  History   Drug Use Unknown         Objective     /64 (BP Location: Left arm, Patient Position: Sitting, Cuff Size: Adult Regular)   Pulse 99   Temp 98.1  F (36.7  C) (Tympanic)   Resp 20   Wt 83.9 kg (185 lb)   SpO2 95%   BMI 24.41 kg/m      Physical Exam    GENERAL APPEARANCE: healthy, alert and no distress     EYES: EOMI,  PERRL     HENT: ear canals and TM's normal and nose and mouth without ulcers or lesions     NECK: no adenopathy, no asymmetry, masses, or scars and thyroid normal to palpation     RESP: lungs clear to auscultation - no rales, rhonchi or wheezes     CV: regular rates and rhythm, normal S1 S2, no S3 or S4 and no murmur, click or rub     ABDOMEN:  soft, nontender, no HSM or masses and bowel sounds normal     MS: extremities normal- no gross deformities noted, no evidence of inflammation in joints, FROM in all extremities.     SKIN: no suspicious lesions or rashes     NEURO: Normal strength and tone, sensory exam grossly normal, mentation intact and speech normal     PSYCH: mentation appears normal. and affect normal/bright     LYMPHATICS: No cervical adenopathy    Recent Labs   Lab Test 10/20/21  0647 10/19/21  0530 10/06/21  0943 08/05/21  1055 05/06/21  0000   HGB 12.4* 12.0*   < > 14.0  --     148*   < > 256  --     142   < > 138  --     POTASSIUM 3.5 3.5   < > 4.6  --    CR 1.04 1.12   < > 1.09  --    A1C  --   --   --  5.3 6.5*    < > = values in this interval not displayed.        Diagnostics:  No results found for this or any previous visit (from the past 720 hour(s)).   No EKG required for low risk surgery (cataract, skin procedure, breast biopsy, etc).    Revised Cardiac Risk Index (RCRI):  The patient has the following serious cardiovascular risks for perioperative complications:   - Cerebrovascular Disease (TIA or CVA) = 1 point     RCRI Interpretation: 1 point: Class II (low risk - 0.9% complication rate)           Signed Electronically by: George Orourke MD  Copy of this evaluation report is provided to requesting physician.

## 2021-12-17 NOTE — NURSING NOTE
"Chief Complaint   Patient presents with     Pre-Op Exam       Initial /64 (BP Location: Left arm, Patient Position: Sitting, Cuff Size: Adult Regular)   Pulse 99   Temp 98.1  F (36.7  C) (Tympanic)   Resp 20   Wt 83.9 kg (185 lb)   SpO2 95%   BMI 24.41 kg/m   Estimated body mass index is 24.41 kg/m  as calculated from the following:    Height as of 10/22/21: 1.854 m (6' 1\").    Weight as of this encounter: 83.9 kg (185 lb).  Medication Reconciliation: complete  Rigoberto Carl LPN  "

## 2021-12-17 NOTE — H&P (VIEW-ONLY)
[facility] [place]  [address] [place] [address]  Phone: [phone]  Primary Provider: Tc Harrison  Pre-op Performing Provider: TC HARRISON      PREOPERATIVE EVALUATION:  Today's date: 12/17/2021    Anthony Dyer is a 81 year old male who presents for a preoperative evaluation.    Surgical Information:  Surgery/Procedure: Cataract surgery   [facility] [place]  Surgeon: Dr. Maldonado  Surgery Date: 12/27/2021 for LT eye; 1/11/2022 for RT eye  Time of Surgery: TBD  Where patient plans to recover: At home with family  Fax number for surgical facility: Note does not need to be faxed, will be available electronically in Epic.    Type of Anesthesia Anticipated: to be determined    Assessment & Plan     The proposed surgical procedure is considered LOW risk.    Preop general physical exam  Preoperative medical clearance completed    Cortical age-related cataract of both eyes  Surgical management per Ophthalmology    Urinary retention  Resolved.  Has BPH.  Will follow  - nitroFURantoin macrocrystal-monohydrate (MACROBID) 100 MG capsule; Take 1 capsule (100 mg) by mouth 2 times daily    Centrilobular emphysema (H)  Mild.  Stable    Type 2 diabetes mellitus with stage 3a chronic kidney disease, without long-term current use of insulin (H)  Controlled    Other acute pulmonary embolism with acute cor pulmonale (H)  On anticoagulation.  Discussed.       Risks and Recommendations:  The patient has the following additional risks and recommendations for perioperative complications:   - History of urinary retention      RECOMMENDATION:  APPROVAL GIVEN to proceed with proposed procedure, without further diagnostic evaluation.      Subjective     HPI related to upcoming procedure: Anthony has a history of bilateral cataracts and has had progressive vision change.  He was seen by Ophthalmology where it was felt the patient would benefit from surgical management.  I was asked to see hime for  preoperative medical clearance.      Preop Questions 12/17/2021   1. Have you ever had a heart attack or stroke? YES - Stroke in 2011   2. Have you ever had surgery on your heart or blood vessels, such as a stent placement, a coronary artery bypass, or surgery on an artery in your head, neck, heart, or legs? No   3. Do you have chest pain with activity? No   4. Do you have a history of  heart failure? No   5. Do you currently have a cold, bronchitis or symptoms of other infection? No   6. Do you have a cough, shortness of breath, or wheezing? No   7. Do you or anyone in your family have previous history of blood clots? YES - Patient does   8. Do you or does anyone in your family have a serious bleeding problem such as prolonged bleeding following surgeries or cuts? No   9. Have you ever had problems with anemia or been told to take iron pills? No   10. Have you had any abnormal blood loss such as black, tarry or bloody stools? No   11. Have you ever had a blood transfusion? No   12. Are you willing to have a blood transfusion if it is medically needed before, during, or after your surgery? Yes   13. Have you or any of your relatives ever had problems with anesthesia? No   14. Do you have sleep apnea, excessive snoring or daytime drowsiness? YES - daytime drowsiness   14a. Do you have a CPAP machine? No   15. Do you have any artifical heart valves or other implanted medical devices like a pacemaker, defibrillator, or continuous glucose monitor? No   16. Do you have artificial joints? No   17. Are you allergic to latex? No       Health Care Directive:  Patient has a Health Care Directive on file      Preoperative Review of :   reviewed - no record of controlled substances prescribed.      Status of Chronic Conditions:  COPD - Patient has a longstanding history of moderate-severe COPD . Patient has been doing well overall noting NO SYMPTOMS and continues on medication regimen consisting of intermittent  bronchodilators without adverse reactions or side effects.    DIABETES - Patient has a longstanding history of DiabetesType Type II . Patient is being treated with oral agents and denies significant side effects. Control has been good. Complicating factors include but are not limited to: hypertension, neuropathy and CVA.     HYPERTENSION - Patient has longstanding history of HTN , currently denies any symptoms referable to elevated blood pressure. Specifically denies chest pain, palpitations, dyspnea, orthopnea, PND or peripheral edema. Blood pressure readings have been in normal range. Current medication regimen is as listed below. Patient denies any side effects of medication.       Review of Systems  Constitutional, neuro, ENT, endocrine, pulmonary, cardiac, gastrointestinal, genitourinary, musculoskeletal, integument and psychiatric systems are negative, except as otherwise noted.    Patient Active Problem List    Diagnosis Date Noted     Nursing Home Visit 05/21/2021     Priority: Medium     Benign prostatic hyperplasia with urinary retention 04/29/2021     Priority: Medium     Tachycardia 04/26/2021     Priority: Medium     Hypoxia 04/26/2021     Priority: Medium     Other acute pulmonary embolism with acute cor pulmonale (H) 04/26/2021     Priority: Medium     Dyspnea, unspecified type 04/26/2021     Priority: Medium     Urinary retention 04/19/2021     Priority: Medium     Generalized weakness 04/19/2021     Priority: Medium     Onychomycosis of left great toe 02/09/2021     Priority: Medium     Pincer nail deformity 02/09/2021     Priority: Medium     Chronic painful diabetic neuropathy (H) 10/24/2018     Priority: Medium     Moderate episode of recurrent major depressive disorder (H) 10/24/2018     Priority: Medium     Formatting of this note might be different from the original.  Patient does not want anti-depressant medication       Chronic obstructive lung disease (H) 11/22/2017     Priority: Medium      Formatting of this note might be different from the original.  Mild       Mild concentric left ventricular hypertrophy (LVH) 11/22/2017     Priority: Medium     Uncomplicated alcohol dependence (H) 09/28/2016     Priority: Medium     IBS (irritable bowel syndrome) 12/23/2015     Priority: Medium     Spinal stenosis of lumbar region without neurogenic claudication 12/23/2015     Priority: Medium     Erectile dysfunction 05/28/2014     Priority: Medium     Chronic kidney disease, stage III (moderate) (H) 05/22/2013     Priority: Medium     Formatting of this note might be different from the original.  Since 2011       History of CVA (cerebrovascular accident) 02/03/2011     Priority: Medium     Formatting of this note might be different from the original.  Jan 2011  IMPRESSION: Interval development of a diffusion abnormality consistent with acute to early subacute infarct of less than 3 days of age in the left thalamus and the medial left cerebral peduncle. No associated abnormal enhancement or hydrocephalus.       Hyperlipidemia 02/03/2011     Priority: Medium     Essential hypertension 11/28/2006     Priority: Medium     Type 2 diabetes mellitus with stage 3 chronic kidney disease, without long-term current use of insulin (H) 02/16/2001     Priority: Medium     Gout, unspecified 02/16/2001     Priority: Medium     Formatting of this note might be different from the original.  On Allopurinol       Lumbar spondylosis 02/22/1995     Priority: Medium     Formatting of this note might be different from the original.  S/P RFN L2-L5 facets        Past Medical History:   Diagnosis Date     Diabetes (H)      Hypertension      Past Surgical History:   Procedure Laterality Date     CYSTOSCOPY, TRANSURETHRAL RESECTION (TUR) PROSTATE, COMBINED N/A 10/15/2021    Procedure: CYSTOSCOPY, WITH TRANSURETHRAL RESECTION PROSTATE;  Surgeon: Indu De Leon MD;  Location: [facility] OR     HERNIA REPAIR       Current Outpatient Medications    Medication Sig Dispense Refill     acetaminophen (TYLENOL) 325 MG tablet Take 650 mg by mouth every 4 hours as needed for pain . Limit acetaminophen to 4000 mg per day from all sources.       albuterol (PROAIR HFA/PROVENTIL HFA/VENTOLIN HFA) 108 (90 Base) MCG/ACT inhaler Inhale 2 puffs into the lungs every 4 hours as needed       allopurinol (ZYLOPRIM) 100 MG tablet Take 100 mg by mouth daily        amLODIPine (NORVASC) 2.5 MG tablet Take 2.5 mg by mouth daily       atorvastatin (LIPITOR) 80 MG tablet Take 40 mg by mouth every evening        carvedilol (COREG) 25 MG tablet Take 25 mg by mouth 2 times daily        finasteride (PROSCAR) 5 MG tablet Take 1 tablet (5 mg) by mouth daily 30 tablet 11     gabapentin (NEURONTIN) 300 MG capsule Take 300 mg by mouth 3 times daily        glimepiride (AMARYL) 2 MG tablet Take 2 mg by mouth every morning (before breakfast)        metFORMIN (GLUCOPHAGE-XR) 500 MG 24 hr tablet Take 1,000 mg by mouth 2 times daily (with meals)        multivitamin, therapeutic (THERA-VIT) TABS tablet Take 1 tablet by mouth daily       rivaroxaban ANTICOAGULANT (XARELTO) 20 MG TABS tablet Take 1 tablet (20 mg) by mouth daily (with dinner) 30 tablet 3     Semaglutide,0.25 or 0.5MG/DOS, (OZEMPIC, 0.25 OR 0.5 MG/DOSE,) 2 MG/1.5ML SOPN Inject 0.5 mg Subcutaneous every 7 days on Tuesdays       tamsulosin (FLOMAX) 0.4 MG capsule Take 0.4 mg by mouth every evening        vitamin B-12 (CYANOCOBALAMIN) 250 MCG tablet Take 250 mcg by mouth daily       ketorolac (ACULAR) 0.5 % ophthalmic solution INSTILL 1 DROP INTO LEFT EYE 4 TIMES DAILY. START 3 DAYS BEFORE SURGERY, THEN 4 TIMES DAILY FOR 1 WEEK AFTER SURGERY, THEN 3 TIMES DAILY FOR (Patient not taking: Reported on 12/17/2021)       moxifloxacin (VIGAMOX) 0.5 % ophthalmic solution INSTILL 1 DROP INTO LEFT EYE 4 TIMES DAILY AS DIRECTED START 3 DAYS BEFORE SURGERY CONTINUE FOR 1 WEEK AFTER SURGERY (Patient not taking: Reported on 12/17/2021)        prednisoLONE acetate (PRED FORTE) 1 % ophthalmic suspension INSTILL 1 DROP INTO LEFT EYE 4 TIMES DAILY FOR 1 WEEK AFTER SURGERY, THEN TAKE 3 TIMES DAILY FOR 5 WEEKS (Patient not taking: Reported on 12/17/2021)         Allergies   Allergen Reactions     Ace Inhibitors      Per [facility]: hyperkalemia.  Pt doesn't know if he is allergic     Ceclor [Cefaclor] Hives     Sulfa Drugs      Pt unsure.         Social History     Tobacco Use     Smoking status: Never Smoker     Smokeless tobacco: Former User     Types: Chew   Substance Use Topics     Alcohol use: Yes     Comment: shot a day     History reviewed. No pertinent family history.  History   Drug Use Unknown         Objective     /64 (BP Location: Left arm, Patient Position: Sitting, Cuff Size: Adult Regular)   Pulse 99   Temp 98.1  F (36.7  C) (Tympanic)   Resp 20   Wt 83.9 kg (185 lb)   SpO2 95%   BMI 24.41 kg/m      Physical Exam    GENERAL APPEARANCE: healthy, alert and no distress     EYES: EOMI,  PERRL     HENT: ear canals and TM's normal and nose and mouth without ulcers or lesions     NECK: no adenopathy, no asymmetry, masses, or scars and thyroid normal to palpation     RESP: lungs clear to auscultation - no rales, rhonchi or wheezes     CV: regular rates and rhythm, normal S1 S2, no S3 or S4 and no murmur, click or rub     ABDOMEN:  soft, nontender, no HSM or masses and bowel sounds normal     MS: extremities normal- no gross deformities noted, no evidence of inflammation in joints, FROM in all extremities.     SKIN: no suspicious lesions or rashes     NEURO: Normal strength and tone, sensory exam grossly normal, mentation intact and speech normal     PSYCH: mentation appears normal. and affect normal/bright     LYMPHATICS: No cervical adenopathy    Recent Labs   Lab Test 10/20/21  0647 10/19/21  0530 10/06/21  0943 08/05/21  1055 05/06/21  0000   HGB 12.4* 12.0*   < > 14.0  --     148*   < > 256  --     142   < > 138  --     POTASSIUM 3.5 3.5   < > 4.6  --    CR 1.04 1.12   < > 1.09  --    A1C  --   --   --  5.3 6.5*    < > = values in this interval not displayed.        Diagnostics:  No results found for this or any previous visit (from the past 720 hour(s)).   No EKG required for low risk surgery (cataract, skin procedure, breast biopsy, etc).    Revised Cardiac Risk Index (RCRI):  The patient has the following serious cardiovascular risks for perioperative complications:   - Cerebrovascular Disease (TIA or CVA) = 1 point     RCRI Interpretation: 1 point: Class II (low risk - 0.9% complication rate)           Signed Electronically by: George Orourke MD  Copy of this evaluation report is provided to requesting physician.

## 2021-12-20 ENCOUNTER — ANESTHESIA EVENT (OUTPATIENT)
Dept: SURGERY | Facility: HOSPITAL | Age: 81
End: 2021-12-20
Payer: MEDICARE

## 2021-12-20 ASSESSMENT — COPD QUESTIONNAIRES: COPD: 1

## 2021-12-20 ASSESSMENT — ENCOUNTER SYMPTOMS: DYSRHYTHMIAS: 1

## 2021-12-20 NOTE — ANESTHESIA PREPROCEDURE EVALUATION
Anesthesia Pre-Procedure Evaluation    Patient: Anthony Dyer   MRN: [id number] : 1940        Preoperative Diagnosis: Combined form of age-related cataract, left eye [H25.812]    Procedure : Procedure(s):  PHACOEMULSIFICATION CATARACT EXTRACTION POSTERIOR CHAMBER LENS LEFT EYE          Past Medical History:   Diagnosis Date     Diabetes (H)      Hypertension       Past Surgical History:   Procedure Laterality Date     CYSTOSCOPY, TRANSURETHRAL RESECTION (TUR) PROSTATE, COMBINED N/A 10/15/2021    Procedure: CYSTOSCOPY, WITH TRANSURETHRAL RESECTION PROSTATE;  Surgeon: Indu De Leon MD;  Location: [facility]     [facility]ERNIA REPAIR        Allergies   Allergen Reactions     Ace Inhibitors      Per [facility]: hyperkalemia.  Pt doesn't know if he is allergic     Ceclor [Cefaclor] Hives     Sulfa Drugs      Pt unsure.       Social History     Tobacco Use     Smoking status: Never Smoker     Smokeless tobacco: Former User     Types: Chew   Substance Use Topics     Alcohol use: Yes     Comment: shot a day      Wt Readings from Last 1 Encounters:   21 83.9 kg (185 lb)        Anesthesia Evaluation   Pt has had prior anesthetic. Type: General and MAC.        ROS/MED HX  ENT/Pulmonary: Comment: dyspnea    (+) mild,  COPD,     Neurologic:     (+) CVA, without deficits,  TIA: .   Cardiovascular: Comment: Left ventricular hypertrophy  Low METS level d/t bad back    (+) Dyslipidemia hypertension-----dysrhythmias, a-fib, Previous cardiac testing   Echo: Date: Results:    Stress Test: Date: Results:    ECG Reviewed: Date: 10-15-21 Results:  A.Fib with PVC's  Cath: Date: Results:      METS/Exercise Tolerance: 1 - Eating, dressing    Hematologic: Comments: Hx of pulmonary emboli    (+) History of blood clots,     Musculoskeletal: Comment: Lumbar spial stenosis and spondylosis      GI/Hepatic: Comment: IBS      Renal/Genitourinary: Comment: CKD stage 3    (+) renal disease, BPH,     Endo:     (+) type II DM, Last HgA1c: 6.1,  date: 05/06/21, Diabetic complications: neuropathy.     Psychiatric/Substance Use:     (+) psychiatric history depression     Infectious Disease:  - neg infectious disease ROS     Malignancy:  - neg malignancy ROS     Other:  - neg other ROS          Physical Exam    Airway        Mallampati: II   TM distance: > 3 FB   Neck ROM: full   Mouth opening: > 3 cm    Respiratory Devices and Support         Dental  no notable dental history         Cardiovascular          Rhythm and rate: irregular     Pulmonary   pulmonary exam normal        breath sounds clear to auscultation           OUTSIDE LABS:  CBC:   Lab Results   Component Value Date    WBC 6.0 10/20/2021    WBC 6.2 10/19/2021    HGB 12.4 (L) 10/20/2021    HGB 12.0 (L) 10/19/2021    HCT 36.5 (L) 10/20/2021    HCT 36.0 (L) 10/19/2021     10/20/2021     (L) 10/19/2021     BMP:   Lab Results   Component Value Date     10/20/2021     10/19/2021    POTASSIUM 3.5 10/20/2021    POTASSIUM 3.5 10/19/2021    CHLORIDE 109 10/20/2021    CHLORIDE 110 (H) 10/19/2021    CO2 25 10/20/2021    CO2 26 10/19/2021    BUN 12 10/20/2021    BUN 16 10/19/2021    CR 1.04 10/20/2021    CR 1.12 10/19/2021     (H) 10/20/2021     (H) 10/19/2021     COAGS: No results found for: PTT, INR, FIBR  POC:   Lab Results   Component Value Date     (H) 04/29/2021     HEPATIC:   Lab Results   Component Value Date    ALBUMIN 2.8 (L) 10/17/2021    PROTTOTAL 5.7 (L) 10/17/2021    ALT 14 10/17/2021    AST 11 10/17/2021    ALKPHOS 60 10/17/2021    BILITOTAL 0.4 10/17/2021     OTHER:   Lab Results   Component Value Date    LACT 1.7 10/17/2021    A1C 5.3 08/05/2021    JODI 8.2 (L) 10/20/2021    CRP 85.7 (H) 04/15/2021       Anesthesia Plan    ASA Status:  3   NPO Status:  NPO Appropriate    Anesthesia Type: MAC.     - Reason for MAC: chronic cardiopulmonary disease, straight local not clinically adequate              Consents    Anesthesia Plan(s) and associated  risks, benefits, and realistic alternatives discussed. Questions answered and patient/representative(s) expressed understanding.     - Discussed: Risks, Benefits and Alternatives for BOTH SEDATION and the PROCEDURE were discussed     - Discussed with:  Patient      - Extended Intubation/Ventilatory Support Discussed: Yes.      - Patient is DNR/DNI Status: No    Use of blood products discussed: No .     Postoperative Care    Pain management: Multi-modal analgesia.        Comments:                MARTELL Jean CRNA

## 2021-12-23 ENCOUNTER — OFFICE VISIT (OUTPATIENT)
Dept: FAMILY MEDICINE | Facility: OTHER | Age: 81
End: 2021-12-23
Attending: OPHTHALMOLOGY
Payer: MEDICARE

## 2021-12-23 DIAGNOSIS — Z20.822 COVID-19 RULED OUT: Primary | ICD-10-CM

## 2021-12-23 PROCEDURE — U0005 INFEC AGEN DETEC AMPLI PROBE: HCPCS | Mod: ZL

## 2021-12-24 LAB — SARS-COV-2 RNA RESP QL NAA+PROBE: NEGATIVE

## 2021-12-27 ENCOUNTER — ANESTHESIA (OUTPATIENT)
Dept: SURGERY | Facility: HOSPITAL | Age: 81
End: 2021-12-27
Payer: MEDICARE

## 2021-12-27 ENCOUNTER — HOSPITAL ENCOUNTER (OUTPATIENT)
Facility: HOSPITAL | Age: 81
Discharge: HOME | End: 2021-12-27
Attending: OPHTHALMOLOGY | Admitting: OPHTHALMOLOGY
Payer: MEDICARE

## 2021-12-27 VITALS
TEMPERATURE: 97.6 F | OXYGEN SATURATION: 94 % | WEIGHT: 185 LBS | DIASTOLIC BLOOD PRESSURE: 77 MMHG | RESPIRATION RATE: 16 BRPM | HEIGHT: 72 IN | HEART RATE: 93 BPM | SYSTOLIC BLOOD PRESSURE: 100 MMHG | BODY MASS INDEX: 25.06 KG/M2

## 2021-12-27 PROCEDURE — 272N000001 HC OR GENERAL SUPPLY STERILE: Performed by: OPHTHALMOLOGY

## 2021-12-27 PROCEDURE — 360N000076 HC SURGERY LEVEL 3, PER MIN: Performed by: OPHTHALMOLOGY

## 2021-12-27 PROCEDURE — 250N000009 HC RX 250: Performed by: OPHTHALMOLOGY

## 2021-12-27 PROCEDURE — V2632 POST CHMBR INTRAOCULAR LENS: HCPCS | Performed by: OPHTHALMOLOGY

## 2021-12-27 PROCEDURE — 370N000017 HC ANESTHESIA TECHNICAL FEE, PER MIN: Performed by: OPHTHALMOLOGY

## 2021-12-27 PROCEDURE — 66984 XCAPSL CTRC RMVL W/O ECP: CPT | Performed by: NURSE ANESTHETIST, CERTIFIED REGISTERED

## 2021-12-27 PROCEDURE — 99100 ANES PT EXTEME AGE<1 YR&>70: CPT | Performed by: NURSE ANESTHETIST, CERTIFIED REGISTERED

## 2021-12-27 PROCEDURE — 250N000011 HC RX IP 250 OP 636: Performed by: NURSE ANESTHETIST, CERTIFIED REGISTERED

## 2021-12-27 PROCEDURE — 999N000141 HC STATISTIC PRE-PROCEDURE NURSING ASSESSMENT: Performed by: OPHTHALMOLOGY

## 2021-12-27 PROCEDURE — 250N000013 HC RX MED GY IP 250 OP 250 PS 637: Performed by: OPHTHALMOLOGY

## 2021-12-27 PROCEDURE — 710N000012 HC RECOVERY PHASE 2, PER MINUTE: Performed by: OPHTHALMOLOGY

## 2021-12-27 PROCEDURE — 250N000011 HC RX IP 250 OP 636: Performed by: OPHTHALMOLOGY

## 2021-12-27 DEVICE — LENS-SOFPORT 16.0: Type: IMPLANTABLE DEVICE | Site: EYE | Status: FUNCTIONAL

## 2021-12-27 RX ORDER — PHENYLEPHRINE HYDROCHLORIDE 100 MG/ML
1 SOLUTION/ DROPS OPHTHALMIC
Status: COMPLETED | OUTPATIENT
Start: 2021-12-27 | End: 2021-12-27

## 2021-12-27 RX ORDER — ONDANSETRON 2 MG/ML
4 INJECTION INTRAMUSCULAR; INTRAVENOUS EVERY 30 MIN PRN
Status: DISCONTINUED | OUTPATIENT
Start: 2021-12-27 | End: 2021-12-27 | Stop reason: HOSPADM

## 2021-12-27 RX ORDER — NALOXONE HYDROCHLORIDE 0.4 MG/ML
0.4 INJECTION, SOLUTION INTRAMUSCULAR; INTRAVENOUS; SUBCUTANEOUS
Status: DISCONTINUED | OUTPATIENT
Start: 2021-12-27 | End: 2021-12-27 | Stop reason: HOSPADM

## 2021-12-27 RX ORDER — TETRACAINE HYDROCHLORIDE 5 MG/ML
SOLUTION OPHTHALMIC PRN
Status: DISCONTINUED | OUTPATIENT
Start: 2021-12-27 | End: 2021-12-27 | Stop reason: HOSPADM

## 2021-12-27 RX ORDER — SODIUM CHLORIDE, SODIUM LACTATE, POTASSIUM CHLORIDE, CALCIUM CHLORIDE 600; 310; 30; 20 MG/100ML; MG/100ML; MG/100ML; MG/100ML
INJECTION, SOLUTION INTRAVENOUS CONTINUOUS
Status: DISCONTINUED | OUTPATIENT
Start: 2021-12-27 | End: 2021-12-27 | Stop reason: HOSPADM

## 2021-12-27 RX ORDER — ACETAMINOPHEN 325 MG/1
TABLET ORAL
Status: DISCONTINUED
Start: 2021-12-27 | End: 2021-12-27 | Stop reason: HOSPADM

## 2021-12-27 RX ORDER — LIDOCAINE HYDROCHLORIDE 10 MG/ML
INJECTION, SOLUTION EPIDURAL; INFILTRATION; INTRACAUDAL; PERINEURAL PRN
Status: DISCONTINUED | OUTPATIENT
Start: 2021-12-27 | End: 2021-12-27 | Stop reason: HOSPADM

## 2021-12-27 RX ORDER — PILOCARPINE HYDROCHLORIDE 10 MG/ML
SOLUTION/ DROPS OPHTHALMIC PRN
Status: DISCONTINUED | OUTPATIENT
Start: 2021-12-27 | End: 2021-12-27 | Stop reason: HOSPADM

## 2021-12-27 RX ORDER — FENTANYL CITRATE 50 UG/ML
INJECTION, SOLUTION INTRAMUSCULAR; INTRAVENOUS PRN
Status: DISCONTINUED | OUTPATIENT
Start: 2021-12-27 | End: 2021-12-27

## 2021-12-27 RX ORDER — FENTANYL CITRATE 50 UG/ML
25 INJECTION, SOLUTION INTRAMUSCULAR; INTRAVENOUS EVERY 5 MIN PRN
Status: DISCONTINUED | OUTPATIENT
Start: 2021-12-27 | End: 2021-12-27 | Stop reason: HOSPADM

## 2021-12-27 RX ORDER — OXYCODONE HYDROCHLORIDE 5 MG/1
5 TABLET ORAL EVERY 4 HOURS PRN
Status: DISCONTINUED | OUTPATIENT
Start: 2021-12-27 | End: 2021-12-27 | Stop reason: HOSPADM

## 2021-12-27 RX ORDER — PHENYLEPHRINE HYDROCHLORIDE 100 MG/ML
1 SOLUTION/ DROPS OPHTHALMIC ONCE
Status: COMPLETED | OUTPATIENT
Start: 2021-12-27 | End: 2021-12-27

## 2021-12-27 RX ORDER — LEVOBUNOLOL HYDROCHLORIDE 5 MG/ML
SOLUTION/ DROPS OPHTHALMIC PRN
Status: DISCONTINUED | OUTPATIENT
Start: 2021-12-27 | End: 2021-12-27 | Stop reason: HOSPADM

## 2021-12-27 RX ORDER — ACETAMINOPHEN 325 MG/1
650 TABLET ORAL ONCE
Status: COMPLETED | OUTPATIENT
Start: 2021-12-27 | End: 2021-12-27

## 2021-12-27 RX ORDER — PHENYLEPHRINE HYDROCHLORIDE 25 MG/ML
1 SOLUTION/ DROPS OPHTHALMIC ONCE
Status: COMPLETED | OUTPATIENT
Start: 2021-12-27 | End: 2021-12-27

## 2021-12-27 RX ORDER — NALOXONE HYDROCHLORIDE 0.4 MG/ML
0.2 INJECTION, SOLUTION INTRAMUSCULAR; INTRAVENOUS; SUBCUTANEOUS
Status: DISCONTINUED | OUTPATIENT
Start: 2021-12-27 | End: 2021-12-27 | Stop reason: HOSPADM

## 2021-12-27 RX ORDER — ONDANSETRON 4 MG/1
4 TABLET, ORALLY DISINTEGRATING ORAL EVERY 30 MIN PRN
Status: DISCONTINUED | OUTPATIENT
Start: 2021-12-27 | End: 2021-12-27 | Stop reason: HOSPADM

## 2021-12-27 RX ORDER — CYCLOPENTOLATE HYDROCHLORIDE 20 MG/ML
1 SOLUTION/ DROPS OPHTHALMIC
Status: COMPLETED | OUTPATIENT
Start: 2021-12-27 | End: 2021-12-27

## 2021-12-27 RX ORDER — MOXIFLOXACIN 5 MG/ML
SOLUTION/ DROPS OPHTHALMIC PRN
Status: DISCONTINUED | OUTPATIENT
Start: 2021-12-27 | End: 2021-12-27 | Stop reason: HOSPADM

## 2021-12-27 RX ORDER — FENTANYL CITRATE 50 UG/ML
25 INJECTION, SOLUTION INTRAMUSCULAR; INTRAVENOUS
Status: DISCONTINUED | OUTPATIENT
Start: 2021-12-27 | End: 2021-12-27 | Stop reason: HOSPADM

## 2021-12-27 RX ORDER — PROPARACAINE HYDROCHLORIDE 5 MG/ML
1 SOLUTION/ DROPS OPHTHALMIC ONCE
Status: COMPLETED | OUTPATIENT
Start: 2021-12-27 | End: 2021-12-27

## 2021-12-27 RX ORDER — LIDOCAINE 40 MG/G
CREAM TOPICAL
Status: DISCONTINUED | OUTPATIENT
Start: 2021-12-27 | End: 2021-12-27 | Stop reason: HOSPADM

## 2021-12-27 RX ORDER — HYDROMORPHONE HYDROCHLORIDE 1 MG/ML
0.2 INJECTION, SOLUTION INTRAMUSCULAR; INTRAVENOUS; SUBCUTANEOUS EVERY 5 MIN PRN
Status: DISCONTINUED | OUTPATIENT
Start: 2021-12-27 | End: 2021-12-27 | Stop reason: HOSPADM

## 2021-12-27 RX ADMIN — ACETAMINOPHEN 650 MG: 325 TABLET, FILM COATED ORAL at 11:46

## 2021-12-27 RX ADMIN — CYCLOPENTOLATE HYDROCHLORIDE 1 DROP: 20 SOLUTION/ DROPS OPHTHALMIC at 11:55

## 2021-12-27 RX ADMIN — FENTANYL CITRATE 50 MCG: 50 INJECTION, SOLUTION INTRAMUSCULAR; INTRAVENOUS at 12:56

## 2021-12-27 RX ADMIN — PHENYLEPHRINE HYDROCHLORIDE 1 DROP: 100 SOLUTION/ DROPS OPHTHALMIC at 12:14

## 2021-12-27 RX ADMIN — PHENYLEPHRINE HYDROCHLORIDE 1 DROP: 100 SOLUTION/ DROPS OPHTHALMIC at 11:47

## 2021-12-27 RX ADMIN — FENTANYL CITRATE 50 MCG: 50 INJECTION, SOLUTION INTRAMUSCULAR; INTRAVENOUS at 13:05

## 2021-12-27 RX ADMIN — PROPARACAINE HYDROCHLORIDE 1 DROP: 5 SOLUTION/ DROPS OPHTHALMIC at 11:47

## 2021-12-27 RX ADMIN — PHENYLEPHRINE HYDROCHLORIDE 1 DROP: 25 SOLUTION/ DROPS OPHTHALMIC at 11:55

## 2021-12-27 RX ADMIN — CYCLOPENTOLATE HYDROCHLORIDE 1 DROP: 20 SOLUTION/ DROPS OPHTHALMIC at 11:47

## 2021-12-27 RX ADMIN — FLURBIPROFEN SODIUM 0.5 ML: 0.3 SOLUTION/ DROPS OPHTHALMIC at 11:55

## 2021-12-27 ASSESSMENT — COPD QUESTIONNAIRES: CAT_SEVERITY: MILD

## 2021-12-27 ASSESSMENT — MIFFLIN-ST. JEOR: SCORE: 1587.15

## 2021-12-27 NOTE — ANESTHESIA POSTPROCEDURE EVALUATION
Patient: Anthony Dyer    Procedure: Procedure(s):  PHACOEMULSIFICATION CATARACT EXTRACTION POSTERIOR CHAMBER LENS LEFT EYE       Diagnosis:Combined form of age-related cataract, left eye [H25.812]  Diagnosis Additional Information: No value filed.    Anesthesia Type:  MAC    Note:  Disposition: Outpatient   Postop Pain Control: Uneventful            Sign Out: Well controlled pain   PONV: No   Neuro/Psych: Uneventful            Sign Out: Acceptable/Baseline neuro status   Airway/Respiratory: Uneventful            Sign Out: Acceptable/Baseline resp. status   CV/Hemodynamics: Uneventful            Sign Out: Acceptable CV status; No obvious hypovolemia; No obvious fluid overload   Other NRE: NONE   DID A NON-ROUTINE EVENT OCCUR? No           Last vitals:  Vitals Value Taken Time   /77 12/27/21 1345   Temp 97.6  F (36.4  C) 12/27/21 1335   Pulse 93 12/27/21 1345   Resp 16 12/27/21 1345   SpO2 94 % 12/27/21 1345       Electronically Signed By: MARTELL Rodriguez CRNA  December 27, 2021  3:11 PM

## 2021-12-27 NOTE — OR NURSING
Patient and responsible adult given discharge instructions with no questions regarding instructions. Mariano score 20/20. Denies pain.  Discharged from unit via walking. Patient discharged to home with FREDRICK. Tolerating PO intake.

## 2021-12-27 NOTE — OP NOTE
[facility]  Ophthalmology Full Operative Note    Pre-operative diagnosis: Combined form of age-related cataract, left eye [H25.812]   Post-operative diagnosis Same   Procedure: Procedure(s):  PHACOEMULSIFICATION CATARACT EXTRACTION POSTERIOR CHAMBER LENS LEFT EYE   Surgeon: Phillip Maldonado MD   Assistants(s):    Anesthesia: Combined MAC with Topical    Estimated blood loss: None    Total IV fluids: (See anesthesia record)   Specimens: None   Implants: 16 [id number]   Findings:    Complications: None   Condition: Stable   Comments:       PROCEDURAL ANESTHESIA:     Topical/MAC with IV sedation.  Lidocaine 2% jelly topically and Lidocaine 1% preservative-free intracamerally.     PROCEDURE:  The patient was brought to the Operating Room and prepped and draped in a sterile manner.  A wire lid speculum was placed.  A paracentesis was created and 1% Lidocaine was instilled in the anterior chamber.  Moderate dilation, and later floppy iris (Flomax, finasteride use). The anterior chamber was then filled with Viscoat viscoelastic.  A clear cornea temporal wound was created using a 2.8 mm keratome.  A cystotome was used to initiate a flap in the anterior capsule and a Utrata forceps was used to create a continuous tear capsulorhexis.  Hydrodissection was performed.  The phacoemulsification tip was inserted into the eye and the nucleus and epinucleus were removed using a divide and conquer technique.  The residual cortex was removed using the I/A handpiece.  The anterior chamber was then refilled with viscoelastic and the wound was enlarged with the keratome.  The intraocular lens, 16 diopter, Model LI61AO, was placed into the injector and injected into the capsular bag. It was checked to make sure that it was central and stable.  Residual viscoelastic was removed using the I/A.  The anterior chamber was refilled with BSS.  The wounds were hydrated with BSS and were noted to be watertight with no suture necessary.   Topical pilocarpine 1%, Betagan, and Vigamox was applied.  A hard shield was placed.     The patient tolerated the procedure well and was sent to the Recovery Room in satisfactory condition.

## 2021-12-27 NOTE — ANESTHESIA CARE TRANSFER NOTE
Patient: Anthony Dyer    Procedure: Procedure(s):  PHACOEMULSIFICATION CATARACT EXTRACTION POSTERIOR CHAMBER LENS LEFT EYE       Diagnosis: Combined form of age-related cataract, left eye [H25.812]  Diagnosis Additional Information: No value filed.    Anesthesia Type:   MAC     Note:    Oropharynx: oropharynx clear of all foreign objects and spontaneously breathing  Level of Consciousness: awake  Oxygen Supplementation: room air    Independent Airway: airway patency satisfactory and stable  Dentition: dentition unchanged  Vital Signs Stable: post-procedure vital signs reviewed and stable  Report to RN Given: handoff report given  Patient transferred to: Phase II    Handoff Report: Identifed the Patient, Identified the Reponsible Provider, Reviewed the pertinent medical history, Discussed the surgical course, Reviewed Intra-OP anesthesia mangement and issues during anesthesia, Set expectations for post-procedure period and Allowed opportunity for questions and acknowledgement of understanding      Vitals:  Vitals Value Taken Time   BP     Temp     Pulse     Resp     SpO2         Electronically Signed By: MARTELL Rodriguez CRNA  December 27, 2021  1:26 PM

## 2021-12-27 NOTE — INTERVAL H&P NOTE
I have reviewed the surgical (or preoperative) H&P that is linked to this encounter, and examined the patient. There are no significant changes.  Phillip Maldonado MD

## 2021-12-28 ENCOUNTER — TRANSFERRED RECORDS (OUTPATIENT)
Dept: HEALTH INFORMATION MANAGEMENT | Facility: HOSPITAL | Age: 81
End: 2021-12-28
Payer: MEDICARE

## 2021-12-28 LAB — RETINOPATHY: NORMAL

## 2021-12-29 ENCOUNTER — TELEPHONE (OUTPATIENT)
Dept: FAMILY MEDICINE | Facility: OTHER | Age: 81
End: 2021-12-29
Payer: MEDICARE

## 2022-01-03 ENCOUNTER — OFFICE VISIT (OUTPATIENT)
Dept: PODIATRY | Facility: OTHER | Age: 82
End: 2022-01-03
Attending: PODIATRIST
Payer: MEDICARE

## 2022-01-03 VITALS
HEART RATE: 102 BPM | TEMPERATURE: 96.9 F | OXYGEN SATURATION: 92 % | DIASTOLIC BLOOD PRESSURE: 72 MMHG | SYSTOLIC BLOOD PRESSURE: 135 MMHG

## 2022-01-03 DIAGNOSIS — M21.969 LOSS OF PROTECTIVE SENSATION OF SKIN OF DEFORMED FOOT: ICD-10-CM

## 2022-01-03 DIAGNOSIS — E11.42 DIABETIC POLYNEUROPATHY ASSOCIATED WITH TYPE 2 DIABETES MELLITUS (H): ICD-10-CM

## 2022-01-03 DIAGNOSIS — M47.816 LUMBAR SPONDYLOSIS: ICD-10-CM

## 2022-01-03 DIAGNOSIS — R20.8 LOSS OF PROTECTIVE SENSATION OF SKIN OF DEFORMED FOOT: ICD-10-CM

## 2022-01-03 DIAGNOSIS — L84 CALLUS OF FOOT: ICD-10-CM

## 2022-01-03 DIAGNOSIS — E11.9 DIABETES MELLITUS TYPE 2, NONINSULIN DEPENDENT (H): ICD-10-CM

## 2022-01-03 DIAGNOSIS — L60.3 ONYCHODYSTROPHY: Primary | ICD-10-CM

## 2022-01-03 DIAGNOSIS — L85.3 XEROSIS OF SKIN: ICD-10-CM

## 2022-01-03 DIAGNOSIS — L97.512 DIABETIC ULCER OF TOE OF RIGHT FOOT ASSOCIATED WITH TYPE 2 DIABETES MELLITUS, WITH FAT LAYER EXPOSED (H): ICD-10-CM

## 2022-01-03 DIAGNOSIS — E11.621 DIABETIC ULCER OF TOE OF RIGHT FOOT ASSOCIATED WITH TYPE 2 DIABETES MELLITUS, WITH FAT LAYER EXPOSED (H): ICD-10-CM

## 2022-01-03 PROCEDURE — 97597 DBRDMT OPN WND 1ST 20 CM/<: CPT | Performed by: PODIATRIST

## 2022-01-03 PROCEDURE — G0463 HOSPITAL OUTPT CLINIC VISIT: HCPCS | Mod: 25

## 2022-01-03 PROCEDURE — 11721 DEBRIDE NAIL 6 OR MORE: CPT | Performed by: PODIATRIST

## 2022-01-03 PROCEDURE — 99213 OFFICE O/P EST LOW 20 MIN: CPT | Mod: 25 | Performed by: PODIATRIST

## 2022-01-03 ASSESSMENT — PAIN SCALES - GENERAL: PAINLEVEL: NO PAIN (0)

## 2022-01-03 NOTE — PATIENT INSTRUCTIONS
-Continue changing the dressing on RIGHT foot on the fifth toe every two days:  ---Cut the Mepilex Ag (silver foam pad) to the size of the wound. Peel the backing off the Mepilex and apply the sticky side directly to the wound (this will not hurt the wound). Secure with tape or a band aid.  ---Discontinue foot soaks until the wound is healed        -The RIGHT great toe: Changed band aid daily with antibiotic ointment ONLY if the wound is still open / if the toe is still bleeding on both sides of the toenail.     -------------------------------------------------    -Look for signs of infection (redness, swelling, pain, purulence, fever, chills, nausea, vomiting) and to return to podiatry or the emergency department immediately if there are any signs of infection.

## 2022-01-03 NOTE — PROGRESS NOTES
Chief complaint: Patient presents with:  Toenail: Trimming      History of Present Illness: This 81 year old NIDDM II male is seen for follow-up management of elongated toenails.  He says he has difficulty trimming his toenails and his family encourages him to see podiatry for care of his toenails.    He gets burning, tingling, and numbness in his feet every once in awhile. He does not have DM shoes and he previously declined them, but he is requesting DM shoes and CMOs today. He says he had CMOs made by [facility] in the past, but this was about twenty years ago. He still wears the CMOs but he thinks it is time for a new pair.    He says his  applies lotion to his feet once per week with [employer] and she noticed cracks on his feet. He wants the cracks evaluated and he is wondering how to treat this.      Last HbA1C was 5.3% on 08/05/2021.        No further pedal complaints today.         /72 (BP Location: Left arm, Patient Position: Sitting, Cuff Size: Adult Regular)   Pulse 102   Temp 96.9  F (36.1  C) (Tympanic)   SpO2 92%     Patient Active Problem List   Diagnosis     Urinary retention     Generalized weakness     Type 2 diabetes mellitus with stage 3 chronic kidney disease, without long-term current use of insulin (H)     Tachycardia     Hypoxia     Other acute pulmonary embolism with acute cor pulmonale (H)     Dyspnea, unspecified type     Benign prostatic hyperplasia with urinary retention     Chronic kidney disease, stage III (moderate) (H)     Chronic obstructive lung disease (H)     Chronic painful diabetic neuropathy (H)     Erectile dysfunction     Essential hypertension     Gout, unspecified     History of CVA (cerebrovascular accident)     Hyperlipidemia     IBS (irritable bowel syndrome)     Lumbar spondylosis     Mild concentric left ventricular hypertrophy (LVH)     Moderate episode of recurrent major depressive disorder (H)     Spinal stenosis of lumbar region without  neurogenic claudication     Uncomplicated alcohol dependence (H)     Nursing Home Visit     Onychomycosis of left great toe     Pincer nail deformity       Past Surgical History:   Procedure Laterality Date     CYSTOSCOPY, TRANSURETHRAL RESECTION (TUR) PROSTATE, COMBINED N/A 10/15/2021    Procedure: CYSTOSCOPY, WITH TRANSURETHRAL RESECTION PROSTATE;  Surgeon: Indu De Leon MD;  Location: [facility]     HERNIA REPAIR       PHACOEMULSIFICATION WITH STANDARD INTRAOCULAR LENS IMPLANT Left 12/27/2021    Procedure: PHACOEMULSIFICATION CATARACT EXTRACTION POSTERIOR CHAMBER LENS LEFT EYE;  Surgeon: Phillip Maldonado MD;  Location: [facility] OR       Current Outpatient Medications   Medication     acetaminophen (TYLENOL) 325 MG tablet     albuterol (PROAIR HFA/PROVENTIL HFA/VENTOLIN HFA) 108 (90 Base) MCG/ACT inhaler     allopurinol (ZYLOPRIM) 100 MG tablet     amLODIPine (NORVASC) 2.5 MG tablet     atorvastatin (LIPITOR) 80 MG tablet     carvedilol (COREG) 25 MG tablet     finasteride (PROSCAR) 5 MG tablet     gabapentin (NEURONTIN) 300 MG capsule     glimepiride (AMARYL) 2 MG tablet     ketorolac (ACULAR) 0.5 % ophthalmic solution     metFORMIN (GLUCOPHAGE-XR) 500 MG 24 hr tablet     moxifloxacin (VIGAMOX) 0.5 % ophthalmic solution     multivitamin, therapeutic (THERA-VIT) TABS tablet     nitroFURantoin macrocrystal-monohydrate (MACROBID) 100 MG capsule     prednisoLONE acetate (PRED FORTE) 1 % ophthalmic suspension     rivaroxaban ANTICOAGULANT (XARELTO) 20 MG TABS tablet     Semaglutide,0.25 or 0.5MG/DOS, (OZEMPIC, 0.25 OR 0.5 MG/DOSE,) 2 MG/1.5ML SOPN     tamsulosin (FLOMAX) 0.4 MG capsule     vitamin B-12 (CYANOCOBALAMIN) 250 MCG tablet     No current facility-administered medications for this visit.          Allergies   Allergen Reactions     Ace Inhibitors      Per [facility]: hyperkalemia.  Pt doesn't know if he is allergic     Ceclor [Cefaclor] Hives     Sulfa Drugs      Pt unsure.        History reviewed. No pertinent family  history.    Social History     Socioeconomic History     Marital status: Single     Spouse name: None     Number of children: None     Years of education: None     Highest education level: None   Occupational History     None   Tobacco Use     Smoking status: Never Smoker     Smokeless tobacco: Former User     Types: Chew   Substance and Sexual Activity     Alcohol use: Not Currently     Drug use: Never     Sexual activity: None   Other Topics Concern     Parent/sibling w/ CABG, MI or angioplasty before 65F 55M? Not Asked   Social History Narrative     None     Social Determinants of Health     Financial Resource Strain:      Difficulty of Paying Living Expenses:    Food Insecurity:      Worried About Running Out of Food in the Last Year:      Ran Out of Food in the Last Year:    Transportation Needs:      Lack of Transportation (Medical):      Lack of Transportation (Non-Medical):    Physical Activity:      Days of Exercise per Week:      Minutes of Exercise per Session:    Stress:      Feeling of Stress :    Social Connections:      Frequency of Communication with Friends and Family:      Frequency of Social Gatherings with Friends and Family:      Attends Scientologist Services:      Active Member of Clubs or Organizations:      Attends Club or Organization Meetings:      Marital Status:    Intimate Partner Violence:      Fear of Current or Ex-Partner:      Emotionally Abused:      Physically Abused:      Sexually Abused:        ROS: 10 point ROS neg other than the symptoms noted above in the HPI.  EXAM  Constitutional: healthy, alert and no distress    Psychiatric: mentation appears normal and affect normal/bright    VASCULAR:  -Dorsalis pedis pulse +2/4 b/l  -Posterior tibial pulse +1/4 b/l  -Capillary refill time < 3 seconds to b/l hallux  -Hair growth absent to b/l anterior legs and ankles  NEURO:  -Light touch sensation severely diminished to b/l plantar forefoot  -Protective sensation diminished with SWM +0/10  RIGHT and +2/10 LEFT on 01/03/2022  -Protective sensation diminished with SWM +0/10 RIGHT and +1/10 LEFT on 08/17/2021    DERM:  -Skin temperature within normal limits to bilateral foot  -Hyperkeratotic lesion to the bilateral lateral fifth digit IPJ -- no wounds post paring  -Hyperkeratotic lesion to the RIGHT distal lateral fourth digit -- no wounds post paring  -Moderately dry skin with flaking of skin to bilateral plantar foot and mild rubor to bilateral plantar foot  ---Increased cracking to skin on bilateral foot  ---Fissure to RIGHT distal lateral nail border and several small fissures to bilateral heels without open wounds    -Toenails elongated, thickened, dystrophic and discolored x 10  ---Moderate incurvation of bilateral nail borders of the RIGHT hallux without a break in the skin, without pinpoint opening through skin layer only on RIGHT hallux bilateral toenail border  Wound Location:  RIGHT plantar lateral distal fifth digit  01/03/2022  Measurement:  0.2cm x 0.3cm x 0.2cm to subcutaneous tissue  Drainage:  Moderate serous  Odor:  None  Undermining:  None  Edges:  Intact  Base:  100% viable   Surrounding Skin: Intact  No severe erythema, no ascending erythema, no calor, no purulence, no malodor, no other signs of infection.   MSK:  -Muscle strength of ankles +5/5 for dorsiflexion, plantarflexion, ABDUction and ADDuction b/l    ============================================================    ASSESSMENT:  (L60.3) Onychodystrophy  (primary encounter diagnosis)    (L84) Callus of foot    (L85.3) Xerosis of skin    (E11.9) Diabetes mellitus type 2, noninsulin dependent (H)    (E11.42) Diabetic polyneuropathy associated with type 2 diabetes mellitus (H)    (M21.969,  R20.8) Loss of protective sensation of skin of deformed foot    (M47.816) Lumbar spondylosis      PLAN:  -Patient evaluated and examined. Treatment options discussed with no educational barriers noted.    -High risk toenail debridement x 10  toenails without incident  ---Slant back to bilateral border of RIGHT hallux. Small amount of antibiotic cream and band aid applied. Patient to removed bandage tomorrow and re-apply if still open, otherwise discontinue dressing.  ----Patient was instructed to look for signs of infection (redness, swelling, pain, purulence, fever, chills, nausea, vomiting) and to return to podiatry or the emergency department immediately if there are any signs of infection.     -Callus pared x 1 to the RIGHT lateral 5th digit   ---Patient reminded that the callus will likely return due to the underlying, prominent bone causing the callus while the patient is walking.    -DM shoes:  ---Patient declined these on 8/17/2021, but he is requesting this referral today for DM shoes and CMOs through .  ---Referral placed on 10/25/2021 for DM shoes and for Dm shoes and CMOs  ---Patient is being fit for shoes today on 01/03/2022    -Xerosis of skin: Discussed xerosis of the skin including the risks of dry, cracking skin creating ulcerations on the feet. These can be painful or can become infected which can then potentially lead to life threatening wounds or amputation. It is important to continue with daily moisturizing lotion to prevent this. Patient expressed understanding. He would like to try [employer] Cream before trying ammonium lactate.  ---Watch carefully for open wounds or infections.    -Excisional debridement of wound on RIGHT lateral fifth toe with a sharp 15 blade to the level of and including the subcutaneous tissue layer (debrided a total of less than 20 centimeters squared) with all non-viable soft tissue debrided from the wound bed.  -Debrided ulceration in attempt to decrease the biofilm layer, promote healing and in attempt to prevent infection  ---Dressed wound with Mepilex Ag with Medipore tape  ----Patient was instructed to look for signs of infection (redness, swelling, pain, purulence, fever, chills, nausea, vomiting)  and to return to podiatry or the emergency department immediately if there are any signs of infection.   ---Offload with a post-op shoe (dispensed to patient in the clinic)    -Patient in agreement with the above treatment plan and all of patient's questions were answered.      RTC 63+ days for diabetic foot exam and high risk nail debridement and callus ang Orourke DPM

## 2022-01-03 NOTE — NURSING NOTE
Chief Complaint   Patient presents with     Toenail     Trimming       Initial /72 (BP Location: Left arm, Patient Position: Sitting, Cuff Size: Adult Regular)   Pulse 102   Temp 96.9  F (36.1  C) (Tympanic)   SpO2 92%  Estimated body mass index is 25.09 kg/m  as calculated from the following:    Height as of 12/27/21: 1.829 m (6').    Weight as of 12/27/21: 83.9 kg (185 lb).  Medication Reconciliation: vanna Leon

## 2022-01-04 ENCOUNTER — ANESTHESIA EVENT (OUTPATIENT)
Dept: SURGERY | Facility: HOSPITAL | Age: 82
End: 2022-01-04
Payer: MEDICARE

## 2022-01-04 RX ORDER — LABETALOL 20 MG/4 ML (5 MG/ML) INTRAVENOUS SYRINGE
10
Status: CANCELLED | OUTPATIENT
Start: 2022-01-04

## 2022-01-04 RX ORDER — ONDANSETRON 2 MG/ML
4 INJECTION INTRAMUSCULAR; INTRAVENOUS EVERY 30 MIN PRN
Status: CANCELLED | OUTPATIENT
Start: 2022-01-04

## 2022-01-04 RX ORDER — SODIUM CHLORIDE, SODIUM LACTATE, POTASSIUM CHLORIDE, CALCIUM CHLORIDE 600; 310; 30; 20 MG/100ML; MG/100ML; MG/100ML; MG/100ML
INJECTION, SOLUTION INTRAVENOUS CONTINUOUS
Status: CANCELLED | OUTPATIENT
Start: 2022-01-04

## 2022-01-04 RX ORDER — ONDANSETRON 4 MG/1
4 TABLET, ORALLY DISINTEGRATING ORAL EVERY 30 MIN PRN
Status: CANCELLED | OUTPATIENT
Start: 2022-01-04

## 2022-01-04 RX ORDER — HYDRALAZINE HYDROCHLORIDE 20 MG/ML
2.5-5 INJECTION INTRAMUSCULAR; INTRAVENOUS EVERY 10 MIN PRN
Status: CANCELLED | OUTPATIENT
Start: 2022-01-04

## 2022-01-04 ASSESSMENT — COPD QUESTIONNAIRES
CAT_SEVERITY: MILD
COPD: 1

## 2022-01-04 ASSESSMENT — ENCOUNTER SYMPTOMS: DYSRHYTHMIAS: 1

## 2022-01-04 NOTE — ANESTHESIA PREPROCEDURE EVALUATION
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.811]    Procedure : Procedure(s):  PHACOEMULSIFICATION CATARACT EXTRACTION POSTERIOR CHAMBER LENS RIGHT EYE          Past Medical History:   Diagnosis Date     Diabetes (H)      Hypertension       Past Surgical History:   Procedure Laterality Date     CYSTOSCOPY, TRANSURETHRAL RESECTION (TUR) PROSTATE, COMBINED N/A 10/15/2021    Procedure: CYSTOSCOPY, WITH TRANSURETHRAL RESECTION PROSTATE;  Surgeon: Indu De Leon MD;  Location: [facility]     HERNIA REPAIR       PHACOEMULSIFICATION WITH STANDARD INTRAOCULAR LENS IMPLANT Left 2021    Procedure: PHACOEMULSIFICATION CATARACT EXTRACTION POSTERIOR CHAMBER LENS LEFT EYE;  Surgeon: Phillip Maldonado MD;  Location: [facility]      Allergies   Allergen Reactions     Ace Inhibitors      Per [facility]: hyperkalemia.  Pt doesn't know if he is allergic     Ceclor [Cefaclor] Hives     Sulfa Drugs      Pt unsure.       Social History     Tobacco Use     Smoking status: Never Smoker     Smokeless tobacco: Former User     Types: Chew   Substance Use Topics     Alcohol use: Yes     Comment: shot a day      Wt Readings from Last 1 Encounters:   21 83.9 kg (185 lb)        Anesthesia Evaluation   Pt has had prior anesthetic. Type: General and MAC.        ROS/MED HX  ENT/Pulmonary: Comment: dyspnea    (+) mild,  COPD,     Neurologic:     (+) CVA, without deficits,  TIA: 2011.   Cardiovascular: Comment: Left ventricular hypertrophy  Low METS level d/t bad back    (+) Dyslipidemia hypertension-----dysrhythmias, a-fib, Previous cardiac testing   Echo: Date: Results:    Stress Test: Date: Results:    ECG Reviewed: Date: 10-15-21 Results:  A.Fib with PVC's  Cath: Date: Results:      METS/Exercise Tolerance: 1 - Eating, dressing    Hematologic: Comments: Hx of pulmonary emboli  On Xa inhibitor    (+) History of  blood clots,     Musculoskeletal: Comment: Lumbar spial stenosis and spondylosis      GI/Hepatic: Comment: IBS      Renal/Genitourinary: Comment: CKD stage 3    (+) renal disease, BPH,     Endo:     (+) type II DM, Last HgA1c: 6.1, date: 05/06/21, Diabetic complications: neuropathy.     Psychiatric/Substance Use:     (+) psychiatric history depression     Infectious Disease:  - neg infectious disease ROS     Malignancy:  - neg malignancy ROS     Other:  - neg other ROS          Physical Exam    Airway        Mallampati: II   TM distance: > 3 FB   Neck ROM: full   Mouth opening: > 3 cm    Respiratory Devices and Support         Dental  no notable dental history         Cardiovascular          Rhythm and rate: irregular and normal     Pulmonary   pulmonary exam normal        breath sounds clear to auscultation           OUTSIDE LABS:  CBC:   Lab Results   Component Value Date    WBC 6.0 10/20/2021    WBC 6.2 10/19/2021    HGB 12.4 (L) 10/20/2021    HGB 12.0 (L) 10/19/2021    HCT 36.5 (L) 10/20/2021    HCT 36.0 (L) 10/19/2021     10/20/2021     (L) 10/19/2021     BMP:   Lab Results   Component Value Date     10/20/2021     10/19/2021    POTASSIUM 3.5 10/20/2021    POTASSIUM 3.5 10/19/2021    CHLORIDE 109 10/20/2021    CHLORIDE 110 (H) 10/19/2021    CO2 25 10/20/2021    CO2 26 10/19/2021    BUN 12 10/20/2021    BUN 16 10/19/2021    CR 1.04 10/20/2021    CR 1.12 10/19/2021     (H) 10/20/2021     (H) 10/19/2021     COAGS: No results found for: PTT, INR, FIBR  POC:   Lab Results   Component Value Date     (H) 04/29/2021     HEPATIC:   Lab Results   Component Value Date    ALBUMIN 2.8 (L) 10/17/2021    PROTTOTAL 5.7 (L) 10/17/2021    ALT 14 10/17/2021    AST 11 10/17/2021    ALKPHOS 60 10/17/2021    BILITOTAL 0.4 10/17/2021     OTHER:   Lab Results   Component Value Date    LACT 1.7 10/17/2021    A1C 5.3 08/05/2021    JODI 8.2 (L) 10/20/2021    CRP 85.7 (H) 04/15/2021        Anesthesia Plan    ASA Status:  3   NPO Status:  NPO Appropriate    Anesthesia Type: MAC.     - Reason for MAC: straight local not clinically adequate              Consents    Anesthesia Plan(s) and associated risks, benefits, and realistic alternatives discussed. Questions answered and patient/representative(s) expressed understanding.     - Discussed: Risks, Benefits and Alternatives for BOTH SEDATION and the PROCEDURE were discussed     - Discussed with:  Patient      - Extended Intubation/Ventilatory Support Discussed: Yes.      - Patient is DNR/DNI Status: No    Use of blood products discussed: No .     Postoperative Care    Pain management: Multi-modal analgesia.        Comments:                MARTELL Saini CRNA

## 2022-01-05 ENCOUNTER — TELEPHONE (OUTPATIENT)
Dept: PODIATRY | Facility: OTHER | Age: 82
End: 2022-01-05
Payer: MEDICARE

## 2022-01-05 NOTE — TELEPHONE ENCOUNTER
Returned pt's call, he had questions about his dressing changes. Reviewed progress notes and the dressing applied. Instructed patient to follow the instruction on the AVS. Stephanie Antoine LPN

## 2022-01-07 ENCOUNTER — OFFICE VISIT (OUTPATIENT)
Dept: FAMILY MEDICINE | Facility: OTHER | Age: 82
End: 2022-01-07
Attending: FAMILY MEDICINE
Payer: MEDICARE

## 2022-01-07 DIAGNOSIS — Z01.818 PRE-OP EXAM: Primary | ICD-10-CM

## 2022-01-07 PROCEDURE — U0005 INFEC AGEN DETEC AMPLI PROBE: HCPCS | Mod: ZL

## 2022-01-07 NOTE — PROGRESS NOTES
"Collected covid swab today writer also reached out to [facility] due to patient when getting swabbed was very adamant on having results back today. He stated he had to have results back today due to having a procedure and that he did not want to stop blood thinner if test is positive. Writer advised to continue with his pre-op instructions as given. Results will be 48-72 and will come on my chart. He was very upset, and states \" if I was a  would I get them back today. \" patient advised all swabs are the same, unless order otherwise. I apologized and let patient know to continue with instructions given.   "

## 2022-01-08 LAB — SARS-COV-2 RNA RESP QL NAA+PROBE: NEGATIVE

## 2022-01-11 ENCOUNTER — ANESTHESIA (OUTPATIENT)
Dept: SURGERY | Facility: HOSPITAL | Age: 82
End: 2022-01-11
Payer: MEDICARE

## 2022-01-11 ENCOUNTER — HOSPITAL ENCOUNTER (OUTPATIENT)
Facility: HOSPITAL | Age: 82
Discharge: HOME | End: 2022-01-11
Attending: OPHTHALMOLOGY | Admitting: OPHTHALMOLOGY
Payer: MEDICARE

## 2022-01-11 VITALS
SYSTOLIC BLOOD PRESSURE: 116 MMHG | HEIGHT: 72 IN | HEART RATE: 105 BPM | RESPIRATION RATE: 16 BRPM | OXYGEN SATURATION: 94 % | BODY MASS INDEX: 23.98 KG/M2 | WEIGHT: 177 LBS | DIASTOLIC BLOOD PRESSURE: 88 MMHG | TEMPERATURE: 97.8 F

## 2022-01-11 PROCEDURE — 250N000011 HC RX IP 250 OP 636: Performed by: NURSE ANESTHETIST, CERTIFIED REGISTERED

## 2022-01-11 PROCEDURE — 250N000011 HC RX IP 250 OP 636: Performed by: OPHTHALMOLOGY

## 2022-01-11 PROCEDURE — 370N000017 HC ANESTHESIA TECHNICAL FEE, PER MIN: Performed by: OPHTHALMOLOGY

## 2022-01-11 PROCEDURE — 710N000012 HC RECOVERY PHASE 2, PER MINUTE: Performed by: OPHTHALMOLOGY

## 2022-01-11 PROCEDURE — 272N000001 HC OR GENERAL SUPPLY STERILE: Performed by: OPHTHALMOLOGY

## 2022-01-11 PROCEDURE — 99100 ANES PT EXTEME AGE<1 YR&>70: CPT | Performed by: NURSE ANESTHETIST, CERTIFIED REGISTERED

## 2022-01-11 PROCEDURE — V2632 POST CHMBR INTRAOCULAR LENS: HCPCS | Performed by: OPHTHALMOLOGY

## 2022-01-11 PROCEDURE — 66984 XCAPSL CTRC RMVL W/O ECP: CPT | Performed by: NURSE ANESTHETIST, CERTIFIED REGISTERED

## 2022-01-11 PROCEDURE — 250N000009 HC RX 250: Performed by: OPHTHALMOLOGY

## 2022-01-11 PROCEDURE — 999N000141 HC STATISTIC PRE-PROCEDURE NURSING ASSESSMENT: Performed by: OPHTHALMOLOGY

## 2022-01-11 PROCEDURE — 360N000076 HC SURGERY LEVEL 3, PER MIN: Performed by: OPHTHALMOLOGY

## 2022-01-11 DEVICE — LENS-SOFPORT 18.5: Type: IMPLANTABLE DEVICE | Site: EYE | Status: FUNCTIONAL

## 2022-01-11 RX ORDER — LEVOBUNOLOL HYDROCHLORIDE 5 MG/ML
SOLUTION/ DROPS OPHTHALMIC PRN
Status: DISCONTINUED | OUTPATIENT
Start: 2022-01-11 | End: 2022-01-11 | Stop reason: HOSPADM

## 2022-01-11 RX ORDER — PHENYLEPHRINE HYDROCHLORIDE 100 MG/ML
1 SOLUTION/ DROPS OPHTHALMIC
Status: COMPLETED | OUTPATIENT
Start: 2022-01-11 | End: 2022-01-11

## 2022-01-11 RX ORDER — ACETAMINOPHEN 325 MG/1
650 TABLET ORAL ONCE
Status: DISCONTINUED | OUTPATIENT
Start: 2022-01-11 | End: 2022-01-11 | Stop reason: HOSPADM

## 2022-01-11 RX ORDER — MOXIFLOXACIN 5 MG/ML
SOLUTION/ DROPS OPHTHALMIC PRN
Status: DISCONTINUED | OUTPATIENT
Start: 2022-01-11 | End: 2022-01-11 | Stop reason: HOSPADM

## 2022-01-11 RX ORDER — LIDOCAINE HYDROCHLORIDE 10 MG/ML
INJECTION, SOLUTION EPIDURAL; INFILTRATION; INTRACAUDAL; PERINEURAL PRN
Status: DISCONTINUED | OUTPATIENT
Start: 2022-01-11 | End: 2022-01-11 | Stop reason: HOSPADM

## 2022-01-11 RX ORDER — PROPARACAINE HYDROCHLORIDE 5 MG/ML
1 SOLUTION/ DROPS OPHTHALMIC ONCE
Status: DISCONTINUED | OUTPATIENT
Start: 2022-01-11 | End: 2022-01-11

## 2022-01-11 RX ORDER — PHENYLEPHRINE HYDROCHLORIDE 25 MG/ML
1 SOLUTION/ DROPS OPHTHALMIC ONCE
Status: COMPLETED | OUTPATIENT
Start: 2022-01-11 | End: 2022-01-11

## 2022-01-11 RX ORDER — PHENYLEPHRINE HYDROCHLORIDE 100 MG/ML
1 SOLUTION/ DROPS OPHTHALMIC ONCE
Status: COMPLETED | OUTPATIENT
Start: 2022-01-11 | End: 2022-01-11

## 2022-01-11 RX ORDER — LIDOCAINE 40 MG/G
CREAM TOPICAL
Status: DISCONTINUED | OUTPATIENT
Start: 2022-01-11 | End: 2022-01-11 | Stop reason: HOSPADM

## 2022-01-11 RX ORDER — PILOCARPINE HYDROCHLORIDE 10 MG/ML
SOLUTION/ DROPS OPHTHALMIC PRN
Status: DISCONTINUED | OUTPATIENT
Start: 2022-01-11 | End: 2022-01-11 | Stop reason: HOSPADM

## 2022-01-11 RX ORDER — PROPARACAINE HYDROCHLORIDE 5 MG/ML
1 SOLUTION/ DROPS OPHTHALMIC ONCE
Status: COMPLETED | OUTPATIENT
Start: 2022-01-11 | End: 2022-01-11

## 2022-01-11 RX ORDER — TETRACAINE HYDROCHLORIDE 5 MG/ML
SOLUTION OPHTHALMIC PRN
Status: DISCONTINUED | OUTPATIENT
Start: 2022-01-11 | End: 2022-01-11 | Stop reason: HOSPADM

## 2022-01-11 RX ORDER — CYCLOPENTOLATE HYDROCHLORIDE 20 MG/ML
1 SOLUTION/ DROPS OPHTHALMIC
Status: COMPLETED | OUTPATIENT
Start: 2022-01-11 | End: 2022-01-11

## 2022-01-11 RX ADMIN — PHENYLEPHRINE HYDROCHLORIDE 1 DROP: 100 SOLUTION/ DROPS OPHTHALMIC at 08:47

## 2022-01-11 RX ADMIN — PROPARACAINE HYDROCHLORIDE 1 DROP: 5 SOLUTION/ DROPS OPHTHALMIC at 08:46

## 2022-01-11 RX ADMIN — PHENYLEPHRINE HYDROCHLORIDE 1 DROP: 25 SOLUTION/ DROPS OPHTHALMIC at 08:59

## 2022-01-11 RX ADMIN — FLURBIPROFEN SODIUM 0.5 ML: 0.3 SOLUTION/ DROPS OPHTHALMIC at 09:00

## 2022-01-11 RX ADMIN — MIDAZOLAM 1 MG: 1 INJECTION INTRAMUSCULAR; INTRAVENOUS at 09:44

## 2022-01-11 RX ADMIN — CYCLOPENTOLATE HYDROCHLORIDE 1 DROP: 20 SOLUTION/ DROPS OPHTHALMIC at 08:46

## 2022-01-11 RX ADMIN — CYCLOPENTOLATE HYDROCHLORIDE 1 DROP: 20 SOLUTION/ DROPS OPHTHALMIC at 08:56

## 2022-01-11 RX ADMIN — MIDAZOLAM 1 MG: 1 INJECTION INTRAMUSCULAR; INTRAVENOUS at 09:50

## 2022-01-11 RX ADMIN — PHENYLEPHRINE HYDROCHLORIDE 1 DROP: 100 SOLUTION/ DROPS OPHTHALMIC at 09:35

## 2022-01-11 ASSESSMENT — MIFFLIN-ST. JEOR: SCORE: 1550.87

## 2022-01-11 NOTE — ANESTHESIA POSTPROCEDURE EVALUATION
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    Anesthesia Type:  MAC    Note:  Disposition: Outpatient   Postop Pain Control: Uneventful            Sign Out: Well controlled pain   PONV: No   Neuro/Psych: Uneventful            Sign Out: Acceptable/Baseline neuro status   Airway/Respiratory: Uneventful            Sign Out: Acceptable/Baseline resp. status   CV/Hemodynamics: Uneventful            Sign Out: Acceptable CV status; No obvious hypovolemia; No obvious fluid overload   Other NRE: NONE   DID A NON-ROUTINE EVENT OCCUR? No           Last vitals:  Vitals Value Taken Time   /88 01/11/22 1030   Temp 97.8  F (36.6  C) 01/11/22 1025   Pulse 105 01/11/22 1030   Resp 16 01/11/22 1035   SpO2 94 % 01/11/22 1035       Electronically Signed By: MARTELL Saini CRNA  January 11, 2022  10:47 AM

## 2022-01-11 NOTE — OP NOTE
[facility]  Ophthalmology Full Operative Note    Pre-operative diagnosis: Combined form of age-related cataract, right eye [H25.811]   Post-operative diagnosis Same   Procedure: Procedure(s):  PHACOEMULSIFICATION CATARACT EXTRACTION POSTERIOR CHAMBER LENS RIGHT EYE   Surgeon: Phillip Maldonado MD   Assistants(s):    Anesthesia: Combined MAC with Topical    Estimated blood loss: None    Total IV fluids: (See anesthesia record)   Specimens: None   Implants: 18.5 LI61AO   Findings:    Complications: None   Condition: Stable   Comments:       PROCEDURAL ANESTHESIA:     Topical/MAC with IV sedation.  Lidocaine 2% jelly topically and Lidocaine 1% preservative-free intracamerally.     PROCEDURE:  The patient was brought to the Operating Room and prepped and draped in a sterile manner.  A wire lid speculum was placed.  A paracentesis was created and 1% Lidocaine was instilled in the anterior chamber.  Moderate dilation, later floppy iris (history of Flomax, finasteride use). The anterior chamber was then filled with Viscoat viscoelastic.  A clear cornea temporal wound was created using a 2.8 mm keratome.  A cystotome was used to initiate a flap in the anterior capsule and a Utrata forceps was used to create a continuous tear capsulorhexis.  Hydrodissection was performed.  The phacoemulsification tip was inserted into the eye and the nucleus and epinucleus were removed using a divide and conquer technique.  The residual cortex was removed using the I/A handpiece.  The anterior chamber was then refilled with viscoelastic and the wound was enlarged with the keratome.  The intraocular lens, 18.5 diopter, Model LI61AO, was placed into the injector and injected into the capsular bag. It was checked to make sure that it was central and stable.  Residual viscoelastic was removed using the I/A.  The anterior chamber was refilled with BSS.  The wounds were hydrated with BSS and were noted to be watertight with no suture  necessary.  Topical pilocarpine 1%, Betagan, and Vigamox was applied.  A hard shield was placed.     The patient tolerated the procedure well and was sent to the Recovery Room in satisfactory condition.

## 2022-01-11 NOTE — OR NURSING
Patient and responsible adult given discharge instructions with no questions regarding instructions. Mariano score 20. Pain level 0/10.  Discharged from unit via wheelchair and cane from home. Patient discharged to home accompanied by sister in law Grazyna. The patient verbalized how to take his eye drops according to the instruction sheet and to restart his anticoagulant tonight per instruction from Dr. Maldonado.

## 2022-01-11 NOTE — ANESTHESIA CARE TRANSFER NOTE
Patient: Anthony Dyer    Procedure: Procedure(s):  PHACOEMULSIFICATION CATARACT EXTRACTION POSTERIOR CHAMBER LENS RIGHT EYE       Diagnosis: Combined form of age-related cataract, right eye [H25.811]  Diagnosis Additional Information: No value filed.    Anesthesia Type:   MAC     Note:    Oropharynx: oropharynx clear of all foreign objects  Level of Consciousness: awake  Oxygen Supplementation: nasal cannula  Level of Supplemental Oxygen (L/min / FiO2): 2  Independent Airway: airway patency satisfactory and stable  Dentition: dentition unchanged  Vital Signs Stable: post-procedure vital signs reviewed and stable  Report to RN Given: handoff report given  Patient transferred to: Phase II    Handoff Report: Identifed the Patient, Identified the Reponsible Provider, Reviewed the pertinent medical history, Discussed the surgical course, Reviewed Intra-OP anesthesia mangement and issues during anesthesia, Set expectations for post-procedure period and Allowed opportunity for questions and acknowledgement of understanding      Vitals:  Vitals Value Taken Time   BP     Temp     Pulse     Resp     SpO2         Electronically Signed By: MARTELL Willis CRNA  January 11, 2022  10:12 AM

## 2022-01-13 DIAGNOSIS — E11.22 TYPE 2 DIABETES MELLITUS WITH STAGE 3A CHRONIC KIDNEY DISEASE, WITHOUT LONG-TERM CURRENT USE OF INSULIN (H): Primary | ICD-10-CM

## 2022-01-13 DIAGNOSIS — N18.31 TYPE 2 DIABETES MELLITUS WITH STAGE 3A CHRONIC KIDNEY DISEASE, WITHOUT LONG-TERM CURRENT USE OF INSULIN (H): Primary | ICD-10-CM

## 2022-01-13 RX ORDER — METFORMIN HCL 500 MG
1000 TABLET, EXTENDED RELEASE 24 HR ORAL 2 TIMES DAILY WITH MEALS
Qty: 60 TABLET | Refills: 0 | Status: SHIPPED | OUTPATIENT
Start: 2022-01-13 | End: 2022-07-07

## 2022-01-13 NOTE — TELEPHONE ENCOUNTER
Metformin  Last Written Prescription Date: Not prescribed here  Last Fill Quantity: ? # of Refills: ?  Last Office Visit: 12/17/21

## 2022-01-14 DIAGNOSIS — N18.31 TYPE 2 DIABETES MELLITUS WITH STAGE 3A CHRONIC KIDNEY DISEASE, WITHOUT LONG-TERM CURRENT USE OF INSULIN (H): Primary | ICD-10-CM

## 2022-01-14 DIAGNOSIS — E11.22 TYPE 2 DIABETES MELLITUS WITH STAGE 3A CHRONIC KIDNEY DISEASE, WITHOUT LONG-TERM CURRENT USE OF INSULIN (H): Primary | ICD-10-CM

## 2022-01-14 RX ORDER — SEMAGLUTIDE 1.34 MG/ML
0.5 INJECTION, SOLUTION SUBCUTANEOUS
Qty: 3 ML | Refills: 3 | Status: SHIPPED | OUTPATIENT
Start: 2022-01-14 | End: 2022-06-08

## 2022-01-14 NOTE — TELEPHONE ENCOUNTER
Semaglutide,0.25 or 0.5MG/DOS, (OZEMPIC, 0.25 OR 0.5 MG/DOSE,) 2 MG/1.5ML SOPN      Last Written Prescription Date:  12/18/20  Last Fill Quantity: ,   # refills:   Last Office Visit: 10/6/21  Future Office visit:    Next 5 appointments (look out 90 days)    Jan 18, 2022 11:30 AM  (Arrive by 11:15 AM)  Return Visit with Meenu Orourke DPM  [facility] [address] [place] [id number]  [phone]   Mar 15, 2022 10:30 AM  (Arrive by 10:15 AM)  Return Visit with Meenu Orourke DPM  [facility] ([facility] ) [address] [place] [id number]  [phone]           Routing refill request to provider for review/approval because:  Drug not on the G, P or [facility] refill protocol or controlled substance

## 2022-01-18 ENCOUNTER — OFFICE VISIT (OUTPATIENT)
Dept: PODIATRY | Facility: OTHER | Age: 82
End: 2022-01-18
Attending: PODIATRIST
Payer: MEDICARE

## 2022-01-18 VITALS
HEART RATE: 75 BPM | SYSTOLIC BLOOD PRESSURE: 135 MMHG | OXYGEN SATURATION: 94 % | TEMPERATURE: 96.6 F | DIASTOLIC BLOOD PRESSURE: 65 MMHG

## 2022-01-18 DIAGNOSIS — M21.969 LOSS OF PROTECTIVE SENSATION OF SKIN OF DEFORMED FOOT: ICD-10-CM

## 2022-01-18 DIAGNOSIS — L84 CALLUS OF FOOT: ICD-10-CM

## 2022-01-18 DIAGNOSIS — L60.3 ONYCHODYSTROPHY: ICD-10-CM

## 2022-01-18 DIAGNOSIS — R20.8 LOSS OF PROTECTIVE SENSATION OF SKIN OF DEFORMED FOOT: ICD-10-CM

## 2022-01-18 DIAGNOSIS — E11.9 DIABETES MELLITUS TYPE 2, NONINSULIN DEPENDENT (H): ICD-10-CM

## 2022-01-18 DIAGNOSIS — E11.42 DIABETIC POLYNEUROPATHY ASSOCIATED WITH TYPE 2 DIABETES MELLITUS (H): ICD-10-CM

## 2022-01-18 DIAGNOSIS — L85.3 XEROSIS OF SKIN: ICD-10-CM

## 2022-01-18 DIAGNOSIS — E11.621 DIABETIC ULCER OF TOE OF RIGHT FOOT ASSOCIATED WITH TYPE 2 DIABETES MELLITUS, LIMITED TO BREAKDOWN OF SKIN (H): Primary | ICD-10-CM

## 2022-01-18 DIAGNOSIS — L97.511 DIABETIC ULCER OF TOE OF RIGHT FOOT ASSOCIATED WITH TYPE 2 DIABETES MELLITUS, LIMITED TO BREAKDOWN OF SKIN (H): Primary | ICD-10-CM

## 2022-01-18 PROCEDURE — G0463 HOSPITAL OUTPT CLINIC VISIT: HCPCS | Mod: 25

## 2022-01-18 PROCEDURE — G0463 HOSPITAL OUTPT CLINIC VISIT: HCPCS

## 2022-01-18 PROCEDURE — 99212 OFFICE O/P EST SF 10 MIN: CPT | Performed by: PODIATRIST

## 2022-01-18 ASSESSMENT — PAIN SCALES - GENERAL: PAINLEVEL: NO PAIN (0)

## 2022-01-18 NOTE — NURSING NOTE
Chief Complaint   Patient presents with     Wound Check       Initial /65 (BP Location: Left arm, Patient Position: Sitting, Cuff Size: Adult Regular)   Pulse 75   Temp (!) 96.6  F (35.9  C) (Tympanic)   SpO2 94%  Estimated body mass index is 24.01 kg/m  as calculated from the following:    Height as of 1/11/22: 1.829 m (6').    Weight as of 1/11/22: 80.3 kg (177 lb).  Medication Reconciliation: vanna Leon

## 2022-02-02 ENCOUNTER — MEDICAL CORRESPONDENCE (OUTPATIENT)
Dept: HEALTH INFORMATION MANAGEMENT | Facility: CLINIC | Age: 82
End: 2022-02-02

## 2022-02-09 ENCOUNTER — TRANSFERRED RECORDS (OUTPATIENT)
Dept: HEALTH INFORMATION MANAGEMENT | Facility: HOSPITAL | Age: 82
End: 2022-02-09
Payer: MEDICARE

## 2022-02-09 LAB — RETINOPATHY: NEGATIVE

## 2022-02-16 ENCOUNTER — APPOINTMENT (OUTPATIENT)
Dept: CT IMAGING | Facility: HOSPITAL | Age: 82
End: 2022-02-16
Attending: PHYSICIAN ASSISTANT
Payer: MEDICARE

## 2022-02-16 ENCOUNTER — HOSPITAL ENCOUNTER (EMERGENCY)
Facility: HOSPITAL | Age: 82
Discharge: HOME | End: 2022-02-16
Attending: PHYSICIAN ASSISTANT | Admitting: PHYSICIAN ASSISTANT
Payer: MEDICARE

## 2022-02-16 VITALS
TEMPERATURE: 97.8 F | DIASTOLIC BLOOD PRESSURE: 118 MMHG | SYSTOLIC BLOOD PRESSURE: 147 MMHG | OXYGEN SATURATION: 95 % | HEART RATE: 111 BPM | RESPIRATION RATE: 14 BRPM

## 2022-02-16 DIAGNOSIS — E16.2 HYPOGLYCEMIA: ICD-10-CM

## 2022-02-16 LAB
ANION GAP SERPL CALCULATED.3IONS-SCNC: 9 MMOL/L (ref 3–14)
BASOPHILS # BLD AUTO: 0 10E3/UL (ref 0–0.2)
BASOPHILS NFR BLD AUTO: 0 %
BUN SERPL-MCNC: 21 MG/DL (ref 7–30)
CALCIUM SERPL-MCNC: 9.2 MG/DL (ref 8.5–10.1)
CHLORIDE BLD-SCNC: 107 MMOL/L (ref 94–109)
CO2 SERPL-SCNC: 24 MMOL/L (ref 20–32)
CREAT SERPL-MCNC: 1.18 MG/DL (ref 0.66–1.25)
EOSINOPHIL # BLD AUTO: 0.1 10E3/UL (ref 0–0.7)
EOSINOPHIL NFR BLD AUTO: 1 %
ERYTHROCYTE [DISTWIDTH] IN BLOOD BY AUTOMATED COUNT: 12.6 % (ref 10–15)
ETHANOL SERPL-MCNC: <0.01 G/DL
GFR SERPL CREATININE-BSD FRML MDRD: 62 ML/MIN/1.73M2
GLUCOSE BLD-MCNC: 174 MG/DL (ref 70–99)
GLUCOSE BLDC GLUCOMTR-MCNC: 147 MG/DL (ref 70–99)
HCT VFR BLD AUTO: 44.1 % (ref 40–53)
HGB BLD-MCNC: 14.7 G/DL (ref 13.3–17.7)
HOLD SPECIMEN: NORMAL
IMM GRANULOCYTES # BLD: 0 10E3/UL
IMM GRANULOCYTES NFR BLD: 0 %
LYMPHOCYTES # BLD AUTO: 1.2 10E3/UL (ref 0.8–5.3)
LYMPHOCYTES NFR BLD AUTO: 12 %
MCH RBC QN AUTO: 32 PG (ref 26.5–33)
MCHC RBC AUTO-ENTMCNC: 33.3 G/DL (ref 31.5–36.5)
MCV RBC AUTO: 96 FL (ref 78–100)
MONOCYTES # BLD AUTO: 0.7 10E3/UL (ref 0–1.3)
MONOCYTES NFR BLD AUTO: 7 %
NEUTROPHILS # BLD AUTO: 8 10E3/UL (ref 1.6–8.3)
NEUTROPHILS NFR BLD AUTO: 80 %
NRBC # BLD AUTO: 0 10E3/UL
NRBC BLD AUTO-RTO: 0 /100
NT-PROBNP SERPL-MCNC: 885 PG/ML (ref 0–1800)
PLATELET # BLD AUTO: 191 10E3/UL (ref 150–450)
POTASSIUM BLD-SCNC: 4.1 MMOL/L (ref 3.4–5.3)
RBC # BLD AUTO: 4.59 10E6/UL (ref 4.4–5.9)
SODIUM SERPL-SCNC: 140 MMOL/L (ref 133–144)
TROPONIN I SERPL HS-MCNC: 8 NG/L
WBC # BLD AUTO: 10 10E3/UL (ref 4–11)

## 2022-02-16 PROCEDURE — 93010 ELECTROCARDIOGRAM REPORT: CPT | Performed by: INTERNAL MEDICINE

## 2022-02-16 PROCEDURE — 36415 COLL VENOUS BLD VENIPUNCTURE: CPT | Performed by: PHYSICIAN ASSISTANT

## 2022-02-16 PROCEDURE — 84484 ASSAY OF TROPONIN QUANT: CPT | Performed by: PHYSICIAN ASSISTANT

## 2022-02-16 PROCEDURE — 82077 ASSAY SPEC XCP UR&BREATH IA: CPT | Performed by: PHYSICIAN ASSISTANT

## 2022-02-16 PROCEDURE — 83880 ASSAY OF NATRIURETIC PEPTIDE: CPT | Mod: GZ | Performed by: PHYSICIAN ASSISTANT

## 2022-02-16 PROCEDURE — 99284 EMERGENCY DEPT VISIT MOD MDM: CPT | Performed by: PHYSICIAN ASSISTANT

## 2022-02-16 PROCEDURE — 99285 EMERGENCY DEPT VISIT HI MDM: CPT | Mod: 25

## 2022-02-16 PROCEDURE — 93005 ELECTROCARDIOGRAM TRACING: CPT

## 2022-02-16 PROCEDURE — 82310 ASSAY OF CALCIUM: CPT | Performed by: PHYSICIAN ASSISTANT

## 2022-02-16 PROCEDURE — 85025 COMPLETE CBC W/AUTO DIFF WBC: CPT | Performed by: PHYSICIAN ASSISTANT

## 2022-02-16 PROCEDURE — G1004 CDSM NDSC: HCPCS

## 2022-02-16 NOTE — ED PROVIDER NOTES
History     Chief Complaint   Patient presents with     Hypoglycemia     HPI  Anthony Dyer is a 81 year old male who arrives to the emergency department via EMS for hypoglycemia.  She has a past medical history of hypertension, diabetes which is controlled with Metformin as well as Ozempic and glimepiride, patient stated he did not take any extra medication today.  Also has a history of clotting for which he takes Xarelto for daily.  Looking at his current medications he is also on allopurinol also looks like he has gout hypertension as well as high cholesterol he is also on gabapentin likely for diabetic neuropathy.  Patient was found confused walking around his yard neighbors were concerned due to Supref[employer] that he has had a stroke in the past he was found to be glycemic and was given oral glucose his sugar was still in the 50s so they transported him here.  Patient had recently been at a bar and had 2 shots of whiskey along with 2 beers.  He did state he ate breakfast and had lunch as well.  He denies any falls.  He denies any chills or sweats, any headaches, any blurry vision or double vision any sore throat any chest pain or palpitations any shortness of breath or cough he has no abdominal pain no nausea no vomiting however he did have urinary incontinence in the ambulance.  Allergies:  Allergies   Allergen Reactions     Ace Inhibitors      Per [facility]: hyperkalemia.  Pt doesn't know if he is allergic     Ceclor [Cefaclor] Hives     Sulfa Drugs      Pt unsure.        Problem List:    Patient Active Problem List    Diagnosis Date Noted     Nursing Home Visit 05/21/2021     Priority: Medium     Benign prostatic hyperplasia with urinary retention 04/29/2021     Priority: Medium     Tachycardia 04/26/2021     Priority: Medium     Hypoxia 04/26/2021     Priority: Medium     Other acute pulmonary embolism with acute cor pulmonale (H) 04/26/2021     Priority: Medium     Dyspnea, unspecified type 04/26/2021      Priority: Medium     Urinary retention 04/19/2021     Priority: Medium     Generalized weakness 04/19/2021     Priority: Medium     Onychomycosis of left great toe 02/09/2021     Priority: Medium     Pincer nail deformity 02/09/2021     Priority: Medium     Chronic painful diabetic neuropathy (H) 10/24/2018     Priority: Medium     Moderate episode of recurrent major depressive disorder (H) 10/24/2018     Priority: Medium     Formatting of this note might be different from the original.  Patient does not want anti-depressant medication       Chronic obstructive lung disease (H) 11/22/2017     Priority: Medium     Formatting of this note might be different from the original.  Mild       Mild concentric left ventricular hypertrophy (LVH) 11/22/2017     Priority: Medium     Uncomplicated alcohol dependence (H) 09/28/2016     Priority: Medium     IBS (irritable bowel syndrome) 12/23/2015     Priority: Medium     Spinal stenosis of lumbar region without neurogenic claudication 12/23/2015     Priority: Medium     Erectile dysfunction 05/28/2014     Priority: Medium     Chronic kidney disease, stage III (moderate) (H) 05/22/2013     Priority: Medium     Formatting of this note might be different from the original.  Since 2011       History of CVA (cerebrovascular accident) 02/03/2011     Priority: Medium     Formatting of this note might be different from the original.  Jan 2011  IMPRESSION: Interval development of a diffusion abnormality consistent with acute to early subacute infarct of less than 3 days of age in the left thalamus and the medial left cerebral peduncle. No associated abnormal enhancement or hydrocephalus.       Hyperlipidemia 02/03/2011     Priority: Medium     Essential hypertension 11/28/2006     Priority: Medium     Type 2 diabetes mellitus with stage 3 chronic kidney disease, without long-term current use of insulin (H) 02/16/2001     Priority: Medium     Gout, unspecified 02/16/2001     Priority:  Medium     Formatting of this note might be different from the original.  On Allopurinol       Lumbar spondylosis 02/22/1995     Priority: Medium     Formatting of this note might be different from the original.  S/P RFN L2-L5 facets          Past Medical History:    Past Medical History:   Diagnosis Date     Diabetes (H)      Hypertension        Past Surgical History:    Past Surgical History:   Procedure Laterality Date     CYSTOSCOPY, TRANSURETHRAL RESECTION (TUR) PROSTATE, COMBINED N/A 10/15/2021    Procedure: CYSTOSCOPY, WITH TRANSURETHRAL RESECTION PROSTATE;  Surgeon: Indu De Leon MD;  Location: [facility] OR     HERNIA REPAIR       PHACOEMULSIFICATION WITH STANDARD INTRAOCULAR LENS IMPLANT Left 12/27/2021    Procedure: PHACOEMULSIFICATION CATARACT EXTRACTION POSTERIOR CHAMBER LENS LEFT EYE;  Surgeon: Phillip Maldonado MD;  Location: [facility] OR     PHACOEMULSIFICATION WITH STANDARD INTRAOCULAR LENS IMPLANT Right 1/11/2022    Procedure: PHACOEMULSIFICATION CATARACT EXTRACTION POSTERIOR CHAMBER LENS RIGHT EYE;  Surgeon: Phillip Maldonado MD;  Location: [facility] OR       Family History:    History reviewed. No pertinent family history.    Social History:  Marital Status:  Single [1]  Social History     Tobacco Use     Smoking status: Never Smoker     Smokeless tobacco: Former User     Types: Chew   Substance Use Topics     Alcohol use: Yes     Comment: daily, 4 today     Drug use: Never        Medications:    acetaminophen (TYLENOL) 325 MG tablet  albuterol (PROAIR HFA/PROVENTIL HFA/VENTOLIN HFA) 108 (90 Base) MCG/ACT inhaler  allopurinol (ZYLOPRIM) 100 MG tablet  amLODIPine (NORVASC) 2.5 MG tablet  atorvastatin (LIPITOR) 80 MG tablet  carvedilol (COREG) 25 MG tablet  finasteride (PROSCAR) 5 MG tablet  gabapentin (NEURONTIN) 300 MG capsule  glimepiride (AMARYL) 2 MG tablet  ketorolac (ACULAR) 0.5 % ophthalmic solution  metFORMIN (GLUCOPHAGE-XR) 500 MG 24 hr tablet  moxifloxacin (VIGAMOX) 0.5 % ophthalmic solution  multivitamin,  therapeutic (THERA-VIT) TABS tablet  nitroFURantoin macrocrystal-monohydrate (MACROBID) 100 MG capsule  prednisoLONE acetate (PRED FORTE) 1 % ophthalmic suspension  rivaroxaban ANTICOAGULANT (XARELTO) 20 MG TABS tablet  semaglutide (OZEMPIC, 0.25 OR 0.5 MG/DOSE,) 2 MG/1.5ML SOPN pen  tamsulosin (FLOMAX) 0.4 MG capsule  vitamin B-12 (CYANOCOBALAMIN) 250 MCG tablet          Review of Systems ROS is listed in HPI    Physical Exam   BP: 146/100  Pulse: 96  Temp: 97.8  F (36.6  C)  Resp: 16  SpO2: 96 %      Physical Exam   Vitals blood pressure 146/100 pulse of 96, respirations of 16 is 96% on room air  General: alert, oriented to person, place, time  Head: atraumatic  Eyes: PERRL, corneas clear and conjunctivae clear  Ears: pearly grey bilateral TMs and non-inflamed external ear canals  Nose: no rhinorrhea/nasal discharge  Mouth/Throat: no exudates, no erythema, no tonsillar enlargement, structures midline and no hoarseness  Neck: no tenderness, supple and no adenopathy  Chest/Pulmonary: chest clear with equal lung sounds bilaterally, no chest wall tenderness or deformities, no tachypnea and no cyanosis  Cardiovascular: S1, S2 normal, regular rate and rhythm, no murmur and no pedal edema  Abdomen: soft, non-tender, no guarding, no rebound tenderness  Musculoskeletal/Extremities: normal extremities, no edema, erythema, tenderness and 5/5 strength in all 4 extremities  Skin: no diaphoresis and skin color normal  Neuro: speech clear and gait stable cranial nerves II through XII are grossly intact negative Deridder stroke scale can plantarflex and dorsiflex against resistance there is no clonus  Psychiatric: affect/mood normal, cooperative, normal judgement/insight and memory intact    ED Course        MDM: Differential diagnosis includes but is not limited to: Hyper glycemia, systemic illness, medication misuse, electrolyte abnormality which includes hyponatremia and alcohol intoxication    At this point will obtain an  EKG CBC BMP as well as ethanol      ED Course as of 02/16/22 1901   Wed Feb 16, 2022   1749 Basic metabolic panel(!)  BMP is reassuring his glucose is now 174.   1749 Alcohol ethyl  Ethanol is negative.   1749 CBC with platelets differential  CBC is reassuring.   1749 EKG shows atrial fibrillation with PVCs and/or aberrancy patient is already anticoagulated at this time his rate is 90 will obtain a troponin, patient also seems still slightly confused will do CT of his head as well we will also pull a BNP.  He previously was not in atrial fibrillation however he is on Xarelto so it is reassuring that he is anticoagulated at this time.   1817 Troponin I  Troponin is reassuring   1818 Nt probnp inpatient (BNP)  BNP is reassuring.   1859 CT Head w/o Contrast  IMPRESSION: No acute changes. No evidence of intracranial hemorrhage  or mass.   1859 Would like to leave at this time will recommendation to follow-up with primary care provider.  Recommendations continue stay well fed and hydrated.     EKG shows atrial fibrillation with PVCs these are unifocal in nature.  His troponin was reassuring there is no ST elevation or depression patient is previously been in atrial fibrillation is on anticoagulation with Xarelto.         Results for orders placed or performed during the hospital encounter of 02/16/22 (from the past 24 hour(s))   Glucose by meter   Result Value Ref Range    GLUCOSE BY METER POCT 147 (H) 70 - 99 mg/dL   Alcohol ethyl   Result Value Ref Range    Alcohol ethyl <0.01 <=0.01 g/dL   CBC with platelets differential    Narrative    The following orders were created for panel order CBC with platelets differential.  Procedure                               Abnormality         Status                     ---------                               -----------         ------                     CBC with platelets and d...[[id number]]                      Final result                 Please view results for these tests on  the individual orders.   Basic metabolic panel   Result Value Ref Range    Sodium 140 133 - 144 mmol/L    Potassium 4.1 3.4 - 5.3 mmol/L    Chloride 107 94 - 109 mmol/L    Carbon Dioxide (CO2) 24 20 - 32 mmol/L    Anion Gap 9 3 - 14 mmol/L    Urea Nitrogen 21 7 - 30 mg/dL    Creatinine 1.18 0.66 - 1.25 mg/dL    Calcium 9.2 8.5 - 10.1 mg/dL    Glucose 174 (H) 70 - 99 mg/dL    GFR Estimate 62 >60 mL/min/1.73m2   CBC with platelets and differential   Result Value Ref Range    WBC Count 10.0 4.0 - 11.0 10e3/uL    RBC Count 4.59 4.40 - 5.90 10e6/uL    Hemoglobin 14.7 13.3 - 17.7 g/dL    Hematocrit 44.1 40.0 - 53.0 %    MCV 96 78 - 100 fL    MCH 32.0 26.5 - 33.0 pg    MCHC 33.3 31.5 - 36.5 g/dL    RDW 12.6 10.0 - 15.0 %    Platelet Count 191 150 - 450 10e3/uL    % Neutrophils 80 %    % Lymphocytes 12 %    % Monocytes 7 %    % Eosinophils 1 %    % Basophils 0 %    % Immature Granulocytes 0 %    NRBCs per 100 WBC 0 <1 /100    Absolute Neutrophils 8.0 1.6 - 8.3 10e3/uL    Absolute Lymphocytes 1.2 0.8 - 5.3 10e3/uL    Absolute Monocytes 0.7 0.0 - 1.3 10e3/uL    Absolute Eosinophils 0.1 0.0 - 0.7 10e3/uL    Absolute Basophils 0.0 0.0 - 0.2 10e3/uL    Absolute Immature Granulocytes 0.0 <=0.4 10e3/uL    Absolute NRBCs 0.0 10e3/uL   Extra Tube    Narrative    The following orders were created for panel order Extra Tube.  Procedure                               Abnormality         Status                     ---------                               -----------         ------                     Extra Blue Top Tube[[id number]]                              Final result               Extra Red Top Tube[[id number]]                               Final result               Extra Green Top (Lithium...[[id number]]                      Final result                 Please view results for these tests on the individual orders.   Extra Blue Top Tube   Result Value Ref Range    Hold Specimen     Extra Red Top Tube   Result Value Ref Range    Hold  Specimen     Extra Green Top (Lithium Heparin) ON ICE   Result Value Ref Range    Hold Specimen     Troponin I   Result Value Ref Range    Troponin I High Sensitivity 8 <79 ng/L   Nt probnp inpatient (BNP)   Result Value Ref Range    N terminal Pro BNP Inpatient 885 0-1,800 pg/mL   CT Head w/o Contrast    Narrative    Exam: CT HEAD W/O CONTRAST    Clinical history:81 years Male Dizziness, confusion, non-specific   prior CVA    Comparisons:None    Technique: Axial CT imaging of the head was performed Without  intervenous contrast.    FINDINGS:   Ventricles and sulci are symmetric. The gray-white matter  differentiation throughout the brain is well maintained. There is  advanced periventricular white matter change of chronic small vessel  ischemic disease. There is no evidence of intracranial mass or  hemorrhage. Visualized portions of the paranasal sinuses and mastoid  air cells are well aerated. There is no evidence of skull fracture.      Impression    IMPRESSION: No acute changes. No evidence of intracranial hemorrhage  or mass.    ROMARIO BARRERA MD         SYSTEM ID:  [id number]       Medications - No data to display    Assessments & Plan (with Medical Decision Making)     I have reviewed the nursing notes.    I have reviewed the findings, diagnosis, plan and need for follow up with the patient.      New Prescriptions    No medications on file       Final diagnoses:   Hypoglycemia       2/16/2022   [facility]     Buzz Renner PA-C  02/16/22 1908

## 2022-02-16 NOTE — ED TRIAGE NOTES
Patient brought in via EMS as his neighbor found him outside without any shoes on. Patient had been at the bar and had 4 drinks, per EMS he also had a Blood sugar of 5. They did some things and state that the sugars weren't really coming up well so told him he should be evaluated as he didn't want to come in.

## 2022-02-17 ENCOUNTER — MYC MEDICAL ADVICE (OUTPATIENT)
Dept: FAMILY MEDICINE | Facility: OTHER | Age: 82
End: 2022-02-17
Payer: MEDICARE

## 2022-02-17 DIAGNOSIS — E11.22 TYPE 2 DIABETES MELLITUS WITH STAGE 3A CHRONIC KIDNEY DISEASE, WITHOUT LONG-TERM CURRENT USE OF INSULIN (H): Primary | ICD-10-CM

## 2022-02-17 DIAGNOSIS — N18.31 TYPE 2 DIABETES MELLITUS WITH STAGE 3A CHRONIC KIDNEY DISEASE, WITHOUT LONG-TERM CURRENT USE OF INSULIN (H): Primary | ICD-10-CM

## 2022-02-17 NOTE — DISCHARGE INSTRUCTIONS
You were seen in the emergency department for low blood sugar.  Additional lab as well as a CT was obtained which showed no abnormalities.  It is recommended that you continue to eat normal and stay well-hydrated and decrease your alcohol intake.  If you start having chest pain or shortness of breath or changes in cognition please return to emergency department.

## 2022-02-18 NOTE — TELEPHONE ENCOUNTER
Patient called to schedule appointment with Dr. Jones as per note below, no openings.     Please reach out to patient for lab appointment and appt with Dr. Jones to follow.       Patient can be reached at [phone]

## 2022-02-18 NOTE — TELEPHONE ENCOUNTER
Patient of Dr Orourke's.  I am listed as PCP but he has not had an establish care appointment.  Did see him for preop though.    However, yes - he is overdue for labs.  A1c, lipids, tsh, and urine microalb ordered.    Can schedule fasting lab only appointment and a routine follow up with me after please.

## 2022-02-21 ENCOUNTER — TELEPHONE (OUTPATIENT)
Dept: FAMILY MEDICINE | Facility: OTHER | Age: 82
End: 2022-02-21
Payer: MEDICARE

## 2022-02-21 NOTE — TELEPHONE ENCOUNTER
Left message for patient to return call . Please help him set up a future lab appointment and future office visit .

## 2022-02-26 ENCOUNTER — HEALTH MAINTENANCE LETTER (OUTPATIENT)
Age: 82
End: 2022-02-26

## 2022-03-04 ENCOUNTER — LAB (OUTPATIENT)
Dept: LAB | Facility: OTHER | Age: 82
End: 2022-03-04
Payer: MEDICARE

## 2022-03-04 DIAGNOSIS — E11.22 TYPE 2 DIABETES MELLITUS WITH STAGE 3A CHRONIC KIDNEY DISEASE, WITHOUT LONG-TERM CURRENT USE OF INSULIN (H): ICD-10-CM

## 2022-03-04 DIAGNOSIS — N18.31 TYPE 2 DIABETES MELLITUS WITH STAGE 3A CHRONIC KIDNEY DISEASE, WITHOUT LONG-TERM CURRENT USE OF INSULIN (H): ICD-10-CM

## 2022-03-04 DIAGNOSIS — R33.8 BENIGN PROSTATIC HYPERPLASIA WITH URINARY RETENTION: ICD-10-CM

## 2022-03-04 DIAGNOSIS — N40.1 BENIGN PROSTATIC HYPERPLASIA WITH URINARY RETENTION: ICD-10-CM

## 2022-03-04 LAB
ALBUMIN UR-MCNC: NEGATIVE MG/DL
APPEARANCE UR: ABNORMAL
BACTERIA #/AREA URNS HPF: ABNORMAL /HPF
BILIRUB UR QL STRIP: NEGATIVE
CHOLEST SERPL-MCNC: 136 MG/DL
COLOR UR AUTO: ABNORMAL
CREAT UR-MCNC: 52 MG/DL
EST. AVERAGE GLUCOSE BLD GHB EST-MCNC: 114 MG/DL
FASTING STATUS PATIENT QL REPORTED: YES
GLUCOSE UR STRIP-MCNC: 70 MG/DL
HBA1C MFR BLD: 5.6 % (ref 0–5.6)
HDLC SERPL-MCNC: 62 MG/DL
HGB UR QL STRIP: ABNORMAL
KETONES UR STRIP-MCNC: NEGATIVE MG/DL
LDLC SERPL CALC-MCNC: 55 MG/DL
LEUKOCYTE ESTERASE UR QL STRIP: ABNORMAL
MICROALBUMIN UR-MCNC: 41 MG/L
MICROALBUMIN/CREAT UR: 78.85 MG/G CR (ref 0–17)
MUCOUS THREADS #/AREA URNS LPF: PRESENT /LPF
NITRATE UR QL: NEGATIVE
NONHDLC SERPL-MCNC: 74 MG/DL
PH UR STRIP: 5.5 [PH] (ref 4.7–8)
RBC URINE: 44 /HPF
SP GR UR STRIP: 1.01 (ref 1–1.03)
SQUAMOUS EPITHELIAL: 0 /HPF
TRIGL SERPL-MCNC: 93 MG/DL
TSH SERPL DL<=0.005 MIU/L-ACNC: 1.35 MU/L (ref 0.4–4)
UROBILINOGEN UR STRIP-MCNC: NORMAL MG/DL
WBC CLUMPS #/AREA URNS HPF: PRESENT /HPF
WBC URINE: >182 /HPF

## 2022-03-04 PROCEDURE — 36415 COLL VENOUS BLD VENIPUNCTURE: CPT | Mod: ZL

## 2022-03-04 PROCEDURE — 82043 UR ALBUMIN QUANTITATIVE: CPT | Mod: ZL

## 2022-03-04 PROCEDURE — 83036 HEMOGLOBIN GLYCOSYLATED A1C: CPT | Mod: ZL

## 2022-03-04 PROCEDURE — 80061 LIPID PANEL: CPT | Mod: ZL

## 2022-03-04 PROCEDURE — 81001 URINALYSIS AUTO W/SCOPE: CPT | Mod: ZL

## 2022-03-04 PROCEDURE — 84443 ASSAY THYROID STIM HORMONE: CPT | Mod: ZL

## 2022-03-04 PROCEDURE — 87086 URINE CULTURE/COLONY COUNT: CPT | Mod: ZL

## 2022-03-06 LAB — BACTERIA UR CULT: ABNORMAL

## 2022-03-07 ENCOUNTER — HOSPITAL ENCOUNTER (INPATIENT)
Facility: HOSPITAL | Age: 82
LOS: 2 days | Discharge: HOME | DRG: 872 | End: 2022-03-10
Attending: INTERNAL MEDICINE | Admitting: INTERNAL MEDICINE
Payer: MEDICARE

## 2022-03-07 DIAGNOSIS — N39.0 COMPLICATED UTI (URINARY TRACT INFECTION): ICD-10-CM

## 2022-03-07 PROCEDURE — 99285 EMERGENCY DEPT VISIT HI MDM: CPT | Mod: 25

## 2022-03-07 PROCEDURE — 99285 EMERGENCY DEPT VISIT HI MDM: CPT | Performed by: INTERNAL MEDICINE

## 2022-03-08 ENCOUNTER — APPOINTMENT (OUTPATIENT)
Dept: OCCUPATIONAL THERAPY | Facility: HOSPITAL | Age: 82
DRG: 872 | End: 2022-03-08
Attending: INTERNAL MEDICINE
Payer: MEDICARE

## 2022-03-08 ENCOUNTER — APPOINTMENT (OUTPATIENT)
Dept: GENERAL RADIOLOGY | Facility: HOSPITAL | Age: 82
DRG: 872 | End: 2022-03-08
Attending: INTERNAL MEDICINE
Payer: MEDICARE

## 2022-03-08 ENCOUNTER — APPOINTMENT (OUTPATIENT)
Dept: PHYSICAL THERAPY | Facility: HOSPITAL | Age: 82
DRG: 872 | End: 2022-03-08
Attending: INTERNAL MEDICINE
Payer: MEDICARE

## 2022-03-08 PROBLEM — A41.9 SEPSIS WITH ACUTE RENAL FAILURE (H): Status: ACTIVE | Noted: 2022-03-08

## 2022-03-08 PROBLEM — N39.0 COMPLICATED UTI (URINARY TRACT INFECTION): Status: ACTIVE | Noted: 2022-03-08

## 2022-03-08 PROBLEM — R65.20 SEPSIS WITH ACUTE RENAL FAILURE (H): Status: ACTIVE | Noted: 2022-03-08

## 2022-03-08 PROBLEM — N17.9 SEPSIS WITH ACUTE RENAL FAILURE (H): Status: ACTIVE | Noted: 2022-03-08

## 2022-03-08 PROBLEM — J44.9 CHRONIC OBSTRUCTIVE LUNG DISEASE (H): Status: ACTIVE | Noted: 2017-11-22

## 2022-03-08 LAB
ALBUMIN SERPL-MCNC: 2.7 G/DL (ref 3.4–5)
ALBUMIN SERPL-MCNC: 3.5 G/DL (ref 3.4–5)
ALBUMIN UR-MCNC: 50 MG/DL
ALP SERPL-CCNC: 59 U/L (ref 40–150)
ALP SERPL-CCNC: 74 U/L (ref 40–150)
ALT SERPL W P-5'-P-CCNC: 21 U/L (ref 0–70)
ALT SERPL W P-5'-P-CCNC: 23 U/L (ref 0–70)
ANION GAP SERPL CALCULATED.3IONS-SCNC: 4 MMOL/L (ref 3–14)
ANION GAP SERPL CALCULATED.3IONS-SCNC: 8 MMOL/L (ref 3–14)
APPEARANCE UR: ABNORMAL
AST SERPL W P-5'-P-CCNC: 18 U/L (ref 0–45)
AST SERPL W P-5'-P-CCNC: 24 U/L (ref 0–45)
BACTERIA #/AREA URNS HPF: ABNORMAL /HPF
BASOPHILS # BLD AUTO: 0 10E3/UL (ref 0–0.2)
BASOPHILS NFR BLD AUTO: 0 %
BILIRUB SERPL-MCNC: 0.9 MG/DL (ref 0.2–1.3)
BILIRUB SERPL-MCNC: 0.9 MG/DL (ref 0.2–1.3)
BILIRUB UR QL STRIP: NEGATIVE
BUN SERPL-MCNC: 24 MG/DL (ref 7–30)
BUN SERPL-MCNC: 25 MG/DL (ref 7–30)
CALCIUM SERPL-MCNC: 8 MG/DL (ref 8.5–10.1)
CALCIUM SERPL-MCNC: 9 MG/DL (ref 8.5–10.1)
CHLORIDE BLD-SCNC: 109 MMOL/L (ref 94–109)
CHLORIDE BLD-SCNC: 109 MMOL/L (ref 94–109)
CK SERPL-CCNC: 134 U/L (ref 30–300)
CO2 SERPL-SCNC: 23 MMOL/L (ref 20–32)
CO2 SERPL-SCNC: 25 MMOL/L (ref 20–32)
COLOR UR AUTO: YELLOW
CREAT SERPL-MCNC: 1.4 MG/DL (ref 0.66–1.25)
CREAT SERPL-MCNC: 1.52 MG/DL (ref 0.66–1.25)
CRP SERPL-MCNC: 130 MG/L (ref 0–8)
EOSINOPHIL # BLD AUTO: 0 10E3/UL (ref 0–0.7)
EOSINOPHIL NFR BLD AUTO: 0 %
ERYTHROCYTE [DISTWIDTH] IN BLOOD BY AUTOMATED COUNT: 12.9 % (ref 10–15)
GFR SERPL CREATININE-BSD FRML MDRD: 46 ML/MIN/1.73M2
GFR SERPL CREATININE-BSD FRML MDRD: 51 ML/MIN/1.73M2
GLUCOSE BLD-MCNC: 131 MG/DL (ref 70–99)
GLUCOSE BLD-MCNC: 201 MG/DL (ref 70–99)
GLUCOSE BLDC GLUCOMTR-MCNC: 126 MG/DL (ref 70–99)
GLUCOSE BLDC GLUCOMTR-MCNC: 131 MG/DL (ref 70–99)
GLUCOSE BLDC GLUCOMTR-MCNC: 131 MG/DL (ref 70–99)
GLUCOSE BLDC GLUCOMTR-MCNC: 144 MG/DL (ref 70–99)
GLUCOSE BLDC GLUCOMTR-MCNC: 82 MG/DL (ref 70–99)
GLUCOSE UR STRIP-MCNC: NEGATIVE MG/DL
HCT VFR BLD AUTO: 41.4 % (ref 40–53)
HGB BLD-MCNC: 14.1 G/DL (ref 13.3–17.7)
HGB UR QL STRIP: ABNORMAL
HYALINE CASTS: 3 /LPF
IMM GRANULOCYTES # BLD: 0.1 10E3/UL
IMM GRANULOCYTES NFR BLD: 1 %
KETONES UR STRIP-MCNC: NEGATIVE MG/DL
LACTATE SERPL-SCNC: 1.7 MMOL/L (ref 0.7–2)
LACTATE SERPL-SCNC: 3.2 MMOL/L (ref 0.7–2)
LEUKOCYTE ESTERASE UR QL STRIP: ABNORMAL
LYMPHOCYTES # BLD AUTO: 0.2 10E3/UL (ref 0.8–5.3)
LYMPHOCYTES NFR BLD AUTO: 1 %
MCH RBC QN AUTO: 31.7 PG (ref 26.5–33)
MCHC RBC AUTO-ENTMCNC: 34.1 G/DL (ref 31.5–36.5)
MCV RBC AUTO: 93 FL (ref 78–100)
MONOCYTES # BLD AUTO: 0.5 10E3/UL (ref 0–1.3)
MONOCYTES NFR BLD AUTO: 3 %
MUCOUS THREADS #/AREA URNS LPF: PRESENT /LPF
NEUTROPHILS # BLD AUTO: 19.1 10E3/UL (ref 1.6–8.3)
NEUTROPHILS NFR BLD AUTO: 95 %
NITRATE UR QL: NEGATIVE
NRBC # BLD AUTO: 0 10E3/UL
NRBC BLD AUTO-RTO: 0 /100
PH UR STRIP: 5.5 [PH] (ref 4.7–8)
PLATELET # BLD AUTO: 156 10E3/UL (ref 150–450)
POTASSIUM BLD-SCNC: 4.1 MMOL/L (ref 3.4–5.3)
POTASSIUM BLD-SCNC: 4.3 MMOL/L (ref 3.4–5.3)
PROCALCITONIN SERPL-MCNC: 26.96 NG/ML
PROT SERPL-MCNC: 5.8 G/DL (ref 6.8–8.8)
PROT SERPL-MCNC: 7 G/DL (ref 6.8–8.8)
RBC # BLD AUTO: 4.45 10E6/UL (ref 4.4–5.9)
RBC URINE: >182 /HPF
SARS-COV-2 RNA RESP QL NAA+PROBE: NEGATIVE
SODIUM SERPL-SCNC: 138 MMOL/L (ref 133–144)
SODIUM SERPL-SCNC: 140 MMOL/L (ref 133–144)
SP GR UR STRIP: 1.02 (ref 1–1.03)
SQUAMOUS EPITHELIAL: 0 /HPF
UROBILINOGEN UR STRIP-MCNC: NORMAL MG/DL
WBC # BLD AUTO: 19.9 10E3/UL (ref 4–11)
WBC CLUMPS #/AREA URNS HPF: PRESENT /HPF
WBC URINE: 179 /HPF

## 2022-03-08 PROCEDURE — 250N000011 HC RX IP 250 OP 636

## 2022-03-08 PROCEDURE — 96365 THER/PROPH/DIAG IV INF INIT: CPT

## 2022-03-08 PROCEDURE — 250N000011 HC RX IP 250 OP 636: Performed by: INTERNAL MEDICINE

## 2022-03-08 PROCEDURE — 81001 URINALYSIS AUTO W/SCOPE: CPT | Performed by: INTERNAL MEDICINE

## 2022-03-08 PROCEDURE — 80053 COMPREHEN METABOLIC PANEL: CPT | Performed by: INTERNAL MEDICINE

## 2022-03-08 PROCEDURE — 258N000003 HC RX IP 258 OP 636: Performed by: INTERNAL MEDICINE

## 2022-03-08 PROCEDURE — 87040 BLOOD CULTURE FOR BACTERIA: CPT | Performed by: INTERNAL MEDICINE

## 2022-03-08 PROCEDURE — 96361 HYDRATE IV INFUSION ADD-ON: CPT

## 2022-03-08 PROCEDURE — 97530 THERAPEUTIC ACTIVITIES: CPT | Mod: GP

## 2022-03-08 PROCEDURE — 97530 THERAPEUTIC ACTIVITIES: CPT | Mod: GO

## 2022-03-08 PROCEDURE — 71045 X-RAY EXAM CHEST 1 VIEW: CPT

## 2022-03-08 PROCEDURE — 97161 PT EVAL LOW COMPLEX 20 MIN: CPT | Mod: GP

## 2022-03-08 PROCEDURE — 96367 TX/PROPH/DG ADDL SEQ IV INF: CPT

## 2022-03-08 PROCEDURE — 82550 ASSAY OF CK (CPK): CPT | Performed by: INTERNAL MEDICINE

## 2022-03-08 PROCEDURE — 99223 1ST HOSP IP/OBS HIGH 75: CPT | Mod: AI | Performed by: INTERNAL MEDICINE

## 2022-03-08 PROCEDURE — 120N000001 HC R&B MED SURG/OB

## 2022-03-08 PROCEDURE — 84145 PROCALCITONIN (PCT): CPT | Performed by: INTERNAL MEDICINE

## 2022-03-08 PROCEDURE — 84450 TRANSFERASE (AST) (SGOT): CPT | Performed by: INTERNAL MEDICINE

## 2022-03-08 PROCEDURE — 86140 C-REACTIVE PROTEIN: CPT | Performed by: INTERNAL MEDICINE

## 2022-03-08 PROCEDURE — 87086 URINE CULTURE/COLONY COUNT: CPT | Performed by: INTERNAL MEDICINE

## 2022-03-08 PROCEDURE — C9803 HOPD COVID-19 SPEC COLLECT: HCPCS

## 2022-03-08 PROCEDURE — 96375 TX/PRO/DX INJ NEW DRUG ADDON: CPT

## 2022-03-08 PROCEDURE — 36415 COLL VENOUS BLD VENIPUNCTURE: CPT | Performed by: INTERNAL MEDICINE

## 2022-03-08 PROCEDURE — 250N000013 HC RX MED GY IP 250 OP 250 PS 637: Performed by: INTERNAL MEDICINE

## 2022-03-08 PROCEDURE — U0003 INFECTIOUS AGENT DETECTION BY NUCLEIC ACID (DNA OR RNA); SEVERE ACUTE RESPIRATORY SYNDROME CORONAVIRUS 2 (SARS-COV-2) (CORONAVIRUS DISEASE [COVID-19]), AMPLIFIED PROBE TECHNIQUE, MAKING USE OF HIGH THROUGHPUT TECHNOLOGIES AS DESCRIBED BY CMS-2020-01-R: HCPCS | Performed by: INTERNAL MEDICINE

## 2022-03-08 PROCEDURE — 97166 OT EVAL MOD COMPLEX 45 MIN: CPT | Mod: GO

## 2022-03-08 PROCEDURE — 83605 ASSAY OF LACTIC ACID: CPT | Performed by: INTERNAL MEDICINE

## 2022-03-08 PROCEDURE — 85025 COMPLETE CBC W/AUTO DIFF WBC: CPT | Performed by: INTERNAL MEDICINE

## 2022-03-08 RX ORDER — TAMSULOSIN HYDROCHLORIDE 0.4 MG/1
0.4 CAPSULE ORAL EVERY EVENING
Status: DISCONTINUED | OUTPATIENT
Start: 2022-03-08 | End: 2022-03-10 | Stop reason: HOSPADM

## 2022-03-08 RX ORDER — KETOROLAC TROMETHAMINE 5 MG/ML
1 SOLUTION OPHTHALMIC 4 TIMES DAILY
Status: DISCONTINUED | OUTPATIENT
Start: 2022-03-08 | End: 2022-03-08

## 2022-03-08 RX ORDER — ACETAMINOPHEN 650 MG/1
650 SUPPOSITORY RECTAL EVERY 4 HOURS PRN
Status: DISCONTINUED | OUTPATIENT
Start: 2022-03-08 | End: 2022-03-10 | Stop reason: HOSPADM

## 2022-03-08 RX ORDER — AMLODIPINE BESYLATE 2.5 MG/1
2.5 TABLET ORAL DAILY
Status: DISCONTINUED | OUTPATIENT
Start: 2022-03-08 | End: 2022-03-10 | Stop reason: HOSPADM

## 2022-03-08 RX ORDER — LEVOFLOXACIN 5 MG/ML
750 INJECTION, SOLUTION INTRAVENOUS ONCE
Status: COMPLETED | OUTPATIENT
Start: 2022-03-08 | End: 2022-03-08

## 2022-03-08 RX ORDER — DEXTROSE MONOHYDRATE 25 G/50ML
25-50 INJECTION, SOLUTION INTRAVENOUS
Status: DISCONTINUED | OUTPATIENT
Start: 2022-03-08 | End: 2022-03-10 | Stop reason: HOSPADM

## 2022-03-08 RX ORDER — ONDANSETRON 2 MG/ML
4 INJECTION INTRAMUSCULAR; INTRAVENOUS EVERY 6 HOURS PRN
Status: DISCONTINUED | OUTPATIENT
Start: 2022-03-08 | End: 2022-03-10 | Stop reason: HOSPADM

## 2022-03-08 RX ORDER — MOXIFLOXACIN 5 MG/ML
1 SOLUTION/ DROPS OPHTHALMIC 3 TIMES DAILY
Status: DISCONTINUED | OUTPATIENT
Start: 2022-03-08 | End: 2022-03-08

## 2022-03-08 RX ORDER — PREDNISOLONE ACETATE 10 MG/ML
1 SUSPENSION/ DROPS OPHTHALMIC 3 TIMES DAILY
Status: DISCONTINUED | OUTPATIENT
Start: 2022-03-08 | End: 2022-03-08

## 2022-03-08 RX ORDER — ONDANSETRON 4 MG/1
4 TABLET, ORALLY DISINTEGRATING ORAL EVERY 6 HOURS PRN
Status: DISCONTINUED | OUTPATIENT
Start: 2022-03-08 | End: 2022-03-10 | Stop reason: HOSPADM

## 2022-03-08 RX ORDER — ACETAMINOPHEN 325 MG/1
650 TABLET ORAL EVERY 4 HOURS PRN
Status: DISCONTINUED | OUTPATIENT
Start: 2022-03-08 | End: 2022-03-10 | Stop reason: HOSPADM

## 2022-03-08 RX ORDER — GABAPENTIN 300 MG/1
300 CAPSULE ORAL 3 TIMES DAILY
Status: DISCONTINUED | OUTPATIENT
Start: 2022-03-08 | End: 2022-03-10 | Stop reason: HOSPADM

## 2022-03-08 RX ORDER — MULTIPLE VITAMINS W/ MINERALS TAB 9MG-400MCG
1 TAB ORAL DAILY
Status: DISCONTINUED | OUTPATIENT
Start: 2022-03-08 | End: 2022-03-10 | Stop reason: HOSPADM

## 2022-03-08 RX ORDER — VANCOMYCIN HYDROCHLORIDE 1 G/20ML
INJECTION, POWDER, LYOPHILIZED, FOR SOLUTION INTRAVENOUS
Status: COMPLETED
Start: 2022-03-08 | End: 2022-03-08

## 2022-03-08 RX ORDER — VANCOMYCIN HYDROCHLORIDE 1 G/200ML
1000 INJECTION, SOLUTION INTRAVENOUS EVERY 24 HOURS
Status: DISCONTINUED | OUTPATIENT
Start: 2022-03-08 | End: 2022-03-10

## 2022-03-08 RX ORDER — SODIUM CHLORIDE, SODIUM LACTATE, POTASSIUM CHLORIDE, CALCIUM CHLORIDE 600; 310; 30; 20 MG/100ML; MG/100ML; MG/100ML; MG/100ML
INJECTION, SOLUTION INTRAVENOUS CONTINUOUS
Status: ACTIVE | OUTPATIENT
Start: 2022-03-08 | End: 2022-03-08

## 2022-03-08 RX ORDER — LIDOCAINE 40 MG/G
CREAM TOPICAL
Status: DISCONTINUED | OUTPATIENT
Start: 2022-03-08 | End: 2022-03-10 | Stop reason: HOSPADM

## 2022-03-08 RX ORDER — ALBUTEROL SULFATE 90 UG/1
2 AEROSOL, METERED RESPIRATORY (INHALATION) EVERY 4 HOURS PRN
Status: DISCONTINUED | OUTPATIENT
Start: 2022-03-08 | End: 2022-03-10 | Stop reason: HOSPADM

## 2022-03-08 RX ORDER — ATORVASTATIN CALCIUM 40 MG/1
40 TABLET, FILM COATED ORAL EVERY EVENING
Status: DISCONTINUED | OUTPATIENT
Start: 2022-03-08 | End: 2022-03-10 | Stop reason: HOSPADM

## 2022-03-08 RX ORDER — NICOTINE POLACRILEX 4 MG
15-30 LOZENGE BUCCAL
Status: DISCONTINUED | OUTPATIENT
Start: 2022-03-08 | End: 2022-03-10 | Stop reason: HOSPADM

## 2022-03-08 RX ORDER — GLIMEPIRIDE 2 MG/1
2 TABLET ORAL
Status: DISCONTINUED | OUTPATIENT
Start: 2022-03-08 | End: 2022-03-10 | Stop reason: HOSPADM

## 2022-03-08 RX ORDER — CARVEDILOL 25 MG/1
25 TABLET ORAL 2 TIMES DAILY
Status: DISCONTINUED | OUTPATIENT
Start: 2022-03-08 | End: 2022-03-10 | Stop reason: HOSPADM

## 2022-03-08 RX ORDER — ALLOPURINOL 100 MG/1
100 TABLET ORAL DAILY
Status: DISCONTINUED | OUTPATIENT
Start: 2022-03-08 | End: 2022-03-10 | Stop reason: HOSPADM

## 2022-03-08 RX ORDER — FINASTERIDE 5 MG/1
5 TABLET, FILM COATED ORAL DAILY
Status: DISCONTINUED | OUTPATIENT
Start: 2022-03-08 | End: 2022-03-10 | Stop reason: HOSPADM

## 2022-03-08 RX ADMIN — TAZOBACTAM SODIUM AND PIPERACILLIN SODIUM 3.38 G: 375; 3 INJECTION, SOLUTION INTRAVENOUS at 02:52

## 2022-03-08 RX ADMIN — LEVOFLOXACIN 750 MG: 5 INJECTION, SOLUTION INTRAVENOUS at 01:18

## 2022-03-08 RX ADMIN — Medication 1500 MG: at 03:26

## 2022-03-08 RX ADMIN — SODIUM CHLORIDE, POTASSIUM CHLORIDE, SODIUM LACTATE AND CALCIUM CHLORIDE: 600; 310; 30; 20 INJECTION, SOLUTION INTRAVENOUS at 13:54

## 2022-03-08 RX ADMIN — CARVEDILOL 25 MG: 25 TABLET, FILM COATED ORAL at 20:32

## 2022-03-08 RX ADMIN — GABAPENTIN 300 MG: 300 CAPSULE ORAL at 13:55

## 2022-03-08 RX ADMIN — ACETAMINOPHEN 650 MG: 325 TABLET, FILM COATED ORAL at 20:31

## 2022-03-08 RX ADMIN — FINASTERIDE 5 MG: 5 TABLET, FILM COATED ORAL at 09:09

## 2022-03-08 RX ADMIN — GABAPENTIN 300 MG: 300 CAPSULE ORAL at 20:32

## 2022-03-08 RX ADMIN — VANCOMYCIN HYDROCHLORIDE 1500 MG: 1 INJECTION, POWDER, LYOPHILIZED, FOR SOLUTION INTRAVENOUS at 03:25

## 2022-03-08 RX ADMIN — VANCOMYCIN HYDROCHLORIDE 1000 MG: 1 INJECTION, SOLUTION INTRAVENOUS at 20:38

## 2022-03-08 RX ADMIN — SODIUM CHLORIDE 1000 ML: 9 INJECTION, SOLUTION INTRAVENOUS at 00:30

## 2022-03-08 RX ADMIN — CARVEDILOL 25 MG: 25 TABLET, FILM COATED ORAL at 09:09

## 2022-03-08 RX ADMIN — TAMSULOSIN HYDROCHLORIDE 0.4 MG: 0.4 CAPSULE ORAL at 20:32

## 2022-03-08 RX ADMIN — AMLODIPINE BESYLATE 2.5 MG: 2.5 TABLET ORAL at 09:09

## 2022-03-08 RX ADMIN — ATORVASTATIN CALCIUM 40 MG: 40 TABLET, FILM COATED ORAL at 20:32

## 2022-03-08 RX ADMIN — SODIUM CHLORIDE, POTASSIUM CHLORIDE, SODIUM LACTATE AND CALCIUM CHLORIDE: 600; 310; 30; 20 INJECTION, SOLUTION INTRAVENOUS at 05:06

## 2022-03-08 RX ADMIN — GABAPENTIN 300 MG: 300 CAPSULE ORAL at 09:09

## 2022-03-08 RX ADMIN — MULTIPLE VITAMINS W/ MINERALS TAB 1 TABLET: TAB at 09:09

## 2022-03-08 RX ADMIN — ALLOPURINOL 100 MG: 100 TABLET ORAL at 09:09

## 2022-03-08 RX ADMIN — ACETAMINOPHEN 650 MG: 325 TABLET, FILM COATED ORAL at 04:26

## 2022-03-08 RX ADMIN — SODIUM CHLORIDE 1000 ML: 9 INJECTION, SOLUTION INTRAVENOUS at 02:33

## 2022-03-08 ASSESSMENT — ENCOUNTER SYMPTOMS
COLOR CHANGE: 0
NUMBNESS: 0
ABDOMINAL DISTENTION: 0
ABDOMINAL PAIN: 0
FREQUENCY: 0
HEADACHES: 0
SLEEP DISTURBANCE: 0
FLANK PAIN: 0
DYSURIA: 1
FEVER: 1
ANAL BLEEDING: 0
SHORTNESS OF BREATH: 0
BLOOD IN STOOL: 0
CONFUSION: 0
DIZZINESS: 0
VOICE CHANGE: 0
CHILLS: 0
LIGHT-HEADEDNESS: 0
NAUSEA: 0
CHEST TIGHTNESS: 0
WHEEZING: 0
PALPITATIONS: 0
VOMITING: 0
DIAPHORESIS: 0
BACK PAIN: 0
COUGH: 0
MYALGIAS: 0
NECK PAIN: 0
WEAKNESS: 1
FATIGUE: 1

## 2022-03-08 ASSESSMENT — ACTIVITIES OF DAILY LIVING (ADL)
ADLS_ACUITY_SCORE: 10
WALKING_OR_CLIMBING_STAIRS: AMBULATION DIFFICULTY, REQUIRES EQUIPMENT
ADLS_ACUITY_SCORE: 10
ADLS_ACUITY_SCORE: 10
ADLS_ACUITY_SCORE: 11
ADLS_ACUITY_SCORE: 14
ADLS_ACUITY_SCORE: 10
WALKING_OR_CLIMBING_STAIRS_DIFFICULTY: YES
ADLS_ACUITY_SCORE: 10
WEAR_GLASSES_OR_BLIND: YES
TOILETING_ISSUES: NO
ADLS_ACUITY_SCORE: 10
DIFFICULTY_COMMUNICATING: NO
DOING_ERRANDS_INDEPENDENTLY_DIFFICULTY: YES
ADLS_ACUITY_SCORE: 11
ADLS_ACUITY_SCORE: 10
VISION_MANAGEMENT: WEARS GLASSES
CHANGE_IN_FUNCTIONAL_STATUS_SINCE_ONSET_OF_CURRENT_ILLNESS/INJURY: YES
ADLS_ACUITY_SCORE: 14
ADLS_ACUITY_SCORE: 11
TRANSFERRING: 1-->ASSISTANCE (EQUIPMENT/PERSON) NEEDED
TRANSFERRING: 1-->ASSISTANCE (EQUIPMENT/PERSON) NEEDED (NOT DEVELOPMENTALLY APPROPRIATE)
ADLS_ACUITY_SCORE: 10
CONCENTRATING,_REMEMBERING_OR_MAKING_DECISIONS_DIFFICULTY: NO
ADLS_ACUITY_SCORE: 10
ADLS_ACUITY_SCORE: 10
ADLS_ACUITY_SCORE: 14
ADLS_ACUITY_SCORE: 10
ADLS_ACUITY_SCORE: 11
ADLS_ACUITY_SCORE: 10
ADLS_ACUITY_SCORE: 10
EQUIPMENT_CURRENTLY_USED_AT_HOME: CANE, STRAIGHT;GRAB BAR, TOILET;GRAB BAR, TUB/SHOWER;SHOWER CHAIR
ADLS_ACUITY_SCORE: 11
FALL_HISTORY_WITHIN_LAST_SIX_MONTHS: YES
DIFFICULTY_EATING/SWALLOWING: NO
ADLS_ACUITY_SCORE: 10
HEARING_DIFFICULTY_OR_DEAF: NO
PREVIOUS_RESPONSIBILITIES: MEAL PREP;LAUNDRY;SHOPPING;MEDICATION MANAGEMENT;FINANCES;DRIVING
DRESSING/BATHING_DIFFICULTY: NO

## 2022-03-08 NOTE — ED TRIAGE NOTES
States that he was on the floor playing with his cat and fell asleep. EMS state that they found him on the floor in front of his chair.

## 2022-03-08 NOTE — PHARMACY-VANCOMYCIN DOSING SERVICE
"Pharmacy Vancomycin Initial Note  Date of Service 2022  Patient's  1940  81 year old, male    Indication: Sepsis and Urinary Tract Infection    Current estimated CrCl = Estimated Creatinine Clearance: 46.3 mL/min (A) (based on SCr of 1.52 mg/dL (H)).    Creatinine for last 3 days  3/8/2022: 12:27 AM Creatinine 1.52 mg/dL    Recent Vancomycin Level(s) for last 3 days  No results found for requested labs within last 72 hours.      Vancomycin IV Administrations (past 72 hours)                   vancomycin 1500 mg in 0.9% NaCl 250 ml intermittent infusion 1,500 mg (mg) 1,500 mg New Bag 22 0326    vancomycin (VANCOCIN) 100 mg/mL injection (mg) 1,500 mg New Bag 22 0325                Nephrotoxins and other renal medications (From now, onward)    Start     Dose/Rate Route Frequency Ordered Stop    22 2100  vancomycin (VANCOCIN) 1000 mg in dextrose 5% 200 mL PREMIX         1,000 mg  200 mL/hr over 1 Hours Intravenous EVERY 24 HOURS 22 0405      22 1000  piperacillin-tazobactam (ZOSYN) infusion 3.375 g        Note to Pharmacy: For [facility], [facility] and [facility]: For Zosyn-naive patients, use the \"Zosyn initial dose + extended infusion\" order panel.    3.375 g  100 mL/hr over 30 Minutes Intravenous EVERY 6 HOURS 22 0349            Contrast Orders - past 72 hours (72h ago, onward)            None          [employer] Prediction of Planned Initial Vancomycin Regimen  Loading dose: 1500mg  Regimen: 1000 mg IV every 24 hours.  Start time: 04:06 on 2022  Exposure target: AUC24 (range)400-600 mg/L.hr   AUC24,ss: 464 mg/L.hr  Probability of AUC24 > 400: 67 %  Ctrough,ss: 14.8 mg/L  Probability of Ctrough,ss > 20: 20 %  Probability of nephrotoxicity (Lodise JESSICA ): 10 %          Plan:  1. Start vancomycin  1500 mg once, then 1000 mg IV q24h.   2. Vancomycin monitoring method: AUC  3. Vancomycin therapeutic monitoring goal: 400-600 mg*h/L  4. Pharmacy will check vancomycin levels as " appropriate in 1-3 Days.    5. Serum creatinine levels will be ordered daily for the first week of therapy and at least twice weekly for subsequent weeks.      Fernando Dickey, [facility]

## 2022-03-08 NOTE — PHARMACY-MEDICATION REGIMEN REVIEW
Pharmacy Antimicrobial Stewardship Assessment     Current Antimicrobial Therapy:  Anti-infectives (From now, onward)    Start     Dose/Rate Route Frequency Ordered Stop    22 2100  vancomycin (VANCOCIN) 1000 mg in dextrose 5% 200 mL PREMIX         1,000 mg  200 mL/hr over 1 Hours Intravenous EVERY 24 HOURS 22 0405            Indication: Sepsis d/t UTI    Days of Therapy: 1     Pertinent Labs:  Recent Labs   Lab Test 22  0027 22  1725   WBC 19.9* 10.0     Recent Labs   Lab Test 22  0510 22  0027 LACT 1.7 3.2* CRP  --  130.0* PCAL  --  26.96*  < > = values in this interval not displayed.        Temperature:  Temp (24hrs), Av.5  F (37.5  C), Min:99.1  F (37.3  C), Max:100.2  F (37.9  C)    Culture Results:   (3/8/22) COVID = negative  (3/8/22) Urine  (3/8/22) Blood    (3/4/22) Urine = Enterococcus faecalis          Recommendations/Interventions:  1. Urine culture from 3/4/22 was never treated per chart review. Current regimen appropriate    Kapil Dave, [facility]  2022

## 2022-03-08 NOTE — PROVIDER NOTIFICATION
Spoke with Brittni VALADEZ in ICU regarding telemetry order. There are no telemetry boxes currently available. ICU nurse messaged provider to let them know that they are all being used. No response received at this time.

## 2022-03-08 NOTE — PROGRESS NOTES
[facility]    Hospitalist Progress Note      Assessment & Plan   Anthony Dyer is a 81 year old male who was admitted on 3/7/2022.      1.  Acute cystitis with hematuria: Patient had a urinalysis done bleeding for several days prior to his admission.  This did grow Enterococcus faecalis.  He did have some nitrofurantoin at home and did start this.  In the culture was sensitive to this.  He states he only took about 3 tablets and basically started to get much weaker.  Came on fairly quickly and does not really remember what happened he was on the floor playing with his cat and then next thing he knows the EMS was there.  His neighbor called and checked on him and found that he was quite lethargic and called 911.  Upon arrival to the ER he was hemodynamically stable.  No significant hypotension.  Low-grade fever 99.1.  Sats are fine on room air 92%.  He did have elevated procalcitonin at 26.9 white count 19,900  lactic acid was 3.2.  He did get some IV fluids.  Blood cultures were done urine culture was performed.  He was started on vancomycin and did get a dose of Zosyn also..  His urine did still show evidence of pyuria with some blood.  Does have a significant history of urinary retention.  Has had a TURP done in the past.  Has been able to void.  We did watch him closely however.  I would recommend that we do check some postvoid residuals also in terms of bladder scanning.    He feels markedly better today.  Lactic acid was down to normal range.  He has had no fevers.  We will continue with the vancomycin until we see if this culture grows out anything else but highly unlikely as is only been a couple days since his previous one was performed.  The vancomycin should cover him adequately.  If nothing does really grow differently than might just consider put him back on the Macrobid as it was sensitive to this.  And has worked for him in the past per his report.      2.  Sepsis: Patient did  present with mild sepsis.  He responded quickly to some IV fluids.  Normal lactic acid no other evidence of endorgan damage.  Is basically resolved.    3.  Diabetes mellitus type 2: Patient is on some Ozempic weekly along with Glucophage and glimepiride.  His family will bring in the Ozempic given the 0.25 g dose.  He is currently getting sliding scale coverage for now.  He was given a regular consistent carb diet today he is ordering breakfast now.  We will readjust things depending how he does over the next 24 hours.    4.  COPD: No significant dyspnea sats look fine room air his lungs are clear to auscultation.    5.  History of pulmonary embolus 2021: He remains on the Xarelto.  He has no peripheral edema.  No hypoxia chest pain etc.    6.  Weakness: We will have PT OT evaluate the patient also.  Says he does a lot of furniture walking around his home.    Diet: Consistent Carbohydrate Diet Moderate Consistent Carb (60 g CHO per Meal) Diet  Fluids: LR 75 cc an    DVT Prophylaxis: DOAC  Code Status: Full Code    Disposition: Expected discharge in 1-2 days once culture results back.    Osmar Martins    Interval History   Patient is alert oriented recognizes me from previous visits.  Is no distress at all.  Denies any chest pain shortness of breath nausea vomiting.  Is hungry and is just ordered breakfast.  No flank pain at all does have some chronic back pain.  He was able to relate most of what happened with the last couple days just did not realize how sick he was.  Lactic acid has resolved blood pressures good no fevers at all.  Remains on the vancomycin will just see what his culture grows out.  With PT and OT see him also.    -Data reviewed today: I reviewed all new labs and imaging results over the last 24 hours. I personally reviewed no images or EKG's today.    Physical Exam   Temp: 99.1  F (37.3  C) Temp src: Tympanic BP: 113/60 Pulse: 82   Resp: 20 SpO2: 92 % O2 Device: None (Room air)    Vitals:     03/08/22 0358   Weight: 84.4 kg (186 lb 1.1 oz)     Vital Signs with Ranges  Temp:  [99.1  F (37.3  C)-100.2  F (37.9  C)] 99.1  F (37.3  C)  Pulse:  [] 82  Resp:  [11-20] 20  BP: (113-156)/(60-83) 113/60  SpO2:  [91 %-94 %] 92 %  No intake/output data recorded.    Peripheral IV 03/08/22 Left;Posterior Lower forearm (Active)   Site Assessment WDL 03/08/22 0745   Line Status Infusing;Checked every 1-2 hour 03/08/22 0745   Phlebitis Scale 0-->no symptoms 03/08/22 0745   Infiltration Scale 0 03/08/22 0745   Number of days: 0       Incision/Surgical Site 12/27/21 Left Eye (Active)   Number of days: 71       Incision/Surgical Site 01/11/22 Right Eye (Active)   Number of days: 56     No line/device    Constitutional: Alert and oriented x3. No distress    HEENT: Normocephalic/atraumatic, PERRL, EOMI, mouth moist, neck supple, no LN.     Cardiovascular: RRR.  No murmur, no  rubs, or gallops.  JVD no.  Bruits no.  Pulses 2+    Respiratory: Clear to auscultation bilaterally    Abdomen: Soft, nontender, nondistended, no organomegaly. Bowel sounds present    Extremities: Warm/dry.  No edema    Neuro:   Non focal, cranial nerves intact, Moves all extremities.  Medications     lactated ringers 125 mL/hr at 03/08/22 0506     - MEDICATION INSTRUCTIONS -         allopurinol  100 mg Oral Daily     amLODIPine  2.5 mg Oral Daily     atorvastatin  40 mg Oral QPM     carvedilol  25 mg Oral BID     finasteride  5 mg Oral Daily     gabapentin  300 mg Oral TID     [Held by provider] glimepiride  2 mg Oral QAM AC     insulin aspart  1-3 Units Subcutaneous TID AC     insulin aspart  1-3 Units Subcutaneous At Bedtime     multivitamin w/minerals  1 tablet Oral Daily     rivaroxaban ANTICOAGULANT  20 mg Oral QAM     semaglutide  0.25 mg Subcutaneous Q7 Days     sodium chloride (PF)  3 mL Intracatheter Q8H     tamsulosin  0.4 mg Oral QPM     vancomycin  1,000 mg Intravenous Q24H       Data   Recent Labs   Lab 03/08/22  0747 03/08/22  0510  03/08/22  0418 03/08/22  0027   WBC  --   --   --  19.9*   HGB  --   --   --  14.1   MCV  --   --   --  93   PLT  --   --   --  156   NA  --  138  --  140   POTASSIUM  --  4.1  --  4.3   CHLORIDE  --  109  --  109   CO2  --  25  --  23   BUN  --  24  --  25   CR  --  1.40*  --  1.52*   ANIONGAP  --  4  --  8   JODI  --  8.0*  --  9.0   GLC 82 131* 131* 201*   ALBUMIN  --  2.7*  --  3.5   PROTTOTAL  --  5.8*  --  7.0   BILITOTAL  --  0.9  --  0.9   ALKPHOS  --  59  --  74   ALT  --  21  --  23   AST  --  24  --  18       Recent Results (from the past 24 hour(s))   XR Chest Port 1 View    Narrative    Procedure:XR CHEST PORT 1 VIEW    Clinical history:Male, 81 years, fever    Technique: Single view was obtained.    Comparison: 10/16/2021    Findings: The cardiac silhouette is normal. The pulmonary vasculature  is normal.    The lungs are clear. Bony structures are unremarkable.      Impression    Impression:   No acute abnormality. No evidence of acute or active cardiopulmonary  disease.     This report is in agreement with the preliminary report.    ASHWINI ALBRIGHT MD         SYSTEM ID:  [id number]

## 2022-03-08 NOTE — PLAN OF CARE
Multi-disciplinary Care Rounds were held today 9:45 AM.    Spoke of advancing diet to cons carb mod diet. Increase activity. Discontinue eye gtts. Change xarelto to give this am as pt takes at home. And pt due for ozempic. Sister in law will bring in later today to give dose of 0.25 since his episode of hypoglycemia.

## 2022-03-08 NOTE — ED PROVIDER NOTES
History     Chief Complaint   Patient presents with     Extremity Weakness     The history is provided by the patient.   Fatigue  Severity:  Severe  Onset quality:  Gradual  Duration:  2 days  Timing:  Constant  Progression:  Worsening  Chronicity:  New  Associated symptoms: dysuria and fever    Associated symptoms: no abdominal pain, no chest pain, no cough, no dizziness, no frequency, no headaches, no myalgias, no nausea, no shortness of breath and no vomiting          Allergies:  Allergies   Allergen Reactions     Ace Inhibitors      Per [facility]: hyperkalemia.  Pt doesn't know if he is allergic     Ceclor [Cefaclor] Hives     Sulfa Drugs      Pt unsure.        Problem List:    Patient Active Problem List    Diagnosis Date Noted     Complicated UTI (urinary tract infection) 03/08/2022     Priority: Medium     Sepsis with acute renal failure (H) 03/08/2022     Priority: Medium     Nursing Home Visit 05/21/2021     Priority: Medium     Benign prostatic hyperplasia with urinary retention 04/29/2021     Priority: Medium     Tachycardia 04/26/2021     Priority: Medium     Hypoxia 04/26/2021     Priority: Medium     Other acute pulmonary embolism with acute cor pulmonale (H) 04/26/2021     Priority: Medium     Dyspnea, unspecified type 04/26/2021     Priority: Medium     Urinary retention 04/19/2021     Priority: Medium     Generalized weakness 04/19/2021     Priority: Medium     Onychomycosis of left great toe 02/09/2021     Priority: Medium     Pincer nail deformity 02/09/2021     Priority: Medium     Chronic painful diabetic neuropathy (H) 10/24/2018     Priority: Medium     Moderate episode of recurrent major depressive disorder (H) 10/24/2018     Priority: Medium     Formatting of this note might be different from the original.  Patient does not want anti-depressant medication       Chronic obstructive lung disease (H) 11/22/2017     Priority: Medium     Formatting of this note might be different from the  original.  Mild       Mild concentric left ventricular hypertrophy (LVH) 11/22/2017     Priority: Medium     Uncomplicated alcohol dependence (H) 09/28/2016     Priority: Medium     IBS (irritable bowel syndrome) 12/23/2015     Priority: Medium     Spinal stenosis of lumbar region without neurogenic claudication 12/23/2015     Priority: Medium     Erectile dysfunction 05/28/2014     Priority: Medium     Chronic kidney disease, stage III (moderate) (H) 05/22/2013     Priority: Medium     Formatting of this note might be different from the original.  Since 2011       History of CVA (cerebrovascular accident) 02/03/2011     Priority: Medium     Formatting of this note might be different from the original.  Jan 2011  IMPRESSION: Interval development of a diffusion abnormality consistent with acute to early subacute infarct of less than 3 days of age in the left thalamus and the medial left cerebral peduncle. No associated abnormal enhancement or hydrocephalus.       Hyperlipidemia 02/03/2011     Priority: Medium     Essential hypertension 11/28/2006     Priority: Medium     Type 2 diabetes mellitus with stage 3 chronic kidney disease, without long-term current use of insulin (H) 02/16/2001     Priority: Medium     Gout, unspecified 02/16/2001     Priority: Medium     Formatting of this note might be different from the original.  On Allopurinol       Lumbar spondylosis 02/22/1995     Priority: Medium     Formatting of this note might be different from the original.  S/P RFN L2-L5 facets          Past Medical History:    Past Medical History:   Diagnosis Date     Diabetes (H)      Hypertension        Past Surgical History:    Past Surgical History:   Procedure Laterality Date     CYSTOSCOPY, TRANSURETHRAL RESECTION (TUR) PROSTATE, COMBINED N/A 10/15/2021    Procedure: CYSTOSCOPY, WITH TRANSURETHRAL RESECTION PROSTATE;  Surgeon: Inud De Leon MD;  Location: [facility] OR     HERNIA REPAIR       PHACOEMULSIFICATION WITH STANDARD  "INTRAOCULAR LENS IMPLANT Left 12/27/2021    Procedure: PHACOEMULSIFICATION CATARACT EXTRACTION POSTERIOR CHAMBER LENS LEFT EYE;  Surgeon: Phillip Maldonado MD;  Location: [facility] OR     PHACOEMULSIFICATION WITH STANDARD INTRAOCULAR LENS IMPLANT Right 1/11/2022    Procedure: PHACOEMULSIFICATION CATARACT EXTRACTION POSTERIOR CHAMBER LENS RIGHT EYE;  Surgeon: Phillip Maldonado MD;  Location: [facility] OR       Family History:    No family history on file.    Social History:  Marital Status:  Single [1]  Social History     Tobacco Use     Smoking status: Never Smoker     Smokeless tobacco: Former User     Types: Chew   Substance Use Topics     Alcohol use: Yes     Comment: daily, 4 today     Drug use: Never        Medications:    No current outpatient medications on file.        Review of Systems   Constitutional: Positive for fatigue and fever. Negative for chills and diaphoresis.   HENT: Negative for voice change.    Eyes: Negative for visual disturbance.   Respiratory: Negative for cough, chest tightness, shortness of breath and wheezing.    Cardiovascular: Negative for chest pain, palpitations and leg swelling.   Gastrointestinal: Negative for abdominal distention, abdominal pain, anal bleeding, blood in stool, nausea and vomiting.   Genitourinary: Positive for dysuria. Negative for decreased urine volume, flank pain and frequency.   Musculoskeletal: Negative for back pain, gait problem, myalgias and neck pain.   Skin: Negative for color change, pallor and rash.   Neurological: Positive for weakness. Negative for dizziness, syncope, light-headedness, numbness and headaches.   Psychiatric/Behavioral: Negative for confusion, sleep disturbance and suicidal ideas.       Physical Exam   BP: 118/69  Pulse: 108  Temp: 100.2  F (37.9  C)  Resp: 20  Height: 185.4 cm (6' 1\")  Weight: 84.4 kg (186 lb 1.1 oz)  SpO2: 94 %      Physical Exam  Vitals and nursing note reviewed.   Constitutional:       Appearance: He is well-developed.   HENT:    "   Head: Normocephalic and atraumatic.   Eyes:      Conjunctiva/sclera: Conjunctivae normal.      Pupils: Pupils are equal, round, and reactive to light.   Neck:      Thyroid: No thyromegaly.      Vascular: No JVD.      Trachea: No tracheal deviation.   Cardiovascular:      Rate and Rhythm: Regular rhythm. Tachycardia present.      Heart sounds: Normal heart sounds. No murmur heard.    No gallop.   Pulmonary:      Effort: Pulmonary effort is normal. No respiratory distress.      Breath sounds: Normal breath sounds. No stridor. No wheezing or rales.   Chest:      Chest wall: No tenderness.   Abdominal:      General: Bowel sounds are normal. There is no distension.      Palpations: Abdomen is soft. There is no mass.      Tenderness: There is no abdominal tenderness. There is no guarding or rebound.   Musculoskeletal:         General: No tenderness. Normal range of motion.      Cervical back: Normal range of motion and neck supple.   Lymphadenopathy:      Cervical: No cervical adenopathy.   Skin:     General: Skin is warm.      Coloration: Skin is not pale.      Findings: No erythema or rash.   Neurological:      Mental Status: He is alert and oriented to person, place, and time.   Psychiatric:         Behavior: Behavior normal.         ED Course                 Procedures                  Results for orders placed or performed during the hospital encounter of 03/07/22 (from the past 24 hour(s))   CBC with Platelets & Differential    Narrative    The following orders were created for panel order CBC with Platelets & Differential.  Procedure                               Abnormality         Status                     ---------                               -----------         ------                     CBC with platelets and d...[[id number]]  Abnormal            Final result                 Please view results for these tests on the individual orders.   Comprehensive metabolic panel   Result Value Ref Range    Sodium 140  133 - 144 mmol/L    Potassium 4.3 3.4 - 5.3 mmol/L    Chloride 109 94 - 109 mmol/L    Carbon Dioxide (CO2) 23 20 - 32 mmol/L    Anion Gap 8 3 - 14 mmol/L    Urea Nitrogen 25 7 - 30 mg/dL    Creatinine 1.52 (H) 0.66 - 1.25 mg/dL    Calcium 9.0 8.5 - 10.1 mg/dL    Glucose 201 (H) 70 - 99 mg/dL    Alkaline Phosphatase 74 40 - 150 U/L    AST 18 0 - 45 U/L    ALT 23 0 - 70 U/L    Protein Total 7.0 6.8 - 8.8 g/dL    Albumin 3.5 3.4 - 5.0 g/dL    Bilirubin Total 0.9 0.2 - 1.3 mg/dL    GFR Estimate 46 (L) >60 mL/min/1.73m2   Lactic acid whole blood   Result Value Ref Range    Lactic Acid 3.2 (H) 0.7 - 2.0 mmol/L   CRP inflammation   Result Value Ref Range    CRP Inflammation 130.0 (H) 0.0 - 8.0 mg/L   Procalcitonin   Result Value Ref Range    Procalcitonin 26.96 ([facility]) <0.05 ng/mL   CK total   Result Value Ref Range     30 - 300 U/L   CBC with platelets and differential   Result Value Ref Range    WBC Count 19.9 (H) 4.0 - 11.0 10e3/uL    RBC Count 4.45 4.40 - 5.90 10e6/uL    Hemoglobin 14.1 13.3 - 17.7 g/dL    Hematocrit 41.4 40.0 - 53.0 %    MCV 93 78 - 100 fL    MCH 31.7 26.5 - 33.0 pg    MCHC 34.1 31.5 - 36.5 g/dL    RDW 12.9 10.0 - 15.0 %    Platelet Count 156 150 - 450 10e3/uL    % Neutrophils 95 %    % Lymphocytes 1 %    % Monocytes 3 %    % Eosinophils 0 %    % Basophils 0 %    % Immature Granulocytes 1 %    NRBCs per 100 WBC 0 <1 /100    Absolute Neutrophils 19.1 (H) 1.6 - 8.3 10e3/uL    Absolute Lymphocytes 0.2 (L) 0.8 - 5.3 10e3/uL    Absolute Monocytes 0.5 0.0 - 1.3 10e3/uL    Absolute Eosinophils 0.0 0.0 - 0.7 10e3/uL    Absolute Basophils 0.0 0.0 - 0.2 10e3/uL    Absolute Immature Granulocytes 0.1 <=0.4 10e3/uL    Absolute NRBCs 0.0 10e3/uL   UA reflex to Microscopic and Culture    Specimen: Urine, Midstream   Result Value Ref Range    Color Urine Yellow Colorless, Straw, Light Yellow, Yellow    Appearance Urine Slightly Cloudy (A) Clear    Glucose Urine Negative Negative mg/dL    Bilirubin Urine Negative  Negative    Ketones Urine Negative Negative mg/dL    Specific Gravity Urine 1.022 1.003 - 1.035    Blood Urine Large (A) Negative    pH Urine 5.5 4.7 - 8.0    Protein Albumin Urine 50  (A) Negative mg/dL    Urobilinogen Urine Normal Normal, 2.0 mg/dL    Nitrite Urine Negative Negative    Leukocyte Esterase Urine Large (A) Negative    Bacteria Urine Few (A) None Seen /HPF    WBC Clumps Urine Present (A) None Seen /HPF    Mucus Urine Present (A) None Seen /LPF    RBC Urine >182 (H) <=2 /HPF    WBC Urine 179 (H) <=5 /HPF    Squamous Epithelials Urine 0 <=1 /HPF    Hyaline Casts Urine 3 (H) <=2 /LPF    Narrative    Urine Culture ordered based on laboratory criteria   Asymptomatic COVID-19 Virus (Coronavirus) by PCR Nasopharyngeal    Specimen: Nasopharyngeal; Swab   Result Value Ref Range    SARS CoV2 PCR Negative Negative    Narrative    Testing was performed using the Xpert Xpress SARS-CoV-2 Assay on the   Cepheid Gene-Xpert Instrument Systems. Additional information about   this Emergency Use Authorization (EUA) assay can be found via the Lab   Guide. This test should be ordered for the detection of SARS-CoV-2 in   individuals who meet SARS-CoV-2 clinical and/or epidemiological   criteria. Test performance is unknown in asymptomatic patients. This   test is for in vitro diagnostic use under the FDA EUA for   laboratories certified under CLIA to perform high complexity testing.   This test has not been FDA cleared or approved. A negative result   does not rule out the presence of PCR inhibitors in the specimen or   target RNA in concentration below the limit of detection for the   assay. The possibility of a false negative should be considered if   the patient's recent exposure or clinical presentation suggests   COVID-19. This test was validated by [facility]. This laboratory is certified under the Clinical Laboratory Improve  ment Amendments (CLIA) as qualified to perform high  complexity testing.   Glucose by meter   Result Value Ref Range    GLUCOSE BY METER POCT 131 (H) 70 - 99 mg/dL       Medications   albuterol (PROVENTIL HFA/VENTOLIN HFA) inhaler (has no administration in time range)   allopurinol (ZYLOPRIM) tablet 100 mg (has no administration in time range)   amLODIPine (NORVASC) tablet 2.5 mg (has no administration in time range)   atorvastatin (LIPITOR) tablet 40 mg (has no administration in time range)   carvedilol (COREG) tablet 25 mg (has no administration in time range)   finasteride (PROSCAR) tablet 5 mg (has no administration in time range)   gabapentin (NEURONTIN) capsule 300 mg (has no administration in time range)   ketorolac (ACULAR) 0.5 % ophthalmic solution 1 drop (has no administration in time range)   moxifloxacin (VIGAMOX) 0.5 % ophthalmic solution 1 drop (has no administration in time range)   multivitamin w/minerals (THERA-VIT-M) tablet 1 tablet (has no administration in time range)   prednisoLONE acetate (PRED FORTE) 1 % ophthalmic susp 1 drop (has no administration in time range)   rivaroxaban ANTICOAGULANT (XARELTO) tablet 20 mg (has no administration in time range)   tamsulosin (FLOMAX) capsule 0.4 mg (has no administration in time range)   glimepiride (AMARYL) tablet 2 mg ( Oral Automatically Held 3/11/22 7925)   lidocaine 1 % 0.1-1 mL (has no administration in time range)   lidocaine (LMX4) cream (has no administration in time range)   sodium chloride (PF) 0.9% PF flush 3 mL (3 mLs Intracatheter Not Given 3/8/22 2891)   sodium chloride (PF) 0.9% PF flush 3 mL (has no administration in time range)   piperacillin-tazobactam (ZOSYN) infusion 3.375 g (has no administration in time range)   Patient is already receiving anticoagulation with heparin, enoxaparin (LOVENOX), warfarin (COUMADIN)  or other anticoagulant medication (has no administration in time range)   lactated ringers infusion (has no administration in time range)   ondansetron (ZOFRAN-ODT) ODT tab 4  mg (has no administration in time range)     Or   ondansetron (ZOFRAN) injection 4 mg (has no administration in time range)   acetaminophen (TYLENOL) tablet 650 mg (650 mg Oral Given 3/8/22 0426)     Or   acetaminophen (TYLENOL) Suppository 650 mg ( Rectal See Alternative 3/8/22 0426)   glucose gel 15-30 g (has no administration in time range)     Or   dextrose 50 % injection 25-50 mL (has no administration in time range)     Or   glucagon injection 1 mg (has no administration in time range)   insulin aspart (NovoLOG) injection (RAPID ACTING) (has no administration in time range)   insulin aspart (NovoLOG) injection (RAPID ACTING) (1 Units Subcutaneous Not Given 3/8/22 0419)   vancomycin (VANCOCIN) 1000 mg in dextrose 5% 200 mL PREMIX (has no administration in time range)   0.9% sodium chloride BOLUS (0 mLs Intravenous Stopped 3/8/22 0124)   levofloxacin (LEVAQUIN) infusion 750 mg (0 mg Intravenous Stopped 3/8/22 0252)   vancomycin 1500 mg in 0.9% NaCl 250 ml intermittent infusion 1,500 mg (0 mg Intravenous ED Infusing on Admission/transfer 3/8/22 0336)   piperacillin-tazobactam (ZOSYN) infusion 3.375 g (0 g Intravenous Stopped 3/8/22 0324)   0.9% sodium chloride BOLUS (0 mLs Intravenous Stopped 3/8/22 0325)   vancomycin (VANCOCIN) 100 mg/mL injection (0 g  ED Infusing on Admission/transfer 3/8/22 0332)       Assessments & Plan (with Medical Decision Making)   Feeling very weak all over body, mostly on both legs  Fever and on treatment for UTI    Sepsis workup ordered  Labs reviewed, elevated lactic , procalcitonin  2 lit IVF given, levaquin + zosyn + vanco given  CXR: no acute finding    I spoke to Dr wang, accepted for admission   I have reviewed the nursing notes.    I have reviewed the findings, diagnosis, plan and need for follow up with the patient.      Current Discharge Medication List          Final diagnoses:   Complicated UTI (urinary tract infection)       3/7/2022   [facility]      Brandon Taveras MD  03/08/22 0454       Brandon Taveras MD  03/08/22 0454

## 2022-03-08 NOTE — PROGRESS NOTES
03/08/22 1200   Signing Clinician's Name / Credentials   Signing clinician's name / credentials Breanna Nguyen DPT   Quick Adds   Quick Adds Interventions Therapeutic Activity   Gait Training   Distance in Feet (Required for LE Total Joints) 200'   Therapeutic Activity   Minutes of Treatment 10 minutes   Symptoms Noted During/After Treatment Fatigue   Treatment Detail Pt ambulated 200' c cane and CGA. As pt fatigued he required min A and was reaching for railing in hallway as well as IV pole for stability. Recommend pt use walker due to safety concerns. Pt reports he does not have one at home and admits to using furniture around his home to get around   PT Discharge Planning   PT Discharge Recommendation (DC Rec) home with home care physical therapy   PT Rationale for DC Rec Pt demonstrates with weakness and balance impairments putting him at risk for falls   PT Brief overview of current status CGA for safety with mobility, decreased stability noted with cane-recommending walker   Additional Documentation   PT Plan Progress mobility as tolerated

## 2022-03-08 NOTE — PLAN OF CARE
"[facility]patient Admission Note:    Patient admitted to 3228/3228-1 at approximately 0338 via cart accompanied by transport tech from emergency room . Report received from TONY Mendoza in SBAR format at 0310 via telephone. Patient transferred to bed via Ax2. Patient is alert and oriented X 3, reports pain; rates at 5 on 0-10 scale.  Patient oriented to room, unit, hourly rounding, and plan of care. Explained admission packet and patient handbook with patient bill of rights brochure. Will continue to monitor and document as needed.     Inpatient Nursing criteria listed below was met:    Health care directives status obtained and documented: Yes    Patient identifies a surrogate decision maker: No If yes, who: N/A Contact Information: N/A    If initial lactic acid greater than 2.0, repeat lactic acid drawn within one hour of arrival to unit: Yes. If no, state reason: N/A    Clergy visit ordered if patient requests: N/A    Skin issues/needs documented: Yes    Isolation Patient: no Education given, correct sign in place and documentation row added to PCS:  No    Fall Prevention Yes: Care plan updated, education given and documented, sticker and magnet in place: Yes    Care Plan initiated: Yes    Education Documented (including assessment): Yes    Patient has discharge needs : Yes If yes, please explain: TBD    /61 (BP Location: Right arm, Patient Position: Semi-Dickinson's, Cuff Size: Adult Regular)   Pulse 95   Temp 99.1  F (37.3  C) (Tympanic)   Resp 20   Ht 1.854 m (6' 1\")   Wt 84.4 kg (186 lb 1.1 oz)   SpO2 92%   BMI 24.55 kg/m      Pt A&O, VSS, max temp of 99.1, and reports pain rated 5/10. Given PRN tylenol. On 1/2 L of O2 via NC w/ sats in low 90s. Lung sounds are clear and equal bilaterally. Reports SOB and SALOMON. No cough. Rest of assessment as charted. IV infusing LR @ 125 mL/hr. . On bedrest. Bed alarm on, call light within reach, and makes needs known.    Face to face report given with " opportunity to observe patient.    Report given to TONY Abraham RN   3/8/2022

## 2022-03-08 NOTE — PROGRESS NOTES
03/08/22 1400   Quick Adds   Type of Visit Initial Occupational Therapy Evaluation   Living Environment   People in Home alone   Current Living Arrangements house   Home Accessibility stairs to enter home;stairs within home   Number of Stairs, Main Entrance 1   Number of Stairs, Within Home, Primary ten   Transportation Anticipated car, drives self;family or friend will provide   Living Environment Comments Pt lives alone. House is 1 level with a basement. Bedroom and bathroom that pt bathes in are both main floor; uses a shower/tub combo. There is a walk in shower in the basement. Laundry is in the basement. Reports having good support from friend, sister-in-law, and neighbors.   Self-Care   Usual Activity Tolerance moderate   Current Activity Tolerance fair   Regular Exercise No   Equipment Currently Used at Home cane, straight;grab bar, toilet;grab bar, tub/shower;shower chair   Fall history within last six months yes   Number of times patient has fallen within last six months 3   Activity/Exercise/Self-Care Comment Pt independent with ADLs at baseline. Uses a cane and furniture walks.   Instrumental Activities of Daily Living (IADL)   Previous Responsibilities meal prep;laundry;shopping;medication management;finances;driving   IADL Comments Pt makes simple meals. Has a friend/housekeeping. She assists with folding laundry. Pt's family and friend assist with shopping but pt does some, too. Pt drives locally. Neighbor completes yard work.    General Information   Onset of Illness/Injury or Date of Surgery 03/08/22   Referring Physician Dr. Martins   Patient/Family Therapy Goal Statement (OT) Return home when medically ready   Additional Occupational Profile Info/Pertinent History of Current Problem Pt hospitalized with weakness and UTI   Existing Precautions/Restrictions fall   Cognitive Status Examination   Orientation Status orientation to person, place and time   Affect/Mental Status (Cognitive) WFL   Bed  Mobility   Comment (Bed Mobility) Pt had help from RN to transfer supine>sit   Transfers   Transfers transfer safety analysis;sit-stand transfer;toilet transfer   Sit-Stand Transfer   Sit-Stand [place] (Transfers) supervision;verbal cues   Sit/Stand Transfer Comments needs cues for safe hand placement   Toilet Transfer   [place] Level (Toilet Transfer) supervision;contact guard;verbal cues   Assistive Device (Toilet Transfer) grab bars/safety frame   Toilet Transfer Comments pt cued to use grab bars for safety especially bc he has them at home   Transfer Safety Analysis   Transfer Safety Concerns Noted decreased balance during turns   Impairments Contributing to Impaired Transfers impaired balance;decreased strength   Balance   Balance Comments good with support   Activities of Daily Living   BADL Assessment/Intervention lower body dressing   Lower Body Dressing Assessment/Training   [place] Level (Lower Body Dressing) supervision   Comment, (Lower Body Dressing) pt doffed/donned socks sitting in the chair, mild-mod difficulties but pt reports due to IV   Clinical Impression   Criteria for Skilled Therapeutic Interventions Met (OT) Yes, treatment indicated   OT Diagnosis impaired ADLs   OT Problem List-Impairments impacting ADL problems related to;activity tolerance impaired;balance;strength   Assessment of Occupational Performance 3-5 Performance Deficits   Identified Performance Deficits grooming, bathing, toileting, functional mobility, IADLs   Planned Therapy Interventions (OT) ADL retraining;IADL retraining;balance training;strengthening;home program guidelines;progressive activity/exercise;risk factor education   Clinical Decision Making Complexity (OT) moderate complexity   Risk & Benefits of therapy have been explained evaluation/treatment results reviewed;care plan/treatment goals reviewed;risks/benefits reviewed;current/potential barriers reviewed;participants voiced agreement with care  plan;participants included;patient   Clinical Impression Comments Pt presents with mild difficulties in ADLs due to decreased balance, weakness, and deconditioning. Pt would benefit from ongoing skilled OT intervention during his acute care stay to progress his deficits and maximize his safety and independence in ADLs for a safe return home. Home PT may be sufficient upon DC but will discuss further with pt tomorrow.   OT Discharge Planning   OT Discharge Recommendation (DC Rec) home with assist   OT Rationale for DC Rec Pt presents with mild difficulties in ADLs due to decreased balance, weakness, and deconditioning. Pt would benefit from ongoing skilled OT intervention during his acute care stay to progress his deficits and maximize his safety and independence in ADLs for a safe return home. Home PT may be sufficient upon DC but will discuss further with pt tomorrow.   OT Brief overview of current status SBA-CGA and verbal cues   Total Evaluation Time (Minutes)   Total Evaluation Time (Minutes) 13   OT Goals   Therapy Frequency (OT) 5 times/wk   OT Predicated Duration/Target Date for Goal Attainment 03/15/22   OT Goals Hygiene/Grooming;Toilet Transfer/Toileting;Aerobic Activity;Lower Body Dressing   OT: Hygiene/Grooming modified independent   OT: Lower Body Dressing Modified independent   OT: Toilet Transfer/Toileting Modified independent   OT: Perform aerobic activity with stable cardiovascular response 15 minutes

## 2022-03-08 NOTE — PROGRESS NOTES
Assessment completed with Anthony.    LOC: alert     Dx: Complicated UTI  Chronic Disease Management: DM2, COPD    Lives with: his cat  Living at:  Home in [place]  Transportation: YES, drives self, Grazyna also provides transportation    Primary PCP: Zoila Jones  Insurance:  Medicare/BCBS  Medicare: Inpatient  letter reviewed with Anthony.    Support System:  family  Homecare/PCA: no  /County Services:   no  [place]: NO      How was the [facility] notification completed: n/a    Health Care Directive: POLST on file  Guardian: no  POA: no    Pharmacy: [facility]  Meds management: manages own    Adequate Resources for needs (housing, utilities, food/med): YES  Household chores: has a   Work/community/social activity: YES, gets out as desired    ADLs: independent  Ambulation:uses a cane and furniture  Falls: a few times on ice  Nutrition: no concerns gets MOS and Grazyna brings food  Sleep: usually sleeps well    Equipment used: cane, shower chair and grab bars      Oxygen supplier: no      Does the supplier have valid oxygen orders: n/a    Mental health: denies  Substance abuse: denies  Exposure to violence/abuse: denies  Stressors: denies    Able to Return to Prior Living Arrangements: YES    Choice of Vendor: n/a    Barriers: unknown    MARCELA: medium    Plan: home with possible home PT.    Alaina Sotelo CM

## 2022-03-08 NOTE — PLAN OF CARE
"Pt up in chair twice today. Pt worked with PT and ambulated the in hallway with them. OT also in to see pt this afternoon. Pt denies pain. Appeared to do better this afternoon with walker while ambulating to bathroom whether or not pt uses at home is questionable. Pt at [facility]. Refused sliding scale this afternoon as pt does not take this at home. This am comments of \"he'd rather die from not checking blood sugars or getting more pokes\". Pt is on ozempic at home and is due for his weekly injection. His friend conrado does those injections as well as look out for him by housekeeping and bringing pt grocery shopping at times. Pt has low grade temps. Lungs sound clear. Denies dysuria. Phillip dc'd but will continue on IV vanco tonight. Pt makes needs known approp. Attempted to do med rec. Pt does not know dosages but could tell me if he takes them morning or night.     Face to face report given with opportunity to observe patient.    Report given to Shivani Rose RN   3/8/2022  3:24 PM      "

## 2022-03-08 NOTE — H&P
[facility]    History and Physical - Hospitalist Service       Date of Admission:  3/7/2022    Assessment & Plan      Anthony Dyer is a 81 year old male admitted on 3/7/2022. He was brought to ED by EMS because of weakness. Diagnosed with UTI, sepsis.     Problem list;   1. Sepsis with acute on chronic renal failure and encephalopathy. Due to UtI. Will treat UTI. Hemodynamically stable. Follow cultures.   2. Recurrent UTI. Previous cultures reviewed. Most previous cultures grew pansensitive enterococcus. Last UTI was 3/4/2022 and he was treated with nitrofurantoin. But this admission he is clinically septic and we will broader cover with zosyn and vancomycin.   3. DM-2. Diabetic diet. Sliding scale.  4. COPd. No signs of exacerbation. Will continue nebulizer PRN  5. H/o large PE (4.2021). Still on Xarelto 20 mg po daily. Will continue xarelto.   6. H/o BPH, urinary retention. Post TURP 10.15.2021. Continue finasteride. Able to urinate 150-200 ml w/o problems. Will scan bladder if suspect retention (unlikely).      Diet:   advance to full diabetic.  DVT Prophylaxis: DOAC  Schmitt Catheter: Not present  Central Lines: None  Cardiac Monitoring: None  Code Status:   Full     Clinically Significant Risk Factors Present on Admission              # Coagulation Defect: home medication list includes an anticoagulant medication        Disposition Plan   Expected Discharge: in 3-4 days  Anticipated discharge location: TBD  Delays: don't expect, unless clinically not improving as expected    The patient's care was discussed with the Patient.    Brayan Mason MD  Hospitalist Service  [facility]  Securely message with the Vocera Web Console (learn more here)  Text page via [employer] Paging/Directory         ______________________________________________________________________    Chief Complaint   Weakness      History is obtained from the patient, electronic health record and emergency department  physician. Patient is poor historian due to sepsis and encephalopathy    History of Present Illness   Anthony Dyer is a 81 year old male who who has PMH of recurrent UTI (last 3.4.2021), urinary retention (due to BPH), s/p TURP (10.15.2021), extensive PE (4.2021), COPD, CKD, DM-2, who was brought to ED due to weakness. He remembers sitting and playing with his cat on the floor. Doesn't remember details. He remembers that his weakness was progressing over the past 2 days and was worsening. Admits dysuria and fever. Denies abdominal pain, cough, chest pain, diarrhea, hematemezis, hematuria, dyspnea, hemoptysis, vomiting.   He presents to ED with low grade fever, tachycardic, , 94% on RA. /69  eD workup revealed leukocytosis, elevated CRP, lactic acid, and acute on chronic renal failure.   UTI related sepsis diagnosed. Cultures obtained. 2 L NS given. Levaquin, zosyn and vancomycin given in Ed. Cr 1.52 (base 1.18)  alctic acid 3.2 Procal 27  WBC 19.9   UA   xR chest w/o acute pathology.       Review of Systems    12 points obtained. Pertinent positives and negatives listed in HPI. The rest is negative.     Past Medical History    I have reviewed this patient's medical history and updated it with pertinent information if needed.   Past Medical History:   Diagnosis Date     Diabetes (H)      Hypertension        Past Surgical History   I have reviewed this patient's surgical history and updated it with pertinent information if needed.  Past Surgical History:   Procedure Laterality Date     CYSTOSCOPY, TRANSURETHRAL RESECTION (TUR) PROSTATE, COMBINED N/A 10/15/2021    Procedure: CYSTOSCOPY, WITH TRANSURETHRAL RESECTION PROSTATE;  Surgeon: Indu De Leon MD;  Location: [facility] OR     HERNIA REPAIR       PHACOEMULSIFICATION WITH STANDARD INTRAOCULAR LENS IMPLANT Left 12/27/2021    Procedure: PHACOEMULSIFICATION CATARACT EXTRACTION POSTERIOR CHAMBER LENS LEFT EYE;  Surgeon: Phillip Maldonado MD;  Location:  [facility] OR     PHACOEMULSIFICATION WITH STANDARD INTRAOCULAR LENS IMPLANT Right 1/11/2022    Procedure: PHACOEMULSIFICATION CATARACT EXTRACTION POSTERIOR CHAMBER LENS RIGHT EYE;  Surgeon: Phillip Maldonado MD;  Location: [facility] OR       Social History   I have reviewed this patient's social history and updated it with pertinent information if needed.  Social History     Tobacco Use     Smoking status: Never Smoker     Smokeless tobacco: Former User     Types: Chew   Substance Use Topics     Alcohol use: Yes     Comment: daily, 4 today     Drug use: Never       Family History     Unable to obtain due to: patient doesn't know    Prior to Admission Medications   Prior to Admission Medications   Prescriptions Last Dose Informant Patient Reported? Taking?   acetaminophen (TYLENOL) 325 MG tablet  Self Yes No   Sig: Take 650 mg by mouth every 4 hours as needed for pain . Limit acetaminophen to 4000 mg per day from all sources.   albuterol (PROAIR HFA/PROVENTIL HFA/VENTOLIN HFA) 108 (90 Base) MCG/ACT inhaler  Self Yes No   Sig: Inhale 2 puffs into the lungs every 4 hours as needed   allopurinol (ZYLOPRIM) 100 MG tablet  Self Yes No   Sig: Take 100 mg by mouth daily    amLODIPine (NORVASC) 2.5 MG tablet  Self Yes No   Sig: Take 2.5 mg by mouth daily   atorvastatin (LIPITOR) 80 MG tablet  Self Yes No   Sig: Take 40 mg by mouth every evening    carvedilol (COREG) 25 MG tablet  Self Yes No   Sig: Take 25 mg by mouth 2 times daily    finasteride (PROSCAR) 5 MG tablet  Self No No   Sig: Take 1 tablet (5 mg) by mouth daily   gabapentin (NEURONTIN) 300 MG capsule  Self Yes No   Sig: Take 300 mg by mouth 3 times daily    glimepiride (AMARYL) 2 MG tablet  Self Yes No   Sig: Take 2 mg by mouth every morning (before breakfast)    ketorolac (ACULAR) 0.5 % ophthalmic solution   Yes No   Sig: INSTILL 1 DROP INTO LEFT EYE 4 TIMES DAILY. START 3 DAYS BEFORE SURGERY, THEN 4 TIMES DAILY FOR 1 WEEK AFTER SURGERY, THEN 3 TIMES DAILY FOR   metFORMIN  (GLUCOPHAGE-XR) 500 MG 24 hr tablet   No No   Sig: Take 2 tablets (1,000 mg) by mouth 2 times daily (with meals)   moxifloxacin (VIGAMOX) 0.5 % ophthalmic solution   Yes No   Sig: INSTILL 1 DROP INTO LEFT EYE 4 TIMES DAILY AS DIRECTED START 3 DAYS BEFORE SURGERY CONTINUE FOR 1 WEEK AFTER SURGERY   multivitamin, therapeutic (THERA-VIT) TABS tablet  Self Yes No   Sig: Take 1 tablet by mouth daily   nitroFURantoin macrocrystal-monohydrate (MACROBID) 100 MG capsule   No No   Sig: Take 1 capsule (100 mg) by mouth 2 times daily   prednisoLONE acetate (PRED FORTE) 1 % ophthalmic suspension   Yes No   Sig: INSTILL 1 DROP INTO LEFT EYE 4 TIMES DAILY FOR 1 WEEK AFTER SURGERY, THEN TAKE 3 TIMES DAILY FOR 5 WEEKS   rivaroxaban ANTICOAGULANT (XARELTO) 20 MG TABS tablet   No No   Sig: Take 1 tablet (20 mg) by mouth daily (with dinner)   semaglutide (OZEMPIC, 0.25 OR 0.5 MG/DOSE,) 2 MG/1.5ML SOPN pen   No No   Sig: Inject 0.5 mg Subcutaneous every 7 days on Tuesdays   tamsulosin (FLOMAX) 0.4 MG capsule  Self Yes No   Sig: Take 0.4 mg by mouth every evening    vitamin B-12 (CYANOCOBALAMIN) 250 MCG tablet  Self Yes No   Sig: Take 250 mcg by mouth daily      Facility-Administered Medications: None     Allergies   Allergies   Allergen Reactions     Ace Inhibitors      Per [facility]: hyperkalemia.  Pt doesn't know if he is allergic     Ceclor [Cefaclor] Hives     Sulfa Drugs      Pt unsure.        Physical Exam   Vital Signs: Temp: 100.2  F (37.9  C) Temp src: Tympanic BP: 128/82 Pulse: 95   Resp: 20 SpO2: (!) 91 % O2 Device: None (Room air)    Weight: 0 lbs 0 oz    General: elderly man, ill looking, but not toxic. Mildly confused.   NC/AT. MMM  Neck: no JVD  Cardiac: regular, tachycardic, extrasystole  Lungs: clear left and right  Abd; obese, not tender  : bladder not palpable.   Skin; warm, flushed  Neurological: diffusely weak, non-focal.   Psychiatric: sleepy, arousable, confused.     Data   Data reviewed today: I reviewed all  medications, new labs and imaging results over the last 24 hours. I personally reviewed the EKG tracing showing NSR, PVC, sinus tachycardic and the chest x-ray image(s) showing no acute pathology.    Recent Labs   Lab 03/08/22  0027   WBC 19.9*   HGB 14.1   MCV 93         POTASSIUM 4.3   CHLORIDE 109   CO2 23   BUN 25   CR 1.52*   ANIONGAP 8   JODI 9.0   *   ALBUMIN 3.5   PROTTOTAL 7.0   BILITOTAL 0.9   ALKPHOS 74   ALT 23   AST 18     19.9 (H)    \    14.1    /    156   N 95    L N/A    140    109    25 /   ------------------------------------ 201 (H)   ALT 23   AST 18   AP 74   ALB 3.5   Ca 9.0  4.3    23    1.52 (H) \    % RETIC N/A    LDH N/A  Troponin N/A    BNP N/A      INR N/A   PTT N/A    D-dimer N/A    Fibrinogen N/A    Antithrombin N/A  Ferritin N/A  .0 (H)    IL-6 N/A  No results found for this or any previous visit (from the past 24 hour(s)).

## 2022-03-08 NOTE — PROGRESS NOTES
03/08/22 1200   Quick Adds   Type of Visit Initial PT Evaluation   Living Environment   People in Home alone   Current Living Arrangements house   Home Accessibility stairs to enter home   Number of Stairs, Main Entrance 1   Transportation Anticipated family or friend will provide   Living Environment Comments Lives alone. Reports family is close by    Self-Care   Usual Activity Tolerance moderate   Current Activity Tolerance fair   Regular Exercise No   Equipment Currently Used at Home cane, straight;grab bar, toilet;grab bar, tub/shower;shower chair   Fall history within last six months yes   Number of times patient has fallen within last six months 3   General Information   Onset of Illness/Injury or Date of Surgery 03/08/22   Referring Physician Dr. Martins   Patient/Family Therapy Goals Statement (PT) Return home   Pertinent History of Current Problem (include personal factors and/or comorbidities that impact the POC) Pt hospitalized c weakness, UTI   Existing Precautions/Restrictions fall   Cognition   Affect/Mental Status (Cognition) WFL   Orientation Status (Cognition) oriented x 4   Pain Assessment   Patient Currently in Pain No   Integumentary/Edema   Integumentary/Edema no deficits were identifed   Range of Motion (ROM)   Range of Motion ROM is WNL   Strength (Manual Muscle Testing)   Strength (Manual Muscle Testing) Deficits observed during functional mobility   Transfers   Transfers sit-stand transfer   Transfer Safety Concerns Noted decreased balance during turns   Impairments Contributing to Impaired Transfers impaired balance;decreased strength   Sit-Stand Transfer   Sit-Stand [place] (Transfers) supervision;contact guard   Assistive Device (Sit-Stand Transfers) cane, straight   Gait/Stairs (Locomotion)   [place] Level (Gait) contact guard;minimum assist (75% patient effort)   Assistive Device (Gait) cane, straight   Distance in Feet (Required for LE Total Joints) 200'   Pattern (Gait)  step-through   Deviations/Abnormal Patterns (Gait) domi decreased;stride length decreased   Comment, (Gait/Stairs) Flexed posture, reaches for railings and IV pole. Recommending walking at this time for safety. Pt reports furniture walking at home   Coordination   Coordination no deficits were identified   Muscle Tone   Muscle Tone no deficits were identified   Clinical Impression   Criteria for Skilled Therapeutic Intervention Yes, treatment indicated   PT Diagnosis (PT) Weakness   Influenced by the following impairments Impaired gait, increased fall risk, weakness, impaired balance   Functional limitations due to impairments Decreased safety with functional mobility   Clinical Presentation (PT Evaluation Complexity) Stable/Uncomplicated   Clinical Presentation Rationale Clinical judgement   Clinical Decision Making (Complexity) low complexity   Planned Therapy Interventions (PT) balance training;bed mobility training;gait training;home exercise program;neuromuscular re-education;strengthening;stair training;transfer training   Anticipated Equipment Needs at Discharge (PT) walker, rolling   Risk & Benefits of therapy have been explained evaluation/treatment results reviewed   Clinical Impression Comments PT evaluation completed for weakness. Based on pt's current function he would benefit from a walker and also home PT for safety assessment and for generalized weakness and decreased activity tolerance. Plan to treat pt during his stay and monitor progress   PT Discharge Planning   PT Discharge Recommendation (DC Rec) home with home care physical therapy   PT Rationale for DC Rec Pt demonstrates with weakness and balance impairments putting him at risk for falls   PT Brief overview of current status CGA for safety with mobility, decreased stability noted with cane-recommending walker   Total Evaluation Time   Total Evaluation Time (Minutes) 10   Physical Therapy Goals   PT Frequency 6x/week   PT Predicated  Duration/Target Date for Goal Attainment 03/15/22   PT Goals Gait;Stairs;Transfers   PT: Transfers Modified independent   PT: Gait Modified independent;Rolling walker;Greater than 200 feet   PT: Stairs 1 stair;Modified independent

## 2022-03-09 ENCOUNTER — APPOINTMENT (OUTPATIENT)
Dept: OCCUPATIONAL THERAPY | Facility: HOSPITAL | Age: 82
DRG: 872 | End: 2022-03-09
Payer: MEDICARE

## 2022-03-09 ENCOUNTER — APPOINTMENT (OUTPATIENT)
Dept: PHYSICAL THERAPY | Facility: HOSPITAL | Age: 82
DRG: 872 | End: 2022-03-09
Payer: MEDICARE

## 2022-03-09 LAB
ALBUMIN SERPL-MCNC: 2.9 G/DL (ref 3.4–5)
ALP SERPL-CCNC: 64 U/L (ref 40–150)
ALT SERPL W P-5'-P-CCNC: 23 U/L (ref 0–70)
ANION GAP SERPL CALCULATED.3IONS-SCNC: 4 MMOL/L (ref 3–14)
AST SERPL W P-5'-P-CCNC: 25 U/L (ref 0–45)
BILIRUB SERPL-MCNC: 0.5 MG/DL (ref 0.2–1.3)
BUN SERPL-MCNC: 22 MG/DL (ref 7–30)
CALCIUM SERPL-MCNC: 8.7 MG/DL (ref 8.5–10.1)
CHLORIDE BLD-SCNC: 111 MMOL/L (ref 94–109)
CO2 SERPL-SCNC: 26 MMOL/L (ref 20–32)
CREAT SERPL-MCNC: 1.35 MG/DL (ref 0.66–1.25)
ERYTHROCYTE [DISTWIDTH] IN BLOOD BY AUTOMATED COUNT: 13 % (ref 10–15)
GFR SERPL CREATININE-BSD FRML MDRD: 53 ML/MIN/1.73M2
GLUCOSE BLD-MCNC: 112 MG/DL (ref 70–99)
GLUCOSE BLDC GLUCOMTR-MCNC: 104 MG/DL (ref 70–99)
GLUCOSE BLDC GLUCOMTR-MCNC: 147 MG/DL (ref 70–99)
GLUCOSE BLDC GLUCOMTR-MCNC: 150 MG/DL (ref 70–99)
GLUCOSE BLDC GLUCOMTR-MCNC: 186 MG/DL (ref 70–99)
GLUCOSE BLDC GLUCOMTR-MCNC: 80 MG/DL (ref 70–99)
GLUCOSE BLDC GLUCOMTR-MCNC: 96 MG/DL (ref 70–99)
HCT VFR BLD AUTO: 38.8 % (ref 40–53)
HGB BLD-MCNC: 12.7 G/DL (ref 13.3–17.7)
LACTATE SERPL-SCNC: 1.4 MMOL/L (ref 0.7–2)
MCH RBC QN AUTO: 31.8 PG (ref 26.5–33)
MCHC RBC AUTO-ENTMCNC: 32.7 G/DL (ref 31.5–36.5)
MCV RBC AUTO: 97 FL (ref 78–100)
PLATELET # BLD AUTO: 128 10E3/UL (ref 150–450)
POTASSIUM BLD-SCNC: 3.9 MMOL/L (ref 3.4–5.3)
PROCALCITONIN SERPL-MCNC: 21.98 NG/ML
PROT SERPL-MCNC: 6.4 G/DL (ref 6.8–8.8)
RBC # BLD AUTO: 4 10E6/UL (ref 4.4–5.9)
SODIUM SERPL-SCNC: 141 MMOL/L (ref 133–144)
WBC # BLD AUTO: 7.9 10E3/UL (ref 4–11)

## 2022-03-09 PROCEDURE — 99232 SBSQ HOSP IP/OBS MODERATE 35: CPT | Mod: 24 | Performed by: NURSE PRACTITIONER

## 2022-03-09 PROCEDURE — 250N000013 HC RX MED GY IP 250 OP 250 PS 637: Performed by: INTERNAL MEDICINE

## 2022-03-09 PROCEDURE — 84145 PROCALCITONIN (PCT): CPT | Performed by: INTERNAL MEDICINE

## 2022-03-09 PROCEDURE — 97530 THERAPEUTIC ACTIVITIES: CPT | Mod: GP

## 2022-03-09 PROCEDURE — 85027 COMPLETE CBC AUTOMATED: CPT | Performed by: INTERNAL MEDICINE

## 2022-03-09 PROCEDURE — 82040 ASSAY OF SERUM ALBUMIN: CPT | Performed by: INTERNAL MEDICINE

## 2022-03-09 PROCEDURE — 120N000001 HC R&B MED SURG/OB

## 2022-03-09 PROCEDURE — 83605 ASSAY OF LACTIC ACID: CPT | Performed by: INTERNAL MEDICINE

## 2022-03-09 PROCEDURE — 97530 THERAPEUTIC ACTIVITIES: CPT | Mod: GO

## 2022-03-09 PROCEDURE — 250N000011 HC RX IP 250 OP 636: Performed by: INTERNAL MEDICINE

## 2022-03-09 PROCEDURE — 80053 COMPREHEN METABOLIC PANEL: CPT | Performed by: INTERNAL MEDICINE

## 2022-03-09 PROCEDURE — 97110 THERAPEUTIC EXERCISES: CPT | Mod: GP

## 2022-03-09 PROCEDURE — 36415 COLL VENOUS BLD VENIPUNCTURE: CPT | Performed by: INTERNAL MEDICINE

## 2022-03-09 RX ORDER — ASCORBIC ACID 500 MG
500 TABLET ORAL DAILY
COMMUNITY

## 2022-03-09 RX ORDER — NITROFURANTOIN 25; 75 MG/1; MG/1
100 CAPSULE ORAL 2 TIMES DAILY
Status: ON HOLD | COMMUNITY
End: 2022-03-10

## 2022-03-09 RX ADMIN — FINASTERIDE 5 MG: 5 TABLET, FILM COATED ORAL at 09:33

## 2022-03-09 RX ADMIN — GABAPENTIN 300 MG: 300 CAPSULE ORAL at 09:34

## 2022-03-09 RX ADMIN — GABAPENTIN 300 MG: 300 CAPSULE ORAL at 14:16

## 2022-03-09 RX ADMIN — VANCOMYCIN HYDROCHLORIDE 1000 MG: 1 INJECTION, SOLUTION INTRAVENOUS at 20:46

## 2022-03-09 RX ADMIN — CARVEDILOL 25 MG: 25 TABLET, FILM COATED ORAL at 09:34

## 2022-03-09 RX ADMIN — TAMSULOSIN HYDROCHLORIDE 0.4 MG: 0.4 CAPSULE ORAL at 20:44

## 2022-03-09 RX ADMIN — ALLOPURINOL 100 MG: 100 TABLET ORAL at 09:34

## 2022-03-09 RX ADMIN — MULTIPLE VITAMINS W/ MINERALS TAB 1 TABLET: TAB at 09:34

## 2022-03-09 RX ADMIN — AMLODIPINE BESYLATE 2.5 MG: 2.5 TABLET ORAL at 09:34

## 2022-03-09 RX ADMIN — ACETAMINOPHEN 650 MG: 325 TABLET, FILM COATED ORAL at 20:44

## 2022-03-09 RX ADMIN — GABAPENTIN 300 MG: 300 CAPSULE ORAL at 20:44

## 2022-03-09 RX ADMIN — RIVAROXABAN 20 MG: 20 TABLET, FILM COATED ORAL at 09:34

## 2022-03-09 RX ADMIN — ATORVASTATIN CALCIUM 40 MG: 40 TABLET, FILM COATED ORAL at 20:44

## 2022-03-09 RX ADMIN — CARVEDILOL 25 MG: 25 TABLET, FILM COATED ORAL at 20:44

## 2022-03-09 ASSESSMENT — ACTIVITIES OF DAILY LIVING (ADL)
ADLS_ACUITY_SCORE: 10
ADLS_ACUITY_SCORE: 10
ADLS_ACUITY_SCORE: 11
ADLS_ACUITY_SCORE: 13
ADLS_ACUITY_SCORE: 10
ADLS_ACUITY_SCORE: 13
ADLS_ACUITY_SCORE: 10
ADLS_ACUITY_SCORE: 11
ADLS_ACUITY_SCORE: 11
ADLS_ACUITY_SCORE: 12
ADLS_ACUITY_SCORE: 12
ADLS_ACUITY_SCORE: 10
ADLS_ACUITY_SCORE: 11
ADLS_ACUITY_SCORE: 10
ADLS_ACUITY_SCORE: 11
ADLS_ACUITY_SCORE: 11
ADLS_ACUITY_SCORE: 12
ADLS_ACUITY_SCORE: 12
ADLS_ACUITY_SCORE: 11
ADLS_ACUITY_SCORE: 10
ADLS_ACUITY_SCORE: 12
ADLS_ACUITY_SCORE: 11
ADLS_ACUITY_SCORE: 13
ADLS_ACUITY_SCORE: 10

## 2022-03-09 NOTE — PHARMACY
Pharmacy Antimicrobial Stewardship Assessment     Current Antimicrobial Therapy:  Anti-infectives (From now, onward)    Start     Dose/Rate Route Frequency Ordered Stop    22 2100  vancomycin (VANCOCIN) 1000 mg in dextrose 5% 200 mL PREMIX         1,000 mg  200 mL/hr over 1 Hours Intravenous EVERY 24 HOURS 22 0405            Indication: Sepsis + UTI    Days of Therapy: 2     Pertinent Labs:  Recent Labs   Lab Test 22  0556 22  0027   WBC 7.9 19.9*     Lab Test 22  0556 22  0510 22  0027   LACT 1.4 1.7 3.2*   CRP  --   --  130.0*   PCAL 21.98*  --  26.96*        Temperature:  Temp (24hrs), Av.3  F (37.4  C), Min:98.6  F (37  C), Max:100.1  F (37.8  C)    Culture Results:       Recommendations/Interventions:  1. None at this time.     Ramonita Henriquez [facility]  2022

## 2022-03-09 NOTE — PROGRESS NOTES
03/09/22 1200   Signing Clinician's Name / Credentials   Signing clinician's name / credentials Rebekah Jones, OTR/L   Quick Adds   Rehab Discipline OT   Therapeutic Activity   Minutes of Treatment 19 minutes   Symptoms Noted During/After Treatment Fatigue   Treatment Detail Pt in bed upon OT arrival, reports he had been resting there for about a half hour but was agreeable to tx. Supine<>sit SBA and verbal cues to scoot up higher to get back into bed. Sit<>stand from EOB SBA. Pt ambulated around room to/from the door x2 and in/out of BR using FWW with CGA. He completed a toilet transfer with SBA with use of grab bar that he also has at home (states his toilet is higher too). He stood at the sink and completed a grooming task with SBA. Pt returned to bed and participated in BUE AROM / strengthening exercises x 10 reps each including shoulder flex, punch outs, elbow flex/ext, forearm pro/sup, and finger flex/ext. SpO2 92% on RA. DIscussed home therapy; pt not sure what he wants to home therapy but states he's only mildly below his baseline. Pt was left resting in the bed with the call light within reach and bed alarm activated.   OT Discharge Planning   OT Discharge Recommendation (DC Rec) home with assist   OT Rationale for DC Rec Pt presents with mild difficulties in ADLs due to decreased balance, weakness, and deconditioning. Pt would benefit from ongoing skilled OT intervention during his acute care stay to progress his deficits and maximize his safety and independence in ADLs for a safe return home. Home PT may be sufficient upon DC; pt wasn't sure if he wanted just one or both upon discussion today.   OT Brief overview of current status Pt in bed upon OT arrival, reports he had been resting there for about a half hour but was agreeable to tx. Supine<>sit SBA and verbal cues to scoot up higher to get back into bed. Sit<>stand from EOB SBA. Pt ambulated around room to/from the door x2 and in/out of BR using  FWW with CGA. He completed a toilet transfer with SBA with use of grab bar that he also has at home (states his toilet is higher too). He stood at the sink and completed a grooming task with SBA. Pt returned to bed and participated in BUE AROM / strengthening exercises x 10 reps each including shoulder flex, punch outs, elbow flex/ext, forearm pro/sup, and finger flex/ext. SpO2 92% on RA. DIscussed home therapy; pt not sure what he wants to home therapy but states he's only mildly below his baseline. Pt was left resting in the bed with the call light within reach and bed alarm activated.   Additional Documentation   Rehab Comments Pt doing well, only required CGA at most today and tolerated increased activity.   OT Plan Progress strength, activity tolerance, and ADLs as able   Total Session Time   Total Session Time (minutes) 19 minutes

## 2022-03-09 NOTE — CARE PLAN
Prior to Admission Medication Reconciliation:     Medications added:   [] None  [x] As listed below:    Ascorbic acid 500 mg daily     Medications deleted:   [] None  [x] As listed below:    Eye drops (moxifloxacin, prednisolone and ketorolac)- therapy completed    Medications marked for review/removal by attending:  [x] None  [] As listed below:    Changes made to existing medications:   [] None  [] Updated strengths and frequencies to most current  [x] As listed below:    ozempic- pt independently reduced his dosing to 0.25 mg weekly because his sugars were running low. Reports that he has not had issues since doing so.     Note: macrobid- prescription pt had on hand from December. Started taking when he started feeling ill and took 3 doses total PTA. Has 17 capsules left at home.     Last times/dates taken verified with patient:  [x] Yes- completed myself  [] Prepared PTA medlist for review only. (will not be available to review personally)  [] Did not review with patient. Rx verification only. Review completed by nursing.    [] Nurse completed no changes made (double checked entries)  [] Unable to review with patient at this time:  [] Nurse completed/changes made:     Allergies listed at another location:  []Allergies match allergies listed in Epic  []Other allergies listed:    Allergy review:    [x]Did not review: reviewed by nursing  []Did not review: pt unable at this time  []Patient/MAR verified NKDA  []Patient/MAR verified current existing allergies: no changes made  []Patient confirmed current existing allergies and new allergies added:    Medication reconciliation sources:   [x]Patient  []Patient family member/emergency contact: **  [x][facility] Report Review: none  []Epic Chart Review  [x]Care Everywhere review:  MULTIVITAMINS OR TABS   one tablet one time a day 10/29/2004   [x]acetaminophen (TYLENOL) 325 MG tablet   Take 2 Tabs by mouth every four hours as needed for Pain. Acetaminophen should be  limited to 4000 mg per day. 05/23/2012   [x]Cyanocobalamin (B-12) 250 MCG Tab   Take 1 Tab by mouth one time a day. 09/29/2016   lancets (ACCU-CHEK SOFTCLIX)   Use to monitor blood sugars twice daily 10/05/2016   ACCU-CHEK GRECIA PLUS STRIP   Use to test blood sugars twice daily 10/05/2016   Blood Glucose Monitoring Suppl (BLOOD GLUCOSE SYSTEM LUIS) Kit   by Does not apply route. Patient needs a new BG meter. Please supply him one that will be covered by his insurance 10/25/2017   []aspirin 325 MG tablet  (per pt he is not taking) Take 0.5 Tabs by mouth one time a day. 01/24/2018   Insulin Pen Needle (PEN NEEDLES) 31G X 6 MM device   For use on Ozempic pen 10/23/2019   []sildenafil citrate (Viagra) 100 MG tablet  (per patient not taking recently) Take 0.5-1 Tabs by mouth as needed for Erectile dysfunction. Take orally 30 minutes to 4 hours before sexual activity. 01/22/2020   [x]albuterol HFA (Ventolin HFA) 108 (90 Base) MCG/ACT inhalation aerosol   Inhale 2 Puffs into the lungs every four hours as needed for Shortness of Breath. Shake before using. 07/28/2020   glimepiride (Amaryl) 4 MG tablet   TAKE 1 TAB BY MOUTH EVERY MORNING WITH BREAKFAST. 04/11/2021   [x]glimepiride (Amaryl) 4 MG tablet   Take 0.5 Tablets by mouth every morning with breakfast. 04/11/2021   [x]atorvaSTATin (Lipitor) 80 MG tablet   TAKE ONE-HALF TABLET BY MOUTH EVERY EVENING 08/09/2021   [x]amLODIPine (Norvasc) 2.5 MG tablet   TAKE 1 TABLET BY MOUTH ONCE DAILY 08/09/2021   [x]allopurinol (Zyloprim) 100 MG tablet   TAKE 1 TABLET BY MOUTH ONCE DAILY 08/19/2021   [x]metFORMIN-XR (Glucophage-XR) 500 MG 24 hour tablet   TAKE 2 TABLETS BY MOUTH TWICE DAILY WITH MEALS. SWALLOW TABLET WHOLE; DO NOT CRUSH, DIVIDE OR CHEW. No further refills until seen 12/11/2021   [x]semaglutide, 0.25 or 0.5 mg/dose, (Ozempic) 2 MG/1.5ML Solution Pen-injector   Inject 0.5 mg under the skin every seven days. NEEDS APPT BY 2/17/22 FOR MORE REFILLS. 01/17/2022    [x]gabapentin (Neurontin) 300 MG capsule   TAKE 1 CAP BY MOUTH THREE TIMES A DAY. 02/03/2022   [x]carvedilol (Coreg) 25 MG tablet   TAKE 1 TABLET BY MOUTH TWICE A DAY 02/03/2022   [x]tamsulosin (Flomax) 0.4 MG 24 hour capsule   TAKE 1 CAPSULE BY MOUTH ONE TIME A DAY. CAPSULES SHOULD BE SWALLOWED WHOLE; DO NOT CRUSH, CHEW, OR OPEN 02/07/2022     []Pharmacy med list: **  []Pharmacy phone call  [x]Outside meds dispense report: see below  []Nursing home or Assisted Living MAR:  []Other: **    Pharmacy desired at discharge[facility]    Is patient on coumadin?  [x]No    Requests for consultation by provider or pharmacist:   [x] Patient understands why all of their meds were prescribed and how to take them. No questions.   [] Managing party has no questions.   [] Patient/ managing party has questions about the following:  [] Did not review with patient. Cannot assess.     Fill dates and reported compliancy:  [x] Fill dates coincide with reported compliancy for all/most maintenance meds.   [] Fill dates do not coincide with compliancy with maintenance meds. See notes in PTA medlist and in comments.    [] Fill dates do not coincide with the following medications but pt reports compliancy:  [] Did not review with patient. Cannot assess.     Historian accuracy:  [x] Excellent- alert and oriented, understands why meds were prescribed and how to take, able to answer specifics  [] Good- alert and oriented, understands why meds were prescribed and how to take, some confusion   [] Fair- alert and oriented, doesn't know medications without list, cannot answer specifics about medications, but has a decent process for which to take at home  [] Poor- does not know medications, may not have a process to take at home, may be cognitively unable to review at this time  []Medication management done by family member or facility, no concerns about historian accuracy.   [] Did not review with patient. Cannot assess.     Medication  Management:  [x] Manages meds independently  [] Family member/ other party manages meds/assists:  [] Meds managed by staff at facility  [] Meds set up by home care, family/other party helps administer  [] Meds set up by home care, self administers  [] Did not review with patient. Cannot assess.     Other medications aside from PTA:  [x] Denies taking any medications aside from those listed in PTA meds  [] Reports taking another medication(s) but cannot recall the name(s)  [] Refuses to say.  [] Did not review with patient. Cannot assess.     Comments: none, knew his medications well. Several medications have fill dates that do not align with compliancy but pt reports having a surplus and seems confident about it. See fill dates and qty's below.       Myriam Talley on 3/9/2022 at 8:55 AM       Discrepancies: [x] No []Not Applicable []Yes: listed below    Issues completing PTA medication reconciliation:  [] On hold for a long time  [] Waited for a call back  [] Fax didn't come through  [] Fax took a long time  [] Other:    Notifying appropriate party of changes/additions/discrepancies:  [x]No pertinent changes made, notification not necessary.   [] Notified attending provider via text page/phone call  [] Notified attending provider in person  [] Notified pharmacy  [] Notified nurse  [] Medications have not been reconciled by a provider yet, notification not necessary  [] Pt is not admitted to floor yet, PTA meds completed before admission.   Medications Prior to Admission   Medication Sig Dispense Refill Last Dose     acetaminophen (TYLENOL) 500 MG tablet Take 1,000 mg by mouth 2 times daily . Limit acetaminophen to 4000 mg per day from all sources.    3/7/2022 at Unknown time     albuterol (PROAIR HFA/PROVENTIL HFA/VENTOLIN HFA) 108 (90 Base) MCG/ACT inhaler Inhale 2 puffs into the lungs every 4 hours as needed   Past Month at Unknown time     allopurinol (ZYLOPRIM) 100 MG tablet Take 100 mg by mouth daily    3/7/2022  at AM     amLODIPine (NORVASC) 2.5 MG tablet Take 2.5 mg by mouth daily   3/7/2022 at AM     atorvastatin (LIPITOR) 80 MG tablet Take 40 mg by mouth every evening    3/7/2022 at 1800     carvedilol (COREG) 25 MG tablet Take 25 mg by mouth 2 times daily    3/7/2022 at 0800/2000     finasteride (PROSCAR) 5 MG tablet Take 1 tablet (5 mg) by mouth daily 30 tablet 11 3/7/2022 at AM     gabapentin (NEURONTIN) 300 MG capsule Take 300 mg by mouth 3 times daily    3/7/2022 at AM/2pm/8pm     glimepiride (AMARYL) 2 MG tablet Take 2 mg by mouth every morning (before breakfast)    3/7/2022 at AM     metFORMIN (GLUCOPHAGE-XR) 500 MG 24 hr tablet Take 2 tablets (1,000 mg) by mouth 2 times daily (with meals) 60 tablet 0 3/7/2022 at AM/PM     multivitamin, therapeutic (THERA-VIT) TABS tablet Take 1 tablet by mouth daily   3/7/2022 at AM     nitroFURantoin macrocrystal-monohydrate (MACROBID) 100 MG capsule Take 100 mg by mouth 2 times daily For 10 days.   Past Week at Unknown time     rivaroxaban ANTICOAGULANT (XARELTO) 20 MG TABS tablet Take 1 tablet (20 mg) by mouth daily (with dinner) 30 tablet 3 3/7/2022 at 1800      semaglutide (OZEMPIC, 0.25 OR 0.5 MG/DOSE,) 2 MG/1.5ML SOPN pen Inject 0.5 mg Subcutaneous every 7 days on Tuesdays (Patient taking differently: Inject 0.25 mg Subcutaneous every 7 days Tuesdays) 3 mL 3 3/1/2022 at Unknown time     tamsulosin (FLOMAX) 0.4 MG capsule Take 0.4 mg by mouth every evening    3/7/2022 at 1800     vitamin B-12 (CYANOCOBALAMIN) 250 MCG tablet Take 250 mcg by mouth daily   Past Week at am     vitamin C (ASCORBIC ACID) 500 MG tablet Take 500 mg by mouth daily   Past Week at AM               Medication Dispense History (from 3/9/2021 to 3/9/2022)  Expand All  Collapse All    Acetaminophen     Dispensed Days Supply Quantity Provider Pharmacy   ACETAMINOPHEN 325MG TABLET 05/28/2021 9 100 Units TC HARRISON [place] [facility]...           Albuterol Sulfate     Dispensed Days Supply Quantity  Provider Pharmacy   ALBUTEROL HFA 90MCG/ACT INH 05/28/2021 [facility]   ALBUTEROL HFA 90MCG/ACT INH 03/12/2021 17 [facility]           Allopurinol     Dispensed Days Supply Quantity Provider Pharmacy   ALLOPURINOL 100MG TAB 02/14/2022 90 90 tablet MCKENNA CHERY Pharmacy...   ALLOPURINOL 100MG TABLET 02/14/2022 [facility]...   ALLOPURINOL 100MG TAB 11/12/2021 90 90 [facility]   ALLOPURINOL 100MG TABLET 11/12/2021 90 90 Units MCKENNA CHERY #61 - Fa...   ALLOPURINOL 100MG TABLET 08/19/2021 90 [facility]   ALLOPURINOL 100MG TABLET 05/28/2021 90 90 Units FAVIAN WILSON Pharmacy...   ALLOPURINOL 100MG TABLET 05/12/2021 30 30 Units FAVIAN WILSON #61 - Fa...   ALLOPURINOL 100MG TABLET 04/13/2021 30 30 Units FAVIAN WILSON #61 - Fa...           Amoxicillin     Dispensed Days Supply Quantity Provider Pharmacy   AMOXICILLIN 500MG CAPSULE 10/08/2021 7 14 Units TUCKER LUNA Pharmacy...           Amoxicillin-Pot Clavulanate     Dispensed Days Supply Quantity Provider Pharmacy   AMOX/CLAV 875MG-125MG TABLET 10/20/2021 10 20 [facility]           Atorvastatin Calcium     Dispensed Days Supply Quantity Provider Pharmacy   ATORVASTATIN 80MG TABLET 01/31/2022 90 45 Units MCKENNA CHERY #61 - Fa...   ATORVASTATIN CALCIUM 80MG TAB 01/31/2022 90 45 tablet MCKENNA CHERY Pharmacy...   ATORVASTATIN 80MG TABLET 11/01/2021 90 45 Units MCKENNA CHERY #61 - Fa...   ATORVASTATIN CALCIUM 80MG TAB 11/01/2021 90 45 tablet MCKENNA CHERY Pharmacy...   ATORVASTATIN 80MG TABLET 08/09/2021 90 [facility]   ATORVASTATIN 80MG TABLET 05/28/2021 90 45 Units  [facility] Pharmacy...   ATORVASTATIN 80MG TABLET 04/13/2021 90 45 Units [facility] #61 - Fa...   ATORVASTATIN TAB 80MG 04/13/2021 90 45 each [facility] Pharmacy...           Carvedilol     Dispensed Days Supply Quantity Provider Pharmacy   CARVEDILOL 25MG TAB 02/14/2022 90 180 tablet [facility] Pharmacy...   CARVEDILOL 25MG TABLET 02/14/2022 90 180 Units [facility] #61 - Fa...   CARVEDILOL 25MG TAB 11/12/2021 90 180 tablet [facility] Pharmacy...   CARVEDILOL 25MG TABLET 11/12/2021 90 180 Units [facility] #61 - Fa...   CARVEDILOL   TAB 25M 08/18/2021 90 180 tablet [facility] Pharmacy...   CARVEDILOL 25MG TABLET 08/18/2021 90 180 Units [facility] #61 - Fa...   CARVEDILOL 25MG TABLET 05/28/2021 90 180 Units [facility] Pharmacy...   CARVEDILOL   TAB 25MG 04/13/2021 90 180 each [facility] Pharmacy...   CARVEDILOL 25MG TABLET 04/13/2021 90 180 Units [facility] #61 - Fa...           Cephalexin     Dispensed Days Supply Quantity Provider Pharmacy   CEPHALEXIN 500MG CAPSULE 07/07/2021 5 10 Units EDGAR ARROYO [facility] Pharmacy...           Cyanocobalamin     Dispensed Days Supply Quantity Provider Pharmacy   VITAMIN B-12 250MCG TABLET 05/28/2021 30 30 Units TC HARRISON [facility] Pharmacy...           Finasteride      Dispensed Days Supply Quantity Provider Pharmacy   FINASTERIDE 5MG TAB 01/14/2022 30 30 tablet TUCKER LUNA [facility] Pharmacy...   FINASTERIDE 5MG TABLET 01/14/2022 30 30 Units TUCKER LUNA [facility] #61 - Fa...   FINASTERIDE 5MG TAB 12/15/2021 30 30 tablet GEMTUCKER MARTIN Pharmacy...   FINASTERIDE 5MG TABLET 12/15/2021 30 30 Units TUCKER LUNA #61 - Fa...   FINASTERIDE 5MG TAB 11/12/2021 30 30 tablet TUCKER LUNA Pharmacy...   FINASTERIDE 5MG  TABLET 11/12/2021 30 30 Units TUCKER LUNA [place] #61 - Fa...   FINASTERIDE  TAB 5MG 10/15/2021 30 30 tablet [facility] Pharmacy...   FINASTERIDE 5MG TABLET 10/15/2021 30 30 Units [address]LT[address] [facility] #61 - Fa...   FINASTERIDE  TAB 5MG 09/18/2021 30 [facility]   FINASTERIDE 5MG TABLET 09/18/2021 30 30 [facility]...   FINASTERIDE  TAB 5MG 08/18/2021 30 30 tablet [facility] Pharmacy...   FINASTERIDE 5MG TABLET 08/18/2021 30 30 [place]TUCKER [facility] #61 - Fa...   FINASTERIDE  TAB 5MG 07/20/2021 30 30 tablet [facility] Pharmacy...   FINASTERIDE 5MG TABLET 07/20/2021 30 30 [facility]...   FINASTERIDE  TAB 5MG 06/20/2021 30 30 tablet [facility] Pharmacy...   FINASTERIDE 5MG TABLET 06/20/2021 30 30 Units [place]TUCKER [facility] #61 - Fa...   FINASTERIDE 5MG TABLET 05/28/2021 30 30 Units [facility] Pharmacy...   FINASTERIDE 5MG TABLET 04/28/2021 30 30 Units [facility] Pharmacy...           Gabapentin     Dispensed Days Supply Quantity Provider Pharmacy   GABAPENTIN 300MG CAP 02/14/2022 90 270 capsule [address] [facility] Pharmacy...   GABAPENTIN 300MG CAPSULE 02/14/2022 90 270 Units MCKENNA CHERY #61 - Fa...   GABAPENTIN 300MG CAP 11/12/2021 90 270 capsule [address] [facility] Pharmacy...   GABAPENTIN 300MG CAPSULE 11/12/2021 [facility]...   GABAPENTIN    08/18/2021 90 270 capsule MCKENNA CHERY Pharmacy...   GABAPENTIN 300MG CAPSULE 08/18/2021 90 270 Units MCKENNA CHERY #61 - Fa...   GABAPENTIN 300MG CAPSULE 05/28/2021 90 270 Units MCKENNA CHERY Pharmacy...           Glimepiride     Dispensed Days Supply Quantity Provider Pharmacy   GLIMEPIRIDE 2MG TABLET 05/28/2021 30 30 Units  TC HARRISON Pharmacy...   GLIMEPIRIDE  TAB 4MG 04/13/2021 90 [facility]   GLIMEPIRIDE 4MG TABLET 04/13/2021 90 90 Units MCKENNA CHERY #61 - Fa...           Ketorolac Tromethamine     Dispensed Days Supply Quantity Provider Pharmacy   KETOROLAC 0.5% OPHTH SOLUTION 03/03/2022 45 10 mL MARY GARCIA Pharmacy...   KETOROLAC 0.5% OPHTH SOLUTION 02/14/2022 45 [facility]   KETOROLAC 0.5% OPHTH SOLUTION 01/29/2022 45 10 [facility]   KETOROLAC 0.5% OPHTH SOLUTION 01/25/2022 45 10 [facility]   KETOROLAC 0.5% OPHTH SOLUTION 12/28/2021 45 [facility]   KETOROLAC 0.5% OPHTH SOLUTION 10/06/2021 45 10 mL THO GARCIA Pharmacy...           Magnesium Hydroxide     Dispensed Days Supply Quantity Provider Pharmacy   MILK OF MAG 400MG/5ML SUSP 05/28/2021 16 473 mL TC HARRISON Pharmacy...           Moxifloxacin HCl     Dispensed Days Supply Quantity Provider Pharmacy   MOXIFLOXACIN 0.5% OPHTH SOLN 12/28/2021 11 3 mL THO GARCIA Pharmacy...   MOXIFLOXACIN 0.5% OPHTH SOLN 10/06/2021 10 3 [facility]           Multiple Vitamin     Dispensed Days Supply Quantity Provider Pharmacy   TAB-A-REGINA MULTIVITAMIN TABLET 05/28/2021 30 30 Units TC HARRISON Pharmacy...           Nitrofurantoin Monohyd Macro     Dispensed Days Supply Quantity Provider Pharmacy   NITROFURANTOIN M/M 100MG CAP 12/17/2021 10 20 Units TC HARRISON Pharmacy...   NITROFURANTOIN M/M 100MG CAP 07/09/2021 7 14 Units TC HARRISON Pharmacy...   NITROFURANTOIN M/M 100MG CAP 06/09/2021 7 14 Units MISHA BONDS Pharmacy...           Rivaroxaban     Dispensed Days Supply Quantity Provider  Pharmacy   XARELTO 20MG TAB 01/14/2022 30 30 tablet TC HARRISON Pharmacy...   XARELTO 20MG TABLET 01/14/2022 30 30 Units TC HARRISON #61 - Fa...   XARELTO 20MG TAB 12/15/2021 30 30 tablet TC HARRISON Pharmacy...   XARELTO 20MG TABLET 12/15/2021 30 30 Units TC HARRISON #61 - Fa...   XARELTO 20MG TAB 11/12/2021 30 30 tablet TC HARRISON Pharmacy...   XARELTO 20MG TABLET 11/12/2021 30 30 Units TC HARRISON #61 - Fa...   XARELTO      TAB 20M 10/15/2021 30 30 tablet TC HARRISON Pharmacy...   XARELTO 20MG TABLET 10/15/2021 30 30 Units TC HARRISON #61 - Fa...   XARELTO      TAB 20M 09/18/2021 30 30 tablet TC HARRISON Pharmacy...   XARELTO 20MG TABLET 09/18/2021 30 30 Units TC HARRISON #61 - Fa...   XARELTO      TAB 20M 08/18/2021 30 30 tablet TC HARRISON Pharmacy...   XARELTO 20MG TABLET 08/18/2021 30 30 Units TC HARRISON #61 - Fa...   XARELTO 20MG TABLET 07/21/2021 30 30 Units TC HARRISON Pharmacy...   XARELTO 20MG TABLET 06/24/2021 30 30 Units TAMIE RIBEIRO Pharmacy...   XARELTO 20MG TABLET 05/28/2021 30 30 Units TC HARRISON Pharmacy...           Semaglutide     Dispensed Days Supply Quantity Provider Pharmacy   OZEMPIC 0.25 OR 0.50/DOSE INJ 03/03/2022 28 1.5 mL MCKENNA CHERY #61 - Fa...   OZEMPIC 0.25/0.5 PEN 03/03/2022 28 1.5 mL MCKENNA CHERY Pharmacy...   OZEMPIC 0.25 OR 0.50/DOSE INJ 01/14/2022 56 3 mL SARAH TRAMMELL Pharmacy...   OZEMPIC 0.25 OR 0.50/DOSE INJ 12/13/2021 28 1.5 mL MCKENNA CHERY #61 - Fa...   OZEMPIC 0.25/0.5 PEN 12/13/2021 28 1.5 mL MCKENNA CHERY Pharmacy...   OZEMPIC 0.25 OR 0.50/DOSE INJ 11/12/2021 28 1.5 mL MCKENNA CHERY #61 - Fa...    OZEMPIC 0.25/0.5 PEN 11/12/2021 28 1.5 mL [place],MCKENNA [facility]...   OZEMPIC      INJ 2/1 10/12/2021 28 1.5 mL [place],[facility]CKENNA [facility]...   OZEMPIC 0.25 OR 0.50/DOSE INJ 10/12/2021 28 1.5 mL [place]NA,MCKENNA [facility] #61 - Fa...   OZEMPIC      INJ 2/1 09/15/2021 28 1.5 mL [place],MCKENNA [facility]...   OZEMPIC 0.25 OR 0.50/DOSE INJ 09/15/2021 28 1.5 mL [place]NA,MCKENNA [facility] #61 - Fa...   OZEMPIC      INJ 2/1 08/18/2021 28 1.5 mL [place],MCKENNA [facility]...   OZEMPIC 0.25 OR 0.50/DOSE INJ 08/18/2021 28 1.5 mL [place]NA,MCKENNA [facility] #61 - Fa...   OZEMPIC      INJ 2/1 07/15/2021 28 1.5 mL [place],MCKENNA [facility]...   OZEMPIC 0.25 OR 0.50/DOSE INJ 07/15/2021 28 1.5 mL [place],MCKENNA [facility] #61 - Fa...   OZEMPIC      INJ 2/1.5ML 06/17/2021 28 1.5 mL [place],MCKENNA [facility]...   OZEMPIC 0.25 OR 0.50/DOSE INJ 06/17/2021 28 1.5 mL [place]NA,MCKENNA [facility] #61 - Fa...   OZEMPIC      INJ 2/1.5ML 05/17/2021 28 1.5 each [place]NA,MCKENNA [facility]...   OZEMPIC 0.25 OR 0.50/DOSE INJ 05/17/2021 28 1.5 mL [place]NA,MCKENNA [facility] #61 - Fa...   OZEMPIC      INJ 2/1.5ML 04/19/2021 28 1.5 each [place]NA,MCKENNA [facility]...   OZEMPIC 0.25 OR 0.50/DOSE INJ 04/19/2021 28 1.5 mL MCKENNA CHERY #61 - Fa...   OZEMPIC      INJ 2/1.5ML 03/22/2021 28 1.5 each MCKENNA CHERY Pharmacy...   OZEMPIC 0.25 OR 0.50/DOSE INJ 03/22/2021 28 1.5 mL MCKENNA CHERY #61 - Fa...           Tamsulosin HCl     Dispensed Days Supply Quantity Provider Pharmacy   TAMSULOSIN 0.4MG CAPSULE 02/14/2022 90 90 Units MCKENNA CHERY #61 - Fa...   TAMSULOSIN HYDROCHLORIDE 0.4MG CAP 02/14/2022 90 90 capsule MCKENNA CHERY Pharmacy...   TAMSULOSIN 0.4MG CAPSULE 11/12/2021 90 90 Units MCKENNA CHERY #61 - Fa...   TAMSULOSIN HYDROCHLORIDE 0.4MG CAP 11/12/2021 90 90 capsule  [facility]   TAMSULOSIN   CAP 0.4 08/18/2021 90 90 capsule MONTANA,MCKENNA [facility] Pharmacy...   TAMSULOSIN 0.4MG CAPSULE 08/18/2021 90 90 Units [place]NA,MCKENNA [place] #61 - Fa...   TAMSULOSIN 0.4MG CAPSULE 05/28/2021 90 90 Units MONTANA,MCKENNA [facility] Pharmacy...   TAMSULOSIN 0.4MG CAPSULE 03/12/2021 90 90 Units MONTANA,MCKENNA [facility] Pharmacy...           amLODIPine Besylate     Dispensed Days Supply Quantity Provider Pharmacy   AMLODIPINE 2.5MG TABLET 02/14/2022 90 90 Units [facility]   AMLODIPINE BESYLATE 2.5MG TAB 02/14/2022 90 90 tablet MONTANA,MCKENNA [facility] Pharmacy...   AMLODIPINE 2.5MG TABLET 11/12/2021 90 90 Units [facility]...   AMLODIPINE BESYLATE 2.5MG TAB 11/12/2021 90 90 tablet MONTANA,MCKENNA [facility] Pharmacy...   AMLODIPINE   TAB 2.5 08/18/2021 90 90 tablet MONTANA,MCKENNA [facility] Pharmacy...   AMLODIPINE 2.5MG TABLET 08/18/2021 90 90 Units [facility]...   AMLODIPINE 2.5MG TABLET 05/28/2021 90 90 [facility]...   AMLODIPINE 2.5MG TABLET 04/12/2021 90 90 Units [facility]...           levoFLOXacin     Dispensed Days Supply Quantity Provider Pharmacy   LEVOFLOXACIN 500MG TABLET 06/07/2021 5 5 Units MISHA BONDS [facility] Pharmacy...   LEVOFLOXACIN 250MG TABLET 04/16/2021 5 5 Units BERONICA KWON [facility] Pharmacy...           metFORMIN HCl     Dispensed Days Supply Quantity Provider Pharmacy   METFORMIN 500MG ER TABLET 01/14/2022 15 60 Units TAMIE RIBEIRO [facility] Pharmacy...   METFORMIN 500MG ER TABLET 12/12/2021 15 60 Units MON[place]CKENNA [facility] Pharmacy...   METFORMIN 500MG ER TABLET 11/12/2021 30 120 Units MCKENNA CHERY #61 - Fa...   METFORMIN HYDROCHLORIDE E 500MG GP TAB 11/12/2021 30 120 tablet MCKENNA CHERY Pharmacy...   METFORMIN      08/18/2021 90 360 tablet [facility]   METFORMIN 500MG ER TABLET 08/18/2021 90 360 Units [facility]...   METFORMIN 500MG ER TABLET 05/28/2021 90 360 Units MONTANA,MCKENNA [facility] Pharmacy...   METFORMIN    TAB 500MG ER 04/13/2021 90 360 each MONTANA,MCKENNA [facility] Pharmacy...   METFORMIN 500MG ER TABLET 04/13/2021 90 360 Units MONTANA,MCKENNA [facility] #61 - Fa...           prednisoLONE Acetate     Dispensed Days Supply Quantity Provider Pharmacy   PREDNISOLONE 1% OPHTH SUSP 02/07/2022 42 10 mL [facility]   PREDNISOLONE 1% OPHTH SUSP 12/28/2021 42 10 mL [facility]   PREDNISOLONE 1% OPHTH SUSP 10/06/2021 42 15 mL [facility]           Disclaimer    Certain dispenses may not be available or accurate in this report, including over-the-counter medications, low cost prescriptions, prescriptions paid for by the patient or non-participating sources, or errors in insurance claims information. The provider should independently verify medication history with the patient.    External Sources

## 2022-03-09 NOTE — PLAN OF CARE
"Most recent vitals: /74 (BP Location: Right arm, Patient Position: Supine)   Pulse 108   Temp 100.1  F (37.8  C) (Tympanic)   Resp 20   Ht 1.854 m (6' 1\")   Wt 84.4 kg (186 lb 1.1 oz)   SpO2 94%   BMI 24.55 kg/m        Face to face report given with opportunity to observe patient.    Report given to Brittni Gomez RN   3/8/2022  11:24 PM        " Per Dr Marilu Porter mom can use Mother's Milk it is a tea to help with milk production or there are cookies also she can by that will help with milk production that she can get at [address]  She recommends she put the pt up to the breast more to help with the

## 2022-03-09 NOTE — PLAN OF CARE
"/74 (BP Location: Right arm, Patient Position: Semi-Dickinson's)   Pulse 98   Temp 99.8  F (37.7  C) (Tympanic)   Resp 24   Ht 1.854 m (6' 1\")   Wt 84.4 kg (186 lb 1.1 oz)   SpO2 93%   BMI 24.55 kg/m      Pt A&O, VSS, max temp of 99.8, and denies pain. On 1 L of O2 via NC. Reports SOB and SALOMON. Rest of assessment as charted. IV patent and SL. BG 80 & 96. On bedrest. Bed alarm on, call light within reach, and makes needs known.    Face to face report given with opportunity to observe patient.    Report given to TONY Forrester RN   3/9/2022  "

## 2022-03-09 NOTE — PROGRESS NOTES
03/09/22 1256   Signing Clinician's Name / Credentials   Signing clinician's name / credentials Argelia Rachna PTA   Quick Adds   Rehab Discipline PT   PT Assistant Visit Number 1   Therapeutic Activity   Minutes of Treatment 15 minutes   Symptoms Noted During/After Treatment Fatigue   Treatment Detail Pt was in bed upon arrival but was willing to participate in PT this morning. Pt was SBA1 to go from supine to sit and CGA1 for sit to stand. Pt struggled to stand upright with posture once standing holiding onto the walker. Pt had flexed knees with static and dynamic standing and short shuffling steps with walking. Pt ambulated 200 feet with FWW CGA1 with wheelchair behind. Cued pt to stand more upright with his upper body and straighten the knees more so his body would not have to work as hard. Pt needed cues for correct positioning and squaring up to the chair before reaching back for the armrests.    Therapeutic Exercise   Minutes of Treatment 8   Symptoms Noted During/After Treatment fatigue   Treatment Detail Pt performed 20 reps marching, LAQ'S, heel toe raises, glut sets, VMO, CKC: 20 reps, hs curls, hip abd, sit to stands 5 reps. Pt did struggle with sit to stands cued pt to scoot to the EOC before attempting to stand.    PT Discharge Planning   PT Discharge Recommendation (DC Rec) home with home care physical therapy   PT Rationale for DC Rec Pt demonstrates with weakness and balance impairments putting him at risk for falls   PT Brief overview of current status Pt was more steady using the FWW vs the cane today with mobility.    Additional Documentation   PT Plan Progress mobility as tolerated   Total Session Time   Total Session Time (minutes) 23 minutes       [facility]    [address]    [facility] [address]    Phone:  [phone]    Fax:  [phone]                                       Major Woods   MRN: [id number]    Department:  [facility]   Date of Visit:  6/7/2018           Patient Information     Date Of Birth          1996        Your diagnoses for this visit were:     Suicidal ideation     Depression, unspecified depression type        You were seen by Vel Hanson MD.      Follow-up Information     Schedule an appointment as soon as possible for a visit with Your primary care provider.        Follow up with [facility].    Specialty:  EMERGENCY MEDICINE    Why:  If symptoms worsen    Contact information:    [address]  [facility] [address] [phone]        Discharge Instructions       Discharge Instructions  Mental Health Concerns    You were seen today for mental health concerns, such as depression, anxiety, or suicidal thinking. Your provider feels that you do not require hospitalization at this time. However, your symptoms may become worse, and you may need to return to the Emergency Department. Most treatments of depression and suicidal thoughts are a process rather than a single intervention.  Medications and counseling can take several weeks or more to help.    Generally, every Emergency Department visit should have a follow-up clinic visit with either a primary or a specialty clinic/provider. Please follow-up as instructed by your emergency provider today.    By accepting these discharge instructions:    You promise to not harm yourself or others.    You agree that if you feel you are becoming unable to keep that promise, you will do something to help yourself before you do anything to harm yourself or others.     You agree to keep any safety plan arranged on your visit here today.    You agree to take  any medication prescribed or recommended by your provider.    If you are getting worse, you can contact a friend or a family member, contact your counselor or family provider, contact a crisis line, or other options discussed with the provider or therapist today.    At any time, you can call 911 and return to the Emergency Department for more help.    You understand that follow-up is essential to your treatment, and you will make and keep appointments recommended on your visit today.    How to improve your mental health and prevent suicide:    Involve others by letting family, friends, counselors know.  Do not isolate yourself.    Avoid alcohol or drugs. Remove weapons, poisons from your home.    Try to stick to routines for eating, sleeping and getting regular exercise.      Try to get into sunlight. Bright natural light not only treats seasonal affective disorder but also depression.    Increase safe activities that you enjoy.    If you feel worse, contact [phone] ([phone]), or call 911, or your primary provider/counselor for additional assistance.    If you were given a prescription for medicine here today, be sure to read all of the information (including the package insert) that comes with your prescription.  This will include important information about the medicine, its side effects, and any warnings that you need to know about.  The pharmacist who fills the prescription can provide more information and answer questions you may have about the medicine.  If you have questions or concerns that the pharmacist cannot address, please call or return to the Emergency Department.   Remember that you can always come back to the Emergency Department if you are not able to see your regular provider in the amount of time listed above, if you get any new symptoms, or if there is anything that worries you.      24 Hour Appointment Hotline       To make an appointment at any [facility], call [phone]  ([phone]). If you don't have a family doctor or clinic, we will help you find one. [place] clinics are conveniently located to serve the needs of you and your family.             Review of your medicines      Notice     You have not been prescribed any medications.            Orders Needing Specimen Collection     None      Pending Results     No orders found from 6/5/2018 to 6/8/2018.            Pending Culture Results     No orders found from 6/5/2018 to 6/8/2018.            Pending Results Instructions     If you had any lab results that were not finalized at the time of your Discharge, you can call the ED Lab Result RN at [phone]. You will be contacted by this team for any positive Lab results or changes in treatment. The nurses are available 7 days a week from 10A to 6:30P.  You can leave a message 24 hours per day and they will return your call.        Test Results From Your Hospital Stay               Clinical Quality Measure: Blood Pressure Screening     Your blood pressure was checked while you were in the emergency department today. The last reading we obtained was  BP: 126/77 . Please read the guidelines below about what these numbers mean and what you should do about them.  If your systolic blood pressure (the top number) is less than 120 and your diastolic blood pressure (the bottom number) is less than 80, then your blood pressure is normal. There is nothing more that you need to do about it.  If your systolic blood pressure (the top number) is 120-139 or your diastolic blood pressure (the bottom number) is 80-89, your blood pressure may be higher than it should be. You should have your blood pressure rechecked within a year by a primary care provider.  If your systolic blood pressure (the top number) is 140 or greater or your diastolic blood pressure (the bottom number) is 90 or greater, you may have high blood pressure. High blood pressure is treatable, but if left untreated over time it  "can put you at risk for heart attack, stroke, or kidney failure. You should have your blood pressure rechecked by a primary care provider within the next 4 weeks.  If your provider in the emergency department today gave you specific instructions to follow-up with your doctor or provider even sooner than that, you should follow that instruction and not wait for up to 4 weeks for your follow-up visit.        Thank you for choosing [facility]       Thank you for choosing [facility] for your care. Our goal is always to provide you with excellent care. Hearing back from our patients is one way we can continue to improve our services. Please take a few minutes to complete the written survey that you may receive in the mail after you visit with us. Thank you!        [facility] Information     [facility] lets you send messages to your doctor, view your test results, renew your prescriptions, schedule appointments and more. To sign up, go to [email or web address] . Click on \"Log in\" on the left side of the screen, which will take you to the Welcome page. Then click on \"Sign up Now\" on the right side of the page.     You will be asked to enter the access code listed below, as well as some personal information. Please follow the directions to create your username and password.     Your access code is: [email or web address]  Expires: 2018  7:59 PM     Your access code will  in 90 days. If you need help or a new code, please call your [facility] clinic or [phone].        Care EveryWhere ID     This is your Care EveryWhere ID. This could be used by other organizations to access your [facility] medical records  [id number]        Equal Access to Services     [facility] : Hadii shabnam Valenzuela, waaxda luqadaha, qaybta kaalchristopher solorzano . So [facility] [phone].    ATENCIÓN: Si habla español, tiene a santana disposición servicios gratuitos de asistencia lingüística. Llame al " [phone].    We comply with applicable federal civil rights laws and [place] laws. We do not discriminate on the basis of race, color, national origin, age, disability, sex, sexual orientation, or gender identity.            After Visit Summary       This is your record. Keep this with you and show to your community pharmacist(s) and doctor(s) at your next visit.

## 2022-03-09 NOTE — PLAN OF CARE
"Reason for hospital stay:  Sepsis   Living situation PTA: Home   Most recent vitals: /75 (BP Location: Right arm, Patient Position: Supine)   Pulse 93   Temp 98.6  F (37  C) (Tympanic)   Resp 20   Ht 1.854 m (6' 1\")   Wt 84.6 kg (186 lb 8.2 oz)   SpO2 93%   BMI 24.61 kg/m    Pt has been awake of most of this shift. A&O with VS and Assessments. Denies pain. Up to chair for meals. NP took off o2, satting low 90s, denies SOB. Uses urinal at bedside and BR for BM. Walker used for ambulation. IV SL.  and 186. Refused lunchtime Insulin.     Safety:  Bed in lowest position, call button within reach, calls appropriately.     Discharge care conference held.   Attendees: Nursing, , Physical Therapy, Occupational Therapy, Pharmacy, Physician, and NP  Comments:    Face to face report given with opportunity to observe patient.    Report given to TONY Carballo RN   3/9/2022  3:54 PM      "

## 2022-03-09 NOTE — PROGRESS NOTES
Care Transitions focused note:      Spoke with Anthony regarding the recommendation for home PT. He states he does not feel like he needs it at this time.    Alaina Sotelo CM

## 2022-03-09 NOTE — PROGRESS NOTES
03/09/22 0919   Appointment Canceled   Appointment Canceled With other staff/provider   Cancel Comments Nursing present, pt just recieved breakfast, will attempt back as able.    Signing Clinician's Name / Credentials   Signing clinician's name / credentials Janette Bella DPT   Quick Adds   Rehab Discipline PT

## 2022-03-09 NOTE — PROGRESS NOTES
"[facility]    Hospitalist Progress Note    Date of Service (when I saw the patient): 03/09/2022    Assessment & Plan     Acute cystitis with hematuria: Patient had a urinalysis done bleeding for several days prior to his admission.  This did grow Enterococcus faecalis.  He did have some nitrofurantoin at home and did start this.  In the culture was sensitive to this.  He states he only took about 3 tablets and basically started to get much weaker.    -3/8: He feels markedly better today.  Lactic acid was down to normal range.  He has had no fevers.  We will continue with the vancomycin until we see if this culture grows out anything else but highly unlikely as is only been a couple days since his previous one was performed.  The vancomycin should cover him adequately.  If nothing does really grow differently than might just consider put him back on the Macrobid as it was sensitive to this.  And has worked for him in the past per his report.  -3/9: Feels well but not quite back to normal. UC again showing enterococcus. He is anxious to get out of the hospital. Procal starting to come down, WBC normal, vitals stable, mild fever overnight. Will continue IV vanco today plan on switching to oral in the AM.       Sepsis: Resolved.  Patient did present with mild sepsis with lactic acidosis.  He responded quickly to some IV fluids, repeat lactic acid was normal and no other evidence of endorgan damage.     Acute kidney injury: Due to acute cystitis-likely poor oral intake and increased micturition. Improved this morning but not back to normal yet, recheck again in AM. Discussed will need to avoid macrobid with renal injury, he isn't very excited about having to take a different antibiotic but states understanding.      Diabetes mellitus type 2: Patient is on some Ozempic weekly along with Glucophage and glimepiride. He states he has only been taking \"half a shot\" because he had some lows. He took this on his " regular scheduled day yesterday (Tuesday). Restart  His glimepiride as he is eating well. He is refusing sliding scale so will discontinue that. Continue QID checks.      COPD: No significant dyspnea sats look fine room air his lungs are clear to auscultation.     Chronic anticoagulation: History of pulmonary embolus 2021: He remains on the Xarelto.  He has no peripheral edema.  No hypoxia chest pain etc.     Weakness: We will have PT OT evaluate the patient also.  Says he does a lot of furniture walking around his home.      DVT Prophylaxis: DOAC  Code Status: Full Code    Disposition: Expected discharge in 1 days once stable.    Vickie Amaro, CNP    Interval History   Denies chest pain, abdominal pain or shortness of breath. No nausea, good appetite this morning.     -Data reviewed today: I reviewed all new labs and imaging results over the last 24 hours.    Physical Exam   Temp: 98.6  F (37  C) Temp src: Tympanic BP: 134/75 Pulse: 93   Resp: 20 SpO2: 93 % O2 Device: Nasal cannula Oxygen Delivery: 1 LPM  Vitals:    03/08/22 0358 03/09/22 0613   Weight: 84.4 kg (186 lb 1.1 oz) 84.6 kg (186 lb 8.2 oz)     Vital Signs with Ranges  Temp:  [98.6  F (37  C)-100.1  F (37.8  C)] 98.6  F (37  C)  Pulse:  [] 93  Resp:  [20-24] 20  BP: (132-145)/(68-75) 134/75  SpO2:  [87 %-96 %] 93 %  I/O last 3 completed shifts:  In: 1203 [P.O.:1200; I.V.:3]  Out: 2190 [Urine:2190]    Peripheral IV 03/08/22 Left;Posterior Lower forearm (Active)   Site Assessment WDL 03/09/22 0010   Line Status Saline locked 03/09/22 0010   Phlebitis Scale 0-->no symptoms 03/09/22 0010   Infiltration Scale 0 03/09/22 0010   Number of days: 1       Incision/Surgical Site 12/27/21 Left Eye (Active)   Number of days: 72       Incision/Surgical Site 01/11/22 Right Eye (Active)   Number of days: 57     Line/device assessment(s) completed for medical necessity    Constitutional: Awake,alert, no acute distress  Respiratory: Clear but diminished bases,  no crackles rhonchi or wheezes.   Cardiovascular: HRR, no murmurs, rubs,thrills.   GI: Soft,nontender, bowel sounds are positive.   Skin/Integumen: No rashes or open areas       Medications     - MEDICATION INSTRUCTIONS -         allopurinol  100 mg Oral Daily     amLODIPine  2.5 mg Oral Daily     atorvastatin  40 mg Oral QPM     carvedilol  25 mg Oral BID     finasteride  5 mg Oral Daily     gabapentin  300 mg Oral TID     glimepiride  2 mg Oral QAM AC     multivitamin w/minerals  1 tablet Oral Daily     rivaroxaban ANTICOAGULANT  20 mg Oral QAM     semaglutide  0.25 mg Subcutaneous Q7 Days     sodium chloride (PF)  3 mL Intracatheter Q8H     tamsulosin  0.4 mg Oral QPM     vancomycin  1,000 mg Intravenous Q24H       Data   Recent Labs   Lab 03/09/22  1206 03/09/22  0748 03/09/22  0556 03/08/22  0747 03/08/22  0510 03/08/22  0418 03/08/22  0027   WBC  --   --  7.9  --   --   --  19.9*   HGB  --   --  12.7*  --   --   --  14.1   MCV  --   --  97  --   --   --  93   PLT  --   --  128*  --   --   --  156   NA  --   --  141  --  138  --  140   POTASSIUM  --   --  3.9  --  4.1  --  4.3   CHLORIDE  --   --  111*  --  109  --  109   CO2  --   --  26  --  25  --  23   BUN  --   --  22  --  24  --  25   CR  --   --  1.35*  --  1.40*  --  1.52*   ANIONGAP  --   --  4  --  4  --  8   JODI  --   --  8.7  --  8.0*  --  9.0   * 104* 112*   < > 131*   < > 201*   ALBUMIN  --   --  2.9*  --  2.7*  --  3.5   PROTTOTAL  --   --  6.4*  --  5.8*  --  7.0   BILITOTAL  --   --  0.5  --  0.9  --  0.9   ALKPHOS  --   --  64  --  59  --  74   ALT  --   --  23  --  21  --  23   AST  --   --  25  --  24  --  18    < > = values in this interval not displayed.       No results found for this or any previous visit (from the past 24 hour(s)).

## 2022-03-09 NOTE — PROVIDER NOTIFICATION
DATE:  3/9/2022   TIME OF RECEIPT FROM LAB:  0644  LAB TEST:  Procalcitonin  LAB VALUE:  21.98  RESULTS GIVEN WITH READ-BACK TO (PROVIDER):  Vickie Amaro NP  TIME LAB VALUE REPORTED TO PROVIDER:   0646

## 2022-03-10 ENCOUNTER — APPOINTMENT (OUTPATIENT)
Dept: PHYSICAL THERAPY | Facility: HOSPITAL | Age: 82
DRG: 872 | End: 2022-03-10
Payer: MEDICARE

## 2022-03-10 VITALS
HEART RATE: 79 BPM | OXYGEN SATURATION: 98 % | RESPIRATION RATE: 20 BRPM | BODY MASS INDEX: 24.72 KG/M2 | TEMPERATURE: 98.3 F | DIASTOLIC BLOOD PRESSURE: 69 MMHG | SYSTOLIC BLOOD PRESSURE: 123 MMHG | WEIGHT: 186.51 LBS | HEIGHT: 73 IN

## 2022-03-10 LAB
ALBUMIN SERPL-MCNC: 2.9 G/DL (ref 3.4–5)
ALP SERPL-CCNC: 66 U/L (ref 40–150)
ALT SERPL W P-5'-P-CCNC: 22 U/L (ref 0–70)
ANION GAP SERPL CALCULATED.3IONS-SCNC: 4 MMOL/L (ref 3–14)
AST SERPL W P-5'-P-CCNC: 18 U/L (ref 0–45)
BACTERIA UR CULT: ABNORMAL
BILIRUB SERPL-MCNC: 0.7 MG/DL (ref 0.2–1.3)
BUN SERPL-MCNC: 17 MG/DL (ref 7–30)
CALCIUM SERPL-MCNC: 8.6 MG/DL (ref 8.5–10.1)
CHLORIDE BLD-SCNC: 111 MMOL/L (ref 94–109)
CO2 SERPL-SCNC: 27 MMOL/L (ref 20–32)
CREAT SERPL-MCNC: 1.35 MG/DL (ref 0.66–1.25)
ERYTHROCYTE [DISTWIDTH] IN BLOOD BY AUTOMATED COUNT: 12.9 % (ref 10–15)
GFR SERPL CREATININE-BSD FRML MDRD: 53 ML/MIN/1.73M2
GLUCOSE BLD-MCNC: 130 MG/DL (ref 70–99)
GLUCOSE BLDC GLUCOMTR-MCNC: 125 MG/DL (ref 70–99)
HCT VFR BLD AUTO: 38.8 % (ref 40–53)
HGB BLD-MCNC: 12.8 G/DL (ref 13.3–17.7)
MCH RBC QN AUTO: 31.9 PG (ref 26.5–33)
MCHC RBC AUTO-ENTMCNC: 33 G/DL (ref 31.5–36.5)
MCV RBC AUTO: 97 FL (ref 78–100)
PLATELET # BLD AUTO: 145 10E3/UL (ref 150–450)
POTASSIUM BLD-SCNC: 3.9 MMOL/L (ref 3.4–5.3)
PROCALCITONIN SERPL-MCNC: 13.83 NG/ML
PROT SERPL-MCNC: 5.9 G/DL (ref 6.8–8.8)
RBC # BLD AUTO: 4.01 10E6/UL (ref 4.4–5.9)
SODIUM SERPL-SCNC: 142 MMOL/L (ref 133–144)
VANCOMYCIN SERPL-MCNC: 16.1 MG/L
WBC # BLD AUTO: 5.9 10E3/UL (ref 4–11)

## 2022-03-10 PROCEDURE — 85027 COMPLETE CBC AUTOMATED: CPT | Performed by: INTERNAL MEDICINE

## 2022-03-10 PROCEDURE — 84145 PROCALCITONIN (PCT): CPT | Performed by: INTERNAL MEDICINE

## 2022-03-10 PROCEDURE — 250N000013 HC RX MED GY IP 250 OP 250 PS 637: Performed by: INTERNAL MEDICINE

## 2022-03-10 PROCEDURE — 97110 THERAPEUTIC EXERCISES: CPT | Mod: GP

## 2022-03-10 PROCEDURE — 97530 THERAPEUTIC ACTIVITIES: CPT | Mod: GP

## 2022-03-10 PROCEDURE — 99239 HOSP IP/OBS DSCHRG MGMT >30: CPT | Mod: 24 | Performed by: NURSE PRACTITIONER

## 2022-03-10 PROCEDURE — 36415 COLL VENOUS BLD VENIPUNCTURE: CPT | Performed by: INTERNAL MEDICINE

## 2022-03-10 PROCEDURE — 80053 COMPREHEN METABOLIC PANEL: CPT | Performed by: INTERNAL MEDICINE

## 2022-03-10 PROCEDURE — 80202 ASSAY OF VANCOMYCIN: CPT | Performed by: NURSE PRACTITIONER

## 2022-03-10 RX ORDER — CIPROFLOXACIN 500 MG/1
500 TABLET, FILM COATED ORAL 2 TIMES DAILY
Qty: 8 TABLET | Refills: 0 | Status: SHIPPED | OUTPATIENT
Start: 2022-03-10 | End: 2022-03-14

## 2022-03-10 RX ADMIN — GLIMEPIRIDE 2 MG: 2 TABLET ORAL at 06:44

## 2022-03-10 RX ADMIN — ALLOPURINOL 100 MG: 100 TABLET ORAL at 09:23

## 2022-03-10 RX ADMIN — ACETAMINOPHEN 650 MG: 325 TABLET, FILM COATED ORAL at 00:28

## 2022-03-10 RX ADMIN — CARVEDILOL 25 MG: 25 TABLET, FILM COATED ORAL at 09:23

## 2022-03-10 RX ADMIN — MULTIPLE VITAMINS W/ MINERALS TAB 1 TABLET: TAB at 09:23

## 2022-03-10 RX ADMIN — GABAPENTIN 300 MG: 300 CAPSULE ORAL at 09:24

## 2022-03-10 RX ADMIN — FINASTERIDE 5 MG: 5 TABLET, FILM COATED ORAL at 09:23

## 2022-03-10 RX ADMIN — AMLODIPINE BESYLATE 2.5 MG: 2.5 TABLET ORAL at 09:23

## 2022-03-10 RX ADMIN — RIVAROXABAN 20 MG: 20 TABLET, FILM COATED ORAL at 09:23

## 2022-03-10 ASSESSMENT — ACTIVITIES OF DAILY LIVING (ADL)
ADLS_ACUITY_SCORE: 11
ADLS_ACUITY_SCORE: 10
ADLS_ACUITY_SCORE: 11
ADLS_ACUITY_SCORE: 11
ADLS_ACUITY_SCORE: 10
ADLS_ACUITY_SCORE: 11
ADLS_ACUITY_SCORE: 10
ADLS_ACUITY_SCORE: 11
ADLS_ACUITY_SCORE: 11

## 2022-03-10 NOTE — DISCHARGE SUMMARY
[facility]    Discharge Summary  Hospitalist    Date of Admission:  3/7/2022  Date of Discharge:  3/10/2022 12:10 PM  Discharging Provider: Vickie Aamro NP  Date of Service (when I saw the patient): 3/10/22    Discharge Diagnoses   Principal Problem:    Sepsis with acute renal failure (H)  Active Problems:    Type 2 diabetes mellitus with stage 3 chronic kidney disease, without long-term current use of insulin (H)    Chronic obstructive lung disease (H)    Complicated UTI (urinary tract infection)      History of Present Illness   From admission:Anthony Dyer is a 81 year old male who who has PMH of recurrent UTI (last 3.4.2021), urinary retention (due to BPH), s/p TURP (10.15.2021), extensive PE (4.2021), COPD, CKD, DM-2, who was brought to ED due to weakness. He remembers sitting and playing with his cat on the floor. Doesn't remember details. He remembers that his weakness was progressing over the past 2 days and was worsening. Admits dysuria and fever. Denies abdominal pain, cough, chest pain, diarrhea, hematemezis, hematuria, dyspnea, hemoptysis, vomiting.   He presents to ED with low grade fever, tachycardic, , 94% on RA. /69  eD workup revealed leukocytosis, elevated CRP, lactic acid, and acute on chronic renal failure.     Hospital Course   Acute cystitis with hematuria: Patient had a urinalysis done bleeding for several days prior to his admission.  This did grow Enterococcus faecalis.  He did have some nitrofurantoin at home and did start this.  In the culture was sensitive to this.  He states he only took about 3 tablets and basically started to get much weaker.    -3/8: He feels markedly better today.  Lactic acid was down to normal range.  He has had no fevers.  We will continue with the vancomycin until we see if this culture grows out anything else but highly unlikely as is only been a couple days since his previous one was performed.  The vancomycin should cover him  "adequately.  If nothing does really grow differently than might just consider put him back on the Macrobid as it was sensitive to this.  And has worked for him in the past per his report.  -3/9: Feels well but not quite back to normal. UC again showing enterococcus. He is anxious to get out of the hospital. Procal starting to come down, WBC normal, vitals stable, mild fever overnight. Will continue IV vanco today plan on switching to oral in the AM.   -3/10: Still feeling well, anxious for discharge. Will finish antibiotic course with Cipro as renal function has not returned to normal.       Sepsis: Resolved.  Patient did present with mild sepsis with lactic acidosis.  He responded quickly to some IV fluids, repeat lactic acid was normal and no other evidence of endorgan damage.      Acute kidney injury: Due to acute cystitis-likely poor oral intake and increased micturition. Improved  but not back to normal yet, recheck at followup. Discussed will need to avoid macrobid with renal injury, he isn't very excited about having to take a different antibiotic but states understanding. Finish course with cipro-hold glimepiride until finished to avoid hypoglycemia.      Diabetes mellitus type 2: Patient is on some Ozempic weekly along with Glucophage and glimepiride. He states he has only been taking \"half a shot\" because he had some lows. He took this on his regular scheduled day Tuesday. Restart glimepiride when done with cipro.     COPD: No significant dyspnea sats look fine room air his lungs are clear to auscultation.     Chronic anticoagulation: History of pulmonary embolus 2021: He remains on the Xarelto.  He has no peripheral edema.  No hypoxia chest pain etc.     Weakness: refuses home PT   Says he does a lot of furniture walking around his home.        Vickie Amaro CNP      Pending Results     Unresulted Labs Ordered in the Past 30 Days of this Admission     Date and Time Order Name Status Description    " 3/8/2022 12:12 AM Blood Culture Peripheral Blood Preliminary     3/8/2022 12:12 AM Blood Culture Peripheral Blood Preliminary           Code Status   Full Code       Primary Care Physician   Ziola Azul    Discharge Disposition   Discharged to home  Condition at discharge: Stable    Consultations This Hospital Stay   PHARMACY TO DOSE VANCO  PHYSICAL THERAPY ADULT IP CONSULT  OCCUPATIONAL THERAPY ADULT IP CONSULT    Time Spent on this Encounter   Vickie HECTOR NP, personally saw the patient today and spent greater than 30 minutes discharging this patient.    Discharge Orders      Reason for your hospital stay    Acute urinary tract infection     Follow-up and recommended labs and tests     Follow up with primary care provider, Zoila Azul, within 5-7 days for hospital follow- up.  The following labs/tests are recommended: BMP.     Activity    Your activity upon discharge: activity as tolerated     When to contact your care team    Call your primary doctor if you have any of the following: fever, chills, new or worsening fatigue or weakness.     Discharge Instructions    Do not take Amaryl (glimepiride) until you are done with the antibiotics (Ciproflaxacin) as this can cause low blood sugars if taken together. You can continue your Ozempic and metformin as usual.     Diet    Follow this diet upon discharge: Orders Placed This Encounter      Consistent Carbohydrate Diet Moderate Consistent Carb (60 g CHO per Meal) Diet     Discharge Medications   Current Discharge Medication List      START taking these medications    Details   ciprofloxacin (CIPRO) 500 MG tablet Take 1 tablet (500 mg) by mouth 2 times daily for 4 days  Qty: 8 tablet, Refills: 0    Associated Diagnoses: Complicated UTI (urinary tract infection)         CONTINUE these medications which have NOT CHANGED    Details   acetaminophen (TYLENOL) 500 MG tablet Take 1,000 mg by mouth 2 times daily . Limit acetaminophen to 4000 mg per day  from all sources.       albuterol (PROAIR HFA/PROVENTIL HFA/VENTOLIN HFA) 108 (90 Base) MCG/ACT inhaler Inhale 2 puffs into the lungs every 4 hours as needed    Comments: Pharmacy may dispense brand covered by insurance (Proair, or proventil or ventolin or generic albuterol inhaler)      allopurinol (ZYLOPRIM) 100 MG tablet Take 100 mg by mouth daily       amLODIPine (NORVASC) 2.5 MG tablet Take 2.5 mg by mouth daily      atorvastatin (LIPITOR) 80 MG tablet Take 40 mg by mouth every evening       carvedilol (COREG) 25 MG tablet Take 25 mg by mouth 2 times daily       finasteride (PROSCAR) 5 MG tablet Take 1 tablet (5 mg) by mouth daily  Qty: 30 tablet, Refills: 11    Associated Diagnoses: Urinary retention      gabapentin (NEURONTIN) 300 MG capsule Take 300 mg by mouth 3 times daily       glimepiride (AMARYL) 2 MG tablet Take 2 mg by mouth every morning (before breakfast)       metFORMIN (GLUCOPHAGE-XR) 500 MG 24 hr tablet Take 2 tablets (1,000 mg) by mouth 2 times daily (with meals)  Qty: 60 tablet, Refills: 0    Associated Diagnoses: Type 2 diabetes mellitus with stage 3a chronic kidney disease, without long-term current use of insulin (H)      multivitamin, therapeutic (THERA-VIT) TABS tablet Take 1 tablet by mouth daily      rivaroxaban ANTICOAGULANT (XARELTO) 20 MG TABS tablet Take 1 tablet (20 mg) by mouth daily (with dinner)  Qty: 30 tablet, Refills: 3    Associated Diagnoses: Other acute pulmonary embolism with acute cor pulmonale (H)      semaglutide (OZEMPIC, 0.25 OR 0.5 MG/DOSE,) 2 MG/1.5ML SOPN pen Inject 0.5 mg Subcutaneous every 7 days on Tuesdays  Qty: 3 mL, Refills: 3    Associated Diagnoses: Type 2 diabetes mellitus with stage 3a chronic kidney disease, without long-term current use of insulin (H)      tamsulosin (FLOMAX) 0.4 MG capsule Take 0.4 mg by mouth every evening       vitamin B-12 (CYANOCOBALAMIN) 250 MCG tablet Take 250 mcg by mouth daily      vitamin C (ASCORBIC ACID) 500 MG tablet Take  500 mg by mouth daily         STOP taking these medications       nitroFURantoin macrocrystal-monohydrate (MACROBID) 100 MG capsule Comments:   Reason for Stopping:             Allergies   Allergies   Allergen Reactions     Ace Inhibitors      Per [facility]: hyperkalemia.  Pt doesn't know if he is allergic     Ceclor [Cefaclor] Hives     Sulfa Drugs      Pt unsure.      Data   Most Recent 3 CBC's:Recent Labs   Lab Test 03/10/22  0520 03/09/22  0556 03/08/22  0027   WBC 5.9 7.9 19.9*   HGB 12.8* 12.7* 14.1   MCV 97 97 93   * 128* 156      Most Recent 3 BMP's:  Recent Labs   Lab Test 03/10/22  0520 03/10/22  0156 03/09/22 2056 03/09/22  0748 03/09/22  0556 03/08/22  0747 03/08/22  0510     --   --   --  141  --  138   POTASSIUM 3.9  --   --   --  3.9  --  4.1   CHLORIDE 111*  --   --   --  111*  --  109   CO2 27  --   --   --  26  --  25   BUN 17  --   --   --  22  --  24   CR 1.35*  --   --   --  1.35*  --  1.40*   ANIONGAP 4  --   --   --  4  --  4   JODI 8.6  --   --   --  8.7  --  8.0*   * 125* 150*   < > 112*   < > 131*    < > = values in this interval not displayed.     Most Recent 2 LFT's:  Recent Labs   Lab Test 03/10/22  0520 03/09/22  0556   AST 18 25   ALT 22 23   ALKPHOS 66 64   BILITOTAL 0.7 0.5     Most Recent INR's and Anticoagulation Dosing History:  Anticoagulation Dose History    There is no flowsheet data to display.       Most Recent 3 Troponin's:  Recent Labs   Lab Test 10/19/21  0530 04/26/21  1033 04/19/21  2111 03/10/15  1021   TROPI  --  0.109* <0.015 <0.015  The 99th percentile for upper reference range is 0.045 ug/L.  Troponin values in   the range of 0.045 - 0.120 ug/L may be associated with risks of adverse   clinical events.   Effective 7/30/2014, the reference range for this assay has changed to reflect   new instrumentation/methodology.     TROPONIN <0.015  --   --   --      Most Recent Cholesterol Panel:  Recent Labs   Lab Test 03/04/22  0758   CHOL 136   LDL 55    HDL 62   TRIG 93     Most Recent 6 Bacteria Isolates From Any Culture (See EPIC Reports for Culture Details):  Recent Labs   Lab Test 07/06/21  1245 06/07/21  1317 04/15/21  2320 04/15/21  2224   CULT >100,000 colonies/mL  Enterococcus faecalis  * >100,000 colonies/mL  Enterococcus faecalis  * <10,000 colonies/mL  mixed urogenital gen  No further identification or sensitivity done   No growth after 6 days  No growth after 6 days     Most Recent TSH, T4 and A1c Labs:  Recent Labs   Lab Test 03/04/22  0758   TSH 1.35   A1C 5.6     Results for orders placed or performed during the hospital encounter of 03/07/22   XR Chest Port 1 View    Narrative    Procedure:XR CHEST PORT 1 VIEW    Clinical history:Male, 81 years, fever    Technique: Single view was obtained.    Comparison: 10/16/2021    Findings: The cardiac silhouette is normal. The pulmonary vasculature  is normal.    The lungs are clear. Bony structures are unremarkable.      Impression    Impression:   No acute abnormality. No evidence of acute or active cardiopulmonary  disease.     This report is in agreement with the preliminary report.    ASHWINI ALBRIGHT MD         SYSTEM ID:  [id number]

## 2022-03-10 NOTE — PLAN OF CARE
"/71 (BP Location: Right arm, Patient Position: Supine, Cuff Size: Adult Regular)   Pulse 80   Temp 97.8  F (36.6  C) (Tympanic)   Resp 22   Ht 1.854 m (6' 1\")   Wt 84.6 kg (186 lb 8.2 oz)   SpO2 94%   BMI 24.61 kg/m      Pt A&O, VSS, afebrile, and denies pain. On room air. Assessment as charted. IV patent and SL. . Bed alarm on, call light within reach, and makes needs known.    Face to face report given with opportunity to observe patient.    Report given to TONY Abraham RN   3/10/2022  "

## 2022-03-10 NOTE — PROVIDER NOTIFICATION
DATE:  3/10/2022   TIME OF RECEIPT FROM LAB:  0618  LAB TEST:  Procalcitonin  LAB VALUE:  13.83  RESULTS GIVEN WITH READ-BACK TO (PROVIDER):  Vickie Amaro NP  TIME LAB VALUE REPORTED TO PROVIDER:   0622

## 2022-03-10 NOTE — PLAN OF CARE
"Most recent vitals: /80 (BP Location: Right arm)   Pulse 99   Temp 99.1  F (37.3  C) (Tympanic)   Resp 18   Ht 1.854 m (6' 1\")   Wt 84.6 kg (186 lb 8.2 oz)   SpO2 92%   BMI 24.61 kg/m      Alert and oriented, forgetful. VSS. Afebrile. Complains of a headache, tylenol given with relief. Lungs clear bilaterally. Bowel sounds active. Voiding adequately. Vanco infused per order. IV SL. Good appetite. Alarms active. Standby assist with walker. Bgs 147 and 150.    Face to face report given with opportunity to observe patient.    Report given to Brittni Ellis RN   3/9/2022  11:19 PM        "

## 2022-03-10 NOTE — PLAN OF CARE
Occupational Therapy Discharge Summary    Reason for therapy discharge:    Discharged to home.    Progress towards therapy goal(s). See goals on Care Plan in [facility] electronic health record for goal details.  Goals partially met.  Barriers to achieving goals:   discharge from facility.    Therapy recommendation(s):    Pt would have benefited from at least home PT to progress strength however he declined.     Goal Outcome Evaluation:

## 2022-03-10 NOTE — PHARMACY
Pharmacy Antimicrobial Stewardship Assessment     Current Antimicrobial Therapy:  Anti-infectives (From now, onward)    Start     Dose/Rate Route Frequency Ordered Stop    03/10/22 2100  vancomycin 1250 mg in 0.9% NaCl 250 mL intermittent infusion 1,250 mg         1,250 mg  over 90 Minutes Intravenous EVERY 24 HOURS 03/10/22 0731            Indication: Sepsis + UTI    Days of Therapy: 3     Pertinent Labs:  Recent Labs   Lab Test 03/10/22  0520 03/09/22  0556 22  0027   WBC 5.9 7.9 19.9*     Recent Labs   Lab Test 03/10/22  0520 03/09/22  0556 22  0510 22  0027   LACT  --  1.4 1.7 3.2*   CRP  --   --   --  130.0*   PCAL 13.83* 21.98*  --  26.96*    < > = values in this interval not displayed.        Temperature:  Temp (24hrs), Av.6  F (37  C), Min:97.8  F (36.6  C), Max:99.4  F (37.4  C)    Culture Results:         Recommendations/Interventions:  1. Culture results show penicillin sensitive E. faecalis. Consider deescalating therapy to penicillin or ampicillin.     Ramonita Henriquez [facility]  March 10, 2022

## 2022-03-10 NOTE — PLAN OF CARE
Patient discharged at 12:10 PM via wheel chair accompanied by staff. Prescriptions sent to patients preferred pharmacy. All belongings sent with patient.     Discharge instructions reviewed with pt. Listed belongings gathered and returned to patient. All personal    Patient discharged to home.   Report called to na    Surgical Patient   Surgical Procedures during stay: none  Did patient receive discharge instruction on wound care and recognition of infection symptoms? Yes    MISC  Follow up appointment made:  Yes  Home medications returned to patient: N/A  Patient reports pain was well managed at discharge: Yes. Denies    Pt afebrile, VSS. Pt up in chair has breakfast. Worked with PT. Pt I guess refused home PT. Sent home with cane and walker. Pt encouraged to use walker as recommended by PT.

## 2022-03-10 NOTE — PHARMACY-VANCOMYCIN DOSING SERVICE
Pharmacy Vancomycin Note  Date of Service March 10, 2022  Patient's  1940   81 year old, male    Indication: Sepsis and Urinary Tract Infection  Day of Therapy: 3  Current vancomycin regimen:  1000 mg IV q24h  Current vancomycin monitoring method: AUC  Current vancomycin therapeutic monitoring goal: 400-600 mg*h/L    InsightRX Prediction of Current Vancomycin Regimen  Loading dose: N/A  Regimen: 1000 mg IV every 24 hours.  Start time: 20:46 on 03/10/2022  Exposure target: AUC24 (range)400-600 mg/L.hr   AUC24,ss: 413 mg/L.hr  Probability of AUC24 > 400: 56 %  Ctrough,ss: 13 mg/L  Probability of Ctrough,ss > 20: 9 %  Probability of nephrotoxicity (Lodise JESSICA ): 8 %      Current estimated CrCl = Estimated Creatinine Clearance: 52.2 mL/min (A) (based on SCr of 1.35 mg/dL (H)).    Creatinine for last 3 days  3/8/2022: 12:27 AM Creatinine 1.52 mg/dL;  5:10 AM Creatinine 1.40 mg/dL  3/9/2022:  5:56 AM Creatinine 1.35 mg/dL  3/10/2022:  5:20 AM Creatinine 1.35 mg/dL    Recent Vancomycin Levels (past 3 days)  3/10/2022:  5:20 AM Vancomycin 16.1 mg/L    Vancomycin IV Administrations (past 72 hours)                   vancomycin (VANCOCIN) 1000 mg in dextrose 5% 200 mL PREMIX (mg) 1,000 mg New Bag 22 2046     1,000 mg New Bag 22 2038    vancomycin 1500 mg in 0.9% NaCl 250 ml intermittent infusion 1,500 mg (mg) 1,500 mg New Bag 22 0326    vancomycin (VANCOCIN) 100 mg/mL injection (mg) 1,500 mg New Bag 22 0325                Nephrotoxins and other renal medications (From now, onward)    Start     Dose/Rate Route Frequency Ordered Stop    22 2100  vancomycin (VANCOCIN) 1000 mg in dextrose 5% 200 mL PREMIX         1,000 mg  200 mL/hr over 1 Hours Intravenous EVERY 24 HOURS 22 0405               Contrast Orders - past 72 hours (72h ago, onward)            None          Interpretation of levels and current regimen:  Vancomycin level is reflective of -600    Has serum creatinine  changed greater than 50% in last 72 hours: No    Urine output:  unable to determine    Renal Function: Stable    InsightRX Prediction of Planned New Vancomycin Regimen  Loading dose: N/A  Regimen: 1250 mg IV every 24 hours.  Start time: 20:46 on 03/10/2022  Exposure target: AUC24 (range)400-600 mg/L.hr   AUC24,ss: 512 mg/L.hr  Probability of AUC24 > 400: 87 %  Ctrough,ss: 16.1 mg/L  Probability of Ctrough,ss > 20: 25 %  Probability of nephrotoxicity (Lodise JESSICA 2009): 11 %      Plan:  1. Increase Dose to Vancomycin 1250 mg IV q24h  2. Vancomycin monitoring method: AUC  3. Vancomycin therapeutic monitoring goal: 400-600 mg*h/L  4. Pharmacy will check vancomycin levels as appropriate in 1-3 Days.  5. Serum creatinine levels will be ordered daily for the first week of therapy and at least twice weekly for subsequent weeks.    Ramonita Henriquez RP

## 2022-03-10 NOTE — PROGRESS NOTES
03/10/22 1017   Signing Clinician's Name / Credentials   Signing clinician's name / credentials Argelia Warnersenaitharsha PTA   Quick Adds   Rehab Discipline PT   PT Assistant Visit Number 2   Therapeutic Activity   Minutes of Treatment 15 minutes   Symptoms Noted During/After Treatment Fatigue   Treatment Detail Pt was in bed upon arrival but was willing to participate in PT this morning. Pt was SBA1 supine to sit resting oxygen was 97% after movement 93% and sit to stand SBA1 with verbal cues to stand more upright and straighten his knees. Pt ambulated 200 feet with FWW SBA1 no LOB posture was more upright today and less flexion in his knees.    Therapeutic Exercise   Minutes of Treatment 8   Symptoms Noted During/After Treatment fatigue   Treatment Detail Pt performed CKC marching, hip abd, Hs curls heel raises 15 reps, squats 10 reps, sit to stands 2x5 reps.    PT Discharge Planning   PT Discharge Recommendation (DC Rec) home with home care physical therapy   PT Rationale for DC Rec Pt has unsteadiness which increases his chances for falls.   PT Brief overview of current status Pt was a little more steady today with static and dynamic standing.    Additional Documentation   PT Plan Progress mobility as tolerated.    Total Session Time   Total Session Time (minutes) 23 minutes

## 2022-03-10 NOTE — PROGRESS NOTES
Name: Anthony Dyer    MRN#: [id number]    Reason for Hospitalization: Complicated UTI (urinary tract infection) [N39.0]    Discharge Date: 3/10/2022    Patient / Family response to discharge plan: in agreement    Follow-Up Appt:   Future Appointments   Date Time Provider Department Center   3/11/2022  9:00 AM Breanna Nguyen, PT [facility]   3/14/2022  1:30 PM Zoila Jones MD [facility]   3/15/2022 10:30 AM Meenu Orourke DPM [facility]   4/4/2022  2:30 PM Zoila Jones MD [facility]       Other Providers (Care Coordinator, County Services, PCA services etc): No    Discharge Disposition: home, pt declined home PT.    Alaina Sotelo CM

## 2022-03-13 NOTE — PROGRESS NOTES
Assessment & Plan     Sepsis due to urinary tract infection (H)  Resolved.  Completed antibiotic.  UA mildly abnormal - WBC, LE.  Culture pending.  BMP stable.  Standing order to check at any sign/symptom of UTI given rapid progression.  - Basic metabolic panel; Future  - UA reflex to Microscopic and Culture; Standing  - Basic metabolic panel  - UA reflex to Microscopic and Culture  - Urine Culture    Hypoglycemia  His Glimepiride was held in hospital.  A1c low - and has been.  Will stop Glimepiride completely.  Repeat a1c at 3 months.  Continue Metformin and Ozempic.    Lab Results   Component Value Date    A1C 5.6 03/04/2022    A1C 5.3 08/05/2021    A1C 6.5 05/06/2021       Patient has establish care visit scheduled in upcoming weeks.          See Patient Instructions    No follow-ups on file.    Zoila Azul MD  [facility] - ALEXA Cruz is a 81 year old who presents for the following health issues     HPI       Hospital Follow-up Visit:    Hospital/Nursing Home/IP Rehab Facility: [facility]  Date of Admission: 3/7/22  Date of Discharge: 3/10/2022  Reason(s) for Admission: sepsis with acute renal failure -     Had history of recurrent UTI, urinary retention BPH s/p TURP 10/15/21.  Presented with fever, weakness, LOC.  Does report some blood in urine in week prior.  Neighbor came in and found patient.    Treated with Nitrofurantoin outpatient first, then Vanco in hospital and discharged with Ciprofloxacin.  Completed course.  No missed doses or side effects.    Refused home  PT.  Uses cane.  No falls.  No dizziness.    Instructed to hold his Glimipiride - was having lows.  Does not monitor glucose.    Was your hospitalization related to COVID-19? No   Problems taking medications regularly:  None  Medication changes since discharge: Began CIPRO, thx completed. Glimpermide on hold   Problems adhering to non-medication therapy:  None    Summary of hospitalization:  [facility] discharge summary reviewed  Diagnostic Tests/Treatments reviewed.  Follow up needed: none  Other Healthcare Providers Involved in Patient s Care:         None  Update since discharge: improved. Post Discharge Medication Reconciliation: discharge medications reconciled, continue medications without change.  Plan of care communicated with patient              Review of Systems   Constitutional, HEENT, cardiovascular, pulmonary, gi and gu systems are negative, except as otherwise noted.      Objective    /68   Pulse 90   Temp 97.8  F (36.6  C)   Resp 18   Wt 88.5 kg (195 lb)   SpO2 96%   BMI 25.73 kg/m    Body mass index is 25.73 kg/m .  Physical Exam   GENERAL: healthy, alert and no distress  NECK: no adenopathy, no asymmetry, masses, or scars and thyroid normal to palpation  RESP: lungs clear to auscultation - no rales, rhonchi or wheezes  CV: regular rate and rhythm, normal S1 S2, no S3 or S4, no murmur, click or rub, no peripheral edema and peripheral pulses strong  ABDOMEN: soft, nontender, no hepatosplenomegaly, no masses and bowel sounds normal  MS: no gross musculoskeletal defects noted, no edema  PSYCH: mentation appears normal, affect normal/bright    Results for orders placed or performed in visit on 03/14/22 (from the past 24 hour(s))   Basic metabolic panel   Result Value Ref Range    Sodium 141 133 - 144 mmol/L    Potassium 4.4 3.4 - 5.3 mmol/L    Chloride 111 (H) 94 - 109 mmol/L    Carbon Dioxide (CO2) 26 20 - 32 mmol/L    Anion Gap 4 3 - 14 mmol/L    Urea Nitrogen 18 7 - 30 mg/dL    Creatinine 1.24 0.66 - 1.25 mg/dL    Calcium 9.0 8.5 - 10.1 mg/dL    Glucose 104 (H) 70 - 99 mg/dL    GFR Estimate 59 (L) >60 mL/min/1.73m2   UA reflex to Microscopic and Culture    Specimen: Urine, Clean Catch   Result Value Ref Range    Color Urine Light Yellow Colorless, Straw, Light Yellow, Yellow    Appearance Urine Clear Clear    Glucose Urine Negative Negative mg/dL    Bilirubin Urine  Negative Negative    Ketones Urine Negative Negative mg/dL    Specific Gravity Urine 1.018 1.003 - 1.035    Blood Urine Negative Negative    pH Urine 6.0 4.7 - 8.0    Protein Albumin Urine Negative Negative mg/dL    Urobilinogen Urine Normal Normal, 2.0 mg/dL    Nitrite Urine Negative Negative    Leukocyte Esterase Urine Moderate (A) Negative    RBC Urine 2 <=2 /HPF    WBC Urine 38 (H) <=5 /HPF    Squamous Epithelials Urine 0 <=1 /HPF    Narrative    Urine Culture ordered based on laboratory criteria       Culture pending.

## 2022-03-14 ENCOUNTER — OFFICE VISIT (OUTPATIENT)
Dept: FAMILY MEDICINE | Facility: OTHER | Age: 82
End: 2022-03-14
Attending: FAMILY MEDICINE
Payer: MEDICARE

## 2022-03-14 ENCOUNTER — TELEPHONE (OUTPATIENT)
Dept: CASE MANAGEMENT | Facility: HOSPITAL | Age: 82
End: 2022-03-14
Payer: MEDICARE

## 2022-03-14 VITALS
TEMPERATURE: 97.8 F | WEIGHT: 195 LBS | SYSTOLIC BLOOD PRESSURE: 108 MMHG | BODY MASS INDEX: 25.73 KG/M2 | HEART RATE: 90 BPM | DIASTOLIC BLOOD PRESSURE: 68 MMHG | RESPIRATION RATE: 18 BRPM | OXYGEN SATURATION: 96 %

## 2022-03-14 DIAGNOSIS — E16.2 HYPOGLYCEMIA: ICD-10-CM

## 2022-03-14 DIAGNOSIS — A41.9 SEPSIS DUE TO URINARY TRACT INFECTION (H): Primary | ICD-10-CM

## 2022-03-14 DIAGNOSIS — N39.0 SEPSIS DUE TO URINARY TRACT INFECTION (H): Primary | ICD-10-CM

## 2022-03-14 LAB
ALBUMIN UR-MCNC: NEGATIVE MG/DL
ANION GAP SERPL CALCULATED.3IONS-SCNC: 4 MMOL/L (ref 3–14)
APPEARANCE UR: CLEAR
BACTERIA BLD CULT: NO GROWTH
BACTERIA BLD CULT: NO GROWTH
BILIRUB UR QL STRIP: NEGATIVE
BUN SERPL-MCNC: 18 MG/DL (ref 7–30)
CALCIUM SERPL-MCNC: 9 MG/DL (ref 8.5–10.1)
CHLORIDE BLD-SCNC: 111 MMOL/L (ref 94–109)
CO2 SERPL-SCNC: 26 MMOL/L (ref 20–32)
COLOR UR AUTO: ABNORMAL
CREAT SERPL-MCNC: 1.24 MG/DL (ref 0.66–1.25)
GFR SERPL CREATININE-BSD FRML MDRD: 59 ML/MIN/1.73M2
GLUCOSE BLD-MCNC: 104 MG/DL (ref 70–99)
GLUCOSE UR STRIP-MCNC: NEGATIVE MG/DL
HGB UR QL STRIP: NEGATIVE
KETONES UR STRIP-MCNC: NEGATIVE MG/DL
LEUKOCYTE ESTERASE UR QL STRIP: ABNORMAL
NITRATE UR QL: NEGATIVE
PH UR STRIP: 6 [PH] (ref 4.7–8)
POTASSIUM BLD-SCNC: 4.4 MMOL/L (ref 3.4–5.3)
RBC URINE: 2 /HPF
SODIUM SERPL-SCNC: 141 MMOL/L (ref 133–144)
SP GR UR STRIP: 1.02 (ref 1–1.03)
SQUAMOUS EPITHELIAL: 0 /HPF
UROBILINOGEN UR STRIP-MCNC: NORMAL MG/DL
WBC URINE: 38 /HPF

## 2022-03-14 PROCEDURE — 87086 URINE CULTURE/COLONY COUNT: CPT | Mod: ZL | Performed by: FAMILY MEDICINE

## 2022-03-14 PROCEDURE — 99213 OFFICE O/P EST LOW 20 MIN: CPT | Performed by: FAMILY MEDICINE

## 2022-03-14 PROCEDURE — 82310 ASSAY OF CALCIUM: CPT | Mod: ZL | Performed by: FAMILY MEDICINE

## 2022-03-14 PROCEDURE — 36415 COLL VENOUS BLD VENIPUNCTURE: CPT | Mod: ZL | Performed by: FAMILY MEDICINE

## 2022-03-14 PROCEDURE — G0463 HOSPITAL OUTPT CLINIC VISIT: HCPCS | Performed by: FAMILY MEDICINE

## 2022-03-14 PROCEDURE — 81001 URINALYSIS AUTO W/SCOPE: CPT | Mod: ZL | Performed by: FAMILY MEDICINE

## 2022-03-14 ASSESSMENT — ANXIETY QUESTIONNAIRES
2. NOT BEING ABLE TO STOP OR CONTROL WORRYING: NOT AT ALL
GAD7 TOTAL SCORE: 5
3. WORRYING TOO MUCH ABOUT DIFFERENT THINGS: SEVERAL DAYS
IF YOU CHECKED OFF ANY PROBLEMS ON THIS QUESTIONNAIRE, HOW DIFFICULT HAVE THESE PROBLEMS MADE IT FOR YOU TO DO YOUR WORK, TAKE CARE OF THINGS AT HOME, OR GET ALONG WITH OTHER PEOPLE: SOMEWHAT DIFFICULT
1. FEELING NERVOUS, ANXIOUS, OR ON EDGE: SEVERAL DAYS
7. FEELING AFRAID AS IF SOMETHING AWFUL MIGHT HAPPEN: SEVERAL DAYS
4. TROUBLE RELAXING: SEVERAL DAYS
6. BECOMING EASILY ANNOYED OR IRRITABLE: SEVERAL DAYS
5. BEING SO RESTLESS THAT IT IS HARD TO SIT STILL: NOT AT ALL

## 2022-03-14 ASSESSMENT — PATIENT HEALTH QUESTIONNAIRE - PHQ9: SUM OF ALL RESPONSES TO PHQ QUESTIONS 1-9: 9

## 2022-03-14 ASSESSMENT — PAIN SCALES - GENERAL: PAINLEVEL: NO PAIN (0)

## 2022-03-14 NOTE — PATIENT INSTRUCTIONS
Standing urine test order placed.  Call before coming in to lab.    Stop Glimepiride/Amaryl.    Lab today - BMP blood and urine.  Will call with results.

## 2022-03-14 NOTE — TELEPHONE ENCOUNTER
Attempted to make follow up phone call with patient. Unable to reach patient at this time. First attempt. Will attempt again.    Destiny Winchester RN on 3/14/2022 at 2:32 PM

## 2022-03-14 NOTE — NURSING NOTE
"Chief Complaint   Patient presents with     Hospital F/U       Initial /68   Pulse 90   Temp 97.8  F (36.6  C)   Resp 18   Wt 88.5 kg (195 lb)   SpO2 96%   BMI 25.73 kg/m   Estimated body mass index is 25.73 kg/m  as calculated from the following:    Height as of 3/8/22: 1.854 m (6' 1\").    Weight as of this encounter: 88.5 kg (195 lb).  Medication Reconciliation: vanna Omer  "

## 2022-03-15 ENCOUNTER — OFFICE VISIT (OUTPATIENT)
Dept: PODIATRY | Facility: OTHER | Age: 82
End: 2022-03-15
Attending: PODIATRIST
Payer: MEDICARE

## 2022-03-15 VITALS
SYSTOLIC BLOOD PRESSURE: 125 MMHG | HEART RATE: 96 BPM | OXYGEN SATURATION: 93 % | TEMPERATURE: 96.8 F | DIASTOLIC BLOOD PRESSURE: 73 MMHG

## 2022-03-15 DIAGNOSIS — E11.42 DIABETIC POLYNEUROPATHY ASSOCIATED WITH TYPE 2 DIABETES MELLITUS (H): ICD-10-CM

## 2022-03-15 DIAGNOSIS — M47.816 LUMBAR SPONDYLOSIS: ICD-10-CM

## 2022-03-15 DIAGNOSIS — M21.969 LOSS OF PROTECTIVE SENSATION OF SKIN OF DEFORMED FOOT: ICD-10-CM

## 2022-03-15 DIAGNOSIS — R20.8 LOSS OF PROTECTIVE SENSATION OF SKIN OF DEFORMED FOOT: ICD-10-CM

## 2022-03-15 DIAGNOSIS — Z86.31 PERSONAL HISTORY OF DIABETIC FOOT ULCER: ICD-10-CM

## 2022-03-15 DIAGNOSIS — L85.3 XEROSIS OF SKIN: ICD-10-CM

## 2022-03-15 DIAGNOSIS — E11.9 DIABETES MELLITUS TYPE 2, NONINSULIN DEPENDENT (H): ICD-10-CM

## 2022-03-15 DIAGNOSIS — L60.3 ONYCHODYSTROPHY: Primary | ICD-10-CM

## 2022-03-15 DIAGNOSIS — L84 CALLUS OF FOOT: ICD-10-CM

## 2022-03-15 PROCEDURE — G0463 HOSPITAL OUTPT CLINIC VISIT: HCPCS | Mod: 25

## 2022-03-15 PROCEDURE — 11721 DEBRIDE NAIL 6 OR MORE: CPT | Performed by: PODIATRIST

## 2022-03-15 PROCEDURE — 11721 DEBRIDE NAIL 6 OR MORE: CPT | Mod: 59 | Performed by: PODIATRIST

## 2022-03-15 PROCEDURE — 11056 PARNG/CUTG B9 HYPRKR LES 2-4: CPT | Performed by: PODIATRIST

## 2022-03-15 ASSESSMENT — PAIN SCALES - GENERAL: PAINLEVEL: NO PAIN (0)

## 2022-03-15 ASSESSMENT — ANXIETY QUESTIONNAIRES: GAD7 TOTAL SCORE: 5

## 2022-03-15 NOTE — NURSING NOTE
"Chief Complaint   Patient presents with     Toenail     Trimming       Initial /73 (BP Location: Left arm, Patient Position: Sitting, Cuff Size: Adult Regular)   Pulse 96   Temp 96.8  F (36  C) (Tympanic)   SpO2 93%  Estimated body mass index is 25.73 kg/m  as calculated from the following:    Height as of 3/8/22: 1.854 m (6' 1\").    Weight as of 3/14/22: 88.5 kg (195 lb).  Medication Reconciliation: vanna Leon  "

## 2022-03-15 NOTE — PROGRESS NOTES
Chief complaint: Patient presents with:  Toenail: Trimming      History of Present Illness: This 81 year old NIDDM II male is seen for follow-up management of an ulcer on the RIGHT lateral fifth digit.    He has Vanicream at home but he has not been using it on his skin a couple times a week with the help of his .    He is on Xarelto for a history of blood clots in his legs.    He has his usual burning, tingling, and numbness in his feet.    Last HbA1C was 5.6% on 03/04/2022.    Last HbA1C was 5.3% on 08/05/2021.      No further pedal complaints today.         /73 (BP Location: Left arm, Patient Position: Sitting, Cuff Size: Adult Regular)   Pulse 96   Temp 96.8  F (36  C) (Tympanic)   SpO2 93%     Patient Active Problem List   Diagnosis     Urinary retention     Generalized weakness     Type 2 diabetes mellitus with stage 3 chronic kidney disease, without long-term current use of insulin (H)     Tachycardia     Hypoxia     Other acute pulmonary embolism with acute cor pulmonale (H)     Dyspnea, unspecified type     Benign prostatic hyperplasia with urinary retention     Chronic kidney disease, stage III (moderate) (H)     Chronic obstructive lung disease (H)     Chronic painful diabetic neuropathy (H)     Erectile dysfunction     Essential hypertension     Gout, unspecified     History of CVA (cerebrovascular accident)     Hyperlipidemia     IBS (irritable bowel syndrome)     Lumbar spondylosis     Mild concentric left ventricular hypertrophy (LVH)     Moderate episode of recurrent major depressive disorder (H)     Spinal stenosis of lumbar region without neurogenic claudication     Uncomplicated alcohol dependence (H)     Nursing Home Visit     Onychomycosis of left great toe     Pincer nail deformity     Complicated UTI (urinary tract infection)     Sepsis with acute renal failure (H)       Past Surgical History:   Procedure Laterality Date     CYSTOSCOPY, TRANSURETHRAL RESECTION (TUR)  PROSTATE, COMBINED N/A 10/15/2021    Procedure: CYSTOSCOPY, WITH TRANSURETHRAL RESECTION PROSTATE;  Surgeon: Indu De Leon MD;  Location: [facility] OR     HERNIA REPAIR       PHACOEMULSIFICATION WITH STANDARD INTRAOCULAR LENS IMPLANT Left 12/27/2021    Procedure: PHACOEMULSIFICATION CATARACT EXTRACTION POSTERIOR CHAMBER LENS LEFT EYE;  Surgeon: Phillip Maldonado MD;  Location: [facility] OR     PHACOEMULSIFICATION WITH STANDARD INTRAOCULAR LENS IMPLANT Right 1/11/2022    Procedure: PHACOEMULSIFICATION CATARACT EXTRACTION POSTERIOR CHAMBER LENS RIGHT EYE;  Surgeon: Phillip Maldonado MD;  Location: [facility] OR       Current Outpatient Medications   Medication     acetaminophen (TYLENOL) 500 MG tablet     albuterol (PROAIR HFA/PROVENTIL HFA/VENTOLIN HFA) 108 (90 Base) MCG/ACT inhaler     allopurinol (ZYLOPRIM) 100 MG tablet     amLODIPine (NORVASC) 2.5 MG tablet     atorvastatin (LIPITOR) 80 MG tablet     carvedilol (COREG) 25 MG tablet     finasteride (PROSCAR) 5 MG tablet     gabapentin (NEURONTIN) 300 MG capsule     metFORMIN (GLUCOPHAGE-XR) 500 MG 24 hr tablet     multivitamin, therapeutic (THERA-VIT) TABS tablet     rivaroxaban ANTICOAGULANT (XARELTO) 20 MG TABS tablet     semaglutide (OZEMPIC, 0.25 OR 0.5 MG/DOSE,) 2 MG/1.5ML SOPN pen     tamsulosin (FLOMAX) 0.4 MG capsule     vitamin B-12 (CYANOCOBALAMIN) 250 MCG tablet     vitamin C (ASCORBIC ACID) 500 MG tablet     No current facility-administered medications for this visit.          Allergies   Allergen Reactions     Ace Inhibitors      Per [facility]: hyperkalemia.  Pt doesn't know if he is allergic     Ceclor [Cefaclor] Hives     Sulfa Drugs      Pt unsure.        History reviewed. No pertinent family history.    Social History     Socioeconomic History     Marital status: Single     Spouse name: None     Number of children: None     Years of education: None     Highest education level: None   Occupational History     None   Tobacco Use     Smoking status: Never Smoker      Smokeless tobacco: Former User     Types: Chew   Substance and Sexual Activity     Alcohol use: Not Currently     Drug use: Never     Sexual activity: None   Other Topics Concern     Parent/sibling w/ CABG, MI or angioplasty before 65F 55M? Not Asked   Social History Narrative     None     Social Determinants of Health     Financial Resource Strain:      Difficulty of Paying Living Expenses:    Food Insecurity:      Worried About Running Out of Food in the Last Year:      Ran Out of Food in the Last Year:    Transportation Needs:      Lack of Transportation (Medical):      Lack of Transportation (Non-Medical):    Physical Activity:      Days of Exercise per Week:      Minutes of Exercise per Session:    Stress:      Feeling of Stress :    Social Connections:      Frequency of Communication with Friends and Family:      Frequency of Social Gatherings with Friends and Family:      Attends [employer] Services:      Active Member of Clubs or Organizations:      Attends Club or Organization Meetings:      Marital Status:    Intimate Partner Violence:      Fear of Current or Ex-Partner:      Emotionally Abused:      Physically Abused:      Sexually Abused:        ROS: 10 point ROS neg other than the symptoms noted above in the HPI.  EXAM  Constitutional: healthy, alert and no distress    Psychiatric: mentation appears normal and affect normal/bright    VASCULAR:  -Dorsalis pedis pulse +2/4 b/l  -Posterior tibial pulse +1/4 b/l  -Capillary refill time < 3 seconds to b/l hallux  -Hair growth absent to b/l anterior legs and ankles  NEURO:  -Light touch sensation severely diminished to b/l plantar forefoot  -Protective sensation diminished with SWM +0/10 RIGHT and +1/10 LEFT on 03/15/2022  -Protective sensation diminished with SWM +0/10 RIGHT and +2/10 LEFT on 01/03/2022  -Protective sensation diminished with SWM +0/10 RIGHT and +1/10 LEFT on 08/17/2021    DERM:  -Skin temperature within normal limits to bilateral  foot  -Minimal hyperkeratotic lesion to the bilateral lateral fifth digit IPJ -- no wounds post paring  -Minimal  hyperkeratotic lesion to the RIGHT distal lateral fourth digit -- no wounds post paring  -Moderately dry skin with flaking of skin to bilateral plantar foot and mild rubor to bilateral plantar foot  ---Increased cracking to skin on bilateral foot.     -Toenails thickened, dystrophic and discolored x 10  ---Moderate incurvation of bilateral nail borders of the RIGHT hallux without a break in the skin    MSK:  -Muscle strength of ankles +5/5 for dorsiflexion, plantarflexion, ABDUction and ADDuction b/l    ============================================================    ASSESSMENT:    (L60.3) Onychodystrophy (primary encounter diagnosis)    (L84) Callus of foot    (L85.3) Xerosis of skin    (E11.9) Diabetes mellitus type 2, noninsulin dependent (H)    (E11.42) Diabetic polyneuropathy associated with type 2 diabetes mellitus (H)    (M21.969,  R20.8) Loss of protective sensation of skin of deformed foot    (L60.3) Onychodystrophy  (primary encounter diagnosis)    (L84) Callus of foot    (L85.3) Xerosis of skin    (E11.9) Diabetes mellitus type 2, noninsulin dependent (H)    (E11.42) Diabetic polyneuropathy associated with type 2 diabetes mellitus (H)    (M21.969,  R20.8) Loss of protective sensation of skin of deformed foot    (M47.816) Lumbar spondylosis    (Z86.31) Personal history of diabetic foot ulcer        PLAN:  -Patient evaluated and examined. Treatment options discussed with no educational barriers noted.    -High risk toenail debridement x 10 toenails without incident on 03/15/2022    -Dm shoes through the orthotist, Traci Hernandez: Patient was fit for new shoes around the end of February, 2022. The shoes are comfortable but a little tight on his toe. He declined shoe stretching today and he will call if the shoes are feeling too tight.    -Callus pared x 2 to the lateral fifth digit without  incident  ---Patient reminded that the callus will likely return due to the underlying, prominent bone causing the callus while the patient is walking.      -Xerosis of skin: Continue with routine lotion on the feet (not between the toes)    -Diabetic Foot Education provided. This included checking the feet daily looking for new new blisters or wounds, wearing shoes at all times when walking including around the house, and avoiding lotion application between the toes. If there are any signs of infection, the patient should present to the ED as soon as possible. Infections of the foot can be life threatening or lead to amputations of the foot or leg.    -Patient in agreement with the above treatment plan and all of patient's questions were answered.      RTC 63+ days for diabetic foot exam and high risk nail debridement and callus israelng        Meenu Orourke DPM

## 2022-03-17 ENCOUNTER — TELEPHONE (OUTPATIENT)
Dept: CASE MANAGEMENT | Facility: HOSPITAL | Age: 82
End: 2022-03-17
Payer: MEDICARE

## 2022-03-17 LAB — BACTERIA UR CULT: NO GROWTH

## 2022-03-17 NOTE — TELEPHONE ENCOUNTER
Second/final attempt for follow up phone call. Unable to reach patient at this time.     Destiny Winchester RN on 3/17/2022 at 11:09 AM

## 2022-03-31 NOTE — PROGRESS NOTES
"SUBJECTIVE:   Anthony Dyer is a 81 year old male who presents for Preventive Visit.      Patient has been advised of split billing requirements and indicates understanding: Yes  Are you in the first 12 months of your Medicare coverage?  No    Healthy Habits:     In general, how would you rate your overall health?  Fair    Frequency of exercise:  6-7 days/week    Duration of exercise:  15-30 minutes    Do you usually eat at least 4 servings of fruit and vegetables a day, include whole grains    & fiber and avoid regularly eating high fat or \"junk\" foods?  No    Taking medications regularly:  Yes    Medication side effects:  Not applicable    Ability to successfully perform activities of daily living:  No assistance needed    Home Safety:  No safety concerns identified    Hearing Impairment:  No hearing concerns    In the past 6 months, have you been bothered by leaking of urine?  No    In general, how would you rate your overall mental or emotional health?  Good      PHQ-2 Total Score: 2    Additional concerns today:  Yes     Right foot- stubbed toes?  Bruised 4 toes.  No open sores.  Onset unclear.  Noticed yesterday.  No skin breakdown.  Has follow up with DR Orourke podiatry 4/14/22 -for left foot follow up.     -friend - 46.  Stops in few times per week.  Does his injections, helps with foot cares, cleaning.  In house - finished basement.   Laundry is in basement.  Has hand rail.  Sometimes goes to [place].    Used Viagra - years ago.  Requesting refill.  No nitroglycerin use.    Do you feel safe in your environment? Yes    Have you ever done Advance Care Planning? (For example, a Health Directive, POLST, or a discussion with a medical provider or your loved ones about your wishes): No, advance care planning information given to patient to review.  Patient declined advance care planning discussion at this time.       Fall risk  Fallen 2 or more times in the past year?: No  Any fall with injury in " the past year?: No    Cognitive Screening   1) Repeat 3 items (Leader, Season, Table)    2) Clock draw: normal if redone bigger  3) 3 item recall: Recalls 3 objects  Results: ABNORMAL clock, 1-2 items recalled: PROBABLE COGNITIVE IMPAIRMENT, **INFORM PROVIDER**         Mini-CogTM Copyright IVY Delgado. Licensed by the author for use in [facility]; reprinted with permission (anthony@[email or web address]). All rights reserved.          Reviewed and updated as needed this visit by clinical staff   Tobacco  Allergies  Meds   Med Hx  Surg Hx  Fam Hx          Reviewed and updated as needed this visit by Provider                 Social History     Tobacco Use     Smoking status: Never Smoker     Smokeless tobacco: Former User     Types: Chew   Substance Use Topics     Alcohol use: Yes     Comment: daily, 4 today         Alcohol Use 4/4/2022   Prescreen: >3 drinks/day or >7 drinks/week? No       Diabetes Follow-up    How often are you checking your blood sugar? Not at all    What concerns do you have today about your diabetes? None     Do you have any of these symptoms? (Select all that apply)  Numbness in feet     ozempic - up to 0.5 mg now - moved down to 0.25 after an episode of low    Hyperlipidemia Follow-Up    Are you regularly taking any medication or supplement to lower your cholesterol?   Yes- statin'    Are you having muscle aches or other side effects that you think could be caused by your cholesterol lowering medication?  No    Hypertension Follow-up    Do you check your blood pressure regularly outside of the clinic? No     Are you following a low salt diet? No  No added salt    Are your blood pressures ever more than 140 on the top number (systolic) OR more   than 90 on the bottom number (diastolic), for example 140/90? No    BP Readings from Last 2 Encounters:   04/04/22 120/70   04/01/22 125/75     Hemoglobin A1C POCT (%)   Date Value   05/06/2021 6.5 (H)     Hemoglobin A1C (%)   Date Value   03/04/2022 5.6    08/05/2021 5.3     LDL Cholesterol Calculated (mg/dL)   Date Value   03/04/2022 55       COPD Follow-Up    Overall, how are your COPD symptoms since your last clinic visit?  No change    How much fatigue or shortness of breath do you have when you are walking?  Same as usual    How much shortness of breath do you have when you are resting?  Same as usual    How often do you cough? Never    Have you noticed any change in your sputum/phlegm?  No    Have you experienced a recent fever? No    Please describe how far you can walk without stopping to rest:  The length of 3-5 rooms    How many flights of stairs are you able to walk up without stopping?  1    Have you had any Emergency Room Visits, Urgent Care Visits, or Hospital Admissions because of your COPD since your last office visit?  No     Uses cane    No falls    History   Smoking Status     Never Smoker   Smokeless Tobacco     Former User     Types: Chew     No results found for: FEV1, ILD7GBY    Chronic Kidney Disease Follow-up    Do you take any over the counter pain medicine?: No     Gout -   On allopurinol - big toes  No flares for few years  Has to avoid liver sausage  Uric acid 5.3 in 10/2021.    Current providers sharing in care for this patient include:   Patient Care Team:  Zoila Jones MD as PCP - General (Family Medicine)  George Orourke MD as Assigned PCP  Indu De Leon MD as Assigned Surgical Provider    The following health maintenance items are reviewed in Epic and correct as of today:  Health Maintenance Due   Topic Date Due     ZOSTER IMMUNIZATION (1 of 2) Never done     INFLUENZA VACCINE (1) 09/01/2021     Current Outpatient Medications   Medication     acetaminophen (TYLENOL) 500 MG tablet     albuterol (PROAIR HFA/PROVENTIL HFA/VENTOLIN HFA) 108 (90 Base) MCG/ACT inhaler     allopurinol (ZYLOPRIM) 100 MG tablet     amLODIPine (NORVASC) 2.5 MG tablet     atorvastatin (LIPITOR) 80 MG tablet     carvedilol (COREG) 25 MG tablet      finasteride (PROSCAR) 5 MG tablet     gabapentin (NEURONTIN) 300 MG capsule     metFORMIN (GLUCOPHAGE-XR) 500 MG 24 hr tablet     multivitamin, therapeutic (THERA-VIT) TABS tablet     rivaroxaban ANTICOAGULANT (XARELTO) 20 MG TABS tablet     semaglutide (OZEMPIC, 0.25 OR 0.5 MG/DOSE,) 2 MG/1.5ML SOPN pen     tamsulosin (FLOMAX) 0.4 MG capsule     vitamin B-12 (CYANOCOBALAMIN) 250 MCG tablet     vitamin C (ASCORBIC ACID) 500 MG tablet     No current facility-administered medications for this visit.       Lab work is in process  Labs reviewed in EPIC          Review of Systems  Constitutional, HEENT, cardiovascular, pulmonary, gi and gu systems are negative, except as otherwise noted.    OBJECTIVE:   /70 (BP Location: Left arm, Patient Position: Sitting, Cuff Size: Adult Regular)   Pulse 95   Temp 98  F (36.7  C) (Tympanic)   Ht 1.829 m (6')   Wt 84.4 kg (186 lb)   SpO2 96%   BMI 25.23 kg/m   Estimated body mass index is 25.23 kg/m  as calculated from the following:    Height as of this encounter: 1.829 m (6').    Weight as of this encounter: 84.4 kg (186 lb).  Physical Exam  GENERAL: healthy, alert and no distress  EYES: Eyes grossly normal to inspection, PERRL and conjunctivae and sclerae normal  HENT: ear canals and TM's normal, nose and mouth without ulcers or lesions  NECK: no adenopathy, no asymmetry, masses, or scars and thyroid normal to palpation  RESP: lungs clear to auscultation - no rales, rhonchi or wheezes  CV: regular rate and rhythm, normal S1 S2, no S3 or S4, no murmur, click or rub, no peripheral edema and peripheral pulses strong  ABDOMEN: soft, nontender, no hepatosplenomegaly, no masses and bowel sounds normal   (male): no hernias, penis normal without urethral discharge and 1 testicle (other undescended)  MS: no gross musculoskeletal defects noted, no edema  SKIN: no suspicious lesions or rashes  NEURO: Normal strength and tone, mentation intact and speech normal  PSYCH: mentation  appears normal, affect normal/bright    Diagnostic Test Results:  Labs reviewed in Epic    ASSESSMENT / PLAN:       ICD-10-CM    1. Routine general medical examination at a health care facility  Z00.00    2. Centrilobular emphysema (H)  J43.2    3. Type 2 diabetes mellitus with stage 3a chronic kidney disease, without long-term current use of insulin (H)  E11.22 Hemoglobin A1c    N18.31    4. Hyperlipidemia, unspecified hyperlipidemia type  E78.5    5. Essential hypertension  I10    6. Gout, unspecified cause, unspecified chronicity, unspecified site  M10.9    7. Benign prostatic hyperplasia with urinary retention  N40.1     R33.8    8. Stage 3a chronic kidney disease (H)  N18.31    9. Erectile dysfunction, unspecified erectile dysfunction type  N52.9 sildenafil (VIAGRA) 100 MG tablet           COUNSELING:  Reviewed preventive health counseling, as reflected in patient instructions       Regular exercise       Healthy diet/nutrition       Vision screening       Hearing screening       Dental care       Bladder control       Fall risk prevention       Colon cancer screening       Prostate cancer screening    Estimated body mass index is 25.23 kg/m  as calculated from the following:    Height as of this encounter: 1.829 m (6').    Weight as of this encounter: 84.4 kg (186 lb).    Weight management plan: Discussed healthy diet and exercise guidelines    He reports that he has never smoked. He has quit using smokeless tobacco.  His smokeless tobacco use included chew.      Appropriate preventive services were discussed with this patient, including applicable screening as appropriate for cardiovascular disease, diabetes, osteopenia/osteoporosis, and glaucoma.  As appropriate for age/gender, discussed screening for colorectal cancer, prostate cancer, breast cancer, and cervical cancer. Checklist reviewing preventive services available has been given to the patient.    Reviewed patients plan of care and provided an AVS.  The Basic Care Plan (routine screening as documented in Health Maintenance) for Anthony meets the Care Plan requirement. This Care Plan has been established and reviewed with the Patient.    Counseling Resources:  ATP IV Guidelines  Pooled Cohorts Equation Calculator  Breast Cancer Risk Calculator  Breast Cancer: Medication to Reduce Risk  FRAX Risk Assessment  ICSI Preventive Guidelines  Dietary Guidelines for Americans, 2010  USDA's MyPlate  ASA Prophylaxis  Lung CA Screening    Zoila Azul MD  [facility] [place]    Identified Health Risks:

## 2022-04-01 ENCOUNTER — OFFICE VISIT (OUTPATIENT)
Dept: PODIATRY | Facility: OTHER | Age: 82
End: 2022-04-01
Attending: PODIATRIST
Payer: MEDICARE

## 2022-04-01 VITALS
OXYGEN SATURATION: 92 % | HEART RATE: 95 BPM | TEMPERATURE: 97.2 F | DIASTOLIC BLOOD PRESSURE: 75 MMHG | SYSTOLIC BLOOD PRESSURE: 125 MMHG

## 2022-04-01 DIAGNOSIS — L97.522 DIABETIC ULCER OF TOE OF LEFT FOOT ASSOCIATED WITH TYPE 2 DIABETES MELLITUS, WITH FAT LAYER EXPOSED (H): Primary | ICD-10-CM

## 2022-04-01 DIAGNOSIS — E11.621 DIABETIC ULCER OF TOE OF LEFT FOOT ASSOCIATED WITH TYPE 2 DIABETES MELLITUS, WITH FAT LAYER EXPOSED (H): Primary | ICD-10-CM

## 2022-04-01 DIAGNOSIS — L85.3 XEROSIS OF SKIN: ICD-10-CM

## 2022-04-01 DIAGNOSIS — E11.9 DIABETES MELLITUS TYPE 2, NONINSULIN DEPENDENT (H): ICD-10-CM

## 2022-04-01 DIAGNOSIS — L84 CALLUS OF FOOT: ICD-10-CM

## 2022-04-01 DIAGNOSIS — M21.969 LOSS OF PROTECTIVE SENSATION OF SKIN OF DEFORMED FOOT: ICD-10-CM

## 2022-04-01 DIAGNOSIS — Z86.31 PERSONAL HISTORY OF DIABETIC FOOT ULCER: ICD-10-CM

## 2022-04-01 DIAGNOSIS — R20.8 LOSS OF PROTECTIVE SENSATION OF SKIN OF DEFORMED FOOT: ICD-10-CM

## 2022-04-01 DIAGNOSIS — M47.816 LUMBAR SPONDYLOSIS: ICD-10-CM

## 2022-04-01 DIAGNOSIS — E11.42 DIABETIC POLYNEUROPATHY ASSOCIATED WITH TYPE 2 DIABETES MELLITUS (H): ICD-10-CM

## 2022-04-01 DIAGNOSIS — L60.3 ONYCHODYSTROPHY: ICD-10-CM

## 2022-04-01 PROCEDURE — G0463 HOSPITAL OUTPT CLINIC VISIT: HCPCS | Mod: 25

## 2022-04-01 PROCEDURE — 99213 OFFICE O/P EST LOW 20 MIN: CPT | Performed by: PODIATRIST

## 2022-04-01 ASSESSMENT — PAIN SCALES - GENERAL: PAINLEVEL: NO PAIN (0)

## 2022-04-01 NOTE — PROGRESS NOTES
Chief complaint: Patient presents with:  Wound Check      History of Present Illness: This 81 year old NIDDM II male is seen for follow-up management of an ulcer on the RIGHT lateral fifth digit.    He has Vanicream at home but he has not been using it on his skin a couple times a week with the help of his .    He says that around this past Monday, 03/28/2022, he developed blisters on his LEFT lateral fifth toe. He had the shoes stretched on Wednesday, 03/30/2022, but he had already developed the blisters. The toe started to look more red today and he became concerned. He says he was told to call if there were any concerns about open wounds on his toes, so he called the clinic and came to this appointment    He is on Xarelto for a history of blood clots in his legs.    He has his usual burning, tingling, and numbness in his feet.    Last HbA1C was 5.6% on 03/04/2022.    Last HbA1C was 5.3% on 08/05/2021.      No further pedal complaints today.         /75 (BP Location: Left arm, Patient Position: Sitting, Cuff Size: Adult Regular)   Pulse 95   Temp 97.2  F (36.2  C) (Tympanic)   SpO2 92%     Patient Active Problem List   Diagnosis     Urinary retention     Generalized weakness     Type 2 diabetes mellitus with stage 3 chronic kidney disease, without long-term current use of insulin (H)     Tachycardia     Hypoxia     Other acute pulmonary embolism with acute cor pulmonale (H)     Dyspnea, unspecified type     Benign prostatic hyperplasia with urinary retention     Chronic kidney disease, stage III (moderate) (H)     Chronic obstructive lung disease (H)     Chronic painful diabetic neuropathy (H)     Erectile dysfunction     Essential hypertension     Gout, unspecified     History of CVA (cerebrovascular accident)     Hyperlipidemia     IBS (irritable bowel syndrome)     Lumbar spondylosis     Mild concentric left ventricular hypertrophy (LVH)     Moderate episode of recurrent major depressive  disorder (H)     Spinal stenosis of lumbar region without neurogenic claudication     Uncomplicated alcohol dependence (H)     Nursing Home Visit     Onychomycosis of left great toe     Pincer nail deformity     Complicated UTI (urinary tract infection)     Sepsis with acute renal failure (H)       Past Surgical History:   Procedure Laterality Date     CYSTOSCOPY, TRANSURETHRAL RESECTION (TUR) PROSTATE, COMBINED N/A 10/15/2021    Procedure: CYSTOSCOPY, WITH TRANSURETHRAL RESECTION PROSTATE;  Surgeon: Indu De Leon MD;  Location: [facility] OR     HERNIA REPAIR       PHACOEMULSIFICATION WITH STANDARD INTRAOCULAR LENS IMPLANT Left 12/27/2021    Procedure: PHACOEMULSIFICATION CATARACT EXTRACTION POSTERIOR CHAMBER LENS LEFT EYE;  Surgeon: Phillip Maldonado MD;  Location: [facility] OR     PHACOEMULSIFICATION WITH STANDARD INTRAOCULAR LENS IMPLANT Right 1/11/2022    Procedure: PHACOEMULSIFICATION CATARACT EXTRACTION POSTERIOR CHAMBER LENS RIGHT EYE;  Surgeon: Phillip Maldonado MD;  Location: [facility] OR       Current Outpatient Medications   Medication     acetaminophen (TYLENOL) 500 MG tablet     albuterol (PROAIR HFA/PROVENTIL HFA/VENTOLIN HFA) 108 (90 Base) MCG/ACT inhaler     allopurinol (ZYLOPRIM) 100 MG tablet     amLODIPine (NORVASC) 2.5 MG tablet     atorvastatin (LIPITOR) 80 MG tablet     carvedilol (COREG) 25 MG tablet     finasteride (PROSCAR) 5 MG tablet     gabapentin (NEURONTIN) 300 MG capsule     metFORMIN (GLUCOPHAGE-XR) 500 MG 24 hr tablet     multivitamin, therapeutic (THERA-VIT) TABS tablet     rivaroxaban ANTICOAGULANT (XARELTO) 20 MG TABS tablet     semaglutide (OZEMPIC, 0.25 OR 0.5 MG/DOSE,) 2 MG/1.5ML SOPN pen     tamsulosin (FLOMAX) 0.4 MG capsule     vitamin B-12 (CYANOCOBALAMIN) 250 MCG tablet     vitamin C (ASCORBIC ACID) 500 MG tablet     No current facility-administered medications for this visit.          Allergies   Allergen Reactions     Ace Inhibitors      Per [facility]: hyperkalemia.  Pt doesn't know if he is  allergic     Ceclor [Cefaclor] Hives     Sulfa Drugs      Pt unsure.        History reviewed. No pertinent family history.    Social History     Socioeconomic History     Marital status: Single     Spouse name: None     Number of children: None     Years of education: None     Highest education level: None   Occupational History     None   Tobacco Use     Smoking status: Never Smoker     Smokeless tobacco: Former User     Types: Chew   Substance and Sexual Activity     Alcohol use: Not Currently     Drug use: Never     Sexual activity: None   Other Topics Concern     Parent/sibling w/ CABG, MI or angioplasty before 65F 55M? Not Asked   Social History Narrative     None     Social Determinants of Health     Financial Resource Strain:      Difficulty of Paying Living Expenses:    Food Insecurity:      Worried About Running Out of Food in the Last Year:      Ran Out of Food in the Last Year:    Transportation Needs:      Lack of Transportation (Medical):      Lack of Transportation (Non-Medical):    Physical Activity:      Days of Exercise per Week:      Minutes of Exercise per Session:    Stress:      Feeling of Stress :    Social Connections:      Frequency of Communication with Friends and Family:      Frequency of Social Gatherings with Friends and Family:      Attends Yarsanism Services:      Active Member of Clubs or Organizations:      Attends Club or Organization Meetings:      Marital Status:    Intimate Partner Violence:      Fear of Current or Ex-Partner:      Emotionally Abused:      Physically Abused:      Sexually Abused:        ROS: 10 point ROS neg other than the symptoms noted above in the HPI.  EXAM  Constitutional: healthy, alert and no distress    Psychiatric: mentation appears normal and affect normal/bright    VASCULAR:  -Dorsalis pedis pulse +2/4 b/l  -Posterior tibial pulse +1/4 b/l  -Capillary refill time < 3 seconds to b/l hallux  -Hair growth absent to b/l anterior legs and  ankles  NEURO:  -Light touch sensation severely diminished to b/l plantar forefoot  -Protective sensation diminished with SWM +0/10 RIGHT and +1/10 LEFT on 03/15/2022  -Protective sensation diminished with SWM +0/10 RIGHT and +2/10 LEFT on 01/03/2022  -Protective sensation diminished with SWM +0/10 RIGHT and +1/10 LEFT on 08/17/2021    DERM:  Wound Location:  LEFT lateral and LEFT dorsal fifth toe  04/01/2022  Measurement:  Both wounds measure 0.3cm x 0.3cm x 0.1cm to subcutaneous tissue with a 0.3cm skin island  Drainage:  Moderate serous  Odor:  None  Undermining:  None  Edges:  Intact  Base:  100% subcutaneous tissue  Surrounding Skin: Intact  ---Minimal, blanchable erythema to the LEFT fifth toe    -Skin temperature within normal limits to bilateral foot  -Toenails thickened, dystrophic and discolored x 10  ---Moderate incurvation of bilateral nail borders of the RIGHT hallux without a break in the skin    MSK:  -Muscle strength of ankles +5/5 for dorsiflexion, plantarflexion, ABDUction and ADDuction b/l    ============================================================    ASSESSMENT:    (E11.621,  L97.522) 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 associated with type 2 diabetes mellitus, with fat layer exposed (H)  (primary encounter diagnosis)    (L60.3) Onychodystrophy    (L84) Callus of foot    (L85.3) Xerosis of skin    (E11.9) Diabetes mellitus type 2, noninsulin dependent (H)    (E11.42) Diabetic polyneuropathy associated with type 2 diabetes mellitus (H)    (M21.969,  R20.8) Loss of protective sensation of skin of deformed foot    (L60.3) Onychodystrophy  (primary encounter diagnosis)    (L84) Callus of foot    (L85.3) Xerosis of skin    (E11.9) Diabetes mellitus type 2, noninsulin dependent (H)    (E11.42) Diabetic polyneuropathy associated with type 2 diabetes mellitus (H)    (M21.969,  R20.8) Loss of protective sensation of skin of deformed foot    (M47.816) Lumbar spondylosis    (Z86.31) Personal history of  diabetic foot ulcer      PLAN:  -Patient evaluated and examined. Treatment options discussed with no educational barriers noted.    -Toenails last debrided on 03/15/2022  -Minimal debridement required over LEFT lateral fifth toe ulcers  ---Applied Mepilex Ag and Medipore tape  ---Patient to change every two days  ----Patient was instructed to look for signs of infection (redness, swelling, pain, purulence, fever, chills, nausea, vomiting) and to return to podiatry or the emergency department immediately if there are any signs of infection.  ---Offloading: Keep pressure off the foot. Wear a loose slipper and/or avoid wearing tight shoes around the house to keep pressure off the ulcer. Patient is in agreement with this plan.    -Patient in agreement with the above treatment plan and all of patient's questions were answered.      Return to clinic two weeks to evaluate LEFT lateral fifth toe ulcers  RTC 63+ days for diabetic foot exam and high risk nail debridement and callus paring (already scheduled for 05/25/2022)        Meenu Orourke DPM

## 2022-04-01 NOTE — NURSING NOTE
"Chief Complaint   Patient presents with     Wound Check       Initial /75 (BP Location: Left arm, Patient Position: Sitting, Cuff Size: Adult Regular)   Pulse 95   Temp 97.2  F (36.2  C) (Tympanic)   SpO2 92%  Estimated body mass index is 25.73 kg/m  as calculated from the following:    Height as of 3/8/22: 1.854 m (6' 1\").    Weight as of 3/14/22: 88.5 kg (195 lb).  Medication Reconciliation: vanna Leon  "

## 2022-04-04 ENCOUNTER — OFFICE VISIT (OUTPATIENT)
Dept: FAMILY MEDICINE | Facility: OTHER | Age: 82
End: 2022-04-04
Attending: FAMILY MEDICINE
Payer: MEDICARE

## 2022-04-04 VITALS
SYSTOLIC BLOOD PRESSURE: 120 MMHG | TEMPERATURE: 98 F | DIASTOLIC BLOOD PRESSURE: 70 MMHG | BODY MASS INDEX: 25.19 KG/M2 | HEIGHT: 72 IN | OXYGEN SATURATION: 96 % | WEIGHT: 186 LBS | HEART RATE: 95 BPM

## 2022-04-04 DIAGNOSIS — E11.22 TYPE 2 DIABETES MELLITUS WITH STAGE 3A CHRONIC KIDNEY DISEASE, WITHOUT LONG-TERM CURRENT USE OF INSULIN (H): ICD-10-CM

## 2022-04-04 DIAGNOSIS — N18.31 TYPE 2 DIABETES MELLITUS WITH STAGE 3A CHRONIC KIDNEY DISEASE, WITHOUT LONG-TERM CURRENT USE OF INSULIN (H): ICD-10-CM

## 2022-04-04 DIAGNOSIS — M10.9 GOUT, UNSPECIFIED CAUSE, UNSPECIFIED CHRONICITY, UNSPECIFIED SITE: ICD-10-CM

## 2022-04-04 DIAGNOSIS — E78.5 HYPERLIPIDEMIA, UNSPECIFIED HYPERLIPIDEMIA TYPE: ICD-10-CM

## 2022-04-04 DIAGNOSIS — R33.8 BENIGN PROSTATIC HYPERPLASIA WITH URINARY RETENTION: ICD-10-CM

## 2022-04-04 DIAGNOSIS — N52.9 ERECTILE DYSFUNCTION, UNSPECIFIED ERECTILE DYSFUNCTION TYPE: ICD-10-CM

## 2022-04-04 DIAGNOSIS — I10 ESSENTIAL HYPERTENSION: ICD-10-CM

## 2022-04-04 DIAGNOSIS — Z00.00 ROUTINE GENERAL MEDICAL EXAMINATION AT A HEALTH CARE FACILITY: Primary | ICD-10-CM

## 2022-04-04 DIAGNOSIS — N18.31 STAGE 3A CHRONIC KIDNEY DISEASE (H): ICD-10-CM

## 2022-04-04 DIAGNOSIS — N40.1 BENIGN PROSTATIC HYPERPLASIA WITH URINARY RETENTION: ICD-10-CM

## 2022-04-04 DIAGNOSIS — J43.2 CENTRILOBULAR EMPHYSEMA (H): ICD-10-CM

## 2022-04-04 PROCEDURE — G0438 PPPS, INITIAL VISIT: HCPCS | Performed by: FAMILY MEDICINE

## 2022-04-04 RX ORDER — SILDENAFIL 100 MG/1
100 TABLET, FILM COATED ORAL DAILY PRN
Qty: 10 TABLET | Refills: 0 | Status: SHIPPED | OUTPATIENT
Start: 2022-04-04

## 2022-04-04 ASSESSMENT — ANXIETY QUESTIONNAIRES
1. FEELING NERVOUS, ANXIOUS, OR ON EDGE: NOT AT ALL
6. BECOMING EASILY ANNOYED OR IRRITABLE: NOT AT ALL
7. FEELING AFRAID AS IF SOMETHING AWFUL MIGHT HAPPEN: NOT AT ALL
2. NOT BEING ABLE TO STOP OR CONTROL WORRYING: NOT AT ALL
3. WORRYING TOO MUCH ABOUT DIFFERENT THINGS: NOT AT ALL
5. BEING SO RESTLESS THAT IT IS HARD TO SIT STILL: NOT AT ALL
4. TROUBLE RELAXING: NOT AT ALL
GAD7 TOTAL SCORE: 0

## 2022-04-04 ASSESSMENT — PAIN SCALES - GENERAL: PAINLEVEL: NO PAIN (0)

## 2022-04-04 ASSESSMENT — PATIENT HEALTH QUESTIONNAIRE - PHQ9: SUM OF ALL RESPONSES TO PHQ QUESTIONS 1-9: 8

## 2022-04-04 ASSESSMENT — ACTIVITIES OF DAILY LIVING (ADL): CURRENT_FUNCTION: NO ASSISTANCE NEEDED

## 2022-04-04 NOTE — NURSING NOTE
Chief Complaint   Patient presents with     Wellness Visit       Initial /70 (BP Location: Left arm, Patient Position: Sitting, Cuff Size: Adult Regular)   Pulse 95   Temp 98  F (36.7  C) (Tympanic)   Ht 1.829 m (6')   Wt 84.4 kg (186 lb)   SpO2 96%   BMI 25.23 kg/m   Estimated body mass index is 25.23 kg/m  as calculated from the following:    Height as of this encounter: 1.829 m (6').    Weight as of this encounter: 84.4 kg (186 lb).  Medication Reconciliation: complete  Brittni Esteves LPN

## 2022-04-04 NOTE — PATIENT INSTRUCTIONS
Preventive Health Recommendations:     See your health care provider every year to    Review health changes.     Discuss preventive care.      Review your medicines if your doctor has prescribed any.      Talk with your health care provider about whether you should have a test to screen for prostate cancer (PSA).    Every 3 years, have a diabetes test (fasting glucose). If you are at risk for diabetes, you should have this test more often.    Every 5 years, have a cholesterol test. Have this test more often if you are at risk for high cholesterol or heart disease.     Every 10 years, have a colonoscopy. Or, have a yearly FIT test (stool test). These exams will check for colon cancer.    Talk to with your health care provider about screening for Abdominal Aortic Aneurysm if you have a family history of AAA or have a history of smoking.    Shots:     Get a flu shot each year.     Get a tetanus shot every 10 years.     Talk to your doctor about your pneumonia vaccines. There are now two you should receive - Pneumovax (PPSV 23) and Prevnar (PCV 13).     Talk to your pharmacist about a shingles vaccine.     Talk to your doctor about the hepatitis B vaccine.  Nutrition:     Eat at least 5 servings of fruits and vegetables each day.     Eat whole-grain bread, whole-wheat pasta and brown rice instead of white grains and rice.     Get adequate Calcium and Vitamin D.   Lifestyle    Exercise for at least 150 minutes a week (30 minutes a day, 5 days a week). This will help you control your weight and prevent disease.     Limit alcohol to one drink per day.     No smoking.     Wear sunscreen to prevent skin cancer.    See your dentist every six months for an exam and cleaning.    See your eye doctor every 1 to 2 years to screen for conditions such as glaucoma, macular degeneration, cataracts, etc.    Personalized Prevention Plan  You are due for the preventive services outlined below.  Your care team is available to assist you  in scheduling these services.  If you have already completed any of these items, please share that information with your care team to update in your medical record.  Health Maintenance Due   Topic Date Due     Zoster (Shingles) Vaccine (1 of 2) Never done     Flu Vaccine (1) 09/01/2021

## 2022-04-04 NOTE — LETTER
My COPD Action Plan     Name: Anthony Dyer    YOB: 1940   Date: 4/4/2022    My doctor: Zoila Azul MD   My clinic: [facility] [place]    [facility] [address]  [phone]  My Controller Medicine: Albuterol (Proair/Ventolin/Proventolin)   Dose: 108 mcg     My Rescue Medicine: Albuterol nebulizer solution    Dose: none     My Flare Up Medicine: none   Dose: none     My COPD Severity: Mild = FeV1 > 80%      Use of Oxygen: Oxygen Not Prescribed      Make sure you've had your pneumonia   vaccines.          GREEN ZONE       Doing well today      Usual level of activity and exercise    Usual amount of cough and mucus    No shortness of breath    Usual level of health (thinking clearly, sleeping well, feel like eating) Actions:      Take daily medicines    Use oxygen as prescribed    Follow regular exercise and diet plan    Avoid cigarette smoke and other irritants that harm the lungs           YELLOW ZONE          Having a bad day or flare up      Short of breath more than usual    A lot more sputum (mucus) than usual    Sputum looks yellow, green, tan, brown or bloody    More coughing or wheezing    Fever or chills    Less energy; trouble completing activities    Trouble thinking or focusing    Using quick relief inhaler or nebulizer more often    Poor sleep; symptoms wake me up    Do not feel like eating Actions:      Get plenty of rest    Take daily medicines    Use quick relief inhaler every 4 hours    If you use oxygen, call you doctor to see if you should adjust your oxygen    Do breathing exercises or other things to help you relax    Let a loved one, friend or neighbor know you are feeling worse    Call your care team if you have 2 or more symptoms.  Start taking steroids or antibiotics if directed by your care team           RED ZONE       Need medical care now      Severe shortness of breath (feel you can't breathe)    Fever, chills    Not enough breath to  do any activity    Trouble coughing up mucus, walking or talking    Blood in mucus    Frequent coughing   Rescue medicines are not working    Not able to sleep because of breathing    Feel confused or drowsy    Chest pain    Actions:      Call your health care team.  If you cannot reach your care team, call 911 or go to the emergency room.        Annual Reminders:  Meet with Care Team, Flu Shot every Fall  Pharmacy: [id number] - [place], [place] - [address]

## 2022-04-05 ASSESSMENT — ANXIETY QUESTIONNAIRES: GAD7 TOTAL SCORE: 0

## 2022-04-17 ENCOUNTER — HOSPITAL ENCOUNTER (EMERGENCY)
Facility: HOSPITAL | Age: 82
Discharge: HOME | End: 2022-04-17
Attending: PHYSICIAN ASSISTANT | Admitting: PHYSICIAN ASSISTANT
Payer: MEDICARE

## 2022-04-17 VITALS
OXYGEN SATURATION: 97 % | HEART RATE: 95 BPM | TEMPERATURE: 98.2 F | SYSTOLIC BLOOD PRESSURE: 147 MMHG | DIASTOLIC BLOOD PRESSURE: 79 MMHG | RESPIRATION RATE: 26 BRPM

## 2022-04-17 DIAGNOSIS — L03.116 CELLULITIS OF LEFT FOOT: ICD-10-CM

## 2022-04-17 PROCEDURE — 99213 OFFICE O/P EST LOW 20 MIN: CPT | Performed by: PHYSICIAN ASSISTANT

## 2022-04-17 PROCEDURE — G0463 HOSPITAL OUTPT CLINIC VISIT: HCPCS

## 2022-04-17 ASSESSMENT — ENCOUNTER SYMPTOMS
COLOR CHANGE: 1
JOINT SWELLING: 1

## 2022-04-17 NOTE — ED NOTES
Put triple antibiotic ointment on wound and covered with non adherent, gauze wrap, and ace wrap per provider's request. Pt tolerated well.

## 2022-04-17 NOTE — ED PROVIDER NOTES
History     Chief Complaint   Patient presents with     Foot Pain     HPI  Anthony Dyer is a 81 year old male who presents to urgent care for evaluation of left foot.  Patient has been seeing the podiatrist here at [place] Dr. Orourke for an ongoing diabetic foot ulcer to the left fifth digit.  Patient states that when he woke up this morning he noted increased redness, and swelling in his left foot.  Patient states he does have a appointment with Dr. Orourke tomorrow but thought he should be evaluated immediately because of the new redness, and swelling in his left foot.  He denies any fevers, chills, pain, or any other associated symptoms.    Allergies:  Allergies   Allergen Reactions     Ace Inhibitors      Per [facility]: hyperkalemia.  Pt doesn't know if he is allergic     Ceclor [Cefaclor] Hives     Sulfa Drugs      Pt unsure.        Problem List:    Patient Active Problem List    Diagnosis Date Noted     Complicated UTI (urinary tract infection) 03/08/2022     Priority: Medium     Sepsis with acute renal failure (H) 03/08/2022     Priority: Medium     Nursing Home Visit 05/21/2021     Priority: Medium     Benign prostatic hyperplasia with urinary retention 04/29/2021     Priority: Medium     Tachycardia 04/26/2021     Priority: Medium     Hypoxia 04/26/2021     Priority: Medium     Other acute pulmonary embolism with acute cor pulmonale (H) 04/26/2021     Priority: Medium     Dyspnea, unspecified type 04/26/2021     Priority: Medium     Urinary retention 04/19/2021     Priority: Medium     Generalized weakness 04/19/2021     Priority: Medium     Onychomycosis of left great toe 02/09/2021     Priority: Medium     Pincer nail deformity 02/09/2021     Priority: Medium     Chronic painful diabetic neuropathy (H) 10/24/2018     Priority: Medium     Moderate episode of recurrent major depressive disorder (H) 10/24/2018     Priority: Medium     Formatting of this note might be different from the  original.  Patient does not want anti-depressant medication       Chronic obstructive lung disease (H) 11/22/2017     Priority: Medium     Formatting of this note might be different from the original.  Mild       Mild concentric left ventricular hypertrophy (LVH) 11/22/2017     Priority: Medium     Uncomplicated alcohol dependence (H) 09/28/2016     Priority: Medium     IBS (irritable bowel syndrome) 12/23/2015     Priority: Medium     Spinal stenosis of lumbar region without neurogenic claudication 12/23/2015     Priority: Medium     Erectile dysfunction 05/28/2014     Priority: Medium     Chronic kidney disease, stage III (moderate) (H) 05/22/2013     Priority: Medium     Formatting of this note might be different from the original.  Since 2011       History of CVA (cerebrovascular accident) 02/03/2011     Priority: Medium     Formatting of this note might be different from the original.  Jan 2011  IMPRESSION: Interval development of a diffusion abnormality consistent with acute to early subacute infarct of less than 3 days of age in the left thalamus and the medial left cerebral peduncle. No associated abnormal enhancement or hydrocephalus.       Hyperlipidemia 02/03/2011     Priority: Medium     Essential hypertension 11/28/2006     Priority: Medium     Type 2 diabetes mellitus with stage 3 chronic kidney disease, without long-term current use of insulin (H) 02/16/2001     Priority: Medium     Gout, unspecified 02/16/2001     Priority: Medium     Formatting of this note might be different from the original.  On Allopurinol       Lumbar spondylosis 02/22/1995     Priority: Medium     Formatting of this note might be different from the original.  S/P RFN L2-L5 facets          Past Medical History:    Past Medical History:   Diagnosis Date     Diabetes (H)      Hypertension        Past Surgical History:    Past Surgical History:   Procedure Laterality Date     CYSTOSCOPY, TRANSURETHRAL RESECTION (TUR) PROSTATE,  COMBINED N/A 10/15/2021    Procedure: CYSTOSCOPY, WITH TRANSURETHRAL RESECTION PROSTATE;  Surgeon: Indu De Leon MD;  Location: [facility] OR     HERNIA REPAIR       PHACOEMULSIFICATION WITH STANDARD INTRAOCULAR LENS IMPLANT Left 12/27/2021    Procedure: PHACOEMULSIFICATION CATARACT EXTRACTION POSTERIOR CHAMBER LENS LEFT EYE;  Surgeon: Phillip Maldonado MD;  Location: [facility] OR     PHACOEMULSIFICATION WITH STANDARD INTRAOCULAR LENS IMPLANT Right 1/11/2022    Procedure: PHACOEMULSIFICATION CATARACT EXTRACTION POSTERIOR CHAMBER LENS RIGHT EYE;  Surgeon: Phillip Maldonado MD;  Location: [facility] OR       Family History:    No family history on file.    Social History:  Marital Status:  Single [1]  Social History     Tobacco Use     Smoking status: Never Smoker     Smokeless tobacco: Former User     Types: Chew   Substance Use Topics     Alcohol use: Yes     Comment: daily, 4 today     Drug use: Never        Medications:    acetaminophen (TYLENOL) 500 MG tablet  albuterol (PROAIR HFA/PROVENTIL HFA/VENTOLIN HFA) 108 (90 Base) MCG/ACT inhaler  allopurinol (ZYLOPRIM) 100 MG tablet  amoxicillin-clavulanate (AUGMENTIN) 875-125 MG tablet  atorvastatin (LIPITOR) 80 MG tablet  carvedilol (COREG) 25 MG tablet  finasteride (PROSCAR) 5 MG tablet  gabapentin (NEURONTIN) 300 MG capsule  metFORMIN (GLUCOPHAGE-XR) 500 MG 24 hr tablet  multivitamin, therapeutic (THERA-VIT) TABS tablet  rivaroxaban ANTICOAGULANT (XARELTO) 20 MG TABS tablet  semaglutide (OZEMPIC, 0.25 OR 0.5 MG/DOSE,) 2 MG/1.5ML SOPN pen  sildenafil (VIAGRA) 100 MG tablet  tamsulosin (FLOMAX) 0.4 MG capsule  vitamin B-12 (CYANOCOBALAMIN) 250 MCG tablet  vitamin C (ASCORBIC ACID) 500 MG tablet  amLODIPine (NORVASC) 2.5 MG tablet          Review of Systems   Musculoskeletal: Positive for joint swelling.   Skin: Positive for color change.   All other systems reviewed and are negative.      Physical Exam   BP: 147/79  Pulse: 95  Temp: 98.2  F (36.8  C)  Resp: 26  SpO2: 97 %      Physical  Exam  Musculoskeletal:        Feet:    Feet:      Comments: Approximate 1 cm x 1 cm ulceration noted over the lateral aspect of fifth left digit.  Patient has light hue of erythema present over the volar aspect of left foot.  This is slightly warm with palpation.  Patient also has 2+ pitting edema present over the volar aspect of left foot.        ED Course                 Procedures             Critical Care time:               No results found for this or any previous visit (from the past 24 hour(s)).    Medications - No data to display    Assessments & Plan (with Medical Decision Making)   #1.  Cellulitis of left foot    Discussed exam findings with patient.  Patient is prescribed course of Augmentin for cellulitis of left foot.  Patient does have an allergy to cefaclor but has taken Augmentin in the past without any issue.  Patient's ulcer dressing was changed and his foot was wrapped with Ace wrap for his edema.  He is instructed to try and stay off his feet and elevate to help with his edema in his left foot.  I recommend patient keep his appointment with Dr. Orourke tomorrow but if he has any fevers, chills, or additional issues in the meantime he should return to emergency department.  Patient verbalized understanding and agreement of plan.    I have reviewed the nursing notes.    I have reviewed the findings, diagnosis, plan and need for follow up with the patient.    New Prescriptions    AMOXICILLIN-CLAVULANATE (AUGMENTIN) 875-125 MG TABLET    Take 1 tablet by mouth 2 times daily for 7 days       Final diagnoses:   Cellulitis of left foot       4/17/2022   [facility]     Crispin Godfrey PA-C  04/17/22 [id number]

## 2022-04-17 NOTE — ED TRIAGE NOTES
Being treated for an ulcerated small left toe. Foot swollen Pain 4/10 at rest. Increases to 9/10 when walking

## 2022-04-17 NOTE — ED TRIAGE NOTES
Pt presents with c/o left foot pain. Reports he has been seeing Dr Orourke for an ulcerated small left toe. States that his foot swelled up over night. Pt does have an appointment with prakash tomorrow but states his foot hurts too bad and is also concerned about losing the foot. Foot hurts worse when he walks. Pt has taken tylenol.

## 2022-04-18 ENCOUNTER — OFFICE VISIT (OUTPATIENT)
Dept: PODIATRY | Facility: OTHER | Age: 82
End: 2022-04-18
Attending: PODIATRIST
Payer: MEDICARE

## 2022-04-18 VITALS
DIASTOLIC BLOOD PRESSURE: 73 MMHG | OXYGEN SATURATION: 95 % | TEMPERATURE: 98.7 F | HEART RATE: 90 BPM | SYSTOLIC BLOOD PRESSURE: 115 MMHG

## 2022-04-18 DIAGNOSIS — M47.816 LUMBAR SPONDYLOSIS: ICD-10-CM

## 2022-04-18 DIAGNOSIS — Z86.31 PERSONAL HISTORY OF DIABETIC FOOT ULCER: ICD-10-CM

## 2022-04-18 DIAGNOSIS — M21.969 LOSS OF PROTECTIVE SENSATION OF SKIN OF DEFORMED FOOT: ICD-10-CM

## 2022-04-18 DIAGNOSIS — L85.3 XEROSIS OF SKIN: ICD-10-CM

## 2022-04-18 DIAGNOSIS — E11.9 DIABETES MELLITUS TYPE 2, NONINSULIN DEPENDENT (H): ICD-10-CM

## 2022-04-18 DIAGNOSIS — L60.3 ONYCHODYSTROPHY: ICD-10-CM

## 2022-04-18 DIAGNOSIS — L97.526 DIABETIC ULCER OF TOE OF LEFT FOOT ASSOCIATED WITH TYPE 2 DIABETES MELLITUS, WITH BONE INVOLVEMENT WITHOUT EVIDENCE OF NECROSIS (H): Primary | ICD-10-CM

## 2022-04-18 DIAGNOSIS — E11.42 DIABETIC POLYNEUROPATHY ASSOCIATED WITH TYPE 2 DIABETES MELLITUS (H): ICD-10-CM

## 2022-04-18 DIAGNOSIS — E11.621 DIABETIC ULCER OF TOE OF LEFT FOOT ASSOCIATED WITH TYPE 2 DIABETES MELLITUS, WITH BONE INVOLVEMENT WITHOUT EVIDENCE OF NECROSIS (H): Primary | ICD-10-CM

## 2022-04-18 DIAGNOSIS — R20.8 LOSS OF PROTECTIVE SENSATION OF SKIN OF DEFORMED FOOT: ICD-10-CM

## 2022-04-18 DIAGNOSIS — L84 CALLUS OF FOOT: ICD-10-CM

## 2022-04-18 PROCEDURE — G0463 HOSPITAL OUTPT CLINIC VISIT: HCPCS | Mod: 25

## 2022-04-18 PROCEDURE — 11043 DBRDMT MUSC&/FSCA 1ST 20/<: CPT | Performed by: PODIATRIST

## 2022-04-18 ASSESSMENT — PAIN SCALES - GENERAL: PAINLEVEL: NO PAIN (0)

## 2022-04-18 NOTE — PROGRESS NOTES
Chief complaint: Patient presents with:  WOUND CARE    History of Present Illness: This 81 year old NIDDM II male is seen for follow-up management of an ulcer on the bilateral lateral fifth digit.    He had increased redness to the LEFT foot on 04/17/2022. He went to Urgent Care and they placed him on Augmentin. He started taking it last night and he says the foot looks much better today. He says his  helps him change the dressing on the toe every few days, but she cannot understand why it won't heal and she doesn't think the dressing is working.    He is on Xarelto for a history of blood clots in his legs.    He has his usual burning, tingling, and numbness in his feet.    Last HbA1C was 5.6% on 03/04/2022.    Last HbA1C was 5.3% on 08/05/2021.      No further pedal complaints today.         /73   Pulse 90   Temp 98.7  F (37.1  C)   SpO2 95%     Patient Active Problem List   Diagnosis     Urinary retention     Generalized weakness     Type 2 diabetes mellitus with stage 3 chronic kidney disease, without long-term current use of insulin (H)     Tachycardia     Hypoxia     Other acute pulmonary embolism with acute cor pulmonale (H)     Dyspnea, unspecified type     Benign prostatic hyperplasia with urinary retention     Chronic kidney disease, stage III (moderate) (H)     Chronic obstructive lung disease (H)     Chronic painful diabetic neuropathy (H)     Erectile dysfunction     Essential hypertension     Gout, unspecified     History of CVA (cerebrovascular accident)     Hyperlipidemia     IBS (irritable bowel syndrome)     Lumbar spondylosis     Mild concentric left ventricular hypertrophy (LVH)     Moderate episode of recurrent major depressive disorder (H)     Spinal stenosis of lumbar region without neurogenic claudication     Uncomplicated alcohol dependence (H)     Nursing Home Visit     Onychomycosis of left great toe     Pincer nail deformity     Complicated UTI (urinary tract infection)      Sepsis with acute renal failure (H)       Past Surgical History:   Procedure Laterality Date     CYSTOSCOPY, TRANSURETHRAL RESECTION (TUR) PROSTATE, COMBINED N/A 10/15/2021    Procedure: CYSTOSCOPY, WITH TRANSURETHRAL RESECTION PROSTATE;  Surgeon: Indu De Leon MD;  Location: [facility]     HERNIA REPAIR       PHACOEMULSIFICATION WITH STANDARD INTRAOCULAR LENS IMPLANT Left 12/27/2021    Procedure: PHACOEMULSIFICATION CATARACT EXTRACTION POSTERIOR CHAMBER LENS LEFT EYE;  Surgeon: Phillip Maldonado MD;  Location: [facility]     PHACOEMULSIFICATION WITH STANDARD INTRAOCULAR LENS IMPLANT Right 1/11/2022    Procedure: PHACOEMULSIFICATION CATARACT EXTRACTION POSTERIOR CHAMBER LENS RIGHT EYE;  Surgeon: Phillip Maldonado MD;  Location: [facility] OR       Current Outpatient Medications   Medication     acetaminophen (TYLENOL) 500 MG tablet     albuterol (PROAIR HFA/PROVENTIL HFA/VENTOLIN HFA) 108 (90 Base) MCG/ACT inhaler     allopurinol (ZYLOPRIM) 100 MG tablet     amLODIPine (NORVASC) 2.5 MG tablet     amoxicillin-clavulanate (AUGMENTIN) 875-125 MG tablet     atorvastatin (LIPITOR) 80 MG tablet     carvedilol (COREG) 25 MG tablet     finasteride (PROSCAR) 5 MG tablet     gabapentin (NEURONTIN) 300 MG capsule     metFORMIN (GLUCOPHAGE-XR) 500 MG 24 hr tablet     multivitamin, therapeutic (THERA-VIT) TABS tablet     rivaroxaban ANTICOAGULANT (XARELTO) 20 MG TABS tablet     semaglutide (OZEMPIC, 0.25 OR 0.5 MG/DOSE,) 2 MG/1.5ML SOPN pen     sildenafil (VIAGRA) 100 MG tablet     tamsulosin (FLOMAX) 0.4 MG capsule     vitamin B-12 (CYANOCOBALAMIN) 250 MCG tablet     vitamin C (ASCORBIC ACID) 500 MG tablet     No current facility-administered medications for this visit.          Allergies   Allergen Reactions     Ace Inhibitors      Per [facility]: hyperkalemia.  Pt doesn't know if he is allergic     Ceclor [Cefaclor] Hives     Sulfa Drugs      Pt unsure.        History reviewed. No pertinent family history.    Social History      Socioeconomic History     Marital status: Single     Spouse name: None     Number of children: None     Years of education: None     Highest education level: None   Occupational History     None   Tobacco Use     Smoking status: Never Smoker     Smokeless tobacco: Former User     Types: Chew   Substance and Sexual Activity     Alcohol use: Not Currently     Drug use: Never     Sexual activity: None   Other Topics Concern     Parent/sibling w/ CABG, MI or angioplasty before 65F 55M? Not Asked   Social History Narrative     None     Social Determinants of Health     Financial Resource Strain:      Difficulty of Paying Living Expenses:    Food Insecurity:      Worried About Running Out of Food in the Last Year:      Ran Out of Food in the Last Year:    Transportation Needs:      Lack of Transportation (Medical):      Lack of Transportation (Non-Medical):    Physical Activity:      Days of Exercise per Week:      Minutes of Exercise per Session:    Stress:      Feeling of Stress :    Social Connections:      Frequency of Communication with Friends and Family:      Frequency of Social Gatherings with Friends and Family:      Attends [employer] Services:      Active Member of Clubs or Organizations:      Attends Club or Organization Meetings:      Marital Status:    Intimate Partner Violence:      Fear of Current or Ex-Partner:      Emotionally Abused:      Physically Abused:      Sexually Abused:        ROS: 10 point ROS neg other than the symptoms noted above in the HPI.  EXAM  Constitutional: healthy, alert and no distress    Psychiatric: mentation appears normal and affect normal/bright    VASCULAR:  -Dorsalis pedis pulse +2/4 b/l  -Posterior tibial pulse +1/4 b/l  -Capillary refill time < 3 seconds to b/l hallux  -Hair growth absent to b/l anterior legs and ankles  NEURO:  -Light touch sensation severely diminished to b/l plantar forefoot  -Protective sensation diminished with SWM +0/10 RIGHT and +1/10 LEFT on  03/15/2022  -Protective sensation diminished with SWM +0/10 RIGHT and +2/10 LEFT on 01/03/2022  -Protective sensation diminished with SWM +0/10 RIGHT and +1/10 LEFT on 08/17/2021    DERM:  Wound Location:  LEFT lateral fifth toe  04/18/2022  Measurement:  0.9cm x 1.1cm x 0.3cm to the level of the bone  Drainage:  Moderate serous  Odor:  None  Undermining:  None  Edges:  Intact  Base:  To the level of the bone   Surrounding Skin: Intact  No severe erythema, no ascending erythema, no calor, no purulence, no malodor, no other signs of infection.     04/01/2022  Measurement:  Both wounds measure 0.3cm x 0.3cm x 0.1cm to subcutaneous tissue with a 0.3cm skin island  Drainage:  Moderate serous  Odor:  None  Undermining:  None  Edges:  Intact  Base:  100% subcutaneous tissue  Surrounding Skin: Intact  ---Minimal, blanchable erythema to the LEFT fifth toe    -Skin temperature within normal limits to bilateral foot  -Moderate hyperkeratotic lesion to the RIGHT lateral fifth toe without an open wound post paring  -Toenails thickened, dystrophic and discolored x 10  MSK:  -Varus rotation of the bilateral fifth toe  -Muscle strength of ankles +5/5 for dorsiflexion, plantarflexion, ABDUction and ADDuction b/l    ============================================================    ASSESSMENT:    (E11.621,  L97.526) 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 associated with type 2 diabetes mellitus, with bone involvement without evidence of necrosis (H)  (primary encounter diagnosis)    (L60.3) Onychodystrophy    (L84) Callus of foot    (L85.3) Xerosis of skin    (E11.9) Diabetes mellitus type 2, noninsulin dependent (H)    (E11.42) Diabetic polyneuropathy associated with type 2 diabetes mellitus (H)    (M21.969,  R20.8) Loss of protective sensation of skin of deformed foot    (M47.816) Lumbar spondylosis    (Z86.31) Personal history of diabetic foot ulcer      PLAN:  -Patient evaluated and examined. Treatment options discussed with no  educational barriers noted.    -Toenails last debrided on 03/15/2022    -Excisional debridement of wound on LEFT lateral fifth toe with a sharp tissue nipper  to the level of and including the muscle/tendon  layer (to the level of the bone but did not debride the bone) (debrided a total of less than 20 centimeters squared) with all non-viable soft tissue debrided from the wound bed.  -Debrided ulceration in attempt to decrease the biofilm layer, promote healing and in attempt to prevent infection  ---Dressed wound with Dakin's soaked gauze, dry gauze and tape  ---Patient to change the dressing every other day. He has a lot of difficulty changing the dressing more often than this on his own, so he is dependent on his  changing his dressing every other day.  ----Patient was instructed to look for signs of infection (redness, swelling, pain, purulence, fever, chills, nausea, vomiting) and to return to podiatry or the emergency department immediately if there are any signs of infection.   ---Stressed to the patient the importance of monitoring the toe. If the bone becomes infected, he may require a toe amputation. A severe infection can lead to a proximal leg amputation or a life threatening infection. He understand and he is in agreement to check the foot carefully. If podiatry is not available and he is concerned about infection, then he should return to the Emergency Department. He is also in agreement with this plan.    ---Dispensed size small toe sleeve for the RIGHT fifth toe. Patient may find these at [facility] or [facility]. Patient should not wear the toe sleeve(s) at night, but only wear the sleeve in shoes and/or socks during the day. The sleeves are re-usable and hand washable until they wear out.    -Continue antibiotics. The infection is currently controlled Will order an xray at his follow-up appointment if the wound is not greatly improving.   -Patient in agreement with the above treatment plan and  all of patient's questions were answered.      Return to clinic one week to evaluate LEFT lateral fifth toe ulcer  RTC 63+ days for diabetic foot exam and high risk nail debridement and callus paring (already scheduled for 05/25/2022)        Meenu Orourke DPM

## 2022-04-18 NOTE — PATIENT INSTRUCTIONS
Directions for dressing change:    1.) Cut gauze to wound size.  2.) cover gauze with Dakins solution and wring out so it isn't dripping wet.  3.) place on wound site and cover with dry gauze  4.) tape gauze down to secure the dressing.

## 2022-04-18 NOTE — NURSING NOTE
Chief Complaint   Patient presents with     WOUND CARE       Initial /73   Pulse 90   Temp 98.7  F (37.1  C)   SpO2 95%  Estimated body mass index is 25.23 kg/m  as calculated from the following:    Height as of 4/4/22: 1.829 m (6').    Weight as of 4/4/22: 84.4 kg (186 lb).  Medication Reconciliation: complete  Luann Rocha MA

## 2022-04-18 NOTE — PROGRESS NOTES
Chief complaint: Patient presents with:  WOUND CARE      History of Present Illness: This 81 year old NIDDM II male is seen for follow-up management of an ulcer on the RIGHT lateral fifth digit.    He has Vanicream at home but he has not been using it on his skin a couple times a week with the help of his .    He says that around this past Monday, 03/28/2022, he developed blisters on his LEFT lateral fifth toe. He had the shoes stretched on Wednesday, 03/30/2022, but he had already developed the blisters. The toe started to look more red today and he became concerned. He says he was told to call if there were any concerns about open wounds on his toes, so he called the clinic and came to this appointment    He is on Xarelto for a history of blood clots in his legs.    He has his usual burning, tingling, and numbness in his feet.    Last HbA1C was 5.6% on 03/04/2022.    Last HbA1C was 5.3% on 08/05/2021.      No further pedal complaints today.         /73   Pulse 90   Temp 98.7  F (37.1  C)   SpO2 95%     Patient Active Problem List   Diagnosis     Urinary retention     Generalized weakness     Type 2 diabetes mellitus with stage 3 chronic kidney disease, without long-term current use of insulin (H)     Tachycardia     Hypoxia     Other acute pulmonary embolism with acute cor pulmonale (H)     Dyspnea, unspecified type     Benign prostatic hyperplasia with urinary retention     Chronic kidney disease, stage III (moderate) (H)     Chronic obstructive lung disease (H)     Chronic painful diabetic neuropathy (H)     Erectile dysfunction     Essential hypertension     Gout, unspecified     History of CVA (cerebrovascular accident)     Hyperlipidemia     IBS (irritable bowel syndrome)     Lumbar spondylosis     Mild concentric left ventricular hypertrophy (LVH)     Moderate episode of recurrent major depressive disorder (H)     Spinal stenosis of lumbar region without neurogenic claudication      Uncomplicated alcohol dependence (H)     Nursing Home Visit     Onychomycosis of left great toe     Pincer nail deformity     Complicated UTI (urinary tract infection)     Sepsis with acute renal failure (H)       Past Surgical History:   Procedure Laterality Date     CYSTOSCOPY, TRANSURETHRAL RESECTION (TUR) PROSTATE, COMBINED N/A 10/15/2021    Procedure: CYSTOSCOPY, WITH TRANSURETHRAL RESECTION PROSTATE;  Surgeon: Indu De Leon MD;  Location: [facility]     HERNIA REPAIR       PHACOEMULSIFICATION WITH STANDARD INTRAOCULAR LENS IMPLANT Left 12/27/2021    Procedure: PHACOEMULSIFICATION CATARACT EXTRACTION POSTERIOR CHAMBER LENS LEFT EYE;  Surgeon: Phillip Maldonado MD;  Location: [facility]     PHACOEMULSIFICATION WITH STANDARD INTRAOCULAR LENS IMPLANT Right 1/11/2022    Procedure: PHACOEMULSIFICATION CATARACT EXTRACTION POSTERIOR CHAMBER LENS RIGHT EYE;  Surgeon: Phillip Maldonado MD;  Location: [facility]       Current Outpatient Medications   Medication     acetaminophen (TYLENOL) 500 MG tablet     albuterol (PROAIR HFA/PROVENTIL HFA/VENTOLIN HFA) 108 (90 Base) MCG/ACT inhaler     allopurinol (ZYLOPRIM) 100 MG tablet     amLODIPine (NORVASC) 2.5 MG tablet     amoxicillin-clavulanate (AUGMENTIN) 875-125 MG tablet     atorvastatin (LIPITOR) 80 MG tablet     carvedilol (COREG) 25 MG tablet     finasteride (PROSCAR) 5 MG tablet     gabapentin (NEURONTIN) 300 MG capsule     metFORMIN (GLUCOPHAGE-XR) 500 MG 24 hr tablet     multivitamin, therapeutic (THERA-VIT) TABS tablet     rivaroxaban ANTICOAGULANT (XARELTO) 20 MG TABS tablet     semaglutide (OZEMPIC, 0.25 OR 0.5 MG/DOSE,) 2 MG/1.5ML SOPN pen     sildenafil (VIAGRA) 100 MG tablet     tamsulosin (FLOMAX) 0.4 MG capsule     vitamin B-12 (CYANOCOBALAMIN) 250 MCG tablet     vitamin C (ASCORBIC ACID) 500 MG tablet     No current facility-administered medications for this visit.          Allergies   Allergen Reactions     Ace Inhibitors      Per [facility]: hyperkalemia.  Pt doesn't  know if he is allergic     Ceclor [Cefaclor] Hives     Sulfa Drugs      Pt unsure.        History reviewed. No pertinent family history.    Social History     Socioeconomic History     Marital status: Single     Spouse name: None     Number of children: None     Years of education: None     Highest education level: None   Occupational History     None   Tobacco Use     Smoking status: Never Smoker     Smokeless tobacco: Former User     Types: Chew   Substance and Sexual Activity     Alcohol use: Not Currently     Drug use: Never     Sexual activity: None   Other Topics Concern     Parent/sibling w/ CABG, MI or angioplasty before 65F 55M? Not Asked   Social History Narrative     None     Social Determinants of Health     Financial Resource Strain:      Difficulty of Paying Living Expenses:    Food Insecurity:      Worried About Running Out of Food in the Last Year:      Ran Out of Food in the Last Year:    Transportation Needs:      Lack of Transportation (Medical):      Lack of Transportation (Non-Medical):    Physical Activity:      Days of Exercise per Week:      Minutes of Exercise per Session:    Stress:      Feeling of Stress :    Social Connections:      Frequency of Communication with Friends and Family:      Frequency of Social Gatherings with Friends and Family:      Attends Jain Services:      Active Member of Clubs or Organizations:      Attends Club or Organization Meetings:      Marital Status:    Intimate Partner Violence:      Fear of Current or Ex-Partner:      Emotionally Abused:      Physically Abused:      Sexually Abused:        ROS: 10 point ROS neg other than the symptoms noted above in the HPI.  EXAM  Constitutional: healthy, alert and no distress    Psychiatric: mentation appears normal and affect normal/bright    VASCULAR:  -Dorsalis pedis pulse +2/4 b/l  -Posterior tibial pulse +1/4 b/l  -Capillary refill time < 3 seconds to b/l hallux  -Hair growth absent to b/l anterior legs and  ankles  NEURO:  -Light touch sensation severely diminished to b/l plantar forefoot  -Protective sensation diminished with SWM +0/10 RIGHT and +1/10 LEFT on 03/15/2022  -Protective sensation diminished with SWM +0/10 RIGHT and +2/10 LEFT on 01/03/2022  -Protective sensation diminished with SWM +0/10 RIGHT and +1/10 LEFT on 08/17/2021    DERM:  Wound Location:  LEFT lateral and LEFT dorsal fifth toe  04/01/2022  Measurement:  Both wounds measure 0.3cm x 0.3cm x 0.1cm to subcutaneous tissue with a 0.3cm skin island  Drainage:  Moderate serous  Odor:  None  Undermining:  None  Edges:  Intact  Base:  100% subcutaneous tissue  Surrounding Skin: Intact  ---Minimal, blanchable erythema to the LEFT fifth toe    -Skin temperature within normal limits to bilateral foot  -Toenails thickened, dystrophic and discolored x 10  ---Moderate incurvation of bilateral nail borders of the RIGHT hallux without a break in the skin    MSK:  -Muscle strength of ankles +5/5 for dorsiflexion, plantarflexion, ABDUction and ADDuction b/l    ============================================================    ASSESSMENT:    (E11.621,  L97.522) 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 associated with type 2 diabetes mellitus, with fat layer exposed (H)  (primary encounter diagnosis)    (L60.3) Onychodystrophy    (L84) Callus of foot    (L85.3) Xerosis of skin    (E11.9) Diabetes mellitus type 2, noninsulin dependent (H)    (E11.42) Diabetic polyneuropathy associated with type 2 diabetes mellitus (H)    (M21.969,  R20.8) Loss of protective sensation of skin of deformed foot    (L60.3) Onychodystrophy  (primary encounter diagnosis)    (L84) Callus of foot    (L85.3) Xerosis of skin    (E11.9) Diabetes mellitus type 2, noninsulin dependent (H)    (E11.42) Diabetic polyneuropathy associated with type 2 diabetes mellitus (H)    (M21.969,  R20.8) Loss of protective sensation of skin of deformed foot    (M47.816) Lumbar spondylosis    (Z86.31) Personal history of  diabetic foot ulcer      PLAN:  -Patient evaluated and examined. Treatment options discussed with no educational barriers noted.    -Toenails last debrided on 03/15/2022  -Minimal debridement required over LEFT lateral fifth toe ulcers  ---Applied Mepilex Ag and Medipore tape  ---Patient to change every two days  ----Patient was instructed to look for signs of infection (redness, swelling, pain, purulence, fever, chills, nausea, vomiting) and to return to podiatry or the emergency department immediately if there are any signs of infection.  ---Offloading: Keep pressure off the foot. Wear a loose slipper and/or avoid wearing tight shoes around the house to keep pressure off the ulcer. Patient is in agreement with this plan.    -Patient in agreement with the above treatment plan and all of patient's questions were answered.      Return to clinic two weeks to evaluate LEFT lateral fifth toe ulcers  RTC 63+ days for diabetic foot exam and high risk nail debridement and callus paring (already scheduled for 05/25/2022)        Meenu Orourke DPM

## 2022-04-25 ENCOUNTER — OFFICE VISIT (OUTPATIENT)
Dept: PODIATRY | Facility: OTHER | Age: 82
End: 2022-04-25
Attending: PODIATRIST
Payer: MEDICARE

## 2022-04-25 ENCOUNTER — ANCILLARY PROCEDURE (OUTPATIENT)
Dept: GENERAL RADIOLOGY | Facility: OTHER | Age: 82
End: 2022-04-25
Attending: PODIATRIST
Payer: MEDICARE

## 2022-04-25 VITALS
DIASTOLIC BLOOD PRESSURE: 76 MMHG | HEIGHT: 73 IN | RESPIRATION RATE: 16 BRPM | OXYGEN SATURATION: 95 % | BODY MASS INDEX: 23.86 KG/M2 | SYSTOLIC BLOOD PRESSURE: 124 MMHG | WEIGHT: 180 LBS | HEART RATE: 85 BPM | TEMPERATURE: 98.4 F

## 2022-04-25 DIAGNOSIS — L97.526 DIABETIC ULCER OF TOE OF LEFT FOOT ASSOCIATED WITH TYPE 2 DIABETES MELLITUS, WITH BONE INVOLVEMENT WITHOUT EVIDENCE OF NECROSIS (H): ICD-10-CM

## 2022-04-25 DIAGNOSIS — E11.621 DIABETIC ULCER OF TOE OF LEFT FOOT ASSOCIATED WITH TYPE 2 DIABETES MELLITUS, WITH BONE INVOLVEMENT WITHOUT EVIDENCE OF NECROSIS (H): Primary | ICD-10-CM

## 2022-04-25 DIAGNOSIS — R20.8 LOSS OF PROTECTIVE SENSATION OF SKIN OF DEFORMED FOOT: ICD-10-CM

## 2022-04-25 DIAGNOSIS — M47.816 LUMBAR SPONDYLOSIS: ICD-10-CM

## 2022-04-25 DIAGNOSIS — Z86.31 PERSONAL HISTORY OF DIABETIC FOOT ULCER: ICD-10-CM

## 2022-04-25 DIAGNOSIS — L85.3 XEROSIS OF SKIN: ICD-10-CM

## 2022-04-25 DIAGNOSIS — E11.9 DIABETES MELLITUS TYPE 2, NONINSULIN DEPENDENT (H): ICD-10-CM

## 2022-04-25 DIAGNOSIS — L60.3 ONYCHODYSTROPHY: ICD-10-CM

## 2022-04-25 DIAGNOSIS — M21.969 LOSS OF PROTECTIVE SENSATION OF SKIN OF DEFORMED FOOT: ICD-10-CM

## 2022-04-25 DIAGNOSIS — L97.526 DIABETIC ULCER OF TOE OF LEFT FOOT ASSOCIATED WITH TYPE 2 DIABETES MELLITUS, WITH BONE INVOLVEMENT WITHOUT EVIDENCE OF NECROSIS (H): Primary | ICD-10-CM

## 2022-04-25 DIAGNOSIS — E11.42 DIABETIC POLYNEUROPATHY ASSOCIATED WITH TYPE 2 DIABETES MELLITUS (H): ICD-10-CM

## 2022-04-25 DIAGNOSIS — L84 CALLUS OF FOOT: ICD-10-CM

## 2022-04-25 DIAGNOSIS — E11.621 DIABETIC ULCER OF TOE OF LEFT FOOT ASSOCIATED WITH TYPE 2 DIABETES MELLITUS, WITH BONE INVOLVEMENT WITHOUT EVIDENCE OF NECROSIS (H): ICD-10-CM

## 2022-04-25 PROCEDURE — 11043 DBRDMT MUSC&/FSCA 1ST 20/<: CPT | Performed by: PODIATRIST

## 2022-04-25 PROCEDURE — G0463 HOSPITAL OUTPT CLINIC VISIT: HCPCS

## 2022-04-25 PROCEDURE — 73630 X-RAY EXAM OF FOOT: CPT | Mod: TC,LT

## 2022-04-25 ASSESSMENT — PAIN SCALES - GENERAL: PAINLEVEL: NO PAIN (0)

## 2022-04-25 NOTE — NURSING NOTE
"Chief Complaint   Patient presents with     Wound Check     Left foot toes       Initial /76 (BP Location: Left arm)   Pulse 85   Temp 98.4  F (36.9  C) (Tympanic)   Resp 16   Ht 1.854 m (6' 1\")   Wt 81.6 kg (180 lb)   SpO2 95%   BMI 23.75 kg/m   Estimated body mass index is 23.75 kg/m  as calculated from the following:    Height as of this encounter: 1.854 m (6' 1\").    Weight as of this encounter: 81.6 kg (180 lb).  Medication Reconciliation: complete  Felisa Santa LPN  "

## 2022-04-25 NOTE — PROGRESS NOTES
"Chief complaint: Patient presents with:  Wound Check: Left foot toes      History of Present Illness: This 81 year old NIDDM II male is seen for follow-up management of an ulcer on the bilateral lateral fifth digit.    The toe is not painful. His  helps him apply the dressing a few times a week with Dakin's soaked gauze, dry gauze and tape.     He is wearing a post-op shoe at home. He wears a regular tennis shoe to his appointments.    He is no longer on Augmentin (he completed the regimen on 04/24/2022). He thinks his LEFT leg edema has improved.     He is on Xarelto for a history of blood clots in his legs.    He has his usual burning, tingling, and numbness in his feet.    Last HbA1C was 5.6% on 03/04/2022.    Last HbA1C was 5.3% on 08/05/2021.      No further pedal complaints today.         /76 (BP Location: Left arm)   Pulse 85   Temp 98.4  F (36.9  C) (Tympanic)   Resp 16   Ht 1.854 m (6' 1\")   Wt 81.6 kg (180 lb)   SpO2 95%   BMI 23.75 kg/m      Patient Active Problem List   Diagnosis     Urinary retention     Generalized weakness     Type 2 diabetes mellitus with stage 3 chronic kidney disease, without long-term current use of insulin (H)     Tachycardia     Hypoxia     Other acute pulmonary embolism with acute cor pulmonale (H)     Dyspnea, unspecified type     Benign prostatic hyperplasia with urinary retention     Chronic kidney disease, stage III (moderate) (H)     Chronic obstructive lung disease (H)     Chronic painful diabetic neuropathy (H)     Erectile dysfunction     Essential hypertension     Gout, unspecified     History of CVA (cerebrovascular accident)     Hyperlipidemia     IBS (irritable bowel syndrome)     Lumbar spondylosis     Mild concentric left ventricular hypertrophy (LVH)     Moderate episode of recurrent major depressive disorder (H)     Spinal stenosis of lumbar region without neurogenic claudication     Uncomplicated alcohol dependence (H)     Nursing Home " Visit     Onychomycosis of left great toe     Pincer nail deformity     Complicated UTI (urinary tract infection)     Sepsis with acute renal failure (H)       Past Surgical History:   Procedure Laterality Date     CYSTOSCOPY, TRANSURETHRAL RESECTION (TUR) PROSTATE, COMBINED N/A 10/15/2021    Procedure: CYSTOSCOPY, WITH TRANSURETHRAL RESECTION PROSTATE;  Surgeon: Indu De Leon MD;  Location: [facility]     HERNIA REPAIR       PHACOEMULSIFICATION WITH STANDARD INTRAOCULAR LENS IMPLANT Left 12/27/2021    Procedure: PHACOEMULSIFICATION CATARACT EXTRACTION POSTERIOR CHAMBER LENS LEFT EYE;  Surgeon: Phillip Maldonado MD;  Location: [facility]     PHACOEMULSIFICATION WITH STANDARD INTRAOCULAR LENS IMPLANT Right 1/11/2022    Procedure: PHACOEMULSIFICATION CATARACT EXTRACTION POSTERIOR CHAMBER LENS RIGHT EYE;  Surgeon: Phillip Maldonado MD;  Location: [facility]       Current Outpatient Medications   Medication     acetaminophen (TYLENOL) 500 MG tablet     albuterol (PROAIR HFA/PROVENTIL HFA/VENTOLIN HFA) 108 (90 Base) MCG/ACT inhaler     allopurinol (ZYLOPRIM) 100 MG tablet     amLODIPine (NORVASC) 2.5 MG tablet     atorvastatin (LIPITOR) 80 MG tablet     carvedilol (COREG) 25 MG tablet     finasteride (PROSCAR) 5 MG tablet     gabapentin (NEURONTIN) 300 MG capsule     metFORMIN (GLUCOPHAGE-XR) 500 MG 24 hr tablet     multivitamin, therapeutic (THERA-VIT) TABS tablet     rivaroxaban ANTICOAGULANT (XARELTO) 20 MG TABS tablet     semaglutide (OZEMPIC, 0.25 OR 0.5 MG/DOSE,) 2 MG/1.5ML SOPN pen     sildenafil (VIAGRA) 100 MG tablet     tamsulosin (FLOMAX) 0.4 MG capsule     vitamin B-12 (CYANOCOBALAMIN) 250 MCG tablet     vitamin C (ASCORBIC ACID) 500 MG tablet     No current facility-administered medications for this visit.          Allergies   Allergen Reactions     Ace Inhibitors      Per [facility]: hyperkalemia.  Pt doesn't know if he is allergic     Ceclor [Cefaclor] Hives     Sulfa Drugs      Pt unsure.        History reviewed. No  pertinent family history.    Social History     Socioeconomic History     Marital status: Single     Spouse name: None     Number of children: None     Years of education: None     Highest education level: None   Occupational History     None   Tobacco Use     Smoking status: Never Smoker     Smokeless tobacco: Former User     Types: Chew   Substance and Sexual Activity     Alcohol use: Not Currently     Drug use: Never     Sexual activity: None   Other Topics Concern     Parent/sibling w/ CABG, MI or angioplasty before 65F 55M? Not Asked   Social History Narrative     None     Social Determinants of Health     Financial Resource Strain:      Difficulty of Paying Living Expenses:    Food Insecurity:      Worried About Running Out of Food in the Last Year:      Ran Out of Food in the Last Year:    Transportation Needs:      Lack of Transportation (Medical):      Lack of Transportation (Non-Medical):    Physical Activity:      Days of Exercise per Week:      Minutes of Exercise per Session:    Stress:      Feeling of Stress :    Social Connections:      Frequency of Communication with Friends and Family:      Frequency of Social Gatherings with Friends and Family:      Attends Uatsdin Services:      Active Member of Clubs or Organizations:      Attends Club or Organization Meetings:      Marital Status:    Intimate Partner Violence:      Fear of Current or Ex-Partner:      Emotionally Abused:      Physically Abused:      Sexually Abused:        ROS: 10 point ROS neg other than the symptoms noted above in the HPI.  EXAM  Constitutional: healthy, alert and no distress    Psychiatric: mentation appears normal and affect normal/bright    VASCULAR:  -Dorsalis pedis pulse +2/4 b/l  -Posterior tibial pulse +1/4 b/l  -Capillary refill time < 3 seconds to b/l hallux  -Hair growth absent to b/l anterior legs and ankles  NEURO:  -Light touch sensation severely diminished to b/l plantar forefoot  -Protective sensation  diminished with SWM +0/10 RIGHT and +1/10 LEFT on 03/15/2022  -Protective sensation diminished with SWM +0/10 RIGHT and +2/10 LEFT on 01/03/2022  -Protective sensation diminished with SWM +0/10 RIGHT and +1/10 LEFT on 08/17/2021    DERM:  Wound Location:  LEFT lateral fifth toe  04/25/2022  Measurement:  1cm x 1m x 0.3cm to the level of the bone  Drainage:  Moderate serous  Odor:  None  Undermining:  None  Edges:  Intact  Base:  To the level of the bone in the central 1/4 of the wound with the outer 3/4 having a viable wound bed  Surrounding Skin: Intact  No severe erythema, no ascending erythema, no calor, no purulence, no malodor, no other signs of infection.     04/18/2022:  0.9cm x 1.1cm x 0.3cm to the level of the bone    04/01/2022:  Both wounds measure 0.3cm x 0.3cm x 0.1cm to subcutaneous tissue with a 0.3cm skin island    -Skin temperature within normal limits to bilateral foot  -Mild hyperkeratotic lesion to the RIGHT lateral fifth toe without an open wound post paring  -Toenails thickened, dystrophic and discolored x 10  MSK:  -Varus rotation of the bilateral fifth toe  -Muscle strength of ankles +5/5 for dorsiflexion, plantarflexion, ABDUction and ADDuction b/l  LEFT FOOT RADIOGRAPH 04/25/2022  IMPRESSION: No definite bone destruction.  DERICK HERBERT MD   ============================================================    ASSESSMENT:    (E11.621,  L97.526) 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 associated with type 2 diabetes mellitus, with bone involvement without evidence of necrosis (H)  (primary encounter diagnosis)    (L60.3) Onychodystrophy    (L84) Callus of foot    (L85.3) Xerosis of skin    (E11.9) Diabetes mellitus type 2, noninsulin dependent (H)    (E11.42) Diabetic polyneuropathy associated with type 2 diabetes mellitus (H)    (M21.969,  R20.8) Loss of protective sensation of skin of deformed foot    (M47.816) Lumbar spondylosis    (Z86.31) Personal history of diabetic foot  ulcer      PLAN:  -Patient evaluated and examined. Treatment options discussed with no educational barriers noted.    -Toenails last debrided on 03/15/2022    -Excisional debridement of wound on LEFT lateral fifth toe with a sharp tissue nipper  to the level of and including the muscle/tendon  layer (to the level of the bone but did not debride the bone) (debrided a total of less than 20 centimeters squared) with all non-viable soft tissue debrided from the wound bed.  -Debrided ulceration in attempt to decrease the biofilm layer, promote healing and in attempt to prevent infection.  ---Cleaned wound with Dakin's soaked gauze.  ---Dressed wound with Mepilex Ag and tape.   ---Patient to change the dressing every other day. He has a lot of difficulty changing the dressing more often than this on his own, so he is dependent on his  changing his dressing every other day.  ----Patient was instructed to look for signs of infection (redness, swelling, pain, purulence, fever, chills, nausea, vomiting) and to return to podiatry or the emergency department immediately if there are any signs of infection.   ---Stressed to the patient the importance of monitoring the toe. If the bone becomes infected, he may require a toe amputation. Again discussed this possibility today. He will continue to monitor the toe.    -Radiograph ordered of LEFT foot on 04/25/2022 -- no obvious bone destruction at this time.    -Patient in agreement with the above treatment plan and all of patient's questions were answered.      Return to clinic 1-2 weeks to evaluate LEFT lateral fifth toe ulcer  RTC 63+ days for diabetic foot exam and high risk nail debridement and callus paring (already scheduled for 05/25/2022)        Meenu Orourke DPM

## 2022-05-03 ENCOUNTER — OFFICE VISIT (OUTPATIENT)
Dept: PODIATRY | Facility: OTHER | Age: 82
End: 2022-05-03
Attending: PODIATRIST
Payer: MEDICARE

## 2022-05-03 VITALS
HEART RATE: 84 BPM | TEMPERATURE: 97.2 F | DIASTOLIC BLOOD PRESSURE: 83 MMHG | SYSTOLIC BLOOD PRESSURE: 144 MMHG | OXYGEN SATURATION: 95 %

## 2022-05-03 DIAGNOSIS — E11.628 DIABETIC FOOT INFECTION (H): Primary | ICD-10-CM

## 2022-05-03 DIAGNOSIS — E11.621 DIABETIC ULCER OF TOE OF LEFT FOOT ASSOCIATED WITH TYPE 2 DIABETES MELLITUS, WITH BONE INVOLVEMENT WITHOUT EVIDENCE OF NECROSIS (H): ICD-10-CM

## 2022-05-03 DIAGNOSIS — E11.42 DIABETIC POLYNEUROPATHY ASSOCIATED WITH TYPE 2 DIABETES MELLITUS (H): ICD-10-CM

## 2022-05-03 DIAGNOSIS — Z86.31 PERSONAL HISTORY OF DIABETIC FOOT ULCER: ICD-10-CM

## 2022-05-03 DIAGNOSIS — M47.816 LUMBAR SPONDYLOSIS: ICD-10-CM

## 2022-05-03 DIAGNOSIS — L85.3 XEROSIS OF SKIN: ICD-10-CM

## 2022-05-03 DIAGNOSIS — M21.969 LOSS OF PROTECTIVE SENSATION OF SKIN OF DEFORMED FOOT: ICD-10-CM

## 2022-05-03 DIAGNOSIS — L84 CALLUS OF FOOT: ICD-10-CM

## 2022-05-03 DIAGNOSIS — L97.526 DIABETIC ULCER OF TOE OF LEFT FOOT ASSOCIATED WITH TYPE 2 DIABETES MELLITUS, WITH BONE INVOLVEMENT WITHOUT EVIDENCE OF NECROSIS (H): ICD-10-CM

## 2022-05-03 DIAGNOSIS — L60.3 ONYCHODYSTROPHY: ICD-10-CM

## 2022-05-03 DIAGNOSIS — E11.9 DIABETES MELLITUS TYPE 2, NONINSULIN DEPENDENT (H): ICD-10-CM

## 2022-05-03 DIAGNOSIS — R20.8 LOSS OF PROTECTIVE SENSATION OF SKIN OF DEFORMED FOOT: ICD-10-CM

## 2022-05-03 DIAGNOSIS — L08.9 DIABETIC FOOT INFECTION (H): Primary | ICD-10-CM

## 2022-05-03 PROCEDURE — G0463 HOSPITAL OUTPT CLINIC VISIT: HCPCS

## 2022-05-03 PROCEDURE — 99214 OFFICE O/P EST MOD 30 MIN: CPT | Performed by: PODIATRIST

## 2022-05-03 PROCEDURE — G0463 HOSPITAL OUTPT CLINIC VISIT: HCPCS | Mod: 25

## 2022-05-03 ASSESSMENT — PAIN SCALES - GENERAL: PAINLEVEL: MODERATE PAIN (5)

## 2022-05-03 NOTE — PROGRESS NOTES
Chief complaint: Patient presents with:  Infection: 5th toe      History of Present Illness: This 81 year old NIDDM II male is seen for follow-up management of an ulcer on the bilateral lateral fifth digit.    The toe is not painful. His  was applying Dakin's soaked gauze, dry gauze and tape. She switched to Mepilex Ag but today she applied Dakin's again. His  looked at his wound today but she thought the swelling went up and she advised him to go back into the clinic.    He is wearing the post-op shoe at home and he is sometimes only wearing socks. He is wearing a tennis shoe when he is running erraands.    The toe is a little sore where he has swelling in the toe.    He is on Xarelto for a history of blood clots in his legs.    He has his usual burning, tingling, and numbness in his feet.    Last HbA1C was 5.6% on 03/04/2022.    Last HbA1C was 5.3% on 08/05/2021.      No further pedal complaints today.         BP (!) 144/83 (BP Location: Left arm, Patient Position: Sitting, Cuff Size: Adult Regular)   Pulse 84   Temp 97.2  F (36.2  C) (Tympanic)   SpO2 95%     Patient Active Problem List   Diagnosis     Urinary retention     Generalized weakness     Type 2 diabetes mellitus with stage 3 chronic kidney disease, without long-term current use of insulin (H)     Tachycardia     Hypoxia     Other acute pulmonary embolism with acute cor pulmonale (H)     Dyspnea, unspecified type     Benign prostatic hyperplasia with urinary retention     Chronic kidney disease, stage III (moderate) (H)     Chronic obstructive lung disease (H)     Chronic painful diabetic neuropathy (H)     Erectile dysfunction     Essential hypertension     Gout, unspecified     History of CVA (cerebrovascular accident)     Hyperlipidemia     IBS (irritable bowel syndrome)     Lumbar spondylosis     Mild concentric left ventricular hypertrophy (LVH)     Moderate episode of recurrent major depressive disorder (H)     Spinal  stenosis of lumbar region without neurogenic claudication     Uncomplicated alcohol dependence (H)     Nursing Home Visit     Onychomycosis of left great toe     Pincer nail deformity     Complicated UTI (urinary tract infection)     Sepsis with acute renal failure (H)       Past Surgical History:   Procedure Laterality Date     CYSTOSCOPY, TRANSURETHRAL RESECTION (TUR) PROSTATE, COMBINED N/A 10/15/2021    Procedure: CYSTOSCOPY, WITH TRANSURETHRAL RESECTION PROSTATE;  Surgeon: Indu De Leon MD;  Location: [facility]     HERNIA REPAIR       PHACOEMULSIFICATION WITH STANDARD INTRAOCULAR LENS IMPLANT Left 12/27/2021    Procedure: PHACOEMULSIFICATION CATARACT EXTRACTION POSTERIOR CHAMBER LENS LEFT EYE;  Surgeon: Phillip Maldonado MD;  Location: [facility]     PHACOEMULSIFICATION WITH STANDARD INTRAOCULAR LENS IMPLANT Right 1/11/2022    Procedure: PHACOEMULSIFICATION CATARACT EXTRACTION POSTERIOR CHAMBER LENS RIGHT EYE;  Surgeon: Phillip Maldonado MD;  Location: [facility]       Current Outpatient Medications   Medication     acetaminophen (TYLENOL) 500 MG tablet     albuterol (PROAIR HFA/PROVENTIL HFA/VENTOLIN HFA) 108 (90 Base) MCG/ACT inhaler     allopurinol (ZYLOPRIM) 100 MG tablet     amLODIPine (NORVASC) 2.5 MG tablet     atorvastatin (LIPITOR) 80 MG tablet     carvedilol (COREG) 25 MG tablet     finasteride (PROSCAR) 5 MG tablet     gabapentin (NEURONTIN) 300 MG capsule     metFORMIN (GLUCOPHAGE-XR) 500 MG 24 hr tablet     multivitamin, therapeutic (THERA-VIT) TABS tablet     rivaroxaban ANTICOAGULANT (XARELTO) 20 MG TABS tablet     semaglutide (OZEMPIC, 0.25 OR 0.5 MG/DOSE,) 2 MG/1.5ML SOPN pen     sildenafil (VIAGRA) 100 MG tablet     tamsulosin (FLOMAX) 0.4 MG capsule     vitamin B-12 (CYANOCOBALAMIN) 250 MCG tablet     vitamin C (ASCORBIC ACID) 500 MG tablet     No current facility-administered medications for this visit.          Allergies   Allergen Reactions     Ace Inhibitors      Per [facility]: hyperkalemia.  Pt doesn't  know if he is allergic     Ceclor [Cefaclor] Hives     Sulfa Drugs      Pt unsure.        History reviewed. No pertinent family history.    Social History     Socioeconomic History     Marital status: Single     Spouse name: None     Number of children: None     Years of education: None     Highest education level: None   Occupational History     None   Tobacco Use     Smoking status: Never Smoker     Smokeless tobacco: Former User     Types: Chew   Substance and Sexual Activity     Alcohol use: Not Currently     Drug use: Never     Sexual activity: None   Other Topics Concern     Parent/sibling w/ CABG, MI or angioplasty before 65F 55M? Not Asked   Social History Narrative     None     Social Determinants of Health     Financial Resource Strain:      Difficulty of Paying Living Expenses:    Food Insecurity:      Worried About Running Out of Food in the Last Year:      Ran Out of Food in the Last Year:    Transportation Needs:      Lack of Transportation (Medical):      Lack of Transportation (Non-Medical):    Physical Activity:      Days of Exercise per Week:      Minutes of Exercise per Session:    Stress:      Feeling of Stress :    Social Connections:      Frequency of Communication with Friends and Family:      Frequency of Social Gatherings with Friends and Family:      Attends [employer] Services:      Active Member of Clubs or Organizations:      Attends Club or Organization Meetings:      Marital Status:    Intimate Partner Violence:      Fear of Current or Ex-Partner:      Emotionally Abused:      Physically Abused:      Sexually Abused:        ROS: 10 point ROS neg other than the symptoms noted above in the HPI.  EXAM  Constitutional: healthy, alert and no distress    Psychiatric: mentation appears normal and affect normal/bright    VASCULAR:  -Dorsalis pedis pulse +2/4 b/l  -Posterior tibial pulse +1/4 b/l  -Capillary refill time < 3 seconds to b/l hallux  -Hair growth absent to b/l anterior legs and  ankles  NEURO:  -Light touch sensation severely diminished to b/l plantar forefoot  -Protective sensation diminished with SWM +0/10 RIGHT and +1/10 LEFT on 03/15/2022  -Protective sensation diminished with SWM +0/10 RIGHT and +2/10 LEFT on 01/03/2022  -Protective sensation diminished with SWM +0/10 RIGHT and +1/10 LEFT on 08/17/2021    DERM:  Wound Location:  LEFT lateral fifth toe  04/25/2022  Measurement:  0/8cm x 0.7cm x 0.6cm to the level of the bone  Drainage:  Moderate serous  Odor:  None  Undermining:  None  Edges:  Intact  Base:  To the level of the bone with 50% of the wound bed exposed to the bone  Surrounding Skin: Intact  Erythema to the entire toe with mild calor  No purulence, no malodor, but mild infection    04/25/2022  Measurement:  1cm x 1m x 0.3cm to the level of the bone  Drainage:  Moderate serous  Odor:  None  Undermining:  None  Edges:  Intact  Base:  To the level of the bone in the central 1/4 of the wound with the outer 3/4 having a viable wound bed  Surrounding Skin: Intact  No severe erythema, no ascending erythema, no calor, no purulence, no malodor, no other signs of infection.     04/18/2022:  0.9cm x 1.1cm x 0.3cm to the level of the bone    04/01/2022:  Both wounds measure 0.3cm x 0.3cm x 0.1cm to subcutaneous tissue with a 0.3cm skin island    -Skin temperature within normal limits to bilateral foot  -Mild hyperkeratotic lesion to the RIGHT lateral fifth toe without an open wound post paring  -Toenails thickened, dystrophic and discolored x 10  MSK:  -Varus rotation of the bilateral fifth toe  -Muscle strength of ankles +5/5 for dorsiflexion, plantarflexion, ABDUction and ADDuction b/l  LEFT FOOT RADIOGRAPH 04/25/2022  IMPRESSION: No definite bone destruction.  DERICK HERBERT MD   ============================================================    ASSESSMENT:    (E11.628,  L08.9) Diabetic foot infection (H)  (primary encounter diagnosis)    (E11.621,  L97.526) Diabetic ulcer of toe of  left foot associated with type 2 diabetes mellitus, with bone involvement without evidence of necrosis (H)  (primary encounter diagnosis)    (L60.3) Onychodystrophy    (L84) Callus of foot    (L85.3) Xerosis of skin    (E11.9) Diabetes mellitus type 2, noninsulin dependent (H)    (E11.42) Diabetic polyneuropathy associated with type 2 diabetes mellitus (H)    (M21.969,  R20.8) Loss of protective sensation of skin of deformed foot    (M47.816) Lumbar spondylosis    (Z86.31) Personal history of diabetic foot ulcer      PLAN:  -Patient evaluated and examined. Treatment options discussed with no educational barriers noted.    -Toenails last debrided on 03/15/2022    -The wound bed did not require debridement beyond minimal removal of slough with a tissue nipper.  ---Applied Dakin's soaked gauze, dry gauze and tape.  ---Patient to change the dressing every other day. He has a lot of difficulty changing the dressing more often than this on his own, so he is dependent on his  who can change the dressing every other day.  ----Patient was instructed to look for worsening signs of infection (redness, swelling, pain, purulence, fever, chills, nausea, vomiting) and to return to podiatry or the emergency department immediately if there are any signs of infection.     -Patient's bone is more exposed today. It is possible the toe may need to be amputated in the near future if the ulceration worsens or fails to improve. The longer the bone is exposed, the higher the chance that he has a bone infection.    -Augmentin was prescribed. Patient took this a few weeks ago and had successful reduction in the erythema of the toe. He tolerated the medication well despite an allergy to Ceclor (hives).     -Radiograph ordered of LEFT foot on 04/25/2022 -- no obvious bone destruction. Patient was called with the results and the results were reviewed with him again in the office today.    -Patient in agreement with the above treatment  plan and all of patient's questions were answered.      Return to clinic 1-2 weeks to evaluate LEFT lateral fifth toe ulcer  RTC 63+ days for diabetic foot exam and high risk nail debridement and callus paring (already scheduled for 05/25/2022)        Meenu Orourke DPM, DPM

## 2022-05-03 NOTE — NURSING NOTE
"Chief Complaint   Patient presents with     Infection     5th toe       Initial BP (!) 144/83 (BP Location: Left arm, Patient Position: Sitting, Cuff Size: Adult Regular)   Pulse 84   Temp 97.2  F (36.2  C) (Tympanic)   SpO2 95%  Estimated body mass index is 23.75 kg/m  as calculated from the following:    Height as of 4/25/22: 1.854 m (6' 1\").    Weight as of 4/25/22: 81.6 kg (180 lb).  Medication Reconciliation: vanna Leon  "

## 2022-05-04 DIAGNOSIS — N40.0 BENIGN PROSTATIC HYPERPLASIA, UNSPECIFIED WHETHER LOWER URINARY TRACT SYMPTOMS PRESENT: Primary | ICD-10-CM

## 2022-05-04 RX ORDER — TAMSULOSIN HYDROCHLORIDE 0.4 MG/1
0.4 CAPSULE ORAL EVERY EVENING
Qty: 90 CAPSULE | Refills: 1 | Status: SHIPPED | OUTPATIENT
Start: 2022-05-04 | End: 2022-08-03

## 2022-05-04 NOTE — TELEPHONE ENCOUNTER
Out of Medication     tamsulosin (FLOMAX) 0.4 MG capsule        Last Written Prescription Date:  Historical   Last Fill Quantity: ,   # refills:   Last Office Visit: 4/4/22  Future Office visit:    Next 5 appointments (look out 90 days)    May 11, 2022  2:45 PM  (Arrive by 2:30 PM)  Return Visit with Meenu Orourke DPM  [facility] ([facility] ) [address] [place] [id number] [phone]   May 25, 2022 10:30 AM  (Arrive by 10:15 AM)  Return Visit with Meenu Orourke DPM  [facility] ([facility] ) [address] [place] [id number] [phone]           Routing refill request to provider for review/approval because:    Medication is reported/historical

## 2022-05-11 ENCOUNTER — OFFICE VISIT (OUTPATIENT)
Dept: PODIATRY | Facility: OTHER | Age: 82
End: 2022-05-11
Attending: PODIATRIST
Payer: MEDICARE

## 2022-05-11 VITALS
OXYGEN SATURATION: 93 % | SYSTOLIC BLOOD PRESSURE: 161 MMHG | HEART RATE: 99 BPM | TEMPERATURE: 98 F | DIASTOLIC BLOOD PRESSURE: 82 MMHG

## 2022-05-11 DIAGNOSIS — L85.3 XEROSIS OF SKIN: ICD-10-CM

## 2022-05-11 DIAGNOSIS — L84 CALLUS OF FOOT: ICD-10-CM

## 2022-05-11 DIAGNOSIS — M21.969 LOSS OF PROTECTIVE SENSATION OF SKIN OF DEFORMED FOOT: ICD-10-CM

## 2022-05-11 DIAGNOSIS — R20.8 LOSS OF PROTECTIVE SENSATION OF SKIN OF DEFORMED FOOT: ICD-10-CM

## 2022-05-11 DIAGNOSIS — M47.816 LUMBAR SPONDYLOSIS: ICD-10-CM

## 2022-05-11 DIAGNOSIS — E11.9 DIABETES MELLITUS TYPE 2, NONINSULIN DEPENDENT (H): ICD-10-CM

## 2022-05-11 DIAGNOSIS — Z86.31 PERSONAL HISTORY OF DIABETIC FOOT ULCER: ICD-10-CM

## 2022-05-11 DIAGNOSIS — E11.42 DIABETIC POLYNEUROPATHY ASSOCIATED WITH TYPE 2 DIABETES MELLITUS (H): ICD-10-CM

## 2022-05-11 DIAGNOSIS — E11.621 DIABETIC ULCER OF TOE OF LEFT FOOT ASSOCIATED WITH TYPE 2 DIABETES MELLITUS, WITH BONE INVOLVEMENT WITHOUT EVIDENCE OF NECROSIS (H): Primary | ICD-10-CM

## 2022-05-11 DIAGNOSIS — L97.526 DIABETIC ULCER OF TOE OF LEFT FOOT ASSOCIATED WITH TYPE 2 DIABETES MELLITUS, WITH BONE INVOLVEMENT WITHOUT EVIDENCE OF NECROSIS (H): Primary | ICD-10-CM

## 2022-05-11 DIAGNOSIS — L60.3 ONYCHODYSTROPHY: ICD-10-CM

## 2022-05-11 PROCEDURE — G0463 HOSPITAL OUTPT CLINIC VISIT: HCPCS | Mod: 25

## 2022-05-11 PROCEDURE — 99213 OFFICE O/P EST LOW 20 MIN: CPT | Performed by: PODIATRIST

## 2022-05-11 ASSESSMENT — PAIN SCALES - GENERAL: PAINLEVEL: WORST PAIN (10)

## 2022-05-11 NOTE — NURSING NOTE
"Chief Complaint   Patient presents with     Wound Check       Initial BP (!) 161/82 (BP Location: Left arm, Patient Position: Sitting, Cuff Size: Adult Regular)   Pulse 99   Temp 98  F (36.7  C) (Tympanic)   SpO2 93%  Estimated body mass index is 23.75 kg/m  as calculated from the following:    Height as of 4/25/22: 1.854 m (6' 1\").    Weight as of 4/25/22: 81.6 kg (180 lb).  Medication Reconciliation: vanna Leon  "

## 2022-05-11 NOTE — PROGRESS NOTES
Chief complaint: Patient presents with:  Wound Check      History of Present Illness: This 81 year old NIDDM II male is seen for follow-up management of an ulcer on the bilateral lateral fifth digit.    The toe is not painful. His  has been changing the dressing with Mepilex Ag and Medipore tape every other day.     He is wearing the post-op shoe at home and he is sometimes only wearing socks. He is wearing a tennis shoe when he is running erraands.    He says he was taking antibiotics but he completed the regimen. He would like to have antibiotics on hand in case the toe gets infected.    He is on Xarelto for a history of blood clots in his legs.    He has his usual burning, tingling, and numbness in his feet.    Last HbA1C was 5.6% on 03/04/2022.    Last HbA1C was 5.3% on 08/05/2021.      No further pedal complaints today.         BP (!) 161/82 (BP Location: Left arm, Patient Position: Sitting, Cuff Size: Adult Regular)   Pulse 99   Temp 98  F (36.7  C) (Tympanic)   SpO2 93%     Patient Active Problem List   Diagnosis     Urinary retention     Generalized weakness     Type 2 diabetes mellitus with stage 3 chronic kidney disease, without long-term current use of insulin (H)     Tachycardia     Hypoxia     Other acute pulmonary embolism with acute cor pulmonale (H)     Dyspnea, unspecified type     Benign prostatic hyperplasia with urinary retention     Chronic kidney disease, stage III (moderate) (H)     Chronic obstructive lung disease (H)     Chronic painful diabetic neuropathy (H)     Erectile dysfunction     Essential hypertension     Gout, unspecified     History of CVA (cerebrovascular accident)     Hyperlipidemia     IBS (irritable bowel syndrome)     Lumbar spondylosis     Mild concentric left ventricular hypertrophy (LVH)     Moderate episode of recurrent major depressive disorder (H)     Spinal stenosis of lumbar region without neurogenic claudication     Uncomplicated alcohol dependence (H)      Nursing Home Visit     Onychomycosis of left great toe     Pincer nail deformity     Complicated UTI (urinary tract infection)     Sepsis with acute renal failure (H)       Past Surgical History:   Procedure Laterality Date     CYSTOSCOPY, TRANSURETHRAL RESECTION (TUR) PROSTATE, COMBINED N/A 10/15/2021    Procedure: CYSTOSCOPY, WITH TRANSURETHRAL RESECTION PROSTATE;  Surgeon: Indu De Leon MD;  Location: [facility]     HERNIA REPAIR       PHACOEMULSIFICATION WITH STANDARD INTRAOCULAR LENS IMPLANT Left 12/27/2021    Procedure: PHACOEMULSIFICATION CATARACT EXTRACTION POSTERIOR CHAMBER LENS LEFT EYE;  Surgeon: Phillip Maldonado MD;  Location: [facility]     PHACOEMULSIFICATION WITH STANDARD INTRAOCULAR LENS IMPLANT Right 1/11/2022    Procedure: PHACOEMULSIFICATION CATARACT EXTRACTION POSTERIOR CHAMBER LENS RIGHT EYE;  Surgeon: Phillip Maldonado MD;  Location: [facility]       Current Outpatient Medications   Medication     acetaminophen (TYLENOL) 500 MG tablet     albuterol (PROAIR HFA/PROVENTIL HFA/VENTOLIN HFA) 108 (90 Base) MCG/ACT inhaler     allopurinol (ZYLOPRIM) 100 MG tablet     amLODIPine (NORVASC) 2.5 MG tablet     amoxicillin-clavulanate (AUGMENTIN) 875-125 MG tablet     atorvastatin (LIPITOR) 80 MG tablet     carvedilol (COREG) 25 MG tablet     finasteride (PROSCAR) 5 MG tablet     gabapentin (NEURONTIN) 300 MG capsule     metFORMIN (GLUCOPHAGE-XR) 500 MG 24 hr tablet     multivitamin, therapeutic (THERA-VIT) TABS tablet     rivaroxaban ANTICOAGULANT (XARELTO) 20 MG TABS tablet     semaglutide (OZEMPIC, 0.25 OR 0.5 MG/DOSE,) 2 MG/1.5ML SOPN pen     sildenafil (VIAGRA) 100 MG tablet     tamsulosin (FLOMAX) 0.4 MG capsule     vitamin B-12 (CYANOCOBALAMIN) 250 MCG tablet     vitamin C (ASCORBIC ACID) 500 MG tablet     No current facility-administered medications for this visit.          Allergies   Allergen Reactions     Ace Inhibitors      Per [facility]: hyperkalemia.  Pt doesn't know if he is allergic     Ceclor  [Cefaclor] Hives     Sulfa Drugs      Pt unsure.        History reviewed. No pertinent family history.    Social History     Socioeconomic History     Marital status: Single     Spouse name: None     Number of children: None     Years of education: None     Highest education level: None   Occupational History     None   Tobacco Use     Smoking status: Never Smoker     Smokeless tobacco: Former User     Types: Chew   Substance and Sexual Activity     Alcohol use: Not Currently     Drug use: Never     Sexual activity: None   Other Topics Concern     Parent/sibling w/ CABG, MI or angioplasty before 65F 55M? Not Asked   Social History Narrative     None     Social Determinants of Health     Financial Resource Strain:      Difficulty of Paying Living Expenses:    Food Insecurity:      Worried About Running Out of Food in the Last Year:      Ran Out of Food in the Last Year:    Transportation Needs:      Lack of Transportation (Medical):      Lack of Transportation (Non-Medical):    Physical Activity:      Days of Exercise per Week:      Minutes of Exercise per Session:    Stress:      Feeling of Stress :    Social Connections:      Frequency of Communication with Friends and Family:      Frequency of Social Gatherings with Friends and Family:      Attends Yarsani Services:      Active Member of Clubs or Organizations:      Attends Club or Organization Meetings:      Marital Status:    Intimate Partner Violence:      Fear of Current or Ex-Partner:      Emotionally Abused:      Physically Abused:      Sexually Abused:        ROS: 10 point ROS neg other than the symptoms noted above in the HPI.  EXAM  Constitutional: healthy, alert and no distress    Psychiatric: mentation appears normal and affect normal/bright    VASCULAR:  -Dorsalis pedis pulse +2/4 b/l  -Posterior tibial pulse +1/4 b/l  -Capillary refill time < 3 seconds to b/l hallux  -Hair growth absent to b/l anterior legs and ankles  NEURO:  -Light touch sensation  severely diminished to b/l plantar forefoot  -Protective sensation diminished with SWM +0/10 RIGHT and +1/10 LEFT on 03/15/2022  -Protective sensation diminished with SWM +0/10 RIGHT and +2/10 LEFT on 01/03/2022  -Protective sensation diminished with SWM +0/10 RIGHT and +1/10 LEFT on 08/17/2021    DERM:  Wound Location:  LEFT lateral fifth toe  05/11/2022  Measurement:  0.6cm x 0.5cm x 0.8cm to the level of the bone  Drainage:  Moderate serous  Odor:  None  Undermining:  None  Edges:  Intact  Base:  To the level of the bone with 50% of the wound bed exposed to the bone  Surrounding Skin: Intact  -Minimal erythema to LEFT toe and no calor  No severe erythema, no ascending erythema, no calor, no purulence, no malodor, no other signs of infection.       04/25/2022  Measurement:  0/8cm x 0.7cm x 0.6cm to the level of the bone  Drainage:  Moderate serous  Odor:  None  Undermining:  None  Edges:  Intact  Base:  To the level of the bone with 50% of the wound bed exposed to the bone  Surrounding Skin: Intact  Erythema to the entire toe with mild calor  No purulence, no malodor, but mild infection    04/25/2022:  1cm x 1m x 0.3cm to the level of the bone     04/18/2022:  0.9cm x 1.1cm x 0.3cm to the level of the bone    04/01/2022:  Both wounds measure 0.3cm x 0.3cm x 0.1cm to subcutaneous tissue with a 0.3cm skin island    -Skin temperature within normal limits to bilateral foot  -Mild hyperkeratotic lesion to the RIGHT lateral fifth toe without an open wound post paring  -Toenails thickened, dystrophic and discolored x 10  MSK:  -Varus rotation of the bilateral fifth toe  -Muscle strength of ankles +5/5 for dorsiflexion, plantarflexion, ABDUction and ADDuction b/l  LEFT FOOT RADIOGRAPH 04/25/2022  IMPRESSION: No definite bone destruction.  DERICK HERBERT MD   ============================================================    ASSESSMENT:    (E11.628,  L08.9) Diabetic foot infection (H)  (primary encounter  diagnosis)    (E11.621,  L97.526) 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 associated with type 2 diabetes mellitus, with bone involvement without evidence of necrosis (H)  (primary encounter diagnosis)    (L60.3) Onychodystrophy    (L84) Callus of foot    (L85.3) Xerosis of skin    (E11.9) Diabetes mellitus type 2, noninsulin dependent (H)    (E11.42) Diabetic polyneuropathy associated with type 2 diabetes mellitus (H)    (M21.969,  R20.8) Loss of protective sensation of skin of deformed foot    (M47.816) Lumbar spondylosis    (Z86.31) Personal history of diabetic foot ulcer      PLAN:  -Patient evaluated and examined. Treatment options discussed with no educational barriers noted.    -Toenails last debrided on 03/15/2022  -The infection of the toe has resolved. No further antibiotics required. Patient is worried his toe may get infected at a time the clinic is closed. A prescription of Augmentin 87/125mg BID was ordered (he tolerated this a couple weeks ago), but he should not take the antibiotics unless there are signs of infection (redness, swelling, pain, purulence, fever, chills, nausea, vomiting). He is in agreement with this plan.    -The wound bed did not require debridement beyond minimal removal of slough with a tissue nipper.  ---Applied Mepilex Ag and tape over the toe. Patient may change this dressing every day after cleaning the wound with Dakin's.    ---Patient to change the dressing every other day. He has a lot of difficulty changing the dressing more often than this on his own, so he is dependent on his  who can change the dressing every other day.  ----Patient was instructed to look for worsening signs of infection (redness, swelling, pain, purulence, fever, chills, nausea, vomiting) and to return to podiatry or the emergency department immediately if there are any signs of infection.     -Patient's bone is still palpable today. It is still possible the toe may need to be amputated in the  near future if the ulceration worsens or fails to improve. The longer the bone is exposed, the higher the chance that he has a bone infection and an infection can spread leading to a foot or leg amputation. He understands and he says he will consider a toe amputation if the wound is not improving within the next few weeks.    -Radiographs from 04/25/2022 showed no concerning bone destruction.    -Patient in agreement with the above treatment plan and all of patient's questions were answered.      Return to clinic 1-2 weeks to evaluate LEFT lateral fifth toe ulcer  RTC 63+ days for diabetic foot exam and high risk nail debridement and callus paring (already scheduled for 05/25/2022)        Meenu Orourke, DILIA, DPM

## 2022-05-25 ENCOUNTER — OFFICE VISIT (OUTPATIENT)
Dept: PODIATRY | Facility: OTHER | Age: 82
End: 2022-05-25
Attending: PODIATRIST
Payer: MEDICARE

## 2022-05-25 VITALS
SYSTOLIC BLOOD PRESSURE: 151 MMHG | DIASTOLIC BLOOD PRESSURE: 69 MMHG | TEMPERATURE: 97.5 F | OXYGEN SATURATION: 94 % | HEART RATE: 105 BPM

## 2022-05-25 DIAGNOSIS — M21.969 LOSS OF PROTECTIVE SENSATION OF SKIN OF DEFORMED FOOT: ICD-10-CM

## 2022-05-25 DIAGNOSIS — E11.628 DIABETIC FOOT INFECTION (H): Primary | ICD-10-CM

## 2022-05-25 DIAGNOSIS — L97.526 DIABETIC ULCER OF TOE OF LEFT FOOT ASSOCIATED WITH TYPE 2 DIABETES MELLITUS, WITH BONE INVOLVEMENT WITHOUT EVIDENCE OF NECROSIS (H): ICD-10-CM

## 2022-05-25 DIAGNOSIS — Z86.31 PERSONAL HISTORY OF DIABETIC FOOT ULCER: ICD-10-CM

## 2022-05-25 DIAGNOSIS — L60.3 ONYCHODYSTROPHY: ICD-10-CM

## 2022-05-25 DIAGNOSIS — E11.9 DIABETES MELLITUS TYPE 2, NONINSULIN DEPENDENT (H): ICD-10-CM

## 2022-05-25 DIAGNOSIS — L85.3 XEROSIS OF SKIN: ICD-10-CM

## 2022-05-25 DIAGNOSIS — E11.621 DIABETIC ULCER OF TOE OF LEFT FOOT ASSOCIATED WITH TYPE 2 DIABETES MELLITUS, WITH BONE INVOLVEMENT WITHOUT EVIDENCE OF NECROSIS (H): ICD-10-CM

## 2022-05-25 DIAGNOSIS — R20.8 LOSS OF PROTECTIVE SENSATION OF SKIN OF DEFORMED FOOT: ICD-10-CM

## 2022-05-25 DIAGNOSIS — E11.42 DIABETIC POLYNEUROPATHY ASSOCIATED WITH TYPE 2 DIABETES MELLITUS (H): ICD-10-CM

## 2022-05-25 DIAGNOSIS — M47.816 LUMBAR SPONDYLOSIS: ICD-10-CM

## 2022-05-25 DIAGNOSIS — L08.9 DIABETIC FOOT INFECTION (H): Primary | ICD-10-CM

## 2022-05-25 DIAGNOSIS — L84 CALLUS OF FOOT: ICD-10-CM

## 2022-05-25 PROCEDURE — 11721 DEBRIDE NAIL 6 OR MORE: CPT | Performed by: PODIATRIST

## 2022-05-25 PROCEDURE — 11042 DBRDMT SUBQ TIS 1ST 20SQCM/<: CPT | Performed by: PODIATRIST

## 2022-05-25 PROCEDURE — 99213 OFFICE O/P EST LOW 20 MIN: CPT | Mod: 25 | Performed by: PODIATRIST

## 2022-05-25 PROCEDURE — 11055 PARING/CUTG B9 HYPRKER LES 1: CPT | Performed by: PODIATRIST

## 2022-05-25 PROCEDURE — G0463 HOSPITAL OUTPT CLINIC VISIT: HCPCS | Mod: 25

## 2022-05-25 ASSESSMENT — PAIN SCALES - GENERAL: PAINLEVEL: NO PAIN (0)

## 2022-05-25 NOTE — PROGRESS NOTES
Chief complaint: Patient presents with:  Toenail: DFE      History of Present Illness: This 81 year old NIDDM II male is seen for follow-up management of an ulcer on the bilateral lateral fifth digit. He is also here for for a diabetic foot exam and high risk nail debridement.    The toe is not painful. His  has been changing the dressing with Mepilex Ag and Medipore tape every other day.     He is wearing the post-op shoe at home and he is sometimes only wearing socks. He is wearing a tennis shoe when he is running erraands.    He is no longer on antibiotics.     He is on Xarelto for a history of blood clots in his legs.    He has his usual burning, tingling, and numbness in his feet.    Last HbA1C was 5.6% on 03/04/2022.    Last HbA1C was 5.3% on 08/05/2021.      No further pedal complaints today.         BP (!) 151/69 (BP Location: Left arm, Patient Position: Sitting, Cuff Size: Adult Regular)   Pulse 105   Temp 97.5  F (36.4  C) (Tympanic)   SpO2 94%     Patient Active Problem List   Diagnosis     Urinary retention     Generalized weakness     Type 2 diabetes mellitus with stage 3 chronic kidney disease, without long-term current use of insulin (H)     Tachycardia     Hypoxia     Other acute pulmonary embolism with acute cor pulmonale (H)     Dyspnea, unspecified type     Benign prostatic hyperplasia with urinary retention     Chronic kidney disease, stage III (moderate) (H)     Chronic obstructive lung disease (H)     Chronic painful diabetic neuropathy (H)     Erectile dysfunction     Essential hypertension     Gout, unspecified     History of CVA (cerebrovascular accident)     Hyperlipidemia     IBS (irritable bowel syndrome)     Lumbar spondylosis     Mild concentric left ventricular hypertrophy (LVH)     Moderate episode of recurrent major depressive disorder (H)     Spinal stenosis of lumbar region without neurogenic claudication     Uncomplicated alcohol dependence (H)     Nursing Home Visit      Onychomycosis of left great toe     Pincer nail deformity     Complicated UTI (urinary tract infection)     Sepsis with acute renal failure (H)       Past Surgical History:   Procedure Laterality Date     CYSTOSCOPY, TRANSURETHRAL RESECTION (TUR) PROSTATE, COMBINED N/A 10/15/2021    Procedure: CYSTOSCOPY, WITH TRANSURETHRAL RESECTION PROSTATE;  Surgeon: Indu De Leon MD;  Location: [facility]     HERNIA REPAIR       PHACOEMULSIFICATION WITH STANDARD INTRAOCULAR LENS IMPLANT Left 12/27/2021    Procedure: PHACOEMULSIFICATION CATARACT EXTRACTION POSTERIOR CHAMBER LENS LEFT EYE;  Surgeon: Phillip Maldonado MD;  Location: [facility]     PHACOEMULSIFICATION WITH STANDARD INTRAOCULAR LENS IMPLANT Right 1/11/2022    Procedure: PHACOEMULSIFICATION CATARACT EXTRACTION POSTERIOR CHAMBER LENS RIGHT EYE;  Surgeon: Phillip Maldonado MD;  Location: [facility]       Current Outpatient Medications   Medication     acetaminophen (TYLENOL) 500 MG tablet     albuterol (PROAIR HFA/PROVENTIL HFA/VENTOLIN HFA) 108 (90 Base) MCG/ACT inhaler     allopurinol (ZYLOPRIM) 100 MG tablet     amLODIPine (NORVASC) 2.5 MG tablet     amoxicillin-clavulanate (AUGMENTIN) 875-125 MG tablet     atorvastatin (LIPITOR) 80 MG tablet     carvedilol (COREG) 25 MG tablet     finasteride (PROSCAR) 5 MG tablet     gabapentin (NEURONTIN) 300 MG capsule     metFORMIN (GLUCOPHAGE-XR) 500 MG 24 hr tablet     multivitamin, therapeutic (THERA-VIT) TABS tablet     rivaroxaban ANTICOAGULANT (XARELTO) 20 MG TABS tablet     semaglutide (OZEMPIC, 0.25 OR 0.5 MG/DOSE,) 2 MG/1.5ML SOPN pen     sildenafil (VIAGRA) 100 MG tablet     tamsulosin (FLOMAX) 0.4 MG capsule     vitamin B-12 (CYANOCOBALAMIN) 250 MCG tablet     vitamin C (ASCORBIC ACID) 500 MG tablet     No current facility-administered medications for this visit.          Allergies   Allergen Reactions     Ace Inhibitors      Per [facility]: hyperkalemia.  Pt doesn't know if he is allergic     Ceclor [Cefaclor] Hives     Sulfa  Drugs      Pt unsure.        History reviewed. No pertinent family history.    Social History     Socioeconomic History     Marital status: Single     Spouse name: None     Number of children: None     Years of education: None     Highest education level: None   Occupational History     None   Tobacco Use     Smoking status: Never Smoker     Smokeless tobacco: Former User     Types: Chew   Substance and Sexual Activity     Alcohol use: Not Currently     Drug use: Never     Sexual activity: None   Other Topics Concern     Parent/sibling w/ CABG, MI or angioplasty before 65F 55M? Not Asked   Social History Narrative     None     Social Determinants of Health     Financial Resource Strain:      Difficulty of Paying Living Expenses:    Food Insecurity:      Worried About Running Out of Food in the Last Year:      Ran Out of Food in the Last Year:    Transportation Needs:      Lack of Transportation (Medical):      Lack of Transportation (Non-Medical):    Physical Activity:      Days of Exercise per Week:      Minutes of Exercise per Session:    Stress:      Feeling of Stress :    Social Connections:      Frequency of Communication with Friends and Family:      Frequency of Social Gatherings with Friends and Family:      Attends [employer] Services:      Active Member of Clubs or Organizations:      Attends Club or Organization Meetings:      Marital Status:    Intimate Partner Violence:      Fear of Current or Ex-Partner:      Emotionally Abused:      Physically Abused:      Sexually Abused:        ROS: 10 point ROS neg other than the symptoms noted above in the HPI.  EXAM  Constitutional: healthy, alert and no distress    Psychiatric: mentation appears normal and affect normal/bright    VASCULAR:  -Dorsalis pedis pulse +2/4 b/l  -Posterior tibial pulse +1/4 b/l  -Capillary refill time < 3 seconds to b/l hallux  -Hair growth absent to b/l anterior legs and ankles  NEURO:  -Light touch sensation severely diminished to b/l  plantar forefoot  -Protective sensation diminished with SWM +1/10 RIGHT and +0/10 LEFT on 05/25/2022  -Protective sensation diminished with SWM +0/10 RIGHT and +1/10 LEFT on 03/15/2022  -Protective sensation diminished with SWM +0/10 RIGHT and +2/10 LEFT on 01/03/2022  -Protective sensation diminished with SWM +0/10 RIGHT and +1/10 LEFT on 08/17/2021    DERM:  -Skin temperature within normal limits to bilateral foot  -Hyperkeratotic lesion to the RIGHT lateral fifth toe without an open wound post paring  -Toenails elongated, thickened, dystrophic and discolored x 10  Wound Location:  LEFT lateral fifth toe  05/25/2022  Measurement:  0.3cm x 0.3cm x 0.8cm to the level of the bone  Drainage:  Moderate serous with mild purulent pocket post debridement  Odor:  None  Undermining:  None  Edges:  Intact  Base:  To the level of the bone with 50% of the wound bed exposed to the bone  Surrounding Skin: Intact  -Minimal erythema to LEFT toe and increased calor -- early infection     05/11/2022  Measurement:  0.6cm x 0.5cm x 0.8cm to the level of the bone  Drainage:  Moderate serous  Odor:  None  Undermining:  None  Edges:  Intact  Base:  To the level of the bone with 50% of the wound bed exposed to the bone  Surrounding Skin: Intact  -Minimal erythema to LEFT toe and no calor  No severe erythema, no ascending erythema, no calor, no purulence, no malodor, no other signs of infection.       04/25/2022:  0/8cm x 0.7cm x 0.6cm to the level of the bone  04/25/2022:  1cm x 1m x 0.3cm to the level of the bone   04/18/2022:  0.9cm x 1.1cm x 0.3cm to the level of the bone    04/01/2022:  Both wounds measure 0.3cm x 0.3cm x 0.1cm to subcutaneous tissue with a 0.3cm skin island      MSK:  -Varus rotation of the bilateral fifth toe  -Muscle strength of ankles +5/5 for dorsiflexion, plantarflexion, ABDUction and ADDuction b/l  LEFT FOOT RADIOGRAPH 04/25/2022  IMPRESSION: No definite bone destruction.  DERICK HERBERT MD    ============================================================    ASSESSMENT:    (E11.628,  L08.9) Diabetic foot infection (H)  (primary encounter diagnosis)    (E11.621,  L97.526) 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 associated with type 2 diabetes mellitus, with bone involvement without evidence of necrosis (H)    (L60.3) Onychodystrophy    (L84) Callus of foot    (L85.3) Xerosis of skin    (E11.9) Diabetes mellitus type 2, noninsulin dependent (H)    (E11.42) Diabetic polyneuropathy associated with type 2 diabetes mellitus (H)    (M21.969,  R20.8) Loss of protective sensation of skin of deformed foot    (M47.816) Lumbar spondylosis    (Z86.31) Personal history of diabetic foot ulcer      PLAN:  -Patient evaluated and examined. Treatment options discussed with no educational barriers noted.    -High risk toenail debridement x 10 toenails without incident on 05/25/2022  -Callus pared x 1 to the RIGHT lateral fifth toe without incident on 05/25/2022  ---Patient reminded that the callus will likely return due to the underlying, prominent bone causing the callus while the patient is walking.    -------------------------    -LEFT lateral fifth toe ulcer and infection:    -Minimal debridement required of toe today. Mild removal of non-viable tissue / overlying callus with mild purulent pocket post debridement  -Patient's bone is still palpable today. It is still possible the toe may need to be amputated in the near future if the ulceration worsens or fails to improve. The longer the bone is exposed, the higher the chance that he has a bone infection and an infection can spread leading to a foot or leg amputation. He understands and he says he will consider a toe amputation if the wound is not improving within the next few weeks. Will discuss this on 06/06/2022 at his follow-up appointment.  -The LEFT lateral toe is warm and mildly erythematous. There is a mild infection starting.  -Patient has antibiotics that he was  previously prescribed that he didn't take. He will start taking this.    -Radiographs from 04/25/2022 showed no concerning bone destruction.    -Patient in agreement with the above treatment plan and all of patient's questions were answered.      Return to clinic 1-2 weeks to evaluate LEFT lateral fifth toe ulcer        Meenu Orourke, DILIA, YINAM

## 2022-05-25 NOTE — NURSING NOTE
"Chief Complaint   Patient presents with     Toenail     DFE       Initial BP (!) 151/69 (BP Location: Left arm, Patient Position: Sitting, Cuff Size: Adult Regular)   Pulse 105   Temp 97.5  F (36.4  C) (Tympanic)   SpO2 94%  Estimated body mass index is 23.75 kg/m  as calculated from the following:    Height as of 4/25/22: 1.854 m (6' 1\").    Weight as of 4/25/22: 81.6 kg (180 lb).  Medication Reconciliation: complete  Rae Leon  "

## 2022-05-26 DIAGNOSIS — R33.9 URINARY RETENTION: ICD-10-CM

## 2022-05-26 RX ORDER — FINASTERIDE 5 MG/1
5 TABLET, FILM COATED ORAL DAILY
Qty: 30 TABLET | Refills: 0 | Status: SHIPPED | OUTPATIENT
Start: 2022-05-26 | End: 2022-07-12

## 2022-05-26 NOTE — TELEPHONE ENCOUNTER
kincar       Last Written Prescription Date:  4/28/21  Last Fill Quantity: 30,   # refills: 11  Last Office Visit: 4/4/22  Future Office visit:    Next 5 appointments (look out 90 days)    Jun 06, 2022  2:15 PM  (Arrive by 2:00 PM)  Return Visit with Meenu Orourke DPM  [facility] ([facility] ) [address] [place] [id number] [phone]           Routing refill request to provider for review/approval because:

## 2022-06-06 ENCOUNTER — LAB (OUTPATIENT)
Dept: LAB | Facility: OTHER | Age: 82
End: 2022-06-06
Attending: PODIATRIST
Payer: MEDICARE

## 2022-06-06 ENCOUNTER — OFFICE VISIT (OUTPATIENT)
Dept: PODIATRY | Facility: OTHER | Age: 82
End: 2022-06-06
Attending: PODIATRIST
Payer: MEDICARE

## 2022-06-06 ENCOUNTER — PREP FOR PROCEDURE (OUTPATIENT)
Dept: PODIATRY | Facility: OTHER | Age: 82
End: 2022-06-06

## 2022-06-06 VITALS
HEART RATE: 112 BPM | SYSTOLIC BLOOD PRESSURE: 151 MMHG | TEMPERATURE: 98.1 F | DIASTOLIC BLOOD PRESSURE: 85 MMHG | OXYGEN SATURATION: 94 %

## 2022-06-06 DIAGNOSIS — E11.621 DIABETIC ULCER OF TOE OF LEFT FOOT ASSOCIATED WITH TYPE 2 DIABETES MELLITUS, WITH BONE INVOLVEMENT WITHOUT EVIDENCE OF NECROSIS (H): Primary | ICD-10-CM

## 2022-06-06 DIAGNOSIS — L84 CALLUS OF FOOT: ICD-10-CM

## 2022-06-06 DIAGNOSIS — N18.31 TYPE 2 DIABETES MELLITUS WITH STAGE 3A CHRONIC KIDNEY DISEASE, WITHOUT LONG-TERM CURRENT USE OF INSULIN (H): ICD-10-CM

## 2022-06-06 DIAGNOSIS — M20.5X2 ADDUCTOVARUS ROTATION OF TOE, ACQUIRED, LEFT: ICD-10-CM

## 2022-06-06 DIAGNOSIS — M47.816 LUMBAR SPONDYLOSIS: ICD-10-CM

## 2022-06-06 DIAGNOSIS — E11.9 DIABETES MELLITUS TYPE 2, NONINSULIN DEPENDENT (H): ICD-10-CM

## 2022-06-06 DIAGNOSIS — N39.0 SEPSIS DUE TO URINARY TRACT INFECTION (H): ICD-10-CM

## 2022-06-06 DIAGNOSIS — L85.3 XEROSIS OF SKIN: ICD-10-CM

## 2022-06-06 DIAGNOSIS — M21.969 LOSS OF PROTECTIVE SENSATION OF SKIN OF DEFORMED FOOT: ICD-10-CM

## 2022-06-06 DIAGNOSIS — L60.3 ONYCHODYSTROPHY: ICD-10-CM

## 2022-06-06 DIAGNOSIS — L97.526 DIABETIC ULCER OF TOE OF LEFT FOOT ASSOCIATED WITH TYPE 2 DIABETES MELLITUS, WITH BONE INVOLVEMENT WITHOUT EVIDENCE OF NECROSIS (H): Primary | ICD-10-CM

## 2022-06-06 DIAGNOSIS — R20.8 LOSS OF PROTECTIVE SENSATION OF SKIN OF DEFORMED FOOT: ICD-10-CM

## 2022-06-06 DIAGNOSIS — E11.22 TYPE 2 DIABETES MELLITUS WITH STAGE 3A CHRONIC KIDNEY DISEASE, WITHOUT LONG-TERM CURRENT USE OF INSULIN (H): ICD-10-CM

## 2022-06-06 DIAGNOSIS — E11.42 DIABETIC POLYNEUROPATHY ASSOCIATED WITH TYPE 2 DIABETES MELLITUS (H): ICD-10-CM

## 2022-06-06 DIAGNOSIS — Z86.31 PERSONAL HISTORY OF DIABETIC FOOT ULCER: ICD-10-CM

## 2022-06-06 DIAGNOSIS — A41.9 SEPSIS DUE TO URINARY TRACT INFECTION (H): ICD-10-CM

## 2022-06-06 LAB
ALBUMIN UR-MCNC: NEGATIVE MG/DL
APPEARANCE UR: CLEAR
BILIRUB UR QL STRIP: NEGATIVE
COLOR UR AUTO: NORMAL
EST. AVERAGE GLUCOSE BLD GHB EST-MCNC: 134 MG/DL
GLUCOSE UR STRIP-MCNC: NEGATIVE MG/DL
HBA1C MFR BLD: 6.3 % (ref 0–5.6)
HGB UR QL STRIP: NEGATIVE
KETONES UR STRIP-MCNC: NEGATIVE MG/DL
LEUKOCYTE ESTERASE UR QL STRIP: NEGATIVE
NITRATE UR QL: NEGATIVE
PH UR STRIP: 7 [PH] (ref 4.7–8)
SP GR UR STRIP: 1.01 (ref 1–1.03)
UROBILINOGEN UR STRIP-MCNC: NORMAL MG/DL

## 2022-06-06 PROCEDURE — G0463 HOSPITAL OUTPT CLINIC VISIT: HCPCS | Mod: 25

## 2022-06-06 PROCEDURE — G0463 HOSPITAL OUTPT CLINIC VISIT: HCPCS

## 2022-06-06 PROCEDURE — 36415 COLL VENOUS BLD VENIPUNCTURE: CPT | Mod: ZL

## 2022-06-06 PROCEDURE — 83036 HEMOGLOBIN GLYCOSYLATED A1C: CPT | Mod: ZL

## 2022-06-06 PROCEDURE — 99214 OFFICE O/P EST MOD 30 MIN: CPT | Performed by: PODIATRIST

## 2022-06-06 PROCEDURE — 81003 URINALYSIS AUTO W/O SCOPE: CPT | Mod: ZL

## 2022-06-06 RX ORDER — CLINDAMYCIN PHOSPHATE 900 MG/50ML
900 INJECTION, SOLUTION INTRAVENOUS SEE ADMIN INSTRUCTIONS
Status: CANCELLED | OUTPATIENT
Start: 2022-06-27

## 2022-06-06 RX ORDER — CLINDAMYCIN PHOSPHATE 900 MG/50ML
900 INJECTION, SOLUTION INTRAVENOUS
Status: CANCELLED | OUTPATIENT
Start: 2022-06-27

## 2022-06-06 ASSESSMENT — PAIN SCALES - GENERAL: PAINLEVEL: NO PAIN (0)

## 2022-06-06 NOTE — PROGRESS NOTES
Chief complaint: Patient presents with:  Wound Check      History of Present Illness: This 81 year old NIDDM II male is seen for follow-up management of an ulcer on the bilateral lateral fifth digit. He is also here for for a diabetic foot exam and high risk nail debridement.    The toe is not painful. His  has been changing the dressing with Mepilex Ag and Medipore tape every other day.     Patient was taking Augmentin, but he thinks the infection improved.    He is wearing the post-op shoe at home and he is sometimes only wearing socks. He is wearing a tennis shoe when he is running errands. He would like to discuss treatment options so that he may discontinue the routine dressing changes.    He is on Xarelto for a history of blood clots in his legs.    He has his usual burning, tingling, and numbness in his feet.    Last HbA1C was 5.6% on 03/04/2022.    Last HbA1C was 5.3% on 08/05/2021.      No further pedal complaints today.         BP (!) 151/85 (BP Location: Left arm, Patient Position: Sitting, Cuff Size: Adult Regular)   Pulse 112   Temp 98.1  F (36.7  C) (Tympanic)   SpO2 94%     Patient Active Problem List   Diagnosis     Urinary retention     Generalized weakness     Type 2 diabetes mellitus with stage 3 chronic kidney disease, without long-term current use of insulin (H)     Tachycardia     Hypoxia     Other acute pulmonary embolism with acute cor pulmonale (H)     Dyspnea, unspecified type     Benign prostatic hyperplasia with urinary retention     Chronic kidney disease, stage III (moderate) (H)     Chronic obstructive lung disease (H)     Chronic painful diabetic neuropathy (H)     Erectile dysfunction     Essential hypertension     Gout, unspecified     History of CVA (cerebrovascular accident)     Hyperlipidemia     IBS (irritable bowel syndrome)     Lumbar spondylosis     Mild concentric left ventricular hypertrophy (LVH)     Moderate episode of recurrent major depressive disorder (H)      Spinal stenosis of lumbar region without neurogenic claudication     Uncomplicated alcohol dependence (H)     Nursing Home Visit     Onychomycosis of left great toe     Pincer nail deformity     Complicated UTI (urinary tract infection)     Sepsis with acute renal failure (H)       Past Surgical History:   Procedure Laterality Date     CYSTOSCOPY, TRANSURETHRAL RESECTION (TUR) PROSTATE, COMBINED N/A 10/15/2021    Procedure: CYSTOSCOPY, WITH TRANSURETHRAL RESECTION PROSTATE;  Surgeon: Indu De Leon MD;  Location: [facility]     HERNIA REPAIR       PHACOEMULSIFICATION WITH STANDARD INTRAOCULAR LENS IMPLANT Left 12/27/2021    Procedure: PHACOEMULSIFICATION CATARACT EXTRACTION POSTERIOR CHAMBER LENS LEFT EYE;  Surgeon: Phillip Maldonado MD;  Location: [facility]     PHACOEMULSIFICATION WITH STANDARD INTRAOCULAR LENS IMPLANT Right 1/11/2022    Procedure: PHACOEMULSIFICATION CATARACT EXTRACTION POSTERIOR CHAMBER LENS RIGHT EYE;  Surgeon: Phillip Maldonado MD;  Location: [facility]       Current Outpatient Medications   Medication     acetaminophen (TYLENOL) 500 MG tablet     albuterol (PROAIR HFA/PROVENTIL HFA/VENTOLIN HFA) 108 (90 Base) MCG/ACT inhaler     allopurinol (ZYLOPRIM) 100 MG tablet     amLODIPine (NORVASC) 2.5 MG tablet     amoxicillin-clavulanate (AUGMENTIN) 875-125 MG tablet     atorvastatin (LIPITOR) 80 MG tablet     carvedilol (COREG) 25 MG tablet     finasteride (PROSCAR) 5 MG tablet     gabapentin (NEURONTIN) 300 MG capsule     metFORMIN (GLUCOPHAGE-XR) 500 MG 24 hr tablet     multivitamin, therapeutic (THERA-VIT) TABS tablet     rivaroxaban ANTICOAGULANT (XARELTO) 20 MG TABS tablet     semaglutide (OZEMPIC, 0.25 OR 0.5 MG/DOSE,) 2 MG/1.5ML SOPN pen     sildenafil (VIAGRA) 100 MG tablet     tamsulosin (FLOMAX) 0.4 MG capsule     vitamin B-12 (CYANOCOBALAMIN) 250 MCG tablet     vitamin C (ASCORBIC ACID) 500 MG tablet     No current facility-administered medications for this visit.          Allergies   Allergen  Reactions     Ace Inhibitors      Per [facility]: hyperkalemia.  Pt doesn't know if he is allergic     Ceclor [Cefaclor] Hives     Sulfa Drugs      Pt unsure.        History reviewed. No pertinent family history.    Social History     Socioeconomic History     Marital status: Single     Spouse name: None     Number of children: None     Years of education: None     Highest education level: None   Occupational History     None   Tobacco Use     Smoking status: Never Smoker     Smokeless tobacco: Former User     Types: Chew   Substance and Sexual Activity     Alcohol use: Not Currently     Drug use: Never     Sexual activity: None   Other Topics Concern     Parent/sibling w/ CABG, MI or angioplasty before 65F 55M? Not Asked   Social History Narrative     None     Social Determinants of Health     Financial Resource Strain:      Difficulty of Paying Living Expenses:    Food Insecurity:      Worried About Running Out of Food in the Last Year:      Ran Out of Food in the Last Year:    Transportation Needs:      Lack of Transportation (Medical):      Lack of Transportation (Non-Medical):    Physical Activity:      Days of Exercise per Week:      Minutes of Exercise per Session:    Stress:      Feeling of Stress :    Social Connections:      Frequency of Communication with Friends and Family:      Frequency of Social Gatherings with Friends and Family:      Attends Yazdanism Services:      Active Member of Clubs or Organizations:      Attends Club or Organization Meetings:      Marital Status:    Intimate Partner Violence:      Fear of Current or Ex-Partner:      Emotionally Abused:      Physically Abused:      Sexually Abused:        ROS: 10 point ROS neg other than the symptoms noted above in the HPI.  EXAM  Constitutional: healthy, alert and no distress    Psychiatric: mentation appears normal and affect normal/bright    VASCULAR:  -Dorsalis pedis pulse +2/4 b/l  -Posterior tibial pulse +1/4 b/l  -Capillary refill time <  3 seconds to b/l hallux  -Hair growth absent to b/l anterior legs and ankles  NEURO:  -Light touch sensation severely diminished to b/l plantar forefoot  -Protective sensation diminished with SWM +1/10 RIGHT and +0/10 LEFT on 05/25/2022  -Protective sensation diminished with SWM +0/10 RIGHT and +1/10 LEFT on 03/15/2022  -Protective sensation diminished with SWM +0/10 RIGHT and +2/10 LEFT on 01/03/2022  -Protective sensation diminished with SWM +0/10 RIGHT and +1/10 LEFT on 08/17/2021    DERM:  -Skin temperature within normal limits to bilateral foot  -Hyperkeratotic lesion to the RIGHT lateral fifth toe without an open wound post paring  -Toenails elongated, thickened, dystrophic and discolored x 10  Wound Location:  LEFT lateral fifth toe  06/06/2022  Measurement:  0.5cm x 0.5cm x 0.8cm to the level of the bone   Drainage:  Moderate serous  Odor:  None  Undermining:  None  Edges:  Intact  Base:  To the level of the bone with 50% of the wound bed exposed to the bone  Surrounding Skin: Intact  No severe erythema, no ascending erythema, no calor, no purulence, no malodor, no other signs of infection.     05/25/2022  Measurement:  0.3cm x 0.3cm x 0.8cm to the level of the bone  Drainage:  Moderate serous with mild purulent pocket post debridement  Odor:  None  Undermining:  None  Edges:  Intact  Base:  To the level of the bone with 50% of the wound bed exposed to the bone  Surrounding Skin: Intact  -Minimal erythema to LEFT toe and increased calor -- early infection     05/11/2022:  0.6cm x 0.5cm x 0.8cm to the level of the bone  04/25/2022:  0/8cm x 0.7cm x 0.6cm to the level of the bone  04/25/2022:  1cm x 1m x 0.3cm to the level of the bone   04/18/2022:  0.9cm x 1.1cm x 0.3cm to the level of the bone    04/01/2022:  Both wounds measure 0.3cm x 0.3cm x 0.1cm to subcutaneous tissue with a 0.3cm skin island      MSK:  -Adductovarus rotation of the bilateral fifth toe  -Muscle strength of ankles +5/5 for dorsiflexion,  plantarflexion, ABDUction and ADDuction b/l  LEFT FOOT RADIOGRAPH 04/25/2022  IMPRESSION: No definite bone destruction.  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 Treatment options discussed with no educational barriers noted.    -Toenails last debrided on 05/25/2022    -------------------------    Minimal debridement required of LEFT lateral fifth toe with sharp ring curette to mildly debride the slough from the wound bed. Applied dressing with Mepilex Ag and Medipore tape.  ---Patient to continue to change daily.    -Discussed conservative and treatment options with patient. The patient has exhausted conservative treatment including routine wound care and offloading and would like to proceed with surgery.  ---Discussed risks and benefits of a partial or full toe amputation procedure. Benefits include decreased pain for a more functional lifestyle.     -Discussed potential risks of surgery. Reviewed with patient that the follow potential risks of surgery are not all extremely common risks of foot surgery. However, there are potential risks associated with any surgery.  -Potential risks could include but are not limited to:   ---The potential need for a revision / more proximal amputation  ---Redness  and/or swelling and/or infection of surgical site(s)  ---Failure of incisions to heal  ---Transfer lesions or calluses or transfer pain to other areas of the foot  ---Recurrence of the deformity / ulceration near the original ulcer location  ---Condition / disability may get worse  ---Fracture / break or dislocation of bone  ---Swollen toe / stiff toe / lifted toe  ---Difficulty walking / wearing shoes / performing daily acitivites  ---Damage to nerves (burning, tingling, and numbness) -- may be temporary or permanent  ---Loss of toe, foot, limb or life  ---Permanent swelling / enlargement of toe, foot or leg  ---More treatment or surgery may be needed in the future  ---Blood clots (especially in the legs or lungs which can cause death)  ---Allergic reactions from implants or sutures or dressing materials  ---Complications from anesthesia (will be reviewed with anesthesia the day of surgery)  ---UTI, heart attack, stroke, or death.     -Patient will be offloaded with a post-op shoe post surgery   -Patient will need an H&P by his PCP within 30 days of the surgery and a COVID test four days before surgery  -Norco 5/325mg will be ordered the day of surgery. Pain medication will not be refilled past two weeks post-surgery.    -Patient is concerned about developing an infection prior to surgery given his history of infections. He requested a prescription of Augmentin in case he develops an infection. This was prescribed, but he is still instructed to call the clinic with any concerns for infection if an infection develops. He is in agreement with this plan.    -Surgery pre-op orders placed on 06/06/3033    Surgery: Left fifth toe partial versus full amputation  DOS: 06/27/2022  Reason:  1. 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 associated with type 2 diabetes mellitus, with bone involvement without evidence of necrosis   2. Adductovarus rotation of toe, acquired, left  Anesthesia: MAC with local  Time: 25 minutes  Equipment:  Soft tissue tray    -Radiographs from 04/25/2022 showed no concerning bone destruction.  -Patient was instructed to look for signs of infection (redness, swelling, pain, purulence, fever, chills, nausea, vomiting) and to return to podiatry or the emergency department immediately if there are any signs of infection.     Total time spent preparing to see the patient, review of chart, obtaining history and physical examination, review of treatment options and risks and benefits of surgery, education, discussion with patient and documenting in Epic / EMR was 32 minutes.  All time involved was spent on the day of service for the patient (the same day as the patient's appointment). This time did not include procedure time.    -Patient in agreement with the above treatment plan and all of patient's questions were answered.      Return to clinic 1-2 weeks to evaluate LEFT lateral fifth toe ulcer with radiographs at follow-up appointment        Meenu Orourke DPM, DILIA

## 2022-06-06 NOTE — NURSING NOTE
"Chief Complaint   Patient presents with     Wound Check       Initial BP (!) 151/85 (BP Location: Left arm, Patient Position: Sitting, Cuff Size: Adult Regular)   Pulse 112   Temp 98.1  F (36.7  C) (Tympanic)   SpO2 94%  Estimated body mass index is 23.75 kg/m  as calculated from the following:    Height as of 4/25/22: 1.854 m (6' 1\").    Weight as of 4/25/22: 81.6 kg (180 lb).  Medication Reconciliation: vanna Leon  "

## 2022-06-06 NOTE — ED NOTES
Bed: ED03  Expected date:   Expected time:   Means of arrival:   Comments:  [facility] - Diarrhea   Consent 3/Introductory Paragraph: I gave the patient a chance to ask questions they had about the procedure.  Following this I explained the Mohs procedure and consent was obtained. The risks, benefits and alternatives to therapy were discussed in detail. Specifically, the risks of infection, scarring, bleeding, prolonged wound healing, incomplete removal, allergy to anesthesia, nerve injury and recurrence were addressed. Prior to the procedure, the treatment site was clearly identified and confirmed by the patient. All components of Universal Protocol/PAUSE Rule completed.

## 2022-06-06 NOTE — LETTER
Anthony Amaral  [address] [place] [address]      SURGERY PACKET            Your surgery is scheduled:    Date: 6/27/2022  ________________________________    Time: Someone will call you  ________________________________    Please arrive at:  Someone will call you  ______________________    Surgeon's Name: Dr.Stacey Orourke  _______________________        Pre-Op Physical Fax Numbers:          Pre-Admissions  [phone]        Your surgery is located at:  [address] [facility] [address]         Before Your Surgery  For Patients and Visitors at [facility]    Welcome  As you get ready for surgery, you may have a lot of questions.  This brochure will help you know what to expect before and after surgery.  You and your family are the most important members of your health care team.  You will need to take an active role in your care.    Be sure to ask questions and learn all that you can about your surgery.  If you have any safety concerns, we urge you to tell a nurse as soon as possible.   This brochure is for information only.  It does not replace the advice of your doctor.  Always follow your doctor's advice.    Please tell us if you need a .    GETTING READY FOR SURGERY  Always follow your surgeon's instructions.  If you don't, your surgery could be canceled.  Please use the following checklist.    Within 30 Days of Surgery:    Have a pre-surgery physical exam with your family doctor or partner.    If you use a [facility], all of your information from the pre-op physical will be in the [employer] computer system.    Ask the doctor to send all of your results to the hospital before the surgery.  The doctor also may ask you to bring the results with you on the day of surgery or you can fax them to [phone].  Tell the doctor if:    You are allergic to latex or rubber  (Latex and rubber gloves are often used in medical care).    You are  taking any medicines (including aspirin), vitamins (Vitamin E, Fish Oil, Omegas) or herbal products.  You will need to stop taking some medicines before surgery.    You have any medical problems (allergies, diabetes or heart disease, for example).    You have a pacemaker or an AICD (automatic implanted cardiac defibrillator).  If you do, please bring the ID card with you on the day of surgery.    You are a smoker.  People who smoke have a higher risk of infection after surgery.  Ask your doctor how you can quit smoking.  Within 7 days of Surgery:  Prior to your surgical procedure, a nurse will be contacting you to obtain a health history. A nurse will call you within a few days of surgery to go over these and other instructions.  If you do not hear from them, please call them at [phone].        Call your insurance company.  Ask if you need pre-approval for your surgery.  If you do not have insurance, please let us know.      Arrange for someone to drive you home after surgery.  If you will have same-day surgery, you may not drive or take public transportation home by yourself.    Arrange for someone to stay with you for 24 hours after you go home.  This person must be a responsible adult (ie- Family member or friend).  The Day Before Surgery:     Call your surgeon if there are any changes in your health.  This includes signs of a cold or flu (such as a sore throat, runny nose, cough, rash or fever).    Do not smoke, drink alcohol or take over-the-counter medicine (unless your surgeon tells you to) for 24 hours before and after surgery.    If you take prescribed drugs:  You may need to stop them until after the surgery.  Follow your doctor's orders.  You may resume Aspirin and/or blood thinners after your surgery as directed by your physician/surgeon.    NO FOOD OR DRINK AFTER MIDNIGHT.  Follow your surgeon's orders for eating and drinking.  You need to have an empty stomach before surgery.  This will make the  surgery as safe as possible.  If you don't follow your doctor's orders, your surgery could be changed to another date.    The Day of Surgery:    Take a shower or bath the night before and the morning of surgery.  Use antiseptic soap or the soap your surgeon gave you.  Gently clean the skin.  Do not shave or scrub your surgery site.    Please remove deodorant, cologne, scented lotion, makeup, nail polish and jewelry (including rings and body piercings).  If you wear artificial nails, please remove at least one nail before coming to the hospital.    Wear clean, loose clothing to the hospital.    Bring these items to the hospital:  1. Your insurance card.  2. A list of all the medicines you take.  Include vitamins, minerals, herbs and over-the-counter drugs.  Note any drug allergies.  3. A copy of your advance health care directive, if you have one.  This tells us what treatment you would want -- and who would make health care decisions -- if you could no longer speak for yourself.  You may request this form in advance or download it from [email or web address].  4. A case for any glasses, contact lenses, hearing aids or dentures.  5. Your inhaler or CPAP machine, if you use these at home.  Leave extra cash, jewelry and other valuables at home.    When You Arrive:  When you get to the hospital, you will:    Check in.  If you are under age 18, you must be with a parent or legal guardian.    Electronically sign consent forms, if you haven't already.  These electronic forms state that you know the risks and benefits of surgery.  When you sign the forms, you give us permission to do the surgery.  Do not sign them unless you understand what will happen during and after your surgery.  If you have any questions about your surgery, ask to speak with your doctor before you sign the forms.  If you don't understand the answers, ask again.    Receive a copy of the Patients Bill of Rights.  If you do not receive a copy, please  "ask for one.    Change into hospital clothes.  Your belongings will be placed in a bag.  We will return them to you after surgery.    Meet with the anesthesia provider.  He or she will tell you what kind of anesthesia (medicine) will be used to keep you comfortable during surgery.  Remember: It's okay to remind doctors and nurses to wash their hands before touching you.   In most cases, your surgeon will use a marker to write his or her initials on the surgery site.  This ensures that the exact site is operated on.  For safety reasons, we will ask you the same questions many times.  For example, we may ask your name and birth date over and over again.  Friends and family can stay with you until it's time for surgery.  While you're in surgery, they will be in the waiting area.  Please note that cell phones are not allowed in some patient care areas.  If you have questions about what will happen in the operating room, talk to your care team.  After Surgery:    We will move you to a recovery room where we will watch you closely.  If you have any pain or discomfort, tell your nurse.  He or she will try to make you comfortable.      If you are staying overnight we will move you to your hospital room after you are awake.    If you are going home we will move you to another room.  Friends and family may be able to join you.  The length of time you spend in recovery depends on the type of medicine you received, your medical condition, and the type of surgery you had.  Dealing with pain:  A nurse will check your comfort level often during your stay.  He or she will work with you to manage your pain.  Remember:    All pain is real.  There are many ways to control pain.  We can help you decide what works best for you.    Ask for pain medicine when you need it.  Don't try to \"tough it out\" -- this can make you feel worse.  Always take your medicine as ordered.    Medicine doesn't work the same for everyone.  If your medicine " isn't working tell your nurse.  There may be other medicines or treatments we can try.  Going Home:  We will let you know when you're ready to leave the hospital.  Before you leave, we will tell you how to care for yourself at home and prevent infections.  If you do not understand something, please say so.  We will answer any questions you have.  We will then help you get ready to leave.  You must have an adult with you for the first 24 hours after you leave the hospital. Take it easy when you get home.  You will need some time to recover -- you may be more tired than you realize at first.  Rest and relax for at least the first 24 hours at home.  You'll feel better and heal faster if you take good care of yourself.                      Pre-Operative Surgery Scrub    Purpose:   The skin harbors a large variety of bacteria, both infectious and noninfectious.  Showering with an antiseptic soap prior to an invasive procedure will decrease the number of transient and resident (good and bad) bacteria that is normally found on the skin.    Procedure:      Shower with 1 bottle of antiseptic soap (enclosed) the evening before and 1 bottle the morning of surgery (bathing the day of the procedure is most important).       Apply the soap at full strength (use the entire bottle).  Gently clean the skin, rinse, and dry with a clean towel that is freshly laundered (out of the dryer) and then put on clean clothing that is freshly laundered.        We have given you information regarding pre-op showering.  We recommend that patients wash with an antiseptic soap prior to surgery.  This cleanser will be given to you at the clinic or mailed to your home.  It is advised that you liberally wash the specific area surgery area the night before, and again in the morning before the surgery.  Do not apply lotion afterward.  We would like to keep the skin as clean as possible.    Thank you for following these important instructions.      You  have been scheduled for surgery and we would like to give you some information that will assist in helping get the best possible outcome.      Before Surgery:   If for any reason you decide not to have the surgery, please contact our office.  We can easily cancel or reschedule the procedure. Please call the  at [phone] or after hours [phone]      Any pain related to the surgery that occurs before the surgery needs to be reported and managed by your primary care or referring doctor.      Please keep in mind that the time of surgery is subject to change.  Make sure you have nothing to eat or drink after midnight.  If your surgery is later in the afternoon, this recommendation might change, but not until the day before surgery after the actual time of the surgery has been established.    After Surgery:  When you are discharged from the recovery room, the nurses will review instructions with you and your caregiver.      Please wash your hands every time you touch the wound or change bandages or dressings.      Do not submerge the wound in water.  You may not use a bathtub or hot tub until the wound is closed.  The wait time frame is generally 2-3 weeks but any open area can be a source of incoming bacteria, so it is better to be on the safe side and avoid the tub until your wound is fully healed.      You may take a shower 24 hours after surgery.  Double check with your surgeon if it is ok for water to run over a wound, whether it has been sutured, stapled, glued or is open.  You may gently wash the wound using the antiseptic soap provided for your pre-surgery showering (do not use a washcloth).  Any mild soap will work as well.      Many surgical wounds will have small white strips of tape on them called Steri Strips.  Do not remove these.  The edges will curl and fall off within 7-10 days with normal showering.      If you are going home with sutures (stitches) or staples, you must return to the  clinic to have them taken out, usually within 1-2 weeks.      Signs and symptoms of infection include:  1. Fever, temperature over 101.5 ' F  2. Redness  3. Swelling  4. Increasing pain  5. Green or yellow drainage which may or may not have a foul odor.    Symptoms of infection need to be reported to your surgery office. Please call your Surgeon.      If you or your  are deaf or hard of hearing, or prefer a language other than English, please let us know.  We have many free services, including interpreters and other aids to help you communicate. You may ask for help  through any member of your care team or by calling Language Services at [phone], option 2.

## 2022-06-07 DIAGNOSIS — N18.31 TYPE 2 DIABETES MELLITUS WITH STAGE 3A CHRONIC KIDNEY DISEASE, WITHOUT LONG-TERM CURRENT USE OF INSULIN (H): ICD-10-CM

## 2022-06-07 DIAGNOSIS — E11.22 TYPE 2 DIABETES MELLITUS WITH STAGE 3A CHRONIC KIDNEY DISEASE, WITHOUT LONG-TERM CURRENT USE OF INSULIN (H): ICD-10-CM

## 2022-06-07 RX ORDER — SEMAGLUTIDE 1.34 MG/ML
0.5 INJECTION, SOLUTION SUBCUTANEOUS
Qty: 3 ML | Refills: 3 | Status: CANCELLED | OUTPATIENT
Start: 2022-06-07

## 2022-06-08 DIAGNOSIS — E11.22 TYPE 2 DIABETES MELLITUS WITH STAGE 3A CHRONIC KIDNEY DISEASE, WITHOUT LONG-TERM CURRENT USE OF INSULIN (H): ICD-10-CM

## 2022-06-08 DIAGNOSIS — I26.09 OTHER ACUTE PULMONARY EMBOLISM WITH ACUTE COR PULMONALE (H): ICD-10-CM

## 2022-06-08 DIAGNOSIS — N18.31 TYPE 2 DIABETES MELLITUS WITH STAGE 3A CHRONIC KIDNEY DISEASE, WITHOUT LONG-TERM CURRENT USE OF INSULIN (H): ICD-10-CM

## 2022-06-08 RX ORDER — SEMAGLUTIDE 1.34 MG/ML
0.5 INJECTION, SOLUTION SUBCUTANEOUS
Qty: 3 ML | Refills: 3 | Status: SHIPPED | OUTPATIENT
Start: 2022-06-08 | End: 2022-12-05

## 2022-06-08 RX ORDER — RIVAROXABAN 20 MG/1
TABLET, FILM COATED ORAL
Qty: 30 TABLET | Refills: 10 | Status: SHIPPED | OUTPATIENT
Start: 2022-06-08 | End: 2023-05-01

## 2022-06-08 NOTE — TELEPHONE ENCOUNTER
Xarelto      Last Written Prescription Date:  10/20/2021  Last Fill Quantity: 30,   # refills: 3  Last Office Visit: 4/4/2022  Future Office visit:    Next 5 appointments (look out 90 days)    Jun 21, 2022  2:15 PM  (Arrive by 2:00 PM)  Return Visit with Meenu Orourke DPM  [facility] ([facility] ) [address] [place] [id number] [phone]   Jun 23, 2022 10:15 AM  (Arrive by 10:00 AM)  Pre-Op physical with Zoila Jones MD  [facility] ([facility] ) [address] [place] [id number]  [phone]

## 2022-06-08 NOTE — TELEPHONE ENCOUNTER
Patient will run out before his appointment on 6/23/22.      semaglutide (OZEMPIC, 0.25 OR 0.5 MG/DOSE,) 2 MG/1.5ML SOPN pen      Last Written Prescription Date:  1/14/22  Last Fill Quantity: 3 ml,   # refills: 3  Last Office Visit: 4/4/22  Future Office visit:    Next 5 appointments (look out 90 days)    Jun 21, 2022  2:15 PM  (Arrive by 2:00 PM)  Return Visit with Meenu Orourke DPM  [facility] ([facility] ) [address] [place] [id number] [phone]   Jun 23, 2022 10:15 AM  (Arrive by 10:00 AM)  Pre-Op physical with Zoila Jones MD  [facility] ([facility] ) [address] [place] [address]  [phone]           Routing refill request to provider for review/approval because:  Drug not on the [facility], [facility] or [facility] refill protocol or controlled substance    Hemoglobin A1C 0.0 - 5.6 % 6.3 High   5.6 CM  5.3 CM     Comment: Normal <5.7%   Prediabetes 5.7-6.4%     Diabetes 6.5% or higher

## 2022-06-14 NOTE — PROGRESS NOTES
[facility] [place]  [address]  [place] [address]  Phone: [phone]  Primary Provider: Zoila Jones  Pre-op Performing Provider: ZOILA JONES      PREOPERATIVE EVALUATION:  Today's date: 6/23/2022    Anthony Dyer is a 81 year old male who presents for a preoperative evaluation.    Surgical Information:  Surgery/Procedure: Left 5th toe amputation  [place] [facility] [place]geon: Dr. Orourke  Surgery Date: 6/27/22  Time of Surgery: tbd  Where patient plans to recover: At home with family  Fax number for surgical facility: Note does not need to be faxed, will be available electronically in Epic.    Type of Anesthesia Anticipated: to be determined    Assessment & Plan     The proposed surgical procedure is considered INTERMEDIATE risk.    Preop general physical exam  Proceed as planned.  COVID swab pending.  Would typically have him hold his Xarelto x 1 day, but per Dr Orourke - ok to continue.  - Asymptomatic COVID-19 Virus (Coronavirus) by PCR Nose; Future  - Asymptomatic COVID-19 Virus (Coronavirus) by PCR Nose  - Basic metabolic panel; Future  - CBC with platelets and differential; Future    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 associated with type 2 diabetes mellitus, unspecified ulcer stage (H)    Chronic painful diabetic neuropathy (H)    Type 2 diabetes mellitus with stage 3a chronic kidney disease, without long-term current use of insulin (H)  Stable.  Lab Results   Component Value Date    A1C 6.3 06/06/2022    A1C 5.6 03/04/2022    A1C 5.3 08/05/2021    A1C 6.5 05/06/2021       - Basic metabolic panel; Future  - CBC with platelets and differential; Future    Centrilobular emphysema (H)  Not active.  Rare use of inhaler.    Hyperlipidemia, unspecified hyperlipidemia type  Stable with statin.    Gout, unspecified cause, unspecified chronicity, unspecified site  No recent flares.    Essential hypertension  Stable.    Stage 3a chronic kidney disease (H)  Stable overall -  slightly improved on today's lab.    Moderate episode of recurrent major depressive disorder (H)  Stable.    Chronic anticoagulation  Comment: see above note; per prior conversation with Dr Seo and Dr Orourke - plan to continue Xarelto x 1 year for bilateral PE; however, patient concerned about going off; I am as well; brother passed of large PE; at minimum would want patient to have repeat CTA to re-evaluate clot burder.  Referral placed to hematology.  Plan: Adult Oncology/Hematology  Referral          History of pulmonary embolism  Comment: as above  Plan: Adult Oncology/Hematology  Referral        Family history of blood clots  Comment: as above  Plan: Adult Oncology/Hematology  Referral           Estimated Creatinine Clearance: 55.7 mL/min (based on SCr of 1.22 mg/dL).       Risks and Recommendations:  The patient has the following additional risks and recommendations for perioperative complications:  Diabetes:  - Patient is not on insulin therapy: regular NPO guidelines can be followed.     Medication Instructions:  Per Dr Orourke - ok to continue Xarelto rather than hold x 1 day prior.    Patient is to take all scheduled medications on the day of surgery EXCEPT for modifications listed below:   - apixaban (Eliquis), edoxaban (Savaysa), rivaroxaban (Xarelto): Bleeding risk is low for this procedure AND CrCl (>=) 50 mL/min. HOLD 1 day before surgery.     - Beta Blockers: Continue taking on the day of surgery.   - Calcium Channel Blockers: May be continued on the day of surgery.   - Statins: Continue taking on the day of surgery.    - metformin: HOLD day of surgery.   - GLP-1 Injectable (exenitide, liraglutide, semaglutide, dulaglutide, etc.): HOLD day of surgery    - rescue Inhaler: Continue PRN. Bring to hospital on the day of surgery.    RECOMMENDATION:  APPROVAL GIVEN to proceed with proposed procedure, without further diagnostic evaluation.        Subjective     HPI related to  upcoming procedure: Patient with toe infection, diabetic neuropathy, planning 5th toe amputation, left.      Preop Questions 6/23/2022   1. Have you ever had a heart attack or stroke? YES - 2011 - CVA    2. Have you ever had surgery on your heart or blood vessels, such as a stent placement, a coronary artery bypass, or surgery on an artery in your head, neck, heart, or legs? No   3. Do you have chest pain with activity? No   4. Do you have a history of  heart failure? No   5. Do you currently have a cold, bronchitis or symptoms of other infection? No   6. Do you have a cough, shortness of breath, or wheezing? No   7. Do you or anyone in your family have previous history of blood clots? YES - brother passed away from a blood clot and shi takes xarelto; patient had bilateral PE   8. Do you or does anyone in your family have a serious bleeding problem such as prolonged bleeding following surgeries or cuts? No   9. Have you ever had problems with anemia or been told to take iron pills? No   10. Have you had any abnormal blood loss such as black, tarry or bloody stools? No   11. Have you ever had a blood transfusion? No   12. Are you willing to have a blood transfusion if it is medically needed before, during, or after your surgery? Yes   13. Have you or any of your relatives ever had problems with anesthesia? No   14. Do you have sleep apnea, excessive snoring or daytime drowsiness? No   14a. Do you have a CPAP machine? -   15. Do you have any artifical heart valves or other implanted medical devices like a pacemaker, defibrillator, or continuous glucose monitor? No   16. Do you have artificial joints? No   17. Are you allergic to latex? No     Health Care Directive:  Patient has a Health Care Directive on file      Preoperative Review of :   reviewed - controlled substances reflected in medication list.Gabapentin      Status of Chronic Conditions:  See problem list for active medical problems.  Problems all  longstanding and stable, except as noted/documented.  See ROS for pertinent symptoms related to these conditions.    DIABETES - Patient has a longstanding history of DiabetesType Type II . Patient is being treated with oral agents and injectable agents and denies significant side effects. Control has been good. Complicating factors include but are not limited to: hypertension, hyperlipidemia, foot ulcer and chronic kidney disease.   ozempic lowered to 0.25 mg last visit.  a1c 6.3%.    HYPERLIPIDEMIA - Patient has a long history of significant Hyperlipidemia requiring medication for treatment with recent good control. Patient reports no problems or side effects with the medication.     HYPERTENSION - Patient has longstanding history of HTN , currently denies any symptoms referable to elevated blood pressure. Specifically denies chest pain, palpitations, dyspnea, orthopnea, PND or peripheral edema. Blood pressure readings have been in normal range. Current medication regimen is as listed below. Patient denies any side effects of medication.     RENAL INSUFFICIENCY - Patient has a longstanding history of moderate-severe chronic renal insufficiency. Last Cr -see today's labs.     GOUT - no recent gout flares    BPH - stable with flomax    CHRONIC ANTICOAGULATION - Xarelto for PE.  4/26/21 large bilateral PE with RV strain.  Felt to be related to limited activity since developing urinary retention.  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  Estimated Creatinine Clearance: 59.6 mL/min (based on SCr of 1.16 mg/dL).  No other prior clots.  Unclear duration - hospital discharge 3-6 months.    Per Dr Orourke, case was discussed with Dr Seo, cardiology, as well.  Ok to hold for procedure and then when resume - plan for total of 1 year.  It was 1 year in 4/2022.    Dr Orourke told patient to continue Xarelto.    CTA 4/26/21 - large bilateral PE.  No repeat imaging done yet?     Review of Systems  Constitutional, neuro, ENT,  endocrine, pulmonary, cardiac, gastrointestinal, genitourinary, musculoskeletal, integument and psychiatric systems are negative, except as otherwise noted.    Patient Active Problem List    Diagnosis Date Noted     Complicated UTI (urinary tract infection) 03/08/2022     Priority: Medium     Sepsis with acute renal failure (H) 03/08/2022     Priority: Medium     Nursing Home Visit 05/21/2021     Priority: Medium     Benign prostatic hyperplasia with urinary retention 04/29/2021     Priority: Medium     Tachycardia 04/26/2021     Priority: Medium     Hypoxia 04/26/2021     Priority: Medium     Other acute pulmonary embolism with acute cor pulmonale (H) 04/26/2021     Priority: Medium     Dyspnea, unspecified type 04/26/2021     Priority: Medium     Urinary retention 04/19/2021     Priority: Medium     Generalized weakness 04/19/2021     Priority: Medium     Onychomycosis of left great toe 02/09/2021     Priority: Medium     Pincer nail deformity 02/09/2021     Priority: Medium     Chronic painful diabetic neuropathy (H) 10/24/2018     Priority: Medium     Moderate episode of recurrent major depressive disorder (H) 10/24/2018     Priority: Medium     Formatting of this note might be different from the original.  Patient does not want anti-depressant medication       Chronic obstructive lung disease (H) 11/22/2017     Priority: Medium     Formatting of this note might be different from the original.  Mild       Mild concentric left ventricular hypertrophy (LVH) 11/22/2017     Priority: Medium     Uncomplicated alcohol dependence (H) 09/28/2016     Priority: Medium     IBS (irritable bowel syndrome) 12/23/2015     Priority: Medium     Spinal stenosis of lumbar region without neurogenic claudication 12/23/2015     Priority: Medium     Erectile dysfunction 05/28/2014     Priority: Medium     Chronic kidney disease, stage III (moderate) (H) 05/22/2013     Priority: Medium     Formatting of this note might be different  from the original.  Since 2011       History of CVA (cerebrovascular accident) 02/03/2011     Priority: Medium     Formatting of this note might be different from the original.  Jan 2011  IMPRESSION: Interval development of a diffusion abnormality consistent with acute to early subacute infarct of less than 3 days of age in the left thalamus and the medial left cerebral peduncle. No associated abnormal enhancement or hydrocephalus.       Hyperlipidemia 02/03/2011     Priority: Medium     Essential hypertension 11/28/2006     Priority: Medium     Type 2 diabetes mellitus with stage 3 chronic kidney disease, without long-term current use of insulin (H) 02/16/2001     Priority: Medium     Gout, unspecified 02/16/2001     Priority: Medium     Formatting of this note might be different from the original.  On Allopurinol       Lumbar spondylosis 02/22/1995     Priority: Medium     Formatting of this note might be different from the original.  S/P RFN L2-L5 facets        Past Medical History:   Diagnosis Date     Diabetes (H)      Hypertension      Past Surgical History:   Procedure Laterality Date     CYSTOSCOPY, TRANSURETHRAL RESECTION (TUR) PROSTATE, COMBINED N/A 10/15/2021    Procedure: CYSTOSCOPY, WITH TRANSURETHRAL RESECTION PROSTATE;  Surgeon: Indu De Leon MD;  Location: [facility] OR     HERNIA REPAIR       PHACOEMULSIFICATION WITH STANDARD INTRAOCULAR LENS IMPLANT Left 12/27/2021    Procedure: PHACOEMULSIFICATION CATARACT EXTRACTION POSTERIOR CHAMBER LENS LEFT EYE;  Surgeon: Phillip Maldonado MD;  Location: [facility] OR     PHACOEMULSIFICATION WITH STANDARD INTRAOCULAR LENS IMPLANT Right 1/11/2022    Procedure: PHACOEMULSIFICATION CATARACT EXTRACTION POSTERIOR CHAMBER LENS RIGHT EYE;  Surgeon: Phillip Maldonado MD;  Location: [facility] OR     Current Outpatient Medications   Medication Sig Dispense Refill     acetaminophen (TYLENOL) 500 MG tablet Take 1,000 mg by mouth 2 times daily . Limit acetaminophen to 4000 mg per day from all  sources.       albuterol (PROAIR HFA/PROVENTIL HFA/VENTOLIN HFA) 108 (90 Base) MCG/ACT inhaler Inhale 2 puffs into the lungs every 4 hours as needed       allopurinol (ZYLOPRIM) 100 MG tablet Take 100 mg by mouth daily        amLODIPine (NORVASC) 2.5 MG tablet Take 2.5 mg by mouth daily       amoxicillin-clavulanate (AUGMENTIN) 875-125 MG tablet Take 1 tablet by mouth 2 times daily 10 tablet 0     atorvastatin (LIPITOR) 80 MG tablet Take 40 mg by mouth every evening        carvedilol (COREG) 25 MG tablet Take 25 mg by mouth 2 times daily        finasteride (PROSCAR) 5 MG tablet Take 1 tablet (5 mg) by mouth daily 30 tablet 0     gabapentin (NEURONTIN) 300 MG capsule Take 300 mg by mouth 3 times daily        metFORMIN (GLUCOPHAGE-XR) 500 MG 24 hr tablet Take 2 tablets (1,000 mg) by mouth 2 times daily (with meals) 60 tablet 0     semaglutide (OZEMPIC, 0.25 OR 0.5 MG/DOSE,) 2 MG/1.5ML SOPN pen Inject 0.5 mg Subcutaneous every 7 days on Tuesdays 3 mL 3     sildenafil (VIAGRA) 100 MG tablet Take 1 tablet (100 mg) by mouth daily as needed (erectile dysfunction) 10 tablet 0     tamsulosin (FLOMAX) 0.4 MG capsule Take 1 capsule (0.4 mg) by mouth every evening 90 capsule 1     XARELTO ANTICOAGULANT 20 MG TABS tablet TAKE 1 TABLET DAILY BY MOUTH WITH DINNER 30 tablet 10     multivitamin, therapeutic (THERA-VIT) TABS tablet Take 1 tablet by mouth daily (Patient not taking: Reported on 6/23/2022)       vitamin B-12 (CYANOCOBALAMIN) 250 MCG tablet Take 250 mcg by mouth daily (Patient not taking: Reported on 6/23/2022)       vitamin C (ASCORBIC ACID) 500 MG tablet Take 500 mg by mouth daily (Patient not taking: Reported on 6/23/2022)         Allergies   Allergen Reactions     Ace Inhibitors      Per [facility]: hyperkalemia.  Pt doesn't know if he is allergic     Ceclor [Cefaclor] Hives     Sulfa Drugs      Pt unsure.         Social History     Tobacco Use     Smoking status: Never Smoker     Smokeless tobacco: Former User      Types: Chew   Substance Use Topics     Alcohol use: Yes     Comment: daily, 4 today     History reviewed. No pertinent family history.  History   Drug Use Unknown         Objective     /84 (BP Location: Left arm, Patient Position: Sitting, Cuff Size: Adult Regular)   Pulse 88   Temp 97.8  F (36.6  C) (Tympanic)   Wt 81.6 kg (180 lb)   SpO2 95%   BMI 23.75 kg/m      Physical Exam    GENERAL APPEARANCE: healthy, alert and no distress; uses cane; unsteady on feet     EYES: EOMI,  PERRL     HENT: ear canals and TM's normal and nose and mouth without ulcers or lesions     NECK: no adenopathy, no asymmetry, masses, or scars and thyroid normal to palpation     RESP: lungs clear to auscultation - no rales, rhonchi or wheezes     CV: regular rates and rhythm, normal S1 S2, no S3 or S4 and no murmur, click or rub     ABDOMEN:  soft, nontender, no HSM or masses and bowel sounds normal     MS: extremities normal- no gross deformities noted, no evidence of inflammation in joints, FROM in all extremities.     MS: left foot in surgical shoe     SKIN: no suspicious lesions or rashes     NEURO: Normal strength and tone, sensory exam grossly normal, mentation intact and speech normal     PSYCH: mentation appears normal. and affect normal/bright     LYMPHATICS: No cervical adenopathy    Recent Labs   Lab Test 06/06/22  1413 03/14/22  1413 03/10/22  0520 03/09/22  0556 03/08/22  0027 03/04/22  0758   HGB  --   --  12.8* 12.7*   < >  --    PLT  --   --  145* 128*   < >  --    NA  --  141 142 141   < >  --    POTASSIUM  --  4.4 3.9 3.9   < >  --    CR  --  1.24 1.35* 1.35*   < >  --    A1C 6.3*  --   --   --   --  5.6    < > = values in this interval not displayed.        Diagnostics:  Recent Results (from the past 24 hour(s))   Basic metabolic panel    Collection Time: 06/23/22 11:01 AM   Result Value Ref Range    Sodium 140 133 - 144 mmol/L    Potassium 4.4 3.4 - 5.3 mmol/L    Chloride 107 94 - 109 mmol/L    Carbon Dioxide  (CO2) 25 20 - 32 mmol/L    Anion Gap 8 3 - 14 mmol/L    Urea Nitrogen 16 7 - 30 mg/dL    Creatinine 1.22 0.66 - 1.25 mg/dL    Calcium 9.3 8.5 - 10.1 mg/dL    Glucose 188 (H) 70 - 99 mg/dL    GFR Estimate 60 (L) >60 mL/min/1.73m2   CBC with platelets and differential    Collection Time: 06/23/22 11:01 AM   Result Value Ref Range    WBC Count 8.1 4.0 - 11.0 10e3/uL    RBC Count 4.66 4.40 - 5.90 10e6/uL    Hemoglobin 14.8 13.3 - 17.7 g/dL    Hematocrit 43.7 40.0 - 53.0 %    MCV 94 78 - 100 fL    MCH 31.8 26.5 - 33.0 pg    MCHC 33.9 31.5 - 36.5 g/dL    RDW 12.9 10.0 - 15.0 %    Platelet Count 186 150 - 450 10e3/uL    % Neutrophils 83 %    % Lymphocytes 10 %    % Monocytes 6 %    % Eosinophils 1 %    % Basophils 0 %    % Immature Granulocytes 0 %    NRBCs per 100 WBC 0 <1 /100    Absolute Neutrophils 6.7 1.6 - 8.3 10e3/uL    Absolute Lymphocytes 0.8 0.8 - 5.3 10e3/uL    Absolute Monocytes 0.5 0.0 - 1.3 10e3/uL    Absolute Eosinophils 0.1 0.0 - 0.7 10e3/uL    Absolute Basophils 0.0 0.0 - 0.2 10e3/uL    Absolute Immature Granulocytes 0.0 <=0.4 10e3/uL    Absolute NRBCs 0.0 10e3/uL      No EKG required, no history of coronary heart disease, significant arrhythmia, peripheral arterial disease or other structural heart disease.    Revised Cardiac Risk Index (RCRI):  The patient has the following serious cardiovascular risks for perioperative complications:   - Cerebrovascular Disease (TIA or CVA) = 1 point   - Diabetes Mellitus (on Insulin) = 1 point     RCRI Interpretation: 2 points: Class III (moderate risk - 6.6% complication rate)     Estimated Functional Capacity: Performs 4 METS exercise without symptoms (e.g., light housework, stairs, 4 mph walk, 7 mph bike, slow step dance)           Signed Electronically by: Zoila Azul MD  Copy of this evaluation report is provided to requesting physician.

## 2022-06-14 NOTE — H&P (VIEW-ONLY)
[facility] [place]  [address]  [place] [address]  Phone: [phone]  Primary Provider: Zoila Jones  Pre-op Performing Provider: ZOILA JONES      PREOPERATIVE EVALUATION:  Today's date: 6/23/2022    Anthony Dyer is a 81 year old male who presents for a preoperative evaluation.    Surgical Information:  Surgery/Procedure: Left 5th toe amputation  [place] [facility] [place]: Dr. Orourke  Surgery Date: 6/27/22  Time of Surgery: tbd  Where patient plans to recover: At home with family  Fax number for surgical facility: Note does not need to be faxed, will be available electronically in Epic.    Type of Anesthesia Anticipated: to be determined    Assessment & Plan     The proposed surgical procedure is considered INTERMEDIATE risk.    Preop general physical exam  Proceed as planned.  COVID swab pending.  Would typically have him hold his Xarelto x 1 day, but per Dr Orourke - ok to continue.  - Asymptomatic COVID-19 Virus (Coronavirus) by PCR Nose; Future  - Asymptomatic COVID-19 Virus (Coronavirus) by PCR Nose  - Basic metabolic panel; Future  - CBC with platelets and differential; Future    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 associated with type 2 diabetes mellitus, unspecified ulcer stage (H)    Chronic painful diabetic neuropathy (H)    Type 2 diabetes mellitus with stage 3a chronic kidney disease, without long-term current use of insulin (H)  Stable.  Lab Results   Component Value Date    A1C 6.3 06/06/2022    A1C 5.6 03/04/2022    A1C 5.3 08/05/2021    A1C 6.5 05/06/2021       - Basic metabolic panel; Future  - CBC with platelets and differential; Future    Centrilobular emphysema (H)  Not active.  Rare use of inhaler.    Hyperlipidemia, unspecified hyperlipidemia type  Stable with statin.    Gout, unspecified cause, unspecified chronicity, unspecified site  No recent flares.    Essential hypertension  Stable.    Stage 3a chronic kidney disease (H)  Stable overall -  slightly improved on today's lab.    Moderate episode of recurrent major depressive disorder (H)  Stable.    Chronic anticoagulation  Comment: see above note; per prior conversation with Dr Soe and Dr Orourke - plan to continue Xarelto x 1 year for bilateral PE; however, patient concerned about going off; I am as well; brother passed of large PE; at minimum would want patient to have repeat CTA to re-evaluate clot burder.  Referral placed to hematology.  Plan: Adult Oncology/Hematology  Referral          History of pulmonary embolism  Comment: as above  Plan: Adult Oncology/Hematology  Referral        Family history of blood clots  Comment: as above  Plan: Adult Oncology/Hematology  Referral           Estimated Creatinine Clearance: 55.7 mL/min (based on SCr of 1.22 mg/dL).       Risks and Recommendations:  The patient has the following additional risks and recommendations for perioperative complications:  Diabetes:  - Patient is not on insulin therapy: regular NPO guidelines can be followed.     Medication Instructions:  Per Dr Orourke - ok to continue Xarelto rather than hold x 1 day prior.    Patient is to take all scheduled medications on the day of surgery EXCEPT for modifications listed below:   - apixaban (Eliquis), edoxaban (Savaysa), rivaroxaban (Xarelto): Bleeding risk is low for this procedure AND CrCl (>=) 50 mL/min. HOLD 1 day before surgery.     - Beta Blockers: Continue taking on the day of surgery.   - Calcium Channel Blockers: May be continued on the day of surgery.   - Statins: Continue taking on the day of surgery.    - metformin: HOLD day of surgery.   - GLP-1 Injectable (exenitide, liraglutide, semaglutide, dulaglutide, etc.): HOLD day of surgery    - rescue Inhaler: Continue PRN. Bring to hospital on the day of surgery.    RECOMMENDATION:  APPROVAL GIVEN to proceed with proposed procedure, without further diagnostic evaluation.        Subjective     HPI related to  upcoming procedure: Patient with toe infection, diabetic neuropathy, planning 5th toe amputation, left.      Preop Questions 6/23/2022   1. Have you ever had a heart attack or stroke? YES - 2011 - CVA    2. Have you ever had surgery on your heart or blood vessels, such as a stent placement, a coronary artery bypass, or surgery on an artery in your head, neck, heart, or legs? No   3. Do you have chest pain with activity? No   4. Do you have a history of  heart failure? No   5. Do you currently have a cold, bronchitis or symptoms of other infection? No   6. Do you have a cough, shortness of breath, or wheezing? No   7. Do you or anyone in your family have previous history of blood clots? YES - brother passed away from a blood clot and shi takes xarelto; patient had bilateral PE   8. Do you or does anyone in your family have a serious bleeding problem such as prolonged bleeding following surgeries or cuts? No   9. Have you ever had problems with anemia or been told to take iron pills? No   10. Have you had any abnormal blood loss such as black, tarry or bloody stools? No   11. Have you ever had a blood transfusion? No   12. Are you willing to have a blood transfusion if it is medically needed before, during, or after your surgery? Yes   13. Have you or any of your relatives ever had problems with anesthesia? No   14. Do you have sleep apnea, excessive snoring or daytime drowsiness? No   14a. Do you have a CPAP machine? -   15. Do you have any artifical heart valves or other implanted medical devices like a pacemaker, defibrillator, or continuous glucose monitor? No   16. Do you have artificial joints? No   17. Are you allergic to latex? No     Health Care Directive:  Patient has a Health Care Directive on file      Preoperative Review of :   reviewed - controlled substances reflected in medication list.Gabapentin      Status of Chronic Conditions:  See problem list for active medical problems.  Problems all  longstanding and stable, except as noted/documented.  See ROS for pertinent symptoms related to these conditions.    DIABETES - Patient has a longstanding history of DiabetesType Type II . Patient is being treated with oral agents and injectable agents and denies significant side effects. Control has been good. Complicating factors include but are not limited to: hypertension, hyperlipidemia, foot ulcer and chronic kidney disease.   ozempic lowered to 0.25 mg last visit.  a1c 6.3%.    HYPERLIPIDEMIA - Patient has a long history of significant Hyperlipidemia requiring medication for treatment with recent good control. Patient reports no problems or side effects with the medication.     HYPERTENSION - Patient has longstanding history of HTN , currently denies any symptoms referable to elevated blood pressure. Specifically denies chest pain, palpitations, dyspnea, orthopnea, PND or peripheral edema. Blood pressure readings have been in normal range. Current medication regimen is as listed below. Patient denies any side effects of medication.     RENAL INSUFFICIENCY - Patient has a longstanding history of moderate-severe chronic renal insufficiency. Last Cr -see today's labs.     GOUT - no recent gout flares    BPH - stable with flomax    CHRONIC ANTICOAGULATION - Xarelto for PE.  4/26/21 large bilateral PE with RV strain.  Felt to be related to limited activity since developing urinary retention.  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  Estimated Creatinine Clearance: 59.6 mL/min (based on SCr of 1.16 mg/dL).  No other prior clots.  Unclear duration - hospital discharge 3-6 months.    Per Dr Orourke, case was discussed with Dr Seo, cardiology, as well.  Ok to hold for procedure and then when resume - plan for total of 1 year.  It was 1 year in 4/2022.    Dr Orourke told patient to continue Xarelto.    CTA 4/26/21 - large bilateral PE.  No repeat imaging done yet?     Review of Systems  Constitutional, neuro, ENT,  endocrine, pulmonary, cardiac, gastrointestinal, genitourinary, musculoskeletal, integument and psychiatric systems are negative, except as otherwise noted.    Patient Active Problem List    Diagnosis Date Noted     Complicated UTI (urinary tract infection) 03/08/2022     Priority: Medium     Sepsis with acute renal failure (H) 03/08/2022     Priority: Medium     Nursing Home Visit 05/21/2021     Priority: Medium     Benign prostatic hyperplasia with urinary retention 04/29/2021     Priority: Medium     Tachycardia 04/26/2021     Priority: Medium     Hypoxia 04/26/2021     Priority: Medium     Other acute pulmonary embolism with acute cor pulmonale (H) 04/26/2021     Priority: Medium     Dyspnea, unspecified type 04/26/2021     Priority: Medium     Urinary retention 04/19/2021     Priority: Medium     Generalized weakness 04/19/2021     Priority: Medium     Onychomycosis of left great toe 02/09/2021     Priority: Medium     Pincer nail deformity 02/09/2021     Priority: Medium     Chronic painful diabetic neuropathy (H) 10/24/2018     Priority: Medium     Moderate episode of recurrent major depressive disorder (H) 10/24/2018     Priority: Medium     Formatting of this note might be different from the original.  Patient does not want anti-depressant medication       Chronic obstructive lung disease (H) 11/22/2017     Priority: Medium     Formatting of this note might be different from the original.  Mild       Mild concentric left ventricular hypertrophy (LVH) 11/22/2017     Priority: Medium     Uncomplicated alcohol dependence (H) 09/28/2016     Priority: Medium     IBS (irritable bowel syndrome) 12/23/2015     Priority: Medium     Spinal stenosis of lumbar region without neurogenic claudication 12/23/2015     Priority: Medium     Erectile dysfunction 05/28/2014     Priority: Medium     Chronic kidney disease, stage III (moderate) (H) 05/22/2013     Priority: Medium     Formatting of this note might be different  from the original.  Since 2011       History of CVA (cerebrovascular accident) 02/03/2011     Priority: Medium     Formatting of this note might be different from the original.  Jan 2011  IMPRESSION: Interval development of a diffusion abnormality consistent with acute to early subacute infarct of less than 3 days of age in the left thalamus and the medial left cerebral peduncle. No associated abnormal enhancement or hydrocephalus.       Hyperlipidemia 02/03/2011     Priority: Medium     Essential hypertension 11/28/2006     Priority: Medium     Type 2 diabetes mellitus with stage 3 chronic kidney disease, without long-term current use of insulin (H) 02/16/2001     Priority: Medium     Gout, unspecified 02/16/2001     Priority: Medium     Formatting of this note might be different from the original.  On Allopurinol       Lumbar spondylosis 02/22/1995     Priority: Medium     Formatting of this note might be different from the original.  S/P RFN L2-L5 facets        Past Medical History:   Diagnosis Date     Diabetes (H)      Hypertension      Past Surgical History:   Procedure Laterality Date     CYSTOSCOPY, TRANSURETHRAL RESECTION (TUR) PROSTATE, COMBINED N/A 10/15/2021    Procedure: CYSTOSCOPY, WITH TRANSURETHRAL RESECTION PROSTATE;  Surgeon: Indu De Leon MD;  Location: [facility] OR     HERNIA REPAIR       PHACOEMULSIFICATION WITH STANDARD INTRAOCULAR LENS IMPLANT Left 12/27/2021    Procedure: PHACOEMULSIFICATION CATARACT EXTRACTION POSTERIOR CHAMBER LENS LEFT EYE;  Surgeon: Phillip Maldonado MD;  Location: [facility] OR     PHACOEMULSIFICATION WITH STANDARD INTRAOCULAR LENS IMPLANT Right 1/11/2022    Procedure: PHACOEMULSIFICATION CATARACT EXTRACTION POSTERIOR CHAMBER LENS RIGHT EYE;  Surgeon: Phillip Maldonado MD;  Location: [facility]     Current Outpatient Medications   Medication Sig Dispense Refill     acetaminophen (TYLENOL) 500 MG tablet Take 1,000 mg by mouth 2 times daily . Limit acetaminophen to 4000 mg per day from all  sources.       albuterol (PROAIR HFA/PROVENTIL HFA/VENTOLIN HFA) 108 (90 Base) MCG/ACT inhaler Inhale 2 puffs into the lungs every 4 hours as needed       allopurinol (ZYLOPRIM) 100 MG tablet Take 100 mg by mouth daily        amLODIPine (NORVASC) 2.5 MG tablet Take 2.5 mg by mouth daily       amoxicillin-clavulanate (AUGMENTIN) 875-125 MG tablet Take 1 tablet by mouth 2 times daily 10 tablet 0     atorvastatin (LIPITOR) 80 MG tablet Take 40 mg by mouth every evening        carvedilol (COREG) 25 MG tablet Take 25 mg by mouth 2 times daily        finasteride (PROSCAR) 5 MG tablet Take 1 tablet (5 mg) by mouth daily 30 tablet 0     gabapentin (NEURONTIN) 300 MG capsule Take 300 mg by mouth 3 times daily        metFORMIN (GLUCOPHAGE-XR) 500 MG 24 hr tablet Take 2 tablets (1,000 mg) by mouth 2 times daily (with meals) 60 tablet 0     semaglutide (OZEMPIC, 0.25 OR 0.5 MG/DOSE,) 2 MG/1.5ML SOPN pen Inject 0.5 mg Subcutaneous every 7 days on Tuesdays 3 mL 3     sildenafil (VIAGRA) 100 MG tablet Take 1 tablet (100 mg) by mouth daily as needed (erectile dysfunction) 10 tablet 0     tamsulosin (FLOMAX) 0.4 MG capsule Take 1 capsule (0.4 mg) by mouth every evening 90 capsule 1     XARELTO ANTICOAGULANT 20 MG TABS tablet TAKE 1 TABLET DAILY BY MOUTH WITH DINNER 30 tablet 10     multivitamin, therapeutic (THERA-VIT) TABS tablet Take 1 tablet by mouth daily (Patient not taking: Reported on 6/23/2022)       vitamin B-12 (CYANOCOBALAMIN) 250 MCG tablet Take 250 mcg by mouth daily (Patient not taking: Reported on 6/23/2022)       vitamin C (ASCORBIC ACID) 500 MG tablet Take 500 mg by mouth daily (Patient not taking: Reported on 6/23/2022)         Allergies   Allergen Reactions     Ace Inhibitors      Per [facility]: hyperkalemia.  Pt doesn't know if he is allergic     Ceclor [Cefaclor] Hives     Sulfa Drugs      Pt unsure.         Social History     Tobacco Use     Smoking status: Never Smoker     Smokeless tobacco: Former User      Types: Chew   Substance Use Topics     Alcohol use: Yes     Comment: daily, 4 today     History reviewed. No pertinent family history.  History   Drug Use Unknown         Objective     /84 (BP Location: Left arm, Patient Position: Sitting, Cuff Size: Adult Regular)   Pulse 88   Temp 97.8  F (36.6  C) (Tympanic)   Wt 81.6 kg (180 lb)   SpO2 95%   BMI 23.75 kg/m      Physical Exam    GENERAL APPEARANCE: healthy, alert and no distress; uses cane; unsteady on feet     EYES: EOMI,  PERRL     HENT: ear canals and TM's normal and nose and mouth without ulcers or lesions     NECK: no adenopathy, no asymmetry, masses, or scars and thyroid normal to palpation     RESP: lungs clear to auscultation - no rales, rhonchi or wheezes     CV: regular rates and rhythm, normal S1 S2, no S3 or S4 and no murmur, click or rub     ABDOMEN:  soft, nontender, no HSM or masses and bowel sounds normal     MS: extremities normal- no gross deformities noted, no evidence of inflammation in joints, FROM in all extremities.     MS: left foot in surgical shoe     SKIN: no suspicious lesions or rashes     NEURO: Normal strength and tone, sensory exam grossly normal, mentation intact and speech normal     PSYCH: mentation appears normal. and affect normal/bright     LYMPHATICS: No cervical adenopathy    Recent Labs   Lab Test 06/06/22  1413 03/14/22  1413 03/10/22  0520 03/09/22  0556 03/08/22  0027 03/04/22  0758   HGB  --   --  12.8* 12.7*   < >  --    PLT  --   --  145* 128*   < >  --    NA  --  141 142 141   < >  --    POTASSIUM  --  4.4 3.9 3.9   < >  --    CR  --  1.24 1.35* 1.35*   < >  --    A1C 6.3*  --   --   --   --  5.6    < > = values in this interval not displayed.        Diagnostics:  Recent Results (from the past 24 hour(s))   Basic metabolic panel    Collection Time: 06/23/22 11:01 AM   Result Value Ref Range    Sodium 140 133 - 144 mmol/L    Potassium 4.4 3.4 - 5.3 mmol/L    Chloride 107 94 - 109 mmol/L    Carbon Dioxide  (CO2) 25 20 - 32 mmol/L    Anion Gap 8 3 - 14 mmol/L    Urea Nitrogen 16 7 - 30 mg/dL    Creatinine 1.22 0.66 - 1.25 mg/dL    Calcium 9.3 8.5 - 10.1 mg/dL    Glucose 188 (H) 70 - 99 mg/dL    GFR Estimate 60 (L) >60 mL/min/1.73m2   CBC with platelets and differential    Collection Time: 06/23/22 11:01 AM   Result Value Ref Range    WBC Count 8.1 4.0 - 11.0 10e3/uL    RBC Count 4.66 4.40 - 5.90 10e6/uL    Hemoglobin 14.8 13.3 - 17.7 g/dL    Hematocrit 43.7 40.0 - 53.0 %    MCV 94 78 - 100 fL    MCH 31.8 26.5 - 33.0 pg    MCHC 33.9 31.5 - 36.5 g/dL    RDW 12.9 10.0 - 15.0 %    Platelet Count 186 150 - 450 10e3/uL    % Neutrophils 83 %    % Lymphocytes 10 %    % Monocytes 6 %    % Eosinophils 1 %    % Basophils 0 %    % Immature Granulocytes 0 %    NRBCs per 100 WBC 0 <1 /100    Absolute Neutrophils 6.7 1.6 - 8.3 10e3/uL    Absolute Lymphocytes 0.8 0.8 - 5.3 10e3/uL    Absolute Monocytes 0.5 0.0 - 1.3 10e3/uL    Absolute Eosinophils 0.1 0.0 - 0.7 10e3/uL    Absolute Basophils 0.0 0.0 - 0.2 10e3/uL    Absolute Immature Granulocytes 0.0 <=0.4 10e3/uL    Absolute NRBCs 0.0 10e3/uL      No EKG required, no history of coronary heart disease, significant arrhythmia, peripheral arterial disease or other structural heart disease.    Revised Cardiac Risk Index (RCRI):  The patient has the following serious cardiovascular risks for perioperative complications:   - Cerebrovascular Disease (TIA or CVA) = 1 point   - Diabetes Mellitus (on Insulin) = 1 point     RCRI Interpretation: 2 points: Class III (moderate risk - 6.6% complication rate)     Estimated Functional Capacity: Performs 4 METS exercise without symptoms (e.g., light housework, stairs, 4 mph walk, 7 mph bike, slow step dance)           Signed Electronically by: Zoila Azul MD  Copy of this evaluation report is provided to requesting physician.

## 2022-06-20 ENCOUNTER — ANESTHESIA EVENT (OUTPATIENT)
Dept: SURGERY | Facility: HOSPITAL | Age: 82
End: 2022-06-20
Payer: MEDICARE

## 2022-06-20 ASSESSMENT — COPD QUESTIONNAIRES
COPD: 1
CAT_SEVERITY: MILD

## 2022-06-20 ASSESSMENT — ENCOUNTER SYMPTOMS: DYSRHYTHMIAS: 1

## 2022-06-20 NOTE — ANESTHESIA PREPROCEDURE EVALUATION
Anesthesia Pre-Procedure Evaluation    Patient: Anthony Dyer   MRN: [id number] : 1940        Procedure : Procedure(s):  Left fifth toe partial versus full amputation          Past Medical History:   Diagnosis Date     Diabetes (H)      Hypertension       Past Surgical History:   Procedure Laterality Date     CYSTOSCOPY, TRANSURETHRAL RESECTION (TUR) PROSTATE, COMBINED N/A 10/15/2021    Procedure: CYSTOSCOPY, WITH TRANSURETHRAL RESECTION PROSTATE;  Surgeon: Indu De Leon MD;  Location: [facility]     HERNIA REPAIR       PHACOEMULSIFICATION WITH STANDARD INTRAOCULAR LENS IMPLANT Left 2021    Procedure: PHACOEMULSIFICATION CATARACT EXTRACTION POSTERIOR CHAMBER LENS LEFT EYE;  Surgeon: Phillip Maldonado MD;  Location: [facility]     PHACOEMULSIFICATION WITH STANDARD INTRAOCULAR LENS IMPLANT Right 2022    Procedure: PHACOEMULSIFICATION CATARACT EXTRACTION POSTERIOR CHAMBER LENS RIGHT EYE;  Surgeon: Phillip Maldonado MD;  Location: [facility]      Allergies   Allergen Reactions     Ace Inhibitors      Per [facility]: hyperkalemia.  Pt doesn't know if he is allergic     Ceclor [Cefaclor] Hives     Sulfa Drugs      Pt unsure.       Social History     Tobacco Use     Smoking status: Never Smoker     Smokeless tobacco: Former User     Types: Chew   Substance Use Topics     Alcohol use: Yes     Comment: daily, 4 today      Wt Readings from Last 1 Encounters:   22 81.6 kg (180 lb)        Anesthesia Evaluation   Pt has had prior anesthetic. Type: General and MAC.    No history of anesthetic complications       ROS/MED HX  ENT/Pulmonary: Comment: dyspnea    (+) CARLOS risk factors, hypertension, tobacco use, Past use, mild,  COPD,     Neurologic: Comment: Generalized weakness    (+) peripheral neuropathy, - Chronic painful diabetic neuropathy. CVA, date: , without deficits,  TIA: .   Cardiovascular: Comment: Left ventricular hypertrophy  Low METS level d/t bad back    (+) Dyslipidemia hypertension-range: took  norvasc and coreg this am/ ----Taking blood thinners Pt has received instructions: Instructions Given to patient: took xarelto last night. CHF etiology: Mild concentric left ventricular hypertrophy Last EF: 65-70 date: 4/21 SALOM[place]. dysrhythmias, a-fib, Previous cardiac testing   Echo: Date: 2021 Results:  Mild concentric wall thickening consistent with left ventricular hypertrophy  is present.  Abnormal non-specific septal motion is present.  Moderate right ventricular dilation is present.  Global right ventricular function is mildly reduced.  Mild right atrial enlargement is present.  Aortic valve is normal in structure and function.  Mild tricuspid insufficiency is present.  Right ventricular systolic pressure is 46mmHg above the right atrial pressure.  All of the segments of the aorta are normal except sinuses of Valsalva.  Global and regional left ventricular function is hyperkinetic with an EF of  65-70%.  ______________________________________________________________________________  Left Ventricle  Left ventricular size is normal. Global and regional left ventricular function  is hyperkinetic with an EF of 65-70%. Mild concentric wall thickening  consistent with left ventricular hypertrophy is present. Abnormal non-specific  septal motion is present.     Right Ventricle  Moderate right ventricular dilation is present. Global right ventricular  function is mildly reduced.     Atria  Mild right atrial enlargement is present.     Aortic Valve  Aortic valve is normal in structure and function. The mean AoV pressure  gradient is 2.0 mmHg. The peak AoV pressure gradient is 3.3 mmHg.     Tricuspid Valve  Mild tricuspid insufficiency is present. Mild (pulmonary artery systolic  pressure<50mmHg) pulmonary hypertension is present. Right ventricular systolic  pressure is 46mmHg above the right atrial pressure.     Pulmonic Valve  The pulmonic valve is normal.     Vessels  All of the segments of the aorta are normal except  sinuses of Valsalva.     Pericardium  No pericardial effusion is present  Stress Test: Date: Results:    ECG Reviewed: Date: 10-15-21 Results:  A.Fib with PVC's  Cath: Date: Results:      METS/Exercise Tolerance: 1 - Eating, dressing    Hematologic: Comments: Hx of pulmonary emboli  On Xa inhibitor    (+) History of blood clots, pt is anticoagulated,     Musculoskeletal: Comment: Lumbar spial stenosis and spondylosis      GI/Hepatic: Comment: IBS      Renal/Genitourinary: Comment: CKD stage 3    (+) renal disease, type: CRI, BPH,     Endo:     (+) type II DM, Last HgA1c: 6.3, date: 6-6-22, Not using insulin, - not using insulin pump. Diabetic complications: neuropathy.     Psychiatric/Substance Use:     (+) psychiatric history depression     Infectious Disease:  - neg infectious disease ROS     Malignancy:  - neg malignancy ROS     Other:  - neg other ROS          Physical Exam    Airway        Mallampati: III   TM distance: > 3 FB   Neck ROM: limited   Mouth opening: > 3 cm    Respiratory Devices and Support         Dental  no notable dental history         Cardiovascular   cardiovascular exam normal       Rhythm and rate: regular and normal     Pulmonary   pulmonary exam normal        breath sounds clear to auscultation           OUTSIDE LABS:  CBC:   Lab Results   Component Value Date    WBC 5.9 03/10/2022    WBC 7.9 03/09/2022    HGB 12.8 (L) 03/10/2022    HGB 12.7 (L) 03/09/2022    HCT 38.8 (L) 03/10/2022    HCT 38.8 (L) 03/09/2022     (L) 03/10/2022     (L) 03/09/2022     BMP:   Lab Results   Component Value Date     03/14/2022     03/10/2022    POTASSIUM 4.4 03/14/2022    POTASSIUM 3.9 03/10/2022    CHLORIDE 111 (H) 03/14/2022    CHLORIDE 111 (H) 03/10/2022    CO2 26 03/14/2022    CO2 27 03/10/2022    BUN 18 03/14/2022    BUN 17 03/10/2022    CR 1.24 03/14/2022    CR 1.35 (H) 03/10/2022     (H) 03/14/2022     (H) 03/10/2022     COAGS: No results found for: PTT, INR,  FIBR  POC:   Lab Results   Component Value Date     (H) 04/29/2021     HEPATIC:   Lab Results   Component Value Date    ALBUMIN 2.9 (L) 03/10/2022    PROTTOTAL 5.9 (L) 03/10/2022    ALT 22 03/10/2022    AST 18 03/10/2022    ALKPHOS 66 03/10/2022    BILITOTAL 0.7 03/10/2022     OTHER:   Lab Results   Component Value Date    LACT 1.4 03/09/2022    A1C 6.3 (H) 06/06/2022    JODI 9.0 03/14/2022    TSH 1.35 03/04/2022    .0 (H) 03/08/2022       Anesthesia Plan    ASA Status:  4   NPO Status:  NPO Appropriate    Anesthesia Type: MAC.     - Reason for MAC: chronic cardiopulmonary disease, straight local not clinically adequate   Induction: Intravenous, Propofol.   Maintenance: Balanced.        Consents    Anesthesia Plan(s) and associated risks, benefits, and realistic alternatives discussed. Questions answered and patient/representative(s) expressed understanding.     - Discussed: Risks, Benefits and Alternatives for BOTH SEDATION and the PROCEDURE were discussed     - Discussed with:  Patient      - Extended Intubation/Ventilatory Support Discussed: No.      - Patient is DNR/DNI Status: No    Use of blood products discussed: No .     Postoperative Care            Comments:    Other Comments:  6-20-22            MARTELL Troy CRNA

## 2022-06-20 NOTE — PATIENT INSTRUCTIONS
COVID test obtained today.  Lab today - will call with results.    Surgery is Monday.  Ok to continue Ozempic, which is .    Do not take Metformin day of surgery.  Preparing for Your Surgery  Getting started  A nurse will call you to review your health history and instructions. They will give you an arrival time based on your scheduled surgery time. Please be ready to share:  Your doctor's clinic name and phone number  Your medical, surgical and anesthesia history  A list of allergies and sensitivities  A list of medicines, including herbal treatments and over-the-counter drugs  Whether the patient has a legal guardian (ask how to send us the papers in advance)  Please tell us if you're pregnant--or if there's any chance you might be pregnant. Some surgeries may injure a fetus (unborn baby), so they require a pregnancy test. Surgeries that are safe for a fetus don't always need a test, and you can choose whether to have one.   If you have a child who's having surgery, please ask for a copy of Preparing for Your Child's Surgery.    Preparing for surgery  Within 30 days of surgery: Have a pre-op exam (sometimes called an H&P, or History and Physical). This can be done at a clinic or pre-operative center.  If you're having a , you may not need this exam. Talk to your care team.  At your pre-op exam, talk to your care team about all medicines you take. If you need to stop any medicines before surgery, ask when to start taking them again.  We do this for your safety. Many medicines can make you bleed too much during surgery. Some change how well surgery (anesthesia) drugs work.  Call your insurance company to let them know you're having surgery. (If you don't have insurance, call [phone].)  Call your clinic if there's any change in your health. This includes signs of a cold or flu (sore throat, runny nose, cough, rash, fever). It also includes a scrape or scratch near the surgery site.  If you have  questions on the day of surgery, call your hospital or surgery center.  COVID testing  You may need to be tested for COVID-19 before having surgery. If so, we will give you instructions.  Eating and drinking guidelines  For your safety: Unless your surgeon tells you otherwise, follow the guidelines below.  Eat and drink as usual until 8 hours before surgery. After that, no food or milk.  Drink clear liquids until 2 hours before surgery. These are liquids you can see through, like water, Gatorade and Propel Water. You may also have black coffee and tea (no cream or milk).  Nothing by mouth within 2 hours of surgery. This includes gum, candy and breath mints.  If you drink alcohol: Stop drinking it the night before surgery.  If your care team tells you to take medicine on the morning of surgery, it's okay to take it with a sip of water.  Preventing infection  Shower or bathe the night before and morning of your surgery. Follow the instructions your clinic gave you. (If no instructions, use regular soap.)  Don't shave or clip hair near your surgery site. We'll remove the hair if needed.  Don't smoke or vape the morning of surgery. You may chew nicotine gum up to 2 hours before surgery. A nicotine patch is okay.  Note: Some surgeries require you to completely quit smoking and nicotine. Check with your surgeon.  Your care team will make every effort to keep you safe from infection. We will:  Clean our hands often with soap and water (or an alcohol-based hand rub).  Clean the skin at your surgery site with a special soap that kills germs.  Give you a special gown to keep you warm. (Cold raises the risk of infection.)  Wear special hair covers, masks, gowns and gloves during surgery.  Give antibiotic medicine, if prescribed. Not all surgeries need antibiotics.  What to bring on the day of surgery  Photo ID and insurance card  Copy of your health care directive, if you have one  Glasses and hearing aides (bring cases)  You  can't wear contacts during surgery  Inhaler and eye drops, if you use them (tell us about these when you arrive)  CPAP machine or breathing device, if you use them  A few personal items, if spending the night  If you have . . .  A pacemaker, ICD (cardiac defibrillator) or other implant: Bring the ID card.  An implanted stimulator: Bring the remote control.  A legal guardian: Bring a copy of the certified (court-stamped) guardianship papers.  Please remove any jewelry, including body piercings. Leave jewelry and other valuables at home.  If you're going home the day of surgery  You must have a responsible adult drive you home. They should stay with you overnight as well.  If you don't have someone to stay with you, and you aren't safe to go home alone, we may keep you overnight. Insurance often won't pay for this.  After surgery  If it's hard to control your pain or you need more pain medicine, please call your surgeon's office.  Questions?   If you have any questions for your care team, list them here: _________________________________________________________________________________________________________________________________________________________________________ ____________________________________ ____________________________________ ____________________________________  For informational purposes only. Not to replace the advice of your health care provider. Copyright   2003, 2019 [facility]. All rights reserved. Clinically reviewed by Madonna Johnson MD. [employer] [id number] - REV 07/21.

## 2022-06-21 ENCOUNTER — OFFICE VISIT (OUTPATIENT)
Dept: PODIATRY | Facility: OTHER | Age: 82
End: 2022-06-21
Attending: PODIATRIST
Payer: MEDICARE

## 2022-06-21 VITALS
HEART RATE: 68 BPM | DIASTOLIC BLOOD PRESSURE: 82 MMHG | TEMPERATURE: 98.7 F | RESPIRATION RATE: 16 BRPM | OXYGEN SATURATION: 95 % | SYSTOLIC BLOOD PRESSURE: 139 MMHG

## 2022-06-21 DIAGNOSIS — M20.5X2 ADDUCTOVARUS ROTATION OF TOE, ACQUIRED, LEFT: ICD-10-CM

## 2022-06-21 DIAGNOSIS — E11.9 DIABETES MELLITUS TYPE 2, NONINSULIN DEPENDENT (H): ICD-10-CM

## 2022-06-21 DIAGNOSIS — E11.42 DIABETIC POLYNEUROPATHY ASSOCIATED WITH TYPE 2 DIABETES MELLITUS (H): ICD-10-CM

## 2022-06-21 DIAGNOSIS — M21.969 LOSS OF PROTECTIVE SENSATION OF SKIN OF DEFORMED FOOT: ICD-10-CM

## 2022-06-21 DIAGNOSIS — M47.816 LUMBAR SPONDYLOSIS: ICD-10-CM

## 2022-06-21 DIAGNOSIS — L84 CALLUS OF FOOT: ICD-10-CM

## 2022-06-21 DIAGNOSIS — L60.3 ONYCHODYSTROPHY: ICD-10-CM

## 2022-06-21 DIAGNOSIS — Z86.31 PERSONAL HISTORY OF DIABETIC FOOT ULCER: ICD-10-CM

## 2022-06-21 DIAGNOSIS — E11.628 DIABETIC FOOT INFECTION (H): Primary | ICD-10-CM

## 2022-06-21 DIAGNOSIS — E11.621 DIABETIC ULCER OF TOE OF LEFT FOOT ASSOCIATED WITH TYPE 2 DIABETES MELLITUS, WITH NECROSIS OF BONE (H): ICD-10-CM

## 2022-06-21 DIAGNOSIS — R20.8 LOSS OF PROTECTIVE SENSATION OF SKIN OF DEFORMED FOOT: ICD-10-CM

## 2022-06-21 DIAGNOSIS — L97.524 DIABETIC ULCER OF TOE OF LEFT FOOT ASSOCIATED WITH TYPE 2 DIABETES MELLITUS, WITH NECROSIS OF BONE (H): ICD-10-CM

## 2022-06-21 DIAGNOSIS — L08.9 DIABETIC FOOT INFECTION (H): Primary | ICD-10-CM

## 2022-06-21 DIAGNOSIS — L85.3 XEROSIS OF SKIN: ICD-10-CM

## 2022-06-21 PROCEDURE — 99213 OFFICE O/P EST LOW 20 MIN: CPT | Performed by: PODIATRIST

## 2022-06-21 PROCEDURE — G0463 HOSPITAL OUTPT CLINIC VISIT: HCPCS

## 2022-06-21 PROCEDURE — G0463 HOSPITAL OUTPT CLINIC VISIT: HCPCS | Mod: 25

## 2022-06-21 ASSESSMENT — PAIN SCALES - GENERAL: PAINLEVEL: MILD PAIN (3)

## 2022-06-21 NOTE — PROGRESS NOTES
Chief complaint: Patient presents with:  WOUND CARE      History of Present Illness: This 81 year old NIDDM II male is seen for follow-up management of an ulcer on the bilateral lateral fifth digit. He is scheduled for a LEFT fifth toe amputation on 06/27/2022.    His  has helped him clean his wound daily with Dakin's followed by a dressing with Mepilex Ag and Medipore tape every other day.     Patient was taking Augmentin and he has another week of antibiotics at home, but he has not taken it.    He is wearing the post-op shoe at home and he is sometimes only wearing socks. It was causing hm back pain so he has recently worn his DM shoes.     Patient is on Xarelto for a history of blood clots in his legs and he is wondering if he needs to discontinue this before surgery.     He has his usual burning, tingling, and numbness in his feet.    Last HbA1C was 5.6% on 03/04/2022.    Last HbA1C was 5.3% on 08/05/2021.      No further pedal complaints today.         /82 (BP Location: Left arm, Patient Position: Sitting, Cuff Size: Adult Regular)   Pulse 68   Temp 98.7  F (37.1  C) (Tympanic)   Resp 16   SpO2 95%     Patient Active Problem List   Diagnosis     Urinary retention     Generalized weakness     Type 2 diabetes mellitus with stage 3 chronic kidney disease, without long-term current use of insulin (H)     Tachycardia     Hypoxia     Other acute pulmonary embolism with acute cor pulmonale (H)     Dyspnea, unspecified type     Benign prostatic hyperplasia with urinary retention     Chronic kidney disease, stage III (moderate) (H)     Chronic obstructive lung disease (H)     Chronic painful diabetic neuropathy (H)     Erectile dysfunction     Essential hypertension     Gout, unspecified     History of CVA (cerebrovascular accident)     Hyperlipidemia     IBS (irritable bowel syndrome)     Lumbar spondylosis     Mild concentric left ventricular hypertrophy (LVH)     Moderate episode of recurrent  major depressive disorder (H)     Spinal stenosis of lumbar region without neurogenic claudication     Uncomplicated alcohol dependence (H)     Nursing Home Visit     Onychomycosis of left great toe     Pincer nail deformity     Complicated UTI (urinary tract infection)     Sepsis with acute renal failure (H)       Past Surgical History:   Procedure Laterality Date     CYSTOSCOPY, TRANSURETHRAL RESECTION (TUR) PROSTATE, COMBINED N/A 10/15/2021    Procedure: CYSTOSCOPY, WITH TRANSURETHRAL RESECTION PROSTATE;  Surgeon: Indu De Leon MD;  Location: [facility]     HERNIA REPAIR       PHACOEMULSIFICATION WITH STANDARD INTRAOCULAR LENS IMPLANT Left 12/27/2021    Procedure: PHACOEMULSIFICATION CATARACT EXTRACTION POSTERIOR CHAMBER LENS LEFT EYE;  Surgeon: Phillip Maldonado MD;  Location: [facility]     PHACOEMULSIFICATION WITH STANDARD INTRAOCULAR LENS IMPLANT Right 1/11/2022    Procedure: PHACOEMULSIFICATION CATARACT EXTRACTION POSTERIOR CHAMBER LENS RIGHT EYE;  Surgeon: Phillip Maldonado MD;  Location: [facility]       Current Outpatient Medications   Medication     acetaminophen (TYLENOL) 500 MG tablet     albuterol (PROAIR HFA/PROVENTIL HFA/VENTOLIN HFA) 108 (90 Base) MCG/ACT inhaler     allopurinol (ZYLOPRIM) 100 MG tablet     amLODIPine (NORVASC) 2.5 MG tablet     amoxicillin-clavulanate (AUGMENTIN) 875-125 MG tablet     atorvastatin (LIPITOR) 80 MG tablet     carvedilol (COREG) 25 MG tablet     finasteride (PROSCAR) 5 MG tablet     gabapentin (NEURONTIN) 300 MG capsule     metFORMIN (GLUCOPHAGE-XR) 500 MG 24 hr tablet     multivitamin, therapeutic (THERA-VIT) TABS tablet     semaglutide (OZEMPIC, 0.25 OR 0.5 MG/DOSE,) 2 MG/1.5ML SOPN pen     sildenafil (VIAGRA) 100 MG tablet     tamsulosin (FLOMAX) 0.4 MG capsule     vitamin B-12 (CYANOCOBALAMIN) 250 MCG tablet     vitamin C (ASCORBIC ACID) 500 MG tablet     XARELTO ANTICOAGULANT 20 MG TABS tablet     No current facility-administered medications for this visit.          Allergies    Allergen Reactions     Ace Inhibitors      Per [facility]: hyperkalemia.  Pt doesn't know if he is allergic     Ceclor [Cefaclor] Hives     Sulfa Drugs      Pt unsure.        History reviewed. No pertinent family history.    Social History     Socioeconomic History     Marital status: Single     Spouse name: None     Number of children: None     Years of education: None     Highest education level: None   Occupational History     None   Tobacco Use     Smoking status: Never Smoker     Smokeless tobacco: Former User     Types: Chew   Substance and Sexual Activity     Alcohol use: Not Currently     Drug use: Never     Sexual activity: None   Other Topics Concern     Parent/sibling w/ CABG, MI or angioplasty before 65F 55M? Not Asked   Social History Narrative     None     Social Determinants of Health     Financial Resource Strain:      Difficulty of Paying Living Expenses:    Food Insecurity:      Worried About Running Out of Food in the Last Year:      Ran Out of Food in the Last Year:    Transportation Needs:      Lack of Transportation (Medical):      Lack of Transportation (Non-Medical):    Physical Activity:      Days of Exercise per Week:      Minutes of Exercise per Session:    Stress:      Feeling of Stress :    Social Connections:      Frequency of Communication with Friends and Family:      Frequency of Social Gatherings with Friends and Family:      Attends [employer] Services:      Active Member of Clubs or Organizations:      Attends Club or Organization Meetings:      Marital Status:    Intimate Partner Violence:      Fear of Current or Ex-Partner:      Emotionally Abused:      Physically Abused:      Sexually Abused:        ROS: 10 point ROS neg other than the symptoms noted above in the HPI.  EXAM  Constitutional: healthy, alert and no distress    Psychiatric: mentation appears normal and affect normal/bright    VASCULAR:  -Dorsalis pedis pulse +2/4 b/l  -Posterior tibial pulse +1/4 b/l  -Capillary  refill time < 3 seconds to b/l hallux  -Hair growth absent to b/l anterior legs and ankles  NEURO:  -Light touch sensation severely diminished to b/l plantar forefoot  -Protective sensation diminished with SWM +1/10 RIGHT and +0/10 LEFT on 05/25/2022  -Protective sensation diminished with SWM +0/10 RIGHT and +1/10 LEFT on 03/15/2022  -Protective sensation diminished with SWM +0/10 RIGHT and +2/10 LEFT on 01/03/2022  -Protective sensation diminished with SWM +0/10 RIGHT and +1/10 LEFT on 08/17/2021    DERM:  -Skin temperature within normal limits to bilateral foot  -Hyperkeratotic lesion to the RIGHT lateral fifth toe without an open wound post paring  -Toenails elongated, thickened, dystrophic and discolored x 10  Wound Location:  LEFT lateral fifth toe  06/21/2022  Measurement:  0.3cm x 0.3cm x 0.8cm to the level of the bone   Drainage:  Moderate serous  Odor:  None  Undermining:  None  Edges:  Intact  Base:  To the level of the bone with mild crepitus with palpation with a freer  Surrounding Skin: Intact  Mild calor and mild erythema and edema to the toe -- early infection    06/06/2022  Measurement:  0.5cm x 0.5cm x 0.8cm to the level of the bone   Drainage:  Moderate serous  Odor:  None  Undermining:  None  Edges:  Intact  Base:  To the level of the bone with 50% of the wound bed exposed to the bone  Surrounding Skin: Intact  No severe erythema, no ascending erythema, no calor, no purulence, no malodor, no other signs of infection.     05/25/2022:  0.3cm x 0.3cm x 0.8cm to the level of the bone  05/11/2022:  0.6cm x 0.5cm x 0.8cm to the level of the bone  04/25/2022:  0/8cm x 0.7cm x 0.6cm to the level of the bone  04/25/2022:  1cm x 1m x 0.3cm to the level of the bone   04/18/2022:  0.9cm x 1.1cm x 0.3cm to the level of the bone    04/01/2022:  Both wounds measure 0.3cm x 0.3cm x 0.1cm to subcutaneous tissue with a 0.3cm skin island      MSK:  -Adductovarus rotation of the bilateral fifth toe  -Muscle strength  of ankles +5/5 for dorsiflexion, plantarflexion, ABDUction and ADDuction b/l  LEFT FOOT RADIOGRAPH 04/25/2022  IMPRESSION: No definite bone destruction.  DERICK HERBERT MD   ============================================================    ASSESSMENT:    (E11.628,  L08.9) Diabetic foot infection (H)  (primary encounter diagnosis)    (E11.621,  L97.526) 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 associated with type 2 diabetes mellitus, with bone involvement without evidence of necrosis (H)  (primary encounter diagnosis)    (M20.5X2) Adductovarus rotation of toe, acquired, left    (L60.3) Onychodystrophy    (L84) Callus of foot    (L85.3) Xerosis of skin    (E11.9) Diabetes mellitus type 2, noninsulin dependent (H)    (E11.42) Diabetic polyneuropathy associated with type 2 diabetes mellitus (H)    (M21.969,  R20.8) Loss of protective sensation of skin of deformed foot    (M47.816) Lumbar spondylosis    (Z86.31) Personal history of diabetic foot ulcer      PLAN:  -Patient evaluated and examined. Treatment options discussed with no educational barriers noted.    -Toenails last debrided on 05/25/2022    -Minimal debridement required. Wound still probes to bone and has mild crepitus. There is likely osteomyelitis of the bone.   -New infection of toe  ---The toe has increased calor and erythema to the toe. Patient has Augmentin at home -- he should resume this BID for seven days and take with food. He should not take it the morning of surgery since he will likely have stomach upset while taking the antibiotic without food. He will also get IV antibiotics the morning of surgery.  ---Applied a Dakin's gauze, dry gauze and tape dressing  ---Patient's  will help him change the above dressing every two day.    -Patient is scheduled for a LEFT fifth toe amputation:    Surgery: Left fifth toe partial versus full amputation  DOS: 06/27/2022  Reason:  1. 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 associated with type 2 diabetes  mellitus, with bone involvement without evidence of necrosis   2. Adductovarus rotation of toe, acquired, left    -Patient was instructed to look for worsening signs of infection (redness, swelling, pain, purulence, fever, chills, nausea, vomiting) and to return to podiatry or the emergency department immediately if there are any signs of infection.     -New radiographs ordered on 06/21/2022    -Patient may remain on his Xarelto the morning of surgery.  -Patient in agreement with the above treatment plan and all of patient's questions were answered.      Return to clinic one and two weeks post-operatively        Meenu Orourke, DILIA, DPM

## 2022-06-21 NOTE — PATIENT INSTRUCTIONS
-Start taking Augmentin (antibiotic) twice daily with food. Take this every day with food.  **DO NOT TAKE YOUR ANTIBIOTIC OR ANY FOOD THE MORNING OF SURGERY**        -Change your dressing on your LEFT little toe every two days. Apply Dakin's (the bleach smelling wound solution) on gauze and apply this to your toe. Cover this with dry gauze and tape.        -----------------------------------------------------      Thank you for allowing  and our Podiatry team to participate in your care. Please call our office at [phone] with scheduling questions or with any other questions or concerns.

## 2022-06-21 NOTE — NURSING NOTE
"Chief Complaint   Patient presents with     WOUND CARE       Initial /82 (BP Location: Left arm, Patient Position: Sitting, Cuff Size: Adult Regular)   Pulse 68   Temp 98.7  F (37.1  C) (Tympanic)   Resp 16   SpO2 95%  Estimated body mass index is 23.75 kg/m  as calculated from the following:    Height as of 4/25/22: 1.854 m (6' 1\").    Weight as of 4/25/22: 81.6 kg (180 lb).  Medication Reconciliation: complete  Stephanie Antoine LPN  "

## 2022-06-23 ENCOUNTER — ANCILLARY PROCEDURE (OUTPATIENT)
Dept: GENERAL RADIOLOGY | Facility: OTHER | Age: 82
End: 2022-06-23
Attending: PODIATRIST
Payer: MEDICARE

## 2022-06-23 ENCOUNTER — OFFICE VISIT (OUTPATIENT)
Dept: FAMILY MEDICINE | Facility: OTHER | Age: 82
End: 2022-06-23
Attending: FAMILY MEDICINE
Payer: MEDICARE

## 2022-06-23 VITALS
OXYGEN SATURATION: 95 % | WEIGHT: 180 LBS | BODY MASS INDEX: 23.75 KG/M2 | TEMPERATURE: 97.8 F | HEART RATE: 88 BPM | DIASTOLIC BLOOD PRESSURE: 84 MMHG | SYSTOLIC BLOOD PRESSURE: 138 MMHG

## 2022-06-23 DIAGNOSIS — L97.524 DIABETIC ULCER OF TOE OF LEFT FOOT ASSOCIATED WITH TYPE 2 DIABETES MELLITUS, WITH NECROSIS OF BONE (H): ICD-10-CM

## 2022-06-23 DIAGNOSIS — Z01.818 PREOP GENERAL PHYSICAL EXAM: Primary | ICD-10-CM

## 2022-06-23 DIAGNOSIS — I10 ESSENTIAL HYPERTENSION: ICD-10-CM

## 2022-06-23 DIAGNOSIS — J43.2 CENTRILOBULAR EMPHYSEMA (H): ICD-10-CM

## 2022-06-23 DIAGNOSIS — N18.31 TYPE 2 DIABETES MELLITUS WITH STAGE 3A CHRONIC KIDNEY DISEASE, WITHOUT LONG-TERM CURRENT USE OF INSULIN (H): ICD-10-CM

## 2022-06-23 DIAGNOSIS — E11.621 DIABETIC ULCER OF TOE OF LEFT FOOT ASSOCIATED WITH TYPE 2 DIABETES MELLITUS, WITH NECROSIS OF BONE (H): ICD-10-CM

## 2022-06-23 DIAGNOSIS — E11.621 DIABETIC ULCER OF TOE OF LEFT FOOT ASSOCIATED WITH TYPE 2 DIABETES MELLITUS, UNSPECIFIED ULCER STAGE (H): ICD-10-CM

## 2022-06-23 DIAGNOSIS — L97.529 DIABETIC ULCER OF TOE OF LEFT FOOT ASSOCIATED WITH TYPE 2 DIABETES MELLITUS, UNSPECIFIED ULCER STAGE (H): ICD-10-CM

## 2022-06-23 DIAGNOSIS — E78.5 HYPERLIPIDEMIA, UNSPECIFIED HYPERLIPIDEMIA TYPE: ICD-10-CM

## 2022-06-23 DIAGNOSIS — M10.9 GOUT, UNSPECIFIED CAUSE, UNSPECIFIED CHRONICITY, UNSPECIFIED SITE: ICD-10-CM

## 2022-06-23 DIAGNOSIS — Z82.49 FAMILY HISTORY OF BLOOD CLOTS: ICD-10-CM

## 2022-06-23 DIAGNOSIS — Z86.711 HISTORY OF PULMONARY EMBOLISM: ICD-10-CM

## 2022-06-23 DIAGNOSIS — Z79.01 CHRONIC ANTICOAGULATION: ICD-10-CM

## 2022-06-23 DIAGNOSIS — N18.31 STAGE 3A CHRONIC KIDNEY DISEASE (H): ICD-10-CM

## 2022-06-23 DIAGNOSIS — E11.40 CHRONIC PAINFUL DIABETIC NEUROPATHY (H): ICD-10-CM

## 2022-06-23 DIAGNOSIS — F33.1 MODERATE EPISODE OF RECURRENT MAJOR DEPRESSIVE DISORDER (H): ICD-10-CM

## 2022-06-23 DIAGNOSIS — E11.22 TYPE 2 DIABETES MELLITUS WITH STAGE 3A CHRONIC KIDNEY DISEASE, WITHOUT LONG-TERM CURRENT USE OF INSULIN (H): ICD-10-CM

## 2022-06-23 LAB
ANION GAP SERPL CALCULATED.3IONS-SCNC: 8 MMOL/L (ref 3–14)
BASOPHILS # BLD AUTO: 0 10E3/UL (ref 0–0.2)
BASOPHILS NFR BLD AUTO: 0 %
BUN SERPL-MCNC: 16 MG/DL (ref 7–30)
CALCIUM SERPL-MCNC: 9.3 MG/DL (ref 8.5–10.1)
CHLORIDE BLD-SCNC: 107 MMOL/L (ref 94–109)
CO2 SERPL-SCNC: 25 MMOL/L (ref 20–32)
CREAT SERPL-MCNC: 1.22 MG/DL (ref 0.66–1.25)
EOSINOPHIL # BLD AUTO: 0.1 10E3/UL (ref 0–0.7)
EOSINOPHIL NFR BLD AUTO: 1 %
ERYTHROCYTE [DISTWIDTH] IN BLOOD BY AUTOMATED COUNT: 12.9 % (ref 10–15)
GFR SERPL CREATININE-BSD FRML MDRD: 60 ML/MIN/1.73M2
GLUCOSE BLD-MCNC: 188 MG/DL (ref 70–99)
HCT VFR BLD AUTO: 43.7 % (ref 40–53)
HGB BLD-MCNC: 14.8 G/DL (ref 13.3–17.7)
IMM GRANULOCYTES # BLD: 0 10E3/UL
IMM GRANULOCYTES NFR BLD: 0 %
LYMPHOCYTES # BLD AUTO: 0.8 10E3/UL (ref 0.8–5.3)
LYMPHOCYTES NFR BLD AUTO: 10 %
MCH RBC QN AUTO: 31.8 PG (ref 26.5–33)
MCHC RBC AUTO-ENTMCNC: 33.9 G/DL (ref 31.5–36.5)
MCV RBC AUTO: 94 FL (ref 78–100)
MONOCYTES # BLD AUTO: 0.5 10E3/UL (ref 0–1.3)
MONOCYTES NFR BLD AUTO: 6 %
NEUTROPHILS # BLD AUTO: 6.7 10E3/UL (ref 1.6–8.3)
NEUTROPHILS NFR BLD AUTO: 83 %
NRBC # BLD AUTO: 0 10E3/UL
NRBC BLD AUTO-RTO: 0 /100
PLATELET # BLD AUTO: 186 10E3/UL (ref 150–450)
POTASSIUM BLD-SCNC: 4.4 MMOL/L (ref 3.4–5.3)
RBC # BLD AUTO: 4.66 10E6/UL (ref 4.4–5.9)
SODIUM SERPL-SCNC: 140 MMOL/L (ref 133–144)
WBC # BLD AUTO: 8.1 10E3/UL (ref 4–11)

## 2022-06-23 PROCEDURE — U0003 INFECTIOUS AGENT DETECTION BY NUCLEIC ACID (DNA OR RNA); SEVERE ACUTE RESPIRATORY SYNDROME CORONAVIRUS 2 (SARS-COV-2) (CORONAVIRUS DISEASE [COVID-19]), AMPLIFIED PROBE TECHNIQUE, MAKING USE OF HIGH THROUGHPUT TECHNOLOGIES AS DESCRIBED BY CMS-2020-01-R: HCPCS | Mod: ZL | Performed by: FAMILY MEDICINE

## 2022-06-23 PROCEDURE — 85025 COMPLETE CBC W/AUTO DIFF WBC: CPT | Mod: ZL | Performed by: FAMILY MEDICINE

## 2022-06-23 PROCEDURE — 99214 OFFICE O/P EST MOD 30 MIN: CPT | Performed by: FAMILY MEDICINE

## 2022-06-23 PROCEDURE — 36415 COLL VENOUS BLD VENIPUNCTURE: CPT | Mod: ZL | Performed by: FAMILY MEDICINE

## 2022-06-23 PROCEDURE — 73630 X-RAY EXAM OF FOOT: CPT | Mod: TC,LT

## 2022-06-23 PROCEDURE — 80048 BASIC METABOLIC PNL TOTAL CA: CPT | Mod: ZL | Performed by: FAMILY MEDICINE

## 2022-06-23 PROCEDURE — G0463 HOSPITAL OUTPT CLINIC VISIT: HCPCS

## 2022-06-23 ASSESSMENT — PAIN SCALES - GENERAL: PAINLEVEL: MILD PAIN (3)

## 2022-06-23 NOTE — NURSING NOTE
"Chief Complaint   Patient presents with     Pre-Op Exam     5th [place] [facility] [place]       Initial /84 (BP Location: Left arm, Patient Position: Sitting, Cuff Size: Adult Regular)   Pulse 88   Temp 97.8  F (36.6  C) (Tympanic)   Wt 81.6 kg (180 lb)   SpO2 95%   BMI 23.75 kg/m   Estimated body mass index is 23.75 kg/m  as calculated from the following:    Height as of 4/25/22: 1.854 m (6' 1\").    Weight as of this encounter: 81.6 kg (180 lb).  Medication Reconciliation: complete  Brittni Esteves LPN  "

## 2022-06-24 ENCOUNTER — TELEPHONE (OUTPATIENT)
Dept: PODIATRY | Facility: OTHER | Age: 82
End: 2022-06-24

## 2022-06-24 LAB — SARS-COV-2 RNA RESP QL NAA+PROBE: NEGATIVE

## 2022-06-24 NOTE — TELEPHONE ENCOUNTER
----- Message from Meenu Orourke DPM sent at 6/23/2022  5:40 PM CDT -----  Please call patient with x-ray results:  There are some small areas of the bone on the fifth toe with possible bone destruction. This suggests that the end of the bone may possibly be infected. I  will see him on Monday, 06/27/2022, for the planned surgery on the toe.    Thank you

## 2022-06-26 ENCOUNTER — TELEPHONE (OUTPATIENT)
Dept: FAMILY MEDICINE | Facility: OTHER | Age: 82
End: 2022-06-26
Payer: MEDICARE

## 2022-06-26 DIAGNOSIS — Z86.711 HISTORY OF PULMONARY EMBOLISM: Primary | ICD-10-CM

## 2022-06-26 DIAGNOSIS — Z79.01 CHRONIC ANTICOAGULATION: ICD-10-CM

## 2022-06-27 ENCOUNTER — ANESTHESIA (OUTPATIENT)
Dept: SURGERY | Facility: HOSPITAL | Age: 82
End: 2022-06-27
Payer: MEDICARE

## 2022-06-27 ENCOUNTER — APPOINTMENT (OUTPATIENT)
Dept: GENERAL RADIOLOGY | Facility: HOSPITAL | Age: 82
End: 2022-06-27
Attending: PODIATRIST
Payer: MEDICARE

## 2022-06-27 ENCOUNTER — HOSPITAL ENCOUNTER (OUTPATIENT)
Facility: HOSPITAL | Age: 82
Discharge: HOME | End: 2022-06-27
Attending: PODIATRIST | Admitting: PODIATRIST
Payer: MEDICARE

## 2022-06-27 VITALS
SYSTOLIC BLOOD PRESSURE: 137 MMHG | RESPIRATION RATE: 16 BRPM | WEIGHT: 180 LBS | HEART RATE: 82 BPM | OXYGEN SATURATION: 95 % | HEIGHT: 73 IN | DIASTOLIC BLOOD PRESSURE: 83 MMHG | BODY MASS INDEX: 23.86 KG/M2 | TEMPERATURE: 98.1 F

## 2022-06-27 DIAGNOSIS — Z98.890 STATUS POST LEFT FOOT SURGERY: Primary | ICD-10-CM

## 2022-06-27 LAB — GLUCOSE BLDC GLUCOMTR-MCNC: 161 MG/DL (ref 70–99)

## 2022-06-27 PROCEDURE — 99100 ANES PT EXTEME AGE<1 YR&>70: CPT | Performed by: NURSE ANESTHETIST, CERTIFIED REGISTERED

## 2022-06-27 PROCEDURE — 28820 AMPUTATION OF TOE: CPT | Performed by: NURSE ANESTHETIST, CERTIFIED REGISTERED

## 2022-06-27 PROCEDURE — 88311 DECALCIFY TISSUE: CPT | Mod: TC | Performed by: PODIATRIST

## 2022-06-27 PROCEDURE — 250N000009 HC RX 250: Performed by: NURSE ANESTHETIST, CERTIFIED REGISTERED

## 2022-06-27 PROCEDURE — 87075 CULTR BACTERIA EXCEPT BLOOD: CPT | Mod: 59 | Performed by: PODIATRIST

## 2022-06-27 PROCEDURE — 360N000075 HC SURGERY LEVEL 2, PER MIN: Performed by: PODIATRIST

## 2022-06-27 PROCEDURE — 999N000065 XR FOOT LEFT G/E 3 VIEWS: Mod: LT

## 2022-06-27 PROCEDURE — 999N000141 HC STATISTIC PRE-PROCEDURE NURSING ASSESSMENT: Performed by: PODIATRIST

## 2022-06-27 PROCEDURE — 87205 SMEAR GRAM STAIN: CPT | Performed by: PODIATRIST

## 2022-06-27 PROCEDURE — 272N000001 HC OR GENERAL SUPPLY STERILE: Performed by: PODIATRIST

## 2022-06-27 PROCEDURE — 28825 PARTIAL AMPUTATION OF TOE: CPT | Mod: T4 | Performed by: PODIATRIST

## 2022-06-27 PROCEDURE — 250N000011 HC RX IP 250 OP 636: Performed by: NURSE ANESTHETIST, CERTIFIED REGISTERED

## 2022-06-27 PROCEDURE — 250N000009 HC RX 250: Performed by: PODIATRIST

## 2022-06-27 PROCEDURE — 370N000017 HC ANESTHESIA TECHNICAL FEE, PER MIN: Performed by: PODIATRIST

## 2022-06-27 PROCEDURE — 258N000003 HC RX IP 258 OP 636: Performed by: NURSE ANESTHETIST, CERTIFIED REGISTERED

## 2022-06-27 PROCEDURE — 87070 CULTURE OTHR SPECIMN AEROBIC: CPT | Performed by: PODIATRIST

## 2022-06-27 PROCEDURE — 250N000011 HC RX IP 250 OP 636: Performed by: PODIATRIST

## 2022-06-27 PROCEDURE — 87077 CULTURE AEROBIC IDENTIFY: CPT | Performed by: PODIATRIST

## 2022-06-27 PROCEDURE — 87102 FUNGUS ISOLATION CULTURE: CPT | Performed by: PODIATRIST

## 2022-06-27 PROCEDURE — 710N000012 HC RECOVERY PHASE 2, PER MINUTE: Performed by: PODIATRIST

## 2022-06-27 PROCEDURE — 82962 GLUCOSE BLOOD TEST: CPT

## 2022-06-27 RX ORDER — OXYCODONE HYDROCHLORIDE 5 MG/1
5 TABLET ORAL EVERY 4 HOURS PRN
Status: DISCONTINUED | OUTPATIENT
Start: 2022-06-27 | End: 2022-06-27 | Stop reason: HOSPADM

## 2022-06-27 RX ORDER — LIDOCAINE 40 MG/G
CREAM TOPICAL
Status: DISCONTINUED | OUTPATIENT
Start: 2022-06-27 | End: 2022-06-27 | Stop reason: HOSPADM

## 2022-06-27 RX ORDER — NALOXONE HYDROCHLORIDE 0.4 MG/ML
0.2 INJECTION, SOLUTION INTRAMUSCULAR; INTRAVENOUS; SUBCUTANEOUS
Status: DISCONTINUED | OUTPATIENT
Start: 2022-06-27 | End: 2022-06-27 | Stop reason: HOSPADM

## 2022-06-27 RX ORDER — HYDRALAZINE HYDROCHLORIDE 20 MG/ML
5-10 INJECTION INTRAMUSCULAR; INTRAVENOUS EVERY 10 MIN PRN
Status: DISCONTINUED | OUTPATIENT
Start: 2022-06-27 | End: 2022-06-27 | Stop reason: HOSPADM

## 2022-06-27 RX ORDER — FENTANYL CITRATE 50 UG/ML
25 INJECTION, SOLUTION INTRAMUSCULAR; INTRAVENOUS EVERY 5 MIN PRN
Status: DISCONTINUED | OUTPATIENT
Start: 2022-06-27 | End: 2022-06-27 | Stop reason: HOSPADM

## 2022-06-27 RX ORDER — HYDROCODONE BITARTRATE AND ACETAMINOPHEN 5; 325 MG/1; MG/1
1 TABLET ORAL EVERY 6 HOURS PRN
Qty: 8 TABLET | Refills: 0 | Status: SHIPPED | OUTPATIENT
Start: 2022-06-27 | End: 2022-12-08

## 2022-06-27 RX ORDER — FENTANYL CITRATE 50 UG/ML
INJECTION, SOLUTION INTRAMUSCULAR; INTRAVENOUS PRN
Status: DISCONTINUED | OUTPATIENT
Start: 2022-06-27 | End: 2022-06-27

## 2022-06-27 RX ORDER — PROPOFOL 10 MG/ML
INJECTION, EMULSION INTRAVENOUS CONTINUOUS PRN
Status: DISCONTINUED | OUTPATIENT
Start: 2022-06-27 | End: 2022-06-27

## 2022-06-27 RX ORDER — HALOPERIDOL 5 MG/ML
0.5 INJECTION INTRAMUSCULAR
Status: DISCONTINUED | OUTPATIENT
Start: 2022-06-27 | End: 2022-06-27 | Stop reason: HOSPADM

## 2022-06-27 RX ORDER — PROCHLORPERAZINE MALEATE 5 MG
5 TABLET ORAL EVERY 6 HOURS PRN
Status: CANCELLED | OUTPATIENT
Start: 2022-06-27

## 2022-06-27 RX ORDER — BUPIVACAINE HYDROCHLORIDE 5 MG/ML
INJECTION, SOLUTION EPIDURAL; INTRACAUDAL
Status: DISCONTINUED
Start: 2022-06-27 | End: 2022-06-27 | Stop reason: HOSPADM

## 2022-06-27 RX ORDER — CLINDAMYCIN PHOSPHATE 900 MG/50ML
900 INJECTION, SOLUTION INTRAVENOUS
Status: COMPLETED | OUTPATIENT
Start: 2022-06-27 | End: 2022-06-27

## 2022-06-27 RX ORDER — LIDOCAINE 40 MG/G
CREAM TOPICAL
Status: CANCELLED | OUTPATIENT
Start: 2022-06-27

## 2022-06-27 RX ORDER — LIDOCAINE HYDROCHLORIDE 10 MG/ML
INJECTION, SOLUTION EPIDURAL; INFILTRATION; INTRACAUDAL; PERINEURAL
Status: DISCONTINUED
Start: 2022-06-27 | End: 2022-06-27 | Stop reason: HOSPADM

## 2022-06-27 RX ORDER — NALOXONE HYDROCHLORIDE 0.4 MG/ML
0.4 INJECTION, SOLUTION INTRAMUSCULAR; INTRAVENOUS; SUBCUTANEOUS
Status: DISCONTINUED | OUTPATIENT
Start: 2022-06-27 | End: 2022-06-27 | Stop reason: HOSPADM

## 2022-06-27 RX ORDER — ONDANSETRON 2 MG/ML
4 INJECTION INTRAMUSCULAR; INTRAVENOUS EVERY 30 MIN PRN
Status: DISCONTINUED | OUTPATIENT
Start: 2022-06-27 | End: 2022-06-27 | Stop reason: HOSPADM

## 2022-06-27 RX ORDER — MEPERIDINE HYDROCHLORIDE 25 MG/ML
12.5 INJECTION INTRAMUSCULAR; INTRAVENOUS; SUBCUTANEOUS
Status: DISCONTINUED | OUTPATIENT
Start: 2022-06-27 | End: 2022-06-27 | Stop reason: HOSPADM

## 2022-06-27 RX ORDER — LIDOCAINE HYDROCHLORIDE 20 MG/ML
INJECTION, SOLUTION INFILTRATION; PERINEURAL PRN
Status: DISCONTINUED | OUTPATIENT
Start: 2022-06-27 | End: 2022-06-27

## 2022-06-27 RX ORDER — SODIUM CHLORIDE, SODIUM LACTATE, POTASSIUM CHLORIDE, CALCIUM CHLORIDE 600; 310; 30; 20 MG/100ML; MG/100ML; MG/100ML; MG/100ML
INJECTION, SOLUTION INTRAVENOUS CONTINUOUS
Status: DISCONTINUED | OUTPATIENT
Start: 2022-06-27 | End: 2022-06-27 | Stop reason: HOSPADM

## 2022-06-27 RX ORDER — PROPOFOL 10 MG/ML
INJECTION, EMULSION INTRAVENOUS PRN
Status: DISCONTINUED | OUTPATIENT
Start: 2022-06-27 | End: 2022-06-27

## 2022-06-27 RX ORDER — ONDANSETRON 4 MG/1
4 TABLET, ORALLY DISINTEGRATING ORAL EVERY 30 MIN PRN
Status: DISCONTINUED | OUTPATIENT
Start: 2022-06-27 | End: 2022-06-27 | Stop reason: HOSPADM

## 2022-06-27 RX ORDER — CLINDAMYCIN PHOSPHATE 900 MG/50ML
900 INJECTION, SOLUTION INTRAVENOUS SEE ADMIN INSTRUCTIONS
Status: DISCONTINUED | OUTPATIENT
Start: 2022-06-27 | End: 2022-06-27 | Stop reason: HOSPADM

## 2022-06-27 RX ORDER — ONDANSETRON 4 MG/1
4 TABLET, ORALLY DISINTEGRATING ORAL EVERY 6 HOURS PRN
Status: CANCELLED | OUTPATIENT
Start: 2022-06-27

## 2022-06-27 RX ORDER — FENTANYL CITRATE 50 UG/ML
25 INJECTION, SOLUTION INTRAMUSCULAR; INTRAVENOUS
Status: DISCONTINUED | OUTPATIENT
Start: 2022-06-27 | End: 2022-06-27 | Stop reason: HOSPADM

## 2022-06-27 RX ORDER — ONDANSETRON 2 MG/ML
4 INJECTION INTRAMUSCULAR; INTRAVENOUS EVERY 6 HOURS PRN
Status: CANCELLED | OUTPATIENT
Start: 2022-06-27

## 2022-06-27 RX ORDER — BISACODYL 10 MG
10 SUPPOSITORY, RECTAL RECTAL DAILY PRN
Status: CANCELLED | OUTPATIENT
Start: 2022-06-27

## 2022-06-27 RX ORDER — HYDROMORPHONE HYDROCHLORIDE 1 MG/ML
0.2 INJECTION, SOLUTION INTRAMUSCULAR; INTRAVENOUS; SUBCUTANEOUS EVERY 5 MIN PRN
Status: DISCONTINUED | OUTPATIENT
Start: 2022-06-27 | End: 2022-06-27 | Stop reason: HOSPADM

## 2022-06-27 RX ADMIN — MIDAZOLAM 0.5 MG: 1 INJECTION INTRAMUSCULAR; INTRAVENOUS at 12:03

## 2022-06-27 RX ADMIN — PROPOFOL 75 MCG/KG/MIN: 10 INJECTION, EMULSION INTRAVENOUS at 11:56

## 2022-06-27 RX ADMIN — PROPOFOL 30 MG: 10 INJECTION, EMULSION INTRAVENOUS at 11:56

## 2022-06-27 RX ADMIN — SODIUM CHLORIDE, POTASSIUM CHLORIDE, SODIUM LACTATE AND CALCIUM CHLORIDE: 600; 310; 30; 20 INJECTION, SOLUTION INTRAVENOUS at 10:35

## 2022-06-27 RX ADMIN — FENTANYL CITRATE 50 MCG: 50 INJECTION, SOLUTION INTRAMUSCULAR; INTRAVENOUS at 12:00

## 2022-06-27 RX ADMIN — CLINDAMYCIN PHOSPHATE 900 MG: 900 INJECTION, SOLUTION INTRAVENOUS at 11:36

## 2022-06-27 RX ADMIN — LIDOCAINE HYDROCHLORIDE 80 MG: 20 INJECTION, SOLUTION INFILTRATION; PERINEURAL at 11:56

## 2022-06-27 ASSESSMENT — LIFESTYLE VARIABLES: TOBACCO_USE: 1

## 2022-06-27 NOTE — TELEPHONE ENCOUNTER
Message sent to Heather Bañuelos [facility] for Oncology / Hematology to advise on this.

## 2022-06-27 NOTE — DISCHARGE INSTRUCTIONS
Post-Anesthesia Patient Instructions    IMMEDIATELY FOLLOWING SURGERY:  Do not drive or operate machinery for the first twenty four hours after surgery.  Do not make any important decisions for twenty four hours after surgery or while taking narcotic pain medications or sedatives.  If you develop intractable nausea and vomiting or a severe headache please notify your doctor immediately.    FOLLOW-UP:  Please make an appointment with your surgeon as instructed. You do not need to follow up with anesthesia unless specifically instructed to do so.    WOUND CARE INSTRUCTIONS (if applicable):  Keep a dry clean dressing on the anesthesia/puncture wound site if there is drainage.  Once the wound has quit draining you may leave it open to air.  Generally you should leave the bandage intact for twenty four hours unless there is drainage.  If the epidural site drains for more than 36-48 hours please call the anesthesia department.    QUESTIONS?:  Please feel free to call your physician or the hospital  if you have any questions, and they will be happy to assist you.         Discharge Instructions for Foot Surgery  Arrange to have an adult drive you home after surgery. If you had general anesthesia, it may take a day or more to fully recover. So for at least the next 24 hours:  Do not drive or use machinery or power tools.  Do not drink alcohol.  Do not make any major decisions.  Diet  Here are some dietary suggestions following surgery:   Start with liquids and light foods (like dry toast, bananas, and applesauce). As you feel up to it, slowly return to your normal diet.  Drink at least 6 to 8 glasses of water or other nonalcoholic fluids a day.  To avoid nausea, eat before taking narcotic pain medicines.  Medicines  It is important to follow these directions:   Take all medicines as instructed.  Take pain medicines on time. Do not wait until the pain is bad before taking your medicines.  Avoid alcohol while on  pain medicines.  Activity  These instructions are to help with your recovery:   Sit or lie down when possible. Put a pillow or 2 under your heel to raise your foot above the level of your heart.  Wrap an ice pack or bag of frozen peas in a thin cloth. Place it over your bandaged foot for no longer than 20 minutes. Do this 3 times a day.  You can drive again in 7 days or as instructed by your healthcare provider.  Wear your surgical shoe at all times unless told otherwise by your healthcare provider.  Use crutches or a cane as directed.  Follow your healthcare provider s instructions about putting weight on your foot.  Bandage and cast care  Here are tips to follow:   Do not shower for 48 hours.  When you can shower again, cover the bandage, splint, or cast with a plastic bag to keep it dry.  Don t remove your bandage until your healthcare provider tells you to. If your bandage gets wet or dirty, check with your healthcare provider. You can likely replace it with a clean, dry one.  What to expect  It is normal to have the following:  Bruising and slight swelling of the foot and toes  A small amount of blood on the dressing  Call your healthcare provider   Contact your healthcare provider right away if you have any of the following:   Continuous bleeding through the bandage  Excessive swelling, increased bleeding, or redness  Fever over 100.4 F (38 C) or chills  Pain unrelieved by pain medicines  Foot feels cold to the touch or numb  Increased pain in your leg or foot  Chest pain or shortness of breath  Anything unusual that concerns you  Enzo last reviewed this educational content on 1/1/2018 2000-2021 [employer]. All rights reserved. This information is not intended as a substitute for professional medical care. Always follow your healthcare professional's instructions.

## 2022-06-27 NOTE — OP NOTE
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 associated with type 2 diabetes mellitus, with necrosis of bone  2. Adductovarus rotation of toe, acquired, left   Procedure: Procedure(s):  Left fifth toe partial versus full amputation   Surgeon: Meenu Orourke DPM   Assistants(s): None   Estimated blood loss: Less than 20mL    Specimens: Left fifth toe wound culture  Left fifth toe bone culture  Left fifth toe bone pathology   Findings: Same as post-op diagnosis      Indications for surgery: Patient is an 81-year-old male with a long standing history of a diabetic foot ulcer on the LEFT lateral fifth toe. Patient has exhausted routine wound care and attempted offloading, but the wound has not healed and has progressed to reach the level of the bone. He opted to proceed with surgery for a partial versus full amputation.        PROCEDURE IN DETAIL:  Under mild sedation, the patient was brought into the OR and placed on the operating room table in a supine position.  A pneumatic ankle tourniquet was placed about the patient's Left mid calf over ample cast padding.  Following IV sedation, local anesthesia was obtained about the Left fifth toe at the base of the toe with a ring block technique using 10 mL of a 1:1 ratio of 0.5% Marcaine plain and 1% lidocaine plain.  The foot was then scrubbed, prepped and draped in the usual aseptic manner.      Attention was directed to the Left fifth toe ulcer at the distal interphalangeal joint. A mosquito was used to probe the 0.2cm x 0.2cm ulcer on the LEFT lateral fifth digit DIPJ and the wound was noted to probe to bone. The bone crunched and felt soft with palpation and upon removing the mosquito, a 0.1cm x 0.3cm  piece of bone came out of the wound site with the mosquito confirming the soft texture of the bone.    Next, a fish mouth incision was created just distal to the proximal interphalangeal joint.  The incision was carried down to the proximal IPJ and the distal digit was amputated at the proximal interphalangeal joint with the incision made proximal to the ulceration on the lateral aspect of the distal toe. A small piece of the proximal bone of the middle phalanx was cut with a bone cutter, placed in a dry specimen cup and passed from the surgical field to be sent for bone culture. A wound culture was obtained at this level of the amputation. The remainder of the distal toe was placed in a formalin cup, passed from the surgical field and sent for pathology. The distal end of the distal phalanx bone was noted to be mildly soft, mildly crumbling, and discolored a mildly grey color. The distal end of the proximal phalanx was also mildly discolored grey and extended 0.1cm to 0.2cm proximally, so a bone saw was used to resect another 0.3cm of bone. An oval azalia was used to smooth the rough edges.      The wound site was thoroughly flushed with normal, sterile, saline. The remainder of the bone was of firmer consistency with normal, white coloration to the bone. The surrounding tissues were also of healthy, normal consistency and color. The plantar skin flap was moved dorsally and re-shaped to re-approximate the plantar and dorsal skin edges with 3-0 Nylon with a simple suture and horizontal suture technique. A blistering of skin was noted in the fourth interspace, so the moist, blistered, non-viable skin was removed with a pick-up. The skin was mildly macerated but intact to the fourth interspace.    The tourniquet was not inflated for this case.    The operative site was dressed with Xeroform gauze, dry gauze (with dry gauze applied to all interspaces with care not to strangulate the digits) (carefully applied to the  "fourth interspace after the blistered and moist skin was thoroughly dried), Kerlix, and a 4\" ACE wrap.    The patient tolerated the procedure and anesthesia well and was transferred to the recovery room with vital signs stable and vascular status intact to all toes of the Left foot.  Following a period of postoperative monitoring, the patient was discharged home with written and oral postoperative instructions. The patient has been instructed to remained partial weightbearing while always wearing either a post-op shoe or short leg CAM boot. The Left leg should elevated at all times. The patient will have hydrocodone (5/325mg) for pain. The sterile, Left foot dressing is to be left clean, dry and intact. The first post-operative appointment with podiatry is scheduled for one week.    "

## 2022-06-27 NOTE — TELEPHONE ENCOUNTER
Hematology scheduling advised E consult for his history of PE and family history of PE and chronic anticoagulation.    Please go through referral questions to verify patient consents.

## 2022-06-27 NOTE — OR NURSING
Patient and responsible adult given discharge instructions with no questions regarding instructions. Mariano score 20/20. Pain level 0/10.  Discharged from unit via wheelchair. Patient discharged to home.

## 2022-06-27 NOTE — ANESTHESIA CARE TRANSFER NOTE
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    Anesthesia Type:   MAC     Note:    Oropharynx: oropharynx clear of all foreign objects and spontaneously breathing  Level of Consciousness: awake  Oxygen Supplementation: room air    Independent Airway: airway patency satisfactory and stable  Dentition: dentition unchanged  Vital Signs Stable: post-procedure vital signs reviewed and stable  Report to RN Given: handoff report given  Patient transferred to: Phase II    Handoff Report: Identifed the Patient, Identified the Reponsible Provider, Reviewed the pertinent medical history, Discussed the surgical course, Reviewed Intra-OP anesthesia mangement and issues during anesthesia, Set expectations for post-procedure period and Allowed opportunity for questions and acknowledgement of understanding      Vitals:  Vitals Value Taken Time   BP     Temp     Pulse     Resp     SpO2         Electronically Signed By: MARTELL MCGOWAN CRNA  June 27, 2022  12:47 PM

## 2022-06-27 NOTE — BRIEF OP NOTE
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 associated with type 2 diabetes mellitus, with necrosis of bone  2. Adductovarus rotation of toe, acquired, left    Procedure: Procedure(s):  Left fifth toe partial versus full amputation  Surgeon: Surgeon(s) and Role:     * Meenu Orourke DPM - Primary  Anesthesia: MAC with Local   Estimated Blood Loss: 25 mL from 6/27/2022 11:51 AM to 6/27/2022 12:44 PM      Drains: None  Specimens:   ID Type Source Tests Collected by Time Destination   1 : left 5th toe Tissue Toe, Left SURGICAL PATHOLOGY EXAM Meenu Orourke DPM 6/27/2022 12:26 PM    A : left 5th toe Wound Toe, Left ANAEROBIC BACTERIAL CULTURE ROUTINE, GRAM STAIN, FUNGAL OR YEAST CULTURE ROUTINE, AEROBIC BACTERIAL CULTURE ROUTINE Meenu Orourke DPM 6/27/2022 12:21 PM    B : left 5th toe Bone Resection Toe, Left ANAEROBIC BACTERIAL CULTURE ROUTINE, GRAM STAIN, FUNGAL OR YEAST CULTURE ROUTINE, AEROBIC BACTERIAL CULTURE ROUTINE Meenu Orourke DPM 6/27/2022 12:21 PM      Findings:   Same as post-op diagnosis.  Complications: None.  Implants: * No implants in log *

## 2022-06-27 NOTE — ANESTHESIA POSTPROCEDURE EVALUATION
Patient: Anthony Dyer    Procedure: Procedure(s):  Left fifth toe partial versus full amputation       Anesthesia Type:  MAC    Note:  Disposition: Outpatient   Postop Pain Control: Uneventful            Sign Out: Well controlled pain   PONV: No   Neuro/Psych: Uneventful            Sign Out: Acceptable/Baseline neuro status   Airway/Respiratory: Uneventful            Sign Out: Acceptable/Baseline resp. status   CV/Hemodynamics: Uneventful            Sign Out: Acceptable CV status; No obvious hypovolemia; No obvious fluid overload   Other NRE: NONE   DID A NON-ROUTINE EVENT OCCUR? No           Last vitals:  Vitals Value Taken Time   /85 06/27/22 1305   Temp 98.3  F (36.8  C) 06/27/22 1245   Pulse 89 06/27/22 1305   Resp 16 06/27/22 1245   SpO2 92 % 06/27/22 1309   Vitals shown include unvalidated device data.    Electronically Signed By: MARTELL Willis CRNA  June 27, 2022  1:10 PM

## 2022-06-27 NOTE — TELEPHONE ENCOUNTER
Brittni - please go through referral questions in pended order with patient.    I can't sign E consult referral unless he agrees to it.

## 2022-06-28 LAB
GRAM STAIN RESULT: ABNORMAL
GRAM STAIN RESULT: ABNORMAL
GRAM STAIN RESULT: NORMAL
GRAM STAIN RESULT: NORMAL

## 2022-06-29 ENCOUNTER — OFFICE VISIT (OUTPATIENT)
Dept: PODIATRY | Facility: OTHER | Age: 82
End: 2022-06-29
Attending: PODIATRIST
Payer: MEDICARE

## 2022-06-29 VITALS
OXYGEN SATURATION: 94 % | DIASTOLIC BLOOD PRESSURE: 82 MMHG | SYSTOLIC BLOOD PRESSURE: 142 MMHG | TEMPERATURE: 97.9 F | HEART RATE: 105 BPM

## 2022-06-29 DIAGNOSIS — E11.9 DIABETES MELLITUS TYPE 2, NONINSULIN DEPENDENT (H): ICD-10-CM

## 2022-06-29 DIAGNOSIS — E11.628 DIABETIC FOOT INFECTION (H): ICD-10-CM

## 2022-06-29 DIAGNOSIS — R20.8 LOSS OF PROTECTIVE SENSATION OF SKIN OF DEFORMED FOOT: ICD-10-CM

## 2022-06-29 DIAGNOSIS — M47.816 LUMBAR SPONDYLOSIS: ICD-10-CM

## 2022-06-29 DIAGNOSIS — L60.3 ONYCHODYSTROPHY: ICD-10-CM

## 2022-06-29 DIAGNOSIS — L85.3 XEROSIS OF SKIN: ICD-10-CM

## 2022-06-29 DIAGNOSIS — E11.42 DIABETIC POLYNEUROPATHY ASSOCIATED WITH TYPE 2 DIABETES MELLITUS (H): ICD-10-CM

## 2022-06-29 DIAGNOSIS — Z86.31 PERSONAL HISTORY OF DIABETIC FOOT ULCER: ICD-10-CM

## 2022-06-29 DIAGNOSIS — L08.9 DIABETIC FOOT INFECTION (H): ICD-10-CM

## 2022-06-29 DIAGNOSIS — L84 CALLUS OF FOOT: ICD-10-CM

## 2022-06-29 DIAGNOSIS — Z47.81 ENCOUNTER FOR ORTHOPEDIC AFTERCARE FOLLOWING SURGICAL AMPUTATION: ICD-10-CM

## 2022-06-29 DIAGNOSIS — L03.116 CELLULITIS OF LEFT FOOT: Primary | ICD-10-CM

## 2022-06-29 DIAGNOSIS — M21.969 LOSS OF PROTECTIVE SENSATION OF SKIN OF DEFORMED FOOT: ICD-10-CM

## 2022-06-29 PROCEDURE — 99024 POSTOP FOLLOW-UP VISIT: CPT | Performed by: PODIATRIST

## 2022-06-29 PROCEDURE — G0463 HOSPITAL OUTPT CLINIC VISIT: HCPCS | Mod: 25

## 2022-06-29 RX ORDER — CIPROFLOXACIN 500 MG/1
500 TABLET, FILM COATED ORAL 2 TIMES DAILY
Qty: 14 TABLET | Refills: 0 | Status: SHIPPED | OUTPATIENT
Start: 2022-06-29 | End: 2022-07-06

## 2022-06-29 ASSESSMENT — PAIN SCALES - GENERAL: PAINLEVEL: NO PAIN (0)

## 2022-06-29 NOTE — NURSING NOTE
"Chief Complaint   Patient presents with     Surgical Followup     Dressing change       Initial BP (!) 142/82 (BP Location: Left arm, Patient Position: Sitting, Cuff Size: Adult Large)   Pulse 105   Temp 97.9  F (36.6  C) (Tympanic)   SpO2 94%  Estimated body mass index is 23.75 kg/m  as calculated from the following:    Height as of 6/27/22: 1.854 m (6' 1\").    Weight as of 6/27/22: 81.6 kg (180 lb).  Medication Reconciliation: complete  Stephanie Antoine LPN  "

## 2022-06-29 NOTE — PROGRESS NOTES
Chief complaint: Patient presents with:  Surgical Followup: Dressing change      History of Present Illness: This 81 year old NIDDM II male is seen for post-op management for a partial LEFT partial fifth toe amputation on 06/29/2022.    His dressing snagged on the Velcro of his post-op shoe last night on 06/28/2022 and came loose. He called this morning because of concern that the dressing came off the toe.    Patient was taking Augmentin and his last antibiotic was last night on 06/28/2022. He has no pain in the toe. He has been wearing a post-op shoe during the day.      He has his usual burning, tingling, and numbness in his feet.    Last HbA1C was 5.6% on 03/04/2022.    Last HbA1C was 5.3% on 08/05/2021.      No further pedal complaints today.         BP (!) 142/82 (BP Location: Left arm, Patient Position: Sitting, Cuff Size: Adult Large)   Pulse 105   Temp 97.9  F (36.6  C) (Tympanic)   SpO2 94%     Patient Active Problem List   Diagnosis     Urinary retention     Generalized weakness     Type 2 diabetes mellitus with stage 3 chronic kidney disease, without long-term current use of insulin (H)     Tachycardia     Hypoxia     Other acute pulmonary embolism with acute cor pulmonale (H)     Dyspnea, unspecified type     Benign prostatic hyperplasia with urinary retention     Chronic kidney disease, stage III (moderate) (H)     Chronic obstructive lung disease (H)     Chronic painful diabetic neuropathy (H)     Erectile dysfunction     Essential hypertension     Gout, unspecified     History of CVA (cerebrovascular accident)     Hyperlipidemia     IBS (irritable bowel syndrome)     Lumbar spondylosis     Mild concentric left ventricular hypertrophy (LVH)     Moderate episode of recurrent major depressive disorder (H)     Spinal stenosis of lumbar region without neurogenic claudication     Uncomplicated alcohol dependence (H)     Nursing Home Visit     Onychomycosis of left great toe     Pincer nail deformity      Complicated UTI (urinary tract infection)     Sepsis with acute renal failure (H)       Past Surgical History:   Procedure Laterality Date     AMPUTATE TOE(S) Left 6/27/2022    Procedure: Left fifth toe partial versus full amputation;  Surgeon: Meenu Orourke DPM;  Location: [facility]     CYSTOSCOPY, TRANSURETHRAL RESECTION (TUR) PROSTATE, COMBINED N/A 10/15/2021    Procedure: CYSTOSCOPY, WITH TRANSURETHRAL RESECTION PROSTATE;  Surgeon: Indu De Leon MD;  Location: [facility]     HERNIA REPAIR       PHACOEMULSIFICATION WITH STANDARD INTRAOCULAR LENS IMPLANT Left 12/27/2021    Procedure: PHACOEMULSIFICATION CATARACT EXTRACTION POSTERIOR CHAMBER LENS LEFT EYE;  Surgeon: Phillip Maldonado MD;  Location: [facility]     PHACOEMULSIFICATION WITH STANDARD INTRAOCULAR LENS IMPLANT Right 1/11/2022    Procedure: PHACOEMULSIFICATION CATARACT EXTRACTION POSTERIOR CHAMBER LENS RIGHT EYE;  Surgeon: Phillip Maldonado MD;  Location: [facility]       Current Outpatient Medications   Medication     acetaminophen (TYLENOL) 500 MG tablet     albuterol (PROAIR HFA/PROVENTIL HFA/VENTOLIN HFA) 108 (90 Base) MCG/ACT inhaler     allopurinol (ZYLOPRIM) 100 MG tablet     amLODIPine (NORVASC) 2.5 MG tablet     atorvastatin (LIPITOR) 80 MG tablet     carvedilol (COREG) 25 MG tablet     finasteride (PROSCAR) 5 MG tablet     gabapentin (NEURONTIN) 300 MG capsule     HYDROcodone-acetaminophen (NORCO) 5-325 MG tablet     metFORMIN (GLUCOPHAGE-XR) 500 MG 24 hr tablet     multivitamin, therapeutic (THERA-VIT) TABS tablet     semaglutide (OZEMPIC, 0.25 OR 0.5 MG/DOSE,) 2 MG/1.5ML SOPN pen     sildenafil (VIAGRA) 100 MG tablet     tamsulosin (FLOMAX) 0.4 MG capsule     vitamin B-12 (CYANOCOBALAMIN) 250 MCG tablet     vitamin C (ASCORBIC ACID) 500 MG tablet     XARELTO ANTICOAGULANT 20 MG TABS tablet     amoxicillin-clavulanate (AUGMENTIN) 875-125 MG tablet     No current facility-administered medications for this visit.          Allergies   Allergen Reactions      Ace Inhibitors      Per [facility]: hyperkalemia.  Pt doesn't know if he is allergic     Ceclor [Cefaclor] Hives     Sulfa Drugs      Pt unsure.        History reviewed. No pertinent family history.    Social History     Socioeconomic History     Marital status: Single     Spouse name: None     Number of children: None     Years of education: None     Highest education level: None   Occupational History     None   Tobacco Use     Smoking status: Never Smoker     Smokeless tobacco: Former User     Types: Chew   Substance and Sexual Activity     Alcohol use: Not Currently     Drug use: Never     Sexual activity: None   Other Topics Concern     Parent/sibling w/ CABG, MI or angioplasty before 65F 55M? Not Asked   Social History Narrative     None     Social Determinants of Health     Financial Resource Strain:      Difficulty of Paying Living Expenses:    Food Insecurity:      Worried About Running Out of Food in the Last Year:      Ran Out of Food in the Last Year:    Transportation Needs:      Lack of Transportation (Medical):      Lack of Transportation (Non-Medical):    Physical Activity:      Days of Exercise per Week:      Minutes of Exercise per Session:    Stress:      Feeling of Stress :    Social Connections:      Frequency of Communication with Friends and Family:      Frequency of Social Gatherings with Friends and Family:      Attends Anabaptist Services:      Active Member of Clubs or Organizations:      Attends Club or Organization Meetings:      Marital Status:    Intimate Partner Violence:      Fear of Current or Ex-Partner:      Emotionally Abused:      Physically Abused:      Sexually Abused:        ROS: 10 point ROS neg other than the symptoms noted above in the HPI.  EXAM  Constitutional: healthy, alert and no distress    Psychiatric: mentation appears normal and affect normal/bright    VASCULAR:  -Dorsalis pedis pulse +2/4 b/l  -Posterior tibial pulse +1/4 b/l  -Capillary refill time < 3 seconds to  b/l hallux  -Hair growth absent to b/l anterior legs and ankles  NEURO:  -Light touch sensation severely diminished to b/l plantar forefoot  -Protective sensation diminished with SWM +1/10 RIGHT and +0/10 LEFT on 05/25/2022  -Protective sensation diminished with SWM +0/10 RIGHT and +1/10 LEFT on 03/15/2022  -Protective sensation diminished with SWM +0/10 RIGHT and +2/10 LEFT on 01/03/2022  -Protective sensation diminished with SWM +0/10 RIGHT and +1/10 LEFT on 08/17/2021    DERM:  -Skin temperature within normal limits to bilateral foot  -Toenails elongated, thickened, dystrophic and discolored x 10  -Skin along incision site(s) to the LEFT distal fifth toe is well approximated with no dehiscence.   ---Sutures intact.  ---Moderate erythema   ---Mild edin-incision edema and no ecchymois--par with post-operative course.   ---No dark or ascending erythema, no moderate calor, no malodor, no purulence, no other SOI.      MSK:  -Adductovarus rotation of the bilateral fifth toe  -Muscle strength of ankles +5/5 for dorsiflexion, plantarflexion, ABDUction and ADDuction b/l  LEFT FOOT RADIOGRAPH 04/25/2022  IMPRESSION: No definite bone destruction.  DERICK HERBERT MD   ============================================================    ASSESSMENT:    (L03.116) Cellulitis of left foot  (primary encounter diagnosis)    (E11.628,  L08.9) Diabetic foot infection (H)    (Z47.81) Encounter for orthopedic aftercare following surgical amputation    (L60.3) Onychodystrophy    (L84) Callus of foot    (L85.3) Xerosis of skin    (E11.9) Diabetes mellitus type 2, noninsulin dependent (H)    (E11.42) Diabetic polyneuropathy associated with type 2 diabetes mellitus (H)    (M21.969,  R20.8) Loss of protective sensation of skin of deformed foot    (M47.816) Lumbar spondylosis    (Z86.31) Personal history of diabetic foot ulcer      PLAN:  -Patient evaluated and examined. Treatment options discussed with no educational barriers  "noted.    -Toenails last debrided on 05/25/2022    Post op: Left fifth toe partial amputation  DOS: 06/27/2022    Post-operative care:   ---Removed post-op dressing.  ---Incision site did not have calor, but the erythema was moderate to the entire toe. The surgical cultures were reviewed and were noted to have grown pseudomonas. This was not previously covered by the antibiotic taken by the patient. Due to the moderate erythema and the growth of pseudomonas, Ciprofloxacin was prescribed for seven days.  ---Applied a fresh dressing with Xeroform over the incision site(s) followed by dry gauze, Kerlix, and a 4\"ACE wrap. He may leave this dressing intact until his follow-up appointment on 07/05/2022.    -Offloading: Continue PWB with a post-op shoe at all times.    -Keep dressing clean, dry and intact. Do not get the incision(s) wet in the shower or bath.    -Patient was instructed to look for worsening signs of infection (redness, swelling, pain, purulence, fever, chills, nausea, vomiting) if the hs to change the dressing or if he develops increased pain to the toe.    -Patient in agreement with the above treatment plan and all of patient's questions were answered.      Return to clinic one and two weeks post-operatively        Meenu Orourke DPM, DILIA    "

## 2022-06-29 NOTE — PATIENT INSTRUCTIONS
-Stop taking your multivitamin while taking your antibiotics  -Start taking Ciprofloxacin (an antibiotic). Take one tablet twice a day with food.        Thank you for allowing  and our Podiatry team to participate in your care. Please call our office at [phone] with scheduling questions or with any other questions or concerns.

## 2022-06-30 LAB
PATH REPORT.COMMENTS IMP SPEC: NORMAL
PATH REPORT.FINAL DX SPEC: NORMAL
PATH REPORT.GROSS SPEC: NORMAL
PATH REPORT.MICROSCOPIC SPEC OTHER STN: NORMAL
PATH REPORT.RELEVANT HX SPEC: NORMAL
PHOTO IMAGE: NORMAL

## 2022-06-30 PROCEDURE — 88304 TISSUE EXAM BY PATHOLOGIST: CPT | Mod: 26 | Performed by: PATHOLOGY

## 2022-06-30 PROCEDURE — 88311 DECALCIFY TISSUE: CPT | Mod: 26 | Performed by: PATHOLOGY

## 2022-07-01 ENCOUNTER — E-CONSULT (OUTPATIENT)
Dept: ONCOLOGY | Facility: CLINIC | Age: 82
End: 2022-07-01

## 2022-07-01 LAB
BACTERIA BONE ANAEROBE+AEROBE CULT: ABNORMAL
BACTERIA BONE ANAEROBE+AEROBE CULT: ABNORMAL
BACTERIA WND CULT: ABNORMAL

## 2022-07-01 PROCEDURE — 99451 NTRPROF PH1/NTRNET/EHR 5/>: CPT | Performed by: INTERNAL MEDICINE

## 2022-07-01 NOTE — PROGRESS NOTES
ALL SMARTFIELDS MUST BE COMPLETED FOR PATIENT CARE AND BILLING    7/1/2022     E-Consult has been accepted.    Interprofessional consultation requested by:  Zoila Jones MD      Clinical Question/Purpose: MY CLINICAL QUESTION IS: need for lifelong anticoagulation?  Or need for repeat imaging if considering stopping? Did have large bilateral PE 4/2021; brother passed of PE - sudden cardiac arrest.    Patient assessment and information reviewed: 81 year old gentleman with history of provoked pulmonary embolism diagnosed 4/26/21 after hospitalization 4/19-4/22/21 for urosepsis.     Patient has been on Xarelto 20 mg since then    Recommendations:   [ ] Recommend VQ scan (not CTA) and ECHO to assess for CTEP.     [ ] Given provoked thrombosis could consider stopping anticoagulation. However given relative immobility secondary to wound infection and family history could consider indefinite prophylactic anticoagulation.     Given his stage 3a renal disease would favor use of apixaban if covered by insurance.     In [facility], the patients randomized to apixaban 2.5 mg orally 2 times a day had the same rate of venous thrombo-embolism recurrence (1.7%) that the 5 mg BID comparison arm. In addition, they had similar risk of major bleeding (0.2%) as placebo (0.5 %).    Similarly, in [facility], low dose rivaroxaban (10 mg once daily) was compared to aspirin 100 and to Rivaroxaban usual 20 mg dose. The efficacy was similar between usual and low dose Rivaroxaban, in both cases better than for aspirin. The risk of major bleeding was 0.4% in reduced; which was not significantly different to 0.3% in aspirin. Moreover, the bleeding risk of 20mg was only 0.5%      The recommendations provided in this E-Consult are based on a review of clinical data pertinent to the clinical question presented, without a review of the patient's complete medical record or, the benefit of a comprehensive in-person or virtual patient  evaluation. This consultation should not replace the clinical judgement and evaluation of the provider ordering this E-Consult. Any new clinical issues, or changes in patient status since the filing of this E-Consult will need to be taken into account when assessing these recommendations. Please contact me if you have further questions.    My total time spent reviewing clinical information and formulating assessment was 5 minutes.    Report sent automatically to requesting provider once signed.     Olive Rene MD/PhD

## 2022-07-05 ENCOUNTER — ANCILLARY PROCEDURE (OUTPATIENT)
Dept: GENERAL RADIOLOGY | Facility: OTHER | Age: 82
End: 2022-07-05
Attending: PODIATRIST
Payer: MEDICARE

## 2022-07-05 ENCOUNTER — OFFICE VISIT (OUTPATIENT)
Dept: PODIATRY | Facility: OTHER | Age: 82
End: 2022-07-05
Attending: PODIATRIST
Payer: MEDICARE

## 2022-07-05 VITALS
OXYGEN SATURATION: 91 % | TEMPERATURE: 97.9 F | SYSTOLIC BLOOD PRESSURE: 138 MMHG | DIASTOLIC BLOOD PRESSURE: 65 MMHG | HEART RATE: 112 BPM

## 2022-07-05 DIAGNOSIS — L60.3 ONYCHODYSTROPHY: ICD-10-CM

## 2022-07-05 DIAGNOSIS — M21.969 LOSS OF PROTECTIVE SENSATION OF SKIN OF DEFORMED FOOT: ICD-10-CM

## 2022-07-05 DIAGNOSIS — L85.3 XEROSIS OF SKIN: ICD-10-CM

## 2022-07-05 DIAGNOSIS — N18.31 TYPE 2 DIABETES MELLITUS WITH STAGE 3A CHRONIC KIDNEY DISEASE, WITHOUT LONG-TERM CURRENT USE OF INSULIN (H): ICD-10-CM

## 2022-07-05 DIAGNOSIS — R20.8 LOSS OF PROTECTIVE SENSATION OF SKIN OF DEFORMED FOOT: ICD-10-CM

## 2022-07-05 DIAGNOSIS — Z47.81 ENCOUNTER FOR ORTHOPEDIC AFTERCARE FOLLOWING SURGICAL AMPUTATION: ICD-10-CM

## 2022-07-05 DIAGNOSIS — Z47.81 ENCOUNTER FOR ORTHOPEDIC AFTERCARE FOLLOWING SURGICAL AMPUTATION: Primary | ICD-10-CM

## 2022-07-05 DIAGNOSIS — Z86.31 PERSONAL HISTORY OF DIABETIC FOOT ULCER: ICD-10-CM

## 2022-07-05 DIAGNOSIS — E11.22 TYPE 2 DIABETES MELLITUS WITH STAGE 3A CHRONIC KIDNEY DISEASE, WITHOUT LONG-TERM CURRENT USE OF INSULIN (H): ICD-10-CM

## 2022-07-05 DIAGNOSIS — L84 CALLUS OF FOOT: ICD-10-CM

## 2022-07-05 DIAGNOSIS — E11.42 DIABETIC POLYNEUROPATHY ASSOCIATED WITH TYPE 2 DIABETES MELLITUS (H): ICD-10-CM

## 2022-07-05 DIAGNOSIS — M47.816 LUMBAR SPONDYLOSIS: ICD-10-CM

## 2022-07-05 DIAGNOSIS — E11.9 DIABETES MELLITUS TYPE 2, NONINSULIN DEPENDENT (H): ICD-10-CM

## 2022-07-05 LAB
BACTERIA BONE ANAEROBE+AEROBE CULT: NORMAL
BACTERIA WND CULT: NORMAL

## 2022-07-05 PROCEDURE — 73630 X-RAY EXAM OF FOOT: CPT | Mod: TC,LT

## 2022-07-05 PROCEDURE — G0463 HOSPITAL OUTPT CLINIC VISIT: HCPCS | Mod: 25

## 2022-07-05 PROCEDURE — 99212 OFFICE O/P EST SF 10 MIN: CPT | Performed by: PODIATRIST

## 2022-07-05 PROCEDURE — G0463 HOSPITAL OUTPT CLINIC VISIT: HCPCS

## 2022-07-05 ASSESSMENT — PAIN SCALES - GENERAL: PAINLEVEL: NO PAIN (0)

## 2022-07-05 NOTE — PROGRESS NOTES
Chief complaint: Patient presents with:  Wound Check: Post op      History of Present Illness: This 81 year old NIDDM II male is seen for post-op management for a partial LEFT partial fifth toe amputation on 06/29/2022.    He has been taking Ciprofloxacin twice daily. He has kept the dressing C/D/I since his post-op appointment on 06/29/2022. He has had minimal pain and he has only taken Tylenol for pain. He has had minimal pain.    He has been wearing a post-op shoe on the LEFT foot whenever he has been walking.    He has his usual burning, tingling, and numbness in his feet.    Last HbA1C was 5.6% on 03/04/2022.    Last HbA1C was 5.3% on 08/05/2021.      No further pedal complaints today.         /65 (BP Location: Left arm, Patient Position: Sitting, Cuff Size: Adult Regular)   Pulse 112   Temp 97.9  F (36.6  C) (Tympanic)   SpO2 91%     Patient Active Problem List   Diagnosis     Urinary retention     Generalized weakness     Type 2 diabetes mellitus with stage 3 chronic kidney disease, without long-term current use of insulin (H)     Tachycardia     Hypoxia     Other acute pulmonary embolism with acute cor pulmonale (H)     Dyspnea, unspecified type     Benign prostatic hyperplasia with urinary retention     Chronic kidney disease, stage III (moderate) (H)     Chronic obstructive lung disease (H)     Chronic painful diabetic neuropathy (H)     Erectile dysfunction     Essential hypertension     Gout, unspecified     History of CVA (cerebrovascular accident)     Hyperlipidemia     IBS (irritable bowel syndrome)     Lumbar spondylosis     Mild concentric left ventricular hypertrophy (LVH)     Moderate episode of recurrent major depressive disorder (H)     Spinal stenosis of lumbar region without neurogenic claudication     Uncomplicated alcohol dependence (H)     Nursing Home Visit     Onychomycosis of left great toe     Pincer nail deformity     Complicated UTI (urinary tract infection)     Sepsis with  acute renal failure (H)       Past Surgical History:   Procedure Laterality Date     AMPUTATE TOE(S) Left 6/27/2022    Procedure: Left fifth toe partial versus full amputation;  Surgeon: Meenu Orourke DPM;  Location: [facility]     CYSTOSCOPY, TRANSURETHRAL RESECTION (TUR) PROSTATE, COMBINED N/A 10/15/2021    Procedure: CYSTOSCOPY, WITH TRANSURETHRAL RESECTION PROSTATE;  Surgeon: Indu De Leon MD;  Location: [facility]     HERNIA REPAIR       PHACOEMULSIFICATION WITH STANDARD INTRAOCULAR LENS IMPLANT Left 12/27/2021    Procedure: PHACOEMULSIFICATION CATARACT EXTRACTION POSTERIOR CHAMBER LENS LEFT EYE;  Surgeon: Phillip Maldonado MD;  Location: [facility]     PHACOEMULSIFICATION WITH STANDARD INTRAOCULAR LENS IMPLANT Right 1/11/2022    Procedure: PHACOEMULSIFICATION CATARACT EXTRACTION POSTERIOR CHAMBER LENS RIGHT EYE;  Surgeon: Phillip Maldonado MD;  Location: [facility]       Current Outpatient Medications   Medication     acetaminophen (TYLENOL) 500 MG tablet     albuterol (PROAIR HFA/PROVENTIL HFA/VENTOLIN HFA) 108 (90 Base) MCG/ACT inhaler     allopurinol (ZYLOPRIM) 100 MG tablet     amLODIPine (NORVASC) 2.5 MG tablet     amoxicillin-clavulanate (AUGMENTIN) 875-125 MG tablet     atorvastatin (LIPITOR) 80 MG tablet     carvedilol (COREG) 25 MG tablet     ciprofloxacin (CIPRO) 500 MG tablet     finasteride (PROSCAR) 5 MG tablet     gabapentin (NEURONTIN) 300 MG capsule     HYDROcodone-acetaminophen (NORCO) 5-325 MG tablet     metFORMIN (GLUCOPHAGE-XR) 500 MG 24 hr tablet     multivitamin, therapeutic (THERA-VIT) TABS tablet     semaglutide (OZEMPIC, 0.25 OR 0.5 MG/DOSE,) 2 MG/1.5ML SOPN pen     sildenafil (VIAGRA) 100 MG tablet     tamsulosin (FLOMAX) 0.4 MG capsule     vitamin B-12 (CYANOCOBALAMIN) 250 MCG tablet     vitamin C (ASCORBIC ACID) 500 MG tablet     XARELTO ANTICOAGULANT 20 MG TABS tablet     No current facility-administered medications for this visit.          Allergies   Allergen Reactions     Ace Inhibitors       Per [facility]: hyperkalemia.  Pt doesn't know if he is allergic     Ceclor [Cefaclor] Hives     Sulfa Drugs      Pt unsure.        History reviewed. No pertinent family history.    Social History     Socioeconomic History     Marital status: Single     Spouse name: None     Number of children: None     Years of education: None     Highest education level: None   Occupational History     None   Tobacco Use     Smoking status: Never Smoker     Smokeless tobacco: Former User     Types: Chew   Substance and Sexual Activity     Alcohol use: Not Currently     Drug use: Never     Sexual activity: None   Other Topics Concern     Parent/sibling w/ CABG, MI or angioplasty before 65F 55M? Not Asked   Social History Narrative     None     Social Determinants of Health     Financial Resource Strain:      Difficulty of Paying Living Expenses:    Food Insecurity:      Worried About Running Out of Food in the Last Year:      Ran Out of Food in the Last Year:    Transportation Needs:      Lack of Transportation (Medical):      Lack of Transportation (Non-Medical):    Physical Activity:      Days of Exercise per Week:      Minutes of Exercise per Session:    Stress:      Feeling of Stress :    Social Connections:      Frequency of Communication with Friends and Family:      Frequency of Social Gatherings with Friends and Family:      Attends Confucianism Services:      Active Member of Clubs or Organizations:      Attends Club or Organization Meetings:      Marital Status:    Intimate Partner Violence:      Fear of Current or Ex-Partner:      Emotionally Abused:      Physically Abused:      Sexually Abused:        ROS: 10 point ROS neg other than the symptoms noted above in the HPI.  EXAM  Constitutional: healthy, alert and no distress    Psychiatric: mentation appears normal and affect normal/bright    VASCULAR:  -Dorsalis pedis pulse +2/4 b/l  -Posterior tibial pulse +1/4 b/l  -Capillary refill time < 3 seconds to b/l hallux  -Hair  growth absent to b/l anterior legs and ankles  NEURO:  -Light touch sensation severely diminished to b/l plantar forefoot  -Protective sensation diminished with SWM +1/10 RIGHT and +0/10 LEFT on 05/25/2022  -Protective sensation diminished with SWM +0/10 RIGHT and +1/10 LEFT on 03/15/2022  -Protective sensation diminished with SWM +0/10 RIGHT and +2/10 LEFT on 01/03/2022  -Protective sensation diminished with SWM +0/10 RIGHT and +1/10 LEFT on 08/17/2021    DERM:  -Skin temperature within normal limits to bilateral foot  -Toenails elongated, thickened, dystrophic and discolored x 10  -Skin along incision site(s) to the LEFT distal fifth toe is well approximated with no dehiscence.   ---Sutures intact.  ---Mild erythema / purple discoloration - par with post-operative course   ---Mild edin-incision edema and no ecchymois--par with post-operative course.   ---No dark or ascending erythema, no moderate calor, no malodor, no purulence, no other SOI.      MSK:  -Adductovarus rotation of the bilateral fifth toe  -Muscle strength of ankles +5/5 for dorsiflexion, plantarflexion, ABDUction and ADDuction b/l  LEFT FOOT RADIOGRAPH 04/25/2022  IMPRESSION: No definite bone destruction.  DERICK HERBERT MD   LEFT FOOT RADIOGRAPH 07/05/2022  IMPRESSION: Stable appearance of the left foot as compared to June 27, 2022    ANGELINA MACKEY MD   ============================================================    ASSESSMENT:    (Z47.81) Encounter for orthopedic aftercare following surgical amputation  (primary encounter diagnosis)    (L60.3) Onychodystrophy    (L84) Callus of foot    (L85.3) Xerosis of skin    (E11.9) Diabetes mellitus type 2, noninsulin dependent (H)    (E11.42) Diabetic polyneuropathy associated with type 2 diabetes mellitus (H)    (M21.969,  R20.8) Loss of protective sensation of skin of deformed foot    (M47.816) Lumbar spondylosis    (Z86.31) Personal history of diabetic foot ulcer      PLAN:  -Patient evaluated and  "examined. Treatment options discussed with no educational barriers noted.    -Toenails last debrided on 05/25/2022    Post op: Left fifth toe partial amputation  DOS: 06/27/2022    Post-operative care:   ---Removed post-op dressing.  ---Incision site did not have calor. There is a persistent rubor / purple discoloration to the toe that lessens with elevation. There is no calor, pain, drainage, or concerns for signs of infection from the toe.  ---Patient to continue with Ciprofloxacin until the prescription is out (two more days).      ---Applied a fresh dressing with Xeroform over the incision site(s) followed by dry gauze, a dry gauze between the fourth and fifth toe to prevent maceration, Conform, and a 4\"ACE wrap. He should change this dressing 1-2 times before his follow-up appointment. He should change it on 07/08/2022 and call with any concerns for infection ((redness, swelling, pain, purulence, fever, chills, nausea, vomiting). If the wound appears healthy, he may leave that dressing intact until his follow-up appointment next week. His  helps him with dressing changes.    -Offloading: Continue PWB with a post-op shoe at all times when walking.    -One week post-op radiographs ordered on 07/05/2022: Partial amputation of the LEFT fifth toe without concerning changes. Patient will be called with the results.    -Keep dressing clean, dry and intact. Do not get the incision(s) wet in the shower or bath.    -Patient was instructed to look for worsening signs of infection (redness, swelling, pain, purulence, fever, chills, nausea, vomiting) if the hs to change the dressing or if he develops increased pain to the toe.    -Patient in agreement with the above treatment plan and all of patient's questions were answered.      Return to clinic one week for two weeks post-op appointment        Meenu Orourke, DILIA, DILIA    "

## 2022-07-05 NOTE — NURSING NOTE
"Chief Complaint   Patient presents with     Wound Check     Post op       Initial /65 (BP Location: Left arm, Patient Position: Sitting, Cuff Size: Adult Regular)   Pulse 112   Temp 97.9  F (36.6  C) (Tympanic)   SpO2 91%  Estimated body mass index is 23.75 kg/m  as calculated from the following:    Height as of 6/27/22: 1.854 m (6' 1\").    Weight as of 6/27/22: 81.6 kg (180 lb).  Medication Reconciliation: vanna Leon  "

## 2022-07-05 NOTE — PATIENT INSTRUCTIONS
"Dressings:  -Change the dressing 1-2 times before your follow-up appointment. You may change it once on Friday, 07/08/2022, and not need to change it again until your follow-up appointment if the incision is looking healthy (No severe erythema, no ascending erythema, no calor, no purulence, no malodor, no other signs of infection).  -Apply a gauze between the fourth and fifth toe to keep the skin dry (you may open the gauze lengthwise and fold it in half to get it between the toes easier).  -Apply Xeroform directly over the incision. This can be cut to the length of the incision. The package is labeled \"Xeroform\" with purple writing int he bag of dressing supplies you were given in the office.  -Cover the Xeroform with gauze and tape it to the foot to secure it.  -Cover the foot with Conform (the white gauze roll), tape, and an ACE wrap.    -------------------------------------------------------    -Look for signs of infection (redness, swelling, pain, purulence, fever, chills, nausea, vomiting) and to return to podiatry or the emergency department immediately if there are any signs of infection.   -Call with any questions: [phone]  "

## 2022-07-06 ENCOUNTER — TELEPHONE (OUTPATIENT)
Dept: FAMILY MEDICINE | Facility: OTHER | Age: 82
End: 2022-07-06

## 2022-07-06 NOTE — TELEPHONE ENCOUNTER
I called,he will be thinking about all the options and will get back to us if he decides on anything.

## 2022-07-06 NOTE — TELEPHONE ENCOUNTER
Notify patient of E consult recommendations from hematology -   He should be able to see on [facility] as well.    1. Echo and VQ Scan to evaluate for chronic clot (CTEP).  2. Changing to Apixiban if covered by insurance.  3. Could consider stopping anticoagulation given provoked clot, but also given family history - could continue indefinitely.    Would he like to move forward with these plans?

## 2022-07-07 DIAGNOSIS — N18.31 TYPE 2 DIABETES MELLITUS WITH STAGE 3A CHRONIC KIDNEY DISEASE, WITHOUT LONG-TERM CURRENT USE OF INSULIN (H): Primary | ICD-10-CM

## 2022-07-07 DIAGNOSIS — E11.22 TYPE 2 DIABETES MELLITUS WITH STAGE 3A CHRONIC KIDNEY DISEASE, WITHOUT LONG-TERM CURRENT USE OF INSULIN (H): Primary | ICD-10-CM

## 2022-07-07 RX ORDER — METFORMIN HCL 500 MG
1000 TABLET, EXTENDED RELEASE 24 HR ORAL 2 TIMES DAILY WITH MEALS
Qty: 360 TABLET | Refills: 1 | Status: SHIPPED | OUTPATIENT
Start: 2022-07-07 | End: 2023-01-05

## 2022-07-07 RX ORDER — FLURBIPROFEN SODIUM 0.3 MG/ML
SOLUTION/ DROPS OPHTHALMIC
Qty: 100 EACH | Refills: 3 | Status: SHIPPED | OUTPATIENT
Start: 2022-07-07 | End: 2024-03-25

## 2022-07-07 NOTE — TELEPHONE ENCOUNTER
Metformin       Last Written Prescription Date:  1/13/22  Last Fill Quantity: 60,   # refills: 0  Last Office Visit: 6/23/22  Future Office visit:       Routing refill request to provider for review/approval because:

## 2022-07-07 NOTE — TELEPHONE ENCOUNTER
Insulin needle       Last Written Prescription Date:  Not on med list   Last Fill Quantity: 0,   # refills: 0  Last Office Visit: 6/23/22  Future Office visit:       Routing refill request to provider for review/approval because:

## 2022-07-11 DIAGNOSIS — R33.9 URINARY RETENTION: ICD-10-CM

## 2022-07-12 ENCOUNTER — OFFICE VISIT (OUTPATIENT)
Dept: PODIATRY | Facility: OTHER | Age: 82
End: 2022-07-12
Attending: PODIATRIST
Payer: MEDICARE

## 2022-07-12 VITALS
DIASTOLIC BLOOD PRESSURE: 76 MMHG | OXYGEN SATURATION: 93 % | TEMPERATURE: 99.3 F | SYSTOLIC BLOOD PRESSURE: 138 MMHG | HEART RATE: 94 BPM

## 2022-07-12 DIAGNOSIS — L84 CALLUS OF FOOT: ICD-10-CM

## 2022-07-12 DIAGNOSIS — M47.816 LUMBAR SPONDYLOSIS: ICD-10-CM

## 2022-07-12 DIAGNOSIS — M21.969 LOSS OF PROTECTIVE SENSATION OF SKIN OF DEFORMED FOOT: ICD-10-CM

## 2022-07-12 DIAGNOSIS — Z86.31 PERSONAL HISTORY OF DIABETIC FOOT ULCER: ICD-10-CM

## 2022-07-12 DIAGNOSIS — E11.9 DIABETES MELLITUS TYPE 2, NONINSULIN DEPENDENT (H): ICD-10-CM

## 2022-07-12 DIAGNOSIS — E11.628 DIABETIC FOOT INFECTION (H): Primary | ICD-10-CM

## 2022-07-12 DIAGNOSIS — E11.42 DIABETIC POLYNEUROPATHY ASSOCIATED WITH TYPE 2 DIABETES MELLITUS (H): ICD-10-CM

## 2022-07-12 DIAGNOSIS — L08.9 DIABETIC FOOT INFECTION (H): Primary | ICD-10-CM

## 2022-07-12 DIAGNOSIS — Z47.81 ENCOUNTER FOR ORTHOPEDIC AFTERCARE FOLLOWING SURGICAL AMPUTATION: ICD-10-CM

## 2022-07-12 DIAGNOSIS — R20.8 LOSS OF PROTECTIVE SENSATION OF SKIN OF DEFORMED FOOT: ICD-10-CM

## 2022-07-12 DIAGNOSIS — L03.116 CELLULITIS OF LEFT FOOT: ICD-10-CM

## 2022-07-12 DIAGNOSIS — L85.3 XEROSIS OF SKIN: ICD-10-CM

## 2022-07-12 DIAGNOSIS — L60.3 ONYCHODYSTROPHY: ICD-10-CM

## 2022-07-12 LAB — BACTERIA WND CULT: ABNORMAL

## 2022-07-12 PROCEDURE — G0463 HOSPITAL OUTPT CLINIC VISIT: HCPCS | Mod: 25

## 2022-07-12 PROCEDURE — G0463 HOSPITAL OUTPT CLINIC VISIT: HCPCS

## 2022-07-12 PROCEDURE — 99213 OFFICE O/P EST LOW 20 MIN: CPT | Performed by: PODIATRIST

## 2022-07-12 RX ORDER — FINASTERIDE 5 MG/1
5 TABLET, FILM COATED ORAL DAILY
Qty: 30 TABLET | Refills: 10 | Status: SHIPPED | OUTPATIENT
Start: 2022-07-12 | End: 2023-03-23

## 2022-07-12 RX ORDER — CIPROFLOXACIN 500 MG/1
500 TABLET, FILM COATED ORAL 2 TIMES DAILY
Qty: 28 TABLET | Refills: 0 | Status: SHIPPED | OUTPATIENT
Start: 2022-07-12 | End: 2022-07-26

## 2022-07-12 ASSESSMENT — PAIN SCALES - GENERAL: PAINLEVEL: NO PAIN (0)

## 2022-07-12 NOTE — PROGRESS NOTES
Chief complaint: Patient presents with:  Wound Check: 2 week post op        History of Present Illness: This 81 year old NIDDM II male is seen for post-op management for a partial LEFT partial fifth toe amputation on 06/29/2022.    He had been taking Ciprofloxacin twice daily. He has kept the dressing C/D/I since his one week post-op appointment.    He has been wearing a post-op shoe on the LEFT foot whenever he has been walking.    He has his usual burning, tingling, and numbness in his feet.    Last HbA1C was 5.6% on 03/04/2022.    Last HbA1C was 5.3% on 08/05/2021.      No further pedal complaints today.         /76 (BP Location: Left arm, Patient Position: Sitting, Cuff Size: Adult Regular)   Pulse 94   Temp 99.3  F (37.4  C) (Tympanic)   SpO2 93%     Patient Active Problem List   Diagnosis     Urinary retention     Generalized weakness     Type 2 diabetes mellitus with stage 3 chronic kidney disease, without long-term current use of insulin (H)     Tachycardia     Hypoxia     Other acute pulmonary embolism with acute cor pulmonale (H)     Dyspnea, unspecified type     Benign prostatic hyperplasia with urinary retention     Chronic kidney disease, stage III (moderate) (H)     Chronic obstructive lung disease (H)     Chronic painful diabetic neuropathy (H)     Erectile dysfunction     Essential hypertension     Gout, unspecified     History of CVA (cerebrovascular accident)     Hyperlipidemia     IBS (irritable bowel syndrome)     Lumbar spondylosis     Mild concentric left ventricular hypertrophy (LVH)     Moderate episode of recurrent major depressive disorder (H)     Spinal stenosis of lumbar region without neurogenic claudication     Uncomplicated alcohol dependence (H)     Nursing Home Visit     Onychomycosis of left great toe     Pincer nail deformity     Complicated UTI (urinary tract infection)     Sepsis with acute renal failure (H)       Past Surgical History:   Procedure Laterality Date      AMPUTATE TOE(S) Left 6/27/2022    Procedure: Left fifth toe partial versus full amputation;  Surgeon: Meenu Orourke DPM;  Location: [facility]     CYSTOSCOPY, TRANSURETHRAL RESECTION (TUR) PROSTATE, COMBINED N/A 10/15/2021    Procedure: CYSTOSCOPY, WITH TRANSURETHRAL RESECTION PROSTATE;  Surgeon: Indu De Leon MD;  Location: [facility]     HERNIA REPAIR       PHACOEMULSIFICATION WITH STANDARD INTRAOCULAR LENS IMPLANT Left 12/27/2021    Procedure: PHACOEMULSIFICATION CATARACT EXTRACTION POSTERIOR CHAMBER LENS LEFT EYE;  Surgeon: Phillip Maldonado MD;  Location: [facility]     PHACOEMULSIFICATION WITH STANDARD INTRAOCULAR LENS IMPLANT Right 1/11/2022    Procedure: PHACOEMULSIFICATION CATARACT EXTRACTION POSTERIOR CHAMBER LENS RIGHT EYE;  Surgeon: Phillip Maldonado MD;  Location: [facility]       Current Outpatient Medications   Medication     acetaminophen (TYLENOL) 500 MG tablet     albuterol (PROAIR HFA/PROVENTIL HFA/VENTOLIN HFA) 108 (90 Base) MCG/ACT inhaler     allopurinol (ZYLOPRIM) 100 MG tablet     amLODIPine (NORVASC) 2.5 MG tablet     amoxicillin-clavulanate (AUGMENTIN) 875-125 MG tablet     atorvastatin (LIPITOR) 80 MG tablet     carvedilol (COREG) 25 MG tablet     finasteride (PROSCAR) 5 MG tablet     gabapentin (NEURONTIN) 300 MG capsule     HYDROcodone-acetaminophen (NORCO) 5-325 MG tablet     metFORMIN (GLUCOPHAGE XR) 500 MG 24 hr tablet     multivitamin, therapeutic (THERA-VIT) TABS tablet     semaglutide (OZEMPIC, 0.25 OR 0.5 MG/DOSE,) 2 MG/1.5ML SOPN pen     sildenafil (VIAGRA) 100 MG tablet     tamsulosin (FLOMAX) 0.4 MG capsule     ULTICARE MINI 31G X 6 MM insulin pen needle     vitamin B-12 (CYANOCOBALAMIN) 250 MCG tablet     vitamin C (ASCORBIC ACID) 500 MG tablet     XARELTO ANTICOAGULANT 20 MG TABS tablet     No current facility-administered medications for this visit.          Allergies   Allergen Reactions     Ace Inhibitors      Per [facility]: hyperkalemia.  Pt doesn't know if he is allergic     Ceclor  [Cefaclor] Hives     Sulfa Drugs      Pt unsure.        History reviewed. No pertinent family history.    Social History     Socioeconomic History     Marital status: Single     Spouse name: None     Number of children: None     Years of education: None     Highest education level: None   Occupational History     None   Tobacco Use     Smoking status: Never Smoker     Smokeless tobacco: Former User     Types: Chew   Substance and Sexual Activity     Alcohol use: Not Currently     Drug use: Never     Sexual activity: None   Other Topics Concern     Parent/sibling w/ CABG, MI or angioplasty before 65F 55M? Not Asked   Social History Narrative     None     Social Determinants of Health     Financial Resource Strain:      Difficulty of Paying Living Expenses:    Food Insecurity:      Worried About Running Out of Food in the Last Year:      Ran Out of Food in the Last Year:    Transportation Needs:      Lack of Transportation (Medical):      Lack of Transportation (Non-Medical):    Physical Activity:      Days of Exercise per Week:      Minutes of Exercise per Session:    Stress:      Feeling of Stress :    Social Connections:      Frequency of Communication with Friends and Family:      Frequency of Social Gatherings with Friends and Family:      Attends Nondenominational Services:      Active Member of Clubs or Organizations:      Attends Club or Organization Meetings:      Marital Status:    Intimate Partner Violence:      Fear of Current or Ex-Partner:      Emotionally Abused:      Physically Abused:      Sexually Abused:        ROS: 10 point ROS neg other than the symptoms noted above in the HPI.  EXAM  Constitutional: healthy, alert and no distress    Psychiatric: mentation appears normal and affect normal/bright    VASCULAR:  -Dorsalis pedis pulse +2/4 b/l  -Posterior tibial pulse +1/4 b/l  -Capillary refill time < 3 seconds to b/l hallux  -Hair growth absent to b/l anterior legs and ankles  NEURO:  -Light touch sensation  severely diminished to b/l plantar forefoot  -Protective sensation diminished with SWM +1/10 RIGHT and +0/10 LEFT on 05/25/2022  -Protective sensation diminished with SWM +0/10 RIGHT and +1/10 LEFT on 03/15/2022  -Protective sensation diminished with SWM +0/10 RIGHT and +2/10 LEFT on 01/03/2022  -Protective sensation diminished with SWM +0/10 RIGHT and +1/10 LEFT on 08/17/2021    DERM:  -Skin temperature within normal limits to bilateral foot  -Toenails elongated, thickened, dystrophic and discolored x 10  -Skin along incision site(s) to the LEFT distal fifth toe is well approximated with no dehiscence.   ---Sutures intact and skin intact except at the most central suture of the incision  ---Moderate, darkened erythema to the head of the fifth metatarsal with mild calor  ---Minimal edin-incision edema and no ecchymois--par with post-operative course.   ---No dark or ascending erythema, no moderate calor, no malodor, no purulence, no other SOI.      MSK:  -Adductovarus rotation of the bilateral fifth toe  -Muscle strength of ankles +5/5 for dorsiflexion, plantarflexion, ABDUction and ADDuction b/l  LEFT FOOT RADIOGRAPH 04/25/2022  IMPRESSION: No definite bone destruction.  DERICK HERBERT MD   LEFT FOOT RADIOGRAPH 07/05/2022  IMPRESSION: Stable appearance of the left foot as compared to June 27, 2022    ANGELINA MACKEY MD   ============================================================    ASSESSMENT:    (E11.628,  L08.9) Diabetic foot infection (H)  (primary encounter diagnosis)    (L03.116) Cellulitis of left foot    (Z47.81) Encounter for orthopedic aftercare following surgical amputation  (primary encounter diagnosis)    (L60.3) Onychodystrophy    (L84) Callus of foot    (L85.3) Xerosis of skin    (E11.9) Diabetes mellitus type 2, noninsulin dependent (H)    (E11.42) Diabetic polyneuropathy associated with type 2 diabetes mellitus (H)    (M21.969,  R20.8) Loss of protective sensation of skin of deformed  foot    (M47.816) Lumbar spondylosis    (Z86.31) Personal history of diabetic foot ulcer      PLAN:  -Patient evaluated and examined. Treatment options discussed with no educational barriers noted.    -Toenails last debrided on 05/25/2022    Post op: Left fifth toe partial amputation  DOS: 06/27/2022    Post-operative care:   ---Removed post-op dressing.  ---All sutures removed except for the one central suture where the skin was not fully healed.    -Patient has had a persistent rubor to the toe, but there is an increase in the shade of the erythema today and it is now extending to the metatarsal head. The is a mild calor to the toe. Patient has been off the Ciprofloxacin for over a day and he cannot remember the day he ran out of antibiotics.  -Discussed antibiotic selection with the inpatient pharmacist, Ramonita. Based on culture results from surgery on 06/27/2022, she agreed that Ciprofloxacin was the best antibiotic to cover the pseudomonas and staph that was cultured from the surgical site. Renewed 14 days of Ciprofloxacin (patient was again instructed to discontinue his multivitamin while taking this antibiotic), but will consider discontinuing the antibiotics after 7-10 days if the toe is appearing more healthy.    -Mepilex Ag and tpae were applied to the toe.  -Patient asked if he could apply lotion to his heels. He is advised to do this 1-2 times were day because of the flaking skin on the heels.    -Offloading: Continue PWB with a post-op shoe at all times when walking.    -Keep dressing clean, dry and intact. Do not get the incision(s) wet in the shower or bath.    -Patient was instructed to look for worsening signs of infection (redness, swelling, pain, purulence, fever, chills, nausea, vomiting) if the hs to change the dressing or if he develops increased pain to the toe.    -Patient in agreement with the above treatment plan and all of patient's questions were answered.      Return to clinic one  week for two weeks post-op appointment        Meenu Orourke, YINAM, DPM

## 2022-07-12 NOTE — NURSING NOTE
"Chief Complaint   Patient presents with     Wound Check     2 week post op       Initial /76 (BP Location: Left arm, Patient Position: Sitting, Cuff Size: Adult Regular)   Pulse 94   Temp 99.3  F (37.4  C) (Tympanic)   SpO2 93%  Estimated body mass index is 23.75 kg/m  as calculated from the following:    Height as of 6/27/22: 1.854 m (6' 1\").    Weight as of 6/27/22: 81.6 kg (180 lb).  Medication Reconciliation: vanna Leon  "

## 2022-07-21 ENCOUNTER — OFFICE VISIT (OUTPATIENT)
Dept: PODIATRY | Facility: OTHER | Age: 82
End: 2022-07-21
Attending: PODIATRIST
Payer: MEDICARE

## 2022-07-21 VITALS
OXYGEN SATURATION: 93 % | HEART RATE: 106 BPM | TEMPERATURE: 98.6 F | DIASTOLIC BLOOD PRESSURE: 66 MMHG | SYSTOLIC BLOOD PRESSURE: 116 MMHG

## 2022-07-21 DIAGNOSIS — E11.42 DIABETIC POLYNEUROPATHY ASSOCIATED WITH TYPE 2 DIABETES MELLITUS (H): ICD-10-CM

## 2022-07-21 DIAGNOSIS — E11.9 DIABETES MELLITUS TYPE 2, NONINSULIN DEPENDENT (H): ICD-10-CM

## 2022-07-21 DIAGNOSIS — R20.8 LOSS OF PROTECTIVE SENSATION OF SKIN OF DEFORMED FOOT: ICD-10-CM

## 2022-07-21 DIAGNOSIS — Z89.422 HISTORY OF AMPUTATION OF LESSER TOE OF LEFT FOOT (H): ICD-10-CM

## 2022-07-21 DIAGNOSIS — Z86.31 PERSONAL HISTORY OF DIABETIC FOOT ULCER: ICD-10-CM

## 2022-07-21 DIAGNOSIS — L85.3 XEROSIS OF SKIN: ICD-10-CM

## 2022-07-21 DIAGNOSIS — M47.816 LUMBAR SPONDYLOSIS: ICD-10-CM

## 2022-07-21 DIAGNOSIS — L84 CALLUS OF FOOT: ICD-10-CM

## 2022-07-21 DIAGNOSIS — E11.621 DIABETIC ULCER OF TOE OF LEFT FOOT ASSOCIATED WITH TYPE 2 DIABETES MELLITUS, WITH FAT LAYER EXPOSED (H): Primary | ICD-10-CM

## 2022-07-21 DIAGNOSIS — M21.969 LOSS OF PROTECTIVE SENSATION OF SKIN OF DEFORMED FOOT: ICD-10-CM

## 2022-07-21 DIAGNOSIS — L97.522 DIABETIC ULCER OF TOE OF LEFT FOOT ASSOCIATED WITH TYPE 2 DIABETES MELLITUS, WITH FAT LAYER EXPOSED (H): Primary | ICD-10-CM

## 2022-07-21 DIAGNOSIS — L60.3 ONYCHODYSTROPHY: ICD-10-CM

## 2022-07-21 PROCEDURE — G0463 HOSPITAL OUTPT CLINIC VISIT: HCPCS | Mod: 25

## 2022-07-21 PROCEDURE — G0463 HOSPITAL OUTPT CLINIC VISIT: HCPCS

## 2022-07-21 PROCEDURE — 11042 DBRDMT SUBQ TIS 1ST 20SQCM/<: CPT | Performed by: PODIATRIST

## 2022-07-21 PROCEDURE — 99213 OFFICE O/P EST LOW 20 MIN: CPT | Performed by: PODIATRIST

## 2022-07-21 ASSESSMENT — PAIN SCALES - GENERAL: PAINLEVEL: NO PAIN (0)

## 2022-07-21 NOTE — NURSING NOTE
"Chief Complaint   Patient presents with     Surgical Followup       Initial /66 (BP Location: Left arm, Patient Position: Sitting, Cuff Size: Adult Large)   Pulse 106   Temp 98.6  F (37  C) (Tympanic)   SpO2 93%  Estimated body mass index is 23.75 kg/m  as calculated from the following:    Height as of 6/27/22: 1.854 m (6' 1\").    Weight as of 6/27/22: 81.6 kg (180 lb).  Medication Reconciliation: complete  Stephanie Antoine LPN  "

## 2022-07-21 NOTE — PATIENT INSTRUCTIONS
Thank you for allowing  and our Podiatry team to participate in your care. Please call our office at [phone] with scheduling questions or with any other questions or concerns.

## 2022-07-21 NOTE — PROGRESS NOTES
Chief complaint: Patient presents with:  Surgical Followup        History of Present Illness: This 81 year old NIDDM II male is seen for post-op management for a partial LEFT partial fifth toe amputation on 06/29/2022.    He had been taking Ciprofloxacin twice daily. He has kept the dressing C/D/I since his one week post-op appointment.     He has been applying Mepilex Ag and tape to the toe every few days. He is also wondering if he can discontinue it. He is also wondering if he has an open wound on the toe.    He has been wearing a post-op shoe on the LEFT foot whenever he has been walking.    He has his usual burning, tingling, and numbness in his feet.    Patient went to a store recently and he accidentally walked out of his post-op shoe. He didn't realize it until he got home and realized he was walking in his sock. He returned to the store and picked up the post-op shoe.    Last HbA1C was 5.6% on 03/04/2022.    Last HbA1C was 5.3% on 08/05/2021.      No further pedal complaints today.         /66 (BP Location: Left arm, Patient Position: Sitting, Cuff Size: Adult Large)   Pulse 106   Temp 98.6  F (37  C) (Tympanic)   SpO2 93%     Patient Active Problem List   Diagnosis     Urinary retention     Generalized weakness     Type 2 diabetes mellitus with stage 3 chronic kidney disease, without long-term current use of insulin (H)     Tachycardia     Hypoxia     Other acute pulmonary embolism with acute cor pulmonale (H)     Dyspnea, unspecified type     Benign prostatic hyperplasia with urinary retention     Chronic kidney disease, stage III (moderate) (H)     Chronic obstructive lung disease (H)     Chronic painful diabetic neuropathy (H)     Erectile dysfunction     Essential hypertension     Gout, unspecified     History of CVA (cerebrovascular accident)     Hyperlipidemia     IBS (irritable bowel syndrome)     Lumbar spondylosis     Mild concentric left ventricular hypertrophy (LVH)     Moderate episode  of recurrent major depressive disorder (H)     Spinal stenosis of lumbar region without neurogenic claudication     Uncomplicated alcohol dependence (H)     Nursing Home Visit     Onychomycosis of left great toe     Pincer nail deformity     Complicated UTI (urinary tract infection)     Sepsis with acute renal failure (H)       Past Surgical History:   Procedure Laterality Date     AMPUTATE TOE(S) Left 6/27/2022    Procedure: Left fifth toe partial versus full amputation;  Surgeon: Meenu Orourke DPM;  Location: [facility]     CYSTOSCOPY, TRANSURETHRAL RESECTION (TUR) PROSTATE, COMBINED N/A 10/15/2021    Procedure: CYSTOSCOPY, WITH TRANSURETHRAL RESECTION PROSTATE;  Surgeon: Indu De Leon MD;  Location: [facility]     HERNIA REPAIR       PHACOEMULSIFICATION WITH STANDARD INTRAOCULAR LENS IMPLANT Left 12/27/2021    Procedure: PHACOEMULSIFICATION CATARACT EXTRACTION POSTERIOR CHAMBER LENS LEFT EYE;  Surgeon: Phillip Maldonado MD;  Location: [facility]     PHACOEMULSIFICATION WITH STANDARD INTRAOCULAR LENS IMPLANT Right 1/11/2022    Procedure: PHACOEMULSIFICATION CATARACT EXTRACTION POSTERIOR CHAMBER LENS RIGHT EYE;  Surgeon: Phillip Maldonado MD;  Location: [facility]       Current Outpatient Medications   Medication     acetaminophen (TYLENOL) 500 MG tablet     albuterol (PROAIR HFA/PROVENTIL HFA/VENTOLIN HFA) 108 (90 Base) MCG/ACT inhaler     allopurinol (ZYLOPRIM) 100 MG tablet     amLODIPine (NORVASC) 2.5 MG tablet     atorvastatin (LIPITOR) 80 MG tablet     carvedilol (COREG) 25 MG tablet     ciprofloxacin (CIPRO) 500 MG tablet     finasteride (PROSCAR) 5 MG tablet     gabapentin (NEURONTIN) 300 MG capsule     HYDROcodone-acetaminophen (NORCO) 5-325 MG tablet     metFORMIN (GLUCOPHAGE XR) 500 MG 24 hr tablet     multivitamin, therapeutic (THERA-VIT) TABS tablet     semaglutide (OZEMPIC, 0.25 OR 0.5 MG/DOSE,) 2 MG/1.5ML SOPN pen     sildenafil (VIAGRA) 100 MG tablet     tamsulosin (FLOMAX) 0.4 MG capsule     ULTICARE MINI 31G  X 6 MM insulin pen needle     vitamin B-12 (CYANOCOBALAMIN) 250 MCG tablet     vitamin C (ASCORBIC ACID) 500 MG tablet     XARELTO ANTICOAGULANT 20 MG TABS tablet     amoxicillin-clavulanate (AUGMENTIN) 875-125 MG tablet     No current facility-administered medications for this visit.          Allergies   Allergen Reactions     Ace Inhibitors      Per [facility]: hyperkalemia.  Pt doesn't know if he is allergic     Ceclor [Cefaclor] Hives     Sulfa Drugs      Pt unsure.        History reviewed. No pertinent family history.    Social History     Socioeconomic History     Marital status: Single     Spouse name: None     Number of children: None     Years of education: None     Highest education level: None   Occupational History     None   Tobacco Use     Smoking status: Never Smoker     Smokeless tobacco: Former User     Types: Chew   Substance and Sexual Activity     Alcohol use: Not Currently     Drug use: Never     Sexual activity: None   Other Topics Concern     Parent/sibling w/ CABG, MI or angioplasty before 65F 55M? Not Asked   Social History Narrative     None     Social Determinants of Health     Financial Resource Strain:      Difficulty of Paying Living Expenses:    Food Insecurity:      Worried About Running Out of Food in the Last Year:      Ran Out of Food in the Last Year:    Transportation Needs:      Lack of Transportation (Medical):      Lack of Transportation (Non-Medical):    Physical Activity:      Days of Exercise per Week:      Minutes of Exercise per Session:    Stress:      Feeling of Stress :    Social Connections:      Frequency of Communication with Friends and Family:      Frequency of Social Gatherings with Friends and Family:      Attends Muslim Services:      Active Member of Clubs or Organizations:      Attends Club or Organization Meetings:      Marital Status:    Intimate Partner Violence:      Fear of Current or Ex-Partner:      Emotionally Abused:      Physically Abused:       Sexually Abused:        ROS: 10 point ROS neg other than the symptoms noted above in the HPI.  EXAM  Constitutional: healthy, alert and no distress    Psychiatric: mentation appears normal and affect normal/bright    VASCULAR:  -Dorsalis pedis pulse +2/4 b/l  -Posterior tibial pulse +1/4 b/l  -Capillary refill time < 3 seconds to b/l hallux  -Hair growth absent to b/l anterior legs and ankles  NEURO:  -Light touch sensation severely diminished to b/l plantar forefoot  -Protective sensation diminished with SWM +1/10 RIGHT and +0/10 LEFT on 05/25/2022  -Protective sensation diminished with SWM +0/10 RIGHT and +1/10 LEFT on 03/15/2022  -Protective sensation diminished with SWM +0/10 RIGHT and +2/10 LEFT on 01/03/2022  -Protective sensation diminished with SWM +0/10 RIGHT and +1/10 LEFT on 08/17/2021    DERM:  -Skin temperature within normal limits to bilateral foot  -Toenails elongated, thickened, dystrophic and discolored x 10  -Skin along incision site(s) to the LEFT distal fifth toe is well approximated with no dehiscence Including post removal of final suture  ---No severe erythema, no ascending erythema, no calor, no purulence, no malodor, no other signs of infection.   ---Blister on LEFT lateral fifth metatarsal head measuring 0.7cm x 0.3cm x superficial    Wound Location:  LEFT lateral distal fifth toe partial amputation site  07/21/2022  Measurement:  0.5cm x 0.4cm x 0.1cm to subcutaneous tissue  Drainage:  Moderate serous  Odor:  None  Undermining:  Minimal  Edges:  Intact  Base:  100% viable  Surrounding Skin: Intact   No severe erythema, no ascending erythema, no calor, no purulence, no malodor, no other signs of infection.         MSK:  -Adductovarus rotation of the bilateral fifth toe  -Muscle strength of ankles +5/5 for dorsiflexion, plantarflexion, ABDUction and ADDuction b/l  LEFT FOOT RADIOGRAPH 04/25/2022  IMPRESSION: No definite bone destruction.  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 Treatment options discussed with no educational barriers noted.    -Toenails last debrided on 05/25/2022    Post op: Left fifth toe partial amputation  DOS: 06/27/2022    Final suture removed. There is a small open wound at the distal lateral aspect of the amputation site.       -Mepilex Ag and tape were applied to the toe ulcer and blister site. Continue with this dressing every three days.  -Patient may try his stomping machine as requested for about 20 minutes per day    -Offloading: Continue PWB with a post-op shoe at all times when walking.      -Patient was instructed to look for worsening signs of infection (redness, swelling, pain, purulence, fever, chills, nausea, vomiting) if the hs to change the dressing or if he develops increased pain to the toe.    -Patient in agreement with the above treatment plan and all of patient's questions were answered.      Return to clinic to three weeks for LEFT fifth toe ulcer      Meenu Orourke DPM, DILIA

## 2022-07-29 LAB — BACTERIA BONE ANAEROBE+AEROBE CULT: ABNORMAL

## 2022-08-02 DIAGNOSIS — N40.0 BENIGN PROSTATIC HYPERPLASIA, UNSPECIFIED WHETHER LOWER URINARY TRACT SYMPTOMS PRESENT: ICD-10-CM

## 2022-08-03 RX ORDER — TAMSULOSIN HYDROCHLORIDE 0.4 MG/1
0.4 CAPSULE ORAL EVERY EVENING
Qty: 90 CAPSULE | Refills: 1 | Status: SHIPPED | OUTPATIENT
Start: 2022-08-03 | End: 2023-01-30

## 2022-08-03 NOTE — TELEPHONE ENCOUNTER
Flomax       Last Written Prescription Date:  5/4/22  Last Fill Quantity: 90,   # refills: 1  Last Office Visit: 6/23/22  Future Office visit:

## 2022-08-09 ENCOUNTER — OFFICE VISIT (OUTPATIENT)
Dept: PODIATRY | Facility: OTHER | Age: 82
End: 2022-08-09
Attending: PODIATRIST
Payer: MEDICARE

## 2022-08-09 VITALS
OXYGEN SATURATION: 92 % | HEART RATE: 84 BPM | TEMPERATURE: 97.5 F | DIASTOLIC BLOOD PRESSURE: 74 MMHG | SYSTOLIC BLOOD PRESSURE: 148 MMHG

## 2022-08-09 DIAGNOSIS — L60.3 ONYCHODYSTROPHY: Primary | ICD-10-CM

## 2022-08-09 DIAGNOSIS — L84 CALLUS OF FOOT: ICD-10-CM

## 2022-08-09 DIAGNOSIS — M47.816 LUMBAR SPONDYLOSIS: ICD-10-CM

## 2022-08-09 DIAGNOSIS — E11.42 DIABETIC POLYNEUROPATHY ASSOCIATED WITH TYPE 2 DIABETES MELLITUS (H): ICD-10-CM

## 2022-08-09 DIAGNOSIS — M21.969 LOSS OF PROTECTIVE SENSATION OF SKIN OF DEFORMED FOOT: ICD-10-CM

## 2022-08-09 DIAGNOSIS — Z89.422 HISTORY OF AMPUTATION OF LESSER TOE OF LEFT FOOT (H): ICD-10-CM

## 2022-08-09 DIAGNOSIS — L85.3 XEROSIS OF SKIN: ICD-10-CM

## 2022-08-09 DIAGNOSIS — Z86.31 PERSONAL HISTORY OF DIABETIC FOOT ULCER: ICD-10-CM

## 2022-08-09 DIAGNOSIS — R20.8 LOSS OF PROTECTIVE SENSATION OF SKIN OF DEFORMED FOOT: ICD-10-CM

## 2022-08-09 DIAGNOSIS — E11.9 DIABETES MELLITUS TYPE 2, NONINSULIN DEPENDENT (H): ICD-10-CM

## 2022-08-09 PROCEDURE — 11721 DEBRIDE NAIL 6 OR MORE: CPT | Performed by: PODIATRIST

## 2022-08-09 PROCEDURE — G0463 HOSPITAL OUTPT CLINIC VISIT: HCPCS | Mod: 25

## 2022-08-09 PROCEDURE — 99212 OFFICE O/P EST SF 10 MIN: CPT | Mod: 25 | Performed by: PODIATRIST

## 2022-08-09 ASSESSMENT — PAIN SCALES - GENERAL: PAINLEVEL: NO PAIN (0)

## 2022-08-09 NOTE — PROGRESS NOTES
"Chief complaint: Patient presents with:  Wound Check      History of Present Illness: This 81 year old NIDDM II male is seen for post-op management for a partial LEFT partial fifth toe amputation on 06/29/2022.    He had Ciprofloxacin, but he quit taking it after his last podiatry appointment.    He has been applying Mepilex Ag and tape to the toe every few days.     He has been wearing a post-op shoe around the house and his regular shoes when he is downtown.     He has his usual burning, tingling, and numbness in his feet. He is also wondering if his toenails can be trimmed today. They are too thick for him to trim or for his significant other to trim.    Lastly, he says his shoes were going to be stretched and \"sprayed with something\" by the orthotist, Traci Hernandez, but he has not heard from her.    Lab Results   Component Value Date    A1C 6.3 06/06/2022    A1C 5.6 03/04/2022    A1C 5.3 08/05/2021    A1C 6.5 05/06/2021       Last HbA1C was 6.3% on 06/06/2022.    ---Previously 5.6% on 03/04/2022.    ---Previously 5.3% on 08/05/2021.      No further pedal complaints today.         BP (!) 148/74 (BP Location: Left arm, Patient Position: Sitting, Cuff Size: Adult Regular)   Pulse 84   Temp 97.5  F (36.4  C) (Tympanic)   SpO2 92%     Patient Active Problem List   Diagnosis     Urinary retention     Generalized weakness     Type 2 diabetes mellitus with stage 3 chronic kidney disease, without long-term current use of insulin (H)     Tachycardia     Hypoxia     Other acute pulmonary embolism with acute cor pulmonale (H)     Dyspnea, unspecified type     Benign prostatic hyperplasia with urinary retention     Chronic kidney disease, stage III (moderate) (H)     Chronic obstructive lung disease (H)     Chronic painful diabetic neuropathy (H)     Erectile dysfunction     Essential hypertension     Gout, unspecified     History of CVA (cerebrovascular accident)     Hyperlipidemia     IBS (irritable bowel syndrome)     " Lumbar spondylosis     Mild concentric left ventricular hypertrophy (LVH)     Moderate episode of recurrent major depressive disorder (H)     Spinal stenosis of lumbar region without neurogenic claudication     Uncomplicated alcohol dependence (H)     Nursing Home Visit     Onychomycosis of left great toe     Pincer nail deformity     Complicated UTI (urinary tract infection)     Sepsis with acute renal failure (H)       Past Surgical History:   Procedure Laterality Date     AMPUTATE TOE(S) Left 6/27/2022    Procedure: Left fifth toe partial versus full amputation;  Surgeon: Meenu Orourke DPM;  Location: [facility]     CYSTOSCOPY, TRANSURETHRAL RESECTION (TUR) PROSTATE, COMBINED N/A 10/15/2021    Procedure: CYSTOSCOPY, WITH TRANSURETHRAL RESECTION PROSTATE;  Surgeon: Indu De Leon MD;  Location: [facility]     HERNIA REPAIR       PHACOEMULSIFICATION WITH STANDARD INTRAOCULAR LENS IMPLANT Left 12/27/2021    Procedure: PHACOEMULSIFICATION CATARACT EXTRACTION POSTERIOR CHAMBER LENS LEFT EYE;  Surgeon: Phillip Maldonado MD;  Location: [facility]     PHACOEMULSIFICATION WITH STANDARD INTRAOCULAR LENS IMPLANT Right 1/11/2022    Procedure: PHACOEMULSIFICATION CATARACT EXTRACTION POSTERIOR CHAMBER LENS RIGHT EYE;  Surgeon: Phillip Maldonado MD;  Location: [facility]       Current Outpatient Medications   Medication     acetaminophen (TYLENOL) 500 MG tablet     albuterol (PROAIR HFA/PROVENTIL HFA/VENTOLIN HFA) 108 (90 Base) MCG/ACT inhaler     allopurinol (ZYLOPRIM) 100 MG tablet     amLODIPine (NORVASC) 2.5 MG tablet     atorvastatin (LIPITOR) 80 MG tablet     carvedilol (COREG) 25 MG tablet     finasteride (PROSCAR) 5 MG tablet     gabapentin (NEURONTIN) 300 MG capsule     HYDROcodone-acetaminophen (NORCO) 5-325 MG tablet     metFORMIN (GLUCOPHAGE XR) 500 MG 24 hr tablet     multivitamin, therapeutic (THERA-VIT) TABS tablet     semaglutide (OZEMPIC, 0.25 OR 0.5 MG/DOSE,) 2 MG/1.5ML SOPN pen     sildenafil (VIAGRA) 100 MG tablet      tamsulosin (FLOMAX) 0.4 MG capsule     ULTICARE MINI 31G X 6 MM insulin pen needle     vitamin B-12 (CYANOCOBALAMIN) 250 MCG tablet     vitamin C (ASCORBIC ACID) 500 MG tablet     XARELTO ANTICOAGULANT 20 MG TABS tablet     amoxicillin-clavulanate (AUGMENTIN) 875-125 MG tablet     No current facility-administered medications for this visit.          Allergies   Allergen Reactions     Ace Inhibitors      Per [facility]: hyperkalemia.  Pt doesn't know if he is allergic     Ceclor [Cefaclor] Hives     Sulfa Drugs      Pt unsure.        History reviewed. No pertinent family history.    Social History     Socioeconomic History     Marital status: Single     Spouse name: None     Number of children: None     Years of education: None     Highest education level: None   Occupational History     None   Tobacco Use     Smoking status: Never Smoker     Smokeless tobacco: Former User     Types: Chew   Substance and Sexual Activity     Alcohol use: Not Currently     Drug use: Never     Sexual activity: None   Other Topics Concern     Parent/sibling w/ CABG, MI or angioplasty before 65F 55M? Not Asked   Social History Narrative     None     Social Determinants of Health     Financial Resource Strain:      Difficulty of Paying Living Expenses:    Food Insecurity:      Worried About Running Out of Food in the Last Year:      Ran Out of Food in the Last Year:    Transportation Needs:      Lack of Transportation (Medical):      Lack of Transportation (Non-Medical):    Physical Activity:      Days of Exercise per Week:      Minutes of Exercise per Session:    Stress:      Feeling of Stress :    Social Connections:      Frequency of Communication with Friends and Family:      Frequency of Social Gatherings with Friends and Family:      Attends [employer] Services:      Active Member of Clubs or Organizations:      Attends Club or Organization Meetings:      Marital Status:    Intimate Partner Violence:      Fear of Current or Ex-Partner:       Emotionally Abused:      Physically Abused:      Sexually Abused:        ROS: 10 point ROS neg other than the symptoms noted above in the HPI.  EXAM  Constitutional: healthy, alert and no distress    Psychiatric: mentation appears normal and affect normal/bright    VASCULAR:  -Dorsalis pedis pulse +2/4 b/l  -Posterior tibial pulse +1/4 b/l  -Capillary refill time < 3 seconds to b/l hallux  -Hair growth absent to b/l anterior legs and ankles  NEURO:  -Light touch sensation severely diminished to b/l plantar forefoot  -Protective sensation diminished with SWM +1/10 RIGHT and +0/10 LEFT on 05/25/2022  -Protective sensation diminished with SWM +0/10 RIGHT and +1/10 LEFT on 03/15/2022  -Protective sensation diminished with SWM +0/10 RIGHT and +2/10 LEFT on 01/03/2022  -Protective sensation diminished with SWM +0/10 RIGHT and +1/10 LEFT on 08/17/2021    DERM:  -Skin temperature within normal limits to bilateral foot  -Toenails elongated, thickened, dystrophic and discolored x 10  -Skin along incision site(s) to the LEFT distal fifth toe is well approximated with no dehiscence     Wound Location:  LEFT lateral distal fifth toe partial amputation site  08/09/2022: Well adhered scab with no open wounds and no drainage.     07/21/2022  Measurement:  0.5cm x 0.4cm x 0.1cm to subcutaneous tissue  Drainage:  Moderate serous  Odor:  None  Undermining:  Minimal  Edges:  Intact  Base:  100% viable  Surrounding Skin: Intact   No severe erythema, no ascending erythema, no calor, no purulence, no malodor, no other signs of infection.         MSK:  -Adductovarus rotation of the bilateral fifth toe  -Muscle strength of ankles +5/5 for dorsiflexion, plantarflexion, ABDUction and ADDuction b/l  LEFT FOOT RADIOGRAPH 04/25/2022  IMPRESSION: No definite bone destruction.  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 Treatment options discussed with no educational barriers noted.    -High risk toenail debridement x 10 toenails without incident on 08/09/2022      Post op: Left fifth toe partial amputation  DOS: 06/27/2022    -Reviewed patient's culture results from surgery and discussed the finding of the fungi and bacteria in the bone. It is possible this was removed in total from surgery. If not, then it will not be treated unless there are concerns with the toe. The toe is appearing healthy without any open wounds and no signs of infection, so no further intervention will be performed at this time. Anti-fungals can be harsh on the liver. ID will be consulted and the toe will be evaluated if it develops an infection. There are no open wounds or concerns to consider addressing this at this time. Patient understands and agrees with this treatment plan.      -Offloading: Continue PWB with a post-op shoe at all times when walking.      -Patient was instructed to look for worsening signs of infection (redness, swelling, pain, purulence, fever, chills, nausea, vomiting) if the hs to change the dressing or if he develops increased pain to the toe.    -Patient in agreement with the above treatment plan and all of patient's questions were answered.      Return to clinic to three weeks for LEFT fifth toe ulcer      Meenu Orourke DPM, DILIA

## 2022-08-09 NOTE — NURSING NOTE
"Chief Complaint   Patient presents with     Wound Check       Initial BP (!) 148/74 (BP Location: Left arm, Patient Position: Sitting, Cuff Size: Adult Regular)   Pulse 84   Temp 97.5  F (36.4  C) (Tympanic)   SpO2 92%  Estimated body mass index is 23.75 kg/m  as calculated from the following:    Height as of 6/27/22: 1.854 m (6' 1\").    Weight as of 6/27/22: 81.6 kg (180 lb).  Medication Reconciliation: vanna Leon  "

## 2022-08-15 DIAGNOSIS — I10 ESSENTIAL HYPERTENSION: Primary | ICD-10-CM

## 2022-08-17 RX ORDER — CARVEDILOL 25 MG/1
TABLET ORAL
Qty: 180 TABLET | Refills: 0 | Status: SHIPPED | OUTPATIENT
Start: 2022-08-17 | End: 2022-11-09

## 2022-08-17 NOTE — TELEPHONE ENCOUNTER
coreg      Last Written Prescription Date:  historical  Last Fill Quantity: ,   # refills:   Last Office Visit: 6/23/22  Future Office visit:    Next 5 appointments (look out 90 days)    Oct 13, 2022  1:30 PM  (Arrive by 1:15 PM)  Return Visit with Meenu Orourke DPM  [facility] ([facility] ) [address] [place] [id number] [phone]

## 2022-08-18 DIAGNOSIS — I10 ESSENTIAL HYPERTENSION: Primary | ICD-10-CM

## 2022-08-19 RX ORDER — AMLODIPINE BESYLATE 2.5 MG/1
2.5 TABLET ORAL DAILY
Qty: 90 TABLET | Refills: 0 | Status: SHIPPED | OUTPATIENT
Start: 2022-08-19 | End: 2022-11-09

## 2022-08-19 NOTE — TELEPHONE ENCOUNTER
Amlodipine    Last Written Prescription Date:  Pt reported    Last Office Visit: 6.23.2022  Future Office visit:    Next 5 appointments (look out 90 days)    Oct 13, 2022  1:30 PM  (Arrive by 1:15 PM)  Return Visit with Meenu Orourke DPM  [facility] ([facility] [place] ) [address] [place] [id number] [phone]           Routing refill request to provider for review/approval because:  Medication is reported/historical    Tessie Guerra RN

## 2022-08-25 DIAGNOSIS — E78.5 HYPERLIPIDEMIA, UNSPECIFIED HYPERLIPIDEMIA TYPE: Primary | ICD-10-CM

## 2022-08-25 RX ORDER — ATORVASTATIN CALCIUM 80 MG/1
40 TABLET, FILM COATED ORAL EVERY EVENING
Qty: 90 TABLET | Refills: 1 | Status: SHIPPED | OUTPATIENT
Start: 2022-08-25 | End: 2023-07-28

## 2022-08-25 NOTE — TELEPHONE ENCOUNTER
Lititor 40mg      Last Written Prescription Date:  Unknown  Last Fill Quantity: ?,   # refills: ?  Last Office Visit: 6-  Future Office visit:    Next 5 appointments (look out 90 days)    Oct 13, 2022  1:30 PM  (Arrive by 1:15 PM)  Return Visit with Meenu Orourke DPM  [facility] ([facility] ) [address] [place] [id number] [phone]           Routing refill request to provider for review/approval because:  Medication is reported/historical

## 2022-08-31 ENCOUNTER — OFFICE VISIT (OUTPATIENT)
Dept: PODIATRY | Facility: OTHER | Age: 82
End: 2022-08-31
Attending: PODIATRIST
Payer: MEDICARE

## 2022-08-31 VITALS
SYSTOLIC BLOOD PRESSURE: 143 MMHG | OXYGEN SATURATION: 93 % | BODY MASS INDEX: 25.33 KG/M2 | HEART RATE: 103 BPM | TEMPERATURE: 98.9 F | DIASTOLIC BLOOD PRESSURE: 75 MMHG | WEIGHT: 192 LBS

## 2022-08-31 DIAGNOSIS — E11.42 DIABETIC POLYNEUROPATHY ASSOCIATED WITH TYPE 2 DIABETES MELLITUS (H): ICD-10-CM

## 2022-08-31 DIAGNOSIS — R20.8 LOSS OF PROTECTIVE SENSATION OF SKIN OF DEFORMED FOOT: ICD-10-CM

## 2022-08-31 DIAGNOSIS — L97.521 DIABETIC ULCER OF TOE OF LEFT FOOT ASSOCIATED WITH TYPE 2 DIABETES MELLITUS, LIMITED TO BREAKDOWN OF SKIN (H): Primary | ICD-10-CM

## 2022-08-31 DIAGNOSIS — E11.621 DIABETIC ULCER OF TOE OF LEFT FOOT ASSOCIATED WITH TYPE 2 DIABETES MELLITUS, LIMITED TO BREAKDOWN OF SKIN (H): Primary | ICD-10-CM

## 2022-08-31 DIAGNOSIS — L84 CALLUS OF FOOT: ICD-10-CM

## 2022-08-31 DIAGNOSIS — Z86.31 PERSONAL HISTORY OF DIABETIC FOOT ULCER: ICD-10-CM

## 2022-08-31 DIAGNOSIS — L85.3 XEROSIS OF SKIN: ICD-10-CM

## 2022-08-31 DIAGNOSIS — M21.969 LOSS OF PROTECTIVE SENSATION OF SKIN OF DEFORMED FOOT: ICD-10-CM

## 2022-08-31 DIAGNOSIS — L60.3 ONYCHODYSTROPHY: ICD-10-CM

## 2022-08-31 DIAGNOSIS — E11.9 DIABETES MELLITUS TYPE 2, NONINSULIN DEPENDENT (H): ICD-10-CM

## 2022-08-31 DIAGNOSIS — M47.816 LUMBAR SPONDYLOSIS: ICD-10-CM

## 2022-08-31 PROCEDURE — 99212 OFFICE O/P EST SF 10 MIN: CPT | Performed by: PODIATRIST

## 2022-08-31 PROCEDURE — G0463 HOSPITAL OUTPT CLINIC VISIT: HCPCS

## 2022-08-31 PROCEDURE — G0463 HOSPITAL OUTPT CLINIC VISIT: HCPCS | Mod: 25

## 2022-08-31 ASSESSMENT — PAIN SCALES - GENERAL: PAINLEVEL: MILD PAIN (3)

## 2022-08-31 NOTE — PROGRESS NOTES
Chief complaint: Patient presents with:  Surgical Followup  WOUND CARE      History of Present Illness: This 81 year old NIDDM II male is seen for post-op management for a partial LEFT partial fifth toe amputation on 06/29/2022.    He has not had any draining or open wounds on the LEFT lateral fifth toe. He says the toe is a little more pink than the other toes but that has been normal for him. He has no pain from the toe.    He is wearing regular DM shoes. He would like to see the orthotist, Traci Hernandez, for a spray on his shoes she had discussed with him. He thinks he got his most recent Dm shoes toward the end of last year or the beginning of 2022. He does not think he needs new shoes.    He has his usual burning, tingling, and numbness in his feet.       Lab Results   Component Value Date    A1C 6.3 06/06/2022    A1C 5.6 03/04/2022    A1C 5.3 08/05/2021    A1C 6.5 05/06/2021       Last HbA1C was 6.3% on 06/06/2022.    ---Previously 5.6% on 03/04/2022.    ---Previously 5.3% on 08/05/2021.      No further pedal complaints today.         BP (!) 143/75 (BP Location: Left arm, Patient Position: Sitting, Cuff Size: Adult Regular)   Pulse 103   Temp 98.9  F (37.2  C) (Tympanic)   Wt 87.1 kg (192 lb)   SpO2 93%   BMI 25.33 kg/m      Patient Active Problem List   Diagnosis     Urinary retention     Generalized weakness     Type 2 diabetes mellitus with stage 3 chronic kidney disease, without long-term current use of insulin (H)     Tachycardia     Hypoxia     Other acute pulmonary embolism with acute cor pulmonale (H)     Dyspnea, unspecified type     Benign prostatic hyperplasia with urinary retention     Chronic kidney disease, stage III (moderate) (H)     Chronic obstructive lung disease (H)     Chronic painful diabetic neuropathy (H)     Erectile dysfunction     Essential hypertension     Gout, unspecified     History of CVA (cerebrovascular accident)     Hyperlipidemia     IBS (irritable bowel syndrome)      Lumbar spondylosis     Mild concentric left ventricular hypertrophy (LVH)     Moderate episode of recurrent major depressive disorder (H)     Spinal stenosis of lumbar region without neurogenic claudication     Uncomplicated alcohol dependence (H)     Nursing Home Visit     Onychomycosis of left great toe     Pincer nail deformity     Complicated UTI (urinary tract infection)     Sepsis with acute renal failure (H)       Past Surgical History:   Procedure Laterality Date     AMPUTATE TOE(S) Left 6/27/2022    Procedure: Left fifth toe partial versus full amputation;  Surgeon: Meenu Orourke DPM;  Location: [facility]     CYSTOSCOPY, TRANSURETHRAL RESECTION (TUR) PROSTATE, COMBINED N/A 10/15/2021    Procedure: CYSTOSCOPY, WITH TRANSURETHRAL RESECTION PROSTATE;  Surgeon: Indu De Leon MD;  Location: [facility]     HERNIA REPAIR       PHACOEMULSIFICATION WITH STANDARD INTRAOCULAR LENS IMPLANT Left 12/27/2021    Procedure: PHACOEMULSIFICATION CATARACT EXTRACTION POSTERIOR CHAMBER LENS LEFT EYE;  Surgeon: Phillip Maldonado MD;  Location: [facility]     PHACOEMULSIFICATION WITH STANDARD INTRAOCULAR LENS IMPLANT Right 1/11/2022    Procedure: PHACOEMULSIFICATION CATARACT EXTRACTION POSTERIOR CHAMBER LENS RIGHT EYE;  Surgeon: Phillip Maldonado MD;  Location: [facility]       Current Outpatient Medications   Medication     acetaminophen (TYLENOL) 500 MG tablet     albuterol (PROAIR HFA/PROVENTIL HFA/VENTOLIN HFA) 108 (90 Base) MCG/ACT inhaler     allopurinol (ZYLOPRIM) 100 MG tablet     amLODIPine (NORVASC) 2.5 MG tablet     atorvastatin (LIPITOR) 80 MG tablet     carvedilol (COREG) 25 MG tablet     finasteride (PROSCAR) 5 MG tablet     gabapentin (NEURONTIN) 300 MG capsule     metFORMIN (GLUCOPHAGE XR) 500 MG 24 hr tablet     multivitamin, therapeutic (THERA-VIT) TABS tablet     semaglutide (OZEMPIC, 0.25 OR 0.5 MG/DOSE,) 2 MG/1.5ML SOPN pen     sildenafil (VIAGRA) 100 MG tablet     tamsulosin (FLOMAX) 0.4 MG capsule     ULTICARE MINI 31G  X 6 MM insulin pen needle     vitamin B-12 (CYANOCOBALAMIN) 250 MCG tablet     vitamin C (ASCORBIC ACID) 500 MG tablet     XARELTO ANTICOAGULANT 20 MG TABS tablet     amoxicillin-clavulanate (AUGMENTIN) 875-125 MG tablet     HYDROcodone-acetaminophen (NORCO) 5-325 MG tablet     No current facility-administered medications for this visit.          Allergies   Allergen Reactions     Ace Inhibitors      Per [facility]: hyperkalemia.  Pt doesn't know if he is allergic     Ceclor [Cefaclor] Hives     Sulfa Drugs      Pt unsure.        History reviewed. No pertinent family history.    Social History     Socioeconomic History     Marital status: Single     Spouse name: None     Number of children: None     Years of education: None     Highest education level: None   Occupational History     None   Tobacco Use     Smoking status: Never Smoker     Smokeless tobacco: Former User     Types: Chew   Substance and Sexual Activity     Alcohol use: Not Currently     Drug use: Never     Sexual activity: None   Other Topics Concern     Parent/sibling w/ CABG, MI or angioplasty before 65F 55M? Not Asked   Social History Narrative     None     Social Determinants of Health     Financial Resource Strain:      Difficulty of Paying Living Expenses:    Food Insecurity:      Worried About Running Out of Food in the Last Year:      Ran Out of Food in the Last Year:    Transportation Needs:      Lack of Transportation (Medical):      Lack of Transportation (Non-Medical):    Physical Activity:      Days of Exercise per Week:      Minutes of Exercise per Session:    Stress:      Feeling of Stress :    Social Connections:      Frequency of Communication with Friends and Family:      Frequency of Social Gatherings with Friends and Family:      Attends [employer] Services:      Active Member of Clubs or Organizations:      Attends Club or Organization Meetings:      Marital Status:    Intimate Partner Violence:      Fear of Current or Ex-Partner:       Emotionally Abused:      Physically Abused:      Sexually Abused:        ROS: 10 point ROS neg other than the symptoms noted above in the HPI.  EXAM  Constitutional: healthy, alert and no distress    Psychiatric: mentation appears normal and affect normal/bright    VASCULAR:  -Dorsalis pedis pulse +2/4 b/l  -Posterior tibial pulse +1/4 b/l  -Capillary refill time < 3 seconds to b/l hallux  -Hair growth absent to b/l anterior legs and ankles  NEURO:  -Light touch sensation severely diminished to b/l plantar forefoot  -Epicritic and protective sensation diminished to the bilateral foot.    -Protective sensation diminished with SWM +1/10 RIGHT and +0/10 LEFT on 05/25/2022  -Protective sensation diminished with SWM +0/10 RIGHT and +1/10 LEFT on 03/15/2022  -Protective sensation diminished with SWM +0/10 RIGHT and +2/10 LEFT on 01/03/2022  -Protective sensation diminished with SWM +0/10 RIGHT and +1/10 LEFT on 08/17/2021    DERM:  -Skin temperature within normal limits to bilateral foot  -Toenails elongated, thickened, dystrophic and discolored x 10    Wound Location:  LEFT lateral distal fifth toe partial amputation site    08/31/2022  Measurement:  0.1cm x 0.1cm x superficial through skin layer only  Drainage:  Minimal serous  Odor:  None  Undermining:  Minimal  Edges:  Intact  Base:  100% viable  Surrounding Skin: Intact   No severe erythema, no ascending erythema, no calor, no purulence, no malodor, no other signs of infection.    08/09/2022: Well adhered scab with no open wounds and no drainage.   07/21/2022:  0.5cm x 0.4cm x 0.1cm to subcutaneous tissue       MSK:  -Adductovarus rotation of the bilateral fifth toe  -Muscle strength of ankles +5/5 for dorsiflexion, plantarflexion, ABDUction and ADDuction b/l  LEFT FOOT RADIOGRAPH 04/25/2022  IMPRESSION: No definite bone destruction.  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 Treatment options discussed with no educational barriers noted.    -Toenails last debrided on 08/09/2022    Post op: Left fifth toe partial amputation  DOS: 06/27/2022    -The LEFT lateral fifth toe has a small opening through the skin layer only.  ---Applied Mepilex Ag to the toe. Patient to change this dressing every few days until the wound is healed without any drainage.    -Sent  A message to the orthotist, Traci Hernandez, on 08/31/2022 to see if she can see the patient for the shoe spray at his follow-up appointment.    -Patient was instructed to look for worsening signs of infection (redness, swelling, pain, purulence, fever, chills, nausea, vomiting) if the hs to change the dressing or if he develops increased pain to the toe.    -Patient in agreement with the above treatment plan and all of patient's questions were answered.      Return to clinic to three weeks for LEFT fifth toe ulcer      Meenu Orourke DPM, DILIA

## 2022-08-31 NOTE — NURSING NOTE
"Chief Complaint   Patient presents with     Surgical Followup     WOUND CARE       Initial BP (!) 143/75 (BP Location: Left arm, Patient Position: Sitting, Cuff Size: Adult Regular)   Pulse 103   Temp 98.9  F (37.2  C) (Tympanic)   Wt 87.1 kg (192 lb)   SpO2 93%   BMI 25.33 kg/m   Estimated body mass index is 25.33 kg/m  as calculated from the following:    Height as of 6/27/22: 1.854 m (6' 1\").    Weight as of this encounter: 87.1 kg (192 lb).  Medication Reconciliation: complete  Stephanie Antoine LPN  "

## 2022-08-31 NOTE — PATIENT INSTRUCTIONS
Thank you for allowing  and our Podiatry team to participate in your care. Please call our office at [phone] with scheduling questions or with any other questions or concerns.

## 2022-10-20 ENCOUNTER — OFFICE VISIT (OUTPATIENT)
Dept: PODIATRY | Facility: OTHER | Age: 82
End: 2022-10-20
Attending: PODIATRIST
Payer: MEDICARE

## 2022-10-20 ENCOUNTER — APPOINTMENT (OUTPATIENT)
Dept: LAB | Facility: OTHER | Age: 82
End: 2022-10-20
Attending: PODIATRIST
Payer: MEDICARE

## 2022-10-20 VITALS
OXYGEN SATURATION: 93 % | TEMPERATURE: 97.9 F | DIASTOLIC BLOOD PRESSURE: 85 MMHG | HEART RATE: 102 BPM | SYSTOLIC BLOOD PRESSURE: 145 MMHG

## 2022-10-20 DIAGNOSIS — E11.621 DIABETIC ULCER OF TOE OF LEFT FOOT ASSOCIATED WITH TYPE 2 DIABETES MELLITUS, LIMITED TO BREAKDOWN OF SKIN (H): Primary | ICD-10-CM

## 2022-10-20 DIAGNOSIS — M47.816 LUMBAR SPONDYLOSIS: ICD-10-CM

## 2022-10-20 DIAGNOSIS — Z86.31 PERSONAL HISTORY OF DIABETIC FOOT ULCER: ICD-10-CM

## 2022-10-20 DIAGNOSIS — E11.42 DIABETIC POLYNEUROPATHY ASSOCIATED WITH TYPE 2 DIABETES MELLITUS (H): ICD-10-CM

## 2022-10-20 DIAGNOSIS — L85.3 XEROSIS OF SKIN: ICD-10-CM

## 2022-10-20 DIAGNOSIS — L84 CALLUS OF FOOT: ICD-10-CM

## 2022-10-20 DIAGNOSIS — M21.969 LOSS OF PROTECTIVE SENSATION OF SKIN OF DEFORMED FOOT: ICD-10-CM

## 2022-10-20 DIAGNOSIS — L60.3 ONYCHODYSTROPHY: ICD-10-CM

## 2022-10-20 DIAGNOSIS — R20.8 LOSS OF PROTECTIVE SENSATION OF SKIN OF DEFORMED FOOT: ICD-10-CM

## 2022-10-20 DIAGNOSIS — M10.9 GOUT, UNSPECIFIED CAUSE, UNSPECIFIED CHRONICITY, UNSPECIFIED SITE: ICD-10-CM

## 2022-10-20 DIAGNOSIS — E11.9 DIABETES MELLITUS TYPE 2, NONINSULIN DEPENDENT (H): ICD-10-CM

## 2022-10-20 DIAGNOSIS — L97.521 DIABETIC ULCER OF TOE OF LEFT FOOT ASSOCIATED WITH TYPE 2 DIABETES MELLITUS, LIMITED TO BREAKDOWN OF SKIN (H): Primary | ICD-10-CM

## 2022-10-20 LAB — URATE SERPL-MCNC: 4.2 MG/DL (ref 3.4–7)

## 2022-10-20 PROCEDURE — 11721 DEBRIDE NAIL 6 OR MORE: CPT | Performed by: PODIATRIST

## 2022-10-20 PROCEDURE — 36415 COLL VENOUS BLD VENIPUNCTURE: CPT | Mod: ZL | Performed by: PODIATRIST

## 2022-10-20 PROCEDURE — 99212 OFFICE O/P EST SF 10 MIN: CPT | Mod: 25 | Performed by: PODIATRIST

## 2022-10-20 PROCEDURE — 84550 ASSAY OF BLOOD/URIC ACID: CPT | Mod: ZL | Performed by: PODIATRIST

## 2022-10-20 PROCEDURE — G0463 HOSPITAL OUTPT CLINIC VISIT: HCPCS | Mod: 25

## 2022-10-20 ASSESSMENT — PAIN SCALES - GENERAL: PAINLEVEL: NO PAIN (0)

## 2022-10-20 NOTE — PROGRESS NOTES
Chief complaint: Patient presents with:  Toenail: Trimming      History of Present Illness: This 82 year old NIDDM II male is seen for post-op management for a partial LEFT partial fifth toe amputation on 06/27/2022 and for a diabetic foot exam and high risk nail debridement.    He says he still has a wound with a little drainage on the LEFT lateral fifth toe. His  helps him apply a Mepilex Ag dressing every few days. He has no pain on the toe unless he bumps it.    He is wearing regular DM shoes. He would like to see the orthotist, Traci Hernandez. He thinks he got his most recent Dm shoes toward the end of last year or the beginning of 2022. He does not think he needs new shoes because he does not wear them a lot.    He has his usual burning, tingling, and numbness in his feet.     Lab Results   Component Value Date    A1C 6.3 06/06/2022    A1C 5.6 03/04/2022    A1C 5.3 08/05/2021    A1C 6.5 05/06/2021       Last HbA1C was 6.3% on 06/06/2022.    ---Previously 5.6% on 03/04/2022.    ---Previously 5.3% on 08/05/2021.      No further pedal complaints today.         BP (!) 162/96 (BP Location: Left arm, Patient Position: Sitting, Cuff Size: Adult Regular)   Pulse 105   Temp 97.9  F (36.6  C) (Tympanic)   SpO2 93%     Patient Active Problem List   Diagnosis     Urinary retention     Generalized weakness     Type 2 diabetes mellitus with stage 3 chronic kidney disease, without long-term current use of insulin (H)     Tachycardia     Hypoxia     Other acute pulmonary embolism with acute cor pulmonale (H)     Dyspnea, unspecified type     Benign prostatic hyperplasia with urinary retention     Chronic kidney disease, stage III (moderate) (H)     Chronic obstructive lung disease (H)     Chronic painful diabetic neuropathy (H)     Erectile dysfunction     Essential hypertension     Gout, unspecified     History of CVA (cerebrovascular accident)     Hyperlipidemia     IBS (irritable bowel syndrome)     Lumbar  spondylosis     Mild concentric left ventricular hypertrophy (LVH)     Moderate episode of recurrent major depressive disorder (H)     Spinal stenosis of lumbar region without neurogenic claudication     Uncomplicated alcohol dependence (H)     Nursing Home Visit     Onychomycosis of left great toe     Pincer nail deformity     Complicated UTI (urinary tract infection)     Sepsis with acute renal failure (H)       Past Surgical History:   Procedure Laterality Date     AMPUTATE TOE(S) Left 6/27/2022    Procedure: Left fifth toe partial versus full amputation;  Surgeon: Meenu Orourke DPM;  Location: [facility]     CYSTOSCOPY, TRANSURETHRAL RESECTION (TUR) PROSTATE, COMBINED N/A 10/15/2021    Procedure: CYSTOSCOPY, WITH TRANSURETHRAL RESECTION PROSTATE;  Surgeon: Indu De Leon MD;  Location: [facility]     HERNIA REPAIR       PHACOEMULSIFICATION WITH STANDARD INTRAOCULAR LENS IMPLANT Left 12/27/2021    Procedure: PHACOEMULSIFICATION CATARACT EXTRACTION POSTERIOR CHAMBER LENS LEFT EYE;  Surgeon: Phillip Maldonado MD;  Location: [facility]     PHACOEMULSIFICATION WITH STANDARD INTRAOCULAR LENS IMPLANT Right 1/11/2022    Procedure: PHACOEMULSIFICATION CATARACT EXTRACTION POSTERIOR CHAMBER LENS RIGHT EYE;  Surgeon: Phillip Maldonado MD;  Location: [facility]       Current Outpatient Medications   Medication     acetaminophen (TYLENOL) 500 MG tablet     albuterol (PROAIR HFA/PROVENTIL HFA/VENTOLIN HFA) 108 (90 Base) MCG/ACT inhaler     allopurinol (ZYLOPRIM) 100 MG tablet     amLODIPine (NORVASC) 2.5 MG tablet     amoxicillin-clavulanate (AUGMENTIN) 875-125 MG tablet     atorvastatin (LIPITOR) 80 MG tablet     carvedilol (COREG) 25 MG tablet     finasteride (PROSCAR) 5 MG tablet     gabapentin (NEURONTIN) 300 MG capsule     HYDROcodone-acetaminophen (NORCO) 5-325 MG tablet     metFORMIN (GLUCOPHAGE XR) 500 MG 24 hr tablet     multivitamin, therapeutic (THERA-VIT) TABS tablet     semaglutide (OZEMPIC, 0.25 OR 0.5 MG/DOSE,) 2 MG/1.5ML  SOPN pen     sildenafil (VIAGRA) 100 MG tablet     tamsulosin (FLOMAX) 0.4 MG capsule     ULTICARE MINI 31G X 6 MM insulin pen needle     vitamin B-12 (CYANOCOBALAMIN) 250 MCG tablet     vitamin C (ASCORBIC ACID) 500 MG tablet     XARELTO ANTICOAGULANT 20 MG TABS tablet     No current facility-administered medications for this visit.          Allergies   Allergen Reactions     Ace Inhibitors      Per [facility]: hyperkalemia.  Pt doesn't know if he is allergic     Ceclor [Cefaclor] Hives     Sulfa Drugs      Pt unsure.        History reviewed. No pertinent family history.    Social History     Socioeconomic History     Marital status: Single     Spouse name: None     Number of children: None     Years of education: None     Highest education level: None   Occupational History     None   Tobacco Use     Smoking status: Never Smoker     Smokeless tobacco: Former User     Types: Chew   Substance and Sexual Activity     Alcohol use: Not Currently     Drug use: Never     Sexual activity: None   Other Topics Concern     Parent/sibling w/ CABG, MI or angioplasty before 65F 55M? Not Asked   Social History Narrative     None     Social Determinants of Health     Financial Resource Strain:      Difficulty of Paying Living Expenses:    Food Insecurity:      Worried About Running Out of Food in the Last Year:      Ran Out of Food in the Last Year:    Transportation Needs:      Lack of Transportation (Medical):      Lack of Transportation (Non-Medical):    Physical Activity:      Days of Exercise per Week:      Minutes of Exercise per Session:    Stress:      Feeling of Stress :    Social Connections:      Frequency of Communication with Friends and Family:      Frequency of Social Gatherings with Friends and Family:      Attends Yazdanism Services:      Active Member of Clubs or Organizations:      Attends Club or Organization Meetings:      Marital Status:    Intimate Partner Violence:      Fear of Current or Ex-Partner:       Emotionally Abused:      Physically Abused:      Sexually Abused:        ROS: 10 point ROS neg other than the symptoms noted above in the HPI.  EXAM  Constitutional: healthy, alert and no distress    Psychiatric: mentation appears normal and affect normal/bright    VASCULAR:  -Dorsalis pedis pulse +2/4 b/l  -Posterior tibial pulse +1/4 b/l  -Capillary refill time < 3 seconds to b/l hallux  -Hair growth absent to b/l anterior legs and ankles  NEURO:  -Epicritic and protective sensation absent to the bilateral foot    DERM:  -Skin temperature within normal limits to bilateral foot  -Toenails elongated, thickened, dystrophic and discolored x 10    Wound Location:  LEFT lateral distal fifth toe partial amputation site    10/20/2022  Measurement:  0.4cm x 0.4cm x superficial through skin layer only  Drainage:  Minimal serous  Odor:  None  Undermining:  Minimal  Edges:  Intact  Base:  100% viable  Surrounding Skin: Intact and mildly macerated  No severe erythema, no ascending erythema, no calor, no purulence, no malodor, no other signs of infection.    08/31/2022  Measurement:  0.1cm x 0.1cm x superficial through skin layer only  Drainage:  Minimal serous  Odor:  None  Undermining:  Minimal  Edges:  Intact  Base:  100% viable  Surrounding Skin: Intact   No severe erythema, no ascending erythema, no calor, no purulence, no malodor, no other signs of infection.    08/09/2022: Well adhered scab with no open wounds and no drainage.   07/21/2022:  0.5cm x 0.4cm x 0.1cm to subcutaneous tissue       MSK:  -Adductovarus rotation of the bilateral fifth toe  -Muscle strength of ankles +5/5 for dorsiflexion, plantarflexion, ABDUction and ADDuction b/l  LEFT FOOT RADIOGRAPH 04/25/2022  IMPRESSION: No definite bone destruction.  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 Treatment options discussed with no educational barriers noted.    -Toenails last debrided on 08/09/2022  -Patient is status post Left fifth toe partial amputation (DOS: 06/27/2022).     -High risk toenail debridement x 10 toenails without incident    -Minimal debridement required on the LEFT lateral fifth toe. Wound is through skin layer only. Once there is no drainage on the toe for two dressing change in a row, he may discontinue the dressing.   -Wound is almost healed.  ---Applied Mepilex Ag to the toe. Patient to change this dressing every few days until the wound is healed without any drainage.  -Patient was instructed to look for worsening signs of infection (redness, swelling, pain, purulence, fever, chills, nausea, vomiting) if the hs to change the dressing or if he develops increased pain to the toe.    -Patient in agreement with the above treatment plan and all of patient's questions were answered.      Return to clinic to three weeks for LEFT fifth toe ulcer      Meenu Orourke DPM, DILIA

## 2022-10-20 NOTE — NURSING NOTE
"Chief Complaint   Patient presents with     Toenail     Trimming       Initial BP (!) 162/96 (BP Location: Left arm, Patient Position: Sitting, Cuff Size: Adult Regular)   Pulse 105   Temp 97.9  F (36.6  C) (Tympanic)   SpO2 93%  Estimated body mass index is 25.33 kg/m  as calculated from the following:    Height as of 6/27/22: 1.854 m (6' 1\").    Weight as of 8/31/22: 87.1 kg (192 lb).  Medication Reconciliation: complete  Rae Leon  "

## 2022-10-30 ENCOUNTER — HEALTH MAINTENANCE LETTER (OUTPATIENT)
Age: 82
End: 2022-10-30

## 2022-11-08 DIAGNOSIS — I10 ESSENTIAL HYPERTENSION: ICD-10-CM

## 2022-11-09 RX ORDER — CARVEDILOL 25 MG/1
TABLET ORAL
Qty: 180 TABLET | Refills: 0 | Status: SHIPPED | OUTPATIENT
Start: 2022-11-09 | End: 2023-02-03

## 2022-11-09 RX ORDER — AMLODIPINE BESYLATE 2.5 MG/1
2.5 TABLET ORAL DAILY
Qty: 90 TABLET | Refills: 0 | Status: SHIPPED | OUTPATIENT
Start: 2022-11-09 | End: 2023-02-03

## 2022-11-09 NOTE — TELEPHONE ENCOUNTER
norvasc      Last Written Prescription Date:  8/19/22  Last Fill Quantity: 90,   # refills: 0  Last Office Visit: 6/23/22  Future Office visit:    Next 5 appointments (look out 90 days)    Dec 08, 2022  2:30 PM  (Arrive by 2:15 PM)  Office Visit with Zoila Jones MD  [facility] ([facility] ) [address] [place] [id number]  [phone]   Dec 22, 2022 12:30 PM  (Arrive by 12:15 PM)  Return Visit with Meenu Orourke DPM  [facility] ([facility] ) [address] [place] [address] [phone]

## 2022-11-09 NOTE — TELEPHONE ENCOUNTER
coreg      Last Written Prescription Date:  8/17/22  Last Fill Quantity: 180,   # refills: 0  Last Office Visit: 6/23/22  Future Office visit:    Next 5 appointments (look out 90 days)    Dec 08, 2022  2:30 PM  (Arrive by 2:15 PM)  Office Visit with Zoila Jones MD  [facility] ([facility] ) [address] [place] [id number]  [phone]   Dec 22, 2022 12:30 PM  (Arrive by 12:15 PM)  Return Visit with Meenu Orourke DPM  [facility] ([facility] ) [address] [place] [address] [phone]

## 2022-12-01 DIAGNOSIS — N18.31 TYPE 2 DIABETES MELLITUS WITH STAGE 3A CHRONIC KIDNEY DISEASE, WITHOUT LONG-TERM CURRENT USE OF INSULIN (H): ICD-10-CM

## 2022-12-01 DIAGNOSIS — E11.22 TYPE 2 DIABETES MELLITUS WITH STAGE 3A CHRONIC KIDNEY DISEASE, WITHOUT LONG-TERM CURRENT USE OF INSULIN (H): ICD-10-CM

## 2022-12-01 NOTE — PROGRESS NOTES
Assessment & Plan     Type 2 diabetes mellitus with stage 3a chronic kidney disease, without long-term current use of insulin (H)  Update a1c today.  Then will have to change ozempic due to insurance covereage  - Comprehensive metabolic panel (BMP + Alb, Alk Phos, ALT, AST, Total. Bili, TP); Future  - Hemoglobin A1c; Future  - CBC with platelets and differential; Future    Essential hypertension  Stable.  - Comprehensive metabolic panel (BMP + Alb, Alk Phos, ALT, AST, Total. Bili, TP); Future  - CBC with platelets and differential; Future    Hyperlipidemia, unspecified hyperlipidemia type  Stable - fasting labs due 3/2023.  - Comprehensive metabolic panel (BMP + Alb, Alk Phos, ALT, AST, Total. Bili, TP); Future    Stage 3a chronic kidney disease (H)  Updating lab today.  - Comprehensive metabolic panel (BMP + Alb, Alk Phos, ALT, AST, Total. Bili, TP); Future  - CBC with platelets and differential; Future    Centrilobular emphysema (H)  Stable.    Anxiety  Mostly situational.  Driving.  Especially longer drives.  Requesting to restart Prozac.  Was on it in the past.  Will start low dose.  Discussed importance of compliance.  Follow up 3 months.  Sooner if concerns.  - FLUoxetine (PROZAC) 10 MG capsule; Take 1 capsule (10 mg) by mouth daily    Osteoarthritis, unspecified osteoarthritis type, unspecified site  Add voltaren to hands - prefers topical agent.  Disability parking permit completed - osteoarthritis, falls risk, cane use, diabetic neuropathy.    Chronic painful diabetic neuropathy (H)  As above.  Follows with podiatry.       Patient Instructions   Labs today - will call with results.  Then once a1c reviewed - can look at alterative for Ozempic due to insurance change.    Trial of Voltaren gel topical to hands for pain - over the counter.    Restart the Prozac/Fluoxetine 10 mg daily for anxiety - take regularly - takes time to take effect.    Disability parking permit completed -  permanent.                Return in about 3 months (around 3/8/2023) for diabetes; fasting labs, depression/anxiety.    Zoila Azul MD  [facility] - ALEXA Cruz is a 82 year old, presenting for the following health issues:  Diabetes, Hypertension, Lipids, Depression, and Anxiety      HPI     Handicap parking permit - has completed.  Osteoarthritis.  Falls risk.    Good appetite.  Weight overall stable.    1 fall - tripped in the dark.  No loss of consciouness.  No injury.    Diabetes Follow-up    How often are you checking your blood sugar? Not at all    What concerns do you have today about your diabetes? None     Do you have any of these symptoms? (Select all that apply)  Numbness in feet and Burning in feet     Dr Orourke - podiatry - follow up 12/22/22    amaryl stopped prior    Ozempic started 3/2022 - 0.25 mg- but no longer covered in 2023 - see form      BP Readings from Last 2 Encounters:   12/08/22 138/84   10/20/22 (!) 145/85     Hemoglobin A1C (%)   Date Value   06/06/2022 6.3 (H)   03/04/2022 5.6   05/06/2021 6.5 (H)     LDL Cholesterol Calculated (mg/dL)   Date Value   03/04/2022 55       Hypertension Follow-up    Do you check your blood pressure regularly outside of the clinic? No     Are you following a low salt diet? No    Are your blood pressures ever more than 140 on the top number (systolic) OR more   than 90 on the bottom number (diastolic), for example 140/90? No    Hyperlipidemia Follow-Up - fasting labs 3/2022    Are you regularly taking any medication or supplement to lower your cholesterol?   Yes- lipitor    Are you having muscle aches or other side effects that you think could be caused by your cholesterol lowering medication?  Yes- hard to say has arthritis is not sure if he gets muscle aches from that medication    Depression and Anxiety Follow-Up    How are you doing with your depression since your last visit? No change    How are you doing with your  anxiety since your last visit?  Has anxiety while driving for any distance    Are you having other symptoms that might be associated with depression or anxiety? No    Have you had a significant life event? No     Do you have any concerns with your use of alcohol or other drugs? No     Prior fluoxetine - requesting trial again    Girlfriend in [place]    No accidents    Social History     Tobacco Use     Smoking status: Never     Smokeless tobacco: Former     Types: Chew   Substance Use Topics     Alcohol use: Yes     Comment: daily, 4 today     Drug use: Never     PHQ 3/14/2022 4/4/2022 12/8/2022   PHQ-9 Total Score 9 8 0   Q9: Thoughts of better off dead/self-harm past 2 weeks Several days Not at all Not at all     SHANNON-7 SCORE 3/14/2022 4/4/2022 12/8/2022   Total Score 5 0 0     Last PHQ-9 12/8/2022   1.  Little interest or pleasure in doing things 0   2.  Feeling down, depressed, or hopeless 0   3.  Trouble falling or staying asleep, or sleeping too much 0   4.  Feeling tired or having little energy 0   5.  Poor appetite or overeating 0   6.  Feeling bad about yourself 0   7.  Trouble concentrating 0   8.  Moving slowly or restless 0   Q9: Thoughts of better off dead/self-harm past 2 weeks 0   PHQ-9 Total Score 0   Difficulty at work, home, or with people -     SHANNON-7  12/8/2022   1. Feeling nervous, anxious, or on edge 0   2. Not being able to stop or control worrying 0   3. Worrying too much about different things 0   4. Trouble relaxing 0   5. Being so restless that it is hard to sit still 0   6. Becoming easily annoyed or irritable 0   7. Feeling afraid, as if something awful might happen 0   SHANNON-7 Total Score 0   If you checked any problems, how difficult have they made it for you to do your work, take care of things at home, or get along with other people? -       Suicide Assessment Five-step Evaluation and Treatment (SAFE-T)    Chronic anticoagulation -   From phone 7/6/22  Notify patient of E consult  "recommendations from hematology -   He should be able to see on mychart as well.     1. Echo and VQ Scan to evaluate for chronic clot (CTEP).  2. Changing to Apixiban if covered by insurance.  3. Could consider stopping anticoagulation given provoked clot, but also given family history - could continue indefinitely.    Patient discussed with nephew - cardiologist - said continue as is unless symptoms.,      Review of Systems   Constitutional, HEENT, cardiovascular, pulmonary, gi and gu systems are negative, except as otherwise noted.      Objective    /84 (BP Location: Left arm, Patient Position: Sitting, Cuff Size: Adult Regular)   Pulse 91   Temp 98.4  F (36.9  C) (Tympanic)   Ht 1.854 m (6' 1\")   Wt 83.9 kg (185 lb)   SpO2 96%   BMI 24.41 kg/m    Body mass index is 24.41 kg/m .  Physical Exam   GENERAL: healthy, alert and no distress  EYES: Eyes grossly normal to inspection, PERRL and conjunctivae and sclerae normal  NECK: no adenopathy, no asymmetry, masses, or scars and thyroid normal to palpation  RESP: lungs clear to auscultation - no rales, rhonchi or wheezes  CV: regular rate and rhythm, normal S1 S2, no S3 or S4, no murmur, click or rub, no peripheral edema and peripheral pulses strong  ABDOMEN: soft, nontender, no hepatosplenomegaly, no masses and bowel sounds normal  MS: trace edema lower legs  PSYCH: mentation appears normal, affect normal/bright    Labs pending                "

## 2022-12-05 RX ORDER — SEMAGLUTIDE 1.34 MG/ML
0.5 INJECTION, SOLUTION SUBCUTANEOUS
Qty: 3 ML | Refills: 0 | Status: SHIPPED | OUTPATIENT
Start: 2022-12-05 | End: 2023-02-23

## 2022-12-08 ENCOUNTER — OFFICE VISIT (OUTPATIENT)
Dept: FAMILY MEDICINE | Facility: OTHER | Age: 82
End: 2022-12-08
Attending: FAMILY MEDICINE
Payer: MEDICARE

## 2022-12-08 VITALS
HEART RATE: 91 BPM | BODY MASS INDEX: 24.52 KG/M2 | SYSTOLIC BLOOD PRESSURE: 138 MMHG | DIASTOLIC BLOOD PRESSURE: 84 MMHG | HEIGHT: 73 IN | WEIGHT: 185 LBS | OXYGEN SATURATION: 96 % | TEMPERATURE: 98.4 F

## 2022-12-08 DIAGNOSIS — I10 ESSENTIAL HYPERTENSION: ICD-10-CM

## 2022-12-08 DIAGNOSIS — E78.5 HYPERLIPIDEMIA, UNSPECIFIED HYPERLIPIDEMIA TYPE: ICD-10-CM

## 2022-12-08 DIAGNOSIS — M19.90 OSTEOARTHRITIS, UNSPECIFIED OSTEOARTHRITIS TYPE, UNSPECIFIED SITE: ICD-10-CM

## 2022-12-08 DIAGNOSIS — E11.40 CHRONIC PAINFUL DIABETIC NEUROPATHY (H): ICD-10-CM

## 2022-12-08 DIAGNOSIS — E11.22 TYPE 2 DIABETES MELLITUS WITH STAGE 3A CHRONIC KIDNEY DISEASE, WITHOUT LONG-TERM CURRENT USE OF INSULIN (H): Primary | ICD-10-CM

## 2022-12-08 DIAGNOSIS — N18.31 STAGE 3A CHRONIC KIDNEY DISEASE (H): ICD-10-CM

## 2022-12-08 DIAGNOSIS — J43.2 CENTRILOBULAR EMPHYSEMA (H): ICD-10-CM

## 2022-12-08 DIAGNOSIS — F41.9 ANXIETY: ICD-10-CM

## 2022-12-08 DIAGNOSIS — N18.31 TYPE 2 DIABETES MELLITUS WITH STAGE 3A CHRONIC KIDNEY DISEASE, WITHOUT LONG-TERM CURRENT USE OF INSULIN (H): Primary | ICD-10-CM

## 2022-12-08 LAB
ALBUMIN SERPL BCG-MCNC: 4.2 G/DL (ref 3.5–5.2)
ALP SERPL-CCNC: 91 U/L (ref 40–129)
ALT SERPL W P-5'-P-CCNC: 15 U/L (ref 10–50)
ANION GAP SERPL CALCULATED.3IONS-SCNC: 12 MMOL/L (ref 7–15)
AST SERPL W P-5'-P-CCNC: 17 U/L (ref 10–50)
BASOPHILS # BLD AUTO: 0 10E3/UL (ref 0–0.2)
BASOPHILS NFR BLD AUTO: 0 %
BILIRUB SERPL-MCNC: 0.5 MG/DL
BUN SERPL-MCNC: 15.5 MG/DL (ref 8–23)
CALCIUM SERPL-MCNC: 9.2 MG/DL (ref 8.8–10.2)
CHLORIDE SERPL-SCNC: 103 MMOL/L (ref 98–107)
CREAT SERPL-MCNC: 1.37 MG/DL (ref 0.67–1.17)
DEPRECATED HCO3 PLAS-SCNC: 24 MMOL/L (ref 22–29)
EOSINOPHIL # BLD AUTO: 0.1 10E3/UL (ref 0–0.7)
EOSINOPHIL NFR BLD AUTO: 1 %
ERYTHROCYTE [DISTWIDTH] IN BLOOD BY AUTOMATED COUNT: 12.8 % (ref 10–15)
EST. AVERAGE GLUCOSE BLD GHB EST-MCNC: 189 MG/DL
GFR SERPL CREATININE-BSD FRML MDRD: 52 ML/MIN/1.73M2
GLUCOSE SERPL-MCNC: 177 MG/DL (ref 70–99)
HBA1C MFR BLD: 8.2 %
HCT VFR BLD AUTO: 42.7 % (ref 40–53)
HGB BLD-MCNC: 14.6 G/DL (ref 13.3–17.7)
IMM GRANULOCYTES # BLD: 0 10E3/UL
IMM GRANULOCYTES NFR BLD: 0 %
LYMPHOCYTES # BLD AUTO: 1 10E3/UL (ref 0.8–5.3)
LYMPHOCYTES NFR BLD AUTO: 14 %
MCH RBC QN AUTO: 32.2 PG (ref 26.5–33)
MCHC RBC AUTO-ENTMCNC: 34.2 G/DL (ref 31.5–36.5)
MCV RBC AUTO: 94 FL (ref 78–100)
MONOCYTES # BLD AUTO: 0.6 10E3/UL (ref 0–1.3)
MONOCYTES NFR BLD AUTO: 8 %
NEUTROPHILS # BLD AUTO: 5.6 10E3/UL (ref 1.6–8.3)
NEUTROPHILS NFR BLD AUTO: 77 %
NRBC # BLD AUTO: 0 10E3/UL
NRBC BLD AUTO-RTO: 0 /100
PLATELET # BLD AUTO: 185 10E3/UL (ref 150–450)
POTASSIUM SERPL-SCNC: 4.6 MMOL/L (ref 3.4–5.3)
PROT SERPL-MCNC: 6.8 G/DL (ref 6.4–8.3)
RBC # BLD AUTO: 4.53 10E6/UL (ref 4.4–5.9)
SODIUM SERPL-SCNC: 139 MMOL/L (ref 136–145)
WBC # BLD AUTO: 7.3 10E3/UL (ref 4–11)

## 2022-12-08 PROCEDURE — G0463 HOSPITAL OUTPT CLINIC VISIT: HCPCS

## 2022-12-08 PROCEDURE — 36415 COLL VENOUS BLD VENIPUNCTURE: CPT | Mod: ZL | Performed by: FAMILY MEDICINE

## 2022-12-08 PROCEDURE — 83036 HEMOGLOBIN GLYCOSYLATED A1C: CPT | Mod: ZL | Performed by: FAMILY MEDICINE

## 2022-12-08 PROCEDURE — 85025 COMPLETE CBC W/AUTO DIFF WBC: CPT | Mod: ZL | Performed by: FAMILY MEDICINE

## 2022-12-08 PROCEDURE — 80053 COMPREHEN METABOLIC PANEL: CPT | Mod: ZL | Performed by: FAMILY MEDICINE

## 2022-12-08 PROCEDURE — 99214 OFFICE O/P EST MOD 30 MIN: CPT | Performed by: FAMILY MEDICINE

## 2022-12-08 RX ORDER — FLUOXETINE 10 MG/1
10 CAPSULE ORAL DAILY
Qty: 90 CAPSULE | Refills: 1 | Status: SHIPPED | OUTPATIENT
Start: 2022-12-08 | End: 2023-05-01

## 2022-12-08 RX ORDER — DULAGLUTIDE 0.75 MG/.5ML
0.75 INJECTION, SOLUTION SUBCUTANEOUS
Qty: 2 ML | Refills: 0 | Status: SHIPPED | OUTPATIENT
Start: 2022-12-08 | End: 2023-01-04

## 2022-12-08 ASSESSMENT — ANXIETY QUESTIONNAIRES
5. BEING SO RESTLESS THAT IT IS HARD TO SIT STILL: NOT AT ALL
1. FEELING NERVOUS, ANXIOUS, OR ON EDGE: NOT AT ALL
GAD7 TOTAL SCORE: 0
6. BECOMING EASILY ANNOYED OR IRRITABLE: NOT AT ALL
4. TROUBLE RELAXING: NOT AT ALL
2. NOT BEING ABLE TO STOP OR CONTROL WORRYING: NOT AT ALL
7. FEELING AFRAID AS IF SOMETHING AWFUL MIGHT HAPPEN: NOT AT ALL
GAD7 TOTAL SCORE: 0
3. WORRYING TOO MUCH ABOUT DIFFERENT THINGS: NOT AT ALL

## 2022-12-08 ASSESSMENT — PATIENT HEALTH QUESTIONNAIRE - PHQ9: SUM OF ALL RESPONSES TO PHQ QUESTIONS 1-9: 0

## 2022-12-08 NOTE — PATIENT INSTRUCTIONS
Labs today - will call with results.  Then once a1c reviewed - can look at alterative for Ozempic due to insurance change.    Trial of Voltaren gel topical to hands for pain - over the counter.    Restart the Prozac/Fluoxetine 10 mg daily for anxiety - take regularly - takes time to take effect.    Disability parking permit completed - permanent.

## 2022-12-14 ENCOUNTER — TELEPHONE (OUTPATIENT)
Dept: FAMILY MEDICINE | Facility: OTHER | Age: 82
End: 2022-12-14

## 2022-12-14 NOTE — TELEPHONE ENCOUNTER
See lab results notes - patient has not been notified.    Please try to call again and please mail results letter with comments.

## 2022-12-22 ENCOUNTER — OFFICE VISIT (OUTPATIENT)
Dept: PODIATRY | Facility: OTHER | Age: 82
End: 2022-12-22
Attending: PODIATRIST
Payer: MEDICARE

## 2022-12-22 VITALS
DIASTOLIC BLOOD PRESSURE: 68 MMHG | SYSTOLIC BLOOD PRESSURE: 104 MMHG | OXYGEN SATURATION: 94 % | TEMPERATURE: 97.6 F | HEART RATE: 103 BPM

## 2022-12-22 DIAGNOSIS — R20.8 LOSS OF PROTECTIVE SENSATION OF SKIN OF DEFORMED FOOT: ICD-10-CM

## 2022-12-22 DIAGNOSIS — Z86.31 PERSONAL HISTORY OF DIABETIC FOOT ULCER: ICD-10-CM

## 2022-12-22 DIAGNOSIS — E11.42 DIABETIC POLYNEUROPATHY ASSOCIATED WITH TYPE 2 DIABETES MELLITUS (H): ICD-10-CM

## 2022-12-22 DIAGNOSIS — M47.816 LUMBAR SPONDYLOSIS: ICD-10-CM

## 2022-12-22 DIAGNOSIS — L60.3 ONYCHODYSTROPHY: Primary | ICD-10-CM

## 2022-12-22 DIAGNOSIS — E11.9 DIABETES MELLITUS TYPE 2, NONINSULIN DEPENDENT (H): ICD-10-CM

## 2022-12-22 DIAGNOSIS — Z89.422 HISTORY OF AMPUTATION OF LESSER TOE OF LEFT FOOT (H): ICD-10-CM

## 2022-12-22 DIAGNOSIS — L85.3 XEROSIS OF SKIN: ICD-10-CM

## 2022-12-22 DIAGNOSIS — L84 CALLUS OF FOOT: ICD-10-CM

## 2022-12-22 DIAGNOSIS — M21.969 LOSS OF PROTECTIVE SENSATION OF SKIN OF DEFORMED FOOT: ICD-10-CM

## 2022-12-22 PROCEDURE — G0463 HOSPITAL OUTPT CLINIC VISIT: HCPCS | Mod: 25

## 2022-12-22 PROCEDURE — 11721 DEBRIDE NAIL 6 OR MORE: CPT | Mod: 59 | Performed by: PODIATRIST

## 2022-12-22 PROCEDURE — 11055 PARING/CUTG B9 HYPRKER LES 1: CPT | Performed by: PODIATRIST

## 2022-12-22 ASSESSMENT — PAIN SCALES - GENERAL: PAINLEVEL: NO PAIN (0)

## 2022-12-22 NOTE — PROGRESS NOTES
Chief complaint: Patient presents with:  Toenail: Trimming      History of Present Illness: This 82 year old NIDDM II male is seen for post-op management for a partial LEFT partial fifth toe amputation on 06/27/2022 and for a diabetic foot exam and high risk nail debridement.    He says he received his DM shoes and inserts. He is wondering if he should switch the inserts. He says the shoes are not well built. He would like to be scheduled for another DM shoe fitting appointment.    He has not had any open wounds and he has not had to put any dressings on his toe.    He has his usual burning, tingling, and numbness in his feet.       Last HbA1C was 8.2% on 12/09/2022.    ---Previously 6.3% on 06/06/2022.    ---Previously 5.6% on 03/04/2022.    ---Previously 5.3% on 08/05/2021.      No further pedal complaints today.         /68 (BP Location: Left arm, Patient Position: Sitting, Cuff Size: Adult Regular)   Pulse 103   Temp 97.6  F (36.4  C) (Tympanic)   SpO2 94%     Patient Active Problem List   Diagnosis     Urinary retention     Generalized weakness     Type 2 diabetes mellitus with stage 3 chronic kidney disease, without long-term current use of insulin (H)     Tachycardia     Hypoxia     Other acute pulmonary embolism with acute cor pulmonale (H)     Dyspnea, unspecified type     Benign prostatic hyperplasia with urinary retention     Chronic kidney disease, stage III (moderate) (H)     Chronic obstructive lung disease (H)     Chronic painful diabetic neuropathy (H)     Erectile dysfunction     Essential hypertension     Gout, unspecified     History of CVA (cerebrovascular accident)     Hyperlipidemia     IBS (irritable bowel syndrome)     Lumbar spondylosis     Mild concentric left ventricular hypertrophy (LVH)     Moderate episode of recurrent major depressive disorder (H)     Spinal stenosis of lumbar region without neurogenic claudication     Uncomplicated alcohol dependence (H)     Nursing Home  Visit     Onychomycosis of left great toe     Pincer nail deformity     Complicated UTI (urinary tract infection)     Sepsis with acute renal failure (H)       Past Surgical History:   Procedure Laterality Date     AMPUTATE TOE(S) Left 6/27/2022    Procedure: Left fifth toe partial versus full amputation;  Surgeon: Meenu Orourke DPM;  Location: [facility]     CYSTOSCOPY, TRANSURETHRAL RESECTION (TUR) PROSTATE, COMBINED N/A 10/15/2021    Procedure: CYSTOSCOPY, WITH TRANSURETHRAL RESECTION PROSTATE;  Surgeon: Indu De Leon MD;  Location: [facility]     HERNIA REPAIR       PHACOEMULSIFICATION WITH STANDARD INTRAOCULAR LENS IMPLANT Left 12/27/2021    Procedure: PHACOEMULSIFICATION CATARACT EXTRACTION POSTERIOR CHAMBER LENS LEFT EYE;  Surgeon: Phillip Maldonado MD;  Location: [facility]     PHACOEMULSIFICATION WITH STANDARD INTRAOCULAR LENS IMPLANT Right 1/11/2022    Procedure: PHACOEMULSIFICATION CATARACT EXTRACTION POSTERIOR CHAMBER LENS RIGHT EYE;  Surgeon: Phillip Maldonado MD;  Location: [facility]       Current Outpatient Medications   Medication     acetaminophen (TYLENOL) 500 MG tablet     albuterol (PROAIR HFA/PROVENTIL HFA/VENTOLIN HFA) 108 (90 Base) MCG/ACT inhaler     allopurinol (ZYLOPRIM) 100 MG tablet     amLODIPine (NORVASC) 2.5 MG tablet     atorvastatin (LIPITOR) 80 MG tablet     carvedilol (COREG) 25 MG tablet     dulaglutide (TRULICITY) 0.75 MG/0.5ML pen     finasteride (PROSCAR) 5 MG tablet     FLUoxetine (PROZAC) 10 MG capsule     gabapentin (NEURONTIN) 300 MG capsule     metFORMIN (GLUCOPHAGE XR) 500 MG 24 hr tablet     multivitamin, therapeutic (THERA-VIT) TABS tablet     OZEMPIC, 0.25 OR 0.5 MG/DOSE, 2 MG/1.5ML SOPN pen     sildenafil (VIAGRA) 100 MG tablet     tamsulosin (FLOMAX) 0.4 MG capsule     ULTICARE MINI 31G X 6 MM insulin pen needle     vitamin B-12 (CYANOCOBALAMIN) 250 MCG tablet     vitamin C (ASCORBIC ACID) 500 MG tablet     XARELTO ANTICOAGULANT 20 MG TABS tablet     No current  facility-administered medications for this visit.          Allergies   Allergen Reactions     Ace Inhibitors      Per [facility]: hyperkalemia.  Pt doesn't know if he is allergic     Ceclor [Cefaclor] Hives     Sulfa Drugs      Pt unsure.        History reviewed. No pertinent family history.    Social History     Socioeconomic History     Marital status: Single     Spouse name: None     Number of children: None     Years of education: None     Highest education level: None   Occupational History     None   Tobacco Use     Smoking status: Never Smoker     Smokeless tobacco: Former User     Types: Chew   Substance and Sexual Activity     Alcohol use: Not Currently     Drug use: Never     Sexual activity: None   Other Topics Concern     Parent/sibling w/ CABG, MI or angioplasty before 65F 55M? Not Asked   Social History Narrative     None     Social Determinants of Health     Financial Resource Strain:      Difficulty of Paying Living Expenses:    Food Insecurity:      Worried About Running Out of Food in the Last Year:      Ran Out of Food in the Last Year:    Transportation Needs:      Lack of Transportation (Medical):      Lack of Transportation (Non-Medical):    Physical Activity:      Days of Exercise per Week:      Minutes of Exercise per Session:    Stress:      Feeling of Stress :    Social Connections:      Frequency of Communication with Friends and Family:      Frequency of Social Gatherings with Friends and Family:      Attends Taoism Services:      Active Member of Clubs or Organizations:      Attends Club or Organization Meetings:      Marital Status:    Intimate Partner Violence:      Fear of Current or Ex-Partner:      Emotionally Abused:      Physically Abused:      Sexually Abused:        ROS: 10 point ROS neg other than the symptoms noted above in the HPI.  EXAM  Constitutional: healthy, alert and no distress    Psychiatric: mentation appears normal and affect normal/bright    VASCULAR:  -Dorsalis  pedis pulse +2/4 b/l  -Posterior tibial pulse +1/4 b/l  -Capillary refill time < 3 seconds to b/l hallux  -Hair growth absent to b/l anterior legs and ankles  NEURO:  -Epicritic and protective sensation absent to the bilateral foot    DERM:  -Skin temperature within normal limits to bilateral foot  -Toenails elongated, thickened, dystrophic and discolored x 10  -Hyperkeratotic lesion on the RIGHT lateral fifth toe   ---Minimal flaking skin on the LEFT lateral fifth toe and the LEFT distal second toe    Wound Location:  LEFT lateral distal fifth toe partial amputation site    12/22/2022: Healed    10/20/2022  Measurement:  0.4cm x 0.4cm x superficial through skin layer only  Drainage:  Minimal serous  Odor:  None  Undermining:  Minimal  Edges:  Intact  Base:  100% viable  Surrounding Skin: Intact and mildly macerated  No severe erythema, no ascending erythema, no calor, no purulence, no malodor, no other signs of infection.    08/31/2022:  0.1cm x 0.1cm x superficial through skin layer only  08/09/2022: Well adhered scab with no open wounds and no drainage.   07/21/2022:  0.5cm x 0.4cm x 0.1cm to subcutaneous tissue       MSK:  -Adductovarus rotation of the bilateral fifth toe  -Muscle strength of ankles +5/5 for dorsiflexion, plantarflexion, ABDUction and ADDuction b/l  LEFT FOOT RADIOGRAPH 04/25/2022  IMPRESSION: No definite bone destruction.  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 Treatment options discussed with no educational barriers noted.    -High risk toenail debridement x 10 toenails without incident    -Callus pared x 1 to the RIGHT lateral fifth toe  ---Patient reminded that the callus will likely return due to the underlying, prominent bone causing the callus while the patient is walking.      -Diabetic Foot Education provided. This included checking the feet daily looking for new new blisters or wounds, wearing shoes at all times when walking including around the house, and avoiding lotion application between the toes. If there are any signs of infection, the patient should present to the ED as soon as possible. Infections of the foot can be life threatening or lead to amputations of the foot or leg.    DM shoes: New referral placed through the orthotist, Traci Hernandez, per patient request on 12/22/2022. To be scheduled following the next podiatry appointment.    -Patient in agreement with the above treatment plan and all of patient's questions were answered.    Return to clinic 63+ days for a diabetic foot exam and high risk nail debridement       Meenu Orourke DPM

## 2023-01-04 DIAGNOSIS — N18.31 TYPE 2 DIABETES MELLITUS WITH STAGE 3A CHRONIC KIDNEY DISEASE, WITHOUT LONG-TERM CURRENT USE OF INSULIN (H): ICD-10-CM

## 2023-01-04 DIAGNOSIS — E11.22 TYPE 2 DIABETES MELLITUS WITH STAGE 3A CHRONIC KIDNEY DISEASE, WITHOUT LONG-TERM CURRENT USE OF INSULIN (H): ICD-10-CM

## 2023-01-04 RX ORDER — DULAGLUTIDE 0.75 MG/.5ML
0.75 INJECTION, SOLUTION SUBCUTANEOUS
Qty: 2 ML | Refills: 0 | Status: SHIPPED | OUTPATIENT
Start: 2023-01-04 | End: 2023-02-01

## 2023-01-05 RX ORDER — METFORMIN HCL 500 MG
TABLET, EXTENDED RELEASE 24 HR ORAL
Qty: 360 TABLET | Refills: 1 | Status: SHIPPED | OUTPATIENT
Start: 2023-01-05 | End: 2023-03-09

## 2023-01-29 DIAGNOSIS — N40.0 BENIGN PROSTATIC HYPERPLASIA, UNSPECIFIED WHETHER LOWER URINARY TRACT SYMPTOMS PRESENT: ICD-10-CM

## 2023-01-30 RX ORDER — TAMSULOSIN HYDROCHLORIDE 0.4 MG/1
0.4 CAPSULE ORAL EVERY EVENING
Qty: 90 CAPSULE | Refills: 1 | Status: SHIPPED | OUTPATIENT
Start: 2023-01-30 | End: 2023-07-28

## 2023-01-30 NOTE — TELEPHONE ENCOUNTER
tamsulosin (FLOMAX) 0.4 MG capsule   Last Written Prescription Date:  8-3-22  Last Fill Quantity: 90,   # refills: 1  Last Office Visit: 12-8-22  Future Office visit:    Next 5 appointments (look out 90 days)    Feb 23, 2023 11:30 AM  (Arrive by 11:15 AM)  Return Visit with Meenu Orourke DPM  [facility] ([facility] ) [address] [place] [id number] [phone]   Mar 09, 2023  2:00 PM  (Arrive by 1:45 PM)  SHORT with Zoila Jones MD  [facility] ([facility] ) [address] [place] [id number]  [phone]           Routing refill request to provider for review/approval because:

## 2023-01-31 DIAGNOSIS — N18.31 TYPE 2 DIABETES MELLITUS WITH STAGE 3A CHRONIC KIDNEY DISEASE, WITHOUT LONG-TERM CURRENT USE OF INSULIN (H): ICD-10-CM

## 2023-01-31 DIAGNOSIS — E11.22 TYPE 2 DIABETES MELLITUS WITH STAGE 3A CHRONIC KIDNEY DISEASE, WITHOUT LONG-TERM CURRENT USE OF INSULIN (H): ICD-10-CM

## 2023-01-31 NOTE — TELEPHONE ENCOUNTER
TRULICITY 0.75MG/0.5ML SOL INJ  Last Written Prescription Date:  1/4/2023  Last Fill Quantity: 2ml,   # refills: 0  Last Office Visit: 12/8/2022  Future Office visit:    Next 5 appointments (look out 90 days)    Feb 23, 2023 11:30 AM  (Arrive by 11:15 AM)  Return Visit with Meenu Orourke DPM  [facility] ([facility] ) [address] [place] [id number] [phone]   Mar 09, 2023  2:00 PM  (Arrive by 1:45 PM)  SHORT with Zoila Jones MD  [facility] ([facility] ) [address] [place] [id number]  [phone]           Routing refill request to provider for review/approval because:

## 2023-02-01 RX ORDER — DULAGLUTIDE 0.75 MG/.5ML
INJECTION, SOLUTION SUBCUTANEOUS
Qty: 2 ML | Refills: 0 | Status: SHIPPED | OUTPATIENT
Start: 2023-02-01 | End: 2023-03-06

## 2023-02-07 NOTE — PROGRESS NOTES
Assessment & Plan     Type 2 diabetes mellitus with stage 3a chronic kidney disease, without long-term current use of insulin (H)  Suspecting a1c will be above goal.  Still pending.  If is, will increase Trulicity from 0.75 mg to 1.5 mg weekly.  Continue metformin.  DRC referral.  Ongoing podiatry cares.  Eye exam due - Dr Emmanuel - patient declines scheduling until spring.  - metFORMIN (GLUCOPHAGE XR) 500 MG 24 hr tablet; TAKE 2 TABLETS (1,000 MG) BY MOUTH TWICE DAILY WITH MEALS  - Adult Eye  Referral; Future  - Lipid Profile (Chol, Trig, HDL, LDL calc); Future  - Albumin Random Urine Quantitative with Creat Ratio; Future  - Hemoglobin A1c; Future  - Basic metabolic panel; Future  - Lipid Profile (Chol, Trig, HDL, LDL calc)  - Albumin Random Urine Quantitative with Creat Ratio  - Hemoglobin A1c  - Basic metabolic panel  - Diabetes Education Referral (Alexa)    LVH (left ventricular hypertrophy)  Patient has nonspecific symptoms of poor energy, laziness.  Echo 2 years ago - LVH, hyperkinetic EF.  History of PE.  Update echo.   Consider stress testing.  - Echocardiogram Complete; Future    Hyperlipidemia, unspecified hyperlipidemia type  Continue statin.  Not fasting today but labs ran as he is unlikely to return fasting.    Essential hypertension  Stable.    Stage 3a chronic kidney disease (H)  Update lab today.     Patient Instructions   Will call with lab results.  If a1c above goal, will increase Trulicity.  Referral placed to [facility] here at clinic.  Schedule annual exam.  Ongoing foot cares with Dr Orourke.  Echocardiogram ordered to reassess heart - structure/function.    Follow up 3 months - diabetes, chronic disease management.      Return in about 3 months (around 6/9/2023) for diabetes.    Zoila Azul MD  [facility] - ALEXA Cruz is a 82 year old presenting for the following health issues:  Hypertension, Lipids, Diabetes, Anxiety, Depression, and  "chronic pain      HPI     Lives in house - independently.  .  47.  Sister in law lives block away.  Meals on Wheels    Xarelto - chronically - since large PE - 2021     Diabetes Follow-up    How often are you checking your blood sugar? Not at all    What concerns do you have today about your diabetes? None     Do you have any of these symptoms? (Select all that apply)  Numbness in feet and Burning in feet    Have you had a diabetic eye exam in the last 12 months? No     Due for eye exam - Dr Emmanuel - due for exam - awaiting to be seen in Spring    Due for labs - fasting    a1c was over 8 last time - referral to [facility] but not seen     Limited activity    \"chochoholic\"    Trulicty 0.75 mg    Metformin 1000 mg BID     Podiatry - Dr Orourke    Feels more fatigued      Hyperlipidemia Follow-Up - not fasting today -     Are you regularly taking any medication or supplement to lower your cholesterol?   Yes- statin    Are you having muscle aches or other side effects that you think could be caused by your cholesterol lowering medication?  No    Hypertension Follow-up    Do you check your blood pressure regularly outside of the clinic? No     Are you following a low salt diet? No    Are your blood pressures ever more than 140 on the top number (systolic) OR more   than 90 on the bottom number (diastolic), for example 140/90? No    BP Readings from Last 2 Encounters:   03/09/23 134/62   02/23/23 135/74     Hemoglobin A1C (%)   Date Value   12/08/2022 8.2 (H)   06/06/2022 6.3 (H)   05/06/2021 6.5 (H)     LDL Cholesterol Calculated (mg/dL)   Date Value   03/04/2022 55       Depression Followup    How are you doing with your depression since your last visit? No change    Are you having other symptoms that might be associated with depression? No    Have you had a significant life event?  No     Are you feeling anxious or having panic attacks?   No    Do you have any concerns with your use of alcohol or other drugs? " No    Social History     Tobacco Use     Smoking status: Never     Smokeless tobacco: Former     Types: Chew   Vaping Use     Vaping Use: Never used   Substance Use Topics     Alcohol use: Yes     Comment: daily, 4 today     Drug use: Never     PHQ 3/14/2022 4/4/2022 12/8/2022   PHQ-9 Total Score 9 8 0   Q9: Thoughts of better off dead/self-harm past 2 weeks Several days Not at all Not at all     SHANNON-7 SCORE 3/14/2022 4/4/2022 12/8/2022   Total Score 5 0 0     Last PHQ-9 12/8/2022   1.  Little interest or pleasure in doing things 0   2.  Feeling down, depressed, or hopeless 0   3.  Trouble falling or staying asleep, or sleeping too much 0   4.  Feeling tired or having little energy 0   5.  Poor appetite or overeating 0   6.  Feeling bad about yourself 0   7.  Trouble concentrating 0   8.  Moving slowly or restless 0   Q9: Thoughts of better off dead/self-harm past 2 weeks 0   PHQ-9 Total Score 0   Difficulty at work, home, or with people -     SHANNON-7  12/8/2022   1. Feeling nervous, anxious, or on edge 0   2. Not being able to stop or control worrying 0   3. Worrying too much about different things 0   4. Trouble relaxing 0   5. Being so restless that it is hard to sit still 0   6. Becoming easily annoyed or irritable 0   7. Feeling afraid, as if something awful might happen 0   SHANNON-7 Total Score 0   If you checked any problems, how difficult have they made it for you to do your work, take care of things at home, or get along with other people? -       Suicide Assessment Five-step Evaluation and Treatment (SAFE-T)    Pain History:  When did you first notice your pain? - Chronic Pain   Have you seen this provider for your pain in the past?   Yes   Where in your body do you have pain? Complains of multiple pain area.  Mostly arms and back  Are you seeing anyone else for your pain? Yes - was seeing chiropractor    PHQ-9 SCORE 3/14/2022 4/4/2022 12/8/2022   PHQ-9 Total Score 9 8 0       SHANNON-7 SCORE 3/14/2022 4/4/2022  12/8/2022   Total Score 5 0 0           PEG Score 3/9/2023   PEG Total Score 5.67         PDMP Review       Value Time User    State PDMP site checked  Yes 6/20/2022  1:24 PM Zoila Jones MD        Last CSA Agreement:   CSA -- Patient Level:    CSA: None found at the patient level.           Review of Systems   Constitutional, HEENT, cardiovascular, pulmonary, gi and gu systems are negative, except as otherwise noted.      Objective    /62   Pulse 88   Temp 97.6  F (36.4  C) (Tympanic)   Wt 86.2 kg (190 lb)   SpO2 92%   BMI 25.07 kg/m    Body mass index is 25.07 kg/m .  Physical Exam   GENERAL: healthy, alert and no distress  EYES: Eyes grossly normal to inspection, PERRL and conjunctivae and sclerae normal  NECK: no adenopathy, no asymmetry, masses, or scars and thyroid normal to palpation  RESP: lungs clear to auscultation - no rales, rhonchi or wheezes  CV: regular rate and rhythm, normal S1 S2, no S3 or S4, no murmur, click or rub, no peripheral edema and peripheral pulses strong  ABDOMEN: soft, nontender, no hepatosplenomegaly, no masses and bowel sounds normal  MS: normal muscle tone and no edema  NEURO: Normal strength and tone, mentation intact and speech normal  PSYCH: mentation appears normal, affect normal/bright    No results found for this or any previous visit (from the past 24 hour(s)).  A1c, lipids, bmp pending.    Patient did not leave urine sample for micro albumin.

## 2023-02-11 DIAGNOSIS — E11.40 CHRONIC PAINFUL DIABETIC NEUROPATHY (H): Primary | ICD-10-CM

## 2023-02-14 RX ORDER — GABAPENTIN 300 MG/1
CAPSULE ORAL
Qty: 270 CAPSULE | Refills: 3 | Status: SHIPPED | OUTPATIENT
Start: 2023-02-14 | End: 2024-03-18

## 2023-02-14 NOTE — TELEPHONE ENCOUNTER
gabapentin (NEURONTIN) 300 MG capsule     Last Written Prescription Date:  4-15-21  Last Fill Quantity: ?,   # refills: ?  Last Office Visit: 12-8-22  Future Office visit:    Next 5 appointments (look out 90 days)    Feb 23, 2023 11:30 AM  (Arrive by 11:15 AM)  Return Visit with Meenu Orourke DPM  [facility] ([facility] ) [address] [place] [id number] [phone]   Mar 09, 2023  2:00 PM  (Arrive by 1:45 PM)  SHORT with Zoila Jones MD  [facility] ([facility] ) [address] [place] [id number]  [phone]           Routing refill request to provider for review/approval because:  Medication is reported/historical

## 2023-02-23 ENCOUNTER — OFFICE VISIT (OUTPATIENT)
Dept: PODIATRY | Facility: OTHER | Age: 83
End: 2023-02-23
Attending: PODIATRIST
Payer: MEDICARE

## 2023-02-23 VITALS
DIASTOLIC BLOOD PRESSURE: 74 MMHG | SYSTOLIC BLOOD PRESSURE: 135 MMHG | HEIGHT: 73 IN | HEART RATE: 93 BPM | BODY MASS INDEX: 24.52 KG/M2 | TEMPERATURE: 98.1 F | RESPIRATION RATE: 18 BRPM | WEIGHT: 185 LBS | OXYGEN SATURATION: 95 %

## 2023-02-23 DIAGNOSIS — L85.3 XEROSIS OF SKIN: ICD-10-CM

## 2023-02-23 DIAGNOSIS — E11.42 DIABETIC POLYNEUROPATHY ASSOCIATED WITH TYPE 2 DIABETES MELLITUS (H): ICD-10-CM

## 2023-02-23 DIAGNOSIS — M21.969 LOSS OF PROTECTIVE SENSATION OF SKIN OF DEFORMED FOOT: ICD-10-CM

## 2023-02-23 DIAGNOSIS — R20.8 LOSS OF PROTECTIVE SENSATION OF SKIN OF DEFORMED FOOT: ICD-10-CM

## 2023-02-23 DIAGNOSIS — Z86.31 PERSONAL HISTORY OF DIABETIC FOOT ULCER: ICD-10-CM

## 2023-02-23 DIAGNOSIS — L60.3 ONYCHODYSTROPHY: Primary | ICD-10-CM

## 2023-02-23 DIAGNOSIS — L84 CALLUS OF FOOT: ICD-10-CM

## 2023-02-23 DIAGNOSIS — E11.9 DIABETES MELLITUS TYPE 2, NONINSULIN DEPENDENT (H): ICD-10-CM

## 2023-02-23 DIAGNOSIS — Z89.422 HISTORY OF AMPUTATION OF LESSER TOE OF LEFT FOOT (H): ICD-10-CM

## 2023-02-23 PROCEDURE — G0463 HOSPITAL OUTPT CLINIC VISIT: HCPCS | Mod: 25

## 2023-02-23 PROCEDURE — 11056 PARNG/CUTG B9 HYPRKR LES 2-4: CPT | Performed by: PODIATRIST

## 2023-02-23 PROCEDURE — 99213 OFFICE O/P EST LOW 20 MIN: CPT | Mod: 25 | Performed by: PODIATRIST

## 2023-02-23 PROCEDURE — 11721 DEBRIDE NAIL 6 OR MORE: CPT | Performed by: PODIATRIST

## 2023-02-23 ASSESSMENT — PAIN SCALES - GENERAL: PAINLEVEL: NO PAIN (0)

## 2023-02-23 NOTE — PROGRESS NOTES
"Chief complaint: Patient presents with:  Toenail: TRIMMING       History of Present Illness: This 82 year old NIDDM II male is seen for a diabetic foot exam and high risk nail debridement and callus paring.    His  is washing his feet once a week.    He says he received his DM shoes and inserts. He is wondering if he should switch the inserts. He says the shoes are not well built. He is scheduled to see the orthotist, Traci Hernandez, after his podiatry appointment today on 02/23/2023.    He has not had any open wounds and he has not had to put any dressings on his toe.    He has his usual burning, tingling, and numbness in his feet.     The patient says he has had an issue with dry, flaking skin on his feet. This has been getting worse and he is not sure how to treat this. He is wondering if he should soak his feet every day in epsom salts and if he needs to wipe the epsom salts off of his feet.    Lastly, patient is concerned about an ingrown toenail on his LEFT hallux lateral toenail border. He says his  was worried about it when she examined his feet. He is wondering if there is an ingrown toenail.      Last HbA1C was 8.2% on 12/09/2022.    ---Previously 6.3% on 06/06/2022.    ---Previously 5.6% on 03/04/2022.    ---Previously 5.3% on 08/05/2021.      No further pedal complaints today.         /74 (BP Location: Left arm, Cuff Size: Adult Regular)   Pulse 93   Temp 98.1  F (36.7  C) (Tympanic)   Resp 18   Ht 1.854 m (6' 1\")   Wt 83.9 kg (185 lb)   SpO2 95%   BMI 24.41 kg/m      Patient Active Problem List   Diagnosis     Urinary retention     Generalized weakness     Type 2 diabetes mellitus with stage 3 chronic kidney disease, without long-term current use of insulin (H)     Tachycardia     Hypoxia     Other acute pulmonary embolism with acute cor pulmonale (H)     Dyspnea, unspecified type     Benign prostatic hyperplasia with urinary retention     Chronic kidney disease, stage " III (moderate) (H)     Chronic obstructive lung disease (H)     Chronic painful diabetic neuropathy (H)     Erectile dysfunction     Essential hypertension     Gout, unspecified     History of CVA (cerebrovascular accident)     Hyperlipidemia     IBS (irritable bowel syndrome)     Lumbar spondylosis     Mild concentric left ventricular hypertrophy (LVH)     Moderate episode of recurrent major depressive disorder (H)     Spinal stenosis of lumbar region without neurogenic claudication     Uncomplicated alcohol dependence (H)     Nursing Home Visit     Onychomycosis of left great toe     Pincer nail deformity     Complicated UTI (urinary tract infection)     Sepsis with acute renal failure (H)       Past Surgical History:   Procedure Laterality Date     AMPUTATE TOE(S) Left 6/27/2022    Procedure: Left fifth toe partial versus full amputation;  Surgeon: Meenu Orourke DPM;  Location: [facility]     CYSTOSCOPY, TRANSURETHRAL RESECTION (TUR) PROSTATE, COMBINED N/A 10/15/2021    Procedure: CYSTOSCOPY, WITH TRANSURETHRAL RESECTION PROSTATE;  Surgeon: Indu De Leon MD;  Location: [facility]     HERNIA REPAIR       PHACOEMULSIFICATION WITH STANDARD INTRAOCULAR LENS IMPLANT Left 12/27/2021    Procedure: PHACOEMULSIFICATION CATARACT EXTRACTION POSTERIOR CHAMBER LENS LEFT EYE;  Surgeon: Phillip Maldonado MD;  [facility]: [facility]     PHACOEMULSIFICATION WITH STANDARD INTRAOCULAR LENS IMPLANT Right 1/11/2022    Procedure: PHACOEMULSIFICATION CATARACT EXTRACTION POSTERIOR CHAMBER LENS RIGHT EYE;  Surgeon: Phillip Maldonado MD;  Location: [facility]       Current Outpatient Medications   Medication     tamsulosin (FLOMAX) 0.4 MG capsule     acetaminophen (TYLENOL) 500 MG tablet     albuterol (PROAIR HFA/PROVENTIL HFA/VENTOLIN HFA) 108 (90 Base) MCG/ACT inhaler     allopurinol (ZYLOPRIM) 100 MG tablet     amLODIPine (NORVASC) 2.5 MG tablet     atorvastatin (LIPITOR) 80 MG tablet     carvedilol (COREG) 25 MG tablet     finasteride (PROSCAR) 5  MG tablet     FLUoxetine (PROZAC) 10 MG capsule     gabapentin (NEURONTIN) 300 MG capsule     metFORMIN (GLUCOPHAGE XR) 500 MG 24 hr tablet     multivitamin, therapeutic (THERA-VIT) TABS tablet     sildenafil (VIAGRA) 100 MG tablet     TRULICITY 0.75 MG/0.5ML pen     ULTICARE MINI 31G X 6 MM insulin pen needle     vitamin B-12 (CYANOCOBALAMIN) 250 MCG tablet     vitamin C (ASCORBIC ACID) 500 MG tablet     XARELTO ANTICOAGULANT 20 MG TABS tablet     No current facility-administered medications for this visit.          Allergies   Allergen Reactions     Ace Inhibitors      Per [facility]: hyperkalemia.  Pt doesn't know if he is allergic     Ceclor [Cefaclor] Hives     Sulfa Drugs      Pt unsure.        No family history on file.    Social History     Socioeconomic History     Marital status: Single     Spouse name: None     Number of children: None     Years of education: None     Highest education level: None   Occupational History     None   Tobacco Use     Smoking status: Never Smoker     Smokeless tobacco: Former User     Types: Chew   Substance and Sexual Activity     Alcohol use: Not Currently     Drug use: Never     Sexual activity: None   Other Topics Concern     Parent/sibling w/ CABG, MI or angioplasty before 65F 55M? Not Asked   Social History Narrative     None     Social Determinants of Health     Financial Resource Strain:      Difficulty of Paying Living Expenses:    Food Insecurity:      Worried About Running Out of Food in the Last Year:      Ran Out of Food in the Last Year:    Transportation Needs:      Lack of Transportation (Medical):      Lack of Transportation (Non-Medical):    Physical Activity:      Days of Exercise per Week:      Minutes of Exercise per Session:    Stress:      Feeling of Stress :    Social Connections:      Frequency of Communication with Friends and Family:      Frequency of Social Gatherings with Friends and Family:      Attends Tenriism Services:      Active Member of  Clubs or Organizations:      Attends Club or Organization Meetings:      Marital Status:    Intimate Partner Violence:      Fear of Current or Ex-Partner:      Emotionally Abused:      Physically Abused:      Sexually Abused:        ROS: 10 point ROS neg other than the symptoms noted above in the HPI.  EXAM  Constitutional: healthy, alert and no distress    Psychiatric: mentation appears normal and affect normal/bright    VASCULAR:  -Dorsalis pedis pulse +2/4 b/l  -Posterior tibial pulse +1/4 b/l  -Capillary refill time < 3 seconds to b/l hallux  -Hair growth absent to b/l anterior legs and ankles  NEURO:  -Epicritic and protective sensation absent to the bilateral foot    DERM:  -Skin temperature within normal limits to bilateral foot  -Toenails elongated, thickened, dystrophic and discolored x 10  -Incurvation of the LEFT hallux lateral toenail border. No open wounds, no drainage.  ---Mild erythema and minimal edema to the LEFT hallux lateral toenail border.  ---No drainage and no open wounds.  ---No severe erythema, no ascending erythema, no calor, no purulence, no malodor, no other signs of infection.   -Hyperkeratotic lesion on the RIGHT lateral fifth toe   ---Minimal flaking skin on the LEFT lateral fifth toe and the LEFT distal second toe    MSK:  -Adductovarus rotation of the bilateral fifth toe  -Muscle strength of ankles +5/5 for dorsiflexion, plantarflexion, ABDUction and ADDuction b/l    LEFT FOOT RADIOGRAPH 04/25/2022  IMPRESSION: No definite bone destruction.  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 Treatment options discussed with no educational barriers noted.    -High risk toenail debridement x 10 toenails without incident    -Callus pared x 3 to the RIGHT ladistal third toe, RIGHT lateral fifth toe and LEFT disatl fourth toe  ---Patient reminded that the callus will likely return due to the underlying, prominent bone causing the callus while the patient is walking.    -Diabetic Foot Education provided. This included checking the feet daily looking for new new blisters or wounds, wearing shoes at all times when walking including around the house, and avoiding lotion application between the toes. If there are any signs of infection, the patient should present to the ED as soon as possible. Infections of the foot can be life threatening or lead to amputations of the foot or leg.  ---He has a history of a LEFT partial amputation of the fifth toe and dry skin on his feet which increases his risks of ulcerations. He understands the importance of checking his feet daily.    DM shoes: Patient was fit for new shoes by the orthotist, Traci Hernandez, on 02/23/2023.    -Xerosis of skin:   ---Discussed xerosis of the skin including the risks of dry, cracking skin creating ulcerations on the feet. Areas of skin breakdown can break through the skin layer and cause pain or can become infected which can then potentially lead to life threatening wounds or amputation.  ---Dry skin occurs when there is not enough moisture in the outer layers of the skin. Multiple factors can contribute to this including climates with low humidity (including [place] sams), dehydration, using harsh soaps on the skin, frequent exposure to chlorinated water, frequent  bathing, or employment that requires a lot of walking in outdoor environments or on very hard, cement surfaces.  ---Treatment of Xerosis can include application of lotion to the feet at least twice per day. Application of the lotion at night may be followed by the application of socks to prevent the lotion from rubbing off the foot on the floors or bedding. A pumice stone or Adrian board may be used to remove excessive, flaking skin. Care should be taken not to be too aggressive and cause akin breakdown from scrubbing.  ---The patient has moderate cracking to several areas of skin although no areas are currently cracking through the skin. He needs to carefully monitor his feet for cracking that breaks through the skin as it can cause an infection.  ---It is important to monitor and treat the feet daily to prevent the dry skin from starting to or from continuing to crack own. Patient expressed understanding and agreed with this plan.  ---The patient was very determined to soak his feet every day in epsom salts throughout the conversation. He is not advised to soak his feet every day or it could cause too much dryness in the feet. He expressed understanding.    -Ingrown toenail LEFT hallux:   ---A mild slant back was performed on the nail border. It is incurvated into the lateral nail border, but the incurvation was removed and there were no open wounds. The remainder of the toenail did not look concerning. There were no signs of infection (No severe erythema, no ascending erythema, no calor, no purulence, no malodor, no other signs of infection). If he develops redness, swelling, or other concerns, he is advised to call the office or go to  or the Emergency Department. He is in agreement with this plan.    Total time spent preparing to see the patient, review of chart, obtaining history and physical examination, review of treatment options for dry skin and his ingrown toenail, education, discussion with patient and  documenting in Epic / EMR was 20 minutes. This did not include procedure time which involved 20 minutes.  All time involved was spent on the day of service for the patient (the same day as the patient's appointment).    -Patient in agreement with the above treatment plan and all of patient's questions were answered.    Return to clinic 63+ days for a diabetic foot exam and high risk nail debridement       Meenu Orourke DPM

## 2023-02-24 ENCOUNTER — MEDICAL CORRESPONDENCE (OUTPATIENT)
Dept: HEALTH INFORMATION MANAGEMENT | Facility: HOSPITAL | Age: 83
End: 2023-02-24

## 2023-02-28 DIAGNOSIS — E11.22 TYPE 2 DIABETES MELLITUS WITH STAGE 3A CHRONIC KIDNEY DISEASE, WITHOUT LONG-TERM CURRENT USE OF INSULIN (H): ICD-10-CM

## 2023-02-28 DIAGNOSIS — N18.31 TYPE 2 DIABETES MELLITUS WITH STAGE 3A CHRONIC KIDNEY DISEASE, WITHOUT LONG-TERM CURRENT USE OF INSULIN (H): ICD-10-CM

## 2023-03-03 NOTE — TELEPHONE ENCOUNTER
TRULICITY 0.75 MG/0.5ML pen 2 mL 0 2/1/2023     Last Office Visit: 12-8-22    Future Office visit:    Next 5 appointments (look out 90 days)    Mar 09, 2023  2:00 PM  (Arrive by 1:45 PM)  SHORT with Zoila Jones MD  [facility] ([facility] ) [address] [place] [id number]  [phone]   Apr 27, 2023 11:30 AM  (Arrive by 11:15 AM)  Return Visit with Meenu Orourke DPM  [facility] ([facility] ) [address] [place] [phone] [phone]

## 2023-03-06 RX ORDER — DULAGLUTIDE 0.75 MG/.5ML
INJECTION, SOLUTION SUBCUTANEOUS
Qty: 2 ML | Refills: 3 | Status: SHIPPED | OUTPATIENT
Start: 2023-03-06 | End: 2023-06-21

## 2023-03-09 ENCOUNTER — OFFICE VISIT (OUTPATIENT)
Dept: FAMILY MEDICINE | Facility: OTHER | Age: 83
End: 2023-03-09
Attending: FAMILY MEDICINE
Payer: MEDICARE

## 2023-03-09 ENCOUNTER — TELEPHONE (OUTPATIENT)
Dept: FAMILY MEDICINE | Facility: OTHER | Age: 83
End: 2023-03-09

## 2023-03-09 VITALS
DIASTOLIC BLOOD PRESSURE: 62 MMHG | WEIGHT: 190 LBS | BODY MASS INDEX: 25.07 KG/M2 | HEART RATE: 88 BPM | SYSTOLIC BLOOD PRESSURE: 134 MMHG | OXYGEN SATURATION: 92 % | TEMPERATURE: 97.6 F

## 2023-03-09 DIAGNOSIS — E78.5 HYPERLIPIDEMIA, UNSPECIFIED HYPERLIPIDEMIA TYPE: ICD-10-CM

## 2023-03-09 DIAGNOSIS — I10 ESSENTIAL HYPERTENSION: ICD-10-CM

## 2023-03-09 DIAGNOSIS — N18.31 TYPE 2 DIABETES MELLITUS WITH STAGE 3A CHRONIC KIDNEY DISEASE, WITHOUT LONG-TERM CURRENT USE OF INSULIN (H): Primary | ICD-10-CM

## 2023-03-09 DIAGNOSIS — E11.22 TYPE 2 DIABETES MELLITUS WITH STAGE 3A CHRONIC KIDNEY DISEASE, WITHOUT LONG-TERM CURRENT USE OF INSULIN (H): Primary | ICD-10-CM

## 2023-03-09 DIAGNOSIS — I51.7 LVH (LEFT VENTRICULAR HYPERTROPHY): ICD-10-CM

## 2023-03-09 DIAGNOSIS — N18.31 STAGE 3A CHRONIC KIDNEY DISEASE (H): ICD-10-CM

## 2023-03-09 LAB
ANION GAP SERPL CALCULATED.3IONS-SCNC: 11 MMOL/L (ref 7–15)
BUN SERPL-MCNC: 16.3 MG/DL (ref 8–23)
CALCIUM SERPL-MCNC: 9.6 MG/DL (ref 8.8–10.2)
CHLORIDE SERPL-SCNC: 101 MMOL/L (ref 98–107)
CHOLEST SERPL-MCNC: 139 MG/DL
CREAT SERPL-MCNC: 1.13 MG/DL (ref 0.67–1.17)
DEPRECATED HCO3 PLAS-SCNC: 25 MMOL/L (ref 22–29)
EST. AVERAGE GLUCOSE BLD GHB EST-MCNC: 126 MG/DL
GFR SERPL CREATININE-BSD FRML MDRD: 65 ML/MIN/1.73M2
GLUCOSE SERPL-MCNC: 110 MG/DL (ref 70–99)
HBA1C MFR BLD: 6 %
HDLC SERPL-MCNC: 66 MG/DL
LDLC SERPL CALC-MCNC: 52 MG/DL
NONHDLC SERPL-MCNC: 73 MG/DL
POTASSIUM SERPL-SCNC: 4.4 MMOL/L (ref 3.4–5.3)
SODIUM SERPL-SCNC: 137 MMOL/L (ref 136–145)
TRIGL SERPL-MCNC: 106 MG/DL

## 2023-03-09 PROCEDURE — 36415 COLL VENOUS BLD VENIPUNCTURE: CPT | Mod: ZL | Performed by: FAMILY MEDICINE

## 2023-03-09 PROCEDURE — 99214 OFFICE O/P EST MOD 30 MIN: CPT | Performed by: FAMILY MEDICINE

## 2023-03-09 PROCEDURE — 80061 LIPID PANEL: CPT | Mod: ZL | Performed by: FAMILY MEDICINE

## 2023-03-09 PROCEDURE — G0463 HOSPITAL OUTPT CLINIC VISIT: HCPCS

## 2023-03-09 PROCEDURE — 82310 ASSAY OF CALCIUM: CPT | Mod: ZL | Performed by: FAMILY MEDICINE

## 2023-03-09 PROCEDURE — 83036 HEMOGLOBIN GLYCOSYLATED A1C: CPT | Mod: ZL | Performed by: FAMILY MEDICINE

## 2023-03-09 RX ORDER — METFORMIN HCL 500 MG
TABLET, EXTENDED RELEASE 24 HR ORAL
Qty: 360 TABLET | Refills: 1 | Status: SHIPPED | OUTPATIENT
Start: 2023-03-09 | End: 2023-08-09

## 2023-03-09 NOTE — PATIENT INSTRUCTIONS
Will call with lab results.  If a1c above goal, will increase Trulicity.  Referral placed to [facility] here at clinic.  Schedule annual exam.  Ongoing foot cares with Dr Orourke.  Echocardiogram ordered to reassess heart - structure/function.    Follow up 3 months - diabetes, chronic disease management.

## 2023-03-09 NOTE — TELEPHONE ENCOUNTER
----- Message from Zoila Jones MD sent at 3/9/2023  4:29 PM CST -----  Notify of lab results - a1c much improved from 8.2 to 6.  Kidney function improved as well.  Lipids stable.  Continue current medications.

## 2023-03-10 ENCOUNTER — APPOINTMENT (OUTPATIENT)
Dept: LAB | Facility: OTHER | Age: 83
End: 2023-03-10
Payer: MEDICARE

## 2023-03-10 LAB
CREAT UR-MCNC: 87.5 MG/DL
MICROALBUMIN UR-MCNC: 84.9 MG/L
MICROALBUMIN/CREAT UR: 97.03 MG/G CR (ref 0–17)

## 2023-03-10 PROCEDURE — 82570 ASSAY OF URINE CREATININE: CPT | Mod: ZL | Performed by: FAMILY MEDICINE

## 2023-03-22 DIAGNOSIS — R33.9 URINARY RETENTION: ICD-10-CM

## 2023-03-23 RX ORDER — FINASTERIDE 5 MG/1
5 TABLET, FILM COATED ORAL DAILY
Qty: 30 TABLET | Refills: 4 | Status: SHIPPED | OUTPATIENT
Start: 2023-03-23 | End: 2023-09-14

## 2023-03-23 NOTE — TELEPHONE ENCOUNTER
Finasteride (Proscar) 5 MG tablet    Last Written Prescription Date:  07/12/2022  Last Fill Quantity: 30,   # refills: 10  Last Office Visit: 03/09/2023

## 2023-04-27 ENCOUNTER — OFFICE VISIT (OUTPATIENT)
Dept: PODIATRY | Facility: OTHER | Age: 83
End: 2023-04-27
Attending: PODIATRIST
Payer: MEDICARE

## 2023-04-27 ENCOUNTER — HOSPITAL ENCOUNTER (OUTPATIENT)
Dept: CARDIOLOGY | Facility: HOSPITAL | Age: 83
Discharge: HOME | End: 2023-04-27
Attending: FAMILY MEDICINE
Payer: MEDICARE

## 2023-04-27 VITALS
TEMPERATURE: 98.2 F | HEART RATE: 101 BPM | DIASTOLIC BLOOD PRESSURE: 86 MMHG | OXYGEN SATURATION: 92 % | SYSTOLIC BLOOD PRESSURE: 157 MMHG

## 2023-04-27 DIAGNOSIS — Z89.422 HISTORY OF AMPUTATION OF LESSER TOE OF LEFT FOOT (H): ICD-10-CM

## 2023-04-27 DIAGNOSIS — R20.8 LOSS OF PROTECTIVE SENSATION OF SKIN OF DEFORMED FOOT: ICD-10-CM

## 2023-04-27 DIAGNOSIS — E11.9 DIABETES MELLITUS TYPE 2, NONINSULIN DEPENDENT (H): ICD-10-CM

## 2023-04-27 DIAGNOSIS — L60.3 ONYCHODYSTROPHY: Primary | ICD-10-CM

## 2023-04-27 DIAGNOSIS — I51.7 LVH (LEFT VENTRICULAR HYPERTROPHY): ICD-10-CM

## 2023-04-27 DIAGNOSIS — E11.42 DIABETIC POLYNEUROPATHY ASSOCIATED WITH TYPE 2 DIABETES MELLITUS (H): ICD-10-CM

## 2023-04-27 DIAGNOSIS — M21.969 LOSS OF PROTECTIVE SENSATION OF SKIN OF DEFORMED FOOT: ICD-10-CM

## 2023-04-27 LAB — LVEF ECHO: NORMAL

## 2023-04-27 PROCEDURE — 11721 DEBRIDE NAIL 6 OR MORE: CPT | Performed by: PODIATRIST

## 2023-04-27 PROCEDURE — G0463 HOSPITAL OUTPT CLINIC VISIT: HCPCS | Mod: 25

## 2023-04-27 PROCEDURE — 93306 TTE W/DOPPLER COMPLETE: CPT

## 2023-04-27 PROCEDURE — 93306 TTE W/DOPPLER COMPLETE: CPT | Mod: 26 | Performed by: INTERNAL MEDICINE

## 2023-04-27 ASSESSMENT — PAIN SCALES - GENERAL: PAINLEVEL: NO PAIN (0)

## 2023-04-27 NOTE — PROGRESS NOTES
Chief complaint: Patient presents with:  Toenail: Trimming      History of Present Illness: This 82 year old NIDDM II male is seen for a diabetic foot exam and high risk nail debridement and callus paring.    His  is washing his feet once a week and helping him with an epsom salt soak and lotion. He says lotion does not go between the toes.    He says he received his DM shoes and inserts on 04/27/2023 through the orthotist, Traci Hernandez. He has not had a chance to wear them yet.    He has not had any open wounds and he has not had to put any dressings on his toe.    He has his usual burning, tingling, and numbness in his feet.     Lab Results   Component Value Date    A1C 6.0 03/09/2023    A1C 8.2 12/08/2022    A1C 6.3 06/06/2022    A1C 5.6 03/04/2022    A1C 5.3 08/05/2021    A1C 6.5 05/06/2021     No further pedal complaints today.         BP (!) 157/86 (BP Location: Left arm, Patient Position: Sitting, Cuff Size: Adult Regular)   Pulse 101   Temp 98.2  F (36.8  C) (Tympanic)   SpO2 92%     Patient Active Problem List   Diagnosis     Urinary retention     Generalized weakness     Type 2 diabetes mellitus with stage 3 chronic kidney disease, without long-term current use of insulin (H)     Tachycardia     Hypoxia     Other acute pulmonary embolism with acute cor pulmonale (H)     Dyspnea, unspecified type     Benign prostatic hyperplasia with urinary retention     Chronic kidney disease, stage III (moderate) (H)     Chronic obstructive lung disease (H)     Chronic painful diabetic neuropathy (H)     Erectile dysfunction     Essential hypertension     Gout, unspecified     History of CVA (cerebrovascular accident)     Hyperlipidemia     IBS (irritable bowel syndrome)     Lumbar spondylosis     Mild concentric left ventricular hypertrophy (LVH)     Moderate episode of recurrent major depressive disorder (H)     Spinal stenosis of lumbar region without neurogenic claudication     Uncomplicated alcohol  dependence (H)     Nursing Home Visit     Onychomycosis of left great toe     Pincer nail deformity     Complicated UTI (urinary tract infection)     Sepsis with acute renal failure (H)       Past Surgical History:   Procedure Laterality Date     AMPUTATE TOE(S) Left 6/27/2022    Procedure: Left fifth toe partial versus full amputation;  Surgeon: Meenu Orourke DPM;  Location: [facility]     CYSTOSCOPY, TRANSURETHRAL RESECTION (TUR) PROSTATE, COMBINED N/A 10/15/2021    Procedure: CYSTOSCOPY, WITH TRANSURETHRAL RESECTION PROSTATE;  Surgeon: Indu De Leon MD;  Location: [facility]     HERNIA REPAIR       PHACOEMULSIFICATION WITH STANDARD INTRAOCULAR LENS IMPLANT Left 12/27/2021    Procedure: PHACOEMULSIFICATION CATARACT EXTRACTION POSTERIOR CHAMBER LENS LEFT EYE;  Surgeon: Phillip Maldonado MD;  Location: [facility]     PHACOEMULSIFICATION WITH STANDARD INTRAOCULAR LENS IMPLANT Right 1/11/2022    Procedure: PHACOEMULSIFICATION CATARACT EXTRACTION POSTERIOR CHAMBER LENS RIGHT EYE;  Surgeon: Phillip Maldonado MD;  Location: [facility]       Current Outpatient Medications   Medication     acetaminophen (TYLENOL) 500 MG tablet     allopurinol (ZYLOPRIM) 100 MG tablet     amLODIPine (NORVASC) 2.5 MG tablet     atorvastatin (LIPITOR) 80 MG tablet     carvedilol (COREG) 25 MG tablet     finasteride (PROSCAR) 5 MG tablet     FLUoxetine (PROZAC) 10 MG capsule     gabapentin (NEURONTIN) 300 MG capsule     metFORMIN (GLUCOPHAGE XR) 500 MG 24 hr tablet     multivitamin, therapeutic (THERA-VIT) TABS tablet     sildenafil (VIAGRA) 100 MG tablet     tamsulosin (FLOMAX) 0.4 MG capsule     TRULICITY 0.75 MG/0.5ML pen     ULTICARE MINI 31G X 6 MM insulin pen needle     vitamin B-12 (CYANOCOBALAMIN) 250 MCG tablet     vitamin C (ASCORBIC ACID) 500 MG tablet     XARELTO ANTICOAGULANT 20 MG TABS tablet     albuterol (PROAIR HFA/PROVENTIL HFA/VENTOLIN HFA) 108 (90 Base) MCG/ACT inhaler     No current facility-administered medications for this visit.           Allergies   Allergen Reactions     Ace Inhibitors      Per [facility]: hyperkalemia.  Pt doesn't know if he is allergic     Ceclor [Cefaclor] Hives     Sulfa Antibiotics      Pt unsure.        History reviewed. No pertinent family history.    Social History     Socioeconomic History     Marital status: Single     Spouse name: None     Number of children: None     Years of education: None     Highest education level: None   Occupational History     None   Tobacco Use     Smoking status: Never Smoker     Smokeless tobacco: Former User     Types: Chew   Substance and Sexual Activity     Alcohol use: Not Currently     Drug use: Never     Sexual activity: None   Other Topics Concern     Parent/sibling w/ CABG, MI or angioplasty before 65F 55M? Not Asked   Social History Narrative     None     Social Determinants of Health     Financial Resource Strain:      Difficulty of Paying Living Expenses:    Food Insecurity:      Worried About Running Out of Food in the Last Year:      Ran Out of Food in the Last Year:    Transportation Needs:      Lack of Transportation (Medical):      Lack of Transportation (Non-Medical):    Physical Activity:      Days of Exercise per Week:      Minutes of Exercise per Session:    Stress:      Feeling of Stress :    Social Connections:      Frequency of Communication with Friends and Family:      Frequency of Social Gatherings with Friends and Family:      Attends Yarsani Services:      Active Member of Clubs or Organizations:      Attends Club or Organization Meetings:      Marital Status:    Intimate Partner Violence:      Fear of Current or Ex-Partner:      Emotionally Abused:      Physically Abused:      Sexually Abused:        ROS: 10 point ROS neg other than the symptoms noted above in the HPI.  EXAM  Constitutional: healthy, alert and no distress    Psychiatric: mentation appears normal and affect normal/bright    VASCULAR:  -Dorsalis pedis pulse +2/4 b/l  -Posterior tibial pulse  +1/4 b/l  -Capillary refill time < 3 seconds to b/l hallux  -Hair growth absent to b/l anterior legs and ankles  NEURO:  -Epicritic and protective sensation absent to the bilateral foot    DERM:  -Skin temperature within normal limits to bilateral foot  -Toenails elongated, thickened, dystrophic and discolored x 10  -Incurvation of the LEFT hallux lateral toenail border. No open wounds, no drainage.  ---Mild erythema and minimal edema to the LEFT hallux lateral toenail border.  ---No drainage and no open wounds.  ---No severe erythema, no ascending erythema, no calor, no purulence, no malodor, no other signs of infection.   -Hyperkeratotic lesion on the RIGHT lateral fifth toe   ---Minimal flaking skin on the LEFT lateral fifth toe and the LEFT distal second toe    MSK:  -Adductovarus rotation of the bilateral fifth toe  -Muscle strength of ankles +5/5 for dorsiflexion, plantarflexion, ABDUction and ADDuction b/l    LEFT FOOT RADIOGRAPH 04/25/2022  IMPRESSION: No definite bone destruction.  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 Treatment options discussed with no educational barriers noted.    -High risk toenail debridement x 10 toenails without incident    Diabetes Mellitus: Patient's DM is managed by their PCP. The DM appears to be stable. Patient's last HbA1C was 6.0% on 03/09/2023.    -Diabetic Foot Education provided. This included checking  the feet daily looking for new new blisters or wounds, wearing shoes at all times when walking including around the house, and avoiding lotion application between the toes. If there are any signs of infection, the patient should present to the ED as soon as possible. Infections of the foot can be life threatening or lead to amputations of the foot or leg.  ---He has a history of a LEFT partial amputation of the fifth toe and dry skin on his feet which increases his risks of ulcerations. He understands the importance of checking his feet daily.    DM shoes: Patient was fit for new shoes by the orthotist, Traci Hernandez, on 02/23/2023.    -Patient in agreement with the above treatment plan and all of patient's questions were answered.    Return to clinic 63+ days for a diabetic foot exam and high risk nail debridement       Meenu Orourke DPM

## 2023-04-29 DIAGNOSIS — I26.09 OTHER ACUTE PULMONARY EMBOLISM WITH ACUTE COR PULMONALE (H): ICD-10-CM

## 2023-04-30 DIAGNOSIS — F41.9 ANXIETY: ICD-10-CM

## 2023-05-01 RX ORDER — RIVAROXABAN 20 MG/1
TABLET, FILM COATED ORAL
Qty: 30 TABLET | Refills: 10 | Status: SHIPPED | OUTPATIENT
Start: 2023-05-01 | End: 2023-11-20

## 2023-05-01 RX ORDER — FLUOXETINE 10 MG/1
10 CAPSULE ORAL DAILY
Qty: 90 CAPSULE | Refills: 1 | Status: SHIPPED | OUTPATIENT
Start: 2023-05-01 | End: 2023-10-24

## 2023-05-01 NOTE — TELEPHONE ENCOUNTER
prozac      Last Written Prescription Date:  12/8/22  Last Fill Quantity: 90,   # refills: 1  Last Office Visit: 3/9/23  Future Office visit:    Next 5 appointments (look out 90 days)    Jul 06, 2023 11:15 AM  (Arrive by 11:00 AM)  Return Visit with Meenu Orourke DPM  [facility] ([facility] ) [address] [place] [id number] [phone]

## 2023-05-01 NOTE — TELEPHONE ENCOUNTER
Xarelto 20mg      Last Written Prescription Date:  06/08/2022  Last Fill Quantity: 30,   # refills: 10  Last Office Visit: 03/09/2023  Future Office visit:    Next 5 appointments (look out 90 days)    Jul 06, 2023 11:15 AM  (Arrive by 11:00 AM)  Return Visit with Meenu Orourke DPM  [facility] ([facility] ) [address] [place] [id number] [phone]           Routing refill request to provider for review/approval because:  Direct Oral Anticoagulant Agents Failed    Rerun Protocol (4/29/2023 1:01 AM)    Creatinine Clearance greater than 50 ml/min on file in past 3 mos    No lab results found.

## 2023-05-15 ENCOUNTER — TRANSFERRED RECORDS (OUTPATIENT)
Dept: HEALTH INFORMATION MANAGEMENT | Facility: CLINIC | Age: 83
End: 2023-05-15

## 2023-05-15 LAB — RETINOPATHY: NEGATIVE

## 2023-06-01 ENCOUNTER — HEALTH MAINTENANCE LETTER (OUTPATIENT)
Age: 83
End: 2023-06-01

## 2023-06-20 DIAGNOSIS — N18.31 TYPE 2 DIABETES MELLITUS WITH STAGE 3A CHRONIC KIDNEY DISEASE, WITHOUT LONG-TERM CURRENT USE OF INSULIN (H): ICD-10-CM

## 2023-06-20 DIAGNOSIS — E11.22 TYPE 2 DIABETES MELLITUS WITH STAGE 3A CHRONIC KIDNEY DISEASE, WITHOUT LONG-TERM CURRENT USE OF INSULIN (H): ICD-10-CM

## 2023-06-21 RX ORDER — DULAGLUTIDE 0.75 MG/.5ML
INJECTION, SOLUTION SUBCUTANEOUS
Qty: 2 ML | Refills: 3 | Status: SHIPPED | OUTPATIENT
Start: 2023-06-21 | End: 2023-10-06

## 2023-06-21 NOTE — TELEPHONE ENCOUNTER
trulicity       Last Written Prescription Date:  3/6/2023  Last Fill Quantity: 2mL,   # refills: 3  Last Office Visit: 3/9/2023  Future Office visit:    Next 5 appointments (look out 90 days)    Jul 06, 2023 11:15 AM  (Arrive by 11:00 AM)  Return Visit with Meenu Orourke DPM  [facility] ([facility] ) [address] [place] [id number] [phone]           Routing refill request to provider for review/approval because:

## 2023-07-06 ENCOUNTER — OFFICE VISIT (OUTPATIENT)
Dept: PODIATRY | Facility: OTHER | Age: 83
End: 2023-07-06
Attending: PODIATRIST
Payer: MEDICARE

## 2023-07-06 VITALS
TEMPERATURE: 98.5 F | SYSTOLIC BLOOD PRESSURE: 146 MMHG | OXYGEN SATURATION: 93 % | HEART RATE: 81 BPM | DIASTOLIC BLOOD PRESSURE: 74 MMHG

## 2023-07-06 DIAGNOSIS — E11.42 DIABETIC POLYNEUROPATHY ASSOCIATED WITH TYPE 2 DIABETES MELLITUS (H): ICD-10-CM

## 2023-07-06 DIAGNOSIS — E11.9 DIABETES MELLITUS TYPE 2, NONINSULIN DEPENDENT (H): ICD-10-CM

## 2023-07-06 DIAGNOSIS — L60.3 ONYCHODYSTROPHY: Primary | ICD-10-CM

## 2023-07-06 DIAGNOSIS — Z89.422 HISTORY OF AMPUTATION OF LESSER TOE OF LEFT FOOT (H): ICD-10-CM

## 2023-07-06 PROCEDURE — G0463 HOSPITAL OUTPT CLINIC VISIT: HCPCS | Mod: 25

## 2023-07-06 PROCEDURE — 11721 DEBRIDE NAIL 6 OR MORE: CPT | Performed by: PODIATRIST

## 2023-07-06 ASSESSMENT — PAIN SCALES - GENERAL: PAINLEVEL: NO PAIN (0)

## 2023-07-06 NOTE — PROGRESS NOTES
Chief complaint: Patient presents with:  Toenail: DFE      History of Present Illness: This 82 year old NIDDM II male is seen for a diabetic foot exam and high risk nail debridement and callus paring.    His  is still washing his feet once a week and helping him with an epsom salt soak and lotion. He says lotion does not go between the toes.    He received his DM shoes and inserts on 04/27/2023 through the orthotist, Traci Hernandez. The shoes are comfortable.    He has not had any open wounds and he has not had to put any dressings on his toe.    He has his usual burning, tingling, and numbness in his feet.       Lab Results   Component Value Date    A1C 6.0 03/09/2023    A1C 8.2 12/08/2022    A1C 6.3 06/06/2022    A1C 5.6 03/04/2022    A1C 5.3 08/05/2021    A1C 6.5 05/06/2021     No further pedal complaints today.         BP (!) 146/74 (BP Location: Left arm, Patient Position: Sitting, Cuff Size: Adult Regular)   Pulse 81   Temp 98.5  F (36.9  C) (Tympanic)   SpO2 93%     Patient Active Problem List   Diagnosis     Urinary retention     Generalized weakness     Type 2 diabetes mellitus with stage 3 chronic kidney disease, without long-term current use of insulin (H)     Tachycardia     Hypoxia     Other acute pulmonary embolism with acute cor pulmonale (H)     Dyspnea, unspecified type     Benign prostatic hyperplasia with urinary retention     Chronic kidney disease, stage III (moderate) (H)     Chronic obstructive lung disease (H)     Chronic painful diabetic neuropathy (H)     Erectile dysfunction     Essential hypertension     Gout, unspecified     History of CVA (cerebrovascular accident)     Hyperlipidemia     IBS (irritable bowel syndrome)     Lumbar spondylosis     Mild concentric left ventricular hypertrophy (LVH)     Moderate episode of recurrent major depressive disorder (H)     Spinal stenosis of lumbar region without neurogenic claudication     Uncomplicated alcohol dependence (H)      Nursing Home Visit     Onychomycosis of left great toe     Pincer nail deformity     Complicated UTI (urinary tract infection)     Sepsis with acute renal failure (H)       Past Surgical History:   Procedure Laterality Date     AMPUTATE TOE(S) Left 6/27/2022    Procedure: Left fifth toe partial versus full amputation;  Surgeon: Meenu Orourke DPM;  [facility]     CYSTOSCOPY, TRANSURETHRAL RESECTION (TUR) PROSTATE, COMBINED N/A 10/15/2021    Procedure: CYSTOSCOPY, WITH TRANSURETHRAL RESECTION PROSTATE;  Surgeon: Indu De Leon MD;  Location: [facility]     HERNIA REPAIR       PHACOEMULSIFICATION WITH STANDARD INTRAOCULAR LENS IMPLANT Left 12/27/2021    Procedure: PHACOEMULSIFICATION CATARACT EXTRACTION POSTERIOR CHAMBER LENS LEFT EYE;  Surgeon: Phillip Maldonado MD;  [facility]: [facility]     PHACOEMULSIFICATION WITH STANDARD INTRAOCULAR LENS IMPLANT Right 1/11/2022    Procedure: PHACOEMULSIFICATION CATARACT EXTRACTION POSTERIOR CHAMBER LENS RIGHT EYE;  Surgeon: Phillip Maldonado MD;  [facility]: [facility]       Current Outpatient Medications   Medication     acetaminophen (TYLENOL) 500 MG tablet     allopurinol (ZYLOPRIM) 100 MG tablet     amLODIPine (NORVASC) 2.5 MG tablet     atorvastatin (LIPITOR) 80 MG tablet     carvedilol (COREG) 25 MG tablet     finasteride (PROSCAR) 5 MG tablet     FLUoxetine (PROZAC) 10 MG capsule     gabapentin (NEURONTIN) 300 MG capsule     metFORMIN (GLUCOPHAGE XR) 500 MG 24 hr tablet     multivitamin, therapeutic (THERA-VIT) TABS tablet     sildenafil (VIAGRA) 100 MG tablet     tamsulosin (FLOMAX) 0.4 MG capsule     TRULICITY 0.75 MG/0.5ML pen     ULTICARE MINI 31G X 6 MM insulin pen needle     vitamin B-12 (CYANOCOBALAMIN) 250 MCG tablet     vitamin C (ASCORBIC ACID) 500 MG tablet     XARELTO ANTICOAGULANT 20 MG TABS tablet     albuterol (PROAIR HFA/PROVENTIL HFA/VENTOLIN HFA) 108 (90 Base) MCG/ACT inhaler     No current facility-administered medications for this visit.          Allergies    Allergen Reactions     Ace Inhibitors      Per [facility]: hyperkalemia.  Pt doesn't know if he is allergic     Ceclor [Cefaclor] Hives     Sulfa Antibiotics      Pt unsure.        History reviewed. No pertinent family history.    Social History     Socioeconomic History     Marital status: Single     Spouse name: None     Number of children: None     Years of education: None     Highest education level: None   Occupational History     None   Tobacco Use     Smoking status: Never Smoker     Smokeless tobacco: Former User     Types: Chew   Substance and Sexual Activity     Alcohol use: Not Currently     Drug use: Never     Sexual activity: None   Other Topics Concern     Parent/sibling w/ CABG, MI or angioplasty before 65F 55M? Not Asked   Social History Narrative     None     Social Determinants of Health     Financial Resource Strain:      Difficulty of Paying Living Expenses:    Food Insecurity:      Worried About Running Out of Food in the Last Year:      Ran Out of Food in the Last Year:    Transportation Needs:      Lack of Transportation (Medical):      Lack of Transportation (Non-Medical):    Physical Activity:      Days of Exercise per Week:      Minutes of Exercise per Session:    Stress:      Feeling of Stress :    Social Connections:      Frequency of Communication with Friends and Family:      Frequency of Social Gatherings with Friends and Family:      Attends Syn[employer] Services:      Active Member of Clubs or Organizations:      Attends Club or Organization Meetings:      Marital Status:    Intimate Partner Violence:      Fear of Current or Ex-Partner:      Emotionally Abused:      Physically Abused:      Sexually Abused:        ROS: 10 point ROS neg other than the symptoms noted above in the HPI.  EXAM  Constitutional: healthy, alert and no distress    Psychiatric: mentation appears normal and affect normal/bright    VASCULAR:  -Dorsalis pedis pulse +2/4 b/l  -Posterior tibial pulse +1/4  b/l  -Capillary refill time < 3 seconds to b/l hallux  -Hair growth absent to b/l anterior legs and ankles  NEURO:  -Epicritic and protective sensation absent to the bilateral foot    DERM:  -Skin temperature within normal limits to bilateral foot  -Toenails elongated, thickened, dystrophic and discolored x 10  -Incurvation of the LEFT hallux lateral toenail border.   ---Mild opening of skin at the distal lateral toenail border  ---Pressure fully relieved post slant back to toenail  ---Minimal erythema and minimal edema to the LEFT hallux lateral toenail border. Minimal serous drainage  ---No severe erythema, no ascending erythema, no calor, no purulence, no malodor, no other signs of infection.     MSK:  -Adductovarus rotation of the bilateral fifth toe  -Muscle strength of ankles +5/5 for dorsiflexion, plantarflexion, ABDUction and ADDuction b/l    LEFT FOOT RADIOGRAPH 04/25/2022  IMPRESSION: No definite bone destruction.  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 Treatment options discussed with no educational barriers noted.    -High risk toenail debridement x 9 toenails without incident  ---LEFT fifth distal toe is amputated  ---Slant back to LEFT hallux lateral toenail border  ---Patient advised to do a daily epsom salt soak,t hen apply an antibiotic ointment and band aid on the toe for the next 1-2 weeks. He may allow the skin to air for a couple hours a night.     Diabetes Mellitus: Patient's DM is managed by their  PCP. The DM appears to be stable. Patient's last HbA1C was 6.0% on 03/09/2023.    -Diabetic Foot Education provided. This included checking the feet daily looking for new new blisters or wounds, wearing shoes at all times when walking including around the house, and avoiding lotion application between the toes. If there are any signs of infection, the patient should present to the ED as soon as possible. Infections of the foot can be life threatening or lead to amputations of the foot or leg.  ---He has a history of a LEFT partial amputation of the fifth toe and dry skin on his feet which increases his risks of ulcerations. He understands the importance of checking his feet daily.    DM shoes: Patient was dispensed new shoes by the orthotist, Traci Hernandez, in April, 2023. The shoes are comfortable.    -Patient in agreement with the above treatment plan and all of patient's questions were answered.    Return to clinic 63+ days for a diabetic foot exam and high risk nail debridement       Meenu Orourke DPM

## 2023-07-23 DIAGNOSIS — M10.9 GOUT, UNSPECIFIED CAUSE, UNSPECIFIED CHRONICITY, UNSPECIFIED SITE: ICD-10-CM

## 2023-07-25 RX ORDER — ALLOPURINOL 100 MG/1
TABLET ORAL
Qty: 90 TABLET | Refills: 3 | Status: SHIPPED | OUTPATIENT
Start: 2023-07-25 | End: 2024-07-24

## 2023-07-25 NOTE — TELEPHONE ENCOUNTER
Allopurinol 100 mg      Last Written Prescription Date:  10-12-22  Last Fill Quantity: 90,   # refills: 3  Last Office Visit: 3-9-23  Future Office visit:    Next 5 appointments (look out 90 days)      Sep 21, 2023 11:15 AM  (Arrive by 11:00 AM)  Return Visit with Meenu Orourke DPM  [facility] ([facility] ) [address] [place] [id number] [phone]

## 2023-07-28 DIAGNOSIS — N40.0 BENIGN PROSTATIC HYPERPLASIA, UNSPECIFIED WHETHER LOWER URINARY TRACT SYMPTOMS PRESENT: ICD-10-CM

## 2023-07-28 DIAGNOSIS — E78.5 HYPERLIPIDEMIA, UNSPECIFIED HYPERLIPIDEMIA TYPE: ICD-10-CM

## 2023-07-28 DIAGNOSIS — I10 ESSENTIAL HYPERTENSION: ICD-10-CM

## 2023-07-28 RX ORDER — TAMSULOSIN HYDROCHLORIDE 0.4 MG/1
0.4 CAPSULE ORAL EVERY EVENING
Qty: 90 CAPSULE | Refills: 1 | Status: SHIPPED | OUTPATIENT
Start: 2023-07-28 | End: 2024-01-23

## 2023-07-28 RX ORDER — ATORVASTATIN CALCIUM 80 MG/1
TABLET, FILM COATED ORAL
Qty: 90 TABLET | Refills: 1 | Status: SHIPPED | OUTPATIENT
Start: 2023-07-28 | End: 2024-07-24

## 2023-07-28 NOTE — TELEPHONE ENCOUNTER
carvedilol (COREG) 25 MG tablet       Last Written Prescription Date:  2/3/2023  Last Fill Quantity: 180,   # refills: 1  Last Office Visit: 3/9/2023  Future Office visit:    Next 5 appointments (look out 90 days)      Sep 21, 2023 11:15 AM  (Arrive by 11:00 AM)  Return Visit with Meenu Orourke DPM  [facility] ([facility] ) [address] [place] [phone]             amLODIPine (NORVASC) 2.5 MG tablet       Last Written Prescription Date:  2/3/2023  Last Fill Quantity: 90,   # refills: 1

## 2023-07-28 NOTE — TELEPHONE ENCOUNTER
atorvastatin (LIPITOR) 80 MG tablet       Last Written Prescription Date:  8/25/2022  Last Fill Quantity: 90,   # refills: 1  Last Office Visit: 3/9/2023  Future Office visit:    Next 5 appointments (look out 90 days)      Sep 21, 2023 11:15 AM  (Arrive by 11:00 AM)  Return Visit with Meenu Orourke DPM  [facility] ([facility] ) [address] [place] [id number] [phone]             tamsulosin (FLOMAX) 0.4 MG capsule       Last Written Prescription Date:  1/30/2023  Last Fill Quantity: 90,   # refills: 1

## 2023-07-30 RX ORDER — AMLODIPINE BESYLATE 2.5 MG/1
TABLET ORAL
Qty: 90 TABLET | Refills: 1 | Status: SHIPPED | OUTPATIENT
Start: 2023-07-30 | End: 2024-01-29

## 2023-07-30 RX ORDER — CARVEDILOL 25 MG/1
TABLET ORAL
Qty: 180 TABLET | Refills: 1 | Status: SHIPPED | OUTPATIENT
Start: 2023-07-30 | End: 2024-01-29

## 2023-08-07 DIAGNOSIS — E11.22 TYPE 2 DIABETES MELLITUS WITH STAGE 3A CHRONIC KIDNEY DISEASE, WITHOUT LONG-TERM CURRENT USE OF INSULIN (H): ICD-10-CM

## 2023-08-07 DIAGNOSIS — N18.31 TYPE 2 DIABETES MELLITUS WITH STAGE 3A CHRONIC KIDNEY DISEASE, WITHOUT LONG-TERM CURRENT USE OF INSULIN (H): ICD-10-CM

## 2023-08-09 RX ORDER — METFORMIN HCL 500 MG
TABLET, EXTENDED RELEASE 24 HR ORAL
Qty: 360 TABLET | Refills: 0 | Status: SHIPPED | OUTPATIENT
Start: 2023-08-09 | End: 2023-10-30

## 2023-09-12 DIAGNOSIS — R33.9 URINARY RETENTION: ICD-10-CM

## 2023-09-13 NOTE — TELEPHONE ENCOUNTER
Finasteride (Proscar) 5 MG tablet     Last Written Prescription Date:  03/23/2023  Last Fill Quantity: 30,   # refills: 4  Last Office Visit: 03/09/2023

## 2023-09-14 RX ORDER — FINASTERIDE 5 MG/1
5 TABLET, FILM COATED ORAL DAILY
Qty: 30 TABLET | Refills: 4 | Status: SHIPPED | OUTPATIENT
Start: 2023-09-14

## 2023-09-21 ENCOUNTER — OFFICE VISIT (OUTPATIENT)
Dept: PODIATRY | Facility: OTHER | Age: 83
End: 2023-09-21
Attending: PODIATRIST
Payer: MEDICARE

## 2023-09-21 VITALS
HEART RATE: 100 BPM | DIASTOLIC BLOOD PRESSURE: 65 MMHG | SYSTOLIC BLOOD PRESSURE: 114 MMHG | OXYGEN SATURATION: 98 % | TEMPERATURE: 98 F

## 2023-09-21 DIAGNOSIS — Z89.422 HISTORY OF AMPUTATION OF LESSER TOE OF LEFT FOOT (H): ICD-10-CM

## 2023-09-21 DIAGNOSIS — E11.9 DIABETES MELLITUS TYPE 2, NONINSULIN DEPENDENT (H): ICD-10-CM

## 2023-09-21 DIAGNOSIS — L60.3 ONYCHODYSTROPHY: Primary | ICD-10-CM

## 2023-09-21 DIAGNOSIS — Z13.89 SCREENING FOR DIABETIC PERIPHERAL NEUROPATHY: ICD-10-CM

## 2023-09-21 DIAGNOSIS — E11.42 DIABETIC POLYNEUROPATHY ASSOCIATED WITH TYPE 2 DIABETES MELLITUS (H): ICD-10-CM

## 2023-09-21 PROCEDURE — G0463 HOSPITAL OUTPT CLINIC VISIT: HCPCS

## 2023-09-21 PROCEDURE — 11721 DEBRIDE NAIL 6 OR MORE: CPT | Performed by: PODIATRIST

## 2023-09-21 ASSESSMENT — PAIN SCALES - GENERAL: PAINLEVEL: NO PAIN (0)

## 2023-09-21 NOTE — PROGRESS NOTES
Chief complaint: Patient presents with:  Toenail: DFE      History of Present Illness: This 82 year old NIDDM II male is seen for a diabetic foot exam and high risk nail debridement and callus paring.    His  is still washing his feet once a week and helping him apply lotion on his feet but not between the toes.    He received his DM shoes and inserts on 04/27/2023 through the orthotist, Traci Hernandez. The shoes are comfortable.    He has not had any open wounds.    He has his usual burning, tingling, and numbness in his feet.       Lab Results   Component Value Date    A1C 6.0 03/09/2023    A1C 8.2 12/08/2022    A1C 6.3 06/06/2022    A1C 5.6 03/04/2022    A1C 5.3 08/05/2021    A1C 6.5 05/06/2021           No further pedal complaints today.         /65 (BP Location: Left arm, Patient Position: Sitting, Cuff Size: Adult Regular)   Pulse 100   Temp 98  F (36.7  C) (Tympanic)   SpO2 98%     Patient Active Problem List   Diagnosis    Urinary retention    Generalized weakness    Type 2 diabetes mellitus with stage 3 chronic kidney disease, without long-term current use of insulin (H)    Tachycardia    Hypoxia    Other acute pulmonary embolism with acute cor pulmonale (H)    Dyspnea, unspecified type    Benign prostatic hyperplasia with urinary retention    Chronic kidney disease, stage III (moderate) (H)    Chronic obstructive lung disease (H)    Chronic painful diabetic neuropathy (H)    Erectile dysfunction    Essential hypertension    Gout, unspecified    History of CVA (cerebrovascular accident)    Hyperlipidemia    IBS (irritable bowel syndrome)    Lumbar spondylosis    Mild concentric left ventricular hypertrophy (LVH)    Moderate episode of recurrent major depressive disorder (H)    Spinal stenosis of lumbar region without neurogenic claudication    Uncomplicated alcohol dependence (H)    Nursing Home Visit    Onychomycosis of left great toe    Pincer nail deformity    Complicated UTI (urinary  tract infection)    Sepsis with acute renal failure (H)       Past Surgical History:   Procedure Laterality Date    AMPUTATE TOE(S) Left 6/27/2022    Procedure: Left fifth toe partial versus full amputation;  Surgeon: Meenu Orourke DPM;  [facility]: [facility]    CYSTOSCOPY, TRANSURETHRAL RESECTION (TUR) PROSTATE, COMBINED N/A 10/15/2021    Procedure: CYSTOSCOPY, WITH TRANSURETHRAL RESECTION PROSTATE;  Surgeon: Indu De Leon MD;  Location: [facility]    HERNIA REPAIR      PHACOEMULSIFICATION WITH STANDARD INTRAOCULAR LENS IMPLANT Left 12/27/2021    Procedure: PHACOEMULSIFICATION CATARACT EXTRACTION POSTERIOR CHAMBER LENS LEFT EYE;  Surgeon: Phillip Maldonado MD;  [facility]: [facility]    PHACOEMULSIFICATION WITH STANDARD INTRAOCULAR LENS IMPLANT Right 1/11/2022    Procedure: PHACOEMULSIFICATION CATARACT EXTRACTION POSTERIOR CHAMBER LENS RIGHT EYE;  Surgeon: Phillip Maldonado MD;  Location: [facility]       Current Outpatient Medications   Medication    acetaminophen (TYLENOL) 500 MG tablet    allopurinol (ZYLOPRIM) 100 MG tablet    amLODIPine (NORVASC) 2.5 MG tablet    atorvastatin (LIPITOR) 80 MG tablet    carvedilol (COREG) 25 MG tablet    finasteride (PROSCAR) 5 MG tablet    FLUoxetine (PROZAC) 10 MG capsule    gabapentin (NEURONTIN) 300 MG capsule    metFORMIN (GLUCOPHAGE XR) 500 MG 24 hr tablet    multivitamin, therapeutic (THERA-VIT) TABS tablet    sildenafil (VIAGRA) 100 MG tablet    tamsulosin (FLOMAX) 0.4 MG capsule    TRULICITY 0.75 MG/0.5ML pen    ULTICARE MINI 31G X 6 MM insulin pen needle    vitamin B-12 (CYANOCOBALAMIN) 250 MCG tablet    vitamin C (ASCORBIC ACID) 500 MG tablet    XARELTO ANTICOAGULANT 20 MG TABS tablet    albuterol (PROAIR HFA/PROVENTIL HFA/VENTOLIN HFA) 108 (90 Base) MCG/ACT inhaler     No current facility-administered medications for this visit.          Allergies   Allergen Reactions    Ace Inhibitors      Per [facility]: hyperkalemia.  Pt doesn't know if he is allergic    Ceclor [Cefaclor] Hives     Sulfa Antibiotics      Pt unsure.        History reviewed. No pertinent family history.    Social History     Socioeconomic History    Marital status: Single     Spouse name: None    Number of children: None    Years of education: None    Highest education level: None   Occupational History    None   Tobacco Use    Smoking status: Never Smoker    Smokeless tobacco: Former User     Types: Chew   Substance and Sexual Activity    Alcohol use: Not Currently    Drug use: Never    Sexual activity: None   Other Topics Concern    Parent/sibling w/ CABG, MI or angioplasty before 65F 55M? Not Asked   Social History Narrative    None     Social Determinants of Health     Financial Resource Strain:     Difficulty of Paying Living Expenses:    Food Insecurity:     Worried About Running Out of Food in the Last Year:     Ran Out of Food in the Last Year:    Transportation Needs:     Lack of Transportation (Medical):     Lack of Transportation (Non-Medical):    Physical Activity:     Days of Exercise per Week:     Minutes of Exercise per Session:    Stress:     Feeling of Stress :    Social Connections:     Frequency of Communication with Friends and Family:     Frequency of Social Gatherings with Friends and Family:     Attends [employer] Services:     Active Member of Clubs or Organizations:     Attends Club or Organization Meetings:     Marital Status:    Intimate Partner Violence:     Fear of Current or Ex-Partner:     Emotionally Abused:     Physically Abused:     Sexually Abused:        ROS: 10 point ROS neg other than the symptoms noted above in the HPI.  EXAM  Constitutional: healthy, alert and no distress    Psychiatric: mentation appears normal and affect normal/bright    VASCULAR:  -Dorsalis pedis pulse +2/4 b/l  -Posterior tibial pulse +1/4 b/l  -Capillary refill time < 3 seconds to b/l hallux  -Hair growth absent to b/l anterior legs and ankles  NEURO:  -Epicritic and protective sensation absent to the bilateral  foot    DERM:  -Skin temperature within normal limits to bilateral foot  -Toenails elongated, thickened, dystrophic and discolored x 10  -Mild incurvation of the LEFT hallux lateral toenail border.   ---No open wounds and no drainage  ---No severe erythema, no ascending erythema, no calor, no purulence, no malodor, no other signs of infection.     MSK:  -Adductovarus rotation of the bilateral fifth toe  -Muscle strength of ankles +5/5 for dorsiflexion, plantarflexion, ABDUction and ADDuction b/l    LEFT FOOT RADIOGRAPH 04/25/2022  IMPRESSION: No definite bone destruction.  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 Treatment options discussed with no educational barriers noted.    -High risk toenail debridement x 9 toenails without incident  ---LEFT fifth distal toe is amputated    Diabetes Mellitus: Patient's DM is managed by their PCP. The DM appears to be stable. Patient's last HbA1C was 6.0% on 03/09/2023.    -Diabetic Foot Education provided. This included checking the feet daily looking for new new blisters or wounds, wearing shoes at all times when walking including around the house, and avoiding lotion application between the toes. If there are any signs of infection, the patient should present to the ED as soon as possible. Infections of the foot can be life threatening or lead to amputations of the foot or leg.  ---He has a history of a LEFT partial  amputation of the fifth toe and dry skin on his feet which increases his risks of ulcerations. He understands the importance of checking his feet daily.    DM shoes: Patient was dispensed new shoes by the orthotist, Traci Hernandez, in April, 2023. The shoes are comfortable.    -Patient in agreement with the above treatment plan and all of patient's questions were answered.    Return to clinic 63+ days for a diabetic foot exam and high risk nail debridement       Meenu Orourke DPM

## 2023-10-04 DIAGNOSIS — E11.22 TYPE 2 DIABETES MELLITUS WITH STAGE 3A CHRONIC KIDNEY DISEASE, WITHOUT LONG-TERM CURRENT USE OF INSULIN (H): ICD-10-CM

## 2023-10-04 DIAGNOSIS — N18.31 TYPE 2 DIABETES MELLITUS WITH STAGE 3A CHRONIC KIDNEY DISEASE, WITHOUT LONG-TERM CURRENT USE OF INSULIN (H): ICD-10-CM

## 2023-10-05 NOTE — TELEPHONE ENCOUNTER
TRULICITY 0.75 MG/0.5ML pen 2 mL 3 6/21/2023     Last Office Visit: 03/09/2023     Future Office visit:    Next 5 appointments (look out 90 days)      Nov 30, 2023 11:30 AM  (Arrive by 11:15 AM)  Return Visit with Meenu Orourke DPM  [facility] ([facility] ) [address] [place] [phone]             Routing refill request to provider for review/approval because:

## 2023-10-06 RX ORDER — DULAGLUTIDE 0.75 MG/.5ML
INJECTION, SOLUTION SUBCUTANEOUS
Qty: 2 ML | Refills: 0 | Status: SHIPPED | OUTPATIENT
Start: 2023-10-06 | End: 2023-10-16

## 2023-10-13 DIAGNOSIS — E11.22 TYPE 2 DIABETES MELLITUS WITH STAGE 3A CHRONIC KIDNEY DISEASE, WITHOUT LONG-TERM CURRENT USE OF INSULIN (H): ICD-10-CM

## 2023-10-13 DIAGNOSIS — N18.31 TYPE 2 DIABETES MELLITUS WITH STAGE 3A CHRONIC KIDNEY DISEASE, WITHOUT LONG-TERM CURRENT USE OF INSULIN (H): ICD-10-CM

## 2023-10-13 NOTE — TELEPHONE ENCOUNTER
Due for office visit     Elissaradha      Last Written Prescription Date:  10.6.23  Last Fill Quantity: #2mL,   # refills: 0  Last Office Visit: 3.9.23  Future Office visit:    Next 5 appointments (look out 90 days)      Nov 30, 2023 11:30 AM  (Arrive by 11:15 AM)  Return Visit with Meenu Orourke DPM  [facility] ([facility] ) [address] [place] [phone]             Routing refill request to provider for review/approval because:

## 2023-10-16 RX ORDER — DULAGLUTIDE 0.75 MG/.5ML
INJECTION, SOLUTION SUBCUTANEOUS
Qty: 2 ML | Refills: 0 | Status: SHIPPED | OUTPATIENT
Start: 2023-10-16 | End: 2023-11-03

## 2023-10-24 DIAGNOSIS — F41.9 ANXIETY: ICD-10-CM

## 2023-10-24 RX ORDER — FLUOXETINE 10 MG/1
10 CAPSULE ORAL DAILY
Qty: 90 CAPSULE | Refills: 0 | Status: SHIPPED | OUTPATIENT
Start: 2023-10-24 | End: 2024-01-29

## 2023-10-24 NOTE — TELEPHONE ENCOUNTER
Prozac       Last Written Prescription Date:  5/01/2023  Last Fill Quantity: 90,   # refills: 1  Last Office Visit: 03/09/2023  Future Office visit:    Next 5 appointments (look out 90 days)      Nov 20, 2023  9:45 AM  (Arrive by 9:30 AM)  SHORT with Zoila Jones MD  [facility] ([facility] ) [address] [place] [address]  [phone]     Nov 30, 2023 11:30 AM  (Arrive by 11:15 AM)  Return Visit with Meenu Orourke DPM  [facility] ([facility] ) [address] [place] [phone]

## 2023-10-26 DIAGNOSIS — N18.31 TYPE 2 DIABETES MELLITUS WITH STAGE 3A CHRONIC KIDNEY DISEASE, WITHOUT LONG-TERM CURRENT USE OF INSULIN (H): ICD-10-CM

## 2023-10-26 DIAGNOSIS — E11.22 TYPE 2 DIABETES MELLITUS WITH STAGE 3A CHRONIC KIDNEY DISEASE, WITHOUT LONG-TERM CURRENT USE OF INSULIN (H): ICD-10-CM

## 2023-10-27 NOTE — TELEPHONE ENCOUNTER
Metformin      Last Written Prescription Date:  8/9/23  Last Fill Quantity: 360,   # refills: 0  Last Office Visit: 3/9/23  Future Office visit:    Next 5 appointments (look out 90 days)      Nov 20, 2023  9:45 AM  (Arrive by 9:30 AM)  SHORT with Zoila Jones MD  [facility] ([facility] ) [address] [place] [address]  [phone]     Nov 30, 2023 11:30 AM  (Arrive by 11:15 AM)  Return Visit with Meenu Orourke DPM  [facility] ([facility] ) [address] [place] [phone]             Routing refill request to provider for review/approval because:

## 2023-10-30 RX ORDER — METFORMIN HCL 500 MG
TABLET, EXTENDED RELEASE 24 HR ORAL
Qty: 360 TABLET | Refills: 0 | Status: SHIPPED | OUTPATIENT
Start: 2023-10-30 | End: 2024-01-31

## 2023-11-03 DIAGNOSIS — E11.22 TYPE 2 DIABETES MELLITUS WITH STAGE 3A CHRONIC KIDNEY DISEASE, WITHOUT LONG-TERM CURRENT USE OF INSULIN (H): ICD-10-CM

## 2023-11-03 DIAGNOSIS — N18.31 TYPE 2 DIABETES MELLITUS WITH STAGE 3A CHRONIC KIDNEY DISEASE, WITHOUT LONG-TERM CURRENT USE OF INSULIN (H): ICD-10-CM

## 2023-11-03 RX ORDER — DULAGLUTIDE 0.75 MG/.5ML
INJECTION, SOLUTION SUBCUTANEOUS
Qty: 2 ML | Refills: 3 | Status: SHIPPED | OUTPATIENT
Start: 2023-11-03 | End: 2024-03-25

## 2023-11-03 NOTE — TELEPHONE ENCOUNTER
TRULICITY 0.75 MG/0.5 ML pen      Last Written Prescription Date:  10/16/23  Last Fill Quantity: 2 ml,   # refills: 0  Last Office Visit: 3/9/23  Future Office visit:    Next 5 appointments (look out 90 days)      Nov 20, 2023  9:45 AM  (Arrive by 9:30 AM)  SHORT with Zoila Jones MD  [facility] ([facility] ) [address] [place] [address]  [phone]     Nov 30, 2023 11:30 AM  (Arrive by 11:15 AM)  Return Visit with Meenu Orourke DPM  [facility] ([facility] ) [address] [place] [phone]

## 2023-11-12 ENCOUNTER — HEALTH MAINTENANCE LETTER (OUTPATIENT)
Age: 83
End: 2023-11-12

## 2023-11-16 NOTE — PROGRESS NOTES
Assessment & Plan     Type 2 diabetes mellitus with stage 3a chronic kidney disease, without long-term current use of insulin (H)  Stable.  A1c at goal.  Continue Trulicity and Metformin at current dosing.  Eye referral placed.  Foot exam up to date - ongoing podiatry cares.  - Adult Eye  Referral; Future  - Hemoglobin A1c; Future  - CBC with platelets and differential; Future  - Comprehensive metabolic panel (BMP + Alb, Alk Phos, ALT, AST, Total. Bili, TP); Future  - TSH with free T4 reflex; Future  - Hemoglobin A1c  - CBC with platelets and differential  - Comprehensive metabolic panel (BMP + Alb, Alk Phos, ALT, AST, Total. Bili, TP)  - TSH with free T4 reflex  - Adult Eye  Referral; Future    Chronic painful diabetic neuropathy (H)  Stable.    Essential hypertension  At goal.  - CBC with platelets and differential; Future  - Comprehensive metabolic panel (BMP + Alb, Alk Phos, ALT, AST, Total. Bili, TP); Future  - CBC with platelets and differential  - Comprehensive metabolic panel (BMP + Alb, Alk Phos, ALT, AST, Total. Bili, TP)    Stage 3a chronic kidney disease (H)  Stable overall - Cr ranges 1.13-1.35.  today 1.27.  - Comprehensive metabolic panel (BMP + Alb, Alk Phos, ALT, AST, Total. Bili, TP); Future  - Comprehensive metabolic panel (BMP + Alb, Alk Phos, ALT, AST, Total. Bili, TP)    Hyperlipidemia, unspecified hyperlipidemia type  High intensity statin.  Continue Lipitor 80 mg.  Fasting lipids panel up to date.  - Comprehensive metabolic panel (BMP + Alb, Alk Phos, ALT, AST, Total. Bili, TP); Future  - Comprehensive metabolic panel (BMP + Alb, Alk Phos, ALT, AST, Total. Bili, TP)    History of CVA (cerebrovascular accident)  Secondary prevention.    Gout, unspecified cause, unspecified chronicity, unspecified site  No recent flares.  Uric acid at goal.  Continue Allopurinol 100 mg daily  - Comprehensive metabolic panel (BMP + Alb, Alk Phos, ALT, AST, Total. Bili, TP); Future  - Uric  "acid; Future  - Comprehensive metabolic panel (BMP + Alb, Alk Phos, ALT, AST, Total. Bili, TP)  - Uric acid    Centrilobular emphysema (H)  stable    Moderate episode of recurrent major depressive disorder (H)  stable    Benign prostatic hyperplasia with urinary retention  Stable      Other acute pulmonary embolism with acute cor pulmonale (H)  Prior.  Bilateral.  Large.  Chronic anticoagulation with Xarelto.  - rivaroxaban ANTICOAGULANT (XARELTO ANTICOAGULANT) 20 MG TABS tablet; TAKE 1 TABLET DAILY BY MOUTH WITH DINNER    Chronic anticoagulation  As above.  - rivaroxaban ANTICOAGULANT (XARELTO ANTICOAGULANT) 20 MG TABS tablet; TAKE 1 TABLET DAILY BY MOUTH WITH DINNER      31 minutes spent by me on the date of the encounter doing chart review, history and exam, documentation and further activities per the note       BMI:   Estimated body mass index is 25.93 kg/m  as calculated from the following:    Height as of this encounter: 1.854 m (6' 1\").    Weight as of this encounter: 89.1 kg (196 lb 8 oz).   Weight management plan: Discussed healthy diet and exercise guidelines    Patient Instructions   Right calf - varicose vein.  If developing pain, redness, warmth, etc - would advise ultrasound.    Labs stable overall.  Continue current medications.    Consider RSV vaccine.    Currently [facility] are providing and scheduling RSV vaccines at our pharmacies and clinics. The pharmacy is the recommended location for RSV vaccination for patients on Medicare insurance plans.  We are providing RSV vaccines for the following patients:     RSV vaccine  Patients 60+ years*  Pregnant patients at 32 to 36 weeks' gestation    *It is recommended that patients on Medicare insurance plans receive their RSV vaccine in the pharmacy.    Additional Information about RSV, RSV vaccines, and RSV monoclonal antibodies   Respiratory Syncytial Virus (RSV) is a common respiratory illness that typically causes mild, cold-like " symptoms. However, RSV can be serious for infants and older adults. RSV causes approximately 60,000-160,000 hospitalizations and 6,000-10,000 deaths among adults 65 years and older. RSV is also the leading cause of hospitalization for infants. Nearly all children are infected with RSV by the age of 2 years.    Prevention of RSV through vaccination (adults) or monoclonal antibody (infants) is essential as there is no treatment (medications) for RSV after someone is infected.    RSV vaccine 60+ years  Patients 60 years old are eligible for RSV that desire vaccination  Medical conditions that are associated with increased risk of severe RSV disease are:  Chronic lung disease (e.g. COPD, asthma)  Heart disease (e.g. CHD, CAD)  Chronic or progressive neurological conditions  Chronic kidney disease  Chronic liver disease  Diabetes  Moderate or severe immunocompromise  Chronic blood disorders  Frailty  Living in a nursing home or long term care facility     RSV vaccine in pregnancy  Shown to reduce risk of RSV hospitalization by 57% for the baby in the first six months after birth  One dose is recommended between weeks 32 and 36 of pregnancy          Return for next available physical (at least 3 months out so not early for a1c).    Zoila Azul MD  [facility] - ALEXA Cruz is a 83 year old, presenting for the following health issues:  Diabetes, Lipids, Hypertension, Depression, Anxiety, Kidney Problem, and CVA      HPI       Diabetes Follow-up  How often are you checking your blood sugar? Not at all  What concerns do you have today about your diabetes? None   Do you have any of these symptoms? (Select all that apply)  No numbness or tingling in feet.  No redness, sores or blisters on feet.  No complaints of excessive thirst.  No reports of blurry vision.  No significant changes to weight.  Have you had a diabetic eye exam in the last 12 months? No  Neuropathy - bilateral - feet only;  follows with Dr Orourke  Eye exam due - Dr Emmanuel - [address] - [place]  Trulicity 0.75 and metformin max    Hyperlipidemia Follow-Up - lipitor 80  Are you regularly taking any medication or supplement to lower your cholesterol?   Yes- lipitor 80mg   Are you having muscle aches or other side effects that you think could be caused by your cholesterol lowering medication?  No    Hypertension Follow-up - coreg and norvasc  Do you check your blood pressure regularly outside of the clinic? No   Are you following a low salt diet? No  Are your blood pressures ever more than 140 on the top number (systolic) OR more   than 90 on the bottom number (diastolic), for example 140/90? No    BP Readings from Last 2 Encounters:   11/20/23 114/70   09/21/23 114/65     Hemoglobin A1C (%)   Date Value   11/20/2023 6.7 (H)   03/09/2023 6.0 (H)   05/06/2021 6.5 (H)     LDL Cholesterol Calculated (mg/dL)   Date Value   03/09/2023 52   03/04/2022 55       Depression and Anxiety Follow-Up  How are you doing with your depression since your last visit? No change  How are you doing with your anxiety since your last visit?  No change  Are you having other symptoms that might be associated with depression or anxiety? No  Have you had a significant life event? No   Do you have any concerns with your use of alcohol or other drugs? No    Social History     Tobacco Use    Smoking status: Never    Smokeless tobacco: Former     Types: Chew   Vaping Use    Vaping Use: Never used   Substance Use Topics    Alcohol use: Yes     Comment: daily, 4 today    Drug use: Never         4/4/2022     2:00 PM 12/8/2022     2:19 PM 11/20/2023     9:45 AM   PHQ   PHQ-9 Total Score 8 0 0   Q9: Thoughts of better off dead/self-harm past 2 weeks Not at all Not at all Not at all         4/4/2022     2:00 PM 12/8/2022     2:20 PM 11/20/2023     9:46 AM   SHANNON-7 SCORE   Total Score 0 0 7         11/20/2023     9:45 AM   Last PHQ-9   1.  Little interest or pleasure in doing  things 0   2.  Feeling down, depressed, or hopeless 0   3.  Trouble falling or staying asleep, or sleeping too much 0   4.  Feeling tired or having little energy 0   5.  Poor appetite or overeating 0   6.  Feeling bad about yourself 0   7.  Trouble concentrating 0   8.  Moving slowly or restless 0   Q9: Thoughts of better off dead/self-harm past 2 weeks 0   PHQ-9 Total Score 0         11/20/2023     9:46 AM   SHANNON-7    1. Feeling nervous, anxious, or on edge 1   2. Not being able to stop or control worrying 1   3. Worrying too much about different things 1   4. Trouble relaxing 1   5. Being so restless that it is hard to sit still 1   6. Becoming easily annoyed or irritable 1   7. Feeling afraid, as if something awful might happen 1   SHANNON-7 Total Score 7       Suicide Assessment Five-step Evaluation and Treatment (SAFE-T)    Chronic Kidney Disease Follow-up  Do you take any over the counter pain medicine?: Yes  What over the counter medicine are you taking for your pain?: Tylenol    How often do you take this medicine?:  A few times a month    Cerebrovascular Follow-up  Patient history: hemorrhagic cerebrovascular incident  Residual symptoms: None  Worsened or new symptoms since last visit: No  Daily aspirin use: No  Hypertension controlled: Yes  On xarelto  Bilateral PE history - large with right ventricle strain.  Prior cardiology consult  No abnormal bleeding noted      Right leg - more swollen; noted by .  Ongoing month or more.  Same, not progressive.  No pain or itching.   No skin changes.  No injury or fall.     -  helps with everything.    Still driving.  Uses cane.  No falls.  2 drinks per day - at noon.  At the bar.  [employer].    No gout flares.    Review of Systems   Constitutional, HEENT, cardiovascular, pulmonary, gi and gu systems are negative, except as otherwise noted.      Objective    /70 (BP Location: Right arm, Patient Position: Sitting, Cuff Size: Adult Regular)    "Pulse 97   Temp 98.7  F (37.1  C) (Tympanic)   Ht 1.854 m (6' 1\")   Wt 89.1 kg (196 lb 8 oz)   SpO2 94%   BMI 25.93 kg/m    Body mass index is 25.93 kg/m .  Physical Exam   GENERAL: alert, no distress, and elderly  EYES: Eyes grossly normal to inspection, PERRL and conjunctivae and sclerae normal  NECK: no adenopathy, no asymmetry, masses, or scars and thyroid normal to palpation  RESP: lungs clear to auscultation - no rales, rhonchi or wheezes  CV: regular rate and rhythm, normal S1 S2, no S3 or S4, no murmur, click or rub, no peripheral edema and peripheral pulses strong  ABDOMEN: soft, nontender, no hepatosplenomegaly, no masses and bowel sounds normal  MS: normal muscle tone, varicose veins left more than right, no edema, and peripheral pulses normal; non-tender; negative Lyric's; no overlying skin changes  SKIN: no suspicious lesions or rashes  NEURO: Normal strength and tone, mentation intact and speech normal  PSYCH: mentation appears normal, affect normal/bright    Results for orders placed or performed in visit on 11/20/23 (from the past 24 hour(s))   Hemoglobin A1c   Result Value Ref Range    Estimated Average Glucose 146 mg/dL    Hemoglobin A1C 6.7 (H) <5.7 %   CBC with platelets and differential    Narrative    The following orders were created for panel order CBC with platelets and differential.  Procedure                               Abnormality         Status                     ---------                               -----------         ------                     CBC with platelets and d...[[id number]]  Abnormal            Final result                 Please view results for these tests on the individual orders.   Comprehensive metabolic panel (BMP + Alb, Alk Phos, ALT, AST, Total. Bili, TP)   Result Value Ref Range    Sodium 139 135 - 145 mmol/L    Potassium 4.5 3.4 - 5.3 mmol/L    Carbon Dioxide (CO2) 21 (L) 22 - 29 mmol/L    Anion Gap 15 7 - 15 mmol/L    Urea Nitrogen 16.6 8.0 - 23.0 mg/dL    " Creatinine 1.27 (H) 0.67 - 1.17 mg/dL    GFR Estimate 56 (L) >60 mL/min/1.73m2    Calcium 9.4 8.8 - 10.2 mg/dL    Chloride 103 98 - 107 mmol/L    Glucose 271 (H) 70 - 99 mg/dL    Alkaline Phosphatase 75 40 - 150 U/L    AST 21 0 - 45 U/L    ALT 13 0 - 70 U/L    Protein Total 6.8 6.4 - 8.3 g/dL    Albumin 4.2 3.5 - 5.2 g/dL    Bilirubin Total 0.6 <=1.2 mg/dL   TSH with free T4 reflex   Result Value Ref Range    TSH 1.40 0.30 - 4.20 uIU/mL   Uric acid   Result Value Ref Range    Uric Acid 5.0 3.4 - 7.0 mg/dL   CBC with platelets and differential   Result Value Ref Range    WBC Count 7.6 4.0 - 11.0 10e3/uL    RBC Count 4.38 (L) 4.40 - 5.90 10e6/uL    Hemoglobin 14.3 13.3 - 17.7 g/dL    Hematocrit 42.4 40.0 - 53.0 %    MCV 97 78 - 100 fL    MCH 32.6 26.5 - 33.0 pg    MCHC 33.7 31.5 - 36.5 g/dL    RDW 12.8 10.0 - 15.0 %    Platelet Count 182 150 - 450 10e3/uL    % Neutrophils 81 %    % Lymphocytes 11 %    % Monocytes 6 %    % Eosinophils 1 %    % Basophils 0 %    % Immature Granulocytes 1 %    NRBCs per 100 WBC 0 <1 /100    Absolute Neutrophils 6.2 1.6 - 8.3 10e3/uL    Absolute Lymphocytes 0.9 0.8 - 5.3 10e3/uL    Absolute Monocytes 0.5 0.0 - 1.3 10e3/uL    Absolute Eosinophils 0.1 0.0 - 0.7 10e3/uL    Absolute Basophils 0.0 0.0 - 0.2 10e3/uL    Absolute Immature Granulocytes 0.0 <=0.4 10e3/uL    Absolute NRBCs 0.0 10e3/uL

## 2023-11-20 ENCOUNTER — OFFICE VISIT (OUTPATIENT)
Dept: FAMILY MEDICINE | Facility: OTHER | Age: 83
End: 2023-11-20
Attending: FAMILY MEDICINE
Payer: MEDICARE

## 2023-11-20 VITALS
DIASTOLIC BLOOD PRESSURE: 70 MMHG | WEIGHT: 196.5 LBS | SYSTOLIC BLOOD PRESSURE: 114 MMHG | TEMPERATURE: 98.7 F | HEIGHT: 73 IN | BODY MASS INDEX: 26.04 KG/M2 | OXYGEN SATURATION: 94 % | HEART RATE: 97 BPM

## 2023-11-20 DIAGNOSIS — N18.31 STAGE 3A CHRONIC KIDNEY DISEASE (H): ICD-10-CM

## 2023-11-20 DIAGNOSIS — E11.40 CHRONIC PAINFUL DIABETIC NEUROPATHY (H): ICD-10-CM

## 2023-11-20 DIAGNOSIS — M10.9 GOUT, UNSPECIFIED CAUSE, UNSPECIFIED CHRONICITY, UNSPECIFIED SITE: ICD-10-CM

## 2023-11-20 DIAGNOSIS — E78.5 HYPERLIPIDEMIA, UNSPECIFIED HYPERLIPIDEMIA TYPE: ICD-10-CM

## 2023-11-20 DIAGNOSIS — F33.1 MODERATE EPISODE OF RECURRENT MAJOR DEPRESSIVE DISORDER (H): ICD-10-CM

## 2023-11-20 DIAGNOSIS — I26.09 OTHER ACUTE PULMONARY EMBOLISM WITH ACUTE COR PULMONALE (H): ICD-10-CM

## 2023-11-20 DIAGNOSIS — Z86.73 HISTORY OF CVA (CEREBROVASCULAR ACCIDENT): ICD-10-CM

## 2023-11-20 DIAGNOSIS — E11.22 TYPE 2 DIABETES MELLITUS WITH STAGE 3A CHRONIC KIDNEY DISEASE, WITHOUT LONG-TERM CURRENT USE OF INSULIN (H): Primary | ICD-10-CM

## 2023-11-20 DIAGNOSIS — N40.1 BENIGN PROSTATIC HYPERPLASIA WITH URINARY RETENTION: ICD-10-CM

## 2023-11-20 DIAGNOSIS — I10 ESSENTIAL HYPERTENSION: ICD-10-CM

## 2023-11-20 DIAGNOSIS — R33.8 BENIGN PROSTATIC HYPERPLASIA WITH URINARY RETENTION: ICD-10-CM

## 2023-11-20 DIAGNOSIS — J43.2 CENTRILOBULAR EMPHYSEMA (H): ICD-10-CM

## 2023-11-20 DIAGNOSIS — Z79.01 CHRONIC ANTICOAGULATION: ICD-10-CM

## 2023-11-20 DIAGNOSIS — N18.31 TYPE 2 DIABETES MELLITUS WITH STAGE 3A CHRONIC KIDNEY DISEASE, WITHOUT LONG-TERM CURRENT USE OF INSULIN (H): Primary | ICD-10-CM

## 2023-11-20 PROBLEM — Z78.9 NURSING HOME RESIDENT: Status: RESOLVED | Noted: 2021-05-21 | Resolved: 2023-11-20

## 2023-11-20 LAB
ALBUMIN SERPL BCG-MCNC: 4.2 G/DL (ref 3.5–5.2)
ALP SERPL-CCNC: 75 U/L (ref 40–150)
ALT SERPL W P-5'-P-CCNC: 13 U/L (ref 0–70)
ANION GAP SERPL CALCULATED.3IONS-SCNC: 15 MMOL/L (ref 7–15)
AST SERPL W P-5'-P-CCNC: 21 U/L (ref 0–45)
BASOPHILS # BLD AUTO: 0 10E3/UL (ref 0–0.2)
BASOPHILS NFR BLD AUTO: 0 %
BILIRUB SERPL-MCNC: 0.6 MG/DL
BUN SERPL-MCNC: 16.6 MG/DL (ref 8–23)
CALCIUM SERPL-MCNC: 9.4 MG/DL (ref 8.8–10.2)
CHLORIDE SERPL-SCNC: 103 MMOL/L (ref 98–107)
CREAT SERPL-MCNC: 1.27 MG/DL (ref 0.67–1.17)
DEPRECATED HCO3 PLAS-SCNC: 21 MMOL/L (ref 22–29)
EGFRCR SERPLBLD CKD-EPI 2021: 56 ML/MIN/1.73M2
EOSINOPHIL # BLD AUTO: 0.1 10E3/UL (ref 0–0.7)
EOSINOPHIL NFR BLD AUTO: 1 %
ERYTHROCYTE [DISTWIDTH] IN BLOOD BY AUTOMATED COUNT: 12.8 % (ref 10–15)
EST. AVERAGE GLUCOSE BLD GHB EST-MCNC: 146 MG/DL
GLUCOSE SERPL-MCNC: 271 MG/DL (ref 70–99)
HBA1C MFR BLD: 6.7 %
HCT VFR BLD AUTO: 42.4 % (ref 40–53)
HGB BLD-MCNC: 14.3 G/DL (ref 13.3–17.7)
IMM GRANULOCYTES # BLD: 0 10E3/UL
IMM GRANULOCYTES NFR BLD: 1 %
LYMPHOCYTES # BLD AUTO: 0.9 10E3/UL (ref 0.8–5.3)
LYMPHOCYTES NFR BLD AUTO: 11 %
MCH RBC QN AUTO: 32.6 PG (ref 26.5–33)
MCHC RBC AUTO-ENTMCNC: 33.7 G/DL (ref 31.5–36.5)
MCV RBC AUTO: 97 FL (ref 78–100)
MONOCYTES # BLD AUTO: 0.5 10E3/UL (ref 0–1.3)
MONOCYTES NFR BLD AUTO: 6 %
NEUTROPHILS # BLD AUTO: 6.2 10E3/UL (ref 1.6–8.3)
NEUTROPHILS NFR BLD AUTO: 81 %
NRBC # BLD AUTO: 0 10E3/UL
NRBC BLD AUTO-RTO: 0 /100
PLATELET # BLD AUTO: 182 10E3/UL (ref 150–450)
POTASSIUM SERPL-SCNC: 4.5 MMOL/L (ref 3.4–5.3)
PROT SERPL-MCNC: 6.8 G/DL (ref 6.4–8.3)
RBC # BLD AUTO: 4.38 10E6/UL (ref 4.4–5.9)
SODIUM SERPL-SCNC: 139 MMOL/L (ref 135–145)
TSH SERPL DL<=0.005 MIU/L-ACNC: 1.4 UIU/ML (ref 0.3–4.2)
URATE SERPL-MCNC: 5 MG/DL (ref 3.4–7)
WBC # BLD AUTO: 7.6 10E3/UL (ref 4–11)

## 2023-11-20 PROCEDURE — 99214 OFFICE O/P EST MOD 30 MIN: CPT | Performed by: FAMILY MEDICINE

## 2023-11-20 PROCEDURE — G0463 HOSPITAL OUTPT CLINIC VISIT: HCPCS

## 2023-11-20 PROCEDURE — 84443 ASSAY THYROID STIM HORMONE: CPT | Mod: ZL | Performed by: FAMILY MEDICINE

## 2023-11-20 PROCEDURE — 83036 HEMOGLOBIN GLYCOSYLATED A1C: CPT | Mod: ZL | Performed by: FAMILY MEDICINE

## 2023-11-20 PROCEDURE — 36415 COLL VENOUS BLD VENIPUNCTURE: CPT | Mod: ZL | Performed by: FAMILY MEDICINE

## 2023-11-20 PROCEDURE — 82947 ASSAY GLUCOSE BLOOD QUANT: CPT | Mod: ZL | Performed by: FAMILY MEDICINE

## 2023-11-20 PROCEDURE — 85004 AUTOMATED DIFF WBC COUNT: CPT | Mod: ZL | Performed by: FAMILY MEDICINE

## 2023-11-20 PROCEDURE — 84550 ASSAY OF BLOOD/URIC ACID: CPT | Mod: ZL | Performed by: FAMILY MEDICINE

## 2023-11-20 PROCEDURE — 80053 COMPREHEN METABOLIC PANEL: CPT | Mod: ZL | Performed by: FAMILY MEDICINE

## 2023-11-20 ASSESSMENT — ANXIETY QUESTIONNAIRES
3. WORRYING TOO MUCH ABOUT DIFFERENT THINGS: SEVERAL DAYS
4. TROUBLE RELAXING: SEVERAL DAYS
GAD7 TOTAL SCORE: 7
6. BECOMING EASILY ANNOYED OR IRRITABLE: SEVERAL DAYS
5. BEING SO RESTLESS THAT IT IS HARD TO SIT STILL: SEVERAL DAYS
2. NOT BEING ABLE TO STOP OR CONTROL WORRYING: SEVERAL DAYS
7. FEELING AFRAID AS IF SOMETHING AWFUL MIGHT HAPPEN: SEVERAL DAYS
GAD7 TOTAL SCORE: 7
1. FEELING NERVOUS, ANXIOUS, OR ON EDGE: SEVERAL DAYS

## 2023-11-20 ASSESSMENT — PAIN SCALES - GENERAL: PAINLEVEL: NO PAIN (0)

## 2023-11-20 ASSESSMENT — PATIENT HEALTH QUESTIONNAIRE - PHQ9: SUM OF ALL RESPONSES TO PHQ QUESTIONS 1-9: 0

## 2023-11-20 NOTE — PATIENT INSTRUCTIONS
Right calf - varicose vein.  If developing pain, redness, warmth, etc - would advise ultrasound.    Labs stable overall.  Continue current medications.    Consider RSV vaccine.    Currently [facility] are providing and scheduling RSV vaccines at our pharmacies and clinics. The pharmacy is the recommended location for RSV vaccination for patients on Medicare insurance plans.  We are providing RSV vaccines for the following patients:     RSV vaccine  Patients 60+ years*  Pregnant patients at 32 to 36 weeks' gestation    *It is recommended that patients on Medicare insurance plans receive their RSV vaccine in the pharmacy.    Additional Information about RSV, RSV vaccines, and RSV monoclonal antibodies   Respiratory Syncytial Virus (RSV) is a common respiratory illness that typically causes mild, cold-like symptoms. However, RSV can be serious for infants and older adults. RSV causes approximately 60,000-160,000 hospitalizations and 6,000-10,000 deaths among adults 65 years and older. RSV is also the leading cause of hospitalization for infants. Nearly all children are infected with RSV by the age of 2 years.    Prevention of RSV through vaccination (adults) or monoclonal antibody (infants) is essential as there is no treatment (medications) for RSV after someone is infected.    RSV vaccine 60+ years  Patients 60 years old are eligible for RSV that desire vaccination  Medical conditions that are associated with increased risk of severe RSV disease are:  Chronic lung disease (e.g. COPD, asthma)  Heart disease (e.g. CHD, CAD)  Chronic or progressive neurological conditions  Chronic kidney disease  Chronic liver disease  Diabetes  Moderate or severe immunocompromise  Chronic blood disorders  Frailty  Living in a nursing home or long term care facility     RSV vaccine in pregnancy  Shown to reduce risk of RSV hospitalization by 57% for the baby in the first six months after birth  One dose is recommended  between weeks 32 and 36 of pregnancy

## 2023-12-26 ENCOUNTER — TELEPHONE (OUTPATIENT)
Dept: FAMILY MEDICINE | Facility: OTHER | Age: 83
End: 2023-12-26

## 2023-12-26 NOTE — TELEPHONE ENCOUNTER
12:39 PM    Reason for Call: Phone Call    Description: pt sister in law called about the pt surgery and being off xrelto and needs to know about this. Please respond to [employer] message    Was an appointment offered for this call? No  If yes : Appointment type              Date    Preferred method for responding to this message: sezmirica  What is your phone number ? [employer]    If we cannot reach you directly, may we leave a detailed response at the number you provided? Yes    Can this message wait until your PCP/provider returns, if available today? Joseline Camarillo

## 2024-01-05 ENCOUNTER — TELEPHONE (OUTPATIENT)
Dept: FAMILY MEDICINE | Facility: OTHER | Age: 84
End: 2024-01-05

## 2024-01-05 NOTE — TELEPHONE ENCOUNTER
Received a PA request from Gurpreet Gomez for dulaglutide (TRULICITY) 0.75 MG/0.5ML pen. Submitted on CMM. Waiting for a response.

## 2024-01-08 NOTE — TELEPHONE ENCOUNTER
Received an APPROVAL from [employer] for dulaglutide (TRULICITY) 0.75 MG/0.5ML pen. Effective dates 1/1/24-1/6/25.

## 2024-01-16 ENCOUNTER — OFFICE VISIT (OUTPATIENT)
Dept: PODIATRY | Facility: OTHER | Age: 84
End: 2024-01-16
Attending: PODIATRIST
Payer: MEDICARE

## 2024-01-16 VITALS
HEART RATE: 107 BPM | DIASTOLIC BLOOD PRESSURE: 80 MMHG | TEMPERATURE: 98.6 F | SYSTOLIC BLOOD PRESSURE: 141 MMHG | OXYGEN SATURATION: 93 %

## 2024-01-16 DIAGNOSIS — E11.42 DIABETIC POLYNEUROPATHY ASSOCIATED WITH TYPE 2 DIABETES MELLITUS (H): ICD-10-CM

## 2024-01-16 DIAGNOSIS — L03.116 CELLULITIS OF LEFT FOOT: Primary | ICD-10-CM

## 2024-01-16 DIAGNOSIS — L60.0 INGROWN TOENAIL: ICD-10-CM

## 2024-01-16 DIAGNOSIS — E11.9 DIABETES MELLITUS TYPE 2, NONINSULIN DEPENDENT (H): ICD-10-CM

## 2024-01-16 PROCEDURE — G0463 HOSPITAL OUTPT CLINIC VISIT: HCPCS | Mod: 25

## 2024-01-16 PROCEDURE — 99213 OFFICE O/P EST LOW 20 MIN: CPT | Mod: 25 | Performed by: PODIATRIST

## 2024-01-16 PROCEDURE — G0463 HOSPITAL OUTPT CLINIC VISIT: HCPCS

## 2024-01-16 PROCEDURE — 11730 AVULSION NAIL PLATE SIMPLE 1: CPT | Mod: TA | Performed by: PODIATRIST

## 2024-01-16 RX ORDER — DOXYCYCLINE HYCLATE 100 MG
100 TABLET ORAL 2 TIMES DAILY
Qty: 14 TABLET | Refills: 0 | Status: SHIPPED | OUTPATIENT
Start: 2024-01-16 | End: 2024-01-23

## 2024-01-16 NOTE — PROGRESS NOTES
Chief complaint: Patient presents with:  Ingrown Toenail: Left great toe      History of Present Illness: This 83 year old NIDDM II male is seen for concerns regarding a LEFT hallux ingrown toenail. He thinks he has had the ingrown toenail for a week, but the toe turned very red this morning on 01/16/2023. His  helps him with his foot care and she advised him to call the clinic this morning on 01/16/2024. Patient has no pain or feeling in his foot. He sometimes has burning and tingling in the foot.    No further pedal complaints today.         Lab Results   Component Value Date    A1C 6.7 11/20/2023    A1C 6.0 03/09/2023    A1C 8.2 12/08/2022    A1C 6.3 06/06/2022    A1C 5.6 03/04/2022    A1C 6.5 05/06/2021           BP (!) 141/80 (BP Location: Left arm, Patient Position: Sitting, Cuff Size: Adult Regular)   Pulse 107   Temp 98.6  F (37  C) (Tympanic)   SpO2 93%     Patient Active Problem List   Diagnosis    Urinary retention    Generalized weakness    Type 2 diabetes mellitus with stage 3 chronic kidney disease, without long-term current use of insulin (H)    Tachycardia    Hypoxia    Other acute pulmonary embolism with acute cor pulmonale (H)    Dyspnea, unspecified type    Benign prostatic hyperplasia with urinary retention    Chronic kidney disease, stage III (moderate) (H)    Chronic obstructive lung disease (H)    Chronic painful diabetic neuropathy (H)    Erectile dysfunction    Essential hypertension    Gout, unspecified    History of CVA (cerebrovascular accident)    Hyperlipidemia    IBS (irritable bowel syndrome)    Lumbar spondylosis    Mild concentric left ventricular hypertrophy (LVH)    Moderate episode of recurrent major depressive disorder (H)    Spinal stenosis of lumbar region without neurogenic claudication    Uncomplicated alcohol dependence (H)    Onychomycosis of left great toe    Pincer nail deformity    Complicated UTI (urinary tract infection)    Sepsis with acute renal  failure (H)       Past Surgical History:   Procedure Laterality Date    AMPUTATE TOE(S) Left 6/27/2022    Procedure: Left fifth toe partial versus full amputation;  Surgeon: Meenu Orourke DPM;  Location: [facility]    CYSTOSCOPY, TRANSURETHRAL RESECTION (TUR) PROSTATE, COMBINED N/A 10/15/2021    Procedure: CYSTOSCOPY, WITH TRANSURETHRAL RESECTION PROSTATE;  Surgeon: Indu De Leon MD;  Location: [facility]    HERNIA REPAIR      PHACOEMULSIFICATION WITH STANDARD INTRAOCULAR LENS IMPLANT Left 12/27/2021    Procedure: PHACOEMULSIFICATION CATARACT EXTRACTION POSTERIOR CHAMBER LENS LEFT EYE;  Surgeon: Phillip Maldonado MD;  Location: [facility]    PHACOEMULSIFICATION WITH STANDARD INTRAOCULAR LENS IMPLANT Right 1/11/2022    Procedure: PHACOEMULSIFICATION CATARACT EXTRACTION POSTERIOR CHAMBER LENS RIGHT EYE;  Surgeon: Phillip Maldonado MD;  Location: [facility]       Current Outpatient Medications   Medication    acetaminophen (TYLENOL) 500 MG tablet    albuterol (PROAIR HFA/PROVENTIL HFA/VENTOLIN HFA) 108 (90 Base) MCG/ACT inhaler    allopurinol (ZYLOPRIM) 100 MG tablet    amLODIPine (NORVASC) 2.5 MG tablet    atorvastatin (LIPITOR) 80 MG tablet    carvedilol (COREG) 25 MG tablet    dulaglutide (TRULICITY) 0.75 MG/0.5ML pen    finasteride (PROSCAR) 5 MG tablet    FLUoxetine (PROZAC) 10 MG capsule    gabapentin (NEURONTIN) 300 MG capsule    metFORMIN (GLUCOPHAGE XR) 500 MG 24 hr tablet    multivitamin, therapeutic (THERA-VIT) TABS tablet    rivaroxaban ANTICOAGULANT (XARELTO ANTICOAGULANT) 20 MG TABS tablet    sildenafil (VIAGRA) 100 MG tablet    tamsulosin (FLOMAX) 0.4 MG capsule    ULTICARE MINI 31G X 6 MM insulin pen needle    vitamin B-12 (CYANOCOBALAMIN) 250 MCG tablet    vitamin C (ASCORBIC ACID) 500 MG tablet     No current facility-administered medications for this visit.          Allergies   Allergen Reactions    Ace Inhibitors      Per [facility]: hyperkalemia.  Pt doesn't know if he is allergic    Ceclor [Cefaclor] Hives     Sulfa Antibiotics      Pt unsure.        History reviewed. No pertinent family history.    Social History     Socioeconomic History    Marital status: Single     Spouse name: None    Number of children: None    Years of education: None    Highest education level: None   Occupational History    None   Tobacco Use    Smoking status: Never Smoker    Smokeless tobacco: Former User     Types: Chew   Substance and Sexual Activity    Alcohol use: Not Currently    Drug use: Never    Sexual activity: None   Other Topics Concern    Parent/sibling w/ CABG, MI or angioplasty before 65F 55M? Not Asked   Social History Narrative    None     Social Determinants of Health     Financial Resource Strain:     Difficulty of Paying Living Expenses:    Food Insecurity:     Worried About Running Out of Food in the Last Year:     Ran Out of Food in the Last Year:    Transportation Needs:     Lack of Transportation (Medical):     Lack of Transportation (Non-Medical):    Physical Activity:     Days of Exercise per Week:     Minutes of Exercise per Session:    Stress:     Feeling of Stress :    Social Connections:     Frequency of Communication with Friends and Family:     Frequency of Social Gatherings with Friends and Family:     Attends Orthodoxy Services:     Active Member of Clubs or Organizations:     Attends Club or Organization Meetings:     Marital Status:    Intimate Partner Violence:     Fear of Current or Ex-Partner:     Emotionally Abused:     Physically Abused:     Sexually Abused:        ROS: 10 point ROS neg other than the symptoms noted above in the HPI.  EXAM  Constitutional: healthy, alert and no distress    Psychiatric: mentation appears normal and affect normal/bright    VASCULAR:  -Dorsalis pedis pulse +2/4 b/l  -Posterior tibial pulse +1/4 b/l  -Capillary refill time < 3 seconds to b/l hallux  -Hair growth absent to b/l anterior legs and ankles  NEURO:  -Epicritic and protective sensation absent to the bilateral  foot    DERM:  -Skin temperature within normal limits to bilateral foot  -Toenails elongated, thickened, dystrophic and discolored x 10  -Moderate incurvation of the LEFT hallux lateral toenail border and mild incurvation of medial toenail border  ---Toenail moderately thickened and not fully adhered to the distal 1/3 of the nail bed  ---Moderate erythema, edema, and calor to the LEFT hallux  ---Moderate serous drainage with mild purulent drainage    MSK:  -Adductovarus rotation of the bilateral fifth toe  -Muscle strength of ankles +5/5 for dorsiflexion, plantarflexion, ABDUction and ADDuction b/l    ============================================================    ASSESSMENT:    (L03.116) Cellulitis of left foot  (primary encounter diagnosis)    (L60.0) Ingrown toenail    (E11.9) Diabetes mellitus type 2, noninsulin dependent (H)    (E11.42) Diabetic polyneuropathy associated with type 2 diabetes mellitus (H)        PLAN:  -Patient evaluated and examined. Treatment options discussed with no educational barriers noted.    Cellulitis of LEFT hallux:  -Patient has moderate erythema, edema and calor to the toe. Due to the extent of the cellulitis and his history of DM, he is at greater risk for a toe amputation if the infection is not treated. An antibiotic is advised. He has an allergy to Ceclor as well as Sulfa antibiotics. He also has difficulty remembering instructions, so an antibiotic requiring multiple doses per day would likely have less compliance. His last GFR was also mildly reduced. Prescribed Doxycycline 100mg -- patient to take twice a day for a total of seven days. He is advised to take the full prescription, even if the toe starts looking worse.  -Patient was instructed to look for worsening signs of infection (redness, swelling, pain, purulence, fever, chills, nausea, vomiting) and to return to podiatry or the emergency department immediately if there are any signs of infection.   -This is an acute,  uncomplicated illness/injury with OTC treatment options reviewed.    -------------------------------------------    Ingrown toenail(s):  -Discussed nail procedure options and etiologies and treatments for ingrown toenails. Conservatively, patient could opt for a slant back today and keep monitoring the toe since there are no SOI. Discussed risks and benefits and healing course of a nail border avulsion vs. Matrixectomy including post procedure infection or a non-healing wound, both of which could lead to a life threatening infection or amputation of the foot or leg or a proximal amputation. Since the toe is infected, a matrixectomy will not be performed today. The lateral toenail border is the most incurvated, but there is also mild incurvation to the medial toenail border. The toenail is also thick and partially detached at the distal 1/3 of the nail bed. A nail border avulsion may place him at higher risk of a non-healing wound if the drainage becomes trapped beneath the toenail. Patient understands the risks and benefits and has decided to proceed with the following:    -Toenail avulsion of left hallux toenail: Written and verbal consent obtained after reviewing risks and benefits of the procedure. Patient understands that although phenol is used in attempt to prevent regrowth of the ingrown toenail, the nail can still grow back. There is also a risk of post procedure infection. A severe foot infection could lead to a proximal foot or leg amputation or loss of life, so the patient is advised to return to podiatry or the ED immediately if the patient notices any SOI. The patient is in agreement with this plan and wishes to proceed with the procedure. A time-out was performed to identify the correct patient, limb, digit and procedure.    An alcohol prep pad was applied to  to the base of the left hallux. The digit was injected with 5 mL of 1:1 of 2% Lidocaine plain and 0.5% marcaine plain in a ring block fashion at  "the base of the toe(s). Adequate local anesthesia was obtained. A ring tournicot was applied to the digit and a chloroprep was applied to the hallux. A freer was used to loosen the nail from the underlying nail bed. A hemostat was then used to remove the nail in total. The underlying nail bed appeared healthy without any obvious open wounds puncturing the nail bed. The digit was rinsed thoroughly with alcohol. The tournicot was removed from the toe and there was a prompt hyperemic response to the hallux. The wound was then dressed with an Silvadene, gauze and 1\" coban. The patient was educated on after procedure care including daily epsom salt soaks starting tomorrow followed by dressing of the toe with an antibiotic cream and a bandaid until the wound site on the toe stops draining (2-3 weeks). Provided education on how to look for signs of infection (redness, swelling, pain, purulence, fever, chills, nausea, vomiting) and the patient was instructed to return to the clinic or Emergency Department immediately if there are any signs of infection.    -Patient says he cannot change his own dressing. He has a  who sometimes helps him with dressings, but he says she cannot change the dressing daily for him. He is advised to ask her if she can change it tomorrow and then every other day. He though this was possible. Leaving the dressing on too long increases his risk of a non-healing wound, infection, or worsening infection which can lead to a leg or life-threatening infection. He understands and agrees to have the dressing changed every two days. He will call with any concerns or worsening signs of infection. He should go to the Emergency Department with worsening signs of infection if podiatry is not available.    -Patient in agreement with the above treatment plan and all of patient's questions were answered.      Return to clinic one week to evaluate LEFT hallux cellulitis and LEFT hallux avulsion " site  Patient like to call as needed for diabetic foot exams and high risk nail debridement       Meenu Orourke DPM

## 2024-01-16 NOTE — PATIENT INSTRUCTIONS
Nail procedure care:  -Start epsom salt soaks tomorrow. Soak the foot 1-2 times a day for 20 minutes.  -Apply an antibiotic cream, gauze and a bandaid over the toe for the first week after the procedure.  -After one week, continue the epsom salt soaks, but switch to a betadine dressing. Apply a small amount of betadine on gauze or dab the betadine over the toe with gauze and apply another dry gauze over the toe followed by a band aid or tape.  -Do not apply a band aid directly over the nail procedure site without gauze or it will trap too much moisture beneath the band aid.  Note: Continue the epsom salt soaks and dressings every day until the wound is fully healed. You should not see drainage on the bandage for at least two days in a row. Call the clinic if the wound is still moderately draining two weeks after the procedure.    Keep the toe covered at all times until it is completely healed.  -You may develop a black scab over the nail bed--let this fall off on its own and don't pick at it.  -The toe may drain for 2-3 weeks. It is normal for it to have a clear drainage.      Antibiotics: Take Doxycycline 100mg twice a day  ---Take this for a total of seven days  ---Take this with food  ---Take this until it is completely out  ---Call the clinic or go to the Emergency Department if there are worsening signs of infection (increase in redness, swelling, or pain in the toe, or you begin to feel sick (fever/chills/nausea/vomitting).    Podiatry can be reached directly at [phone]. Leave a voicemail if there is not an answer.

## 2024-01-23 DIAGNOSIS — N40.0 BENIGN PROSTATIC HYPERPLASIA, UNSPECIFIED WHETHER LOWER URINARY TRACT SYMPTOMS PRESENT: ICD-10-CM

## 2024-01-23 RX ORDER — TAMSULOSIN HYDROCHLORIDE 0.4 MG/1
0.4 CAPSULE ORAL EVERY EVENING
Qty: 90 CAPSULE | Refills: 1 | Status: SHIPPED | OUTPATIENT
Start: 2024-01-23 | End: 2024-07-24

## 2024-01-23 NOTE — TELEPHONE ENCOUNTER
Tamsulosin 0.4 mg      Last Written Prescription Date:  7-28-23  Last Fill Quantity: 90,   # refills: 1  Last Office Visit: 11-20-23  Future Office visit:    Next 5 appointments (look out 90 days)      Jan 24, 2024  3:15 PM  (Arrive by 3:00 PM)  Return Visit with Meenu Orourke DPM  [facility] ([facility] ) [address] [place] [id number] [phone]     Mar 25, 2024  2:00 PM  (Arrive by 1:45 PM)  SHORT with Zoila Jones MD  [facility] ([facility] ) [address] [place] [id number]  [phone]

## 2024-01-24 ENCOUNTER — OFFICE VISIT (OUTPATIENT)
Dept: PODIATRY | Facility: OTHER | Age: 84
End: 2024-01-24
Attending: PODIATRIST
Payer: MEDICARE

## 2024-01-24 VITALS
DIASTOLIC BLOOD PRESSURE: 77 MMHG | SYSTOLIC BLOOD PRESSURE: 132 MMHG | TEMPERATURE: 98 F | HEART RATE: 104 BPM | OXYGEN SATURATION: 98 %

## 2024-01-24 DIAGNOSIS — L08.9 DIABETIC FOOT INFECTION (H): Primary | ICD-10-CM

## 2024-01-24 DIAGNOSIS — L03.116 CELLULITIS OF LEFT FOOT: ICD-10-CM

## 2024-01-24 DIAGNOSIS — E11.621 DIABETIC ULCER OF TOE OF LEFT FOOT ASSOCIATED WITH TYPE 2 DIABETES MELLITUS, LIMITED TO BREAKDOWN OF SKIN (H): ICD-10-CM

## 2024-01-24 DIAGNOSIS — E11.628 DIABETIC FOOT INFECTION (H): Primary | ICD-10-CM

## 2024-01-24 DIAGNOSIS — L97.521 DIABETIC ULCER OF TOE OF LEFT FOOT ASSOCIATED WITH TYPE 2 DIABETES MELLITUS, LIMITED TO BREAKDOWN OF SKIN (H): ICD-10-CM

## 2024-01-24 PROCEDURE — G0463 HOSPITAL OUTPT CLINIC VISIT: HCPCS

## 2024-01-24 PROCEDURE — 99214 OFFICE O/P EST MOD 30 MIN: CPT | Performed by: PODIATRIST

## 2024-01-24 ASSESSMENT — PAIN SCALES - GENERAL: PAINLEVEL: NO PAIN (0)

## 2024-01-24 NOTE — PROGRESS NOTES
Chief complaint: Patient presents with:  Wound Check: Avulsion follow up      History of Present Illness: This 83 year old NIDDM II male is seen for concerns regarding a LEFT hallux ingrown toenail and cellulitis of the LEFT hallux. He just completed Doxycycline on 01/24/2024. His  helped him with a dressing every two days with antibiotic ointment, gauze and a bandage. He switched to betadine, gauze and tape on 01/23/2024.  He says his  doesn't think the toe is looking better. The patient is unable to see or feel his toe. Patient is also very unsteady on his feet and is having an increasingly difficult time transferring to the exam chair. Patient says he has been weak like this for a long time and it is getting worse. He attributes it to not exercising enough and discontinuing exercises he used to do.    No further pedal complaints today.       GFR Estimate   Date Value Ref Range Status   11/20/2023 56 (L) >60 mL/min/1.73m2 Final   04/29/2021 66 >60 mL/min/[1.73_m2] Final     Comment:     Non  GFR Calc  Starting 12/18/2018, serum creatinine based estimated GFR (eGFR) will be   calculated using the Chronic Kidney Disease Epidemiology Collaboration   (CKD-EPI) equation.       GFR Estimate If Black   Date Value Ref Range Status   04/29/2021 77 >60 mL/min/[1.73_m2] Final     Comment:      GFR Calc  Starting 12/18/2018, serum creatinine based estimated GFR (eGFR) will be   calculated using the Chronic Kidney Disease Epidemiology Collaboration   (CKD-EPI) equation.           Lab Results   Component Value Date    A1C 6.7 11/20/2023    A1C 6.0 03/09/2023    A1C 8.2 12/08/2022    A1C 6.3 06/06/2022    A1C 5.6 03/04/2022    A1C 6.5 05/06/2021           /77 (BP Location: Left arm, Patient Position: Sitting, Cuff Size: Adult Regular)   Pulse 104   Temp 98  F (36.7  C) (Tympanic)   SpO2 98%     Patient Active Problem List   Diagnosis    Urinary retention     Generalized weakness    Type 2 diabetes mellitus with stage 3 chronic kidney disease, without long-term current use of insulin (H)    Tachycardia    Hypoxia    Other acute pulmonary embolism with acute cor pulmonale (H)    Dyspnea, unspecified type    Benign prostatic hyperplasia with urinary retention    Chronic kidney disease, stage III (moderate) (H)    Chronic obstructive lung disease (H)    Chronic painful diabetic neuropathy (H)    Erectile dysfunction    Essential hypertension    Gout, unspecified    History of CVA (cerebrovascular accident)    Hyperlipidemia    IBS (irritable bowel syndrome)    Lumbar spondylosis    Mild concentric left ventricular hypertrophy (LVH)    Moderate episode of recurrent major depressive disorder (H)    Spinal stenosis of lumbar region without neurogenic claudication    Uncomplicated alcohol dependence (H)    Onychomycosis of left great toe    Pincer nail deformity    Complicated UTI (urinary tract infection)    Sepsis with acute renal failure (H)       Past Surgical History:   Procedure Laterality Date    AMPUTATE TOE(S) Left 6/27/2022    Procedure: Left fifth toe partial versus full amputation;  Surgeon: Meenu Orourke DPM;  Location: [facility]    CYSTOSCOPY, TRANSURETHRAL RESECTION (TUR) PROSTATE, COMBINED N/A 10/15/2021    Procedure: CYSTOSCOPY, WITH TRANSURETHRAL RESECTION PROSTATE;  Surgeon: Indu De Leon MD;  Location: [facility]    HERNIA REPAIR      PHACOEMULSIFICATION WITH STANDARD INTRAOCULAR LENS IMPLANT Left 12/27/2021    Procedure: PHACOEMULSIFICATION CATARACT EXTRACTION POSTERIOR CHAMBER LENS LEFT EYE;  Surgeon: Phillip Maldonado MD;  Location: [facility]    PHACOEMULSIFICATION WITH STANDARD INTRAOCULAR LENS IMPLANT Right 1/11/2022    Procedure: PHACOEMULSIFICATION CATARACT EXTRACTION POSTERIOR CHAMBER LENS RIGHT EYE;  Surgeon: Phillip Maldonado MD;  Location: [facility]       Current Outpatient Medications   Medication    acetaminophen (TYLENOL) 500 MG tablet    albuterol  (PROAIR HFA/PROVENTIL HFA/VENTOLIN HFA) 108 (90 Base) MCG/ACT inhaler    allopurinol (ZYLOPRIM) 100 MG tablet    amLODIPine (NORVASC) 2.5 MG tablet    atorvastatin (LIPITOR) 80 MG tablet    carvedilol (COREG) 25 MG tablet    dulaglutide (TRULICITY) 0.75 MG/0.5ML pen    finasteride (PROSCAR) 5 MG tablet    FLUoxetine (PROZAC) 10 MG capsule    gabapentin (NEURONTIN) 300 MG capsule    metFORMIN (GLUCOPHAGE XR) 500 MG 24 hr tablet    multivitamin, therapeutic (THERA-VIT) TABS tablet    rivaroxaban ANTICOAGULANT (XARELTO ANTICOAGULANT) 20 MG TABS tablet    sildenafil (VIAGRA) 100 MG tablet    tamsulosin (FLOMAX) 0.4 MG capsule    ULTICARE MINI 31G X 6 MM insulin pen needle    vitamin B-12 (CYANOCOBALAMIN) 250 MCG tablet    vitamin C (ASCORBIC ACID) 500 MG tablet     No current facility-administered medications for this visit.          Allergies   Allergen Reactions    Ace Inhibitors      Per [facility]: hyperkalemia.  Pt doesn't know if he is allergic    Ceclor [Cefaclor] Hives    Sulfa Antibiotics      Pt unsure.        History reviewed. No pertinent family history.    Social History     Socioeconomic History    Marital status: Single     Spouse name: None    Number of children: None    Years of education: None    Highest education level: None   Occupational History    None   Tobacco Use    Smoking status: Never Smoker    Smokeless tobacco: Former User     Types: Chew   Substance and Sexual Activity    Alcohol use: Not Currently    Drug use: Never    Sexual activity: None   Other Topics Concern    Parent/sibling w/ CABG, MI or angioplasty before 65F 55M? Not Asked   Social History Narrative    None     Social Determinants of Health     Financial Resource Strain:     Difficulty of Paying Living Expenses:    Food Insecurity:     Worried About Running Out of Food in the Last Year:     Ran Out of Food in the Last Year:    Transportation Needs:     Lack of Transportation (Medical):     Lack of Transportation (Non-Medical):     Physical Activity:     Days of Exercise per Week:     Minutes of Exercise per Session:    Stress:     Feeling of Stress :    Social Connections:     Frequency of Communication with Friends and Family:     Frequency of Social Gatherings with Friends and Family:     Attends Yarsanism Services:     Active Member of Clubs or Organizations:     Attends Club or Organization Meetings:     Marital Status:    Intimate Partner Violence:     Fear of Current or Ex-Partner:     Emotionally Abused:     Physically Abused:     Sexually Abused:        ROS: 10 point ROS neg other than the symptoms noted above in the HPI.  EXAM  Constitutional: healthy, alert and no distress    Psychiatric: mentation appears normal and affect normal/bright    VASCULAR:  -Dorsalis pedis pulse +2/4 b/l  -Posterior tibial pulse +1/4 b/l  -Capillary refill time < 3 seconds to b/l hallux  -Hair growth absent to b/l anterior legs and ankles  NEURO:  -Epicritic and protective sensation absent to the bilateral foot  DERM:  -Skin temperature within normal limits to bilateral foot    -Toenails elongated, thickened, dystrophic and discolored x 9  ---LEFT hallux toenail removed  ---Moderate erythema to the distal 2/3 of the hallux, mild calor  ---Minimal serous drainage with newly epithelialized skin over the nail bed (minimal opening through skin layer only on the nail bed)  No severe erythema, no ascending erythema, no calor, no purulence, no malodor, no other signs of infection.     MSK:  -Adductovarus rotation of the bilateral fifth toe  -Muscle strength of ankles +5/5 for dorsiflexion, plantarflexion, ABDUction and ADDuction b/l    ============================================================    ASSESSMENT:    (L03.116) Cellulitis of left foot  (primary encounter diagnosis)    (L60.0) Ingrown toenail    (E11.9) Diabetes mellitus type 2, noninsulin dependent (H)    (E11.42) Diabetic polyneuropathy associated with type 2 diabetes mellitus  (H)      PLAN:  -Patient evaluated and examined. Treatment options discussed with no educational barriers noted.    Cellulitis of LEFT hallux:  -Patient still has moderate erythema, edema and mild calor to the toe. The remaining wound is moderately dry and a wound culture would unlikely be accurate. Due to the increasing erythema, a strep component is suspected. There is poor strep coverage with Doxycycline. Called inpatient pharmacy and spoke with the inpatient pharmacist, Jelly, about antibiotic options. Patient has a sulfa allergy and an allergy to Ceclor. He has difficulty following multiple instructions so Clindamycin is not a good option since it needs to be taken four times a day. The patient took Augmentin in 06/2022 and he tolerated it well. Augmentin also has excellent strep coverage. The patient's kidney function is within normal limits and would require a significant drop in kidney function to require renal dosing. Discussed this antibiotic with the patient and he is in agreement to take it.  ---Augmentin 875/125mg BID ordered for seven days on 01/24/2024.   ---If patient develop hives, he should call podiatry and stop taking the antibiotics.  ---If patient has shortness of breath or concerning side effects, call 9-1-1.    Diabetic foot wound:  -Applied a dressing to the wound with Mepilex Ag and Medipore tape.  -Patient to change the dressing every two days.  -Ihsan a line of demarcation. If he or his  see the redness traveling up the foot proximally / toward the ankle or if the redness does not improve, then he is advised to go to the Emergency Department.  -Patient was instructed to look for signs of infection (redness, swelling, pain, purulence, fever, chills, nausea, vomiting) and to return to podiatry or the emergency department immediately if there are any signs of infection.     -Patient was instructed to look for worsening signs of infection (redness, swelling, pain, purulence,  fever, chills, nausea, vomiting) and to return to podiatry or the emergency department immediately if there are any signs of infection.     -The patient had significant difficulty walking for even a few steps. The clinic manager, Sudha Salazar, kindly assisted the patient to his vehicle. The patient has noticed moderate weakness in his legs in the past couple of months. He has an appointment scheduled with his PCP, but not until March, 2024. Discussed with patient that his PCP could be notified of his increasing weakness and he was in agreement with this plan. A message was sent to his PCP.    Total time spent preparing to see the patient, review of chart, obtaining history and physical examination, review of  treatment options, education, sending a message to patient's PCP, discussing his prescription management with discussion with patient and documenting in Epic / EMR was 30 minutes. All time involved was spent on the day of service for the patient (the same day as the patient's appointment).    -Patient in agreement with the above treatment plan and all of patient's questions were answered.      Return to clinic one week to evaluate LEFT hallux cellulitis and LEFT hallux avulsion site  Patient like to call as needed for diabetic foot exams and high risk nail debridement       Meenu Orourke DPM

## 2024-01-24 NOTE — PATIENT INSTRUCTIONS
Take antibiotics:  -Augmentin (also called Amoxicillin-calvulanate) twice a day with foot.  ---If you develop hives, call podiatry and stop taking the antibiotics.  ---If you have shortness of breath or concerning side effects, call 9-1-1      Dressing changes:  -Change the dressing every other day: Cut a quarter size piece of Mepilex Ag. Peel the film off the back and apply the sticky side directly on the wound where the toenail was removed.  -Tape the Mepilex to the toe to secure it in place.  NOTE: A purple line was drawn around your toe where the redness stops. If the redness worsens / darkens or travels past that purple line toward your ankle, go to the Emergency Department.      Check for signs of infection:  -Check the toe daily for signs of infection (redness, swelling, pain, purulence, fever, chills, nausea, vomiting) and return to podiatry or go to the Emergency Department immediately if there are any signs of infection.

## 2024-01-25 ENCOUNTER — TELEPHONE (OUTPATIENT)
Dept: FAMILY MEDICINE | Facility: OTHER | Age: 84
End: 2024-01-25

## 2024-01-25 NOTE — TELEPHONE ENCOUNTER
Nursing - can we reach out to Anthony - see if he would like to move his appointment up to 2/2024?  Not a same day slot but sooner than his 3/2024 visit?  Thanks!

## 2024-01-25 NOTE — TELEPHONE ENCOUNTER
----- Message from Meenu Orourke DPM sent at 1/24/2024  9:22 PM CST -----  Zoila Segura,    I just wanted to update you about Anthony. He came to me because he had an infected ingrown toenail last week. I saw him back today to evaluate the toe. I switched the antibiotic today because his toe is still red, but his strength has moderately declined. I haven't seen him since September, 2023, but his physical condition during these last two visits is much worse compared to his normal baseline. In the fall of 2023, he would walk into the clinic on his own. During the last two appointments, he had to have assistance with a wheelchair all the way to the door of his vehicle. He is unsteady and having difficulty with walking only a couple of steps. He said he has been weak for months because he is not exercising as much as he used to. He told me he has an appointment with you at the end of March, 2024, so I told him I would update you on his physical condition.      Thank you,      Meenu

## 2024-01-25 NOTE — TELEPHONE ENCOUNTER
Does not want to come in early at this time, he will keep his March appointment and if he changes his mind will call back.

## 2024-01-27 DIAGNOSIS — F41.9 ANXIETY: ICD-10-CM

## 2024-01-27 DIAGNOSIS — I10 ESSENTIAL HYPERTENSION: ICD-10-CM

## 2024-01-29 RX ORDER — CARVEDILOL 25 MG/1
TABLET ORAL
Qty: 180 TABLET | Refills: 1 | Status: SHIPPED | OUTPATIENT
Start: 2024-01-29 | End: 2024-07-24

## 2024-01-29 RX ORDER — AMLODIPINE BESYLATE 2.5 MG/1
TABLET ORAL
Qty: 90 TABLET | Refills: 1 | Status: SHIPPED | OUTPATIENT
Start: 2024-01-29 | End: 2024-07-24

## 2024-01-29 RX ORDER — FLUOXETINE 10 MG/1
10 CAPSULE ORAL DAILY
Qty: 90 CAPSULE | Refills: 0 | Status: SHIPPED | OUTPATIENT
Start: 2024-01-29 | End: 2024-04-29

## 2024-01-29 NOTE — TELEPHONE ENCOUNTER
Norvasc  Last Written Prescription Date: 7/30/23  Last Fill Quantity: 90 # of Refills: 1  Last Office Visit: 11/20/23    Coreg  Last Written Prescription Date: 7/30/23  Last Fill Quantity: 180 # of Refills: 1  Last Office Visit: 11/20/23    Prozac  Last Written Prescription Date: 10/24/23  Last Fill Quantity: 90 # of Refills: 0  Last Office Visit: 11/20/23

## 2024-01-29 NOTE — TELEPHONE ENCOUNTER
Failed protocol    Creatinine   Date Value Ref Range Status   11/20/2023 1.27 (H) 0.67 - 1.17 mg/dL Final   04/29/2021 1.06 0.66 - 1.25 mg/dL Final

## 2024-01-30 ENCOUNTER — OFFICE VISIT (OUTPATIENT)
Dept: PODIATRY | Facility: OTHER | Age: 84
End: 2024-01-30
Attending: PODIATRIST
Payer: MEDICARE

## 2024-01-30 VITALS
OXYGEN SATURATION: 92 % | SYSTOLIC BLOOD PRESSURE: 110 MMHG | HEART RATE: 97 BPM | TEMPERATURE: 98.3 F | DIASTOLIC BLOOD PRESSURE: 63 MMHG

## 2024-01-30 DIAGNOSIS — E11.42 DIABETIC POLYNEUROPATHY ASSOCIATED WITH TYPE 2 DIABETES MELLITUS (H): ICD-10-CM

## 2024-01-30 DIAGNOSIS — E11.9 DIABETES MELLITUS TYPE 2, NONINSULIN DEPENDENT (H): ICD-10-CM

## 2024-01-30 DIAGNOSIS — L97.522 DIABETIC ULCER OF TOE OF LEFT FOOT ASSOCIATED WITH TYPE 2 DIABETES MELLITUS, WITH FAT LAYER EXPOSED (H): Primary | ICD-10-CM

## 2024-01-30 DIAGNOSIS — E11.621 DIABETIC ULCER OF TOE OF LEFT FOOT ASSOCIATED WITH TYPE 2 DIABETES MELLITUS, WITH FAT LAYER EXPOSED (H): Primary | ICD-10-CM

## 2024-01-30 PROCEDURE — 99213 OFFICE O/P EST LOW 20 MIN: CPT | Performed by: PODIATRIST

## 2024-01-30 PROCEDURE — G0463 HOSPITAL OUTPT CLINIC VISIT: HCPCS

## 2024-01-30 ASSESSMENT — PAIN SCALES - GENERAL: PAINLEVEL: NO PAIN (0)

## 2024-01-30 NOTE — PROGRESS NOTES
Chief complaint: Patient presents with:  Wound Check      History of Present Illness: This 83 year old NIDDM II male is seen for follow-up management of cellulitis secondary to a LEFT hallux ingrown toenail that was removed on 01/16/2024. Patient did not respond to Doxycycline, so he was switched to Augmentin. He has been taking this twice a day and he says he has about three pills remaining. His  is helping him change his dressing every two days with Mepilex Ag and Medipore tape. He says his  told him he had a new spot on his toe and that she thought he needed wider shoes. The patient has no pain in his toe.    No further pedal complaints today.         GFR Estimate   Date Value Ref Range Status   11/20/2023 56 (L) >60 mL/min/1.73m2 Final   04/29/2021 66 >60 mL/min/[1.73_m2] Final     Comment:     Non  GFR Calc  Starting 12/18/2018, serum creatinine based estimated GFR (eGFR) will be   calculated using the Chronic Kidney Disease Epidemiology Collaboration   (CKD-EPI) equation.       GFR Estimate If Black   Date Value Ref Range Status   04/29/2021 77 >60 mL/min/[1.73_m2] Final     Comment:      GFR Calc  Starting 12/18/2018, serum creatinine based estimated GFR (eGFR) will be   calculated using the Chronic Kidney Disease Epidemiology Collaboration   (CKD-EPI) equation.           Lab Results   Component Value Date    A1C 6.7 11/20/2023    A1C 6.0 03/09/2023    A1C 8.2 12/08/2022    A1C 6.3 06/06/2022    A1C 5.6 03/04/2022    A1C 6.5 05/06/2021           /63 (BP Location: Left arm, Patient Position: Sitting, Cuff Size: Adult Regular)   Pulse 97   Temp 98.3  F (36.8  C) (Tympanic)   SpO2 92%     Patient Active Problem List   Diagnosis    Urinary retention    Generalized weakness    Type 2 diabetes mellitus with stage 3 chronic kidney disease, without long-term current use of insulin (H)    Tachycardia    Hypoxia    Other acute pulmonary embolism with acute  cor pulmonale (H)    Dyspnea, unspecified type    Benign prostatic hyperplasia with urinary retention    Chronic kidney disease, stage III (moderate) (H)    Chronic obstructive lung disease (H)    Chronic painful diabetic neuropathy (H)    Erectile dysfunction    Essential hypertension    Gout, unspecified    History of CVA (cerebrovascular accident)    Hyperlipidemia    IBS (irritable bowel syndrome)    Lumbar spondylosis    Mild concentric left ventricular hypertrophy (LVH)    Moderate episode of recurrent major depressive disorder (H)    Spinal stenosis of lumbar region without neurogenic claudication    Uncomplicated alcohol dependence (H)    Onychomycosis of left great toe    Pincer nail deformity    Complicated UTI (urinary tract infection)    Sepsis with acute renal failure (H)       Past Surgical History:   Procedure Laterality Date    AMPUTATE TOE(S) Left 6/27/2022    Procedure: Left fifth toe partial versus full amputation;  Surgeon: Meenu Orourke DPM;  Location: [facility]    CYSTOSCOPY, TRANSURETHRAL RESECTION (TUR) PROSTATE, COMBINED N/A 10/15/2021    Procedure: CYSTOSCOPY, WITH TRANSURETHRAL RESECTION PROSTATE;  Surgeon: Indu De Leon MD;  Location: [facility]    HERNIA REPAIR      PHACOEMULSIFICATION WITH STANDARD INTRAOCULAR LENS IMPLANT Left 12/27/2021    Procedure: PHACOEMULSIFICATION CATARACT EXTRACTION POSTERIOR CHAMBER LENS LEFT EYE;  Surgeon: Phillip Maldonado MD;  Location: [facility]    PHACOEMULSIFICATION WITH STANDARD INTRAOCULAR LENS IMPLANT Right 1/11/2022    Procedure: PHACOEMULSIFICATION CATARACT EXTRACTION POSTERIOR CHAMBER LENS RIGHT EYE;  Surgeon: Phillip Maldonado MD;  Location: [facility]       Current Outpatient Medications   Medication    acetaminophen (TYLENOL) 500 MG tablet    albuterol (PROAIR HFA/PROVENTIL HFA/VENTOLIN HFA) 108 (90 Base) MCG/ACT inhaler    allopurinol (ZYLOPRIM) 100 MG tablet    amLODIPine (NORVASC) 2.5 MG tablet    amoxicillin-clavulanate (AUGMENTIN) 875-125 MG tablet     atorvastatin (LIPITOR) 80 MG tablet    carvedilol (COREG) 25 MG tablet    dulaglutide (TRULICITY) 0.75 MG/0.5ML pen    finasteride (PROSCAR) 5 MG tablet    FLUoxetine (PROZAC) 10 MG capsule    gabapentin (NEURONTIN) 300 MG capsule    metFORMIN (GLUCOPHAGE XR) 500 MG 24 hr tablet    multivitamin, therapeutic (THERA-VIT) TABS tablet    rivaroxaban ANTICOAGULANT (XARELTO ANTICOAGULANT) 20 MG TABS tablet    sildenafil (VIAGRA) 100 MG tablet    tamsulosin (FLOMAX) 0.4 MG capsule    ULTICARE MINI 31G X 6 MM insulin pen needle    vitamin B-12 (CYANOCOBALAMIN) 250 MCG tablet    vitamin C (ASCORBIC ACID) 500 MG tablet     No current facility-administered medications for this visit.          Allergies   Allergen Reactions    Ace Inhibitors      Per [facility]: hyperkalemia.  Pt doesn't know if he is allergic    Ceclor [Cefaclor] Hives    Sulfa Antibiotics      Pt unsure.        History reviewed. No pertinent family history.    Social History     Socioeconomic History    Marital status: Single     Spouse name: None    Number of children: None    Years of education: None    Highest education level: None   Occupational History    None   Tobacco Use    Smoking status: Never Smoker    Smokeless tobacco: Former User     Types: Chew   Substance and Sexual Activity    Alcohol use: Not Currently    Drug use: Never    Sexual activity: None   Other Topics Concern    Parent/sibling w/ CABG, MI or angioplasty before 65F 55M? Not Asked   Social History Narrative    None     Social Determinants of Health     Financial Resource Strain:     Difficulty of Paying Living Expenses:    Food Insecurity:     Worried About Running Out of Food in the Last Year:     Ran Out of Food in the Last Year:    Transportation Needs:     Lack of Transportation (Medical):     Lack of Transportation (Non-Medical):    Physical Activity:     Days of Exercise per Week:     Minutes of Exercise per Session:    Stress:     Feeling of Stress :    Social Connections:      Frequency of Communication with Friends and Family:     Frequency of Social Gatherings with Friends and Family:     Attends Latter day Services:     Active Member of Clubs or Organizations:     Attends Club or Organization Meetings:     Marital Status:    Intimate Partner Violence:     Fear of Current or Ex-Partner:     Emotionally Abused:     Physically Abused:     Sexually Abused:        ROS: 10 point ROS neg other than the symptoms noted above in the HPI.  EXAM  Constitutional: healthy, alert and no distress    Psychiatric: mentation appears normal and affect normal/bright    VASCULAR:  -Dorsalis pedis pulse +2/4 b/l  -Posterior tibial pulse +1/4 b/l  -Capillary refill time < 3 seconds to b/l hallux  -Hair growth absent to b/l anterior legs and ankles  NEURO:  -Epicritic and protective sensation absent to the bilateral foot  DERM:  -Skin temperature within normal limits to bilateral foot    -Toenails thickened, dystrophic and discolored x 9  ---LEFT hallux toenail removed. No open wounds and no drainage.  ---Mild erythema -- moderate reduction in erythema and calor  Wound Location:  LEFT medial hallux IPJ  01/30/2024  Measurement:  1.6cm x 2.3cm x 0.1cm to subcutaneous tissue  Drainage:  Moderate serous  Odor:  None  Undermining:  None  Edges:  Intact  Base:  100% viable  Surrounding Skin: Intact  No severe erythema, no ascending erythema, no calor, no purulence, no malodor, no other signs of infection.     MSK:  -Adductovarus rotation of the bilateral fifth toe  -Muscle strength of ankles +5/5 for dorsiflexion, plantarflexion, ABDUction and ADDuction b/l    ============================================================    ASSESSMENT:    (E11.621,  L97.522) 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 associated with type 2 diabetes mellitus, with fat layer exposed (H)  (primary encounter diagnosis)    (E11.9) Diabetes mellitus type 2, noninsulin dependent (H)    (E11.42) Diabetic polyneuropathy associated with type 2  diabetes mellitus (H)        PLAN:  -Patient evaluated and examined. Treatment options discussed with no educational barriers noted.    Cellulitis of LEFT hallux:  -Greatly improved. The nail bed is now healed. However, there is a new wound on the LEFT medial hallux.    Diabetic foot wound:  -Outer callus cared for and mechanical debridement of wound performed with gauze on the the LEFT medial hallux.  -Applied a dressing to the wound with Mepilex Ag and Medipore tape.  -Patient to change the dressing every two days.  -Patient was instructed to look for signs of infection (redness, swelling, pain, purulence, fever, chills, nausea, vomiting) and to return to podiatry or the emergency department immediately if there are any signs of infection.     -Patient says his  told him he needs wider shoes, but upon discussing shoe gear, the patient says he almost never wears his DM shoes. He is due for new DM shoes so will order new ones to ensure there is enough room in the toe box, but his shoes may not be the cause of this. If his wound is not improving, he may consider wearing his shoes more frequently at home.    -Complete the antibiotics, but no further antibiotics required at this time.      -Patient was instructed to look for worsening signs of infection (redness, swelling, pain, purulence, fever, chills, nausea, vomiting) and to return to podiatry or the emergency department immediately if there are any signs of infection.     -The patient had significant difficulty walking for even a few steps. The clinic manager, Sudha Salazar, kindly assisted the patient to his vehicle again today. The patient's PCP had her nurse call the patient to see if he wanted to be seen sooner to discuss his weakness. He declined and said today that he thinks he is doing a little better so he would like to wait until his scheduled appointment time with his PCP in March, 2024.    -This is an acute, uncomplicated illness/injury with  OTC treatment options reviewed.    -Patient in agreement with the above treatment plan and all of patient's questions were answered.      Return to clinic one week to evaluate new wound on the LEFT medial hallux  Patient like to call as needed for diabetic foot exams and high risk nail debridement       Meenu Orourke DPM

## 2024-01-31 DIAGNOSIS — E11.22 TYPE 2 DIABETES MELLITUS WITH STAGE 3A CHRONIC KIDNEY DISEASE, WITHOUT LONG-TERM CURRENT USE OF INSULIN (H): ICD-10-CM

## 2024-01-31 DIAGNOSIS — N18.31 TYPE 2 DIABETES MELLITUS WITH STAGE 3A CHRONIC KIDNEY DISEASE, WITHOUT LONG-TERM CURRENT USE OF INSULIN (H): ICD-10-CM

## 2024-01-31 RX ORDER — METFORMIN HCL 500 MG
TABLET, EXTENDED RELEASE 24 HR ORAL
Qty: 360 TABLET | Refills: 0 | Status: SHIPPED | OUTPATIENT
Start: 2024-01-31 | End: 2024-04-30

## 2024-01-31 NOTE — TELEPHONE ENCOUNTER
Metformin  MG     Last Written Prescription Date:  10/30/23  Last Fill Quantity: 360,   # refills: 0  Last Office Visit: 11/20/23  Future Office visit:    Next 5 appointments (look out 90 days)      Feb 06, 2024  9:45 AM  (Arrive by 9:30 AM)  Return Visit with Meenu Orourke DPM  [facility] ([facility] ) [address] [place] [id number] [phone]     Mar 25, 2024  2:00 PM  (Arrive by 1:45 PM)  SHORT with Zoila Jones MD  [facility] ([facility] ) [address] [place] [id number]  [phone]             Routing refill request to provider for review/approval because:  Has GFR on file in past 12 months and most recent value is normal

## 2024-02-06 ENCOUNTER — OFFICE VISIT (OUTPATIENT)
Dept: PODIATRY | Facility: OTHER | Age: 84
End: 2024-02-06
Attending: PODIATRIST
Payer: MEDICARE

## 2024-02-06 VITALS
SYSTOLIC BLOOD PRESSURE: 117 MMHG | HEART RATE: 96 BPM | DIASTOLIC BLOOD PRESSURE: 70 MMHG | TEMPERATURE: 97.4 F | OXYGEN SATURATION: 95 %

## 2024-02-06 DIAGNOSIS — L97.522 DIABETIC ULCER OF TOE OF LEFT FOOT ASSOCIATED WITH TYPE 2 DIABETES MELLITUS, WITH FAT LAYER EXPOSED (H): Primary | ICD-10-CM

## 2024-02-06 DIAGNOSIS — E11.621 DIABETIC ULCER OF TOE OF LEFT FOOT ASSOCIATED WITH TYPE 2 DIABETES MELLITUS, WITH FAT LAYER EXPOSED (H): Primary | ICD-10-CM

## 2024-02-06 DIAGNOSIS — L60.3 ONYCHODYSTROPHY: ICD-10-CM

## 2024-02-06 DIAGNOSIS — E11.42 DIABETIC POLYNEUROPATHY ASSOCIATED WITH TYPE 2 DIABETES MELLITUS (H): ICD-10-CM

## 2024-02-06 DIAGNOSIS — E11.9 DIABETES MELLITUS TYPE 2, NONINSULIN DEPENDENT (H): ICD-10-CM

## 2024-02-06 PROCEDURE — G0463 HOSPITAL OUTPT CLINIC VISIT: HCPCS

## 2024-02-06 PROCEDURE — G0463 HOSPITAL OUTPT CLINIC VISIT: HCPCS | Mod: 25

## 2024-02-06 PROCEDURE — 99213 OFFICE O/P EST LOW 20 MIN: CPT | Mod: 25 | Performed by: PODIATRIST

## 2024-02-06 PROCEDURE — 11721 DEBRIDE NAIL 6 OR MORE: CPT | Performed by: PODIATRIST

## 2024-02-06 ASSESSMENT — PAIN SCALES - GENERAL: PAINLEVEL: NO PAIN (0)

## 2024-02-06 NOTE — PROGRESS NOTES
Chief complaint: Patient presents with:  Wound Check: Follow up avulsion      History of Present Illness: This 83 year old NIDDM II male is seen for follow-up management of cellulitis secondary to a LEFT hallux ingrown toenail that was removed on 01/16/2024. Patient did not respond to Doxycycline, so he was switched to Augmentin. He completed it a couple days ago. The toenail site remained healed, but he opened a new wound on his medial LEFT hallux on 01/30/2024. His  has helped him changed the dressing with Mepilex Ag every 1-2 days.    Patient would also like toenail debridement today. The toenails are starting to rub on his toes.    No further pedal complaints today.         GFR Estimate   Date Value Ref Range Status   11/20/2023 56 (L) >60 mL/min/1.73m2 Final   04/29/2021 66 >60 mL/min/[1.73_m2] Final     Comment:     Non  GFR Calc  Starting 12/18/2018, serum creatinine based estimated GFR (eGFR) will be   calculated using the Chronic Kidney Disease Epidemiology Collaboration   (CKD-EPI) equation.       GFR Estimate If Black   Date Value Ref Range Status   04/29/2021 77 >60 mL/min/[1.73_m2] Final     Comment:      GFR Calc  Starting 12/18/2018, serum creatinine based estimated GFR (eGFR) will be   calculated using the Chronic Kidney Disease Epidemiology Collaboration   (CKD-EPI) equation.           Lab Results   Component Value Date    A1C 6.7 11/20/2023    A1C 6.0 03/09/2023    A1C 8.2 12/08/2022    A1C 6.3 06/06/2022    A1C 5.6 03/04/2022    A1C 6.5 05/06/2021           /70 (BP Location: Left arm, Patient Position: Sitting, Cuff Size: Adult Regular)   Pulse 96   Temp 97.4  F (36.3  C) (Tympanic)   SpO2 95%     Patient Active Problem List   Diagnosis    Urinary retention    Generalized weakness    Type 2 diabetes mellitus with stage 3 chronic kidney disease, without long-term current use of insulin (H)    Tachycardia    Hypoxia    Other acute pulmonary  embolism with acute cor pulmonale (H)    Dyspnea, unspecified type    Benign prostatic hyperplasia with urinary retention    Chronic kidney disease, stage III (moderate) (H)    Chronic obstructive lung disease (H)    Chronic painful diabetic neuropathy (H)    Erectile dysfunction    Essential hypertension    Gout, unspecified    History of CVA (cerebrovascular accident)    Hyperlipidemia    IBS (irritable bowel syndrome)    Lumbar spondylosis    Mild concentric left ventricular hypertrophy (LVH)    Moderate episode of recurrent major depressive disorder (H)    Spinal stenosis of lumbar region without neurogenic claudication    Uncomplicated alcohol dependence (H)    Onychomycosis of left great toe    Pincer nail deformity    Complicated UTI (urinary tract infection)    Sepsis with acute renal failure (H)       Past Surgical History:   Procedure Laterality Date    AMPUTATE TOE(S) Left 6/27/2022    Procedure: Left fifth toe partial versus full amputation;  Surgeon: Meenu Orourke DPM;  Location: [facility] OR    CYSTOSCOPY, TRANSURETHRAL RESECTION (TUR) PROSTATE, COMBINED N/A 10/15/2021    Procedure: CYSTOSCOPY, WITH TRANSURETHRAL RESECTION PROSTATE;  Surgeon: Indu De Leon MD;  Location: [facility] OR    HERNIA REPAIR      PHACOEMULSIFICATION WITH STANDARD INTRAOCULAR LENS IMPLANT Left 12/27/2021    Procedure: PHACOEMULSIFICATION CATARACT EXTRACTION POSTERIOR CHAMBER LENS LEFT EYE;  Surgeon: Phillip Maldonado MD;  Location: [facility] OR    PHACOEMULSIFICATION WITH STANDARD INTRAOCULAR LENS IMPLANT Right 1/11/2022    Procedure: PHACOEMULSIFICATION CATARACT EXTRACTION POSTERIOR CHAMBER LENS RIGHT EYE;  Surgeon: Phillip Maldonado MD;  Location: [facility] OR       Current Outpatient Medications   Medication    acetaminophen (TYLENOL) 500 MG tablet    albuterol (PROAIR HFA/PROVENTIL HFA/VENTOLIN HFA) 108 (90 Base) MCG/ACT inhaler    allopurinol (ZYLOPRIM) 100 MG tablet    amLODIPine (NORVASC) 2.5 MG tablet    atorvastatin (LIPITOR) 80 MG tablet     carvedilol (COREG) 25 MG tablet    dulaglutide (TRULICITY) 0.75 MG/0.5ML pen    finasteride (PROSCAR) 5 MG tablet    FLUoxetine (PROZAC) 10 MG capsule    gabapentin (NEURONTIN) 300 MG capsule    metFORMIN (GLUCOPHAGE XR) 500 MG 24 hr tablet    multivitamin, therapeutic (THERA-VIT) TABS tablet    rivaroxaban ANTICOAGULANT (XARELTO ANTICOAGULANT) 20 MG TABS tablet    sildenafil (VIAGRA) 100 MG tablet    tamsulosin (FLOMAX) 0.4 MG capsule    ULTICARE MINI 31G X 6 MM insulin pen needle    vitamin B-12 (CYANOCOBALAMIN) 250 MCG tablet    vitamin C (ASCORBIC ACID) 500 MG tablet     No current facility-administered medications for this visit.          Allergies   Allergen Reactions    Ace Inhibitors      Per [facility]: hyperkalemia.  Pt doesn't know if he is allergic    Ceclor [Cefaclor] Hives    Sulfa Antibiotics      Pt unsure.        History reviewed. No pertinent family history.    Social History     Socioeconomic History    Marital status: Single     Spouse name: None    Number of children: None    Years of education: None    Highest education level: None   Occupational History    None   Tobacco Use    Smoking status: Never Smoker    Smokeless tobacco: Former User     Types: Chew   Substance and Sexual Activity    Alcohol use: Not Currently    Drug use: Never    Sexual activity: None   Other Topics Concern    Parent/sibling w/ CABG, MI or angioplasty before 65F 55M? Not Asked   Social History Narrative    None     Social Determinants of Health     Financial Resource Strain:     Difficulty of Paying Living Expenses:    Food Insecurity:     Worried About Running Out of Food in the Last Year:     Ran Out of Food in the Last Year:    Transportation Needs:     Lack of Transportation (Medical):     Lack of Transportation (Non-Medical):    Physical Activity:     Days of Exercise per Week:     Minutes of Exercise per Session:    Stress:     Feeling of Stress :    Social Connections:     Frequency of Communication with  Friends and Family:     Frequency of Social Gatherings with Friends and Family:     Attends Christianity Services:     Active Member of Clubs or Organizations:     Attends Club or Organization Meetings:     Marital Status:    Intimate Partner Violence:     Fear of Current or Ex-Partner:     Emotionally Abused:     Physically Abused:     Sexually Abused:        ROS: 10 point ROS neg other than the symptoms noted above in the HPI.  EXAM  Constitutional: healthy, alert and no distress    Psychiatric: mentation appears normal and affect normal/bright    VASCULAR:  -Dorsalis pedis pulse +2/4 b/l  -Posterior tibial pulse +1/4 b/l  -Capillary refill time < 3 seconds to b/l hallux  -Hair growth absent to b/l anterior legs and ankles  NEURO:  -Epicritic and protective sensation absent to the bilateral foot  DERM:  -Skin temperature within normal limits to bilateral foot    -Toenails thickened, dystrophic and discolored x 9  ---LEFT hallux toenail removed. No open wounds and no drainage.  ---Mild rubor to the toe   Wound Location:  LEFT medial hallux IPJ  02/06/2024  Measurement:  1.8cm x 1.8cm x 0.1cm to subcutaneous tissue  Drainage:  Moderate serous  Odor:  None  Undermining:  None  Edges:  Intact  Base:  100% viable  Surrounding Skin: Intact with mild rubor, no calor  No severe erythema, no ascending erythema, no calor, no purulence, no malodor, no other signs of infection.     01/30/2024  Measurement:  1.6cm x 2.3cm x 0.1cm to subcutaneous tissue  Drainage:  Moderate serous  Odor:  None  Undermining:  None  Edges:  Intact  Base:  100% viable  Surrounding Skin: Intact  No severe erythema, no ascending erythema, no calor, no purulence, no malodor, no other signs of infection.     MSK:  -Adductovarus rotation of the bilateral fifth toe  -Muscle strength of ankles +5/5 for dorsiflexion, plantarflexion, ABDUction and ADDuction b/l    ============================================================    ASSESSMENT:    (E11.621,   L97.522) 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 associated with type 2 diabetes mellitus, with fat layer exposed (H)  (primary encounter diagnosis)    (E11.9) Diabetes mellitus type 2, noninsulin dependent (H)    (E11.42) Diabetic polyneuropathy associated with type 2 diabetes mellitus (H)        PLAN:  -Patient evaluated and examined. Treatment options discussed with no educational barriers noted.    Cellulitis of LEFT hallux:  -Resolved cellulitis. No further antibiotics required. Patient has a lingering mild rubor to the toe which is consistent with the pigmentation of his toes.    Diabetic foot wound:  -Outer callus cared for and mechanical debridement of wound performed with gauze on the the LEFT medial hallux.  -Applied a dressing to the wound with Mepilex Ag and Medipore tape.  -Patient to change the dressing every two days.  -Patient was instructed to look for signs of infection (redness, swelling, pain, purulence, fever, chills, nausea, vomiting) and to return to podiatry or the emergency department immediately if there are any signs of infection.     -This is an acute, uncomplicated illness/injury with OTC treatment options reviewed.    -----------------------------------------------------    -High risk toenail debridement x 10 toenails without incident      -Patient in agreement with the above treatment plan and all of patient's questions were answered.      Return to clinic two weeks to evaluate new wound on the LEFT medial hallux      Meenu Orourke DPM

## 2024-02-16 ENCOUNTER — DOCUMENTATION ONLY (OUTPATIENT)
Dept: PODIATRY | Facility: OTHER | Age: 84
End: 2024-02-16

## 2024-02-16 DIAGNOSIS — E11.9 DIABETES MELLITUS TYPE 2, NONINSULIN DEPENDENT (H): Primary | ICD-10-CM

## 2024-02-16 DIAGNOSIS — Z86.31 PERSONAL HISTORY OF DIABETIC FOOT ULCER: ICD-10-CM

## 2024-02-16 DIAGNOSIS — E11.42 DIABETIC POLYNEUROPATHY ASSOCIATED WITH TYPE 2 DIABETES MELLITUS (H): ICD-10-CM

## 2024-02-16 DIAGNOSIS — L84 CALLUS OF FOOT: ICD-10-CM

## 2024-02-18 ENCOUNTER — APPOINTMENT (OUTPATIENT)
Dept: GENERAL RADIOLOGY | Facility: HOSPITAL | Age: 84
End: 2024-02-18
Attending: NURSE PRACTITIONER
Payer: MEDICARE

## 2024-02-18 ENCOUNTER — HOSPITAL ENCOUNTER (EMERGENCY)
Facility: HOSPITAL | Age: 84
Discharge: HOME | End: 2024-02-18
Attending: NURSE PRACTITIONER | Admitting: NURSE PRACTITIONER
Payer: MEDICARE

## 2024-02-18 VITALS
SYSTOLIC BLOOD PRESSURE: 112 MMHG | DIASTOLIC BLOOD PRESSURE: 80 MMHG | WEIGHT: 185 LBS | BODY MASS INDEX: 24.41 KG/M2 | OXYGEN SATURATION: 93 % | HEART RATE: 98 BPM | RESPIRATION RATE: 18 BRPM | TEMPERATURE: 97.8 F

## 2024-02-18 DIAGNOSIS — M86.9 OSTEOMYELITIS OF GREAT TOE OF LEFT FOOT (H): ICD-10-CM

## 2024-02-18 LAB
ANION GAP SERPL CALCULATED.3IONS-SCNC: 14 MMOL/L (ref 7–15)
BASOPHILS # BLD AUTO: 0 10E3/UL (ref 0–0.2)
BASOPHILS NFR BLD AUTO: 0 %
BUN SERPL-MCNC: 15.7 MG/DL (ref 8–23)
CALCIUM SERPL-MCNC: 8.7 MG/DL (ref 8.8–10.2)
CHLORIDE SERPL-SCNC: 101 MMOL/L (ref 98–107)
CREAT SERPL-MCNC: 1.26 MG/DL (ref 0.67–1.17)
CRP SERPL-MCNC: 159.79 MG/L
DEPRECATED HCO3 PLAS-SCNC: 23 MMOL/L (ref 22–29)
EGFRCR SERPLBLD CKD-EPI 2021: 57 ML/MIN/1.73M2
EOSINOPHIL # BLD AUTO: 0 10E3/UL (ref 0–0.7)
EOSINOPHIL NFR BLD AUTO: 0 %
ERYTHROCYTE [DISTWIDTH] IN BLOOD BY AUTOMATED COUNT: 12.1 % (ref 10–15)
ERYTHROCYTE [SEDIMENTATION RATE] IN BLOOD BY WESTERGREN METHOD: 57 MM/HR (ref 0–20)
GLUCOSE SERPL-MCNC: 216 MG/DL (ref 70–99)
HCT VFR BLD AUTO: 37.8 % (ref 40–53)
HGB BLD-MCNC: 12.5 G/DL (ref 13.3–17.7)
HOLD SPECIMEN: NORMAL
IMM GRANULOCYTES # BLD: 0 10E3/UL
IMM GRANULOCYTES NFR BLD: 0 %
LYMPHOCYTES # BLD AUTO: 0.8 10E3/UL (ref 0.8–5.3)
LYMPHOCYTES NFR BLD AUTO: 7 %
MCH RBC QN AUTO: 32.3 PG (ref 26.5–33)
MCHC RBC AUTO-ENTMCNC: 33.1 G/DL (ref 31.5–36.5)
MCV RBC AUTO: 98 FL (ref 78–100)
MONOCYTES # BLD AUTO: 0.6 10E3/UL (ref 0–1.3)
MONOCYTES NFR BLD AUTO: 6 %
MRSA DNA SPEC QL NAA+PROBE: NEGATIVE
NEUTROPHILS # BLD AUTO: 9 10E3/UL (ref 1.6–8.3)
NEUTROPHILS NFR BLD AUTO: 87 %
NRBC # BLD AUTO: 0 10E3/UL
NRBC BLD AUTO-RTO: 0 /100
PLATELET # BLD AUTO: 200 10E3/UL (ref 150–450)
POTASSIUM SERPL-SCNC: 4.1 MMOL/L (ref 3.4–5.3)
RBC # BLD AUTO: 3.87 10E6/UL (ref 4.4–5.9)
SA TARGET DNA: NEGATIVE
SODIUM SERPL-SCNC: 138 MMOL/L (ref 135–145)
WBC # BLD AUTO: 10.4 10E3/UL (ref 4–11)

## 2024-02-18 PROCEDURE — 80048 BASIC METABOLIC PNL TOTAL CA: CPT | Performed by: NURSE PRACTITIONER

## 2024-02-18 PROCEDURE — 87040 BLOOD CULTURE FOR BACTERIA: CPT | Performed by: NURSE PRACTITIONER

## 2024-02-18 PROCEDURE — 36415 COLL VENOUS BLD VENIPUNCTURE: CPT | Performed by: NURSE PRACTITIONER

## 2024-02-18 PROCEDURE — 85025 COMPLETE CBC W/AUTO DIFF WBC: CPT | Performed by: NURSE PRACTITIONER

## 2024-02-18 PROCEDURE — 73660 X-RAY EXAM OF TOE(S): CPT | Mod: LT

## 2024-02-18 PROCEDURE — 99284 EMERGENCY DEPT VISIT MOD MDM: CPT | Performed by: STUDENT IN AN ORGANIZED HEALTH CARE EDUCATION/TRAINING PROGRAM

## 2024-02-18 PROCEDURE — 87641 MR-STAPH DNA AMP PROBE: CPT | Mod: GY | Performed by: NURSE PRACTITIONER

## 2024-02-18 PROCEDURE — 86140 C-REACTIVE PROTEIN: CPT | Performed by: NURSE PRACTITIONER

## 2024-02-18 PROCEDURE — 258N000003 HC RX IP 258 OP 636: Performed by: NURSE PRACTITIONER

## 2024-02-18 PROCEDURE — 96366 THER/PROPH/DIAG IV INF ADDON: CPT

## 2024-02-18 PROCEDURE — 250N000011 HC RX IP 250 OP 636: Performed by: NURSE PRACTITIONER

## 2024-02-18 PROCEDURE — 87205 SMEAR GRAM STAIN: CPT | Performed by: NURSE PRACTITIONER

## 2024-02-18 PROCEDURE — 99284 EMERGENCY DEPT VISIT MOD MDM: CPT | Mod: 25

## 2024-02-18 PROCEDURE — 96365 THER/PROPH/DIAG IV INF INIT: CPT

## 2024-02-18 PROCEDURE — 85652 RBC SED RATE AUTOMATED: CPT | Performed by: NURSE PRACTITIONER

## 2024-02-18 RX ORDER — CEPHALEXIN 500 MG/1
500 CAPSULE ORAL 4 TIMES DAILY
Qty: 28 CAPSULE | Refills: 0 | Status: SHIPPED | OUTPATIENT
Start: 2024-02-18 | End: 2024-02-18

## 2024-02-18 RX ORDER — DOXYCYCLINE HYCLATE 100 MG
100 TABLET ORAL 2 TIMES DAILY
Qty: 20 TABLET | Refills: 0 | Status: SHIPPED | OUTPATIENT
Start: 2024-02-18 | End: 2024-02-18

## 2024-02-18 RX ADMIN — VANCOMYCIN HYDROCHLORIDE 2000 MG: 5 INJECTION, POWDER, LYOPHILIZED, FOR SOLUTION INTRAVENOUS at 10:52

## 2024-02-18 ASSESSMENT — ACTIVITIES OF DAILY LIVING (ADL)
ADLS_ACUITY_SCORE: 38

## 2024-02-18 ASSESSMENT — ENCOUNTER SYMPTOMS
FATIGUE: 1
CHILLS: 0
SHORTNESS OF BREATH: 0
ACTIVITY CHANGE: 1
COLOR CHANGE: 1
NAUSEA: 0
VOMITING: 0
WOUND: 1
FEVER: 0

## 2024-02-18 NOTE — DISCHARGE INSTRUCTIONS
-Complete all antibiotics even if feeling better.    -Taking antibiotics with food may decrease the symptoms, of an upset stomach, that can occur when taking antibiotics. Antibiotics frequently cause diarrhea. Probiotics or yogurt may help prevent or decrease these symptoms.   -Return to be reevaluated if symptoms do not improve, or worsen or you develop fevers.  Follow-up appointment with Dr. Orourke for tomorrow, call

## 2024-02-18 NOTE — ED NOTES
Patient and friend discharged but left without discharge instructions. Called to let them know about discharge instructions, did not have discharge packet as they were aware of prescription and discharge instructions/ follow up. Also notified them that no one took out the IV before they left, they did not want to return for this, wanting to remove themselves.

## 2024-02-18 NOTE — PHARMACY-VANCOMYCIN DOSING SERVICE
Pharmacy received consult to dose vancomycin for ED for one time dosing. Indication(s): Osteomyelitis. Dosed at 24 mg/kg using weight of 83.9 kg which gave a calculated dose of 2000 mg. Please order another pharmacy to dose vancomycin consult if admitted and vancomycin is needed. Thank you.    Kapil Dave, PharmD on  2/18/2024

## 2024-02-18 NOTE — ED TRIAGE NOTES
Pt presents with c/o having left big toe pain and redness   Redness has spread from the toe and foot   Pt did have old Cipro at home from 2022 and has taken 4 doses of it   Pt does have appt with PCP on Thursday   Redness started 2 days ago

## 2024-02-18 NOTE — ED NOTES
Denies fever, N/V/D. Eating, drinking and voiding normally. Reports occasional chills. Has been seeing wound care for the toe wound. No active drainage. Some redness/purple color of the toe area that extends onto foot slightly. Friend present with patient.

## 2024-02-18 NOTE — ED PROVIDER NOTES
History     Chief Complaint   Patient presents with    Toe Pain     HPI  Anthony Dyer is a 83 year old male who is accompanied per Komal who is his PCA.  He presents with left great toe redness and swelling.  Accompanied with fatigue.  Was currently treated for cellulitis in his left great toe that had resolved.  Has been seeing Dr. Orourke for diabetic toe ulcer.  Komal has been doing dressing changes every 2 days as instructed.  Has a history of type 2 diabetes, hypertension, CKD, and COPD.  Non-smoker.  Denies numbness and tingling in his left foot.  Has peripheral neuropathy.  Denies fevers, chills, nausea, vomiting, and shortness of breath.    Allergies:  Allergies   Allergen Reactions    Ace Inhibitors      Per [facility]: hyperkalemia.  Pt doesn't know if he is allergic    Ceclor [Cefaclor] Hives    Sulfa Antibiotics      Pt unsure.        Problem List:    Patient Active Problem List    Diagnosis Date Noted    Complicated UTI (urinary tract infection) 03/08/2022     Priority: Medium    Sepsis with acute renal failure (H) 03/08/2022     Priority: Medium    Benign prostatic hyperplasia with urinary retention 04/29/2021     Priority: Medium    Tachycardia 04/26/2021     Priority: Medium    Hypoxia 04/26/2021     Priority: Medium    Other acute pulmonary embolism with acute cor pulmonale (H) 04/26/2021     Priority: Medium    Dyspnea, unspecified type 04/26/2021     Priority: Medium    Urinary retention 04/19/2021     Priority: Medium    Generalized weakness 04/19/2021     Priority: Medium    Onychomycosis of left great toe 02/09/2021     Priority: Medium    Pincer nail deformity 02/09/2021     Priority: Medium    Chronic painful diabetic neuropathy (H) 10/24/2018     Priority: Medium    Moderate episode of recurrent major depressive disorder (H) 10/24/2018     Priority: Medium     Formatting of this note might be different from the original.  Patient does not want anti-depressant medication      Chronic  obstructive lung disease (H) 11/22/2017     Priority: Medium     Formatting of this note might be different from the original.  Mild      Mild concentric left ventricular hypertrophy (LVH) 11/22/2017     Priority: Medium    Uncomplicated alcohol dependence (H) 09/28/2016     Priority: Medium    IBS (irritable bowel syndrome) 12/23/2015     Priority: Medium    Spinal stenosis of lumbar region without neurogenic claudication 12/23/2015     Priority: Medium    Erectile dysfunction 05/28/2014     Priority: Medium    Chronic kidney disease, stage III (moderate) (H) 05/22/2013     Priority: Medium     Formatting of this note might be different from the original.  Since 2011      History of CVA (cerebrovascular accident) 02/03/2011     Priority: Medium     Formatting of this note might be different from the original.  Jan 2011  IMPRESSION: Interval development of a diffusion abnormality consistent with acute to early subacute infarct of less than 3 days of age in the left thalamus and the medial left cerebral peduncle. No associated abnormal enhancement or hydrocephalus.      Hyperlipidemia 02/03/2011     Priority: Medium    Essential hypertension 11/28/2006     Priority: Medium    Type 2 diabetes mellitus with stage 3 chronic kidney disease, without long-term current use of insulin (H) 02/16/2001     Priority: Medium    Gout, unspecified 02/16/2001     Priority: Medium     Formatting of this note might be different from the original.  On Allopurinol      Lumbar spondylosis 02/22/1995     Priority: Medium     Formatting of this note might be different from the original.  S/P RFN L2-L5 facets          Past Medical History:    Past Medical History:   Diagnosis Date    Diabetes (H)     Hypertension        Past Surgical History:    Past Surgical History:   Procedure Laterality Date    AMPUTATE TOE(S) Left 6/27/2022    Procedure: Left fifth toe partial versus full amputation;  Surgeon: Meenu Orourke DPM;  Location: [facility]     CYSTOSCOPY, TRANSURETHRAL RESECTION (TUR) PROSTATE, COMBINED N/A 10/15/2021    Procedure: CYSTOSCOPY, WITH TRANSURETHRAL RESECTION PROSTATE;  Surgeon: Indu De Leon MD;  Location: [facility]    HERNIA REPAIR      PHACOEMULSIFICATION WITH STANDARD INTRAOCULAR LENS IMPLANT Left 12/27/2021    Procedure: PHACOEMULSIFICATION CATARACT EXTRACTION POSTERIOR CHAMBER LENS LEFT EYE;  Surgeon: Phillip Maldonado MD;  Location: [facility]    PHACOEMULSIFICATION WITH STANDARD INTRAOCULAR LENS IMPLANT Right 1/11/2022    Procedure: PHACOEMULSIFICATION CATARACT EXTRACTION POSTERIOR CHAMBER LENS RIGHT EYE;  Surgeon: Phillip Maldonado MD;  Location: [facility]       Family History:    History reviewed. No pertinent family history.    Social History:  Marital Status:  Single [1]  Social History     Tobacco Use    Smoking status: Never    Smokeless tobacco: Former     Types: Chew   Vaping Use    Vaping Use: Never used   Substance Use Topics    Alcohol use: Yes     Comment: daily, 4 today    Drug use: Never        Medications:    acetaminophen (TYLENOL) 500 MG tablet  albuterol (PROAIR HFA/PROVENTIL HFA/VENTOLIN HFA) 108 (90 Base) MCG/ACT inhaler  allopurinol (ZYLOPRIM) 100 MG tablet  amLODIPine (NORVASC) 2.5 MG tablet  amoxicillin-clavulanate (AUGMENTIN) 875-125 MG tablet  atorvastatin (LIPITOR) 80 MG tablet  carvedilol (COREG) 25 MG tablet  finasteride (PROSCAR) 5 MG tablet  FLUoxetine (PROZAC) 10 MG capsule  metFORMIN (GLUCOPHAGE XR) 500 MG 24 hr tablet  multivitamin, therapeutic (THERA-VIT) TABS tablet  rivaroxaban ANTICOAGULANT (XARELTO ANTICOAGULANT) 20 MG TABS tablet  sildenafil (VIAGRA) 100 MG tablet  tamsulosin (FLOMAX) 0.4 MG capsule  vitamin B-12 (CYANOCOBALAMIN) 250 MCG tablet  vitamin C (ASCORBIC ACID) 500 MG tablet  dulaglutide (TRULICITY) 0.75 MG/0.5ML pen  gabapentin (NEURONTIN) 300 MG capsule  ULTICARE MINI 31G X 6 MM insulin pen needle          Review of Systems   Constitutional:  Positive for activity change and fatigue. Negative  for chills and fever.   Respiratory:  Negative for shortness of breath.    Gastrointestinal:  Negative for nausea and vomiting.   Musculoskeletal:         Left great toe swollen and red   Skin:  Positive for color change and wound.        Left great toe swollen and erythema with diabetic ulcer.       Physical Exam   BP: 117/73  Pulse: 107  Temp: 97.8  F (36.6  C)  Resp: 18  Weight: 83.9 kg (185 lb)  SpO2: 92 %      Physical Exam  Vitals and nursing note reviewed.   Constitutional:       General: He is in acute distress (Mild).      Appearance: He is normal weight.   Cardiovascular:      Rate and Rhythm: Tachycardia present.   Pulmonary:      Effort: Pulmonary effort is normal.   Musculoskeletal:         General: Swelling present. No tenderness.        Feet:       Comments: Left great toe/foot   Feet:      Left foot:      Skin integrity: Ulcer, skin breakdown, erythema and warmth present.   Skin:     Findings: Erythema present.   Neurological:      Mental Status: He is alert and oriented to person, place, and time.   Psychiatric:         Behavior: Behavior normal.           ED Course              ED Course as of 02/18/24 1417   Sun Feb 18, 2024   1415 Pt care assumed.  This area of cellulitis in the toe.  He has reasonable mobility of the toe joints this does not appear to be gout, or septic arthritis most consistent with cellulitis overlying.  Osteomyelitis.  Case discussed with Dr. Mitchell who recommended seeing the patient in clinic tomorrow to discuss surgical treatment in the meantime treating cellulitis.  Will start Augmentin as she has recently had some success with him with skin infections that did not respond to doxycycline.  Think this is reasonable we will proceed with further outpatient management.  No systemic signs     Procedures             Results for orders placed or performed during the hospital encounter of 02/18/24 (from the past 24 hour(s))   XR Toe Left G/E 2 Views    Narrative    PROCEDURE:  XR  TOE LEFT G/E 2 VIEWS    HISTORY: diabetic ulcer on medial region of toe. r/o osteomyelitis    COMPARISON: Radiograph 7/5/2022, 6/27/2022    TECHNIQUE:  XR TOE LEFT G/E 2 VIEWS    FINDINGS:   No acute fracture or dislocation is identified. Fifth middle and  distal phalanx are absent. Subcortical erosions along the lateral base  of the first distal phalanx. Degenerative mineralization about the  first MTP joint, similar comparison The joint spaces are preserved.     No foreign body or subcutaneous emphysema. Arterial vascular  calcifications. Soft tissue swelling about the distal first digit.        Impression    IMPRESSION:   Subcortical erosions along the lateral base of the first distal  phalanx, could represent degenerative change or osteomyelitis.    GHISLAINE DEL RIO MD         SYSTEM ID:  [id number]   Basic metabolic panel   Result Value Ref Range    Sodium 138 135 - 145 mmol/L    Potassium 4.1 3.4 - 5.3 mmol/L    Chloride 101 98 - 107 mmol/L    Carbon Dioxide (CO2) 23 22 - 29 mmol/L    Anion Gap 14 7 - 15 mmol/L    Urea Nitrogen 15.7 8.0 - 23.0 mg/dL    Creatinine 1.26 (H) 0.67 - 1.17 mg/dL    GFR Estimate 57 (L) >60 mL/min/1.73m2    Calcium 8.7 (L) 8.8 - 10.2 mg/dL    Glucose 216 (H) 70 - 99 mg/dL   CBC with platelets differential    Narrative    The following orders were created for panel order CBC with platelets differential.  Procedure                               Abnormality         Status                     ---------                               -----------         ------                     CBC with platelets and d...[[id number]]  Abnormal            Final result                 Please view results for these tests on the individual orders.   Erythrocyte sedimentation rate auto   Result Value Ref Range    Erythrocyte Sedimentation Rate 57 (H) 0 - 20 mm/hr   CRP inflammation   Result Value Ref Range    CRP Inflammation 159.79 (H) <5.00 mg/L   [facility]    Narrative    The following orders were created for  panel order [place] [facility].  Procedure                               Abnormality         Status                     ---------                               -----------         ------                     Extra Blood Culture Bottle[[id number]]                       Final result               Extra Blue Top Tube[[id number]]                              Final result               Extra Red Top Tube[[id number]]                               Final result               Extra Green Top (Lithium...[[id number]]                      Final result                 Please view results for these tests on the individual orders.   CBC with platelets and differential   Result Value Ref Range    WBC Count 10.4 4.0 - 11.0 10e3/uL    RBC Count 3.87 (L) 4.40 - 5.90 10e6/uL    Hemoglobin 12.5 (L) 13.3 - 17.7 g/dL    Hematocrit 37.8 (L) 40.0 - 53.0 %    MCV 98 78 - 100 fL    MCH 32.3 26.5 - 33.0 pg    MCHC 33.1 31.5 - 36.5 g/dL    RDW 12.1 10.0 - 15.0 %    Platelet Count 200 150 - 450 10e3/uL    % Neutrophils 87 %    % Lymphocytes 7 %    % Monocytes 6 %    % Eosinophils 0 %    % Basophils 0 %    % Immature Granulocytes 0 %    NRBCs per 100 WBC 0 <1 /100    Absolute Neutrophils 9.0 (H) 1.6 - 8.3 10e3/uL    Absolute Lymphocytes 0.8 0.8 - 5.3 10e3/uL    Absolute Monocytes 0.6 0.0 - 1.3 10e3/uL    Absolute Eosinophils 0.0 0.0 - 0.7 10e3/uL    Absolute Basophils 0.0 0.0 - 0.2 10e3/uL    Absolute Immature Granulocytes 0.0 <=0.4 10e3/uL    Absolute NRBCs 0.0 10e3/uL   Extra Blue Top Tube   Result Value Ref Range    Hold Specimen JIC    Extra Red Top Tube   Result Value Ref Range    Hold Specimen JIC    Extra Green Top (Lithium Heparin) Tube   Result Value Ref Range    Hold Specimen JIC    Extra Blood Culture Bottle   Result Value Ref Range    Hold Specimen JIC    Wound Aerobic Bacterial Culture Routine With Gram Stain    Specimen: Toe, Left; Wound   Result Value Ref Range    Culture Culture in progress     Gram Stain Result 3+ Squamous epithelial  cells/low power field (A)     Gram Stain Result 1+ WBC seen (A)     Gram Stain Result 4+ Gram positive cocci (A)     Gram Stain Result 1+ Gram positive bacilli resembling diphtheroids (A)        Medications   vancomycin (VANCOCIN) 2,000 mg in 0.9% NaCl 500 mL intermittent infusion (0 mg Intravenous Stopped 2/18/24 1315)       Assessments & Plan (with Medical Decision Making)     I have reviewed the nursing notes.    I have reviewed the findings, diagnosis, plan and need for follow up with the patient.    (M86.9) Osteomyelitis of great toe of left foot (H)  Comment:83 year old male who is accompanied per Komal who is his PCA.  He presents with left great toe redness and swelling.  Accompanied with fatigue.  Was currently treated for cellulitis in his left great toe that had resolved.  Has been seeing Dr. Orourke for diabetic toe ulcer.  Komal has been doing dressing changes every 2 days as instructed.  Has a history of type 2 diabetes, hypertension, CKD, and COPD.  Non-smoker.  Denies numbness and tingling in his left foot.  Has peripheral neuropathy.  Denies fevers, chills, nausea, vomiting, and shortness of breath.    MDM: left great toe moderately swollen and erythema. Eschar tissue over medial diabetic ulcer. See media    CBC: WBCs 10.4; absolute neutrophils 9.  Anemic  BMP basically unchanged from 3 months ago  Sed rate 57  .79    Gram stain positive for squamous epithelial cells gram-positive cocci, and gram-positive bacilli,     Left great toe x-ray reviewed and per radiology:  Subcortical erosions along the lateral base of the first distal  phalanx, could represent degenerative change or osteomyelitis.     Vancomycin per pharmacy dosing given IV    Patient transferred over to ER for IV medication    Consult with hospitalist (olaf) and she suggested outpatient treatment at this time.  Consult with Dr. Bourne and he feels admission would be appropriate at this time due to potential new diagnosis of  osteomyelitis    Plan: Care transferred to Dr. Bourne in ER        New Prescriptions    AMOXICILLIN-CLAVULANATE (AUGMENTIN) 875-125 MG TABLET    Take 1 tablet by mouth 2 times daily for 7 days       Final diagnoses:   Osteomyelitis of great toe of left foot (H)       2/18/2024   [facility]  EMERGENCY DEPARTMENT       Heather Yousif, CNP  02/18/24 1328       Hira Bourne MD  02/18/24 5710

## 2024-02-19 ENCOUNTER — TELEPHONE (OUTPATIENT)
Dept: CARE COORDINATION | Facility: OTHER | Age: 84
End: 2024-02-19

## 2024-02-19 ENCOUNTER — MEDICAL CORRESPONDENCE (OUTPATIENT)
Dept: HEALTH INFORMATION MANAGEMENT | Facility: HOSPITAL | Age: 84
End: 2024-02-19

## 2024-02-19 NOTE — TELEPHONE ENCOUNTER
----- Message from Meenu Orourke DPM sent at 2/18/2024  1:57 PM CST -----  Please get patient in on Monday 02/19/2024 for Emergency Department follow-up from 02/27/2024. Thank you

## 2024-02-19 NOTE — TELEPHONE ENCOUNTER
"TONY CC received a call from pt stating that \"I started taking Amoxacillin last night and I was having some weird thoughts. [place] his friends we had guns. Just some wild trips\" RN CC confirmed that pt does not have guns at the house. RN CC was to call pt and see if he was able to come in either today or tomorrow  for an ER follow up. Pt called his ride but they were unable to get him a ride on a short notice. Pt wanted to speak with Dr. Orourke on if he should still take the antibiotic. RN CC spoke with Dr. Orourke who stated \"He should still take the medication and call us if he experiencing those thoughts again then we can switch it. Please make sure pt is monitoring his foot and making sure that it is not getting worse\" RN CC relied the following information to pt. RN CC educated pt s/s of worsening infection to monitor for increased redness, pain, warmth, swelling, odorous thick pus like drainage. Pt verbalized understanding. Pt will call if he has those thoughts again with this mornings does of antibiotic. Pt reports that the dressing is being changed everyday with the \"grey foam\"     Patricia Bowie RN on 2/19/2024 at 8:28 AM    "

## 2024-02-21 LAB
BACTERIA WND CULT: ABNORMAL
BACTERIA WND CULT: ABNORMAL
GRAM STAIN RESULT: ABNORMAL

## 2024-02-22 ENCOUNTER — OFFICE VISIT (OUTPATIENT)
Dept: PODIATRY | Facility: OTHER | Age: 84
End: 2024-02-22
Attending: PODIATRIST
Payer: MEDICARE

## 2024-02-22 VITALS
HEART RATE: 100 BPM | TEMPERATURE: 98.4 F | OXYGEN SATURATION: 90 % | DIASTOLIC BLOOD PRESSURE: 66 MMHG | SYSTOLIC BLOOD PRESSURE: 102 MMHG

## 2024-02-22 DIAGNOSIS — L08.9 DIABETIC FOOT INFECTION (H): Primary | ICD-10-CM

## 2024-02-22 DIAGNOSIS — E11.621 DIABETIC ULCER OF TOE OF LEFT FOOT ASSOCIATED WITH TYPE 2 DIABETES MELLITUS, WITH FAT LAYER EXPOSED (H): ICD-10-CM

## 2024-02-22 DIAGNOSIS — E11.42 DIABETIC POLYNEUROPATHY ASSOCIATED WITH TYPE 2 DIABETES MELLITUS (H): ICD-10-CM

## 2024-02-22 DIAGNOSIS — E11.9 DIABETES MELLITUS TYPE 2, NONINSULIN DEPENDENT (H): ICD-10-CM

## 2024-02-22 DIAGNOSIS — E11.628 DIABETIC FOOT INFECTION (H): Primary | ICD-10-CM

## 2024-02-22 DIAGNOSIS — L97.522 DIABETIC ULCER OF TOE OF LEFT FOOT ASSOCIATED WITH TYPE 2 DIABETES MELLITUS, WITH FAT LAYER EXPOSED (H): ICD-10-CM

## 2024-02-22 PROCEDURE — 11042 DBRDMT SUBQ TIS 1ST 20SQCM/<: CPT | Performed by: PODIATRIST

## 2024-02-22 PROCEDURE — 99213 OFFICE O/P EST LOW 20 MIN: CPT | Mod: 25 | Performed by: PODIATRIST

## 2024-02-22 PROCEDURE — G0463 HOSPITAL OUTPT CLINIC VISIT: HCPCS | Mod: 25

## 2024-02-22 RX ORDER — CLINDAMYCIN HCL 300 MG
300 CAPSULE ORAL 4 TIMES DAILY
Qty: 28 CAPSULE | Refills: 0 | Status: SHIPPED | OUTPATIENT
Start: 2024-02-22 | End: 2024-02-29

## 2024-02-22 ASSESSMENT — PAIN SCALES - GENERAL: PAINLEVEL: NO PAIN (0)

## 2024-02-22 NOTE — PATIENT INSTRUCTIONS
Antibiotics:  -STOP taking Amoxicillin/clavulanate   -START taking Clindamycin (300mg FOUR TIMES PER DAY) -- every six hours  ---If you develop severe diarrhea, go right to the Emergency Department     Dressings for the RIGHT toe:  -Change the dressing every two days: Apply betadine moistened gauze, yolette gauze and tape to the toe every two days.  --Patient was instructed to look for signs of infection (redness, swelling, pain, purulence, fever, chills, nausea, vomiting) and to return to podiatry or the emergency department immediately if there are any signs of infection.    Offloading:  -Wear the black Velcro post-op sandal shoe at home. Wear this all the time around the house and use your walker for balance and to keep extra pressure off the toe. Keep as much pressure off the wound as possible.

## 2024-02-22 NOTE — PROGRESS NOTES
Chief complaint: Patient presents with:  WOUND CARE      History of Present Illness: This 83 year old NIDDM II male is seen for follow-up management of cellulitis secondary to a LEFT plantar medial hallux ulcer. His  was helping him change a dressing every 1-2 days with Mepilex. She told him the wound was looking worse on Sunday, 02/25/2024 and she advised him to go to the Emergency Department. The patient was given Augmentin. He was unable to make it to the clinic until 02/22/2024 for follow-up care. He says he was called by Urgent Care on 02/21/2024 to let him know that his antibiotics might have to be changed and that he should discuss his antibiotics with podiatry. He has been taking Augmentin twice a day.    No further pedal complaints today.       GFR Estimate   Date Value Ref Range Status   02/18/2024 57 (L) >60 mL/min/1.73m2 Final   04/29/2021 66 >60 mL/min/[1.73_m2] Final     Comment:     Non  GFR Calc  Starting 12/18/2018, serum creatinine based estimated GFR (eGFR) will be   calculated using the Chronic Kidney Disease Epidemiology Collaboration   (CKD-EPI) equation.       GFR Estimate If Black   Date Value Ref Range Status   04/29/2021 77 >60 mL/min/[1.73_m2] Final     Comment:      GFR Calc  Starting 12/18/2018, serum creatinine based estimated GFR (eGFR) will be   calculated using the Chronic Kidney Disease Epidemiology Collaboration   (CKD-EPI) equation.           Lab Results   Component Value Date    A1C 6.7 11/20/2023    A1C 6.0 03/09/2023    A1C 8.2 12/08/2022    A1C 6.3 06/06/2022    A1C 5.6 03/04/2022    A1C 6.5 05/06/2021           /66 (BP Location: Left arm, Patient Position: Sitting, Cuff Size: Adult Regular)   Pulse 100   Temp 98.4  F (36.9  C) (Tympanic)   SpO2 90%     Patient Active Problem List   Diagnosis    Urinary retention    Generalized weakness    Type 2 diabetes mellitus with stage 3 chronic kidney disease, without long-term current  use of insulin (H)    Tachycardia    Hypoxia    Other acute pulmonary embolism with acute cor pulmonale (H)    Dyspnea, unspecified type    Benign prostatic hyperplasia with urinary retention    Chronic kidney disease, stage III (moderate) (H)    Chronic obstructive lung disease (H)    Chronic painful diabetic neuropathy (H)    Erectile dysfunction    Essential hypertension    Gout, unspecified    History of CVA (cerebrovascular accident)    Hyperlipidemia    IBS (irritable bowel syndrome)    Lumbar spondylosis    Mild concentric left ventricular hypertrophy (LVH)    Moderate episode of recurrent major depressive disorder (H)    Spinal stenosis of lumbar region without neurogenic claudication    Uncomplicated alcohol dependence (H)    Onychomycosis of left great toe    Pincer nail deformity    Complicated UTI (urinary tract infection)    Sepsis with acute renal failure (H)       Past Surgical History:   Procedure Laterality Date    AMPUTATE TOE(S) Left 6/27/2022    Procedure: Left fifth toe partial versus full amputation;  Surgeon: Meenu Orourke DPM;  Location: [facility]    CYSTOSCOPY, TRANSURETHRAL RESECTION (TUR) PROSTATE, COMBINED N/A 10/15/2021    Procedure: CYSTOSCOPY, WITH TRANSURETHRAL RESECTION PROSTATE;  Surgeon: Indu De Leon MD;  Location: [facility]    HERNIA REPAIR      PHACOEMULSIFICATION WITH STANDARD INTRAOCULAR LENS IMPLANT Left 12/27/2021    Procedure: PHACOEMULSIFICATION CATARACT EXTRACTION POSTERIOR CHAMBER LENS LEFT EYE;  Surgeon: Phillip Maldonado MD;  Location: [facility]    PHACOEMULSIFICATION WITH STANDARD INTRAOCULAR LENS IMPLANT Right 1/11/2022    Procedure: PHACOEMULSIFICATION CATARACT EXTRACTION POSTERIOR CHAMBER LENS RIGHT EYE;  Surgeon: Phillip Maldonado MD;  Location: [facility]       Current Outpatient Medications   Medication    acetaminophen (TYLENOL) 500 MG tablet    albuterol (PROAIR HFA/PROVENTIL HFA/VENTOLIN HFA) 108 (90 Base) MCG/ACT inhaler    allopurinol (ZYLOPRIM) 100 MG tablet     amLODIPine (NORVASC) 2.5 MG tablet    amoxicillin-clavulanate (AUGMENTIN) 875-125 MG tablet    atorvastatin (LIPITOR) 80 MG tablet    carvedilol (COREG) 25 MG tablet    dulaglutide (TRULICITY) 0.75 MG/0.5ML pen    finasteride (PROSCAR) 5 MG tablet    FLUoxetine (PROZAC) 10 MG capsule    gabapentin (NEURONTIN) 300 MG capsule    metFORMIN (GLUCOPHAGE XR) 500 MG 24 hr tablet    multivitamin, therapeutic (THERA-VIT) TABS tablet    rivaroxaban ANTICOAGULANT (XARELTO ANTICOAGULANT) 20 MG TABS tablet    sildenafil (VIAGRA) 100 MG tablet    tamsulosin (FLOMAX) 0.4 MG capsule    ULTICARE MINI 31G X 6 MM insulin pen needle    vitamin B-12 (CYANOCOBALAMIN) 250 MCG tablet    vitamin C (ASCORBIC ACID) 500 MG tablet     No current facility-administered medications for this visit.          Allergies   Allergen Reactions    Ace Inhibitors      Per [facility]: hyperkalemia.  Pt doesn't know if he is allergic    Ceclor [Cefaclor] Hives    Sulfa Antibiotics      Pt unsure.        History reviewed. No pertinent family history.    Social History     Socioeconomic History    Marital status: Single     Spouse name: None    Number of children: None    Years of education: None    Highest education level: None   Occupational History    None   Tobacco Use    Smoking status: Never Smoker    Smokeless tobacco: Former User     Types: Chew   Substance and Sexual Activity    Alcohol use: Not Currently    Drug use: Never    Sexual activity: None   Other Topics Concern    Parent/sibling w/ CABG, MI or angioplasty before 65F 55M? Not Asked   Social History Narrative    None     Social Determinants of Health     Financial Resource Strain:     Difficulty of Paying Living Expenses:    Food Insecurity:     Worried About Running Out of Food in the Last Year:     Ran Out of Food in the Last Year:    Transportation Needs:     Lack of Transportation (Medical):     Lack of Transportation (Non-Medical):    Physical Activity:     Days of Exercise per Week:      Minutes of Exercise per Session:    Stress:     Feeling of Stress :    Social Connections:     Frequency of Communication with Friends and Family:     Frequency of Social Gatherings with Friends and Family:     Attends [employer] Services:     Active Member of Clubs or Organizations:     Attends Club or Organization Meetings:     Marital Status:    Intimate Partner Violence:     Fear of Current or Ex-Partner:     Emotionally Abused:     Physically Abused:     Sexually Abused:        ROS: 10 point ROS neg other than the symptoms noted above in the HPI.  EXAM  Constitutional: healthy, alert and no distress    Psychiatric: mentation appears normal and affect normal/bright    VASCULAR:  -Dorsalis pedis pulse +2/4 b/l  -Posterior tibial pulse +1/4 b/l  -Capillary refill time < 3 seconds to b/l hallux  -Hair growth absent to b/l anterior legs and ankles  NEURO:  -Epicritic and protective sensation absent to the bilateral foot  DERM:  -Skin temperature within normal limits to bilateral foot    -Toenails thickened, dystrophic and discolored x 9  ---LEFT hallux toenail removed. No open wounds and no drainage.  ---Mild rubor to the toe   Wound Location:  LEFT medial hallux IPJ  02/22/2024  Measurement:  1.8cm x 1.8cm x 0.1cm to subcutaneous tissue  Drainage:  Moderate serous  Odor:  None  Undermining:  None  Edges:  Intact with mild maceration to edin-wound skin borders  Base:  40% well-adhered central eschar and 60% edin-wound moist fibrotic slough  Surrounding Skin: Intact with mild rubor to the toe, no calor  No severe erythema, no ascending erythema, no calor, no purulence, no malodor, no other signs of infection.     02/06/2024  Measurement:  1.8cm x 1.8cm x 0.1cm to subcutaneous tissue  Drainage:  Moderate serous  Odor:  None  Undermining:  None  Edges:  Intact  Base:  100% viable  Surrounding Skin: Intact with mild rubor, no calor  No severe erythema, no ascending erythema, no calor, no purulence, no malodor, no other  signs of infection.     01/30/2024:  1.6cm x 2.3cm x 0.1cm to subcutaneous tissue    MSK:  -Adductovarus rotation of the bilateral fifth toe  -Muscle strength of ankles +5/5 for dorsiflexion, plantarflexion, ABDUction and ADDuction b/l    ============================================================    ASSESSMENT:  (E11.628,  L08.9) Diabetic foot infection (H)  (primary encounter diagnosis)    (E11.621,  L97.522) 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 associated with type 2 diabetes mellitus, with fat layer     (E11.9) Diabetes mellitus type 2, noninsulin dependent (H)    (E11.42) Diabetic polyneuropathy associated with type 2 diabetes mellitus (H)      PLAN:  -Patient evaluated and examined. Treatment options discussed with no educational barriers noted.    Diabetic foot ulcer of LEFT hallux:  -Moderate improvement of cellulitis compared to picture in patient's chart from the Emergency Department on 02/18/2024. There is a mild erythema remaining to the toe with mild-to-minimal calor.  -Reviewed culture results from the Emergency Department. There is a penicillin resistance to one of the Staph cultures. Clindamycin covers both of the staph cultures that grew from the culture. Will switch his antibiotics to Clindamycin. Patient asked if he can drink alcohol while he is taking this antibiotic. He is not advised to drink alcohol while on antibiotics, especially Clindamycin since it is heavily metabolized by the liver.     Diabetic foot wound:  -Excisional debridement of wound on LEFT platnar medial hallxu with a sharp tissue nipper to the level of and including the subcutaneous tissue layer (debrided a total of less than 20 centimeters squared) with all non-viable soft tissue debrided from the wound bed.  -Debrided ulceration in attempt to decrease the biofilm layer, promote healing and in attempt to prevent infection  -Due to the amount of drainage to the outer 60% of the wound and the macerated edin-wound skin, will  temporarily switch the dressing to betadine moistened gauze, dry gauze and tape. Applied this dressing to the toe. Patient has assistance from his  and she cannot always change it daily, so he may change it every two days if needed.  -Patient was instructed to look for worsening signs of infection (redness, swelling, pain, purulence, fever, chills, nausea, vomiting) and to return to podiatry or the emergency department immediately if there are any signs of infection.   -Offloading: Patient has a post-op shoe at home. He is advised to wear the post-op shoe at all times to offload the toe (including at home) and use his walker at all times to assist him with balance. Limit walking as much as possible.    -This is an acute, uncomplicated illness/injury with OTC treatment options reviewed.    -----------------------------------------------------    -Toenails last debrided on 02/06/2024      -Patient in agreement with the above treatment plan and all of patient's questions were answered.      Return to clinic one week to evaluate new wound on the LEFT medial hallux and diabetic foot infection       Meenu Orourke DPM

## 2024-02-24 LAB — BACTERIA BLD CULT: NO GROWTH

## 2024-02-29 ENCOUNTER — OFFICE VISIT (OUTPATIENT)
Dept: PODIATRY | Facility: OTHER | Age: 84
End: 2024-02-29
Attending: PODIATRIST
Payer: MEDICARE

## 2024-02-29 VITALS
TEMPERATURE: 97.4 F | HEART RATE: 95 BPM | OXYGEN SATURATION: 95 % | DIASTOLIC BLOOD PRESSURE: 76 MMHG | SYSTOLIC BLOOD PRESSURE: 116 MMHG

## 2024-02-29 DIAGNOSIS — E11.42 DIABETIC POLYNEUROPATHY ASSOCIATED WITH TYPE 2 DIABETES MELLITUS (H): ICD-10-CM

## 2024-02-29 DIAGNOSIS — E11.621 DIABETIC ULCER OF TOE OF LEFT FOOT ASSOCIATED WITH TYPE 2 DIABETES MELLITUS, WITH FAT LAYER EXPOSED (H): Primary | ICD-10-CM

## 2024-02-29 DIAGNOSIS — L97.522 DIABETIC ULCER OF TOE OF LEFT FOOT ASSOCIATED WITH TYPE 2 DIABETES MELLITUS, WITH FAT LAYER EXPOSED (H): Primary | ICD-10-CM

## 2024-02-29 DIAGNOSIS — E11.9 DIABETES MELLITUS TYPE 2, NONINSULIN DEPENDENT (H): ICD-10-CM

## 2024-02-29 PROCEDURE — 99213 OFFICE O/P EST LOW 20 MIN: CPT | Performed by: PODIATRIST

## 2024-02-29 PROCEDURE — G0463 HOSPITAL OUTPT CLINIC VISIT: HCPCS

## 2024-02-29 NOTE — PROGRESS NOTES
Chief complaint: Patient presents with:  Infection: Follow up DFI      History of Present Illness: This 83 year old NIDDM II male is seen for follow-up management of cellulitis secondary to a LEFT plantar medial hallux ulcer. His  is still helping him change a dressing every day and she is changing it with betadine moistened gauze, dry gauze and tape.    He has taken Clindamycin 300mg four times day. He started to develop diarrhea yesterday on 02/28/2024, so he discontinued it last night. He says he only had three pills remaining.    No further pedal complaints today.       GFR Estimate   Date Value Ref Range Status   02/18/2024 57 (L) >60 mL/min/1.73m2 Final   04/29/2021 66 >60 mL/min/[1.73_m2] Final     Comment:     Non  GFR Calc  Starting 12/18/2018, serum creatinine based estimated GFR (eGFR) will be   calculated using the Chronic Kidney Disease Epidemiology Collaboration   (CKD-EPI) equation.       GFR Estimate If Black   Date Value Ref Range Status   04/29/2021 77 >60 mL/min/[1.73_m2] Final     Comment:      GFR Calc  Starting 12/18/2018, serum creatinine based estimated GFR (eGFR) will be   calculated using the Chronic Kidney Disease Epidemiology Collaboration   (CKD-EPI) equation.           Lab Results   Component Value Date    A1C 6.7 11/20/2023    A1C 6.0 03/09/2023    A1C 8.2 12/08/2022    A1C 6.3 06/06/2022    A1C 5.6 03/04/2022    A1C 6.5 05/06/2021           /76 (BP Location: Left arm, Patient Position: Sitting, Cuff Size: Adult Regular)   Pulse 95   Temp 97.4  F (36.3  C) (Tympanic)   SpO2 95%     Patient Active Problem List   Diagnosis    Urinary retention    Generalized weakness    Type 2 diabetes mellitus with stage 3 chronic kidney disease, without long-term current use of insulin (H)    Tachycardia    Hypoxia    Other acute pulmonary embolism with acute cor pulmonale (H)    Dyspnea, unspecified type    Benign prostatic hyperplasia with urinary  retention    Chronic kidney disease, stage III (moderate) (H)    Chronic obstructive lung disease (H)    Chronic painful diabetic neuropathy (H)    Erectile dysfunction    Essential hypertension    Gout, unspecified    History of CVA (cerebrovascular accident)    Hyperlipidemia    IBS (irritable bowel syndrome)    Lumbar spondylosis    Mild concentric left ventricular hypertrophy (LVH)    Moderate episode of recurrent major depressive disorder (H)    Spinal stenosis of lumbar region without neurogenic claudication    Uncomplicated alcohol dependence (H)    Onychomycosis of left great toe    Pincer nail deformity    Complicated UTI (urinary tract infection)    Sepsis with acute renal failure (H)       Past Surgical History:   Procedure Laterality Date    AMPUTATE TOE(S) Left 6/27/2022    Procedure: Left fifth toe partial versus full amputation;  Surgeon: Meenu Orourke DPM;  Location: [facility]    CYSTOSCOPY, TRANSURETHRAL RESECTION (TUR) PROSTATE, COMBINED N/A 10/15/2021    Procedure: CYSTOSCOPY, WITH TRANSURETHRAL RESECTION PROSTATE;  Surgeon: Indu De Leon MD;  Location: [facility]    HERNIA REPAIR      PHACOEMULSIFICATION WITH STANDARD INTRAOCULAR LENS IMPLANT Left 12/27/2021    Procedure: PHACOEMULSIFICATION CATARACT EXTRACTION POSTERIOR CHAMBER LENS LEFT EYE;  Surgeon: Phillip Maldonado MD;  Location: [facility]    PHACOEMULSIFICATION WITH STANDARD INTRAOCULAR LENS IMPLANT Right 1/11/2022    Procedure: PHACOEMULSIFICATION CATARACT EXTRACTION POSTERIOR CHAMBER LENS RIGHT EYE;  Surgeon: Phillip Maldonado MD;  Location: [facility]       Current Outpatient Medications   Medication    acetaminophen (TYLENOL) 500 MG tablet    albuterol (PROAIR HFA/PROVENTIL HFA/VENTOLIN HFA) 108 (90 Base) MCG/ACT inhaler    allopurinol (ZYLOPRIM) 100 MG tablet    amLODIPine (NORVASC) 2.5 MG tablet    atorvastatin (LIPITOR) 80 MG tablet    carvedilol (COREG) 25 MG tablet    clindamycin (CLEOCIN) 300 MG capsule    dulaglutide (TRULICITY) 0.75  MG/0.5ML pen    finasteride (PROSCAR) 5 MG tablet    FLUoxetine (PROZAC) 10 MG capsule    gabapentin (NEURONTIN) 300 MG capsule    metFORMIN (GLUCOPHAGE XR) 500 MG 24 hr tablet    multivitamin, therapeutic (THERA-VIT) TABS tablet    rivaroxaban ANTICOAGULANT (XARELTO ANTICOAGULANT) 20 MG TABS tablet    sildenafil (VIAGRA) 100 MG tablet    tamsulosin (FLOMAX) 0.4 MG capsule    ULTICARE MINI 31G X 6 MM insulin pen needle    vitamin B-12 (CYANOCOBALAMIN) 250 MCG tablet    vitamin C (ASCORBIC ACID) 500 MG tablet     No current facility-administered medications for this visit.          Allergies   Allergen Reactions    Ace Inhibitors      Per [facility]: hyperkalemia.  Pt doesn't know if he is allergic    Ceclor [Cefaclor] Hives    Sulfa Antibiotics      Pt unsure.        History reviewed. No pertinent family history.    Social History     Socioeconomic History    Marital status: Single     Spouse name: None    Number of children: None    Years of education: None    Highest education level: None   Occupational History    None   Tobacco Use    Smoking status: Never Smoker    Smokeless tobacco: Former User     Types: Chew   Substance and Sexual Activity    Alcohol use: Not Currently    Drug use: Never    Sexual activity: None   Other Topics Concern    Parent/sibling w/ CABG, MI or angioplasty before 65F 55M? Not Asked   Social History Narrative    None     Social Determinants of Health     Financial Resource Strain:     Difficulty of Paying Living Expenses:    Food Insecurity:     Worried About Running Out of Food in the Last Year:     Ran Out of Food in the Last Year:    Transportation Needs:     Lack of Transportation (Medical):     Lack of Transportation (Non-Medical):    Physical Activity:     Days of Exercise per Week:     Minutes of Exercise per Session:    Stress:     Feeling of Stress :    Social Connections:     Frequency of Communication with Friends and Family:     Frequency of Social Gatherings with Friends and  Family:     Attends Islam Services:     Active Member of Clubs or Organizations:     Attends Club or Organization Meetings:     Marital Status:    Intimate Partner Violence:     Fear of Current or Ex-Partner:     Emotionally Abused:     Physically Abused:     Sexually Abused:        ROS: 10 point ROS neg other than the symptoms noted above in the HPI.  EXAM  Constitutional: healthy, alert and no distress    Psychiatric: mentation appears normal and affect normal/bright    VASCULAR:  -Dorsalis pedis pulse +2/4 b/l  -Posterior tibial pulse +1/4 b/l  -Capillary refill time < 3 seconds to b/l hallux  -Hair growth absent to b/l anterior legs and ankles  NEURO:  -Epicritic and protective sensation absent to the bilateral foot  DERM:  -Skin temperature within normal limits to bilateral foot    -Toenails thickened, dystrophic and discolored x 9  ---LEFT hallux toenail removed. No open wounds and no drainage.  Wound Location:  LEFT medial hallux IPJ  02/29/2024  Measurement:  2cm x 1.9cm x 0.1cm to subcutaneous tissue  Drainage:  Moderate serous  Odor:  None  Undermining:  None  Edges:  Intact with no edin-wound maceration  Base:  90% fibrotic and 10% viable rim  Surrounding Skin: Intact with mild rubor, no erythema, no calor  No severe erythema, no ascending erythema, no calor, no purulence, no malodor, no other signs of infection.     02/22/2024  Measurement:  1.8cm x 1.8cm x 0.1cm to subcutaneous tissue  Drainage:  Moderate serous  Odor:  None  Undermining:  None  Edges:  Intact with mild maceration to edin-wound skin borders  Base:  40% well-adhered central eschar and 60% edin-wound moist fibrotic slough  Surrounding Skin: Intact with mild rubor to the toe, no calor  No severe erythema, no ascending erythema, no calor, no purulence, no malodor, no other signs of infection.     02/06/2024  Measurement:  1.8cm x 1.8cm x 0.1cm to subcutaneous tissue  Drainage:  Moderate serous  Odor:  None  Undermining:  None  Edges:   Intact  Base:  100% viable  Surrounding Skin: Intact with mild rubor, no calor  No severe erythema, no ascending erythema, no calor, no purulence, no malodor, no other signs of infection.     01/30/2024:  1.6cm x 2.3cm x 0.1cm to subcutaneous tissue    MSK:  -Adductovarus rotation of the bilateral fifth toe  -Muscle strength of ankles +5/5 for dorsiflexion, plantarflexion, ABDUction and ADDuction b/l    ============================================================    ASSESSMENT:    (E11.621,  L97.522) 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 associated with type 2 diabetes mellitus, with fat layer (primary encounter diagnosis)    (E11.9) Diabetes mellitus type 2, noninsulin dependent (H)    (E11.42) Diabetic polyneuropathy associated with type 2 diabetes mellitus (H)      PLAN:  -Patient evaluated and examined. Treatment options discussed with no educational barriers noted.    Diabetic foot ulcer of LEFT hallux:  -Cellulitis has resolved. Patient stopped the Clindamycin three pills early because of diarrhea. He has no acute signs of infection today so he will not need a refill of antibiotics at this time.    Diabetic foot wound:  -Outer callus cared for and mechanical debridement of wound performed with gauze on the RIGHT plantar hallux  -Applied a dressing to the wound: Painted wound with betadine due to history of maceration, Mepilex Ag and tape  -Patient to change the dressing every two days  -Patient was instructed to look for signs of infection (redness, swelling, pain, purulence, fever, chills, nausea, vomiting) and to return to podiatry or the emergency department immediately if there are any signs of infection.     -Offloading: Patient has a post-op shoe at home. He is not wearing it at the appointment. He is advised to wear this at home.    -This is an acute, uncomplicated illness/injury with OTC treatment options reviewed.    -----------------------------------------------------    -Toenails last debrided on  02/06/2024      -Patient in agreement with the above treatment plan and all of patient's questions were answered.      Return to clinic 1 1/2 weeks to evaluate new wound on the LEFT medial hallux ulceration      Meenu Orourke DPM

## 2024-02-29 NOTE — PATIENT INSTRUCTIONS
RIGHT great toe wound instructions:    -Every two days, change the dressing. Apply betadine around the skin on the wound borders. Next, cover the toe with Mepilex Ag (cut to size and remove the plastic film and apply the sticky side directly on the wound). Secure with tape.

## 2024-03-13 ENCOUNTER — OFFICE VISIT (OUTPATIENT)
Dept: PODIATRY | Facility: OTHER | Age: 84
End: 2024-03-13
Attending: PODIATRIST
Payer: MEDICARE

## 2024-03-13 VITALS
SYSTOLIC BLOOD PRESSURE: 114 MMHG | HEART RATE: 101 BPM | OXYGEN SATURATION: 91 % | DIASTOLIC BLOOD PRESSURE: 66 MMHG | TEMPERATURE: 97.8 F

## 2024-03-13 DIAGNOSIS — E11.621 DIABETIC ULCER OF TOE OF LEFT FOOT ASSOCIATED WITH TYPE 2 DIABETES MELLITUS, WITH MUSCLE INVOLVEMENT WITHOUT EVIDENCE OF NECROSIS (H): Primary | ICD-10-CM

## 2024-03-13 DIAGNOSIS — L97.525 DIABETIC ULCER OF TOE OF LEFT FOOT ASSOCIATED WITH TYPE 2 DIABETES MELLITUS, WITH MUSCLE INVOLVEMENT WITHOUT EVIDENCE OF NECROSIS (H): Primary | ICD-10-CM

## 2024-03-13 DIAGNOSIS — E11.9 DIABETES MELLITUS TYPE 2, NONINSULIN DEPENDENT (H): ICD-10-CM

## 2024-03-13 DIAGNOSIS — E11.42 DIABETIC POLYNEUROPATHY ASSOCIATED WITH TYPE 2 DIABETES MELLITUS (H): ICD-10-CM

## 2024-03-13 PROCEDURE — 11043 DBRDMT MUSC&/FSCA 1ST 20/<: CPT | Performed by: PODIATRIST

## 2024-03-13 PROCEDURE — G0463 HOSPITAL OUTPT CLINIC VISIT: HCPCS | Mod: 25

## 2024-03-13 ASSESSMENT — PAIN SCALES - GENERAL: PAINLEVEL: NO PAIN (0)

## 2024-03-13 NOTE — PATIENT INSTRUCTIONS
Dressing change instructions as of 03/13/2024:  -Change the dressing every 1-2 days: Cut the Aquacel Ag into a rectangle so it is large enough to cover the entire wound. Moistened the Aquacel Ag with normal sterile saline (this is in the pink bottle). The Aquacel should be wet but it should not be dripping wet. Cover this with gauze and tape.    Walking restrictions:  -Keep as much weight off the foot as possible. Wear the post-op Velcro strapped shoe (the shoe with open toes). Make sure you use a walker and/or cane when wearing this to maintain your balance.    -Look for signs of infection (redness, swelling, pain, purulence, fever, chills, nausea, vomiting) and call podiatry or go to the Emergency department immediately if there are any signs of infection.    If there are any questions with the dressings or any other questions or concerns, please do no hesitate to call podiatry and ask for clarification of instructions:  [phone]

## 2024-03-13 NOTE — PROGRESS NOTES
Chief complaint: Patient presents with:  Wound Check      History of Present Illness: This 83 year old NIDDM II male is seen for follow-up management of cellulitis secondary to a LEFT plantar medial hallux ulcer. His  is still helping him change a dressing every day and she is changing it with Mepilex Ag and tape. A couple days ago, he noticed his LEFT foot looked more swollen. He took some old antibiotics from  for a couple days, then took an old antibiotic that was not  from the last six months. He says the foot is still swollen but he has no pain.    No further pedal complaints today.       GFR Estimate   Date Value Ref Range Status   2024 57 (L) >60 mL/min/1.73m2 Final   2021 66 >60 mL/min/[1.73_m2] Final     Comment:     Non  GFR Calc  Starting 2018, serum creatinine based estimated GFR (eGFR) will be   calculated using the Chronic Kidney Disease Epidemiology Collaboration   (CKD-EPI) equation.       GFR Estimate If Black   Date Value Ref Range Status   2021 77 >60 mL/min/[1.73_m2] Final     Comment:      GFR Calc  Starting 2018, serum creatinine based estimated GFR (eGFR) will be   calculated using the Chronic Kidney Disease Epidemiology Collaboration   (CKD-EPI) equation.           Lab Results   Component Value Date    A1C 6.7 2023    A1C 6.0 2023    A1C 8.2 2022    A1C 6.3 2022    A1C 5.6 2022    A1C 6.5 2021           /66 (BP Location: Left arm, Patient Position: Sitting, Cuff Size: Adult Regular)   Pulse 101   Temp 97.8  F (36.6  C) (Tympanic)   SpO2 91%     Patient Active Problem List   Diagnosis    Urinary retention    Generalized weakness    Type 2 diabetes mellitus with stage 3 chronic kidney disease, without long-term current use of insulin (H)    Tachycardia    Hypoxia    Other acute pulmonary embolism with acute cor pulmonale (H)    Dyspnea, unspecified type    Benign  prostatic hyperplasia with urinary retention    Chronic kidney disease, stage III (moderate) (H)    Chronic obstructive lung disease (H)    Chronic painful diabetic neuropathy (H)    Erectile dysfunction    Essential hypertension    Gout, unspecified    History of CVA (cerebrovascular accident)    Hyperlipidemia    IBS (irritable bowel syndrome)    Lumbar spondylosis    Mild concentric left ventricular hypertrophy (LVH)    Moderate episode of recurrent major depressive disorder (H)    Spinal stenosis of lumbar region without neurogenic claudication    Uncomplicated alcohol dependence (H)    Onychomycosis of left great toe    Pincer nail deformity    Complicated UTI (urinary tract infection)    Sepsis with acute renal failure (H)       Past Surgical History:   Procedure Laterality Date    AMPUTATE TOE(S) Left 6/27/2022    Procedure: Left fifth toe partial versus full amputation;  Surgeon: Meenu Orourke DPM;  Location: [facility]    CYSTOSCOPY, TRANSURETHRAL RESECTION (TUR) PROSTATE, COMBINED N/A 10/15/2021    Procedure: CYSTOSCOPY, WITH TRANSURETHRAL RESECTION PROSTATE;  Surgeon: Indu De Leon MD;  Location: [facility]    HERNIA REPAIR      PHACOEMULSIFICATION WITH STANDARD INTRAOCULAR LENS IMPLANT Left 12/27/2021    Procedure: PHACOEMULSIFICATION CATARACT EXTRACTION POSTERIOR CHAMBER LENS LEFT EYE;  Surgeon: Phillip Maldonado MD;  Location: [facility]    PHACOEMULSIFICATION WITH STANDARD INTRAOCULAR LENS IMPLANT Right 1/11/2022    Procedure: PHACOEMULSIFICATION CATARACT EXTRACTION POSTERIOR CHAMBER LENS RIGHT EYE;  Surgeon: Phillip Maldonado MD;  Location: [facility]       Current Outpatient Medications   Medication    acetaminophen (TYLENOL) 500 MG tablet    albuterol (PROAIR HFA/PROVENTIL HFA/VENTOLIN HFA) 108 (90 Base) MCG/ACT inhaler    allopurinol (ZYLOPRIM) 100 MG tablet    amLODIPine (NORVASC) 2.5 MG tablet    atorvastatin (LIPITOR) 80 MG tablet    carvedilol (COREG) 25 MG tablet    dulaglutide (TRULICITY) 0.75 MG/0.5ML  pen    finasteride (PROSCAR) 5 MG tablet    FLUoxetine (PROZAC) 10 MG capsule    gabapentin (NEURONTIN) 300 MG capsule    metFORMIN (GLUCOPHAGE XR) 500 MG 24 hr tablet    multivitamin, therapeutic (THERA-VIT) TABS tablet    rivaroxaban ANTICOAGULANT (XARELTO ANTICOAGULANT) 20 MG TABS tablet    sildenafil (VIAGRA) 100 MG tablet    tamsulosin (FLOMAX) 0.4 MG capsule    ULTICARE MINI 31G X 6 MM insulin pen needle    vitamin B-12 (CYANOCOBALAMIN) 250 MCG tablet    vitamin C (ASCORBIC ACID) 500 MG tablet     No current facility-administered medications for this visit.          Allergies   Allergen Reactions    Ace Inhibitors      Per [facility]: hyperkalemia.  Pt doesn't know if he is allergic    Ceclor [Cefaclor] Hives    Sulfa Antibiotics      Pt unsure.        History reviewed. No pertinent family history.    Social History     Socioeconomic History    Marital status: Single     Spouse name: None    Number of children: None    Years of education: None    Highest education level: None   Occupational History    None   Tobacco Use    Smoking status: Never Smoker    Smokeless tobacco: Former User     Types: Chew   Substance and Sexual Activity    Alcohol use: Not Currently    Drug use: Never    Sexual activity: None   Other Topics Concern    Parent/sibling w/ CABG, MI or angioplasty before 65F 55M? Not Asked   Social History Narrative    None     Social Determinants of Health     Financial Resource Strain:     Difficulty of Paying Living Expenses:    Food Insecurity:     Worried About Running Out of Food in the Last Year:     Ran Out of Food in the Last Year:    Transportation Needs:     Lack of Transportation (Medical):     Lack of Transportation (Non-Medical):    Physical Activity:     Days of Exercise per Week:     Minutes of Exercise per Session:    Stress:     Feeling of Stress :    Social Connections:     Frequency of Communication with Friends and Family:     Frequency of Social Gatherings with Friends and Family:      Attends Church Services:     Active Member of Clubs or Organizations:     Attends Club or Organization Meetings:     Marital Status:    Intimate Partner Violence:     Fear of Current or Ex-Partner:     Emotionally Abused:     Physically Abused:     Sexually Abused:        ROS: 10 point ROS neg other than the symptoms noted above in the HPI.  EXAM  Constitutional: healthy, alert and no distress    Psychiatric: mentation appears normal and affect normal/bright    VASCULAR:  -Dorsalis pedis pulse +2/4 b/l  -Posterior tibial pulse +1/4 b/l  -Capillary refill time < 3 seconds to b/l hallux  -Hair growth absent to b/l anterior legs and ankles  NEURO:  -Epicritic and protective sensation absent to the bilateral foot  DERM:  -Skin temperature within normal limits to bilateral foot    -Toenails thickened, dystrophic and discolored x 9  ---LEFT hallux toenail removed. No open wounds and no drainage.  Wound Location:  LEFT medial hallux IPJ  03/13/2024  Measurement:  1.8cm x 1.8cm x 0.4cm to the capsular layer of the toe   Drainage:  Moderate serous  Odor:  None  Undermining:  None  Edges:  Intact with no edin-wound maceration  Base:  60% capsular layer with overlying fibrotic slough (most medial) and 40% viable   Surrounding Skin: Intact with mild rubor, no erythema, no calor  No severe erythema, no ascending erythema, no calor, no purulence, no malodor, no other signs of infection.     02/29/2024  Measurement:  2cm x 1.9cm x 0.1cm to subcutaneous tissue  Drainage:  Moderate serous  Odor:  None  Undermining:  None  Edges:  Intact with no edin-wound maceration  Base:  90% fibrotic and 10% viable rim  Surrounding Skin: Intact with mild rubor, no erythema, no calor  No severe erythema, no ascending erythema, no calor, no purulence, no malodor, no other signs of infection.     02/22/2024:  1.8cm x 1.8cm x 0.1cm to subcutaneous tissue  02/06/2024:  1.8cm x 1.8cm x 0.1cm to subcutaneous tissue  01/30/2024:  1.6cm x 2.3cm x  0.1cm to subcutaneous tissue    MSK:  -Adductovarus rotation of the bilateral fifth toe  -Muscle strength of ankles +5/5 for dorsiflexion, plantarflexion, ABDUction and ADDuction b/l    ============================================================    ASSESSMENT:    (E11.621,  L97.525) 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 associated with type 2 diabetes mellitus, with muscle involvement without evidence of necrosis (H)  (primary encounter diagnosis)    (E11.9) Diabetes mellitus type 2, noninsulin dependent (H)    (E11.42) Diabetic polyneuropathy associated with type 2 diabetes mellitus (H)      PLAN:  -Patient evaluated and examined. Treatment options discussed with no educational barriers noted.    -Excisional debridement of wound on the LEFT plantar hallux with a sharp tissue nipper to the level of and including the muscle/capsular layer (debrided a total of less than 20 centimeters squared) with all non-viable soft tissue debrided from the wound bed.  -Debrided ulceration in attempt to decrease the biofilm layer, promote healing and in attempt to prevent infection  ---Dressed wound with Aquacel Ag moistened with normal sterile saline, gauze and tape.  -Patient was instructed to look for signs of infection (redness, swelling, pain, purulence, fever, chills, nausea, vomiting) and to return to podiatry or the emergency department immediately if there are any signs of infection.    -Patient's wound has no acute signs of infection today. However, the wound is getting deeper. If the wound infects the bone or if an infection develop, it could results in a toe amputation or even a life or limb-threatening infection. He is strongly urged to keep as much weight off the foot as possible and consistently wear the post-op shoe.    -Offloading: Patient has a post-op shoe at home. He is not wearing it at the appointment. He is again advised to wear this at home.    -This is an acute, uncomplicated illness/injury with OTC treatment  options reviewed.      Instructions for the patient:  Dressing change as of 03/13/2024:  -Change the dressing every 1-2 days: Cut the Aquacel Ag into a rectangle so it is large enough to cover the entire wound. Moistened the Aquacel Ag with normal sterile saline (this is in the pink bottle). The Aquacel should be wet but it should not be dripping wet. Cover this with gauze and tape.    Walking restrictions:  -Keep as much weight off the foot as possible. Wear the post-op Velcro straped shoe (the shoe with open toes). Make sure you use a walker and/or cane when wearing this to maintain your balance.    -Look for signs of infection (redness, swelling, pain, purulence, fever, chills, nausea, vomiting) and call podiatry or go to the Emergency department immediately if there are any signs of infection.    ---------------------    -Toenails last debrided on 02/06/2024      -Patient in agreement with the above treatment plan and all of patient's questions were answered.      Return to clinic 1 1/2 weeks to evaluate wound on the LEFT medial hallux ulceration      Meenu Orourke DPM

## 2024-03-16 DIAGNOSIS — E11.40 CHRONIC PAINFUL DIABETIC NEUROPATHY (H): ICD-10-CM

## 2024-03-18 RX ORDER — GABAPENTIN 300 MG/1
CAPSULE ORAL
Qty: 270 CAPSULE | Refills: 0 | Status: SHIPPED | OUTPATIENT
Start: 2024-03-18 | End: 2024-05-13

## 2024-03-18 NOTE — TELEPHONE ENCOUNTER
Gabapentin 300 MG      Last Written Prescription Date:  02/14/23  Last Fill Quantity: 270,   # refills: 3  Last Office Visit: 11/20/23  Future Office visit:    Next 5 appointments (look out 90 days)      Mar 25, 2024  2:00 PM  (Arrive by 1:45 PM)  SHORT with Zoila Jones MD  [facility] ([facility] ) [address] [place] [address] [id number] [phone]     Mar 25, 2024  3:30 PM  (Arrive by 3:15 PM)  Return Visit with Meenu Orourke DPM  [facility] ([facility] ) [address] [place] [address] [id number] [phone]             Routing refill request to provider for review/approval because:  Drug not on the [facility], [facility] or [facility] refill protocol or controlled substance

## 2024-03-21 NOTE — PROGRESS NOTES
Assessment & Plan     Type 2 diabetes mellitus with stage 3a chronic kidney disease, without long-term current use of insulin (H)  Update labs today.  If A1c above goal, can do adjustment.   If not, will refill trulicity.  Continue metformin.  Podiatry appointment this afternoon.  - Lipid Profile (Chol, Trig, HDL, LDL calc); Future  - Albumin Random Urine Quantitative with Creat Ratio; Future  - CBC with platelets and differential; Future  - Basic metabolic panel; Future  - Hemoglobin A1c; Future  - insulin pen needle (ULTICARE MINI) 31G X 6 MM miscellaneous; USE WITH TRULICITY    Hyperlipidemia, unspecified hyperlipidemia type  Update annual lipid panel - non fasting today.  Continue statin.  - Lipid Profile (Chol, Trig, HDL, LDL calc); Future    Essential hypertension  At goal.  - CBC with platelets and differential; Future  - Basic metabolic panel; Future    Stage 3a chronic kidney disease (H)  Update labs  - Albumin Random Urine Quantitative with Creat Ratio; Future  - CBC with platelets and differential; Future  - Basic metabolic panel; Future    Benign prostatic hyperplasia with urinary retention  stable    Gout, unspecified cause, unspecified chronicity, unspecified site  No recent flares    Moderate episode of recurrent major depressive disorder (H)  stable    URI, acute  Recent URI - resolving.   Old inhaler from 2020 at home.  Would like new one on hand.  Refill sent.  - albuterol (PROAIR HFA/PROVENTIL HFA/VENTOLIN HFA) 108 (90 Base) MCG/ACT inhaler; Inhale 2 puffs into the lungs every 4 hours as needed for cough, shortness of breath or wheezing        See Patient Instructions    Return in about 3 months (around 6/25/2024) for diabetes.    Pj Cruz is a 83 year old, presenting for the following health issues:  Diabetes, Lipids, Hypertension, Depression, and Anxiety    HPI     Dr Orourke today as well - after this    Mild URI.  No fever.  Stable vitals.  Would like refill of Albuterol.  Last fill  "2020.    Still in own house.  1 level for the most part.  Has .  \"We are very close\".  Meals on wheels.    Diabetes Follow-up  How often are you checking your blood sugar? Not at all  What concerns do you have today about your diabetes? None   Do you have any of these symptoms? (Select all that apply)  Numbness in feet, Burning in feet, and Redness, sores, or blisters on feet-seeing podiatry after PCP  No new concerns  Ozempic changed to trulicity over a year ago  A1c - then dose change    Hyperlipidemia Follow-Up  Are you regularly taking any medication or supplement to lower your cholesterol?   Yes- lipitor  Are you having muscle aches or other side effects that you think could be caused by your cholesterol lowering medication?  No  Non fasting  Pancakes, oranges, 1/2 pb sandwich    Hypertension Follow-up  Do you check your blood pressure regularly outside of the clinic? No   Are you following a low salt diet? No-doesn't use salt  Are your blood pressures ever more than 140 on the top number (systolic) OR more   than 90 on the bottom number (diastolic), for example 140/90? No    BP Readings from Last 2 Encounters:   03/25/24 102/60   03/13/24 114/66     Hemoglobin A1C (%)   Date Value   11/20/2023 6.7 (H)   03/09/2023 6.0 (H)   05/06/2021 6.5 (H)     LDL Cholesterol Calculated (mg/dL)   Date Value   03/09/2023 52   03/04/2022 55       Depression and Anxiety   How are you doing with your depression since your last visit? No change  How are you doing with your anxiety since your last visit?  No change  Are you having other symptoms that might be associated with depression or anxiety? No  Have you had a significant life event? No   Do you have any concerns with your use of alcohol or other drugs? No    Social History     Tobacco Use    Smoking status: Never    Smokeless tobacco: Former     Types: Chew   Vaping Use    Vaping Use: Never used   Substance Use Topics    Alcohol use: Yes     Comment: daily, 4 " today    Drug use: Never         4/4/2022     2:00 PM 12/8/2022     2:19 PM 11/20/2023     9:45 AM   PHQ   PHQ-9 Total Score 8 0 0   Q9: Thoughts of better off dead/self-harm past 2 weeks Not at all Not at all Not at all         4/4/2022     2:00 PM 12/8/2022     2:20 PM 11/20/2023     9:46 AM   SHANNON-7 SCORE   Total Score 0 0 7         Suicide Assessment Five-step Evaluation and Treatment (SAFE-T)      Chronic Kidney Disease Follow-up  Do you take any over the counter pain medicine?: Yes  What over the counter medicine are you taking for your pain?:  tylenol    How often do you take this medicine?:  Two times daily      Review of Systems  Constitutional, HEENT, cardiovascular, pulmonary, gi and gu systems are negative, except as otherwise noted.      Objective    /60 (BP Location: Right arm, Patient Position: Sitting, Cuff Size: Adult Regular)   Pulse 93   Temp 99.2  F (37.3  C) (Tympanic)   Resp 18   Wt 78.7 kg (173 lb 8 oz)   SpO2 92%   BMI 22.89 kg/m    Body mass index is 22.89 kg/m .  Physical Exam   GENERAL: alert and no distress  EYES: Eyes grossly normal to inspection, PERRL and conjunctivae and sclerae normal  NECK: no adenopathy, no asymmetry, masses, or scars  RESP: lungs clear to auscultation - no rales, rhonchi or wheezes  CV: regular rate and rhythm, normal S1 S2, no S3 or S4, no murmur, click or rub, no peripheral edema  ABDOMEN: soft, nontender, no hepatosplenomegaly, no masses and bowel sounds normal  MS: no gross musculoskeletal defects noted, no edema  SKIN: no suspicious lesions or rashes and did not do foot exam today - seeing podiatry next  NEURO: Normal strength and tone, mentation intact and speech normal  PSYCH: mentation appears normal, affect normal/bright    No results found for this or any previous visit (from the past 24 hour(s)).        Signed Electronically by: Zoila Azul MD

## 2024-03-25 ENCOUNTER — OFFICE VISIT (OUTPATIENT)
Dept: FAMILY MEDICINE | Facility: OTHER | Age: 84
End: 2024-03-25
Attending: FAMILY MEDICINE
Payer: MEDICARE

## 2024-03-25 ENCOUNTER — OFFICE VISIT (OUTPATIENT)
Dept: PODIATRY | Facility: OTHER | Age: 84
End: 2024-03-25
Attending: PODIATRIST
Payer: MEDICARE

## 2024-03-25 VITALS
RESPIRATION RATE: 18 BRPM | HEART RATE: 93 BPM | TEMPERATURE: 99.1 F | OXYGEN SATURATION: 92 % | SYSTOLIC BLOOD PRESSURE: 101 MMHG | DIASTOLIC BLOOD PRESSURE: 67 MMHG

## 2024-03-25 VITALS
WEIGHT: 173.5 LBS | RESPIRATION RATE: 18 BRPM | BODY MASS INDEX: 22.89 KG/M2 | SYSTOLIC BLOOD PRESSURE: 102 MMHG | OXYGEN SATURATION: 92 % | DIASTOLIC BLOOD PRESSURE: 60 MMHG | TEMPERATURE: 99.2 F | HEART RATE: 93 BPM

## 2024-03-25 DIAGNOSIS — F33.1 MODERATE EPISODE OF RECURRENT MAJOR DEPRESSIVE DISORDER (H): ICD-10-CM

## 2024-03-25 DIAGNOSIS — I10 ESSENTIAL HYPERTENSION: ICD-10-CM

## 2024-03-25 DIAGNOSIS — N40.1 BENIGN PROSTATIC HYPERPLASIA WITH URINARY RETENTION: ICD-10-CM

## 2024-03-25 DIAGNOSIS — E11.42 DIABETIC POLYNEUROPATHY ASSOCIATED WITH TYPE 2 DIABETES MELLITUS (H): ICD-10-CM

## 2024-03-25 DIAGNOSIS — M10.9 GOUT, UNSPECIFIED CAUSE, UNSPECIFIED CHRONICITY, UNSPECIFIED SITE: ICD-10-CM

## 2024-03-25 DIAGNOSIS — J06.9 URI, ACUTE: ICD-10-CM

## 2024-03-25 DIAGNOSIS — E11.621 DIABETIC ULCER OF TOE OF LEFT FOOT ASSOCIATED WITH TYPE 2 DIABETES MELLITUS, WITH FAT LAYER EXPOSED (H): Primary | ICD-10-CM

## 2024-03-25 DIAGNOSIS — E78.5 HYPERLIPIDEMIA, UNSPECIFIED HYPERLIPIDEMIA TYPE: ICD-10-CM

## 2024-03-25 DIAGNOSIS — E11.9 DIABETES MELLITUS TYPE 2, NONINSULIN DEPENDENT (H): ICD-10-CM

## 2024-03-25 DIAGNOSIS — N18.31 TYPE 2 DIABETES MELLITUS WITH STAGE 3A CHRONIC KIDNEY DISEASE, WITHOUT LONG-TERM CURRENT USE OF INSULIN (H): Primary | ICD-10-CM

## 2024-03-25 DIAGNOSIS — R33.8 BENIGN PROSTATIC HYPERPLASIA WITH URINARY RETENTION: ICD-10-CM

## 2024-03-25 DIAGNOSIS — E11.22 TYPE 2 DIABETES MELLITUS WITH STAGE 3A CHRONIC KIDNEY DISEASE, WITHOUT LONG-TERM CURRENT USE OF INSULIN (H): Primary | ICD-10-CM

## 2024-03-25 DIAGNOSIS — N18.31 STAGE 3A CHRONIC KIDNEY DISEASE (H): ICD-10-CM

## 2024-03-25 DIAGNOSIS — L97.522 DIABETIC ULCER OF TOE OF LEFT FOOT ASSOCIATED WITH TYPE 2 DIABETES MELLITUS, WITH FAT LAYER EXPOSED (H): Primary | ICD-10-CM

## 2024-03-25 LAB
ANION GAP SERPL CALCULATED.3IONS-SCNC: 9 MMOL/L (ref 7–15)
BASOPHILS # BLD AUTO: 0 10E3/UL (ref 0–0.2)
BASOPHILS NFR BLD AUTO: 0 %
BUN SERPL-MCNC: 15.6 MG/DL (ref 8–23)
CALCIUM SERPL-MCNC: 9.4 MG/DL (ref 8.8–10.2)
CHLORIDE SERPL-SCNC: 105 MMOL/L (ref 98–107)
CHOLEST SERPL-MCNC: 126 MG/DL
CREAT SERPL-MCNC: 1.22 MG/DL (ref 0.67–1.17)
DEPRECATED HCO3 PLAS-SCNC: 26 MMOL/L (ref 22–29)
EGFRCR SERPLBLD CKD-EPI 2021: 59 ML/MIN/1.73M2
EOSINOPHIL # BLD AUTO: 0.1 10E3/UL (ref 0–0.7)
EOSINOPHIL NFR BLD AUTO: 1 %
ERYTHROCYTE [DISTWIDTH] IN BLOOD BY AUTOMATED COUNT: 13.4 % (ref 10–15)
EST. AVERAGE GLUCOSE BLD GHB EST-MCNC: 123 MG/DL
FASTING STATUS PATIENT QL REPORTED: NO
GLUCOSE SERPL-MCNC: 122 MG/DL (ref 70–99)
HBA1C MFR BLD: 5.9 %
HCT VFR BLD AUTO: 39.8 % (ref 40–53)
HDLC SERPL-MCNC: 56 MG/DL
HGB BLD-MCNC: 13 G/DL (ref 13.3–17.7)
IMM GRANULOCYTES # BLD: 0 10E3/UL
IMM GRANULOCYTES NFR BLD: 0 %
LDLC SERPL CALC-MCNC: 48 MG/DL
LYMPHOCYTES # BLD AUTO: 0.9 10E3/UL (ref 0.8–5.3)
LYMPHOCYTES NFR BLD AUTO: 12 %
MCH RBC QN AUTO: 32.3 PG (ref 26.5–33)
MCHC RBC AUTO-ENTMCNC: 32.7 G/DL (ref 31.5–36.5)
MCV RBC AUTO: 99 FL (ref 78–100)
MONOCYTES # BLD AUTO: 0.6 10E3/UL (ref 0–1.3)
MONOCYTES NFR BLD AUTO: 7 %
NEUTROPHILS # BLD AUTO: 6.2 10E3/UL (ref 1.6–8.3)
NEUTROPHILS NFR BLD AUTO: 79 %
NONHDLC SERPL-MCNC: 70 MG/DL
NRBC # BLD AUTO: 0 10E3/UL
NRBC BLD AUTO-RTO: 0 /100
PLATELET # BLD AUTO: 213 10E3/UL (ref 150–450)
POTASSIUM SERPL-SCNC: 4.4 MMOL/L (ref 3.4–5.3)
RBC # BLD AUTO: 4.03 10E6/UL (ref 4.4–5.9)
SODIUM SERPL-SCNC: 140 MMOL/L (ref 135–145)
TRIGL SERPL-MCNC: 110 MG/DL
WBC # BLD AUTO: 7.8 10E3/UL (ref 4–11)

## 2024-03-25 PROCEDURE — 80048 BASIC METABOLIC PNL TOTAL CA: CPT | Mod: ZL | Performed by: FAMILY MEDICINE

## 2024-03-25 PROCEDURE — G0463 HOSPITAL OUTPT CLINIC VISIT: HCPCS

## 2024-03-25 PROCEDURE — 99214 OFFICE O/P EST MOD 30 MIN: CPT | Performed by: FAMILY MEDICINE

## 2024-03-25 PROCEDURE — G0463 HOSPITAL OUTPT CLINIC VISIT: HCPCS | Mod: 25,27

## 2024-03-25 PROCEDURE — 36415 COLL VENOUS BLD VENIPUNCTURE: CPT | Mod: ZL | Performed by: FAMILY MEDICINE

## 2024-03-25 PROCEDURE — 80061 LIPID PANEL: CPT | Mod: ZL | Performed by: FAMILY MEDICINE

## 2024-03-25 PROCEDURE — 85004 AUTOMATED DIFF WBC COUNT: CPT | Mod: ZL | Performed by: FAMILY MEDICINE

## 2024-03-25 PROCEDURE — 99213 OFFICE O/P EST LOW 20 MIN: CPT | Performed by: PODIATRIST

## 2024-03-25 PROCEDURE — 83036 HEMOGLOBIN GLYCOSYLATED A1C: CPT | Mod: ZL | Performed by: FAMILY MEDICINE

## 2024-03-25 RX ORDER — ALBUTEROL SULFATE 90 UG/1
2 AEROSOL, METERED RESPIRATORY (INHALATION) EVERY 4 HOURS PRN
Qty: 18 G | Refills: 3 | Status: SHIPPED | OUTPATIENT
Start: 2024-03-25

## 2024-03-25 RX ORDER — DULAGLUTIDE 0.75 MG/.5ML
0.75 INJECTION, SOLUTION SUBCUTANEOUS
Qty: 2 ML | Refills: 3 | Status: SHIPPED | OUTPATIENT
Start: 2024-03-25 | End: 2024-07-09

## 2024-03-25 RX ORDER — FLURBIPROFEN SODIUM 0.3 MG/ML
SOLUTION/ DROPS OPHTHALMIC
Qty: 100 EACH | Refills: 3 | Status: SHIPPED | OUTPATIENT
Start: 2024-03-25

## 2024-03-25 ASSESSMENT — PAIN SCALES - GENERAL
PAINLEVEL: NO PAIN (0)
PAINLEVEL: NO PAIN (0)

## 2024-03-25 NOTE — PROGRESS NOTES
Chief complaint: Patient presents with:  DFU      History of Present Illness: This 83 year old NIDDM II male is seen for follow-up management of a diabetic foot ulcer on the plantar medial hallux ulcer. His  has assisted him with a dressing change every two days with Aquacel Ag, normal sterile saline, gauze and tape. He has no pain from the toe. He is no longer on antibiotics.    No further pedal complaints today.       GFR Estimate   Date Value Ref Range Status   03/25/2024 59 (L) >60 mL/min/1.73m2 Final   04/29/2021 66 >60 mL/min/[1.73_m2] Final     Comment:     Non  GFR Calc  Starting 12/18/2018, serum creatinine based estimated GFR (eGFR) will be   calculated using the Chronic Kidney Disease Epidemiology Collaboration   (CKD-EPI) equation.       GFR Estimate If Black   Date Value Ref Range Status   04/29/2021 77 >60 mL/min/[1.73_m2] Final     Comment:      GFR Calc  Starting 12/18/2018, serum creatinine based estimated GFR (eGFR) will be   calculated using the Chronic Kidney Disease Epidemiology Collaboration   (CKD-EPI) equation.           Lab Results   Component Value Date    A1C 6.7 11/20/2023    A1C 6.0 03/09/2023    A1C 8.2 12/08/2022    A1C 6.3 06/06/2022    A1C 5.6 03/04/2022    A1C 6.5 05/06/2021           /67   Pulse 93   Temp 99.1  F (37.3  C)   Resp 18   SpO2 92%     Patient Active Problem List   Diagnosis    Urinary retention    Generalized weakness    Type 2 diabetes mellitus with stage 3 chronic kidney disease, without long-term current use of insulin (H)    Tachycardia    Hypoxia    Other acute pulmonary embolism with acute cor pulmonale (H)    Dyspnea, unspecified type    Benign prostatic hyperplasia with urinary retention    Chronic kidney disease, stage III (moderate) (H)    Chronic obstructive lung disease (H)    Chronic painful diabetic neuropathy (H)    Erectile dysfunction    Essential hypertension    Gout, unspecified    History of CVA  (cerebrovascular accident)    Hyperlipidemia    IBS (irritable bowel syndrome)    Lumbar spondylosis    Mild concentric left ventricular hypertrophy (LVH)    Moderate episode of recurrent major depressive disorder (H)    Spinal stenosis of lumbar region without neurogenic claudication    Uncomplicated alcohol dependence (H)    Onychomycosis of left great toe    Pincer nail deformity    Complicated UTI (urinary tract infection)    Sepsis with acute renal failure (H)       Past Surgical History:   Procedure Laterality Date    AMPUTATE TOE(S) Left 6/27/2022    Procedure: Left fifth toe partial versus full amputation;  Surgeon: Meenu Orourke DPM;  Location: [facility]    CYSTOSCOPY, TRANSURETHRAL RESECTION (TUR) PROSTATE, COMBINED N/A 10/15/2021    Procedure: CYSTOSCOPY, WITH TRANSURETHRAL RESECTION PROSTATE;  Surgeon: Indu De Leon MD;  Location: [facility]    HERNIA REPAIR      PHACOEMULSIFICATION WITH STANDARD INTRAOCULAR LENS IMPLANT Left 12/27/2021    Procedure: PHACOEMULSIFICATION CATARACT EXTRACTION POSTERIOR CHAMBER LENS LEFT EYE;  Surgeon: Phillip Maldonado MD;  Location: [facility]    PHACOEMULSIFICATION WITH STANDARD INTRAOCULAR LENS IMPLANT Right 1/11/2022    Procedure: PHACOEMULSIFICATION CATARACT EXTRACTION POSTERIOR CHAMBER LENS RIGHT EYE;  Surgeon: Phillip Maldonado MD;  Location: [facility]       Current Outpatient Medications   Medication    acetaminophen (TYLENOL) 500 MG tablet    albuterol (PROAIR HFA/PROVENTIL HFA/VENTOLIN HFA) 108 (90 Base) MCG/ACT inhaler    allopurinol (ZYLOPRIM) 100 MG tablet    amLODIPine (NORVASC) 2.5 MG tablet    atorvastatin (LIPITOR) 80 MG tablet    carvedilol (COREG) 25 MG tablet    dulaglutide (TRULICITY) 0.75 MG/0.5ML pen    finasteride (PROSCAR) 5 MG tablet    FLUoxetine (PROZAC) 10 MG capsule    gabapentin (NEURONTIN) 300 MG capsule    insulin pen needle (ULTICARE MINI) 31G X 6 MM miscellaneous    metFORMIN (GLUCOPHAGE XR) 500 MG 24 hr tablet    multivitamin, therapeutic  (THERA-VIT) TABS tablet    rivaroxaban ANTICOAGULANT (XARELTO ANTICOAGULANT) 20 MG TABS tablet    sildenafil (VIAGRA) 100 MG tablet    tamsulosin (FLOMAX) 0.4 MG capsule    vitamin B-12 (CYANOCOBALAMIN) 250 MCG tablet    vitamin C (ASCORBIC ACID) 500 MG tablet     No current facility-administered medications for this visit.          Allergies   Allergen Reactions    Ace Inhibitors      Per [facility]: hyperkalemia.  Pt doesn't know if he is allergic    Ceclor [Cefaclor] Hives    Sulfa Antibiotics      Pt unsure.     Tomato Flavor [Flavoring Agent]      Tomato Paste       History reviewed. No pertinent family history.    Social History     Socioeconomic History    Marital status: Single     Spouse name: None    Number of children: None    Years of education: None    Highest education level: None   Occupational History    None   Tobacco Use    Smoking status: Never Smoker    Smokeless tobacco: Former User     Types: Chew   Substance and Sexual Activity    Alcohol use: Not Currently    Drug use: Never    Sexual activity: None   Other Topics Concern    Parent/sibling w/ CABG, MI or angioplasty before 65F 55M? Not Asked   Social History Narrative    None     Social Determinants of Health     Financial Resource Strain:     Difficulty of Paying Living Expenses:    Food Insecurity:     Worried About Running Out of Food in the Last Year:     Ran Out of Food in the Last Year:    Transportation Needs:     Lack of Transportation (Medical):     Lack of Transportation (Non-Medical):    Physical Activity:     Days of Exercise per Week:     Minutes of Exercise per Session:    Stress:     Feeling of Stress :    Social Connections:     Frequency of Communication with Friends and Family:     Frequency of Social Gatherings with Friends and Family:     Attends [employer] Services:     Active Member of Clubs or Organizations:     Attends Club or Organization Meetings:     Marital Status:    Intimate Partner Violence:     Fear of Current or  Ex-Partner:     Emotionally Abused:     Physically Abused:     Sexually Abused:        ROS: 10 point ROS neg other than the symptoms noted above in the HPI.  EXAM  Constitutional: healthy, alert and no distress    Psychiatric: mentation appears normal and affect normal/bright    VASCULAR:  -Dorsalis pedis pulse +2/4 b/l  -Posterior tibial pulse +1/4 b/l  -Capillary refill time < 3 seconds to b/l hallux  -Hair growth absent to b/l anterior legs and ankles  NEURO:  -Epicritic and protective sensation absent to the bilateral foot  DERM:  -Skin temperature within normal limits to bilateral foot    -Toenails thickened, dystrophic and discolored x 9  ---LEFT hallux toenail removed. No open wounds and no drainage.  Wound Location:  LEFT medial hallux IPJ  03/25/2024  Measurement:  1.7cm x 1.6cm x 0.2cm to subcutaneous tissue layer  Drainage:  Moderate serous  Odor:  None  Undermining:  None  Edges:  Intact with no edin-wound maceration  Base:  20% viable and 80% fibrotic  Surrounding Skin: Intact with minimal rubor, no erythema, no calor  No severe erythema, no ascending erythema, no calor, no purulence, no malodor, no other signs of infection.     03/13/2024  Measurement:  1.8cm x 1.8cm x 0.4cm to the capsular layer of the toe   Drainage:  Moderate serous  Odor:  None  Undermining:  None  Edges:  Intact with no edin-wound maceration  Base:  60% capsular layer with overlying fibrotic slough (most medial) and 40% viable   Surrounding Skin: Intact with mild rubor, no erythema, no calor  No severe erythema, no ascending erythema, no calor, no purulence, no malodor, no other signs of infection.     02/29/2024:  2cm x 1.9cm x 0.1cm to subcutaneous tissue  02/22/2024:  1.8cm x 1.8cm x 0.1cm to subcutaneous tissue  02/06/2024:  1.8cm x 1.8cm x 0.1cm to subcutaneous tissue  01/30/2024:  1.6cm x 2.3cm x 0.1cm to subcutaneous tissue    MSK:  -Adductovarus rotation of the bilateral fifth toe  -Muscle strength of ankles +5/5 for  dorsiflexion, plantarflexion, ABDUction and ADDuction b/l    ============================================================    ASSESSMENT:    (E11.621,  L97.522) 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 associated with type 2 diabetes mellitus, with fat layer exposed (H)  (primary encounter diagnosis)    (E11.9) Diabetes mellitus type 2, noninsulin dependent (H)    (E11.42) Diabetic polyneuropathy associated with type 2 diabetes mellitus (H)      PLAN:  -Patient evaluated and examined. Treatment options discussed with no educational barriers noted.    Diabetic foot wound:  -Outer callus cared for and mechanical debridement of wound performed with gauze on the LEFT plantar medial hallux  ---Dressed wound with Aquacel Ag moistened with normal sterile saline, gauze and tape.  -Patient was instructed to look for signs of infection (redness, swelling, pain, purulence, fever, chills, nausea, vomiting) and to return to podiatry or the emergency department immediately if there are any signs of infection.    -Patient's wound has improved. He is strongly urged to keep as much weight off the foot as possible and consistently wear the post-op shoe. He may change the dressing every two days. His  is helping him with the dressing. There is less depth to the wound today and it is no longer probing to the muscle/capsular layer.    -Offloading: Patient has a post-op shoe at home. He is not wearing it at the appointment. He is again advised to wear this at home.    -This is an acute, uncomplicated illness/injury with OTC treatment options reviewed.      ---------------------    -Toenails last debrided on 02/06/2024      -Patient in agreement with the above treatment plan and all of patient's questions were answered.      Return to clinic two weeks to evaluate wound on the LEFT medial hallux ulceration      Meenu Orourke DPM

## 2024-04-10 ENCOUNTER — OFFICE VISIT (OUTPATIENT)
Dept: PODIATRY | Facility: OTHER | Age: 84
End: 2024-04-10
Attending: PODIATRIST
Payer: MEDICARE

## 2024-04-10 VITALS
HEART RATE: 92 BPM | DIASTOLIC BLOOD PRESSURE: 70 MMHG | SYSTOLIC BLOOD PRESSURE: 124 MMHG | TEMPERATURE: 98.2 F | OXYGEN SATURATION: 93 %

## 2024-04-10 DIAGNOSIS — E11.9 DIABETES MELLITUS TYPE 2, NONINSULIN DEPENDENT (H): ICD-10-CM

## 2024-04-10 DIAGNOSIS — E11.621 DIABETIC ULCER OF TOE OF LEFT FOOT ASSOCIATED WITH TYPE 2 DIABETES MELLITUS, WITH FAT LAYER EXPOSED (H): Primary | ICD-10-CM

## 2024-04-10 DIAGNOSIS — E11.42 DIABETIC POLYNEUROPATHY ASSOCIATED WITH TYPE 2 DIABETES MELLITUS (H): ICD-10-CM

## 2024-04-10 DIAGNOSIS — L97.522 DIABETIC ULCER OF TOE OF LEFT FOOT ASSOCIATED WITH TYPE 2 DIABETES MELLITUS, WITH FAT LAYER EXPOSED (H): Primary | ICD-10-CM

## 2024-04-10 DIAGNOSIS — L60.3 ONYCHODYSTROPHY: ICD-10-CM

## 2024-04-10 DIAGNOSIS — Z13.89 SCREENING FOR DIABETIC PERIPHERAL NEUROPATHY: ICD-10-CM

## 2024-04-10 PROCEDURE — G0463 HOSPITAL OUTPT CLINIC VISIT: HCPCS | Mod: 25

## 2024-04-10 PROCEDURE — 11721 DEBRIDE NAIL 6 OR MORE: CPT | Performed by: PODIATRIST

## 2024-04-10 PROCEDURE — G0463 HOSPITAL OUTPT CLINIC VISIT: HCPCS

## 2024-04-10 PROCEDURE — 99213 OFFICE O/P EST LOW 20 MIN: CPT | Mod: 25 | Performed by: PODIATRIST

## 2024-04-10 PROCEDURE — 99207 PR FOOT EXAM NO CHARGE: CPT | Performed by: PODIATRIST

## 2024-04-10 ASSESSMENT — PAIN SCALES - GENERAL: PAINLEVEL: NO PAIN (0)

## 2024-04-10 NOTE — PROGRESS NOTES
Chief complaint: Patient presents with:  Infection: DFI      History of Present Illness: This 83 year old NIDDM II male is seen for follow-up management of a diabetic foot ulcer on the plantar medial hallux ulcer. His  has assisted him with a dressing change every two days with Aquacel Ag, normal sterile saline, gauze and tape. He has no pain from the toe. He is no longer on antibiotics.    He tripped on a stair walking into his house last night. His neighbor was unable to help him up so he called an ambulance. He refused transport to the hospital and he says he feels well without dizziness today.    No further pedal complaints today.       GFR Estimate   Date Value Ref Range Status   03/25/2024 59 (L) >60 mL/min/1.73m2 Final   04/29/2021 66 >60 mL/min/[1.73_m2] Final     Comment:     Non  GFR Calc  Starting 12/18/2018, serum creatinine based estimated GFR (eGFR) will be   calculated using the Chronic Kidney Disease Epidemiology Collaboration   (CKD-EPI) equation.       GFR Estimate If Black   Date Value Ref Range Status   04/29/2021 77 >60 mL/min/[1.73_m2] Final     Comment:      GFR Calc  Starting 12/18/2018, serum creatinine based estimated GFR (eGFR) will be   calculated using the Chronic Kidney Disease Epidemiology Collaboration   (CKD-EPI) equation.           Lab Results   Component Value Date    A1C 6.7 11/20/2023    A1C 6.0 03/09/2023    A1C 8.2 12/08/2022    A1C 6.3 06/06/2022    A1C 5.6 03/04/2022    A1C 6.5 05/06/2021       /70 (BP Location: Left arm, Patient Position: Sitting, Cuff Size: Adult Regular)   Pulse 92   Temp 98.2  F (36.8  C) (Tympanic)   SpO2 93%     Patient Active Problem List   Diagnosis    Urinary retention    Generalized weakness    Type 2 diabetes mellitus with stage 3 chronic kidney disease, without long-term current use of insulin (H)    Tachycardia    Hypoxia    Other acute pulmonary embolism with acute cor pulmonale (H)    Dyspnea,  unspecified type    Benign prostatic hyperplasia with urinary retention    Chronic kidney disease, stage III (moderate) (H)    Chronic obstructive lung disease (H)    Chronic painful diabetic neuropathy (H)    Erectile dysfunction    Essential hypertension    Gout, unspecified    History of CVA (cerebrovascular accident)    Hyperlipidemia    IBS (irritable bowel syndrome)    Lumbar spondylosis    Mild concentric left ventricular hypertrophy (LVH)    Moderate episode of recurrent major depressive disorder (H)    Spinal stenosis of lumbar region without neurogenic claudication    Uncomplicated alcohol dependence (H)    Onychomycosis of left great toe    Pincer nail deformity    Complicated UTI (urinary tract infection)    Sepsis with acute renal failure (H)       Past Surgical History:   Procedure Laterality Date    AMPUTATE TOE(S) Left 6/27/2022    Procedure: Left fifth toe partial versus full amputation;  Surgeon: Meenu Orourke DPM;  Location: [facility]    CYSTOSCOPY, TRANSURETHRAL RESECTION (TUR) PROSTATE, COMBINED N/A 10/15/2021    Procedure: CYSTOSCOPY, WITH TRANSURETHRAL RESECTION PROSTATE;  Surgeon: Indu De Leon MD;  Location: [facility]    HERNIA REPAIR      PHACOEMULSIFICATION WITH STANDARD INTRAOCULAR LENS IMPLANT Left 12/27/2021    Procedure: PHACOEMULSIFICATION CATARACT EXTRACTION POSTERIOR CHAMBER LENS LEFT EYE;  Surgeon: Phillip Maldonado MD;  Location: [facility]    PHACOEMULSIFICATION WITH STANDARD INTRAOCULAR LENS IMPLANT Right 1/11/2022    Procedure: PHACOEMULSIFICATION CATARACT EXTRACTION POSTERIOR CHAMBER LENS RIGHT EYE;  Surgeon: Phillip Maldonado MD;  Location: [facility]       Current Outpatient Medications   Medication Sig Dispense Refill    acetaminophen (TYLENOL) 500 MG tablet Take 1,000 mg by mouth 2 times daily . Limit acetaminophen to 4000 mg per day from all sources.      albuterol (PROAIR HFA/PROVENTIL HFA/VENTOLIN HFA) 108 (90 Base) MCG/ACT inhaler Inhale 2 puffs into the lungs every 4 hours as  needed for cough, shortness of breath or wheezing 18 g 3    allopurinol (ZYLOPRIM) 100 MG tablet TAKE 1 TABLET BY MOUTH ONCE DAILY 90 tablet 3    amLODIPine (NORVASC) 2.5 MG tablet TAKE ONE TABLET BY MOUTH DAILY 90 tablet 1    atorvastatin (LIPITOR) 80 MG tablet TAKE 0.5 TABLETS (40 MG) BYMOUTH EVERY EVENING 90 tablet 1    carvedilol (COREG) 25 MG tablet TAKE 1 TABLET BY MOUTH TWICE DAILY 180 tablet 1    dulaglutide (TRULICITY) 0.75 MG/0.5ML pen Inject 0.75 mg Subcutaneous every 7 days 2 mL 3    finasteride (PROSCAR) 5 MG tablet TAKE 1 TABLET (5 MG) BY MOUTH DAILY 30 tablet 4    FLUoxetine (PROZAC) 10 MG capsule TAKE 1 CAPSULE BY MOUTH EVERY DAY 90 capsule 0    gabapentin (NEURONTIN) 300 MG capsule TAKE 1 CAP BY MOUTH THREE TIMES A DAY. 270 capsule 0    insulin pen needle (ULTICARE MINI) 31G X 6 MM miscellaneous USE WITH TRULICITY 100 each 3    metFORMIN (GLUCOPHAGE XR) 500 MG 24 hr tablet TAKE 2 TABLETS BY MOUTH TWICE DAILY WITH MEALS 360 tablet 0    multivitamin, therapeutic (THERA-VIT) TABS tablet Take 1 tablet by mouth daily      rivaroxaban ANTICOAGULANT (XARELTO ANTICOAGULANT) 20 MG TABS tablet TAKE 1 TABLET DAILY BY MOUTH WITH DINNER 90 tablet 3    sildenafil (VIAGRA) 100 MG tablet Take 1 tablet (100 mg) by mouth daily as needed (erectile dysfunction) 10 tablet 0    tamsulosin (FLOMAX) 0.4 MG capsule TAKE 1 CAPSULE (0.4 MG) BY MOUTH EVERY EVENING 90 capsule 1    vitamin B-12 (CYANOCOBALAMIN) 250 MCG tablet Take 250 mcg by mouth daily      vitamin C (ASCORBIC ACID) 500 MG tablet Take 500 mg by mouth daily       No current facility-administered medications for this visit.          Allergies   Allergen Reactions    Ace Inhibitors      Per [facility]: hyperkalemia.  Pt doesn't know if he is allergic    Ceclor [Cefaclor] Hives    Sulfa Antibiotics      Pt unsure.     Tomato Flavor [Flavoring Agent]      Tomato Paste       History reviewed. No pertinent family history.    Social History     Socioeconomic History     Marital status: Single     Spouse name: None    Number of children: None    Years of education: None    Highest education level: None   Occupational History    None   Tobacco Use    Smoking status: Never Smoker    Smokeless tobacco: Former User     Types: Chew   Substance and Sexual Activity    Alcohol use: Not Currently    Drug use: Never    Sexual activity: None   Other Topics Concern    Parent/sibling w/ CABG, MI or angioplasty before 65F 55M? Not Asked   Social History Narrative    None     Social Determinants of Health     Financial Resource Strain:     Difficulty of Paying Living Expenses:    Food Insecurity:     Worried About Running Out of Food in the Last Year:     Ran Out of Food in the Last Year:    Transportation Needs:     Lack of Transportation (Medical):     Lack of Transportation (Non-Medical):    Physical Activity:     Days of Exercise per Week:     Minutes of Exercise per Session:    Stress:     Feeling of Stress :    Social Connections:     Frequency of Communication with Friends and Family:     Frequency of Social Gatherings with Friends and Family:     Attends [employer] Services:     Active Member of Clubs or Organizations:     Attends Club or Organization Meetings:     Marital Status:    Intimate Partner Violence:     Fear of Current or Ex-Partner:     Emotionally Abused:     Physically Abused:     Sexually Abused:        ROS: 10 point ROS neg other than the symptoms noted above in the HPI.  EXAM  Constitutional: healthy, alert and no distress    Psychiatric: mentation appears normal and affect normal/bright    VASCULAR:  -Dorsalis pedis pulse +2/4 b/l  -Posterior tibial pulse +1/4 b/l  -Capillary refill time < 3 seconds to b/l hallux  -Hair growth absent to b/l anterior legs and ankles  NEURO:  -Epicritic and protective sensation absent to the bilateral foot  DERM:  -Skin temperature within normal limits to bilateral foot    -Toenails elongated, thickened, dystrophic and discolored x  9  ---LEFT hallux toenail removed. No open wounds and no drainage.  Wound Location:  LEFT medial hallux IPJ  04/10/2024  Measurement:  1.8cm x 1.5cm x 0.2cm to subcutaneous tissue layer  Drainage:  Moderate serous  Odor:  None  Undermining:  None  Edges:  Intact with no edin-wound maceration  Base:  20% viable and 80% fibrotic  Surrounding Skin: Intact with minimal rubor, no erythema, no calor  No severe erythema, no ascending erythema, no calor, no purulence, no malodor, no other signs of infection.     03/25/2024  Measurement:  1.7cm x 1.6cm x 0.2cm to subcutaneous tissue layer  Drainage:  Moderate serous  Odor:  None  Undermining:  None  Edges:  Intact with no edin-wound maceration  Base:  20% viable and 80% fibrotic  Surrounding Skin: Intact with minimal rubor, no erythema, no calor  No severe erythema, no ascending erythema, no calor, no purulence, no malodor, no other signs of infection.     03/13/2024:  1.8cm x 1.8cm x 0.4cm to the capsular layer of the toe   02/29/2024:  2cm x 1.9cm x 0.1cm to subcutaneous tissue  02/22/2024:  1.8cm x 1.8cm x 0.1cm to subcutaneous tissue  02/06/2024:  1.8cm x 1.8cm x 0.1cm to subcutaneous tissue  01/30/2024:  1.6cm x 2.3cm x 0.1cm to subcutaneous tissue    MSK:  -Adductovarus rotation of the bilateral fifth toe  -Muscle strength of ankles +5/5 for dorsiflexion, plantarflexion, ABDUction and ADDuction b/l    ============================================================    ASSESSMENT:    (E11.621,  L97.522) 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 associated with type 2 diabetes mellitus, with fat layer exposed (H)  (primary encounter diagnosis)    (L60.3) Onychodystrophy    (E11.9) Diabetes mellitus type 2, noninsulin dependent (H)    (E11.42) Diabetic polyneuropathy associated with type 2 diabetes mellitus (H)    (Z13.89) Screening for diabetic peripheral neuropathy        PLAN:  -Patient evaluated and examined. Treatment options discussed with no educational barriers  noted.    -High risk toenail debridement x 9 toenails without incident on 04/10/2024  ---LEFT hallux toenail not fully re-grown    ---------------------------------------------    Diabetic foot wound:  -Outer callus cared for and mechanical debridement of wound performed with gauze on the LEFT plantar medial hallux  ---Dressed wound with Aquacel Ag moistened with normal sterile saline, gauze and tape.  -Patient was instructed to look for signs of infection (redness, swelling, pain, purulence, fever, chills, nausea, vomiting) and to return to podiatry or the emergency department immediately if there are any signs of infection.    -Patient's wound has not improved, but it has also not worsened. He is again strongly urged to keep as much weight off the foot as possible and consistently wear the post-op shoe as well as use his walker. His walker can help keep extra weight off the foot when he uses his upper body on the walker. He is in agreement with this plan.    -The patient says he does not feel dizzy and he has no pain from his fall on 04/09/2024. He says he had tripped on his stair and he did not have the strength to get himself back up. Paramedics helped him up. Patient is refusing to call his PCP or go to Urgent Care to be evaluated. He says he feels well, he has no areas of pain, he is not dizzy and he has no other concerns today.    -Offloading: Patient has a post-op shoe at home. He is not wearing it at the appointment. He is again advised to wear this at home and use his walker.    -This is an acute, uncomplicated illness/injury with OTC treatment options reviewed.    ---------------------      -Patient in agreement with the above treatment plan and all of patient's questions were answered.      Return to clinic two weeks to evaluate wound on the LEFT medial hallux ulceration      Meenu Orourke DPM

## 2024-04-24 ENCOUNTER — OFFICE VISIT (OUTPATIENT)
Dept: PODIATRY | Facility: OTHER | Age: 84
End: 2024-04-24
Attending: PODIATRIST
Payer: MEDICARE

## 2024-04-24 VITALS
SYSTOLIC BLOOD PRESSURE: 116 MMHG | DIASTOLIC BLOOD PRESSURE: 73 MMHG | HEART RATE: 88 BPM | TEMPERATURE: 97.9 F | OXYGEN SATURATION: 92 %

## 2024-04-24 DIAGNOSIS — E11.42 DIABETIC POLYNEUROPATHY ASSOCIATED WITH TYPE 2 DIABETES MELLITUS (H): ICD-10-CM

## 2024-04-24 DIAGNOSIS — R09.89 DECREASED PEDAL PULSES: Primary | ICD-10-CM

## 2024-04-24 DIAGNOSIS — E11.9 DIABETES MELLITUS TYPE 2, NONINSULIN DEPENDENT (H): ICD-10-CM

## 2024-04-24 DIAGNOSIS — E11.621 DIABETIC ULCER OF TOE OF LEFT FOOT ASSOCIATED WITH TYPE 2 DIABETES MELLITUS, WITH FAT LAYER EXPOSED (H): ICD-10-CM

## 2024-04-24 DIAGNOSIS — Z13.89 SCREENING FOR DIABETIC PERIPHERAL NEUROPATHY: ICD-10-CM

## 2024-04-24 DIAGNOSIS — L97.522 DIABETIC ULCER OF TOE OF LEFT FOOT ASSOCIATED WITH TYPE 2 DIABETES MELLITUS, WITH FAT LAYER EXPOSED (H): ICD-10-CM

## 2024-04-24 PROCEDURE — G0463 HOSPITAL OUTPT CLINIC VISIT: HCPCS

## 2024-04-24 PROCEDURE — 99213 OFFICE O/P EST LOW 20 MIN: CPT | Performed by: PODIATRIST

## 2024-04-24 ASSESSMENT — PAIN SCALES - GENERAL: PAINLEVEL: NO PAIN (0)

## 2024-04-24 NOTE — PROGRESS NOTES
Chief complaint: Patient presents with:  Wound Check      History of Present Illness: This 83 year old NIDDM II male is seen for follow-up management of a diabetic foot ulcer on the plantar medial hallux ulcer. His  has assisted him with a dressing change every two days with Aquacel Ag, normal sterile saline, gauze and tape. He has no pain from the toe. He is no longer on antibiotics.    He tripped on a stair walking into his house last night. His neighbor was unable to help him up so he called an ambulance. He refused transport to the hospital and he says he feels well without dizziness today.    No further pedal complaints today.       GFR Estimate   Date Value Ref Range Status   03/25/2024 59 (L) >60 mL/min/1.73m2 Final   04/29/2021 66 >60 mL/min/[1.73_m2] Final     Comment:     Non  GFR Calc  Starting 12/18/2018, serum creatinine based estimated GFR (eGFR) will be   calculated using the Chronic Kidney Disease Epidemiology Collaboration   (CKD-EPI) equation.       GFR Estimate If Black   Date Value Ref Range Status   04/29/2021 77 >60 mL/min/[1.73_m2] Final     Comment:      GFR Calc  Starting 12/18/2018, serum creatinine based estimated GFR (eGFR) will be   calculated using the Chronic Kidney Disease Epidemiology Collaboration   (CKD-EPI) equation.           Lab Results   Component Value Date    A1C 6.7 11/20/2023    A1C 6.0 03/09/2023    A1C 8.2 12/08/2022    A1C 6.3 06/06/2022    A1C 5.6 03/04/2022    A1C 6.5 05/06/2021       There were no vitals taken for this visit.    Patient Active Problem List   Diagnosis    Urinary retention    Generalized weakness    Type 2 diabetes mellitus with stage 3 chronic kidney disease, without long-term current use of insulin (H)    Tachycardia    Hypoxia    Other acute pulmonary embolism with acute cor pulmonale (H)    Dyspnea, unspecified type    Benign prostatic hyperplasia with urinary retention    Chronic kidney disease, stage III  (moderate) (H)    Chronic obstructive lung disease (H)    Chronic painful diabetic neuropathy (H)    Erectile dysfunction    Essential hypertension    Gout, unspecified    History of CVA (cerebrovascular accident)    Hyperlipidemia    IBS (irritable bowel syndrome)    Lumbar spondylosis    Mild concentric left ventricular hypertrophy (LVH)    Moderate episode of recurrent major depressive disorder (H)    Spinal stenosis of lumbar region without neurogenic claudication    Uncomplicated alcohol dependence (H)    Onychomycosis of left great toe    Pincer nail deformity    Complicated UTI (urinary tract infection)    Sepsis with acute renal failure (H)       Past Surgical History:   Procedure Laterality Date    AMPUTATE TOE(S) Left 6/27/2022    Procedure: Left fifth toe partial versus full amputation;  Surgeon: Meenu Orourke DPM;  Location: [facility]    CYSTOSCOPY, TRANSURETHRAL RESECTION (TUR) PROSTATE, COMBINED N/A 10/15/2021    Procedure: CYSTOSCOPY, WITH TRANSURETHRAL RESECTION PROSTATE;  Surgeon: Indu De Leon MD;  Location: [facility]    HERNIA REPAIR      PHACOEMULSIFICATION WITH STANDARD INTRAOCULAR LENS IMPLANT Left 12/27/2021    Procedure: PHACOEMULSIFICATION CATARACT EXTRACTION POSTERIOR CHAMBER LENS LEFT EYE;  Surgeon: Phillip Maldonado MD;  Location: [facility]    PHACOEMULSIFICATION WITH STANDARD INTRAOCULAR LENS IMPLANT Right 1/11/2022    Procedure: PHACOEMULSIFICATION CATARACT EXTRACTION POSTERIOR CHAMBER LENS RIGHT EYE;  Surgeon: Phillip Maldonado MD;  Location: [facility]       Current Outpatient Medications   Medication Sig Dispense Refill    acetaminophen (TYLENOL) 500 MG tablet Take 1,000 mg by mouth 2 times daily . Limit acetaminophen to 4000 mg per day from all sources.      albuterol (PROAIR HFA/PROVENTIL HFA/VENTOLIN HFA) 108 (90 Base) MCG/ACT inhaler Inhale 2 puffs into the lungs every 4 hours as needed for cough, shortness of breath or wheezing 18 g 3    allopurinol (ZYLOPRIM) 100 MG tablet TAKE 1 TABLET  BY MOUTH ONCE DAILY 90 tablet 3    amLODIPine (NORVASC) 2.5 MG tablet TAKE ONE TABLET BY MOUTH DAILY 90 tablet 1    atorvastatin (LIPITOR) 80 MG tablet TAKE 0.5 TABLETS (40 MG) BYMOUTH EVERY EVENING 90 tablet 1    carvedilol (COREG) 25 MG tablet TAKE 1 TABLET BY MOUTH TWICE DAILY 180 tablet 1    dulaglutide (TRULICITY) 0.75 MG/0.5ML pen Inject 0.75 mg Subcutaneous every 7 days 2 mL 3    finasteride (PROSCAR) 5 MG tablet TAKE 1 TABLET (5 MG) BY MOUTH DAILY 30 tablet 4    FLUoxetine (PROZAC) 10 MG capsule TAKE 1 CAPSULE BY MOUTH EVERY DAY 90 capsule 0    gabapentin (NEURONTIN) 300 MG capsule TAKE 1 CAP BY MOUTH THREE TIMES A DAY. 270 capsule 0    insulin pen needle (ULTICARE MINI) 31G X 6 MM miscellaneous USE WITH TRULICITY 100 each 3    metFORMIN (GLUCOPHAGE XR) 500 MG 24 hr tablet TAKE 2 TABLETS BY MOUTH TWICE DAILY WITH MEALS 360 tablet 0    multivitamin, therapeutic (THERA-VIT) TABS tablet Take 1 tablet by mouth daily      rivaroxaban ANTICOAGULANT (XARELTO ANTICOAGULANT) 20 MG TABS tablet TAKE 1 TABLET DAILY BY MOUTH WITH DINNER 90 tablet 3    sildenafil (VIAGRA) 100 MG tablet Take 1 tablet (100 mg) by mouth daily as needed (erectile dysfunction) 10 tablet 0    tamsulosin (FLOMAX) 0.4 MG capsule TAKE 1 CAPSULE (0.4 MG) BY MOUTH EVERY EVENING 90 capsule 1    vitamin B-12 (CYANOCOBALAMIN) 250 MCG tablet Take 250 mcg by mouth daily      vitamin C (ASCORBIC ACID) 500 MG tablet Take 500 mg by mouth daily       No current facility-administered medications for this visit.          Allergies   Allergen Reactions    Ace Inhibitors      Per [facility]: hyperkalemia.  Pt doesn't know if he is allergic    Ceclor [Cefaclor] Hives    Sulfa Antibiotics      Pt unsure.     Tomato Flavor [Flavoring Agent]      Tomato Paste       No family history on file.    Social History     Socioeconomic History    Marital status: Single     Spouse name: None    Number of children: None    Years of education: None    Highest education level:  None   Occupational History    None   Tobacco Use    Smoking status: Never Smoker    Smokeless tobacco: Former User     Types: Chew   Substance and Sexual Activity    Alcohol use: Not Currently    Drug use: Never    Sexual activity: None   Other Topics Concern    Parent/sibling w/ CABG, MI or angioplasty before 65F 55M? Not Asked   Social History Narrative    None     Social Determinants of Health     Financial Resource Strain:     Difficulty of Paying Living Expenses:    Food Insecurity:     Worried About Running Out of Food in the Last Year:     Ran Out of Food in the Last Year:    Transportation Needs:     Lack of Transportation (Medical):     Lack of Transportation (Non-Medical):    Physical Activity:     Days of Exercise per Week:     Minutes of Exercise per Session:    Stress:     Feeling of Stress :    Social Connections:     Frequency of Communication with Friends and Family:     Frequency of Social Gatherings with Friends and Family:     Attends Taoist Services:     Active Member of Clubs or Organizations:     Attends Club or Organization Meetings:     Marital Status:    Intimate Partner Violence:     Fear of Current or Ex-Partner:     Emotionally Abused:     Physically Abused:     Sexually Abused:        ROS: 10 point ROS neg other than the symptoms noted above in the HPI.  EXAM  Constitutional: healthy, alert and no distress    Psychiatric: mentation appears normal and affect normal/bright    VASCULAR:  -Dorsalis pedis pulse +2/4 b/l  -Posterior tibial pulse +1/4 b/l  -Capillary refill time < 3 seconds to b/l hallux  -Hair growth absent to b/l anterior legs and ankles  NEURO:  -Epicritic and protective sensation absent to the bilateral foot  DERM:  -Skin temperature within normal limits to bilateral foot    -Toenails elongated, thickened, dystrophic and discolored x 9  ---LEFT hallux toenail removed. No open wounds and no drainage.  Wound Location:  LEFT medial hallux [facility]  04/24/2024  Measurement:   1.4cm x 1.4cm x 0.2cm to subcutaneous tissue layer  Drainage:  Moderate serous  Odor:  None  Undermining:  None  Edges:  Intact with no edin-wound maceration  Base:  30% fibrotic with  mild necrosis and 70% viable   Surrounding Skin: Intact with minimal rubor, no erythema, no calor  No severe erythema, no ascending erythema, no calor, no purulence, no malodor, no other signs of infection.     04/10/2024  Measurement:  1.8cm x 1.5cm x 0.2cm to subcutaneous tissue layer  Drainage:  Moderate serous  Odor:  None  Undermining:  None  Edges:  Intact with no edin-wound maceration  Base:  20% viable and 80% fibrotic  Surrounding Skin: Intact with minimal rubor, no erythema, no calor  No severe erythema, no ascending erythema, no calor, no purulence, no malodor, no other signs of infection.     03/25/2024:  1.7cm x 1.6cm x 0.2cm to subcutaneous tissue layer  03/13/2024:  1.8cm x 1.8cm x 0.4cm to the capsular layer of the toe   02/29/2024:  2cm x 1.9cm x 0.1cm to subcutaneous tissue  02/22/2024:  1.8cm x 1.8cm x 0.1cm to subcutaneous tissue  02/06/2024:  1.8cm x 1.8cm x 0.1cm to subcutaneous tissue  01/30/2024:  1.6cm x 2.3cm x 0.1cm to subcutaneous tissue    MSK:  -Adductovarus rotation of the bilateral fifth toe  -Muscle strength of ankles +5/5 for dorsiflexion, plantarflexion, ABDUction and ADDuction b/l    ============================================================    ASSESSMENT:    (E11.621,  L97.522) 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 associated with type 2 diabetes mellitus, with fat layer exposed (H)  (primary encounter diagnosis)    (E11.9) Diabetes mellitus type 2, noninsulin dependent (H)    (E11.42) Diabetic polyneuropathy associated with type 2 diabetes mellitus (H)    (Z13.89) Screening for diabetic peripheral neuropathy        PLAN:  -Patient evaluated and examined. Treatment options discussed with no educational barriers noted.    -Toenails last debrided on 04/10/2024  ---LEFT hallux toenail not fully  re-grown    ---------------------------------------------    Diabetic foot wound:  -Outer callus cared for minimal necrosis address at subcutaneous tissue layer with curette. Wound bed clean and no acute signs of infection. The wound is slightly smaller today.  -The diabetic foot ulcer is slowly improving. Given the length of time it has taken to heal, will check the arterial blood flow with an ANUSHKA. Patient's pedal pulses are palpable, but weakly palpable. Discussed how the pressure is likely the primary reason for delayed healing, but will still evaluate the ANUSHKA.  ---Dressed wound with Aquacel Ag moistened with normal sterile saline, gauze and tape. Patient to continue with daily dressing changes. The wound bed has moderately improved since switching to daily. His  changes his dressing but she sometimes changes the dressing it every other day.  -Patient was instructed to look for signs of infection (redness, swelling, pain, purulence, fever, chills, nausea, vomiting) and to return to podiatry or the emergency department immediately if there are any signs of infection.    -Offloading: Patient has a post-op shoe at home. He is not wearing it at the appointment. He is again advised to wear this at home and use his walker over the cane to offload his foot. A total contact cast would more aggressively offload the foot, but he is not a good candidate for the total contact cast. Patient has a lot of difficulty with balance and the cast would be too heavy for his foot. He had difficulty standing to transfer from the exam chair to a wheelchair today.    -This is an acute, uncomplicated illness/injury with OTC treatment options reviewed.    ---------------------      -Patient in agreement with the above treatment plan and all of patient's questions were answered.      Return to clinic two weeks to evaluate wound on the LEFT medial hallux ulceration after ABIs (will schedule on the same day)      Meenu Orourke  DILIA

## 2024-04-29 DIAGNOSIS — F41.9 ANXIETY: ICD-10-CM

## 2024-04-29 RX ORDER — FLUOXETINE 10 MG/1
10 CAPSULE ORAL DAILY
Qty: 90 CAPSULE | Refills: 1 | Status: SHIPPED | OUTPATIENT
Start: 2024-04-29 | End: 2024-05-13

## 2024-04-30 DIAGNOSIS — E11.22 TYPE 2 DIABETES MELLITUS WITH STAGE 3A CHRONIC KIDNEY DISEASE, WITHOUT LONG-TERM CURRENT USE OF INSULIN (H): ICD-10-CM

## 2024-04-30 DIAGNOSIS — N18.31 TYPE 2 DIABETES MELLITUS WITH STAGE 3A CHRONIC KIDNEY DISEASE, WITHOUT LONG-TERM CURRENT USE OF INSULIN (H): ICD-10-CM

## 2024-04-30 RX ORDER — METFORMIN HCL 500 MG
TABLET, EXTENDED RELEASE 24 HR ORAL
Qty: 360 TABLET | Refills: 0 | Status: SHIPPED | OUTPATIENT
Start: 2024-04-30 | End: 2024-07-29

## 2024-04-30 NOTE — TELEPHONE ENCOUNTER
Metformin 500 mg       Last Written Prescription Date:  1/31/24  Last Fill Quantity: 360,   # refills: 0  Last Office Visit: 3/25/24  Future Office visit:    Next 5 appointments (look out 90 days)      May 08, 2024 12:00 PM  (Arrive by 11:45 AM)  Return Visit with Meenu Orourke DPM  [facility] ([facility] ) [address] [place] [id number] [phone]     Jun 26, 2024  2:00 PM  (Arrive by 1:45 PM)  SHORT with Zoila Jones MD  [facility] ([facility] ) [address] [place] [id number]  [phone]             Routing refill request to provider for review/approval because:  Biguanide Agents Failed      Has GFR on file in past 12 months and most recent value is normal

## 2024-05-08 ENCOUNTER — OFFICE VISIT (OUTPATIENT)
Dept: PODIATRY | Facility: OTHER | Age: 84
End: 2024-05-08
Attending: PODIATRIST
Payer: MEDICARE

## 2024-05-08 ENCOUNTER — HOSPITAL ENCOUNTER (OUTPATIENT)
Dept: ULTRASOUND IMAGING | Facility: HOSPITAL | Age: 84
Discharge: HOME | End: 2024-05-08
Attending: PODIATRIST
Payer: MEDICARE

## 2024-05-08 VITALS
TEMPERATURE: 98.3 F | OXYGEN SATURATION: 94 % | SYSTOLIC BLOOD PRESSURE: 147 MMHG | DIASTOLIC BLOOD PRESSURE: 74 MMHG | HEART RATE: 80 BPM

## 2024-05-08 DIAGNOSIS — L97.522 DIABETIC ULCER OF TOE OF LEFT FOOT ASSOCIATED WITH TYPE 2 DIABETES MELLITUS, WITH FAT LAYER EXPOSED (H): Primary | ICD-10-CM

## 2024-05-08 DIAGNOSIS — Z13.89 SCREENING FOR DIABETIC PERIPHERAL NEUROPATHY: ICD-10-CM

## 2024-05-08 DIAGNOSIS — E11.621 DIABETIC ULCER OF TOE OF LEFT FOOT ASSOCIATED WITH TYPE 2 DIABETES MELLITUS, WITH FAT LAYER EXPOSED (H): Primary | ICD-10-CM

## 2024-05-08 DIAGNOSIS — R09.89 DECREASED PEDAL PULSES: ICD-10-CM

## 2024-05-08 DIAGNOSIS — E11.42 DIABETIC POLYNEUROPATHY ASSOCIATED WITH TYPE 2 DIABETES MELLITUS (H): ICD-10-CM

## 2024-05-08 DIAGNOSIS — E11.9 DIABETES MELLITUS TYPE 2, NONINSULIN DEPENDENT (H): ICD-10-CM

## 2024-05-08 PROCEDURE — G0463 HOSPITAL OUTPT CLINIC VISIT: HCPCS | Mod: 25

## 2024-05-08 PROCEDURE — 99213 OFFICE O/P EST LOW 20 MIN: CPT | Performed by: PODIATRIST

## 2024-05-08 PROCEDURE — 93922 UPR/L XTREMITY ART 2 LEVELS: CPT

## 2024-05-08 PROCEDURE — G0463 HOSPITAL OUTPT CLINIC VISIT: HCPCS

## 2024-05-08 ASSESSMENT — PAIN SCALES - GENERAL: PAINLEVEL: NO PAIN (0)

## 2024-05-08 NOTE — PATIENT INSTRUCTIONS
Wound Dressing Instructions (05/08/2024):  -The dressing is being changed:  -Clean wound with mild soap and water. Gently wash off the Iodosorb (see description below) with soap and water.  ---Please apply Iodosorb (this come in a white/orange tube and looks like a granular peanut butter) on the wound. Apply a nickel size of Iodosorb and apply it directly to the wound. You may need to apply this with a tongue depressor with a little bit of pressure to keep the Iodosorb from sliding off the wound. You may also apply this directly to gauze, first, then apply the Iodosorb directly on the wound.  -Cover the Iodosorb  with gauze and tape.  -Change every day when possible and every two days when not possible to change the dressing daily.    -The Iodosorb and wound care supplies are being mailed to [facility]. Please continue doing the daily Aquacel, normal sterile saline, gauze and tape dressing until the new supplies are in.    -Please check the toe for signs of infection (redness, swelling, pain, purulence, fever, chills, nausea, vomiting) and  return to podiatry or go to the Emergency Department immediately if there are any signs of infection.      Please call podiatry with any questions or concerns: [phone]    Thank you!

## 2024-05-08 NOTE — PROGRESS NOTES
DME PATIENT QUESTIONNAIRE:     Patients DME Choice Vendor: [employer]    Patient has been advised that DME may reach out to them to verify address, discuss co-pays, etc. Patient verbalizes understanding to return their phone call ASAP to ensure timely delivery of products: Yes      Patients street address: [address] [place] [address] [place] [address]        Patricia Bowie, RN on 5/8/2024 at 12:01 PM

## 2024-05-08 NOTE — PROGRESS NOTES
Chief complaint: Patient presents with:  WOUND CARE      History of Present Illness: This 83 year old NIDDM II male is seen for follow-up management of a diabetic foot ulcer on the plantar medial hallux ulcer. His  has assisted him with a dressing change every 1-2 days with Aquacel Ag, normal sterile saline, gauze and tape. He has no pain from the toe. He had ABIs today before his podiatry appointment.    No further pedal complaints today.       GFR Estimate   Date Value Ref Range Status   03/25/2024 59 (L) >60 mL/min/1.73m2 Final   04/29/2021 66 >60 mL/min/[1.73_m2] Final     Comment:     Non  GFR Calc  Starting 12/18/2018, serum creatinine based estimated GFR (eGFR) will be   calculated using the Chronic Kidney Disease Epidemiology Collaboration   (CKD-EPI) equation.       GFR Estimate If Black   Date Value Ref Range Status   04/29/2021 77 >60 mL/min/[1.73_m2] Final     Comment:      GFR Calc  Starting 12/18/2018, serum creatinine based estimated GFR (eGFR) will be   calculated using the Chronic Kidney Disease Epidemiology Collaboration   (CKD-EPI) equation.           Lab Results   Component Value Date    A1C 6.7 11/20/2023    A1C 6.0 03/09/2023    A1C 8.2 12/08/2022    A1C 6.3 06/06/2022    A1C 5.6 03/04/2022    A1C 6.5 05/06/2021       There were no vitals taken for this visit.    Patient Active Problem List   Diagnosis    Urinary retention    Generalized weakness    Type 2 diabetes mellitus with stage 3 chronic kidney disease, without long-term current use of insulin (H)    Tachycardia    Hypoxia    Other acute pulmonary embolism with acute cor pulmonale (H)    Dyspnea, unspecified type    Benign prostatic hyperplasia with urinary retention    Chronic kidney disease, stage III (moderate) (H)    Chronic obstructive lung disease (H)    Chronic painful diabetic neuropathy (H)    Erectile dysfunction    Essential hypertension    Gout, unspecified    History of CVA  (cerebrovascular accident)    Hyperlipidemia    IBS (irritable bowel syndrome)    Lumbar spondylosis    Mild concentric left ventricular hypertrophy (LVH)    Moderate episode of recurrent major depressive disorder (H)    Spinal stenosis of lumbar region without neurogenic claudication    Uncomplicated alcohol dependence (H)    Onychomycosis of left great toe    Pincer nail deformity    Complicated UTI (urinary tract infection)    Sepsis with acute renal failure (H)       Past Surgical History:   Procedure Laterality Date    AMPUTATE TOE(S) Left 6/27/2022    Procedure: Left fifth toe partial versus full amputation;  Surgeon: Meenu Orourke DPM;  Location: [facility]    CYSTOSCOPY, TRANSURETHRAL RESECTION (TUR) PROSTATE, COMBINED N/A 10/15/2021    Procedure: CYSTOSCOPY, WITH TRANSURETHRAL RESECTION PROSTATE;  Surgeon: Indu De Leon MD;  Location: [facility]    HERNIA REPAIR      PHACOEMULSIFICATION WITH STANDARD INTRAOCULAR LENS IMPLANT Left 12/27/2021    Procedure: PHACOEMULSIFICATION CATARACT EXTRACTION POSTERIOR CHAMBER LENS LEFT EYE;  Surgeon: Phillip Maldonado MD;  Location: [facility]    PHACOEMULSIFICATION WITH STANDARD INTRAOCULAR LENS IMPLANT Right 1/11/2022    Procedure: PHACOEMULSIFICATION CATARACT EXTRACTION POSTERIOR CHAMBER LENS RIGHT EYE;  Surgeon: Phillip Maldonado MD;  Location: [facility]       Current Outpatient Medications   Medication Sig Dispense Refill    acetaminophen (TYLENOL) 500 MG tablet Take 1,000 mg by mouth 2 times daily . Limit acetaminophen to 4000 mg per day from all sources.      albuterol (PROAIR HFA/PROVENTIL HFA/VENTOLIN HFA) 108 (90 Base) MCG/ACT inhaler Inhale 2 puffs into the lungs every 4 hours as needed for cough, shortness of breath or wheezing 18 g 3    allopurinol (ZYLOPRIM) 100 MG tablet TAKE 1 TABLET BY MOUTH ONCE DAILY 90 tablet 3    amLODIPine (NORVASC) 2.5 MG tablet TAKE ONE TABLET BY MOUTH DAILY 90 tablet 1    atorvastatin (LIPITOR) 80 MG tablet TAKE 0.5 TABLETS (40 MG) [facility]  EVERY EVENING 90 tablet 1    carvedilol (COREG) 25 MG tablet TAKE 1 TABLET BY MOUTH TWICE DAILY 180 tablet 1    dulaglutide (TRULICITY) 0.75 MG/0.5ML pen Inject 0.75 mg Subcutaneous every 7 days 2 mL 3    finasteride (PROSCAR) 5 MG tablet TAKE 1 TABLET (5 MG) BY MOUTH DAILY 30 tablet 4    FLUoxetine (PROZAC) 10 MG capsule TAKE 1 CAPSULE BY MOUTH EVERY DAY 90 capsule 1    gabapentin (NEURONTIN) 300 MG capsule TAKE 1 CAP BY MOUTH THREE TIMES A DAY. 270 capsule 0    insulin pen needle (ULTICARE MINI) 31G X 6 MM miscellaneous USE WITH TRULICITY 100 each 3    metFORMIN (GLUCOPHAGE XR) 500 MG 24 hr tablet TAKE 2 TABLETS BY MOUTH TWICE DAILY WITH MEALS 360 tablet 0    multivitamin, therapeutic (THERA-VIT) TABS tablet Take 1 tablet by mouth daily      rivaroxaban ANTICOAGULANT (XARELTO ANTICOAGULANT) 20 MG TABS tablet TAKE 1 TABLET DAILY BY MOUTH WITH DINNER 90 tablet 3    sildenafil (VIAGRA) 100 MG tablet Take 1 tablet (100 mg) by mouth daily as needed (erectile dysfunction) 10 tablet 0    tamsulosin (FLOMAX) 0.4 MG capsule TAKE 1 CAPSULE (0.4 MG) BY MOUTH EVERY EVENING 90 capsule 1    vitamin B-12 (CYANOCOBALAMIN) 250 MCG tablet Take 250 mcg by mouth daily      vitamin C (ASCORBIC ACID) 500 MG tablet Take 500 mg by mouth daily       No current facility-administered medications for this visit.          Allergies   Allergen Reactions    Ace Inhibitors      Per [facility]: hyperkalemia.  Pt doesn't know if he is allergic    Ceclor [Cefaclor] Hives    Sulfa Antibiotics      Pt unsure.     Tomato Flavor [Flavoring Agent]      Tomato Paste       No family history on file.    Social History     Socioeconomic History    Marital status: Single     Spouse name: None    Number of children: None    Years of education: None    Highest education level: None   Occupational History    None   Tobacco Use    Smoking status: Never Smoker    Smokeless tobacco: Former User     Types: Chew   Substance and Sexual Activity    Alcohol use: Not  Currently    Drug use: Never    Sexual activity: None   Other Topics Concern    Parent/sibling w/ CABG, MI or angioplasty before 65F 55M? Not Asked   Social History Narrative    None     Social Determinants of Health     Financial Resource Strain:     Difficulty of Paying Living Expenses:    Food Insecurity:     Worried About Running Out of Food in the Last Year:     Ran Out of Food in the Last Year:    Transportation Needs:     Lack of Transportation (Medical):     Lack of Transportation (Non-Medical):    Physical Activity:     Days of Exercise per Week:     Minutes of Exercise per Session:    Stress:     Feeling of Stress :    Social Connections:     Frequency of Communication with Friends and Family:     Frequency of Social Gatherings with Friends and Family:     Attends [employer] Services:     Active Member of Clubs or Organizations:     Attends Club or Organization Meetings:     Marital Status:    Intimate Partner Violence:     Fear of Current or Ex-Partner:     Emotionally Abused:     Physically Abused:     Sexually Abused:        ROS: 10 point ROS neg other than the symptoms noted above in the HPI.  EXAM  Constitutional: healthy, alert and no distress    Psychiatric: mentation appears normal and affect normal/bright    VASCULAR:  -Dorsalis pedis pulse +2/4 b/l  -Posterior tibial pulse +1/4 b/l  -Capillary refill time < 3 seconds to b/l hallux  -Hair growth absent to b/l anterior legs and ankles  NEURO:  -Epicritic and protective sensation absent to the bilateral foot  DERM:  -Skin temperature within normal limits to bilateral foot    -Toenails elongated, thickened, dystrophic and discolored x 9  ---LEFT hallux toenail removed. No open wounds and no drainage.  Wound Location:  LEFT medial hallux [facility]  05/08/2024  Measurement:  1.4cm x 1.5cm x 0.2cm to subcutaneous tissue layer  Drainage:  Moderate serous  Odor:  None  Undermining:  None  Edges:  Intact with no edin-wound maceration  Base:  60% fibrotic with   mild necrosis and 40% viable   Surrounding Skin: Intact with minimal rubor, no erythema, no calor  No severe erythema, no ascending erythema, no calor, no purulence, no malodor, no other signs of infection.     04/24/2024  Measurement:  1.4cm x 1.4cm x 0.2cm to subcutaneous tissue layer  Drainage:  Moderate serous  Odor:  None  Undermining:  None  Edges:  Intact with no edin-wound maceration  Base:  30% fibrotic with  mild necrosis and 70% viable   Surrounding Skin: Intact with minimal rubor, no erythema, no calor  No severe erythema, no ascending erythema, no calor, no purulence, no malodor, no other signs of infection.     04/10/2024:  1.8cm x 1.5cm x 0.2cm to subcutaneous tissue layer  03/25/2024:  1.7cm x 1.6cm x 0.2cm to subcutaneous tissue layer  03/13/2024:  1.8cm x 1.8cm x 0.4cm to the capsular layer of the toe   02/29/2024:  2cm x 1.9cm x 0.1cm to subcutaneous tissue  02/22/2024:  1.8cm x 1.8cm x 0.1cm to subcutaneous tissue  02/06/2024:  1.8cm x 1.8cm x 0.1cm to subcutaneous tissue  01/30/2024:  1.6cm x 2.3cm x 0.1cm to subcutaneous tissue    MSK:  -Adductovarus rotation of the bilateral fifth toe  -Muscle strength of ankles +5/5 for dorsiflexion, plantarflexion, ABDUction and ADDuction b/l    BILATERAL ANUSHKA 05/08/2024  Impression:      Right leg: Noncompressible arteries in the right lower leg. The right  TBI is 0.78.     Left leg: Resting ANUSHKA is 1.05, normal. The left TBI is 1.17.  ============================================================    ASSESSMENT:    (E11.621,  L97.522) 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 associated with type 2 diabetes mellitus, with fat layer exposed (H)  (primary encounter diagnosis)    (E11.9) Diabetes mellitus type 2, noninsulin dependent (H)    (E11.42) Diabetic polyneuropathy associated with type 2 diabetes mellitus (H)    (Z13.89) Screening for diabetic peripheral neuropathy        PLAN:  -Patient evaluated and examined. Treatment options discussed with no educational  barriers noted.    -Toenails last debrided on 04/10/2024  ---LEFT hallux toenail not fully re-grown    ---------------------------------------------    Diabetic foot wound:  -Outer callus cared for minimal necrotic tissue addressed at subcutaneous tissue layer with curette. There are no acute signs of infection. The wound is similar in size today. His  changes his dressing for him daily and the patient has moderate drainage from the wound including extra slough today. Due to the moderate drainage, will switch the dressing to Iodosorb.  ---Dressed wound with Iodosorb, gauze and tape. Patient's  to change this dressing daily. He may continue applying the Aquacel Ag dressing with normal sterile saline, gauze and tape until he receives the new supplies in the mail.  ---DME order for Iodosorb placed by the RN coordinator on 05/08/2024.    -Offloading: Patient has a post-op shoe at home. He is not wearing it at the appointment. He is again advised to wear this at home and use his walker over the cane to offload his foot. He says he wears the post-op shoe at home and he says he does not normally walk a lot.    -This is an acute, uncomplicated illness/injury with OTC treatment options reviewed.    -Bilateral ANUSHKA 05/08/2024: Adequate for wound healing. Within normal limits on the LEFT and non-compressible on the RIGHT but TBI for the RIGHT foot is adequate for wound healing. Patient will be called with the results.    -Patient in agreement with the above treatment plan and all of patient's questions were answered.      Return to clinic two weeks to evaluate wound on the LEFT medial hallux ulceration after ABIs (will schedule on the same day)      Meenu Orourke DPM

## 2024-05-13 DIAGNOSIS — E11.40 CHRONIC PAINFUL DIABETIC NEUROPATHY (H): ICD-10-CM

## 2024-05-13 DIAGNOSIS — F41.9 ANXIETY: ICD-10-CM

## 2024-05-13 RX ORDER — FLUOXETINE 10 MG/1
10 CAPSULE ORAL DAILY
Qty: 90 CAPSULE | Refills: 1 | Status: SHIPPED | OUTPATIENT
Start: 2024-05-13

## 2024-05-13 RX ORDER — GABAPENTIN 300 MG/1
300 CAPSULE ORAL 3 TIMES DAILY
Qty: 270 CAPSULE | Refills: 0 | Status: SHIPPED | OUTPATIENT
Start: 2024-05-13 | End: 2024-07-31

## 2024-05-13 NOTE — TELEPHONE ENCOUNTER
Reason for call:  Medication      Have you contacted your pharmacy? Yes   If patient has contacted Pharmacy and it has been over 72hrs, continue to #2  Medication gabapentin (NEURONTIN) 300 MG capsule & FLUoxetine (PROZAC) 10 MG capsule  What Pharmacy do you use? [facility]       (Please note that the turn-around-time for prescriptions is 72 business hours; I am sending your request at this time. SEND TO appropriate Care Team Pool )

## 2024-05-22 ENCOUNTER — OFFICE VISIT (OUTPATIENT)
Dept: PODIATRY | Facility: OTHER | Age: 84
End: 2024-05-22
Attending: PODIATRIST
Payer: MEDICARE

## 2024-05-22 VITALS
DIASTOLIC BLOOD PRESSURE: 66 MMHG | SYSTOLIC BLOOD PRESSURE: 100 MMHG | OXYGEN SATURATION: 91 % | TEMPERATURE: 97.9 F | HEART RATE: 92 BPM

## 2024-05-22 DIAGNOSIS — E11.621 DIABETIC ULCER OF TOE OF LEFT FOOT ASSOCIATED WITH TYPE 2 DIABETES MELLITUS, WITH FAT LAYER EXPOSED (H): Primary | ICD-10-CM

## 2024-05-22 DIAGNOSIS — E11.9 DIABETES MELLITUS TYPE 2, NONINSULIN DEPENDENT (H): ICD-10-CM

## 2024-05-22 DIAGNOSIS — E11.42 DIABETIC POLYNEUROPATHY ASSOCIATED WITH TYPE 2 DIABETES MELLITUS (H): ICD-10-CM

## 2024-05-22 DIAGNOSIS — L97.522 DIABETIC ULCER OF TOE OF LEFT FOOT ASSOCIATED WITH TYPE 2 DIABETES MELLITUS, WITH FAT LAYER EXPOSED (H): Primary | ICD-10-CM

## 2024-05-22 DIAGNOSIS — Z13.89 SCREENING FOR DIABETIC PERIPHERAL NEUROPATHY: ICD-10-CM

## 2024-05-22 PROCEDURE — G0463 HOSPITAL OUTPT CLINIC VISIT: HCPCS

## 2024-05-22 PROCEDURE — 99213 OFFICE O/P EST LOW 20 MIN: CPT | Performed by: PODIATRIST

## 2024-05-22 ASSESSMENT — PAIN SCALES - GENERAL: PAINLEVEL: NO PAIN (0)

## 2024-05-22 NOTE — PATIENT INSTRUCTIONS
Podiatry Instructions from 05/22/2024:  Please continue with daily dressings on the LEFT great toe (Iodosorb, gauze and tape). It is okay if the dressing is sometimes changed every two days.  -Please check daily for signs of infection around the wounds (redness, swelling, pain, purulence, fever, chills, nausea, vomiting) and return to podiatry or go to the Emergency Department immediately if there are any signs of infection.      Note: Silver nitrate was applied to the wound on 05/22/2024. This can make the wound like black/green. This is alyssia and should reduce with time based on how much the wound is draining.

## 2024-05-22 NOTE — PROGRESS NOTES
Chief complaint: Patient presents with:  Wound Check      History of Present Illness: This 83 year old NIDDM II male is seen for follow-up management of a diabetic foot ulcer on the plantar medial hallux ulcer. His  has assisted him with a dressing change every 1-2 days with Iodosorb, gauze and tape. He has the dressing supplies at home. He was previously using Aquacel Ag, but it made the wound too macerated. He is trying to keep pressure off the toe as much as he can.    No further pedal complaints today.       GFR Estimate   Date Value Ref Range Status   03/25/2024 59 (L) >60 mL/min/1.73m2 Final   04/29/2021 66 >60 mL/min/[1.73_m2] Final     Comment:     Non  GFR Calc  Starting 12/18/2018, serum creatinine based estimated GFR (eGFR) will be   calculated using the Chronic Kidney Disease Epidemiology Collaboration   (CKD-EPI) equation.       GFR Estimate If Black   Date Value Ref Range Status   04/29/2021 77 >60 mL/min/[1.73_m2] Final     Comment:      GFR Calc  Starting 12/18/2018, serum creatinine based estimated GFR (eGFR) will be   calculated using the Chronic Kidney Disease Epidemiology Collaboration   (CKD-EPI) equation.           Lab Results   Component Value Date    A1C 6.7 11/20/2023    A1C 6.0 03/09/2023    A1C 8.2 12/08/2022    A1C 6.3 06/06/2022    A1C 5.6 03/04/2022    A1C 6.5 05/06/2021       /66 (BP Location: Left arm, Patient Position: Sitting, Cuff Size: Adult Regular)   Pulse 92   Temp 97.9  F (36.6  C) (Tympanic)   SpO2 91%     Patient Active Problem List   Diagnosis    Urinary retention    Generalized weakness    Type 2 diabetes mellitus with stage 3 chronic kidney disease, without long-term current use of insulin (H)    Tachycardia    Hypoxia    Other acute pulmonary embolism with acute cor pulmonale (H)    Dyspnea, unspecified type    Benign prostatic hyperplasia with urinary retention    Chronic kidney disease, stage III (moderate) (H)     Chronic obstructive lung disease (H)    Chronic painful diabetic neuropathy (H)    Erectile dysfunction    Essential hypertension    Gout, unspecified    History of CVA (cerebrovascular accident)    Hyperlipidemia    IBS (irritable bowel syndrome)    Lumbar spondylosis    Mild concentric left ventricular hypertrophy (LVH)    Moderate episode of recurrent major depressive disorder (H)    Spinal stenosis of lumbar region without neurogenic claudication    Uncomplicated alcohol dependence (H)    Onychomycosis of left great toe    Pincer nail deformity    Complicated UTI (urinary tract infection)    Sepsis with acute renal failure (H)       Past Surgical History:   Procedure Laterality Date    AMPUTATE TOE(S) Left 6/27/2022    Procedure: Left fifth toe partial versus full amputation;  Surgeon: Meenu Orourke DPM;  Location: [facility]    CYSTOSCOPY, TRANSURETHRAL RESECTION (TUR) PROSTATE, COMBINED N/A 10/15/2021    Procedure: CYSTOSCOPY, WITH TRANSURETHRAL RESECTION PROSTATE;  Surgeon: Indu De Leon MD;  Location: [facility]    HERNIA REPAIR      PHACOEMULSIFICATION WITH STANDARD INTRAOCULAR LENS IMPLANT Left 12/27/2021    Procedure: PHACOEMULSIFICATION CATARACT EXTRACTION POSTERIOR CHAMBER LENS LEFT EYE;  Surgeon: Phillip Maldonado MD;  Location: [facility]    PHACOEMULSIFICATION WITH STANDARD INTRAOCULAR LENS IMPLANT Right 1/11/2022    Procedure: PHACOEMULSIFICATION CATARACT EXTRACTION POSTERIOR CHAMBER LENS RIGHT EYE;  Surgeon: Phillip Maldonado MD;  Location: [facility]       Current Outpatient Medications   Medication Sig Dispense Refill    acetaminophen (TYLENOL) 500 MG tablet Take 1,000 mg by mouth 2 times daily . Limit acetaminophen to 4000 mg per day from all sources.      albuterol (PROAIR HFA/PROVENTIL HFA/VENTOLIN HFA) 108 (90 Base) MCG/ACT inhaler Inhale 2 puffs into the lungs every 4 hours as needed for cough, shortness of breath or wheezing 18 g 3    allopurinol (ZYLOPRIM) 100 MG tablet TAKE 1 TABLET BY MOUTH ONCE  DAILY 90 tablet 3    amLODIPine (NORVASC) 2.5 MG tablet TAKE ONE TABLET BY MOUTH DAILY 90 tablet 1    atorvastatin (LIPITOR) 80 MG tablet TAKE 0.5 TABLETS (40 MG) BYMOUTH EVERY EVENING 90 tablet 1    carvedilol (COREG) 25 MG tablet TAKE 1 TABLET BY MOUTH TWICE DAILY 180 tablet 1    dulaglutide (TRULICITY) 0.75 MG/0.5ML pen Inject 0.75 mg Subcutaneous every 7 days 2 mL 3    finasteride (PROSCAR) 5 MG tablet TAKE 1 TABLET (5 MG) BY MOUTH DAILY 30 tablet 4    FLUoxetine (PROZAC) 10 MG capsule Take 1 capsule (10 mg) by mouth daily 90 capsule 1    gabapentin (NEURONTIN) 300 MG capsule Take 1 capsule (300 mg) by mouth 3 times daily 270 capsule 0    insulin pen needle (ULTICARE MINI) 31G X 6 MM miscellaneous USE WITH TRULICITY 100 each 3    metFORMIN (GLUCOPHAGE XR) 500 MG 24 hr tablet TAKE 2 TABLETS BY MOUTH TWICE DAILY WITH MEALS 360 tablet 0    multivitamin, therapeutic (THERA-VIT) TABS tablet Take 1 tablet by mouth daily      rivaroxaban ANTICOAGULANT (XARELTO ANTICOAGULANT) 20 MG TABS tablet TAKE 1 TABLET DAILY BY MOUTH WITH DINNER 90 tablet 3    sildenafil (VIAGRA) 100 MG tablet Take 1 tablet (100 mg) by mouth daily as needed (erectile dysfunction) 10 tablet 0    tamsulosin (FLOMAX) 0.4 MG capsule TAKE 1 CAPSULE (0.4 MG) BY MOUTH EVERY EVENING 90 capsule 1    vitamin B-12 (CYANOCOBALAMIN) 250 MCG tablet Take 250 mcg by mouth daily      vitamin C (ASCORBIC ACID) 500 MG tablet Take 500 mg by mouth daily       No current facility-administered medications for this visit.          Allergies   Allergen Reactions    Ace Inhibitors      Per [facility]: hyperkalemia.  Pt doesn't know if he is allergic    Ceclor [Cefaclor] Hives    Sulfa Antibiotics      Pt unsure.     Tomato Flavor [Flavoring Agent]      Tomato Paste       History reviewed. No pertinent family history.    Social History     Socioeconomic History    Marital status: Single     Spouse name: None    Number of children: None    Years of education: None    Highest  education level: None   Occupational History    None   Tobacco Use    Smoking status: Never Smoker    Smokeless tobacco: Former User     Types: Chew   Substance and Sexual Activity    Alcohol use: Not Currently    Drug use: Never    Sexual activity: None   Other Topics Concern    Parent/sibling w/ CABG, MI or angioplasty before 65F 55M? Not Asked   Social History Narrative    None     Social Determinants of Health     Financial Resource Strain:     Difficulty of Paying Living Expenses:    Food Insecurity:     Worried About Running Out of Food in the Last Year:     Ran Out of Food in the Last Year:    Transportation Needs:     Lack of Transportation (Medical):     Lack of Transportation (Non-Medical):    Physical Activity:     Days of Exercise per Week:     Minutes of Exercise per Session:    Stress:     Feeling of Stress :    Social Connections:     Frequency of Communication with Friends and Family:     Frequency of Social Gatherings with Friends and Family:     Attends [employer] Services:     Active Member of Clubs or Organizations:     Attends Club or Organization Meetings:     Marital Status:    Intimate Partner Violence:     Fear of Current or Ex-Partner:     Emotionally Abused:     Physically Abused:     Sexually Abused:        ROS: 10 point ROS neg other than the symptoms noted above in the HPI.  EXAM  Constitutional: healthy, alert and no distress    Psychiatric: mentation appears normal and affect normal/bright    VASCULAR:  -Dorsalis pedis pulse +2/4 b/l  -Posterior tibial pulse +1/4 b/l  -Capillary refill time < 3 seconds to b/l hallux  -Hair growth absent to b/l anterior legs and ankles  NEURO:  -Epicritic and protective sensation absent to the bilateral foot  DERM:  -Skin temperature within normal limits to bilateral foot    -Toenails elongated, thickened, dystrophic and discolored x 9  ---LEFT hallux toenail removed. No open wounds and no drainage.  Wound Location:  LEFT medial hallux  IPJ  05/22/2024  Measurement:  1.3cm x 1.4cm x 0.2cm to subcutaneous tissue layer  Drainage:  Moderate serous  Odor:  None  Undermining:  None  Edges:  Intact with no edin-wound maceration  Base:  60% fibrotic with  mild necrosis and 40% viable   Surrounding Skin: Intact with minimal rubor, no erythema, no calor  No severe erythema, no ascending erythema, no calor, no purulence, no malodor, no other signs of infection.     05/08/2024  Measurement:  1.4cm x 1.5cm x 0.2cm to subcutaneous tissue layer  Drainage:  Moderate serous  Odor:  None  Undermining:  None  Edges:  Intact with no edin-wound maceration  Base:  60% fibrotic with  mild necrosis and 40% viable   Surrounding Skin: Intact with minimal rubor, no erythema, no calor  No severe erythema, no ascending erythema, no calor, no purulence, no malodor, no other signs of infection.     04/24/2024:  1.4cm x 1.4cm x 0.2cm to subcutaneous tissue layer  04/10/2024:  1.8cm x 1.5cm x 0.2cm to subcutaneous tissue layer  03/25/2024:  1.7cm x 1.6cm x 0.2cm to subcutaneous tissue layer  03/13/2024:  1.8cm x 1.8cm x 0.4cm to the capsular layer of the toe   02/29/2024:  2cm x 1.9cm x 0.1cm to subcutaneous tissue  02/22/2024:  1.8cm x 1.8cm x 0.1cm to subcutaneous tissue  02/06/2024:  1.8cm x 1.8cm x 0.1cm to subcutaneous tissue  01/30/2024:  1.6cm x 2.3cm x 0.1cm to subcutaneous tissue    MSK:  -Adductovarus rotation of the bilateral fifth toe  -Muscle strength of ankles +5/5 for dorsiflexion, plantarflexion, ABDUction and ADDuction b/l    BILATERAL ANUSHKA 05/08/2024  Impression:      Right leg: Noncompressible arteries in the right lower leg. The right  TBI is 0.78.     Left leg: Resting ANUSHKA is 1.05, normal. The left TBI is 1.17.  ============================================================    ASSESSMENT:    (E11.621,  L97.692) 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 associated with type 2 diabetes mellitus, with fat layer exposed (H)  (primary encounter diagnosis)    (E11.9)  Diabetes mellitus type 2, noninsulin dependent (H)    (E11.42) Diabetic polyneuropathy associated with type 2 diabetes mellitus (H)    (Z13.89) Screening for diabetic peripheral neuropathy        PLAN:  -Patient evaluated and examined. Treatment options discussed with no educational barriers noted.    -Toenails last debrided on 04/10/2024  ---LEFT hallux toenail not fully re-grown    ---------------------------------------------    Diabetic foot wound:  -Outer callus cared for minimal necrotic tissue addressed at subcutaneous tissue layer with curette. Silver nitrate applied to the wound. There are no acute signs of infection. The wound is similar in size today (slightly smaller). His  changes his dressing for him daily and the patient has moderate drainage from the wound including extra slough today. The Iodosorb is controlling the drainage better.  ---Dressed wound with Iodosorb, gauze and tape. Patient's  to change this dressing daily.     ---DME last placed on 05/08/2024.    -Offloading: Patient has a post-op shoe at home. He is not wearing it at the appointment. He is again advised to wear this at home and use his walker over the cane to offload his foot. He tries to offload his foot as much as possible with the walker, but he has loss of balance and he would not tolerate more aggressive offloading such as crutches or a total contact cast.  -Patient has DM shoes through the orthotist, Traci Hernandez. Will see if she can further offload his inserts at patient's follow-up appointment.    -This is an acute, uncomplicated illness/injury with OTC treatment options reviewed.    -Bilateral ANUSHKA 05/08/2024: Adequate for wound healing. Within normal limits on the LEFT and non-compressible on the RIGHT but TBI for the RIGHT foot is adequate for wound healing.    -Patient in agreement with the above treatment plan and all of patient's questions were answered.      Return to clinic three weeks to evaluate  wound on the LEFT medial hallux ulceration after ABIs (will schedule on the same day)      Meenu Orourke DPM

## 2024-06-09 ENCOUNTER — HEALTH MAINTENANCE LETTER (OUTPATIENT)
Age: 84
End: 2024-06-09

## 2024-06-12 ENCOUNTER — OFFICE VISIT (OUTPATIENT)
Dept: PODIATRY | Facility: OTHER | Age: 84
End: 2024-06-12
Attending: PODIATRIST
Payer: MEDICARE

## 2024-06-12 VITALS
OXYGEN SATURATION: 94 % | TEMPERATURE: 97.9 F | SYSTOLIC BLOOD PRESSURE: 123 MMHG | HEART RATE: 95 BPM | DIASTOLIC BLOOD PRESSURE: 79 MMHG

## 2024-06-12 DIAGNOSIS — Z13.89 SCREENING FOR DIABETIC PERIPHERAL NEUROPATHY: ICD-10-CM

## 2024-06-12 DIAGNOSIS — E11.621 DIABETIC ULCER OF TOE OF LEFT FOOT ASSOCIATED WITH TYPE 2 DIABETES MELLITUS, WITH FAT LAYER EXPOSED (H): Primary | ICD-10-CM

## 2024-06-12 DIAGNOSIS — E11.9 DIABETES MELLITUS TYPE 2, NONINSULIN DEPENDENT (H): ICD-10-CM

## 2024-06-12 DIAGNOSIS — E11.42 DIABETIC POLYNEUROPATHY ASSOCIATED WITH TYPE 2 DIABETES MELLITUS (H): ICD-10-CM

## 2024-06-12 DIAGNOSIS — L97.522 DIABETIC ULCER OF TOE OF LEFT FOOT ASSOCIATED WITH TYPE 2 DIABETES MELLITUS, WITH FAT LAYER EXPOSED (H): Primary | ICD-10-CM

## 2024-06-12 DIAGNOSIS — L60.3 ONYCHODYSTROPHY: ICD-10-CM

## 2024-06-12 PROCEDURE — G0463 HOSPITAL OUTPT CLINIC VISIT: HCPCS

## 2024-06-12 PROCEDURE — G0463 HOSPITAL OUTPT CLINIC VISIT: HCPCS | Mod: 25

## 2024-06-12 PROCEDURE — 11721 DEBRIDE NAIL 6 OR MORE: CPT | Performed by: PODIATRIST

## 2024-06-12 PROCEDURE — 99207 PR FOOT EXAM NO CHARGE: CPT | Performed by: PODIATRIST

## 2024-06-12 PROCEDURE — 99213 OFFICE O/P EST LOW 20 MIN: CPT | Mod: 25 | Performed by: PODIATRIST

## 2024-06-12 ASSESSMENT — PAIN SCALES - GENERAL: PAINLEVEL: NO PAIN (0)

## 2024-06-12 NOTE — PROGRESS NOTES
Chief complaint: Patient presents with:  Wound Check      History of Present Illness: This 83 year old NIDDM II male is seen for follow-up management of a diabetic foot ulcer on the plantar medial hallux ulcer. His  has assisted him with a dressing change every 1-2 days with Iodosorb, gauze and tape. He says his  thinks it is looking better. He does not have pain from the wound. He says he is trying to make a conscious effort to stay off the foot more.    No further pedal complaints today.       GFR Estimate   Date Value Ref Range Status   03/25/2024 59 (L) >60 mL/min/1.73m2 Final   04/29/2021 66 >60 mL/min/[1.73_m2] Final     Comment:     Non  GFR Calc  Starting 12/18/2018, serum creatinine based estimated GFR (eGFR) will be   calculated using the Chronic Kidney Disease Epidemiology Collaboration   (CKD-EPI) equation.       GFR Estimate If Black   Date Value Ref Range Status   04/29/2021 77 >60 mL/min/[1.73_m2] Final     Comment:      GFR Calc  Starting 12/18/2018, serum creatinine based estimated GFR (eGFR) will be   calculated using the Chronic Kidney Disease Epidemiology Collaboration   (CKD-EPI) equation.           Lab Results   Component Value Date    A1C 6.7 11/20/2023    A1C 6.0 03/09/2023    A1C 8.2 12/08/2022    A1C 6.3 06/06/2022    A1C 5.6 03/04/2022    A1C 6.5 05/06/2021       /79 (BP Location: Left arm, Patient Position: Sitting, Cuff Size: Adult Regular)   Pulse 95   Temp 97.9  F (36.6  C) (Tympanic)   SpO2 94%     Patient Active Problem List   Diagnosis    Urinary retention    Generalized weakness    Type 2 diabetes mellitus with stage 3 chronic kidney disease, without long-term current use of insulin (H)    Tachycardia    Hypoxia    Other acute pulmonary embolism with acute cor pulmonale (H)    Dyspnea, unspecified type    Benign prostatic hyperplasia with urinary retention    Chronic kidney disease, stage III (moderate) (H)    Chronic  obstructive lung disease (H)    Chronic painful diabetic neuropathy (H)    Erectile dysfunction    Essential hypertension    Gout, unspecified    History of CVA (cerebrovascular accident)    Hyperlipidemia    IBS (irritable bowel syndrome)    Lumbar spondylosis    Mild concentric left ventricular hypertrophy (LVH)    Moderate episode of recurrent major depressive disorder (H)    Spinal stenosis of lumbar region without neurogenic claudication    Uncomplicated alcohol dependence (H)    Onychomycosis of left great toe    Pincer nail deformity    Complicated UTI (urinary tract infection)    Sepsis with acute renal failure (H)       Past Surgical History:   Procedure Laterality Date    AMPUTATE TOE(S) Left 6/27/2022    Procedure: Left fifth toe partial versus full amputation;  Surgeon: Meenu Orourke DPM;  Location: [facility]    CYSTOSCOPY, TRANSURETHRAL RESECTION (TUR) PROSTATE, COMBINED N/A 10/15/2021    Procedure: CYSTOSCOPY, WITH TRANSURETHRAL RESECTION PROSTATE;  Surgeon: Indu De Leon MD;  Location: [facility]    HERNIA REPAIR      PHACOEMULSIFICATION WITH STANDARD INTRAOCULAR LENS IMPLANT Left 12/27/2021    Procedure: PHACOEMULSIFICATION CATARACT EXTRACTION POSTERIOR CHAMBER LENS LEFT EYE;  Surgeon: Phillip Maldonado MD;  Location: [facility]    PHACOEMULSIFICATION WITH STANDARD INTRAOCULAR LENS IMPLANT Right 1/11/2022    Procedure: PHACOEMULSIFICATION CATARACT EXTRACTION POSTERIOR CHAMBER LENS RIGHT EYE;  Surgeon: Phillip Maldonado MD;  Location: [facility]       Current Outpatient Medications   Medication Sig Dispense Refill    acetaminophen (TYLENOL) 500 MG tablet Take 1,000 mg by mouth 2 times daily . Limit acetaminophen to 4000 mg per day from all sources.      albuterol (PROAIR HFA/PROVENTIL HFA/VENTOLIN HFA) 108 (90 Base) MCG/ACT inhaler Inhale 2 puffs into the lungs every 4 hours as needed for cough, shortness of breath or wheezing 18 g 3    allopurinol (ZYLOPRIM) 100 MG tablet TAKE 1 TABLET BY MOUTH ONCE DAILY 90  tablet 3    amLODIPine (NORVASC) 2.5 MG tablet TAKE ONE TABLET BY MOUTH DAILY 90 tablet 1    atorvastatin (LIPITOR) 80 MG tablet TAKE 0.5 TABLETS (40 MG) BYMOUTH EVERY EVENING 90 tablet 1    carvedilol (COREG) 25 MG tablet TAKE 1 TABLET BY MOUTH TWICE DAILY 180 tablet 1    dulaglutide (TRULICITY) 0.75 MG/0.5ML pen Inject 0.75 mg Subcutaneous every 7 days 2 mL 3    finasteride (PROSCAR) 5 MG tablet TAKE 1 TABLET (5 MG) BY MOUTH DAILY 30 tablet 4    FLUoxetine (PROZAC) 10 MG capsule Take 1 capsule (10 mg) by mouth daily 90 capsule 1    gabapentin (NEURONTIN) 300 MG capsule Take 1 capsule (300 mg) by mouth 3 times daily 270 capsule 0    insulin pen needle (ULTICARE MINI) 31G X 6 MM miscellaneous USE WITH TRULICITY 100 each 3    metFORMIN (GLUCOPHAGE XR) 500 MG 24 hr tablet TAKE 2 TABLETS BY MOUTH TWICE DAILY WITH MEALS 360 tablet 0    multivitamin, therapeutic (THERA-VIT) TABS tablet Take 1 tablet by mouth daily      rivaroxaban ANTICOAGULANT (XARELTO ANTICOAGULANT) 20 MG TABS tablet TAKE 1 TABLET DAILY BY MOUTH WITH DINNER 90 tablet 3    sildenafil (VIAGRA) 100 MG tablet Take 1 tablet (100 mg) by mouth daily as needed (erectile dysfunction) 10 tablet 0    tamsulosin (FLOMAX) 0.4 MG capsule TAKE 1 CAPSULE (0.4 MG) BY MOUTH EVERY EVENING 90 capsule 1    vitamin B-12 (CYANOCOBALAMIN) 250 MCG tablet Take 250 mcg by mouth daily      vitamin C (ASCORBIC ACID) 500 MG tablet Take 500 mg by mouth daily       No current facility-administered medications for this visit.          Allergies   Allergen Reactions    Ace Inhibitors      Per [facility]: hyperkalemia.  Pt doesn't know if he is allergic    Ceclor [Cefaclor] Hives    Sulfa Antibiotics      Pt unsure.     Tomato Flavor [Flavoring Agent]      Tomato Paste       History reviewed. No pertinent family history.    Social History     Socioeconomic History    Marital status: Single     Spouse name: None    Number of children: None    Years of education: None    Highest education  level: None   Occupational History    None   Tobacco Use    Smoking status: Never Smoker    Smokeless tobacco: Former User     Types: Chew   Substance and Sexual Activity    Alcohol use: Not Currently    Drug use: Never    Sexual activity: None   Other Topics Concern    Parent/sibling w/ CABG, MI or angioplasty before 65F 55M? Not Asked   Social History Narrative    None     Social Determinants of Health     Financial Resource Strain:     Difficulty of Paying Living Expenses:    Food Insecurity:     Worried About Running Out of Food in the Last Year:     Ran Out of Food in the Last Year:    Transportation Needs:     Lack of Transportation (Medical):     Lack of Transportation (Non-Medical):    Physical Activity:     Days of Exercise per Week:     Minutes of Exercise per Session:    Stress:     Feeling of Stress :    Social Connections:     Frequency of Communication with Friends and Family:     Frequency of Social Gatherings with Friends and Family:     Attends Judaism Services:     Active Member of Clubs or Organizations:     Attends Club or Organization Meetings:     Marital Status:    Intimate Partner Violence:     Fear of Current or Ex-Partner:     Emotionally Abused:     Physically Abused:     Sexually Abused:        ROS: 10 point ROS neg other than the symptoms noted above in the HPI.  EXAM  Constitutional: healthy, alert and no distress    Psychiatric: mentation appears normal and affect normal/bright    VASCULAR:  -Dorsalis pedis pulse +2/4 b/l  -Posterior tibial pulse +1/4 b/l  -Capillary refill time < 3 seconds to b/l hallux  -Hair growth absent to b/l anterior legs and ankles  NEURO:  -Epicritic and protective sensation absent to the bilateral foot  DERM:  -Skin temperature within normal limits to bilateral foot    -Toenails elongated, thickened, dystrophic and discolored x 9  ---LEFT hallux toenail removed. No open wounds and no drainage.  Wound Location:  LEFT medial hallux  IPJ  06/12/2024  Measurement:  0.9cm x 0.9cm x 0.2cm to subcutaneous tissue layer  Drainage:  Moderate serous  Odor:  None  Undermining:  None  Edges:  Intact with no edin-wound maceration  Base:  60% fibrotic with  mild necrosis and 40% viable   Surrounding Skin: Intact with minimal rubor, no erythema, no calor  No severe erythema, no ascending erythema, no calor, no purulence, no malodor, no other signs of infection.     05/22/2024  Measurement:  1.3cm x 1.4cm x 0.2cm to subcutaneous tissue layer  Drainage:  Moderate serous  Odor:  None  Undermining:  None  Edges:  Intact with no edin-wound maceration  Base:  60% fibrotic with  mild necrosis and 40% viable   Surrounding Skin: Intact with minimal rubor, no erythema, no calor  No severe erythema, no ascending erythema, no calor, no purulence, no malodor, no other signs of infection.     05/08/2024:  1.4cm x 1.5cm x 0.2cm to subcutaneous tissue layer  04/24/2024:  1.4cm x 1.4cm x 0.2cm to subcutaneous tissue layer  04/10/2024:  1.8cm x 1.5cm x 0.2cm to subcutaneous tissue layer  03/25/2024:  1.7cm x 1.6cm x 0.2cm to subcutaneous tissue layer  03/13/2024:  1.8cm x 1.8cm x 0.4cm to the capsular layer of the toe   02/29/2024:  2cm x 1.9cm x 0.1cm to subcutaneous tissue  02/22/2024:  1.8cm x 1.8cm x 0.1cm to subcutaneous tissue  02/06/2024:  1.8cm x 1.8cm x 0.1cm to subcutaneous tissue  01/30/2024:  1.6cm x 2.3cm x 0.1cm to subcutaneous tissue    MSK:  -Adductovarus rotation of the bilateral fifth toe  -Muscle strength of ankles +5/5 for dorsiflexion, plantarflexion, ABDUction and ADDuction b/l    BILATERAL ANUSHKA 05/08/2024  Impression:      Right leg: Noncompressible arteries in the right lower leg. The right  TBI is 0.78.     Left leg: Resting ANUSHKA is 1.05, normal. The left TBI is 1.17.  ============================================================    ASSESSMENT:    (E11.621,  L97.522) 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 associated with type 2 diabetes mellitus, with fat  layer exposed (H)  (primary encounter diagnosis)    (L60.3) Onychodystrophy    (E11.9) Diabetes mellitus type 2, noninsulin dependent (H)    (E11.42) Diabetic polyneuropathy associated with type 2 diabetes mellitus (H)    (Z13.89) Screening for diabetic peripheral neuropathy        PLAN:  -Patient evaluated and examined. Treatment options discussed with no educational barriers noted.    -High risk toenail debridement x 9 toenails without incident on 06/12/2024  ---LEFT hallux toenail not fully re-grown    ---------------------------------------------    Diabetic foot wound:  -Outer callus cared for minimal necrotic tissue addressed at subcutaneous tissue layer with curette. There are no acute signs of infection and the wound is smaller today. His  changes his dressing for him daily and the Iodosorb is managing this well.  ---Dressed wound with Iodosorb, gauze and tape. Patient's  to change this dressing daily.   ---DME last placed on 05/08/2024.    -Offloading: Patient has a post-op shoe at home. He is not wearing it at the appointment. He is again advised to wear this at home and use his walker over the cane to offload his foot. He tries to offload his foot as much as possible with the walker, but he has loss of balance and he would not tolerate more aggressive offloading such as crutches or a total contact cast.  -Patient has DM shoes through the orthotist, Traci Hernandez.     -This is an acute, uncomplicated illness/injury with OTC treatment options reviewed.    -Bilateral ANUSHKA 05/08/2024: Adequate for wound healing. Within normal limits on the LEFT and non-compressible on the RIGHT but TBI for the RIGHT foot is adequate for wound healing.    -Patient in agreement with the above treatment plan and all of patient's questions were answered.      Return to clinic three weeks to evaluate wound on the LEFT medial hallux ulceration after ABIs (will schedule on the same day)      Meenu Orourke  DILIA

## 2024-06-25 NOTE — PROGRESS NOTES
"  Assessment & Plan     Type 2 diabetes mellitus with stage 3a chronic kidney disease, without long-term current use of insulin (H)  Well controlled.  A1c at target.  No lows.  Labs updated.  Continue every 3 month office visits.  Ongoing follow up with podiatry.  Will schedule eye exam - due next month.  - Hemoglobin A1c; Future  - Comprehensive metabolic panel (BMP + Alb, Alk Phos, ALT, AST, Total. Bili, TP); Future  - CBC with platelets and differential; Future    Essential hypertension  At goal.  Running a bit low today - but asymptomatic.  Continue low dose norvasc 2.5 mg and coreg 25 mg dosing.  - Comprehensive metabolic panel (BMP + Alb, Alk Phos, ALT, AST, Total. Bili, TP); Future  - CBC with platelets and differential; Future    Stage 3a chronic kidney disease (H)  Stable GFR.  Avoid NSAIDs.  Control risk factors.  - Comprehensive metabolic panel (BMP + Alb, Alk Phos, ALT, AST, Total. Bili, TP); Future  - CBC with platelets and differential; Future    Hyperlipidemia, unspecified hyperlipidemia type  Stable.  Continue high intensity statin.  Annual lipid panel when due.    Chronic painful diabetic neuropathy (H)  Stable with neurontin.    Anxiety  Moderate episode of recurrent major depressive disorder (H)  Stable with prozac 10 mg daily.  Continue.    Anemia, unspecified type  Resolved.  Normal hemoglobin today.          BMI  Estimated body mass index is 22.48 kg/m  as calculated from the following:    Height as of 11/20/23: 1.854 m (6' 1\").    Weight as of this encounter: 77.3 kg (170 lb 6.4 oz).         See Patient Instructions    Return in about 3 months (around 9/26/2024) for diabetes.    Pj Cruz is a 83 year old, presenting for the following health issues:  Diabetes        6/26/2024     1:35 PM   Additional Questions   Roomed by Caden Obregon   Accompanied by None         6/26/2024     1:35 PM   Patient Reported Additional Medications   Patient reports taking the following new medications " None     HPI     Labs first - Patient went to lab already.    Dr Simpson - oral surgeon - will be biopsy spot on tongue.    Meals on wheels.    Uses inhaler -helpful.  Declines cardiac evaluation.    Diabetes Follow-up - metformin max; trulicity (prior ozempic)   How often are you checking your blood sugar? Not at all  What concerns do you have today about your diabetes? Other: A1C concern   Do you have any of these symptoms? (Select all that apply)  No numbness or tingling in feet.  No redness, sores or blisters on feet.  No complaints of excessive thirst.  No reports of blurry vision.  No significant changes to weight.  Have you had a diabetic eye exam in the last 12 months? Yes- Date of last eye exam: 6/2023,  Location: [facility] [place]   Eye exam - Loulou Emmanuel - [facility] - due - needs to schedule  Urine microalb - unable to give urine sample today  Podiatry - Dr Orourke - toe infection improving  BP running lower; asymptomatic  In wheelchair here; uses cane to ambulate; has walker if needed  No recent falls  Couple candy bars      BP Readings from Last 2 Encounters:   06/26/24 103/65   06/12/24 123/79     Hemoglobin A1C (%)   Date Value   06/26/2024 6.0 (H)   03/25/2024 5.9 (H)   05/06/2021 6.5 (H)     LDL Cholesterol Calculated (mg/dL)   Date Value   03/25/2024 48   03/09/2023 52         Hyperlipidemia Follow-Up - lipitor 80 mg  Are you regularly taking any medication or supplement to lower your cholesterol?   Yes- Lipitor   Are you having muscle aches or other side effects that you think could be caused by your cholesterol lowering medication?  No    Chronic Kidney Disease Follow-up  Do you take any over the counter pain medicine?: Yes  What over the counter medicine are you taking for your pain?:  Tylenol     How often do you take this medicine?:  Two times daily    Hypertension Follow-up norvasc 2.5, coreg 25 bid    Do you check your blood pressure regularly outside of the clinic? No   Are you  following a low salt diet? No  Are your blood pressures ever more than 140 on the top number (systolic) OR more   than 90 on the bottom number (diastolic), for example 140/90? Patient does not check BP outside of the clinic     Depression and Anxiety - prozac 10 mg; neurontin 300 mg TID  How are you doing with your depression since your last visit? Stable   How are you doing with your anxiety since your last visit?  Stable   Are you having other symptoms that might be associated with depression or anxiety? No  Have you had a significant life event? No   Do you have any concerns with your use of alcohol or other drugs? No    Social History     Tobacco Use    Smoking status: Never     Passive exposure: Never    Smokeless tobacco: Former     Types: Chew   Vaping Use    Vaping status: Never Used   Substance Use Topics    Alcohol use: Yes     Comment: daily, 4 today    Drug use: Never         4/4/2022     2:00 PM 12/8/2022     2:19 PM 11/20/2023     9:45 AM   PHQ   PHQ-9 Total Score 8 0 0   Q9: Thoughts of better off dead/self-harm past 2 weeks Not at all Not at all Not at all         4/4/2022     2:00 PM 12/8/2022     2:20 PM 11/20/2023     9:46 AM   SHANNON-7 SCORE   Total Score 0 0 7       Suicide Assessment Five-step Evaluation and Treatment (SAFE-T)          Review of Systems  Constitutional, HEENT, cardiovascular, pulmonary, gi and gu systems are negative, except as otherwise noted.      Objective    /65 (BP Location: Right arm, Patient Position: Sitting, Cuff Size: Adult Regular)   Pulse 83   Temp 98.1  F (36.7  C) (Tympanic)   Resp 16   Wt 77.3 kg (170 lb 6.4 oz)   SpO2 95%   BMI 22.48 kg/m    Body mass index is 22.48 kg/m .  Physical Exam   GENERAL: alert and no distress  EYES: Eyes grossly normal to inspection, PERRL and conjunctivae and sclerae normal  NECK: no adenopathy, no asymmetry, masses, or scars  RESP: lungs clear to auscultation - no rales, rhonchi or wheezes  CV: regular rate and rhythm,  normal S1 S2, no S3 or S4, no murmur, click or rub, no peripheral edema  ABDOMEN: soft, nontender, no hepatosplenomegaly, no masses and bowel sounds normal  MS: no gross musculoskeletal defects noted, no edema  PSYCH: mentation appears normal, affect normal/bright    No results found for this or any previous visit (from the past 24 hour(s)).        Signed Electronically by: Zoila Azul MD

## 2024-06-26 ENCOUNTER — LAB (OUTPATIENT)
Dept: LAB | Facility: OTHER | Age: 84
End: 2024-06-26
Payer: MEDICARE

## 2024-06-26 ENCOUNTER — OFFICE VISIT (OUTPATIENT)
Dept: FAMILY MEDICINE | Facility: OTHER | Age: 84
End: 2024-06-26
Attending: FAMILY MEDICINE
Payer: MEDICARE

## 2024-06-26 VITALS
WEIGHT: 170.4 LBS | SYSTOLIC BLOOD PRESSURE: 103 MMHG | RESPIRATION RATE: 16 BRPM | HEART RATE: 83 BPM | DIASTOLIC BLOOD PRESSURE: 65 MMHG | OXYGEN SATURATION: 95 % | BODY MASS INDEX: 22.48 KG/M2 | TEMPERATURE: 98.1 F

## 2024-06-26 DIAGNOSIS — N18.31 TYPE 2 DIABETES MELLITUS WITH STAGE 3A CHRONIC KIDNEY DISEASE, WITHOUT LONG-TERM CURRENT USE OF INSULIN (H): Primary | ICD-10-CM

## 2024-06-26 DIAGNOSIS — F33.1 MODERATE EPISODE OF RECURRENT MAJOR DEPRESSIVE DISORDER (H): ICD-10-CM

## 2024-06-26 DIAGNOSIS — N18.31 TYPE 2 DIABETES MELLITUS WITH STAGE 3A CHRONIC KIDNEY DISEASE, WITHOUT LONG-TERM CURRENT USE OF INSULIN (H): ICD-10-CM

## 2024-06-26 DIAGNOSIS — I10 ESSENTIAL HYPERTENSION: ICD-10-CM

## 2024-06-26 DIAGNOSIS — E11.22 TYPE 2 DIABETES MELLITUS WITH STAGE 3A CHRONIC KIDNEY DISEASE, WITHOUT LONG-TERM CURRENT USE OF INSULIN (H): ICD-10-CM

## 2024-06-26 DIAGNOSIS — N18.31 STAGE 3A CHRONIC KIDNEY DISEASE (H): ICD-10-CM

## 2024-06-26 DIAGNOSIS — F41.9 ANXIETY: ICD-10-CM

## 2024-06-26 DIAGNOSIS — E11.22 TYPE 2 DIABETES MELLITUS WITH STAGE 3A CHRONIC KIDNEY DISEASE, WITHOUT LONG-TERM CURRENT USE OF INSULIN (H): Primary | ICD-10-CM

## 2024-06-26 DIAGNOSIS — E11.40 CHRONIC PAINFUL DIABETIC NEUROPATHY (H): ICD-10-CM

## 2024-06-26 DIAGNOSIS — D64.9 ANEMIA, UNSPECIFIED TYPE: ICD-10-CM

## 2024-06-26 DIAGNOSIS — E78.5 HYPERLIPIDEMIA, UNSPECIFIED HYPERLIPIDEMIA TYPE: ICD-10-CM

## 2024-06-26 LAB
ALBUMIN SERPL BCG-MCNC: 4 G/DL (ref 3.5–5.2)
ALP SERPL-CCNC: 73 U/L (ref 40–150)
ALT SERPL W P-5'-P-CCNC: 13 U/L (ref 0–70)
ANION GAP SERPL CALCULATED.3IONS-SCNC: 11 MMOL/L (ref 7–15)
AST SERPL W P-5'-P-CCNC: 18 U/L (ref 0–45)
BASOPHILS # BLD AUTO: 0 10E3/UL (ref 0–0.2)
BASOPHILS NFR BLD AUTO: 0 %
BILIRUB SERPL-MCNC: 0.4 MG/DL
BUN SERPL-MCNC: 16.4 MG/DL (ref 8–23)
CALCIUM SERPL-MCNC: 9.5 MG/DL (ref 8.8–10.2)
CHLORIDE SERPL-SCNC: 104 MMOL/L (ref 98–107)
CREAT SERPL-MCNC: 1.26 MG/DL (ref 0.67–1.17)
DEPRECATED HCO3 PLAS-SCNC: 25 MMOL/L (ref 22–29)
EGFRCR SERPLBLD CKD-EPI 2021: 57 ML/MIN/1.73M2
EOSINOPHIL # BLD AUTO: 0.1 10E3/UL (ref 0–0.7)
EOSINOPHIL NFR BLD AUTO: 1 %
ERYTHROCYTE [DISTWIDTH] IN BLOOD BY AUTOMATED COUNT: 12.8 % (ref 10–15)
EST. AVERAGE GLUCOSE BLD GHB EST-MCNC: 126 MG/DL
GLUCOSE SERPL-MCNC: 109 MG/DL (ref 70–99)
HBA1C MFR BLD: 6 %
HCT VFR BLD AUTO: 43.6 % (ref 40–53)
HGB BLD-MCNC: 14.5 G/DL (ref 13.3–17.7)
IMM GRANULOCYTES # BLD: 0 10E3/UL
IMM GRANULOCYTES NFR BLD: 0 %
LYMPHOCYTES # BLD AUTO: 1.1 10E3/UL (ref 0.8–5.3)
LYMPHOCYTES NFR BLD AUTO: 17 %
MCH RBC QN AUTO: 32.2 PG (ref 26.5–33)
MCHC RBC AUTO-ENTMCNC: 33.3 G/DL (ref 31.5–36.5)
MCV RBC AUTO: 97 FL (ref 78–100)
MONOCYTES # BLD AUTO: 0.5 10E3/UL (ref 0–1.3)
MONOCYTES NFR BLD AUTO: 9 %
NEUTROPHILS # BLD AUTO: 4.5 10E3/UL (ref 1.6–8.3)
NEUTROPHILS NFR BLD AUTO: 73 %
NRBC # BLD AUTO: 0 10E3/UL
NRBC BLD AUTO-RTO: 0 /100
PLATELET # BLD AUTO: 177 10E3/UL (ref 150–450)
POTASSIUM SERPL-SCNC: 4.9 MMOL/L (ref 3.4–5.3)
PROT SERPL-MCNC: 6.6 G/DL (ref 6.4–8.3)
RBC # BLD AUTO: 4.51 10E6/UL (ref 4.4–5.9)
SODIUM SERPL-SCNC: 140 MMOL/L (ref 135–145)
WBC # BLD AUTO: 6.3 10E3/UL (ref 4–11)

## 2024-06-26 PROCEDURE — 80053 COMPREHEN METABOLIC PANEL: CPT | Mod: ZL

## 2024-06-26 PROCEDURE — 83036 HEMOGLOBIN GLYCOSYLATED A1C: CPT | Mod: ZL

## 2024-06-26 PROCEDURE — G0463 HOSPITAL OUTPT CLINIC VISIT: HCPCS

## 2024-06-26 PROCEDURE — 99214 OFFICE O/P EST MOD 30 MIN: CPT | Performed by: FAMILY MEDICINE

## 2024-06-26 PROCEDURE — 36415 COLL VENOUS BLD VENIPUNCTURE: CPT | Mod: ZL

## 2024-06-26 PROCEDURE — 85048 AUTOMATED LEUKOCYTE COUNT: CPT | Mod: ZL

## 2024-06-26 ASSESSMENT — PAIN SCALES - GENERAL: PAINLEVEL: NO PAIN (0)

## 2024-06-26 NOTE — PATIENT INSTRUCTIONS
Due for eye exam at [facility] - call to schedule.  Labs stable.  Continue current medications.

## 2024-07-09 DIAGNOSIS — E11.22 TYPE 2 DIABETES MELLITUS WITH STAGE 3A CHRONIC KIDNEY DISEASE, WITHOUT LONG-TERM CURRENT USE OF INSULIN (H): ICD-10-CM

## 2024-07-09 DIAGNOSIS — N18.31 TYPE 2 DIABETES MELLITUS WITH STAGE 3A CHRONIC KIDNEY DISEASE, WITHOUT LONG-TERM CURRENT USE OF INSULIN (H): ICD-10-CM

## 2024-07-09 RX ORDER — DULAGLUTIDE 0.75 MG/.5ML
INJECTION, SOLUTION SUBCUTANEOUS
Qty: 2 ML | Refills: 3 | Status: SHIPPED | OUTPATIENT
Start: 2024-07-09 | End: 2024-09-30

## 2024-07-09 NOTE — TELEPHONE ENCOUNTER
Failed protocol    GFR Estimate   Date Value Ref Range Status   06/26/2024 57 (L) >60 mL/min/1.73m2 Final     Comment:     eGFR calculated using 2021 CKD-EPI equation.   03/25/2024 59 (L) >60 mL/min/1.73m2 Final   02/18/2024 57 (L) >60 mL/min/1.73m2 Final   04/29/2021 66 >60 mL/min/[1.73_m2] Final     Comment:     Non  GFR Calc  Starting 12/18/2018, serum creatinine based estimated GFR (eGFR) will be   calculated using the Chronic Kidney Disease Epidemiology Collaboration   (CKD-EPI) equation.     04/28/2021 51 (L) >60 mL/min/[1.73_m2] Final     Comment:     Non  GFR Calc  Starting 12/18/2018, serum creatinine based estimated GFR (eGFR) will be   calculated using the Chronic Kidney Disease Epidemiology Collaboration   (CKD-EPI) equation.     04/27/2021 53 (L) >60 mL/min/[1.73_m2] Final     Comment:     Non  GFR Calc  Starting 12/18/2018, serum creatinine based estimated GFR (eGFR) will be   calculated using the Chronic Kidney Disease Epidemiology Collaboration   (CKD-EPI) equation.       GFR Estimate If Black   Date Value Ref Range Status   04/29/2021 77 >60 mL/min/[1.73_m2] Final     Comment:      GFR Calc  Starting 12/18/2018, serum creatinine based estimated GFR (eGFR) will be   calculated using the Chronic Kidney Disease Epidemiology Collaboration   (CKD-EPI) equation.     04/28/2021 59 (L) >60 mL/min/[1.73_m2] Final     Comment:      GFR Calc  Starting 12/18/2018, serum creatinine based estimated GFR (eGFR) will be   calculated using the Chronic Kidney Disease Epidemiology Collaboration   (CKD-EPI) equation.     04/27/2021 62 >60 mL/min/[1.73_m2] Final     Comment:      GFR Calc  Starting 12/18/2018, serum creatinine based estimated GFR (eGFR) will be   calculated using the Chronic Kidney Disease Epidemiology Collaboration   (CKD-EPI) equation.          dulaglutide (TRULICITY) 0.75 MG/0.5ML pen       Last Written  Prescription Date:  3/26/24  Last Fill Quantity: 2 ml,   # refills: 3  Last Office Visit: 6/26/24  Future Office visit:    Next 5 appointments (look out 90 days)      Jul 11, 2024 12:30 PM  (Arrive by 12:15 PM)  Return Visit with Meenu Orourke DPM  [facility] ([facility] ) [address] [place] [id number]  [phone]     Sep 30, 2024 2:30 PM  (Arrive by 2:15 PM)  Provider Visit with Zoila Jones MD  [facility] ([facility] ) [address] [place] [id number]  [phone]             Routing refill request to provider for review/approval because:

## 2024-07-11 ENCOUNTER — OFFICE VISIT (OUTPATIENT)
Dept: PODIATRY | Facility: OTHER | Age: 84
End: 2024-07-11
Attending: PODIATRIST
Payer: MEDICARE

## 2024-07-11 VITALS
SYSTOLIC BLOOD PRESSURE: 105 MMHG | HEART RATE: 52 BPM | TEMPERATURE: 97.8 F | OXYGEN SATURATION: 94 % | DIASTOLIC BLOOD PRESSURE: 68 MMHG

## 2024-07-11 DIAGNOSIS — E11.42 DIABETIC POLYNEUROPATHY ASSOCIATED WITH TYPE 2 DIABETES MELLITUS (H): ICD-10-CM

## 2024-07-11 DIAGNOSIS — E11.621 DIABETIC ULCER OF TOE OF LEFT FOOT ASSOCIATED WITH TYPE 2 DIABETES MELLITUS, WITH FAT LAYER EXPOSED (H): Primary | ICD-10-CM

## 2024-07-11 DIAGNOSIS — L97.522 DIABETIC ULCER OF TOE OF LEFT FOOT ASSOCIATED WITH TYPE 2 DIABETES MELLITUS, WITH FAT LAYER EXPOSED (H): Primary | ICD-10-CM

## 2024-07-11 DIAGNOSIS — E11.9 DIABETES MELLITUS TYPE 2, NONINSULIN DEPENDENT (H): ICD-10-CM

## 2024-07-11 PROCEDURE — 99213 OFFICE O/P EST LOW 20 MIN: CPT | Performed by: PODIATRIST

## 2024-07-11 PROCEDURE — G0463 HOSPITAL OUTPT CLINIC VISIT: HCPCS

## 2024-07-11 NOTE — PROGRESS NOTES
Chief complaint: Patient presents with:  Wound Check: DFU      History of Present Illness: This 83 year old NIDDM II male is seen for follow-up management of a diabetic foot ulcer on the plantar medial hallux ulcer. His  has assisted him with a dressing change every 1-2 days with Iodosorb, gauze and tape. He says a few days ago, his  thought the patient was developing a new wound on the inside of the big toe on the RIGHT foot. His  used an antibiotic ointment and she thinks it is looking better.    No further pedal complaints today.       GFR Estimate   Date Value Ref Range Status   06/26/2024 57 (L) >60 mL/min/1.73m2 Final     Comment:     eGFR calculated using 2021 CKD-EPI equation.   04/29/2021 66 >60 mL/min/[1.73_m2] Final     Comment:     Non  GFR Calc  Starting 12/18/2018, serum creatinine based estimated GFR (eGFR) will be   calculated using the Chronic Kidney Disease Epidemiology Collaboration   (CKD-EPI) equation.       GFR Estimate If Black   Date Value Ref Range Status   04/29/2021 77 >60 mL/min/[1.73_m2] Final     Comment:      GFR Calc  Starting 12/18/2018, serum creatinine based estimated GFR (eGFR) will be   calculated using the Chronic Kidney Disease Epidemiology Collaboration   (CKD-EPI) equation.           Lab Results   Component Value Date    A1C 6.7 11/20/2023    A1C 6.0 03/09/2023    A1C 8.2 12/08/2022    A1C 6.3 06/06/2022    A1C 5.6 03/04/2022    A1C 6.5 05/06/2021       /68 (BP Location: Left arm, Patient Position: Sitting, Cuff Size: Adult Regular)   Pulse 52   Temp 97.8  F (36.6  C) (Tympanic)   SpO2 94%     Patient Active Problem List   Diagnosis    Urinary retention    Generalized weakness    Type 2 diabetes mellitus with stage 3 chronic kidney disease, without long-term current use of insulin (H)    Tachycardia    Hypoxia    Other acute pulmonary embolism with acute cor pulmonale (H)    Dyspnea, unspecified type     Benign prostatic hyperplasia with urinary retention    Chronic kidney disease, stage III (moderate) (H)    Chronic obstructive lung disease (H)    Chronic painful diabetic neuropathy (H)    Erectile dysfunction    Essential hypertension    Gout, unspecified    History of CVA (cerebrovascular accident)    Hyperlipidemia    IBS (irritable bowel syndrome)    Lumbar spondylosis    Mild concentric left ventricular hypertrophy (LVH)    Moderate episode of recurrent major depressive disorder (H)    Spinal stenosis of lumbar region without neurogenic claudication    Uncomplicated alcohol dependence (H)    Onychomycosis of left great toe    Pincer nail deformity    Complicated UTI (urinary tract infection)    Sepsis with acute renal failure (H)       Past Surgical History:   Procedure Laterality Date    AMPUTATE TOE(S) Left 6/27/2022    Procedure: Left fifth toe partial versus full amputation;  Surgeon: Meenu Orourke DPM;  Location: [facility]    CYSTOSCOPY, TRANSURETHRAL RESECTION (TUR) PROSTATE, COMBINED N/A 10/15/2021    Procedure: CYSTOSCOPY, WITH TRANSURETHRAL RESECTION PROSTATE;  Surgeon: Indu De Leon MD;  Location: [facility] OR    HERNIA REPAIR      PHACOEMULSIFICATION WITH STANDARD INTRAOCULAR LENS IMPLANT Left 12/27/2021    Procedure: PHACOEMULSIFICATION CATARACT EXTRACTION POSTERIOR CHAMBER LENS LEFT EYE;  Surgeon: Phillip Maldonado MD;  Location: [facility]    PHACOEMULSIFICATION WITH STANDARD INTRAOCULAR LENS IMPLANT Right 1/11/2022    Procedure: PHACOEMULSIFICATION CATARACT EXTRACTION POSTERIOR CHAMBER LENS RIGHT EYE;  Surgeon: Phillip Maldonado MD;  Location: [facility]       Current Outpatient Medications   Medication Sig Dispense Refill    acetaminophen (TYLENOL) 500 MG tablet Take 1,000 mg by mouth 2 times daily . Limit acetaminophen to 4000 mg per day from all sources.      albuterol (PROAIR HFA/PROVENTIL HFA/VENTOLIN HFA) 108 (90 Base) MCG/ACT inhaler Inhale 2 puffs into the lungs every 4 hours as needed for cough,  shortness of breath or wheezing 18 g 3    allopurinol (ZYLOPRIM) 100 MG tablet TAKE 1 TABLET BY MOUTH ONCE DAILY 90 tablet 3    amLODIPine (NORVASC) 2.5 MG tablet TAKE ONE TABLET BY MOUTH DAILY 90 tablet 1    atorvastatin (LIPITOR) 80 MG tablet TAKE 0.5 TABLETS (40 MG) BYMOUTH EVERY EVENING 90 tablet 1    carvedilol (COREG) 25 MG tablet TAKE 1 TABLET BY MOUTH TWICE DAILY 180 tablet 1    finasteride (PROSCAR) 5 MG tablet TAKE 1 TABLET (5 MG) BY MOUTH DAILY 30 tablet 4    FLUoxetine (PROZAC) 10 MG capsule Take 1 capsule (10 mg) by mouth daily 90 capsule 1    gabapentin (NEURONTIN) 300 MG capsule Take 1 capsule (300 mg) by mouth 3 times daily 270 capsule 0    insulin pen needle (ULTICARE MINI) 31G X 6 MM miscellaneous USE WITH TRULICITY 100 each 3    metFORMIN (GLUCOPHAGE XR) 500 MG 24 hr tablet TAKE 2 TABLETS BY MOUTH TWICE DAILY WITH MEALS 360 tablet 0    multivitamin, therapeutic (THERA-VIT) TABS tablet Take 1 tablet by mouth daily      rivaroxaban ANTICOAGULANT (XARELTO ANTICOAGULANT) 20 MG TABS tablet TAKE 1 TABLET DAILY BY MOUTH WITH DINNER 90 tablet 3    sildenafil (VIAGRA) 100 MG tablet Take 1 tablet (100 mg) by mouth daily as needed (erectile dysfunction) 10 tablet 0    tamsulosin (FLOMAX) 0.4 MG capsule TAKE 1 CAPSULE (0.4 MG) BY MOUTH EVERY EVENING 90 capsule 1    TRULICITY 0.75 MG/0.5ML pen INJECT 0.75 MG SUBCUTANEOUSEVERY 7 DAYS 2 mL 3    vitamin B-12 (CYANOCOBALAMIN) 250 MCG tablet Take 250 mcg by mouth daily      vitamin C (ASCORBIC ACID) 500 MG tablet Take 500 mg by mouth daily       No current facility-administered medications for this visit.          Allergies   Allergen Reactions    Ace Inhibitors      Per [facility]: hyperkalemia.  Pt doesn't know if he is allergic    Ceclor [Cefaclor] Hives    Sulfa Antibiotics      Pt unsure.     Tomato Flavor [Flavoring Agent]      Tomato Paste       History reviewed. No pertinent family history.    Social History     Socioeconomic History    Marital status: Single      Spouse name: None    Number of children: None    Years of education: None    Highest education level: None   Occupational History    None   Tobacco Use    Smoking status: Never Smoker    Smokeless tobacco: Former User     Types: Chew   Substance and Sexual Activity    Alcohol use: Not Currently    Drug use: Never    Sexual activity: None   Other Topics Concern    Parent/sibling w/ CABG, MI or angioplasty before 65F 55M? Not Asked   Social History Narrative    None     Social Determinants of Health     Financial Resource Strain:     Difficulty of Paying Living Expenses:    Food Insecurity:     Worried About Running Out of Food in the Last Year:     Ran Out of Food in the Last Year:    Transportation Needs:     Lack of Transportation (Medical):     Lack of Transportation (Non-Medical):    Physical Activity:     Days of Exercise per Week:     Minutes of Exercise per Session:    Stress:     Feeling of Stress :    Social Connections:     Frequency of Communication with Friends and Family:     Frequency of Social Gatherings with Friends and Family:     Attends [employer] Services:     Active Member of Clubs or Organizations:     Attends Club or Organization Meetings:     Marital Status:    Intimate Partner Violence:     Fear of Current or Ex-Partner:     Emotionally Abused:     Physically Abused:     Sexually Abused:        ROS: 10 point ROS neg other than the symptoms noted above in the HPI.  EXAM  Constitutional: healthy, alert and no distress    Psychiatric: mentation appears normal and affect normal/bright    VASCULAR:  -Dorsalis pedis pulse +2/4 b/l  -Posterior tibial pulse +1/4 b/l  -Capillary refill time < 3 seconds to b/l hallux  -Hair growth absent to b/l anterior legs and ankles  NEURO:  -Epicritic and protective sensation absent to the bilateral foot  DERM:  -Skin temperature within normal limits to bilateral foot    -Toenails elongated, thickened, dystrophic and discolored x 9  ---LEFT hallux toenail  removed. No open wounds and no drainage.  Wound Location:  LEFT medial hallux IPJ  07/11/2024  Measurement:  0.5cm x 0.4cm x 0.2cm to subcutaneous tissue layer  Drainage:  Moderate serous  Odor:  None  Undermining:  None  Edges:  Intact with no edin-wound maceration  Base:  20% fibrotic with  mild necrosis and 80% viable   Surrounding Skin: Intact with minimal rubor, no erythema, no calor  No severe erythema, no ascending erythema, no calor, no purulence, no malodor, no other signs of infection.     06/12/2024  Measurement:  0.9cm x 0.9cm x 0.2cm to subcutaneous tissue layer  Drainage:  Moderate serous  Odor:  None  Undermining:  None  Edges:  Intact with no edin-wound maceration  Base:  60% fibrotic with  mild necrosis and 40% viable   Surrounding Skin: Intact with minimal rubor, no erythema, no calor  No severe erythema, no ascending erythema, no calor, no purulence, no malodor, no other signs of infection.     05/22/2024  Measurement:  1.3cm x 1.4cm x 0.2cm to subcutaneous tissue layer  Drainage:  Moderate serous  Odor:  None  Undermining:  None  Edges:  Intact with no edin-wound maceration  Base:  60% fibrotic with  mild necrosis and 40% viable   Surrounding Skin: Intact with minimal rubor, no erythema, no calor  No severe erythema, no ascending erythema, no calor, no purulence, no malodor, no other signs of infection.     05/08/2024:  1.4cm x 1.5cm x 0.2cm to subcutaneous tissue layer  04/24/2024:  1.4cm x 1.4cm x 0.2cm to subcutaneous tissue layer  04/10/2024:  1.8cm x 1.5cm x 0.2cm to subcutaneous tissue layer  03/25/2024:  1.7cm x 1.6cm x 0.2cm to subcutaneous tissue layer  03/13/2024:  1.8cm x 1.8cm x 0.4cm to the capsular layer of the toe   02/29/2024:  2cm x 1.9cm x 0.1cm to subcutaneous tissue  02/22/2024:  1.8cm x 1.8cm x 0.1cm to subcutaneous tissue  02/06/2024:  1.8cm x 1.8cm x 0.1cm to subcutaneous tissue  01/30/2024:  1.6cm x 2.3cm x 0.1cm to subcutaneous tissue    MSK:  -Adductovarus rotation of  the bilateral fifth toe  -Muscle strength of ankles +5/5 for dorsiflexion, plantarflexion, ABDUction and ADDuction b/l    BILATERAL ANUSHKA 05/08/2024  Impression:      Right leg: Noncompressible arteries in the right lower leg. The right  TBI is 0.78.     Left leg: Resting ANUSHKA is 1.05, normal. The left TBI is 1.17.  ============================================================    ASSESSMENT:    (E11.621,  L97.522) 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 associated with type 2 diabetes mellitus, with fat layer exposed (H)  (primary encounter diagnosis)    (E11.9) Diabetes mellitus type 2, noninsulin dependent (H)    (E11.42) Diabetic polyneuropathy associated with type 2 diabetes mellitus (H)      PLAN:  -Patient evaluated and examined. Treatment options discussed with no educational barriers noted.    -Toenails last debrided on 06/12/2024    ---------------------------------------------    Diabetic foot wound:  -Outer callus cared for minimal necrotic tissue addressed at subcutaneous tissue layer with curette. There are no acute signs of infection and the wound is smaller today. His  changes his dressing for him daily and the Iodosorb is managing this well.  ---Dressed wound with Iodosorb, gauze and tape. Patient's  to change this dressing daily.   ---DME order placed on 07/11/2024 for more Iodosorb. The maceration has been controlled since patient switched to Iodosorb and has been doing daily dressing changes.    -Offloading: Patient has a post-op shoe at home. He is wearing this consistently.    -There are no open wounds between the hallux and second toe on either foot. Patient thought there may be an open wound, but there is only mildly dry skin. No concerning changes of the feet today.    -This is an acute, uncomplicated illness/injury with OTC treatment options reviewed.    -Bilateral ANUSHKA 05/08/2024: Adequate for wound healing. Within normal limits on the LEFT and non-compressible on the RIGHT but  TBI for the RIGHT foot is adequate for wound healing.    -Patient in agreement with the above treatment plan and all of patient's questions were answered.      Return to clinic four weeks to evaluate wound on the LEFT medial hallux ulceration       Meenu Orourke DPM

## 2024-07-17 ENCOUNTER — TRANSFERRED RECORDS (OUTPATIENT)
Dept: HEALTH INFORMATION MANAGEMENT | Facility: CLINIC | Age: 84
End: 2024-07-17

## 2024-07-17 LAB — RETINOPATHY: NEGATIVE

## 2024-07-24 DIAGNOSIS — E78.5 HYPERLIPIDEMIA, UNSPECIFIED HYPERLIPIDEMIA TYPE: ICD-10-CM

## 2024-07-24 DIAGNOSIS — I10 ESSENTIAL HYPERTENSION: ICD-10-CM

## 2024-07-24 DIAGNOSIS — M10.9 GOUT, UNSPECIFIED CAUSE, UNSPECIFIED CHRONICITY, UNSPECIFIED SITE: ICD-10-CM

## 2024-07-24 DIAGNOSIS — N40.0 BENIGN PROSTATIC HYPERPLASIA, UNSPECIFIED WHETHER LOWER URINARY TRACT SYMPTOMS PRESENT: ICD-10-CM

## 2024-07-24 RX ORDER — ATORVASTATIN CALCIUM 80 MG/1
TABLET, FILM COATED ORAL
Qty: 90 TABLET | Refills: 1 | Status: SHIPPED | OUTPATIENT
Start: 2024-07-24

## 2024-07-24 RX ORDER — TAMSULOSIN HYDROCHLORIDE 0.4 MG/1
0.4 CAPSULE ORAL EVERY EVENING
Qty: 90 CAPSULE | Refills: 1 | Status: SHIPPED | OUTPATIENT
Start: 2024-07-24

## 2024-07-24 RX ORDER — ALLOPURINOL 100 MG/1
100 TABLET ORAL DAILY
Qty: 90 TABLET | Refills: 3 | Status: SHIPPED | OUTPATIENT
Start: 2024-07-24

## 2024-07-24 RX ORDER — AMLODIPINE BESYLATE 2.5 MG/1
TABLET ORAL
Qty: 90 TABLET | Refills: 1 | Status: SHIPPED | OUTPATIENT
Start: 2024-07-24

## 2024-07-24 RX ORDER — CARVEDILOL 25 MG/1
TABLET ORAL
Qty: 180 TABLET | Refills: 1 | Status: SHIPPED | OUTPATIENT
Start: 2024-07-24

## 2024-07-24 NOTE — TELEPHONE ENCOUNTER
Failed protocol    GFR Estimate   Date Value Ref Range Status   06/26/2024 57 (L) >60 mL/min/1.73m2 Final     Comment:     eGFR calculated using 2021 CKD-EPI equation.   03/25/2024 59 (L) >60 mL/min/1.73m2 Final   02/18/2024 57 (L) >60 mL/min/1.73m2 Final   04/29/2021 66 >60 mL/min/[1.73_m2] Final     Comment:     Non  GFR Calc  Starting 12/18/2018, serum creatinine based estimated GFR (eGFR) will be   calculated using the Chronic Kidney Disease Epidemiology Collaboration   (CKD-EPI) equation.     04/28/2021 51 (L) >60 mL/min/[1.73_m2] Final     Comment:     Non  GFR Calc  Starting 12/18/2018, serum creatinine based estimated GFR (eGFR) will be   calculated using the Chronic Kidney Disease Epidemiology Collaboration   (CKD-EPI) equation.     04/27/2021 53 (L) >60 mL/min/[1.73_m2] Final     Comment:     Non  GFR Calc  Starting 12/18/2018, serum creatinine based estimated GFR (eGFR) will be   calculated using the Chronic Kidney Disease Epidemiology Collaboration   (CKD-EPI) equation.       GFR Estimate If Black   Date Value Ref Range Status   04/29/2021 77 >60 mL/min/[1.73_m2] Final     Comment:      GFR Calc  Starting 12/18/2018, serum creatinine based estimated GFR (eGFR) will be   calculated using the Chronic Kidney Disease Epidemiology Collaboration   (CKD-EPI) equation.     04/28/2021 59 (L) >60 mL/min/[1.73_m2] Final     Comment:      GFR Calc  Starting 12/18/2018, serum creatinine based estimated GFR (eGFR) will be   calculated using the Chronic Kidney Disease Epidemiology Collaboration   (CKD-EPI) equation.     04/27/2021 62 >60 mL/min/[1.73_m2] Final     Comment:      GFR Calc  Starting 12/18/2018, serum creatinine based estimated GFR (eGFR) will be   calculated using the Chronic Kidney Disease Epidemiology Collaboration   (CKD-EPI) equation.          amLODIPine (NORVASC) 2.5 MG tablet       Last Written  Prescription Date:  1/29/24  Last Fill Quantity: 90,   # refills: 1  Last Office Visit: 6/26/24  Future Office visit:    Next 5 appointments (look out 90 days)      Aug 14, 2024 12:30 PM  (Arrive by 12:15 PM)  Return Visit with Meenu Orourke [facility] ([facility] ) [address] [place] [address]  [phone]     Sep 30, 2024 2:30 PM  (Arrive by 2:15 PM)  Provider Visit with Zoila Jones MD  [facility] ([facility] ) [address] [place] [address]  [phone]             Routing refill request to provider for review/approval because:      tamsulosin (FLOMAX) 0.4 MG capsule       Last Written Prescription Date:  1/23/24  Last Fill Quantity: 90,   # refills: 1  Last Office Visit: 6/26/24  Future Office visit:    Next 5 appointments (look out 90 days)      Aug 14, 2024 12:30 PM  (Arrive by 12:15 PM)  Return Visit with Meenu Orourke [facility] ([facility] ) [address] [place] [address]  [phone]     Sep 30, 2024 2:30 PM  (Arrive by 2:15 PM)  Provider Visit with Zoila Jones MD  [facility] ([facility] ) [address] [place] [address]  [phone]             Routing refill request to provider for review/approval because:      allopurinol (ZYLOPRIM) 100 MG tablet       Last Written Prescription Date:  7/25/23  Last Fill Quantity: 90,   # refills: 3  Last Office Visit: 6/26/24  Future Office visit:    Next 5 appointments (look out 90 days)      Aug 14, 2024 12:30 PM  (Arrive by 12:15 PM)  Return Visit with Meenu Orourke [facility] ([facility] ) [address] [place] [address]  [phone]     Sep 30, 2024 2:30 PM  (Arrive by 2:15 PM)  Provider Visit with Zoila Jones MD  [facility] ([facility] ) [address] [place] [address]  [phone]             Routing refill request to provider for review/approval because:

## 2024-07-28 DIAGNOSIS — E11.22 TYPE 2 DIABETES MELLITUS WITH STAGE 3A CHRONIC KIDNEY DISEASE, WITHOUT LONG-TERM CURRENT USE OF INSULIN (H): ICD-10-CM

## 2024-07-28 DIAGNOSIS — N18.31 TYPE 2 DIABETES MELLITUS WITH STAGE 3A CHRONIC KIDNEY DISEASE, WITHOUT LONG-TERM CURRENT USE OF INSULIN (H): ICD-10-CM

## 2024-07-29 RX ORDER — METFORMIN HCL 500 MG
TABLET, EXTENDED RELEASE 24 HR ORAL
Qty: 360 TABLET | Refills: 0 | Status: SHIPPED | OUTPATIENT
Start: 2024-07-29

## 2024-07-29 NOTE — TELEPHONE ENCOUNTER
Metformin 500 mg       Last Written Prescription Date:  4/30/24  Last Fill Quantity: 360,   # refills: 0  Last Office Visit: 6/26/24  Future Office visit:    Next 5 appointments (look out 90 days)      Aug 14, 2024 12:30 PM  (Arrive by 12:15 PM)  Return Visit with Meenu Orourke DPM  [facility] ([facility] ) [address] [place] [id number] [phone]     Sep 30, 2024 2:30 PM  (Arrive by 2:15 PM)  Provider Visit with Zoila Jones MD  [facility] ([facility] ) [address] [place] [address]  [phone]             Routing refill request to provider for review/approval because:  Biguanide Agents Failed      Has GFR on file in past 12 months and most recent value is normal

## 2024-07-31 DIAGNOSIS — E11.40 CHRONIC PAINFUL DIABETIC NEUROPATHY (H): ICD-10-CM

## 2024-07-31 RX ORDER — GABAPENTIN 300 MG/1
300 CAPSULE ORAL 3 TIMES DAILY
Qty: 270 CAPSULE | Refills: 0 | Status: SHIPPED | OUTPATIENT
Start: 2024-07-31

## 2024-07-31 NOTE — TELEPHONE ENCOUNTER
Gabapentin 300 mg       Last Written Prescription Date:  5/13/24  Last Fill Quantity: 270,   # refills: 0  Last Office Visit: 6/26/24  Future Office visit:    Next 5 appointments (look out 90 days)      Aug 14, 2024 12:30 PM  (Arrive by 12:15 PM)  Return Visit with Meenu Orourke DPM  [facility] ([facility] ) [address] [place] [id number] [phone]     Sep 30, 2024 2:30 PM  (Arrive by 2:15 PM)  Provider Visit with Zoila Jones MD  [facility] ([facility] ) [address] [place] [address]  [phone]             Routing refill request to provider for review/approval because:  Drug not on the [facility], [facility] or [facility] refill protocol or controlled substance

## 2024-08-06 ENCOUNTER — TELEPHONE (OUTPATIENT)
Dept: WOUND CARE | Facility: OTHER | Age: 84
End: 2024-08-06

## 2024-08-06 NOTE — TELEPHONE ENCOUNTER
"TONY KNUTSON received a call from Komal pt's friend in regards to pt's toe. Per Komal \"he has a blood blister next to his wound on his foot. I cut the loose skin around the blister without disrupting the blister but I am wondering if there needs to be a change to the care plan. The blister is red/purple and raised kind of looks like how the last wound looked\" RN EAMON advised Komal that pt will need to come in and be assessed by Dr. Orourke to which Komal replied \"he doesn't want to come in it is hard for him to get to the clinic.\" RN CC then encouraged that pt should come in before his appointment next week either to clinic or urgent care. RN CC could here pt in the background stating \"I dont want to go today.\" Call ended. TONY KNUTSON spoke with Dr. Orourke in clinic in regards to interaction. Plan is to call pt back and encourage him to come in to be seen.     Patricia Bowie RN on 8/6/2024 at 9:12 AM    "

## 2024-08-06 NOTE — TELEPHONE ENCOUNTER
RN CC attempted to call pt this afternoon to see if pt is willing to come into clinic to have blister assessed. Pt did not answer phone at time of call. RN CC unable to LVM.     Patricia Bowie RN on 8/6/2024 at 1:32 PM

## 2024-08-14 ENCOUNTER — OFFICE VISIT (OUTPATIENT)
Dept: PODIATRY | Facility: OTHER | Age: 84
End: 2024-08-14
Attending: PODIATRIST
Payer: MEDICARE

## 2024-08-14 VITALS
RESPIRATION RATE: 18 BRPM | HEART RATE: 82 BPM | SYSTOLIC BLOOD PRESSURE: 112 MMHG | OXYGEN SATURATION: 95 % | TEMPERATURE: 99.2 F | DIASTOLIC BLOOD PRESSURE: 73 MMHG

## 2024-08-14 DIAGNOSIS — E11.9 DIABETES MELLITUS TYPE 2, NONINSULIN DEPENDENT (H): ICD-10-CM

## 2024-08-14 DIAGNOSIS — Z86.31 HEALED DIABETIC FOOT ULCER: Primary | ICD-10-CM

## 2024-08-14 DIAGNOSIS — E11.42 DIABETIC POLYNEUROPATHY ASSOCIATED WITH TYPE 2 DIABETES MELLITUS (H): ICD-10-CM

## 2024-08-14 PROCEDURE — 99212 OFFICE O/P EST SF 10 MIN: CPT | Performed by: PODIATRIST

## 2024-08-14 PROCEDURE — G0463 HOSPITAL OUTPT CLINIC VISIT: HCPCS

## 2024-08-14 PROCEDURE — G0463 HOSPITAL OUTPT CLINIC VISIT: HCPCS | Mod: 25

## 2024-08-14 RX ORDER — SODIUM FLUORIDE 1.1 G/100G
CREAM ORAL
COMMUNITY
Start: 2024-06-18

## 2024-08-14 ASSESSMENT — ANXIETY QUESTIONNAIRES
GAD7 TOTAL SCORE: 2
4. TROUBLE RELAXING: NOT AT ALL
6. BECOMING EASILY ANNOYED OR IRRITABLE: SEVERAL DAYS
1. FEELING NERVOUS, ANXIOUS, OR ON EDGE: SEVERAL DAYS
GAD7 TOTAL SCORE: 2
7. FEELING AFRAID AS IF SOMETHING AWFUL MIGHT HAPPEN: NOT AT ALL
2. NOT BEING ABLE TO STOP OR CONTROL WORRYING: NOT AT ALL
3. WORRYING TOO MUCH ABOUT DIFFERENT THINGS: NOT AT ALL
5. BEING SO RESTLESS THAT IT IS HARD TO SIT STILL: NOT AT ALL

## 2024-08-14 ASSESSMENT — PAIN SCALES - GENERAL: PAINLEVEL: NO PAIN (0)

## 2024-08-14 NOTE — PROGRESS NOTES
Chief complaint: Patient presents with:  Wound Check: DFU      History of Present Illness: This 83 year old NIDDM II male is seen for follow-up management of a diabetic foot ulcer on the plantar medial hallux ulcer. His  has assisted him with a dressing change every 1-2 days with Iodosorb, gauze and tape. He says a few days ago, his  thought the patient was developing a new blister on the side of the previous wound site. He has had the dressings re-applied to the toe in case there was still an open wound. He is not having pain from the toe.    No further pedal complaints today.       GFR Estimate   Date Value Ref Range Status   06/26/2024 57 (L) >60 mL/min/1.73m2 Final     Comment:     eGFR calculated using 2021 CKD-EPI equation.   04/29/2021 66 >60 mL/min/[1.73_m2] Final     Comment:     Non  GFR Calc  Starting 12/18/2018, serum creatinine based estimated GFR (eGFR) will be   calculated using the Chronic Kidney Disease Epidemiology Collaboration   (CKD-EPI) equation.       GFR Estimate If Black   Date Value Ref Range Status   04/29/2021 77 >60 mL/min/[1.73_m2] Final     Comment:      GFR Calc  Starting 12/18/2018, serum creatinine based estimated GFR (eGFR) will be   calculated using the Chronic Kidney Disease Epidemiology Collaboration   (CKD-EPI) equation.           Lab Results   Component Value Date    A1C 6.7 11/20/2023    A1C 6.0 03/09/2023    A1C 8.2 12/08/2022    A1C 6.3 06/06/2022    A1C 5.6 03/04/2022    A1C 6.5 05/06/2021       /73   Pulse 82   Temp 99.2  F (37.3  C) (Tympanic)   Resp 18   SpO2 95%     Patient Active Problem List   Diagnosis    Urinary retention    Generalized weakness    Type 2 diabetes mellitus with stage 3 chronic kidney disease, without long-term current use of insulin (H)    Tachycardia    Hypoxia    Other acute pulmonary embolism with acute cor pulmonale (H)    Dyspnea, unspecified type    Benign prostatic hyperplasia  with urinary retention    Chronic kidney disease, stage III (moderate) (H)    Chronic obstructive lung disease (H)    Chronic painful diabetic neuropathy (H)    Erectile dysfunction    Essential hypertension    Gout, unspecified    History of CVA (cerebrovascular accident)    Hyperlipidemia    IBS (irritable bowel syndrome)    Lumbar spondylosis    Mild concentric left ventricular hypertrophy (LVH)    Moderate episode of recurrent major depressive disorder (H)    Spinal stenosis of lumbar region without neurogenic claudication    Uncomplicated alcohol dependence (H)    Onychomycosis of left great toe    Pincer nail deformity    Complicated UTI (urinary tract infection)    Sepsis with acute renal failure (H)       Past Surgical History:   Procedure Laterality Date    AMPUTATE TOE(S) Left 6/27/2022    Procedure: Left fifth toe partial versus full amputation;  Surgeon: Meenu Orourke DPM;  Location: [facility]    CYSTOSCOPY, TRANSURETHRAL RESECTION (TUR) PROSTATE, COMBINED N/A 10/15/2021    Procedure: CYSTOSCOPY, WITH TRANSURETHRAL RESECTION PROSTATE;  Surgeon: Indu De Leon MD;  Location: [facility] OR    HERNIA REPAIR      PHACOEMULSIFICATION WITH STANDARD INTRAOCULAR LENS IMPLANT Left 12/27/2021    Procedure: PHACOEMULSIFICATION CATARACT EXTRACTION POSTERIOR CHAMBER LENS LEFT EYE;  Surgeon: Phillip Maldonado MD;  Location: [facility]    PHACOEMULSIFICATION WITH STANDARD INTRAOCULAR LENS IMPLANT Right 1/11/2022    Procedure: PHACOEMULSIFICATION CATARACT EXTRACTION POSTERIOR CHAMBER LENS RIGHT EYE;  Surgeon: Phillip Maldonado MD;  Location: [facility]       Current Outpatient Medications   Medication Sig Dispense Refill    acetaminophen (TYLENOL) 500 MG tablet Take 1,000 mg by mouth 2 times daily . Limit acetaminophen to 4000 mg per day from all sources.      albuterol (PROAIR HFA/PROVENTIL HFA/VENTOLIN HFA) 108 (90 Base) MCG/ACT inhaler Inhale 2 puffs into the lungs every 4 hours as needed for cough, shortness of breath or wheezing  18 g 3    allopurinol (ZYLOPRIM) 100 MG tablet TAKE 1 TABLET BY MOUTH ONCEDAILY 90 tablet 3    amLODIPine (NORVASC) 2.5 MG tablet TAKE ONE TABLET BY MOUTH DAILY 90 tablet 1    atorvastatin (LIPITOR) 80 MG tablet TAKE ONE-HALF TABLET (40 MG) BY MOUTH EVERY EVENING 90 tablet 1    carvedilol (COREG) 25 MG tablet TAKE 1 TABLET BY MOUTH TWICE DAILY 180 tablet 1    DENTA 5000 PLUS 1.1 % CREA USE NIGHTLY,BRUSH WEEL FOR 2 MINUTES,SPIT OUT EXCESS, NO RINSING,AVOID EATING DRINKING FOR 30 MINUTES      finasteride (PROSCAR) 5 MG tablet TAKE 1 TABLET (5 MG) BY MOUTH DAILY 30 tablet 4    FLUoxetine (PROZAC) 10 MG capsule Take 1 capsule (10 mg) by mouth daily 90 capsule 1    gabapentin (NEURONTIN) 300 MG capsule TAKE 1 CAPSULE (300 MG) BY MOUTH 3 TIMES DAILY 270 capsule 0    insulin pen needle (ULTICARE MINI) 31G X 6 MM miscellaneous USE WITH TRULICITY 100 each 3    metFORMIN (GLUCOPHAGE XR) 500 MG 24 hr tablet TAKE 2 TABLETS BY MOUTH TWICE DAILY WITH MEALS 360 tablet 0    multivitamin, therapeutic (THERA-VIT) TABS tablet Take 1 tablet by mouth daily      rivaroxaban ANTICOAGULANT (XARELTO ANTICOAGULANT) 20 MG TABS tablet TAKE 1 TABLET DAILY BY MOUTH WITH DINNER 90 tablet 3    sildenafil (VIAGRA) 100 MG tablet Take 1 tablet (100 mg) by mouth daily as needed (erectile dysfunction) 10 tablet 0    tamsulosin (FLOMAX) 0.4 MG capsule TAKE 1 CAPSULE (0.4 MG) BY MOUTH EVERY EVENING 90 capsule 1    TRULICITY 0.75 MG/0.5ML pen INJECT 0.75 MG SUBCUTANEOUSEVERY 7 DAYS 2 mL 3    vitamin B-12 (CYANOCOBALAMIN) 250 MCG tablet Take 250 mcg by mouth daily      vitamin C (ASCORBIC ACID) 500 MG tablet Take 500 mg by mouth daily       No current facility-administered medications for this visit.          Allergies   Allergen Reactions    Ace Inhibitors      Per [facility]: hyperkalemia.  Pt doesn't know if he is allergic    Ceclor [Cefaclor] Hives    Sulfa Antibiotics      Pt unsure.     Tomato Flavor [Flavoring Agent]      Tomato Paste       No family  history on file.    Social History     Socioeconomic History    Marital status: Single     Spouse name: None    Number of children: None    Years of education: None    Highest education level: None   Occupational History    None   Tobacco Use    Smoking status: Never Smoker    Smokeless tobacco: Former User     Types: Chew   Substance and Sexual Activity    Alcohol use: Not Currently    Drug use: Never    Sexual activity: None   Other Topics Concern    Parent/sibling w/ CABG, MI or angioplasty before 65F 55M? Not Asked   Social History Narrative    None     Social Determinants of Health     Financial Resource Strain:     Difficulty of Paying Living Expenses:    Food Insecurity:     Worried About Running Out of Food in the Last Year:     Ran Out of Food in the Last Year:    Transportation Needs:     Lack of Transportation (Medical):     Lack of Transportation (Non-Medical):    Physical Activity:     Days of Exercise per Week:     Minutes of Exercise per Session:    Stress:     Feeling of Stress :    Social Connections:     Frequency of Communication with Friends and Family:     Frequency of Social Gatherings with Friends and Family:     Attends Spiritism Services:     Active Member of Clubs or Organizations:     Attends Club or Organization Meetings:     Marital Status:    Intimate Partner Violence:     Fear of Current or Ex-Partner:     Emotionally Abused:     Physically Abused:     Sexually Abused:        ROS: 10 point ROS neg other than the symptoms noted above in the HPI.  EXAM  Constitutional: healthy, alert and no distress    Psychiatric: mentation appears normal and affect normal/bright    VASCULAR:  -Dorsalis pedis pulse +2/4 b/l  -Posterior tibial pulse +1/4 b/l  -Capillary refill time < 3 seconds to b/l hallux  -Hair growth absent to b/l anterior legs and ankles  NEURO:  -Epicritic and protective sensation absent to the bilateral foot  DERM:  -Skin temperature within normal limits to bilateral  foot    -Toenails elongated, thickened, dystrophic and discolored x 9  ---LEFT hallux toenail removed. No open wounds and no drainage.  Wound Location:  LEFT medial hallux IPJ    08/14/2024: Healed  ---Moderate dried blood and loose scab over the wound site with no wound post removal of the dried blood    07/11/2024  Measurement:  0.5cm x 0.4cm x 0.2cm to subcutaneous tissue layer  Drainage:  Moderate serous  Odor:  None  Undermining:  None  Edges:  Intact with no edin-wound maceration  Base:  20% fibrotic with  mild necrosis and 80% viable   Surrounding Skin: Intact with minimal rubor, no erythema, no calor  No severe erythema, no ascending erythema, no calor, no purulence, no malodor, no other signs of infection.     06/12/2024  Measurement:  0.9cm x 0.9cm x 0.2cm to subcutaneous tissue layer  Drainage:  Moderate serous  Odor:  None  Undermining:  None  Edges:  Intact with no edin-wound maceration  Base:  60% fibrotic with  mild necrosis and 40% viable   Surrounding Skin: Intact with minimal rubor, no erythema, no calor  No severe erythema, no ascending erythema, no calor, no purulence, no malodor, no other signs of infection.     05/22/2024:  1.3cm x 1.4cm x 0.2cm to subcutaneous tissue layer  05/08/2024:  1.4cm x 1.5cm x 0.2cm to subcutaneous tissue layer  04/24/2024:  1.4cm x 1.4cm x 0.2cm to subcutaneous tissue layer  04/10/2024:  1.8cm x 1.5cm x 0.2cm to subcutaneous tissue layer  03/25/2024:  1.7cm x 1.6cm x 0.2cm to subcutaneous tissue layer  03/13/2024:  1.8cm x 1.8cm x 0.4cm to the capsular layer of the toe   02/29/2024:  2cm x 1.9cm x 0.1cm to subcutaneous tissue  02/22/2024:  1.8cm x 1.8cm x 0.1cm to subcutaneous tissue  02/06/2024:  1.8cm x 1.8cm x 0.1cm to subcutaneous tissue  01/30/2024:  1.6cm x 2.3cm x 0.1cm to subcutaneous tissue    MSK:  -Adductovarus rotation of the bilateral fifth toe  -Muscle strength of ankles +5/5 for dorsiflexion, plantarflexion, ABDUction and ADDuction b/l    BILATERAL ANUSHKA  05/08/2024  Impression:      Right leg: Noncompressible arteries in the right lower leg. The right  TBI is 0.78.     Left leg: Resting ANUSHKA is 1.05, normal. The left TBI is 1.17.  ============================================================    ASSESSMENT:    (Z86.31) Healed diabetic foot ulcer  (primary encounter diagnosis)    (E11.9) Diabetes mellitus type 2, noninsulin dependent (H)    (E11.42) Diabetic polyneuropathy associated with type 2 diabetes mellitus (H)      PLAN:  -Patient evaluated and examined. Treatment options discussed with no educational barriers noted.    -Toenails last debrided on 06/12/2024    ---------------------------------------------    Diabetic foot wound:  -Outer callus cared for and there are no open wounds on the toe today. Covered wound site with dry gauze and tape. Patient advised to have his  continue with dressings two more weeks, then he may discontinue the dressing     -Offloading: Patient has a post-op shoe at home. He may resume regular shoes. If the wound re-opens, he is advised to call the clinic. He is in agreement with th is plan.    -Bilateral ANUSHKA 05/08/2024: Adequate for wound healing. Within normal limits on the LEFT and non-compressible on the RIGHT but TBI for the RIGHT foot is adequate for wound healing.    -Patient in agreement with the above treatment plan and all of patient's questions were answered.      Return to clinic four weeks to evaluate wound on the LEFT medial hallux [healed] ulceration       Meenu Orourke DPM

## 2024-08-15 ENCOUNTER — TELEPHONE (OUTPATIENT)
Dept: WOUND CARE | Facility: OTHER | Age: 84
End: 2024-08-15

## 2024-08-15 NOTE — TELEPHONE ENCOUNTER
----- Message from Meenu Orourke sent at 8/14/2024  8:23 PM CDT -----  Regarding: FW: Radha oGmez,    Could you please call this patient and advised him to apply the peanut butter (Iodosorb) on his foot every two days (with gauze and tape) for two more weeks. Then switch to dry gauze. If there is no drainage on the dry gauze for two dressings in a row, then he may stop applying all dressings. If it starts draining, resume the peanut butter dressing every two days.    Thank you  ----- Message -----  From: Halle Mendez  Sent: 8/14/2024   3:33 PM CDT  To: Patricia Bowie RN; Meenu Orourke DPM  Subject: Peanut Butter                                    Hi Dr Orourke,     I had a call from this patient asking when he should be applying the peanut butter to his foot. He was seen in the clinic today.     Patients phone number is [phone]

## 2024-08-15 NOTE — TELEPHONE ENCOUNTER
RN CC spoke with pt this day and reiterated the information from Dr. Orourke. Pt verbalized understanding.     Patricia Bowie RN on 8/15/2024 at 2:50 PM

## 2024-08-28 ENCOUNTER — OFFICE VISIT (OUTPATIENT)
Dept: PODIATRY | Facility: OTHER | Age: 84
End: 2024-08-28
Attending: PODIATRIST
Payer: MEDICARE

## 2024-08-28 VITALS
OXYGEN SATURATION: 93 % | TEMPERATURE: 97.3 F | DIASTOLIC BLOOD PRESSURE: 70 MMHG | HEART RATE: 89 BPM | SYSTOLIC BLOOD PRESSURE: 116 MMHG | RESPIRATION RATE: 18 BRPM

## 2024-08-28 DIAGNOSIS — E11.42 DIABETIC POLYNEUROPATHY ASSOCIATED WITH TYPE 2 DIABETES MELLITUS (H): ICD-10-CM

## 2024-08-28 DIAGNOSIS — L60.3 ONYCHODYSTROPHY: ICD-10-CM

## 2024-08-28 DIAGNOSIS — Z13.89 SCREENING FOR DIABETIC PERIPHERAL NEUROPATHY: ICD-10-CM

## 2024-08-28 DIAGNOSIS — E11.9 DIABETES MELLITUS TYPE 2, NONINSULIN DEPENDENT (H): ICD-10-CM

## 2024-08-28 DIAGNOSIS — Z86.31 HEALED DIABETIC FOOT ULCER: Primary | ICD-10-CM

## 2024-08-28 PROCEDURE — 99212 OFFICE O/P EST SF 10 MIN: CPT | Mod: 25 | Performed by: PODIATRIST

## 2024-08-28 PROCEDURE — G0463 HOSPITAL OUTPT CLINIC VISIT: HCPCS | Mod: 25

## 2024-08-28 PROCEDURE — 11721 DEBRIDE NAIL 6 OR MORE: CPT | Performed by: PODIATRIST

## 2024-08-28 PROCEDURE — 99207 PR FOOT EXAM NO CHARGE: CPT | Performed by: PODIATRIST

## 2024-08-28 ASSESSMENT — PAIN SCALES - GENERAL: PAINLEVEL: NO PAIN (0)

## 2024-08-28 NOTE — PATIENT INSTRUCTIONS
Instructions from 08/28/2024:  -The wound is healed. Apply dry gauze every two days for the next two weeks.  ---IF thre is no drainage and the wound remains healed, you may discontinue the dressings in two weeks  ---IF the drainage resumes, go back to using the Iodosorb, gauze and tape    Check the feet daily for signs of infection and call podiatry or go to the Emergency Department with any signs of infection (redness, swelling, pain, purulence, fever, chills, nausea, vomiting).

## 2024-08-28 NOTE — PROGRESS NOTES
Chief complaint: Patient presents with:  WOUND CARE      History of Present Illness: This 83-year-old NIDDM II male is seen for follow-up management of a diabetic foot ulcer on the plantar medial hallux ulcer. His  has continued to assist him with a dressing change every 1-2 days with Iodosorb, gauze and tape. He says a few days ago, his  thought the patient was developing a new blister on the side of the previous wound site. He has had the dressings re-applied to the toe in case there was still an open wound. He is not having pain from the toe. He does think his toenails are getting long and he would like them debrided today.    No further pedal complaints today.       GFR Estimate   Date Value Ref Range Status   06/26/2024 57 (L) >60 mL/min/1.73m2 Final     Comment:     eGFR calculated using 2021 CKD-EPI equation.   04/29/2021 66 >60 mL/min/[1.73_m2] Final     Comment:     Non  GFR Calc  Starting 12/18/2018, serum creatinine based estimated GFR (eGFR) will be   calculated using the Chronic Kidney Disease Epidemiology Collaboration   (CKD-EPI) equation.       GFR Estimate If Black   Date Value Ref Range Status   04/29/2021 77 >60 mL/min/[1.73_m2] Final     Comment:      GFR Calc  Starting 12/18/2018, serum creatinine based estimated GFR (eGFR) will be   calculated using the Chronic Kidney Disease Epidemiology Collaboration   (CKD-EPI) equation.           Lab Results   Component Value Date    A1C 6.7 11/20/2023    A1C 6.0 03/09/2023    A1C 8.2 12/08/2022    A1C 6.3 06/06/2022    A1C 5.6 03/04/2022    A1C 6.5 05/06/2021       /70   Pulse 89   Temp 97.3  F (36.3  C) (Tympanic)   Resp 18   SpO2 93%     Patient Active Problem List   Diagnosis    Urinary retention    Generalized weakness    Type 2 diabetes mellitus with stage 3 chronic kidney disease, without long-term current use of insulin (H)    Tachycardia    Hypoxia    Other acute pulmonary embolism  with acute cor pulmonale (H)    Dyspnea, unspecified type    Benign prostatic hyperplasia with urinary retention    Chronic kidney disease, stage III (moderate) (H)    Chronic obstructive lung disease (H)    Chronic painful diabetic neuropathy (H)    Erectile dysfunction    Essential hypertension    Gout, unspecified    History of CVA (cerebrovascular accident)    Hyperlipidemia    IBS (irritable bowel syndrome)    Lumbar spondylosis    Mild concentric left ventricular hypertrophy (LVH)    Moderate episode of recurrent major depressive disorder (H)    Spinal stenosis of lumbar region without neurogenic claudication    Uncomplicated alcohol dependence (H)    Onychomycosis of left great toe    Pincer nail deformity    Complicated UTI (urinary tract infection)    Sepsis with acute renal failure (H)       Past Surgical History:   Procedure Laterality Date    AMPUTATE TOE(S) Left 6/27/2022    Procedure: Left fifth toe partial versus full amputation;  Surgeon: Meenu Orourke DPM;  Location: [facility]    CYSTOSCOPY, TRANSURETHRAL RESECTION (TUR) PROSTATE, COMBINED N/A 10/15/2021    Procedure: CYSTOSCOPY, WITH TRANSURETHRAL RESECTION PROSTATE;  Surgeon: Indu De Leon MD;  Location: [facility]    HERNIA REPAIR      PHACOEMULSIFICATION WITH STANDARD INTRAOCULAR LENS IMPLANT Left 12/27/2021    Procedure: PHACOEMULSIFICATION CATARACT EXTRACTION POSTERIOR CHAMBER LENS LEFT EYE;  Surgeon: Phillip Maldonado MD;  Location: [facility]    PHACOEMULSIFICATION WITH STANDARD INTRAOCULAR LENS IMPLANT Right 1/11/2022    Procedure: PHACOEMULSIFICATION CATARACT EXTRACTION POSTERIOR CHAMBER LENS RIGHT EYE;  Surgeon: Phillip Maldonado MD;  Location: [facility]       Current Outpatient Medications   Medication Sig Dispense Refill    acetaminophen (TYLENOL) 500 MG tablet Take 1,000 mg by mouth 2 times daily . Limit acetaminophen to 4000 mg per day from all sources.      albuterol (PROAIR HFA/PROVENTIL HFA/VENTOLIN HFA) 108 (90 Base) MCG/ACT inhaler Inhale  2 puffs into the lungs every 4 hours as needed for cough, shortness of breath or wheezing 18 g 3    allopurinol (ZYLOPRIM) 100 MG tablet TAKE 1 TABLET BY MOUTH ONCEDAILY 90 tablet 3    amLODIPine (NORVASC) 2.5 MG tablet TAKE ONE TABLET BY MOUTH DAILY 90 tablet 1    atorvastatin (LIPITOR) 80 MG tablet TAKE ONE-HALF TABLET (40 MG) BY MOUTH EVERY EVENING 90 tablet 1    carvedilol (COREG) 25 MG tablet TAKE 1 TABLET BY MOUTH TWICE DAILY 180 tablet 1    DENTA 5000 PLUS 1.1 % CREA USE NIGHTLY,BRUSH WEEL FOR 2 MINUTES,SPIT OUT EXCESS, NO RINSING,AVOID EATING DRINKING FOR 30 MINUTES      finasteride (PROSCAR) 5 MG tablet TAKE 1 TABLET (5 MG) BY MOUTH DAILY 30 tablet 4    FLUoxetine (PROZAC) 10 MG capsule Take 1 capsule (10 mg) by mouth daily 90 capsule 1    gabapentin (NEURONTIN) 300 MG capsule TAKE 1 CAPSULE (300 MG) BY MOUTH 3 TIMES DAILY 270 capsule 0    insulin pen needle (ULTICARE MINI) 31G X 6 MM miscellaneous USE WITH TRULICITY 100 each 3    metFORMIN (GLUCOPHAGE XR) 500 MG 24 hr tablet TAKE 2 TABLETS BY MOUTH TWICE DAILY WITH MEALS 360 tablet 0    multivitamin, therapeutic (THERA-VIT) TABS tablet Take 1 tablet by mouth daily      rivaroxaban ANTICOAGULANT (XARELTO ANTICOAGULANT) 20 MG TABS tablet TAKE 1 TABLET DAILY BY MOUTH WITH DINNER 90 tablet 3    sildenafil (VIAGRA) 100 MG tablet Take 1 tablet (100 mg) by mouth daily as needed (erectile dysfunction) 10 tablet 0    tamsulosin (FLOMAX) 0.4 MG capsule TAKE 1 CAPSULE (0.4 MG) BY MOUTH EVERY EVENING 90 capsule 1    TRULICITY 0.75 MG/0.5ML pen INJECT 0.75 MG SUBCUTANEOUSEVERY 7 DAYS 2 mL 3    vitamin B-12 (CYANOCOBALAMIN) 250 MCG tablet Take 250 mcg by mouth daily      vitamin C (ASCORBIC ACID) 500 MG tablet Take 500 mg by mouth daily       No current facility-administered medications for this visit.          Allergies   Allergen Reactions    Ace Inhibitors      Per [facility]: hyperkalemia.  Pt doesn't know if he is allergic    Ceclor [Cefaclor] Hives    Sulfa  Antibiotics      Pt unsure.     Tomato Flavor [Flavoring Agent]      Tomato Paste       No family history on file.    Social History     Socioeconomic History    Marital status: Single     Spouse name: None    Number of children: None    Years of education: None    Highest education level: None   Occupational History    None   Tobacco Use    Smoking status: Never Smoker    Smokeless tobacco: Former User     Types: Chew   Substance and Sexual Activity    Alcohol use: Not Currently    Drug use: Never    Sexual activity: None   Other Topics Concern    Parent/sibling w/ CABG, MI or angioplasty before 65F 55M? Not Asked   Social History Narrative    None     Social Determinants of Health     Financial Resource Strain:     Difficulty of Paying Living Expenses:    Food Insecurity:     Worried About Running Out of Food in the Last Year:     Ran Out of Food in the Last Year:    Transportation Needs:     Lack of Transportation (Medical):     Lack of Transportation (Non-Medical):    Physical Activity:     Days of Exercise per Week:     Minutes of Exercise per Session:    Stress:     Feeling of Stress :    Social Connections:     Frequency of Communication with Friends and Family:     Frequency of Social Gatherings with Friends and Family:     Attends Synagogue Services:     Active Member of Clubs or Organizations:     Attends Club or Organization Meetings:     Marital Status:    Intimate Partner Violence:     Fear of Current or Ex-Partner:     Emotionally Abused:     Physically Abused:     Sexually Abused:        ROS: 10 point ROS neg other than the symptoms noted above in the HPI.  EXAM  Constitutional: healthy, alert and no distress    Psychiatric: mentation appears normal and affect normal/bright    VASCULAR:  -Dorsalis pedis pulse +2/4 b/l  -Posterior tibial pulse +1/4 b/l  -Capillary refill time < 3 seconds to b/l hallux  -Hair growth absent to b/l anterior legs and ankles  NEURO:  -Epicritic and protective sensation  absent to the bilateral foot  DERM:  -Skin temperature within normal limits to bilateral foot    -Toenails elongated, thickened, dystrophic and discolored x 9  ---LEFT hallux toenail removed. No open wounds and no drainage.  Wound Location:  LEFT medial hallux IPJ    08/29/2024: Healed    08/14/2024: Healed  ---Moderate dried blood and loose scab over the wound site with no wound post removal of the dried blood    07/11/2024  Measurement:  0.5cm x 0.4cm x 0.2cm to subcutaneous tissue layer  Drainage:  Moderate serous  Odor:  None  Undermining:  None  Edges:  Intact with no edin-wound maceration  Base:  20% fibrotic with  mild necrosis and 80% viable   Surrounding Skin: Intact with minimal rubor, no erythema, no calor  No severe erythema, no ascending erythema, no calor, no purulence, no malodor, no other signs of infection.     06/12/2024:  0.9cm x 0.9cm x 0.2cm to subcutaneous tissue layer  05/22/2024:  1.3cm x 1.4cm x 0.2cm to subcutaneous tissue layer  05/08/2024:  1.4cm x 1.5cm x 0.2cm to subcutaneous tissue layer  04/24/2024:  1.4cm x 1.4cm x 0.2cm to subcutaneous tissue layer  04/10/2024:  1.8cm x 1.5cm x 0.2cm to subcutaneous tissue layer  03/25/2024:  1.7cm x 1.6cm x 0.2cm to subcutaneous tissue layer  03/13/2024:  1.8cm x 1.8cm x 0.4cm to the capsular layer of the toe   02/29/2024:  2cm x 1.9cm x 0.1cm to subcutaneous tissue  02/22/2024:  1.8cm x 1.8cm x 0.1cm to subcutaneous tissue  02/06/2024:  1.8cm x 1.8cm x 0.1cm to subcutaneous tissue  01/30/2024:  1.6cm x 2.3cm x 0.1cm to subcutaneous tissue    MSK:  -Adductovarus rotation of the bilateral fifth toe  -Muscle strength of ankles +5/5 for dorsiflexion, plantarflexion, ABDUction and ADDuction b/l    BILATERAL ANUSHKA 05/08/2024  Impression:      Right leg: Noncompressible arteries in the right lower leg. The right  TBI is 0.78.     Left leg: Resting ANUSHKA is 1.05, normal. The left TBI is  1.17.  ============================================================    ASSESSMENT:    (Z86.31) Healed diabetic foot ulcer  (primary encounter diagnosis)    (E11.9) Diabetes mellitus type 2, noninsulin dependent (H)    (E11.42) Diabetic polyneuropathy associated with type 2 diabetes mellitus (H)      PLAN:  -Patient evaluated and examined. Treatment options discussed with no educational barriers noted.    -High risk toenail debridement x 9 toenails without incident on 08/28/2024    -Diabetic Foot Education provided. This included checking the feet daily looking for new new blisters or wounds, wearing shoes at all times when walking including around the house, and avoiding lotion application between the toes. If there are any signs of infection, the patient should present to the ED as soon as possible. Infections of the foot can be life threatening or lead to amputations of the foot or leg.    ---------------------------------------------    Diabetic foot wound:  -Outer callus cared for and there are no open wounds on the toe today. Covered wound site with Iodosorb, dry gauze and tape. Patient advised to have his  continue with dressings two more weeks with dry gauze only. If there is still no drainage, then he may discontinue the dressings. If the wound re-opens / if he sees drainage, then he is advised to resume the Iodosorb dressings.  -Patient was instructed to look for signs of infection (redness, swelling, pain, purulence, fever, chills, nausea, vomiting) and to return to podiatry or the emergency department immediately if there are any signs of infection.     -Offloading: Patient has a post-op shoe at home. He may continue wearing regular shoes. If the wound re-opens, he is advised to call the clinic. He is in agreement with  is plan.    -Bilateral ANUSHKA 05/08/2024: Adequate for wound healing. Within normal limits on the LEFT and non-compressible on the RIGHT but TBI for the RIGHT foot is adequate  for wound healing.    -Patient in agreement with the above treatment plan and all of patient's questions were answered.      Return to clinic four weeks to evaluate wound on the LEFT medial hallux [healed] ulceration       Meenu Orourke DPM

## 2024-08-29 ENCOUNTER — TELEPHONE (OUTPATIENT)
Dept: FAMILY MEDICINE | Facility: OTHER | Age: 84
End: 2024-08-29

## 2024-08-29 NOTE — TELEPHONE ENCOUNTER
Has been seeing Dr. Simpson (oral surgeon), and the doctor noticed a white spot on tongue, they are wanting to do a biopsy on that area, and due to him being on Xarelto they are requesting an order for him to HOLD Xarelto for 2 days prior to the procedure(this has not been schedule yet,per patient the oral surgeon is waiting for the okay first before surgery is scheduled.

## 2024-08-29 NOTE — TELEPHONE ENCOUNTER
3:33 PM    Reason for Call: Phone Call    Description: pt needs to talk to Dr. Awan care team about zarelto skipping for a biopsy for pt can get scheduled for the procedure. Please call     Was an appointment offered for this call? No  If yes : Appointment type              Date    Preferred method for responding to this message: Telephone Call  What is your phone number ? [phone]     If we cannot reach you directly, may we leave a detailed response at the number you provided? No    Can this message wait until your PCP/provider returns, if available today? Joseline Camarillo

## 2024-09-24 ENCOUNTER — OFFICE VISIT (OUTPATIENT)
Dept: CHIROPRACTIC MEDICINE | Facility: OTHER | Age: 84
End: 2024-09-24
Attending: CHIROPRACTOR
Payer: MEDICARE

## 2024-09-24 DIAGNOSIS — M99.01 SEGMENTAL AND SOMATIC DYSFUNCTION OF CERVICAL REGION: ICD-10-CM

## 2024-09-24 DIAGNOSIS — M99.02 SEGMENTAL AND SOMATIC DYSFUNCTION OF THORACIC REGION: ICD-10-CM

## 2024-09-24 DIAGNOSIS — M54.50 ACUTE BILATERAL LOW BACK PAIN WITHOUT SCIATICA: ICD-10-CM

## 2024-09-24 DIAGNOSIS — M99.03 SEGMENTAL AND SOMATIC DYSFUNCTION OF LUMBAR REGION: Primary | ICD-10-CM

## 2024-09-24 PROCEDURE — 98941 CHIROPRACT MANJ 3-4 REGIONS: CPT | Mod: AT | Performed by: CHIROPRACTOR

## 2024-09-27 NOTE — PROGRESS NOTES
Assessment & Plan     Type 2 diabetes mellitus with stage 3a chronic kidney disease, without long-term current use of insulin (H)  Updating A1c today.  Admits he likes his sweets.  Change in coverage come 1/2025 - will need to change GLP1.  Prior Ozempic before trulicity.  Due for urine - lab ordered- might have to return sample.  - Albumin Random Urine Quantitative with Creat Ratio; Future  - Hemoglobin A1c; Future  - dulaglutide (TRULICITY) 0.75 MG/0.5ML pen; Inject 0.75 mg subcutaneously every 7 days.    Hyperlipidemia, unspecified hyperlipidemia type  Stable.  Fasting labs due 3/2025.  Continue statin.    Essential hypertension  Stable.    Stage 3a chronic kidney disease (H)  Stable.    Benign prostatic hyperplasia with urinary retention  Stable.    Moderate episode of recurrent major depressive disorder (H)  Stable.    History of CVA (cerebrovascular accident)  Secondary prevention.    Need for prophylactic vaccination and inoculation against influenza  Flu and covid vaccines given.            See Patient Instructions    Return in about 6 months (around 3/30/2025) for CDM and fasting labs.    Pj Cruz is a 83 year old, presenting for the following health issues:  Hypertension, Diabetes, Lipids, Kidney Problem, Anxiety, Depression, and CVA        9/30/2024     2:13 PM   Additional Questions   Roomed by SEAN Loomis CMA   Accompanied by Self         9/30/2024     2:13 PM   Patient Reported Additional Medications   Patient reports taking the following new medications None     HPI     Vaccines - agreeable flu/covid today.    Lab - urine - due today; A1c as well.   Declines urine testing.    Podiatry today as well.    .     Diabetes Follow-up  How often are you checking your blood sugar? Not at all  What concerns do you have today about your diabetes? None   Do you have any of these symptoms? (Select all that apply)  Numbness in feet, Burning in feet, and Redness, sores, or blisters on  feet  Trulicity - notice from insurance  - only covered until 1/2025; note to BENJAMÍN.    Hyperlipidemia Follow-Up  Are you regularly taking any medication or supplement to lower your cholesterol?   Yes- Lipitor 80 mg  Are you having muscle aches or other side effects that you think could be caused by your cholesterol lowering medication?  No    Hypertension Follow-up  Do you check your blood pressure regularly outside of the clinic? No   Are you following a low salt diet? No  Are your blood pressures ever more than 140 on the top number (systolic) OR more   than 90 on the bottom number (diastolic), for example 140/90? No    BP Readings from Last 2 Encounters:   08/28/24 116/70   08/14/24 112/73     Hemoglobin A1C (%)   Date Value   06/26/2024 6.0 (H)   03/25/2024 5.9 (H)   05/06/2021 6.5 (H)     LDL Cholesterol Calculated (mg/dL)   Date Value   03/25/2024 48   03/09/2023 52       Depression and Anxiety   How are you doing with your depression since your last visit? No change  How are you doing with your anxiety since your last visit?  No change  Are you having other symptoms that might be associated with depression or anxiety? No  Have you had a significant life event? No   Do you have any concerns with your use of alcohol or other drugs? No    Social History     Tobacco Use    Smoking status: Never     Passive exposure: Never    Smokeless tobacco: Former     Types: Chew   Vaping Use    Vaping status: Never Used   Substance Use Topics    Alcohol use: Yes     Comment: daily, 4 today    Drug use: Never         12/8/2022     2:19 PM 11/20/2023     9:45 AM 9/30/2024     2:14 PM   PHQ   PHQ-9 Total Score 0 0 0   Q9: Thoughts of better off dead/self-harm past 2 weeks Not at all Not at all Not at all         12/8/2022     2:20 PM 11/20/2023     9:46 AM 8/14/2024    12:00 PM   SHANNON-7 SCORE   Total Score 0 7 2         9/30/2024     2:14 PM   Last PHQ-9   1.  Little interest or pleasure in doing things 0   2.  Feeling down,  depressed, or hopeless 0   3.  Trouble falling or staying asleep, or sleeping too much 0   4.  Feeling tired or having little energy 0   5.  Poor appetite or overeating 0   6.  Feeling bad about yourself 0   7.  Trouble concentrating 0   8.  Moving slowly or restless 0   Q9: Thoughts of better off dead/self-harm past 2 weeks 0   PHQ-9 Total Score 0   Difficulty at work, home, or with people Not difficult at all         8/14/2024    12:00 PM   SHANNON-7    1. Feeling nervous, anxious, or on edge 1   2. Not being able to stop or control worrying 0   3. Worrying too much about different things 0   4. Trouble relaxing 0   5. Being so restless that it is hard to sit still 0   6. Becoming easily annoyed or irritable 1   7. Feeling afraid, as if something awful might happen 0   SHANNON-7 Total Score 2       Suicide Assessment Five-step Evaluation and Treatment (SAFE-T)      Cerebrovascular Follow-up  Patient history: ischemic cerebrovascular incident  Residual symptoms: None  Worsened or new symptoms since last visit: No  Daily aspirin use: no  Hypertension controlled: Yes    Chronic Kidney Disease Follow-up  Do you take any over the counter pain medicine?: Yes  What over the counter medicine are you taking for your pain?:  Tyelnol    How often do you take this medicine?:  Three times daily        Review of Systems  Constitutional, HEENT, cardiovascular, pulmonary, gi and gu systems are negative, except as otherwise noted.      Objective    Pulse 72   Temp 98.7  F (37.1  C) (Tympanic)   Resp 16   Wt 78.5 kg (173 lb)   SpO2 93%   BMI 22.82 kg/m    Body mass index is 22.82 kg/m .  Physical Exam   GENERAL: alert, no distress, elderly, and in wheelchair  EYES: Eyes grossly normal to inspection, PERRL and conjunctivae and sclerae normal  NECK: no adenopathy, no asymmetry, masses, or scars  RESP: lungs clear to auscultation - no rales, rhonchi or wheezes  CV: regular rate and rhythm, normal S1 S2, no S3 or S4, no murmur, click or  rub, no peripheral edema  ABDOMEN: soft, nontender, no hepatosplenomegaly, no masses and bowel sounds normal  MS: no gross musculoskeletal defects noted, no edema  SKIN: no suspicious lesions or rashes  PSYCH: mentation appears normal, affect normal/bright    Labs pending - A1c, urine microalb.        Signed Electronically by: Zoila Azul MD

## 2024-09-30 ENCOUNTER — OFFICE VISIT (OUTPATIENT)
Dept: FAMILY MEDICINE | Facility: OTHER | Age: 84
End: 2024-09-30
Attending: FAMILY MEDICINE
Payer: MEDICARE

## 2024-09-30 ENCOUNTER — OFFICE VISIT (OUTPATIENT)
Dept: PODIATRY | Facility: OTHER | Age: 84
End: 2024-09-30
Attending: PODIATRIST
Payer: MEDICARE

## 2024-09-30 VITALS
OXYGEN SATURATION: 93 % | TEMPERATURE: 98.7 F | SYSTOLIC BLOOD PRESSURE: 116 MMHG | HEART RATE: 72 BPM | RESPIRATION RATE: 16 BRPM | DIASTOLIC BLOOD PRESSURE: 70 MMHG

## 2024-09-30 VITALS
BODY MASS INDEX: 22.82 KG/M2 | TEMPERATURE: 98.7 F | WEIGHT: 173 LBS | RESPIRATION RATE: 16 BRPM | HEART RATE: 72 BPM | OXYGEN SATURATION: 93 %

## 2024-09-30 DIAGNOSIS — Z86.31 HEALED DIABETIC FOOT ULCER: Primary | ICD-10-CM

## 2024-09-30 DIAGNOSIS — E78.5 HYPERLIPIDEMIA, UNSPECIFIED HYPERLIPIDEMIA TYPE: ICD-10-CM

## 2024-09-30 DIAGNOSIS — F33.1 MODERATE EPISODE OF RECURRENT MAJOR DEPRESSIVE DISORDER (H): ICD-10-CM

## 2024-09-30 DIAGNOSIS — N40.1 BENIGN PROSTATIC HYPERPLASIA WITH URINARY RETENTION: ICD-10-CM

## 2024-09-30 DIAGNOSIS — I10 ESSENTIAL HYPERTENSION: ICD-10-CM

## 2024-09-30 DIAGNOSIS — R33.8 BENIGN PROSTATIC HYPERPLASIA WITH URINARY RETENTION: ICD-10-CM

## 2024-09-30 DIAGNOSIS — E11.22 TYPE 2 DIABETES MELLITUS WITH STAGE 3A CHRONIC KIDNEY DISEASE, WITHOUT LONG-TERM CURRENT USE OF INSULIN (H): Primary | ICD-10-CM

## 2024-09-30 DIAGNOSIS — E11.42 DIABETIC POLYNEUROPATHY ASSOCIATED WITH TYPE 2 DIABETES MELLITUS (H): ICD-10-CM

## 2024-09-30 DIAGNOSIS — N18.31 STAGE 3A CHRONIC KIDNEY DISEASE (H): ICD-10-CM

## 2024-09-30 DIAGNOSIS — Z23 NEED FOR PROPHYLACTIC VACCINATION AND INOCULATION AGAINST INFLUENZA: ICD-10-CM

## 2024-09-30 DIAGNOSIS — Z86.73 HISTORY OF CVA (CEREBROVASCULAR ACCIDENT): ICD-10-CM

## 2024-09-30 DIAGNOSIS — E11.9 DIABETES MELLITUS TYPE 2, NONINSULIN DEPENDENT (H): ICD-10-CM

## 2024-09-30 DIAGNOSIS — N18.31 TYPE 2 DIABETES MELLITUS WITH STAGE 3A CHRONIC KIDNEY DISEASE, WITHOUT LONG-TERM CURRENT USE OF INSULIN (H): Primary | ICD-10-CM

## 2024-09-30 LAB
EST. AVERAGE GLUCOSE BLD GHB EST-MCNC: 114 MG/DL
HBA1C MFR BLD: 5.6 %

## 2024-09-30 PROCEDURE — G0463 HOSPITAL OUTPT CLINIC VISIT: HCPCS | Mod: 25

## 2024-09-30 PROCEDURE — 83036 HEMOGLOBIN GLYCOSYLATED A1C: CPT | Mod: ZL | Performed by: FAMILY MEDICINE

## 2024-09-30 PROCEDURE — 90480 ADMN SARSCOV2 VAC 1/ONLY CMP: CPT

## 2024-09-30 PROCEDURE — 36415 COLL VENOUS BLD VENIPUNCTURE: CPT | Mod: ZL | Performed by: FAMILY MEDICINE

## 2024-09-30 PROCEDURE — G0463 HOSPITAL OUTPT CLINIC VISIT: HCPCS | Mod: 25,27

## 2024-09-30 PROCEDURE — 99212 OFFICE O/P EST SF 10 MIN: CPT | Performed by: PODIATRIST

## 2024-09-30 PROCEDURE — 99214 OFFICE O/P EST MOD 30 MIN: CPT | Performed by: FAMILY MEDICINE

## 2024-09-30 PROCEDURE — 90662 IIV NO PRSV INCREASED AG IM: CPT

## 2024-09-30 RX ORDER — DULAGLUTIDE 0.75 MG/.5ML
0.75 INJECTION, SOLUTION SUBCUTANEOUS
Qty: 2 ML | Refills: 3 | Status: SHIPPED | OUTPATIENT
Start: 2024-09-30

## 2024-09-30 ASSESSMENT — PAIN SCALES - GENERAL
PAINLEVEL: NO PAIN (0)
PAINLEVEL: NO PAIN (0)

## 2024-09-30 ASSESSMENT — PATIENT HEALTH QUESTIONNAIRE - PHQ9: SUM OF ALL RESPONSES TO PHQ QUESTIONS 1-9: 0

## 2024-09-30 NOTE — PROGRESS NOTES
Subjective Finding:    Chief compalint: Patient presents with:  Back Pain: With neck pain   , Pain Scale: 3/10, Intensity: sharp, Duration: 2 days, Radiating:  bilateral buttock.    Date of injury:     Activities that the pain restricts:   Home/household/hobbies/social activities: Yes.  Work duties: Yes.  Sleep: No.  Makes symptoms better: rest.  Makes symptoms worse: lumbar flexion.  Have you seen anyone else for the symptoms? No.  Work related: No.  Automobile related injury: No.    Objective and Assessment:    Posture Analysis:   High shoulder: .  Head tilt: .  High iliac crest: .  Head carriage: neutral.  Thoracic Kyphosis: neutral.  Lumbar Lordosis: forward.    Lumbar Range of Motion: flexion decreased and extension decreased.  Cervical Range of Motion: .  Thoracic Range of Motion: .  Extremity Range of Motion: .    Palpation:   Quad lumb: bilateral, referred pain: no    Segmental dysfunction pre-treatment and treatment area: C6, T6, L4, and L5.    Assessment post-treatment:  Cervical: ROM decreased.  Thoracic: ROM increased.  Lumbar: ROM increased.    Comments: .      Complicating Factors: .    Procedure(s):  CMT:  54546 Chiropractic manipulative treatment 3-4 regions performed   Cervical: Diversified, See above for level, Supine, Thoracic: Diversified, See above for level, Prone, and Lumbar: Diversified, See above for level, Side posture    Modalities:  None performed this visit    Therapeutic procedures:  None    Plan:  Treatment plan: PRN.  Instructed patient: walk 10 minutes.  Short term goals: reduce pain.  Long term goals: increase ADL.  Prognosis: very good.

## 2024-09-30 NOTE — PROGRESS NOTES
Chief complaint: Patient presents with:  Wound Check    History of Present Illness: This 83-year-old NIDDM II male is seen for follow-up management of a diabetic foot ulcer on the plantar medial hallux ulcer. His  has not applied a dressing or noticed drainage from the wound over the past couple of weeks. She continues to monitor the wound a few times a week to check for recurrence of the wound. The patient has no pain from his wound.    No further pedal complaints today.       GFR Estimate   Date Value Ref Range Status   06/26/2024 57 (L) >60 mL/min/1.73m2 Final     Comment:     eGFR calculated using 2021 CKD-EPI equation.   04/29/2021 66 >60 mL/min/[1.73_m2] Final     Comment:     Non  GFR Calc  Starting 12/18/2018, serum creatinine based estimated GFR (eGFR) will be   calculated using the Chronic Kidney Disease Epidemiology Collaboration   (CKD-EPI) equation.       GFR Estimate If Black   Date Value Ref Range Status   04/29/2021 77 >60 mL/min/[1.73_m2] Final     Comment:      GFR Calc  Starting 12/18/2018, serum creatinine based estimated GFR (eGFR) will be   calculated using the Chronic Kidney Disease Epidemiology Collaboration   (CKD-EPI) equation.           Lab Results   Component Value Date    A1C 6.7 11/20/2023    A1C 6.0 03/09/2023    A1C 8.2 12/08/2022    A1C 6.3 06/06/2022    A1C 5.6 03/04/2022    A1C 6.5 05/06/2021       /70   Pulse 72   Temp 98.7  F (37.1  C)   Resp 16   SpO2 93%     Patient Active Problem List   Diagnosis    Urinary retention    Generalized weakness    Type 2 diabetes mellitus with stage 3 chronic kidney disease, without long-term current use of insulin (H)    Tachycardia    Hypoxia    Other acute pulmonary embolism with acute cor pulmonale (H)    Dyspnea, unspecified type    Benign prostatic hyperplasia with urinary retention    Chronic kidney disease, stage III (moderate) (H)    Chronic obstructive lung disease (H)    Chronic  painful diabetic neuropathy (H)    Erectile dysfunction    Essential hypertension    Gout, unspecified    History of CVA (cerebrovascular accident)    Hyperlipidemia    IBS (irritable bowel syndrome)    Lumbar spondylosis    Mild concentric left ventricular hypertrophy (LVH)    Moderate episode of recurrent major depressive disorder (H)    Spinal stenosis of lumbar region without neurogenic claudication    Uncomplicated alcohol dependence (H)    Onychomycosis of left great toe    Pincer nail deformity    Complicated UTI (urinary tract infection)    Sepsis with acute renal failure (H)       Past Surgical History:   Procedure Laterality Date    AMPUTATE TOE(S) Left 6/27/2022    Procedure: Left fifth toe partial versus full amputation;  Surgeon: Meenu Orourke DPM;  Location: [facility]    CYSTOSCOPY, TRANSURETHRAL RESECTION (TUR) PROSTATE, COMBINED N/A 10/15/2021    Procedure: CYSTOSCOPY, WITH TRANSURETHRAL RESECTION PROSTATE;  Surgeon: Indu De Leon MD;  Location: [facility]    HERNIA REPAIR      PHACOEMULSIFICATION WITH STANDARD INTRAOCULAR LENS IMPLANT Left 12/27/2021    Procedure: PHACOEMULSIFICATION CATARACT EXTRACTION POSTERIOR CHAMBER LENS LEFT EYE;  Surgeon: Phillip Maldonado MD;  Location: [facility]    PHACOEMULSIFICATION WITH STANDARD INTRAOCULAR LENS IMPLANT Right 1/11/2022    Procedure: PHACOEMULSIFICATION CATARACT EXTRACTION POSTERIOR CHAMBER LENS RIGHT EYE;  Surgeon: Phillip Maldonado MD;  Location: [facility]       Current Outpatient Medications   Medication Sig Dispense Refill    acetaminophen (TYLENOL) 500 MG tablet Take 1,000 mg by mouth 2 times daily . Limit acetaminophen to 4000 mg per day from all sources.      albuterol (PROAIR HFA/PROVENTIL HFA/VENTOLIN HFA) 108 (90 Base) MCG/ACT inhaler Inhale 2 puffs into the lungs every 4 hours as needed for cough, shortness of breath or wheezing 18 g 3    allopurinol (ZYLOPRIM) 100 MG tablet TAKE 1 TABLET BY MOUTH ONCEDAILY 90 tablet 3    amLODIPine (NORVASC) 2.5 MG  tablet TAKE ONE TABLET BY MOUTH DAILY 90 tablet 1    atorvastatin (LIPITOR) 80 MG tablet TAKE ONE-HALF TABLET (40 MG) BY MOUTH EVERY EVENING 90 tablet 1    carvedilol (COREG) 25 MG tablet TAKE 1 TABLET BY MOUTH TWICE DAILY 180 tablet 1    DENTA 5000 PLUS 1.1 % CREA USE NIGHTLY,BRUSH WEEL FOR 2 MINUTES,SPIT OUT EXCESS, NO RINSING,AVOID EATING DRINKING FOR 30 MINUTES      dulaglutide (TRULICITY) 0.75 MG/0.5ML pen Inject 0.75 mg subcutaneously every 7 days. 2 mL 3    finasteride (PROSCAR) 5 MG tablet TAKE 1 TABLET (5 MG) BY MOUTH DAILY 30 tablet 4    FLUoxetine (PROZAC) 10 MG capsule Take 1 capsule (10 mg) by mouth daily 90 capsule 1    gabapentin (NEURONTIN) 300 MG capsule TAKE 1 CAPSULE (300 MG) BY MOUTH 3 TIMES DAILY 270 capsule 0    insulin pen needle (ULTICARE MINI) 31G X 6 MM miscellaneous USE WITH TRULICITY 100 each 3    metFORMIN (GLUCOPHAGE XR) 500 MG 24 hr tablet TAKE 2 TABLETS BY MOUTH TWICE DAILY WITH MEALS 360 tablet 0    multivitamin, therapeutic (THERA-VIT) TABS tablet Take 1 tablet by mouth daily      rivaroxaban ANTICOAGULANT (XARELTO ANTICOAGULANT) 20 MG TABS tablet TAKE 1 TABLET DAILY BY MOUTH WITH DINNER 90 tablet 3    sildenafil (VIAGRA) 100 MG tablet Take 1 tablet (100 mg) by mouth daily as needed (erectile dysfunction) 10 tablet 0    tamsulosin (FLOMAX) 0.4 MG capsule TAKE 1 CAPSULE (0.4 MG) BY MOUTH EVERY EVENING 90 capsule 1    vitamin B-12 (CYANOCOBALAMIN) 250 MCG tablet Take 250 mcg by mouth daily      vitamin C (ASCORBIC ACID) 500 MG tablet Take 500 mg by mouth daily       No current facility-administered medications for this visit.          Allergies   Allergen Reactions    Ace Inhibitors      Per [facility]: hyperkalemia.  Pt doesn't know if he is allergic    Ceclor [Cefaclor] Hives    Sulfa Antibiotics      Pt unsure.     Tomato Flavor [Flavoring Agent]      Tomato Paste       History reviewed. No pertinent family history.    Social History     Socioeconomic History    Marital status:  Single     Spouse name: None    Number of children: None    Years of education: None    Highest education level: None   Occupational History    None   Tobacco Use    Smoking status: Never Smoker    Smokeless tobacco: Former User     Types: Chew   Substance and Sexual Activity    Alcohol use: Not Currently    Drug use: Never    Sexual activity: None   Other Topics Concern    Parent/sibling w/ CABG, MI or angioplasty before 65F 55M? Not Asked   Social History Narrative    None     Social Determinants of Health     Financial Resource Strain:     Difficulty of Paying Living Expenses:    Food Insecurity:     Worried About Running Out of Food in the Last Year:     Ran Out of Food in the Last Year:    Transportation Needs:     Lack of Transportation (Medical):     Lack of Transportation (Non-Medical):    Physical Activity:     Days of Exercise per Week:     Minutes of Exercise per Session:    Stress:     Feeling of Stress :    Social Connections:     Frequency of Communication with Friends and Family:     Frequency of Social Gatherings with Friends and Family:     Attends Evangelical Services:     Active Member of Clubs or Organizations:     Attends Club or Organization Meetings:     Marital Status:    Intimate Partner Violence:     Fear of Current or Ex-Partner:     Emotionally Abused:     Physically Abused:     Sexually Abused:        ROS: 10 point ROS neg other than the symptoms noted above in the HPI.  EXAM  Constitutional: healthy, alert and no distress    Psychiatric: mentation appears normal and affect normal/bright    VASCULAR:  -Dorsalis pedis pulse +2/4 b/l  -Posterior tibial pulse +1/4 b/l  -Capillary refill time < 3 seconds to b/l hallux  -Hair growth absent to b/l anterior legs and ankles  NEURO:  -Epicritic and protective sensation absent to the bilateral foot  DERM:  -Skin temperature within normal limits to bilateral foot    -Toenails thickened, dystrophic and discolored x 9  ---LEFT hallux toenail removed.  No open wounds and no drainage.  Wound Location:  LEFT medial hallux IPJ    09/30/2024: Healed    08/29/2024: Healed    08/14/2024: Healed  ---Moderate dried blood and loose scab over the wound site with no wound post removal of the dried blood    07/11/2024  Measurement:  0.5cm x 0.4cm x 0.2cm to subcutaneous tissue layer  Drainage:  Moderate serous  Odor:  None  Undermining:  None  Edges:  Intact with no edin-wound maceration  Base:  20% fibrotic with  mild necrosis and 80% viable   Surrounding Skin: Intact with minimal rubor, no erythema, no calor  No severe erythema, no ascending erythema, no calor, no purulence, no malodor, no other signs of infection.     06/12/2024:  0.9cm x 0.9cm x 0.2cm to subcutaneous tissue layer  05/22/2024:  1.3cm x 1.4cm x 0.2cm to subcutaneous tissue layer  05/08/2024:  1.4cm x 1.5cm x 0.2cm to subcutaneous tissue layer  04/24/2024:  1.4cm x 1.4cm x 0.2cm to subcutaneous tissue layer  04/10/2024:  1.8cm x 1.5cm x 0.2cm to subcutaneous tissue layer  03/25/2024:  1.7cm x 1.6cm x 0.2cm to subcutaneous tissue layer  03/13/2024:  1.8cm x 1.8cm x 0.4cm to the capsular layer of the toe   02/29/2024:  2cm x 1.9cm x 0.1cm to subcutaneous tissue  02/22/2024:  1.8cm x 1.8cm x 0.1cm to subcutaneous tissue  02/06/2024:  1.8cm x 1.8cm x 0.1cm to subcutaneous tissue  01/30/2024:  1.6cm x 2.3cm x 0.1cm to subcutaneous tissue    MSK:  -Adductovarus rotation of the bilateral fifth toe  -Muscle strength of ankles +5/5 for dorsiflexion, plantarflexion, ABDUction and ADDuction b/l    BILATERAL ANUSHKA 05/08/2024  Impression:      Right leg: Noncompressible arteries in the right lower leg. The right  TBI is 0.78.     Left leg: Resting ANUSHKA is 1.05, normal. The left TBI is 1.17.  ============================================================    ASSESSMENT:    (Z86.31) Healed diabetic foot ulcer  (primary encounter diagnosis)    (E11.9) Diabetes mellitus type 2, noninsulin dependent (H)    (E11.42) Diabetic  polyneuropathy associated with type 2 diabetes mellitus (H)      PLAN:  -Patient evaluated and examined. Treatment options discussed with no educational barriers noted.    -Debrided toenails on 08/28/2024    ---------------------------------------------    Diabetic foot wound:  -Minimal outer callus cared for and there are no open wounds on the toe today. No further dressings needed.  -Patient to continue checking his wound daily. He says he  can help check his wound a few times a week and/or every other day.  -If the wound re-opens / if he sees drainage, then he is advised to resume the Iodosorb dressings and call the podiatry clinic.  -Patient was instructed to look for signs of infection (redness, swelling, pain, purulence, fever, chills, nausea, vomiting) and to return to podiatry or the emergency department immediately if there are any signs of infection.     -Bilateral ANUSHKA 05/08/2024: Adequate for wound healing. Within normal limits on the LEFT and non-compressible on the RIGHT but TBI for the RIGHT foot is adequate for wound healing.    -Patient in agreement with the above treatment plan and all of patient's questions were answered.      Return to clinic as one month for a diabetic foot exam and high risk nail debridement       Meenu Orourke DPM

## 2024-10-03 ENCOUNTER — APPOINTMENT (OUTPATIENT)
Dept: LAB | Facility: OTHER | Age: 84
End: 2024-10-03
Payer: MEDICARE

## 2024-10-03 LAB
CREAT UR-MCNC: 176.3 MG/DL
MICROALBUMIN UR-MCNC: 86.4 MG/L
MICROALBUMIN/CREAT UR: 49.01 MG/G CR (ref 0–17)

## 2024-10-03 PROCEDURE — 82043 UR ALBUMIN QUANTITATIVE: CPT | Mod: ZL | Performed by: FAMILY MEDICINE

## 2024-10-16 ENCOUNTER — TELEPHONE (OUTPATIENT)
Dept: WOUND CARE | Facility: OTHER | Age: 84
End: 2024-10-16

## 2024-10-16 NOTE — TELEPHONE ENCOUNTER
"TONY KNUTSON received a call from pt's friend Komal who stated \"I have been helping Anthony with his feet for about the last year. We thought that he was healed enough to not have to be seen by Dr. Orourke again but last night when I was soaking his feet I noticed that there was a dry spot where the wound was. Now this morning the spot looks open. Anthony will not go into the urgent care at all. I am looking to see if we can get him in to see Dr. Orourke.\" TONY KNUTSON explained that Dr. Orourke is in [facility] in today if pt would like to go see her there to which that offer was refused. RN CC offered an appointment for tomorrow at 11:30 which was accepted. RN CC then educated to monitor for any increased redness, pain, drainage, swelling, warmth, and/or fevers and to go to the urgent care to be evaluated. Komal was adamant that Anthony does not want to be seen and when \"we go to the urgent care in [place] they just want to cut off his toe. They aren't going to do anything for him.\" RN CC educated that there are other urgent care facilities that they can go to and get a second opinion to which this was denied.       Patricia Bowie RN on 10/16/2024 at 8:52 AM    "

## 2024-10-17 ENCOUNTER — OFFICE VISIT (OUTPATIENT)
Dept: PODIATRY | Facility: OTHER | Age: 84
End: 2024-10-17
Attending: PODIATRIST
Payer: MEDICARE

## 2024-10-17 VITALS
DIASTOLIC BLOOD PRESSURE: 70 MMHG | OXYGEN SATURATION: 93 % | RESPIRATION RATE: 18 BRPM | HEART RATE: 57 BPM | SYSTOLIC BLOOD PRESSURE: 122 MMHG | TEMPERATURE: 98.8 F

## 2024-10-17 DIAGNOSIS — E11.9 DIABETES MELLITUS TYPE 2, NONINSULIN DEPENDENT (H): ICD-10-CM

## 2024-10-17 DIAGNOSIS — L97.522 DIABETIC ULCER OF TOE OF LEFT FOOT ASSOCIATED WITH TYPE 2 DIABETES MELLITUS, WITH FAT LAYER EXPOSED (H): Primary | ICD-10-CM

## 2024-10-17 DIAGNOSIS — E11.42 DIABETIC POLYNEUROPATHY ASSOCIATED WITH TYPE 2 DIABETES MELLITUS (H): ICD-10-CM

## 2024-10-17 DIAGNOSIS — E11.621 DIABETIC ULCER OF TOE OF LEFT FOOT ASSOCIATED WITH TYPE 2 DIABETES MELLITUS, WITH FAT LAYER EXPOSED (H): Primary | ICD-10-CM

## 2024-10-17 PROCEDURE — 99213 OFFICE O/P EST LOW 20 MIN: CPT | Performed by: PODIATRIST

## 2024-10-17 PROCEDURE — G0463 HOSPITAL OUTPT CLINIC VISIT: HCPCS | Mod: 25

## 2024-10-17 PROCEDURE — G0463 HOSPITAL OUTPT CLINIC VISIT: HCPCS

## 2024-10-17 ASSESSMENT — PAIN SCALES - GENERAL: PAINLEVEL: NO PAIN (0)

## 2024-10-17 NOTE — PROGRESS NOTES
Chief complaint: Patient presents with:  Wound Check    History of Present Illness: This 84-year-old NIDDM II male is seen for follow-up management of a diabetic foot ulcer on the plantar medial hallux ulcer. His  resumed applying Iodosorb, gauze and tape when she noticed the wound re-opened. The patient called the office to have it evaluated because he was concerned that the wound had re-opened.    No further pedal complaints today.       GFR Estimate   Date Value Ref Range Status   06/26/2024 57 (L) >60 mL/min/1.73m2 Final     Comment:     eGFR calculated using 2021 CKD-EPI equation.   04/29/2021 66 >60 mL/min/[1.73_m2] Final     Comment:     Non  GFR Calc  Starting 12/18/2018, serum creatinine based estimated GFR (eGFR) will be   calculated using the Chronic Kidney Disease Epidemiology Collaboration   (CKD-EPI) equation.       GFR Estimate If Black   Date Value Ref Range Status   04/29/2021 77 >60 mL/min/[1.73_m2] Final     Comment:      GFR Calc  Starting 12/18/2018, serum creatinine based estimated GFR (eGFR) will be   calculated using the Chronic Kidney Disease Epidemiology Collaboration   (CKD-EPI) equation.           Lab Results   Component Value Date    A1C 6.7 11/20/2023    A1C 6.0 03/09/2023    A1C 8.2 12/08/2022    A1C 6.3 06/06/2022    A1C 5.6 03/04/2022    A1C 6.5 05/06/2021       /70   Pulse 57   Temp 98.8  F (37.1  C) (Tympanic)   Resp 18   SpO2 93%     Patient Active Problem List   Diagnosis    Urinary retention    Generalized weakness    Type 2 diabetes mellitus with stage 3 chronic kidney disease, without long-term current use of insulin (H)    Tachycardia    Hypoxia    Other acute pulmonary embolism with acute cor pulmonale (H)    Dyspnea, unspecified type    Benign prostatic hyperplasia with urinary retention    Chronic kidney disease, stage III (moderate) (H)    Chronic obstructive lung disease (H)    Chronic painful diabetic neuropathy (H)     Erectile dysfunction    Essential hypertension    Gout, unspecified    History of CVA (cerebrovascular accident)    Hyperlipidemia    IBS (irritable bowel syndrome)    Lumbar spondylosis    Mild concentric left ventricular hypertrophy (LVH)    Moderate episode of recurrent major depressive disorder (H)    Spinal stenosis of lumbar region without neurogenic claudication    Uncomplicated alcohol dependence (H)    Onychomycosis of left great toe    Pincer nail deformity    Complicated UTI (urinary tract infection)    Sepsis with acute renal failure (H)       Past Surgical History:   Procedure Laterality Date    AMPUTATE TOE(S) Left 6/27/2022    Procedure: Left fifth toe partial versus full amputation;  Surgeon: Meenu Orourke DPM;  Location: [facility]    CYSTOSCOPY, TRANSURETHRAL RESECTION (TUR) PROSTATE, COMBINED N/A 10/15/2021    Procedure: CYSTOSCOPY, WITH TRANSURETHRAL RESECTION PROSTATE;  Surgeon: Indu De Leon MD;  Location: [facility]    HERNIA REPAIR      PHACOEMULSIFICATION WITH STANDARD INTRAOCULAR LENS IMPLANT Left 12/27/2021    Procedure: PHACOEMULSIFICATION CATARACT EXTRACTION POSTERIOR CHAMBER LENS LEFT EYE;  Surgeon: Phillip Maldonado MD;  Location: [facility]    PHACOEMULSIFICATION WITH STANDARD INTRAOCULAR LENS IMPLANT Right 1/11/2022    Procedure: PHACOEMULSIFICATION CATARACT EXTRACTION POSTERIOR CHAMBER LENS RIGHT EYE;  Surgeon: Phillip Maldonado MD;  Location: [facility]       Current Outpatient Medications   Medication Sig Dispense Refill    acetaminophen (TYLENOL) 500 MG tablet Take 1,000 mg by mouth 2 times daily . Limit acetaminophen to 4000 mg per day from all sources.      albuterol (PROAIR HFA/PROVENTIL HFA/VENTOLIN HFA) 108 (90 Base) MCG/ACT inhaler Inhale 2 puffs into the lungs every 4 hours as needed for cough, shortness of breath or wheezing 18 g 3    allopurinol (ZYLOPRIM) 100 MG tablet TAKE 1 TABLET BY MOUTH ONCEDAILY 90 tablet 3    amLODIPine (NORVASC) 2.5 MG tablet TAKE ONE TABLET BY MOUTH  DAILY 90 tablet 1    atorvastatin (LIPITOR) 80 MG tablet TAKE ONE-HALF TABLET (40 MG) BY MOUTH EVERY EVENING 90 tablet 1    carvedilol (COREG) 25 MG tablet TAKE 1 TABLET BY MOUTH TWICE DAILY 180 tablet 1    DENTA 5000 PLUS 1.1 % CREA USE NIGHTLY,BRUSH WEEL FOR 2 MINUTES,SPIT OUT EXCESS, NO RINSING,AVOID EATING DRINKING FOR 30 MINUTES      dulaglutide (TRULICITY) 0.75 MG/0.5ML pen Inject 0.75 mg subcutaneously every 7 days. 2 mL 3    finasteride (PROSCAR) 5 MG tablet TAKE 1 TABLET (5 MG) BY MOUTH DAILY 30 tablet 4    FLUoxetine (PROZAC) 10 MG capsule Take 1 capsule (10 mg) by mouth daily 90 capsule 1    gabapentin (NEURONTIN) 300 MG capsule TAKE 1 CAPSULE (300 MG) BY MOUTH 3 TIMES DAILY 270 capsule 0    insulin pen needle (ULTICARE MINI) 31G X 6 MM miscellaneous USE WITH TRULICITY 100 each 3    metFORMIN (GLUCOPHAGE XR) 500 MG 24 hr tablet TAKE 2 TABLETS BY MOUTH TWICE DAILY WITH MEALS 360 tablet 0    multivitamin, therapeutic (THERA-VIT) TABS tablet Take 1 tablet by mouth daily      rivaroxaban ANTICOAGULANT (XARELTO ANTICOAGULANT) 20 MG TABS tablet TAKE 1 TABLET DAILY BY MOUTH WITH DINNER 90 tablet 3    sildenafil (VIAGRA) 100 MG tablet Take 1 tablet (100 mg) by mouth daily as needed (erectile dysfunction) 10 tablet 0    tamsulosin (FLOMAX) 0.4 MG capsule TAKE 1 CAPSULE (0.4 MG) BY MOUTH EVERY EVENING 90 capsule 1    vitamin B-12 (CYANOCOBALAMIN) 250 MCG tablet Take 250 mcg by mouth daily      vitamin C (ASCORBIC ACID) 500 MG tablet Take 500 mg by mouth daily       No current facility-administered medications for this visit.          Allergies   Allergen Reactions    Ace Inhibitors      Per [facility]: hyperkalemia.  Pt doesn't know if he is allergic    Ceclor [Cefaclor] Hives    Sulfa Antibiotics      Pt unsure.     Tomato Flavor [Flavoring Agent]      Tomato Paste       No family history on file.    Social History     Socioeconomic History    Marital status: Single     Spouse name: None    Number of children: None     Years of education: None    Highest education level: None   Occupational History    None   Tobacco Use    Smoking status: Never Smoker    Smokeless tobacco: Former User     Types: Chew   Substance and Sexual Activity    Alcohol use: Not Currently    Drug use: Never    Sexual activity: None   Other Topics Concern    Parent/sibling w/ CABG, MI or angioplasty before 65F 55M? Not Asked   Social History Narrative    None     Social Determinants of Health     Financial Resource Strain:     Difficulty of Paying Living Expenses:    Food Insecurity:     Worried About Running Out of Food in the Last Year:     Ran Out of Food in the Last Year:    Transportation Needs:     Lack of Transportation (Medical):     Lack of Transportation (Non-Medical):    Physical Activity:     Days of Exercise per Week:     Minutes of Exercise per Session:    Stress:     Feeling of Stress :    Social Connections:     Frequency of Communication with Friends and Family:     Frequency of Social Gatherings with Friends and Family:     Attends Taoist Services:     Active Member of Clubs or Organizations:     Attends Club or Organization Meetings:     Marital Status:    Intimate Partner Violence:     Fear of Current or Ex-Partner:     Emotionally Abused:     Physically Abused:     Sexually Abused:        ROS: 10 point ROS neg other than the symptoms noted above in the HPI.  EXAM  Constitutional: healthy, alert and no distress    Psychiatric: mentation appears normal and affect normal/bright    VASCULAR:  -Dorsalis pedis pulse +2/4 b/l  -Posterior tibial pulse +1/4 b/l  -Capillary refill time < 3 seconds to b/l hallux  -Hair growth absent to b/l anterior legs and ankles  NEURO:  -Epicritic and protective sensation absent to the bilateral foot  DERM:  -Skin temperature within normal limits to bilateral foot    -Toenails thickened, dystrophic and discolored x 9  ---LEFT hallux toenail removed. No open wounds and no drainage.  Wound Location:  LEFT  medial hallux IPJ    10/17/2024  Measurement:  0.9cm x 0.9cm x 0.1cm to subcutaneous tissue layer  ---Additional wound immediately dorsal to this wound (0.2cm skin island) measuring 0.3cm x 0.3cm x 0.1cm to subcutaneous tissue  Drainage:  Moderate serous  Odor:  None  Undermining:  None  Edges:  Intact with no edin-wound maceration  Base: 100% viable   Surrounding Skin: Intact  No severe erythema, no ascending erythema, no calor, no purulence, no malodor, no other signs of infection.     Wound open from 01/30/2024:  1.6cm x 2.3cm x 0.1cm to subcutaneous tissue until healed on 08/14/2024    MSK:  -Adductovarus rotation of the bilateral fifth toe  -Muscle strength of ankles +5/5 for dorsiflexion, plantarflexion, ABDUction and ADDuction b/l    BILATERAL ANUSHKA 05/08/2024  Impression:      Right leg: Noncompressible arteries in the right lower leg. The right  TBI is 0.78.     Left leg: Resting ANSUHKA is 1.05, normal. The left TBI is 1.17.  ============================================================    ASSESSMENT:    (E11.621,  L97.522) 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 associated with type 2 diabetes mellitus, with fat layer exposed (H)  (primary encounter diagnosis)    (E11.9) Diabetes mellitus type 2, noninsulin dependent (H)    (E11.42) Diabetic polyneuropathy associated with type 2 diabetes mellitus (H)      PLAN:  -Patient evaluated and examined. Treatment options discussed with no educational barriers noted.    -Debrided toenails on 08/28/2024    ---------------------------------------------    Diabetic foot wound:  -Outer callus cared for and necrotic tissue addressed with tissue nipper at subcutaneous tissue layer.   -Applied Iodosorb, gauze and tape.  -Patient to change the dressing every 2-3 days. His  is able to help him with this dressing.  -Patient was instructed to look for signs of infection (redness, swelling, pain, purulence, fever, chills, nausea, vomiting) and to return to podiatry or the  emergency department immediately if there are any signs of infection.     -Offloading: Patient to resume post-op shoe at home to help with more offloading of the foot.    -Bilateral ANUSHKA 05/08/2024: Adequate for wound healing. Within normal limits on the LEFT and non-compressible on the RIGHT but TBI for the RIGHT foot is adequate for wound healing.    -Patient in agreement with the above treatment plan and all of patient's questions were answered.      Return to clinic two weeks for scheduled diabetic foot exam and to evaluate the LEFT diabetic foot ulcer       Meenu Orourke DPM

## 2024-10-22 DIAGNOSIS — F41.9 ANXIETY: ICD-10-CM

## 2024-10-22 DIAGNOSIS — I26.09 OTHER ACUTE PULMONARY EMBOLISM WITH ACUTE COR PULMONALE (H): ICD-10-CM

## 2024-10-22 DIAGNOSIS — Z79.01 CHRONIC ANTICOAGULATION: ICD-10-CM

## 2024-10-22 DIAGNOSIS — R33.9 URINARY RETENTION: ICD-10-CM

## 2024-10-22 RX ORDER — FINASTERIDE 5 MG/1
5 TABLET, FILM COATED ORAL DAILY
Qty: 150 TABLET | Refills: 4 | Status: SHIPPED | OUTPATIENT
Start: 2024-10-22

## 2024-10-22 RX ORDER — RIVAROXABAN 20 MG/1
20 TABLET, FILM COATED ORAL
Qty: 90 TABLET | Refills: 3 | Status: SHIPPED | OUTPATIENT
Start: 2024-10-22

## 2024-10-22 RX ORDER — FLUOXETINE 10 MG/1
10 CAPSULE ORAL DAILY
Qty: 90 CAPSULE | Refills: 1 | Status: SHIPPED | OUTPATIENT
Start: 2024-10-22

## 2024-10-22 NOTE — TELEPHONE ENCOUNTER
rivaroxaban ANTICOAGULANT (XARELTO ANTICOAGULANT) 20 MG TABS tablet         Last Written Prescription Date:  11/20/23  Last Fill Quantity: 90,   # refills: 3  Last Office Visit: 9/30/24  Future Office visit:    Next 5 appointments (look out 90 days)      Oct 30, 2024 2:30 PM  (Arrive by 2:15 PM)  Return Visit with Meenu Orourke [facility] ([facility] ) [address] [place] [phone]             Routing refill request to provider for review/approval because:  Anticoagulant Agents Failed      Creatinine Clearance greater than 50 ml/min on file in past 3 mos    No lab results found.    Serum creatinine less than or equal to 1.4 on file in past 3 mos        Recent Labs   Lab Test 06/26/24  1330   CR 1.26*           GFR on file in the past 12 months and most recent GFR is normal          finasteride (PROSCAR) 5 MG tablet         Last Written Prescription Date:  9/14/23  Last Fill Quantity: 30,   # refills: 4  Last Office Visit: 9/30/24  Future Office visit:    Next 5 appointments (look out 90 days)      Oct 30, 2024 2:30 PM  (Arrive by 2:15 PM)  Return Visit with Meenu Orourke [facility] ([facility] ) [address] [facility] [phone]             Routing refill request to provider for review/approval because:  Drug not on the [facility], [facility] or [facility] refill protocol or controlled substance

## 2024-10-26 DIAGNOSIS — E11.22 TYPE 2 DIABETES MELLITUS WITH STAGE 3A CHRONIC KIDNEY DISEASE, WITHOUT LONG-TERM CURRENT USE OF INSULIN (H): ICD-10-CM

## 2024-10-26 DIAGNOSIS — N18.31 TYPE 2 DIABETES MELLITUS WITH STAGE 3A CHRONIC KIDNEY DISEASE, WITHOUT LONG-TERM CURRENT USE OF INSULIN (H): ICD-10-CM

## 2024-10-28 RX ORDER — METFORMIN HYDROCHLORIDE 500 MG/1
TABLET, EXTENDED RELEASE ORAL
Qty: 360 TABLET | Refills: 0 | Status: SHIPPED | OUTPATIENT
Start: 2024-10-28

## 2024-10-28 NOTE — TELEPHONE ENCOUNTER
Metformin      Last Written Prescription Date:  7/29/24  Last Fill Quantity: 360,   # refills: 0  Last Office Visit: 9/30/24  Future Office visit:    Next 5 appointments (look out 90 days)      Oct 30, 2024 2:30 PM  (Arrive by 2:15 PM)  Return Visit with Meenu Orourke DPM  [facility] ([facility] ) [address] [place] [phone]             Routing refill request to provider for review/approval because:

## 2024-10-30 ENCOUNTER — OFFICE VISIT (OUTPATIENT)
Dept: PODIATRY | Facility: OTHER | Age: 84
End: 2024-10-30
Attending: PODIATRIST
Payer: MEDICARE

## 2024-10-30 VITALS
HEART RATE: 89 BPM | DIASTOLIC BLOOD PRESSURE: 71 MMHG | SYSTOLIC BLOOD PRESSURE: 130 MMHG | OXYGEN SATURATION: 90 % | TEMPERATURE: 97.1 F

## 2024-10-30 DIAGNOSIS — L97.522 DIABETIC ULCER OF TOE OF LEFT FOOT ASSOCIATED WITH TYPE 2 DIABETES MELLITUS, WITH FAT LAYER EXPOSED (H): Primary | ICD-10-CM

## 2024-10-30 DIAGNOSIS — E11.621 DIABETIC ULCER OF TOE OF LEFT FOOT ASSOCIATED WITH TYPE 2 DIABETES MELLITUS, WITH FAT LAYER EXPOSED (H): Primary | ICD-10-CM

## 2024-10-30 DIAGNOSIS — E11.9 DIABETES MELLITUS TYPE 2, NONINSULIN DEPENDENT (H): ICD-10-CM

## 2024-10-30 DIAGNOSIS — L60.3 ONYCHODYSTROPHY: ICD-10-CM

## 2024-10-30 DIAGNOSIS — Z13.89 SCREENING FOR DIABETIC PERIPHERAL NEUROPATHY: ICD-10-CM

## 2024-10-30 DIAGNOSIS — E11.42 DIABETIC POLYNEUROPATHY ASSOCIATED WITH TYPE 2 DIABETES MELLITUS (H): ICD-10-CM

## 2024-10-30 DIAGNOSIS — N18.31 STAGE 3A CHRONIC KIDNEY DISEASE (H): ICD-10-CM

## 2024-10-30 PROCEDURE — 99207 PR FOOT EXAM NO CHARGE: CPT | Performed by: PODIATRIST

## 2024-10-30 PROCEDURE — G0463 HOSPITAL OUTPT CLINIC VISIT: HCPCS

## 2024-10-30 PROCEDURE — 11721 DEBRIDE NAIL 6 OR MORE: CPT | Performed by: PODIATRIST

## 2024-10-30 PROCEDURE — G0463 HOSPITAL OUTPT CLINIC VISIT: HCPCS | Mod: 25

## 2024-10-30 PROCEDURE — 99213 OFFICE O/P EST LOW 20 MIN: CPT | Mod: 25 | Performed by: PODIATRIST

## 2024-10-30 ASSESSMENT — PAIN SCALES - GENERAL: PAINLEVEL_OUTOF10: NO PAIN (0)

## 2024-10-30 NOTE — PROGRESS NOTES
Chief complaint: Patient presents with:  Toenail: DFE      History of Present Illness: This 84-year-old NIDDM II male is seen for a diabetic foot exam and high risk nail debridement. He is also here for follow-up management of a diabetic foot ulcer that re-opened on the plantar medial hallux ulcer. His  has been helping him change the dressing every two days with Iodosorb, gauze and tape. The patient says he has returned to wearing the post-op shoe at home.    No further pedal complaints today.     Lab Results   Component Value Date    A1C 5.6 09/30/2024    A1C 6.0 06/26/2024    A1C 5.9 03/25/2024    A1C 6.7 11/20/2023    A1C 6.0 03/09/2023    A1C 6.5 05/06/2021         /71 (BP Location: Left arm, Patient Position: Sitting, Cuff Size: Adult Regular)   Pulse 89   Temp 97.1  F (36.2  C) (Tympanic)   SpO2 90%     Patient Active Problem List   Diagnosis    Urinary retention    Generalized weakness    Type 2 diabetes mellitus with stage 3 chronic kidney disease, without long-term current use of insulin (H)    Tachycardia    Hypoxia    Other acute pulmonary embolism with acute cor pulmonale (H)    Dyspnea, unspecified type    Benign prostatic hyperplasia with urinary retention    Chronic kidney disease, stage III (moderate) (H)    Chronic obstructive lung disease (H)    Chronic painful diabetic neuropathy (H)    Erectile dysfunction    Essential hypertension    Gout, unspecified    History of CVA (cerebrovascular accident)    Hyperlipidemia    IBS (irritable bowel syndrome)    Lumbar spondylosis    Mild concentric left ventricular hypertrophy (LVH)    Moderate episode of recurrent major depressive disorder (H)    Spinal stenosis of lumbar region without neurogenic claudication    Uncomplicated alcohol dependence (H)    Onychomycosis of left great toe    Pincer nail deformity    Complicated UTI (urinary tract infection)    Sepsis with acute renal failure (H)       Past Surgical History:   Procedure  Laterality Date    AMPUTATE TOE(S) Left 6/27/2022    Procedure: Left fifth toe partial versus full amputation;  Surgeon: Meenu Orourke DPM;  Location: [facility] OR    CYSTOSCOPY, TRANSURETHRAL RESECTION (TUR) PROSTATE, COMBINED N/A 10/15/2021    Procedure: CYSTOSCOPY, WITH TRANSURETHRAL RESECTION PROSTATE;  Surgeon: Indu De Leon MD;  Location: [facility] OR    HERNIA REPAIR      PHACOEMULSIFICATION WITH STANDARD INTRAOCULAR LENS IMPLANT Left 12/27/2021    Procedure: PHACOEMULSIFICATION CATARACT EXTRACTION POSTERIOR CHAMBER LENS LEFT EYE;  Surgeon: Phillip Maldonado MD;  Location: [facility] OR    PHACOEMULSIFICATION WITH STANDARD INTRAOCULAR LENS IMPLANT Right 1/11/2022    Procedure: PHACOEMULSIFICATION CATARACT EXTRACTION POSTERIOR CHAMBER LENS RIGHT EYE;  Surgeon: Phillip Maldonado MD;  Location: [facility]       Current Outpatient Medications   Medication Sig Dispense Refill    acetaminophen (TYLENOL) 500 MG tablet Take 1,000 mg by mouth 2 times daily . Limit acetaminophen to 4000 mg per day from all sources.      albuterol (PROAIR HFA/PROVENTIL HFA/VENTOLIN HFA) 108 (90 Base) MCG/ACT inhaler Inhale 2 puffs into the lungs every 4 hours as needed for cough, shortness of breath or wheezing 18 g 3    allopurinol (ZYLOPRIM) 100 MG tablet TAKE 1 TABLET BY MOUTH ONCEDAILY 90 tablet 3    amLODIPine (NORVASC) 2.5 MG tablet TAKE ONE TABLET BY MOUTH DAILY 90 tablet 1    atorvastatin (LIPITOR) 80 MG tablet TAKE ONE-HALF TABLET (40 MG) BY MOUTH EVERY EVENING 90 tablet 1    carvedilol (COREG) 25 MG tablet TAKE 1 TABLET BY MOUTH TWICE DAILY 180 tablet 1    DENTA 5000 PLUS 1.1 % CREA USE NIGHTLY,BRUSH WEEL FOR 2 MINUTES,SPIT OUT EXCESS, NO RINSING,AVOID EATING DRINKING FOR 30 MINUTES      dulaglutide (TRULICITY) 0.75 MG/0.5ML pen Inject 0.75 mg subcutaneously every 7 days. 2 mL 3    finasteride (PROSCAR) 5 MG tablet TAKE 1 TABLET (5 MG) BY MOUTH DAILY 150 tablet 4    FLUoxetine (PROZAC) 10 MG capsule TAKE 1 CAPSULE (10 MG) BY MOUTH DAILY 90  capsule 1    gabapentin (NEURONTIN) 300 MG capsule TAKE 1 CAPSULE (300 MG) BY MOUTH 3 TIMES DAILY 270 capsule 0    insulin pen needle (ULTICARE MINI) 31G X 6 MM miscellaneous USE WITH TRULICITY 100 each 3    metFORMIN (GLUCOPHAGE XR) 500 MG 24 hr tablet TAKE 2 TABLETS BY MOUTH TWICE DAILY WITH MEALS 360 tablet 0    multivitamin, therapeutic (THERA-VIT) TABS tablet Take 1 tablet by mouth daily      sildenafil (VIAGRA) 100 MG tablet Take 1 tablet (100 mg) by mouth daily as needed (erectile dysfunction) 10 tablet 0    tamsulosin (FLOMAX) 0.4 MG capsule TAKE 1 CAPSULE (0.4 MG) BY MOUTH EVERY EVENING 90 capsule 1    vitamin B-12 (CYANOCOBALAMIN) 250 MCG tablet Take 250 mcg by mouth daily      vitamin C (ASCORBIC ACID) 500 MG tablet Take 500 mg by mouth daily      XARELTO ANTICOAGULANT 20 MG TABS tablet TAKE 1 TABLET BY MOUTH EVERY DAY WITH DINNER 90 tablet 3     No current facility-administered medications for this visit.          Allergies   Allergen Reactions    Ace Inhibitors      Per [facility]: hyperkalemia.  Pt doesn't know if he is allergic    Ceclor [Cefaclor] Hives    Sulfa Antibiotics      Pt unsure.     Tomato Flavor [Flavoring Agent]      Tomato Paste       History reviewed. No pertinent family history.    Social History     Socioeconomic History    Marital status: Single     Spouse name: None    Number of children: None    Years of education: None    Highest education level: None   Occupational History    None   Tobacco Use    Smoking status: Never Smoker    Smokeless tobacco: Former User     Types: Chew   Substance and Sexual Activity    Alcohol use: Not Currently    Drug use: Never    Sexual activity: None   Other Topics Concern    Parent/sibling w/ CABG, MI or angioplasty before 65F 55M? Not Asked   Social History Narrative    None     Social Determinants of Health     Financial Resource Strain:     Difficulty of Paying Living Expenses:    Food Insecurity:     Worried About Running Out of Food in the Last  Year:     Ran Out of Food in the Last Year:    Transportation Needs:     Lack of Transportation (Medical):     Lack of Transportation (Non-Medical):    Physical Activity:     Days of Exercise per Week:     Minutes of Exercise per Session:    Stress:     Feeling of Stress :    Social Connections:     Frequency of Communication with Friends and Family:     Frequency of Social Gatherings with Friends and Family:     Attends Spiritism Services:     Active Member of Clubs or Organizations:     Attends Club or Organization Meetings:     Marital Status:    Intimate Partner Violence:     Fear of Current or Ex-Partner:     Emotionally Abused:     Physically Abused:     Sexually Abused:        ROS: 10 point ROS neg other than the symptoms noted above in the HPI.  EXAM  Constitutional: healthy, alert and no distress    Psychiatric: mentation appears normal and affect normal/bright    VASCULAR:  -Dorsalis pedis pulse +2/4 b/l  -Posterior tibial pulse +1/4 b/l  -Capillary refill time < 3 seconds to b/l hallux  -Hair growth absent to b/l anterior legs and ankles  NEURO:  -Epicritic and protective sensation absent to the bilateral foot  DERM:  -Skin temperature within normal limits to bilateral foot    -Toenails elongated, thickened, dystrophic and discolored x 9  ---LEFT hallux toenail removed  Wound Location:  LEFT medial hallux IPJ    10/30/2024  Measurement:  0.4cm x 0.6cm x 0.1cm to subcutaneous tissue layer  Drainage:  Moderate serous  Odor:  None  Undermining:  None  Edges:  Intact with no edin-wound maceration  Base: 100% viable   Surrounding Skin: Intact  No severe erythema, no ascending erythema, no calor, no purulence, no malodor, no other signs of infection.     10/17/2024  Measurement:  0.9cm x 0.9cm x 0.1cm to subcutaneous tissue layer  ---Additional wound immediately dorsal to this wound (0.2cm skin island) measuring 0.3cm x 0.3cm x 0.1cm to subcutaneous tissue  Drainage:  Moderate serous  Odor:  None  Undermining:   None  Edges:  Intact with no edin-wound maceration  Base: 100% viable   Surrounding Skin: Intact  No severe erythema, no ascending erythema, no calor, no purulence, no malodor, no other signs of infection.     Wound open from 01/30/2024:  1.6cm x 2.3cm x 0.1cm to subcutaneous tissue until healed on 08/14/2024    MSK:  -Adductovarus rotation of the RIGHT fifth toe  -LEFT distal fifth toe amputated at the PIPJ  -Muscle strength of ankles +5/5 for dorsiflexion, plantarflexion, ABDUction and ADDuction b/l    BILATERAL ANUSHKA 05/08/2024  Impression:      Right leg: Noncompressible arteries in the right lower leg. The right  TBI is 0.78.     Left leg: Resting ANUSHKA is 1.05, normal. The left TBI is 1.17.  ============================================================    ASSESSMENT:    (E11.621,  L97.522) 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 associated with type 2 diabetes mellitus, with fat layer exposed (H)  (primary encounter diagnosis)    (L60.3) Onychodystrophy    (E11.9) Diabetes mellitus type 2, noninsulin dependent (H)    (E11.42) Diabetic polyneuropathy associated with type 2 diabetes mellitus (H)      PLAN:  -Patient evaluated and examined. Treatment options discussed with no educational barriers noted.    -High risk toenail debridement x 8 toenails without incident on 10/30/2024  ---LEFT hallux toenail absent  ---LEFT distal fifth toe amputated    -Diabetic Foot Education provided. This included checking the feet daily looking for new new blisters or wounds, wearing shoes at all times when walking including around the house, and avoiding lotion application between the toes. If there are any signs of infection, the patient should present to the ED as soon as possible. Infections of the foot can be life threatening or lead to amputations of the foot or leg.    ---------------------------------------------    Diabetic foot wound:  -Outer callus cared for and necrotic tissue addressed with tissue nipper at subcutaneous tissue  layer.   -Applied Iodosorb, gauze and tape.  -Patient to change the dressing every 2-3 days. His  is able to help him with this dressing.    -Travel is difficult for the patient. He would like to follow-up in three weeks. He says his  checks his foot consistently and he will call the clinic with any concerning changes or signs of infection.  -Patient was instructed to look for signs of infection (redness, swelling, pain, purulence, fever, chills, nausea, vomiting) and to return to podiatry or the Emergency Department immediately if there are any signs of infection.     -Offloading: Patient to continue wearing the post-op shoe at home to help with more offloading of the foot.    -The patient's wound is smaller today.    -Bilateral ANUSHKA 05/08/2024: Adequate for wound healing. Within normal limits on the LEFT and non-compressible on the RIGHT but TBI for the RIGHT foot is adequate for wound healing.      CKD: Patient's CKDis managed by their PCP. The kidney function appears to be stable. Patient's last GFR was 57 on 06/26/2024. The reduced kidney function contributes to increased edema in the foot.     Diabetes Mellitus: Patient's DM is managed by their PCP. The DM appears to be stable. Patient's last HbA1C was 5.6% on 09/30/2024.    -Patient in agreement with the above treatment plan and all of patient's questions were answered.      Return to clinic three weeks to evaluate the LEFT diabetic foot ulcer       Meenu Orourke DPM

## 2024-11-03 DIAGNOSIS — E11.40 CHRONIC PAINFUL DIABETIC NEUROPATHY (H): ICD-10-CM

## 2024-11-04 RX ORDER — GABAPENTIN 300 MG/1
300 CAPSULE ORAL 3 TIMES DAILY
Qty: 270 CAPSULE | Refills: 1 | Status: SHIPPED | OUTPATIENT
Start: 2024-11-04

## 2024-11-04 NOTE — TELEPHONE ENCOUNTER
gabapentin (NEURONTIN) 300 MG capsule       Last Written Prescription Date:  7/31/24  Last Fill Quantity: 270,   # refills: 0  Last Office Visit: 9/30/24  Future Office visit:    Next 5 appointments (look out 90 days)      Nov 21, 2024 2:15 PM  (Arrive by 2:00 PM)  Return Visit with Meenu Orourke DPM  [facility] ([facility] ) [address] [place] [id number] [phone]     Jan 08, 2025 2:30 PM  (Arrive by 2:15 PM)  Return Visit with Meenu Orourke DPM  [facility] ([facility] ) [address] [place] [id number] [phone]             Routing refill request to provider for review/approval because:  Drug not on the [facility], [facility] or [facility] refill protocol or controlled substance

## 2024-11-21 ENCOUNTER — OFFICE VISIT (OUTPATIENT)
Dept: PODIATRY | Facility: OTHER | Age: 84
End: 2024-11-21
Attending: PODIATRIST
Payer: MEDICARE

## 2024-11-21 VITALS
OXYGEN SATURATION: 93 % | SYSTOLIC BLOOD PRESSURE: 113 MMHG | HEART RATE: 79 BPM | DIASTOLIC BLOOD PRESSURE: 72 MMHG | TEMPERATURE: 97.1 F

## 2024-11-21 DIAGNOSIS — E11.42 DIABETIC POLYNEUROPATHY ASSOCIATED WITH TYPE 2 DIABETES MELLITUS (H): ICD-10-CM

## 2024-11-21 DIAGNOSIS — E11.9 DIABETES MELLITUS TYPE 2, NONINSULIN DEPENDENT (H): ICD-10-CM

## 2024-11-21 DIAGNOSIS — E11.621 DIABETIC ULCER OF TOE OF LEFT FOOT ASSOCIATED WITH TYPE 2 DIABETES MELLITUS, WITH FAT LAYER EXPOSED (H): Primary | ICD-10-CM

## 2024-11-21 DIAGNOSIS — L97.522 DIABETIC ULCER OF TOE OF LEFT FOOT ASSOCIATED WITH TYPE 2 DIABETES MELLITUS, WITH FAT LAYER EXPOSED (H): Primary | ICD-10-CM

## 2024-11-21 PROCEDURE — G0463 HOSPITAL OUTPT CLINIC VISIT: HCPCS

## 2024-11-21 ASSESSMENT — PAIN SCALES - GENERAL: PAINLEVEL_OUTOF10: NO PAIN (0)

## 2024-11-21 NOTE — PROGRESS NOTES
Chief complaint: Patient presents with:  Wound Check: DFU      History of Present Illness: This 84-year-old NIDDM II male is seen for a diabetic foot ulcer.    His  helps him change his dressing every two days with Iodosorb, gauze and tape. She has told him that the wound has drained a lot some days and not as much on the other days.    No further pedal complaints today.      Lab Results   Component Value Date    A1C 5.6 09/30/2024    A1C 6.0 06/26/2024    A1C 5.9 03/25/2024    A1C 6.7 11/20/2023    A1C 6.0 03/09/2023    A1C 6.5 05/06/2021         /72 (BP Location: Left arm, Patient Position: Sitting, Cuff Size: Adult Regular)   Pulse 79   Temp 97.1  F (36.2  C) (Tympanic)   SpO2 93%     Patient Active Problem List   Diagnosis    Urinary retention    Generalized weakness    Type 2 diabetes mellitus with stage 3 chronic kidney disease, without long-term current use of insulin (H)    Tachycardia    Hypoxia    Other acute pulmonary embolism with acute cor pulmonale (H)    Dyspnea, unspecified type    Benign prostatic hyperplasia with urinary retention    Chronic kidney disease, stage III (moderate) (H)    Chronic obstructive lung disease (H)    Chronic painful diabetic neuropathy (H)    Erectile dysfunction    Essential hypertension    Gout, unspecified    History of CVA (cerebrovascular accident)    Hyperlipidemia    IBS (irritable bowel syndrome)    Lumbar spondylosis    Mild concentric left ventricular hypertrophy (LVH)    Moderate episode of recurrent major depressive disorder (H)    Spinal stenosis of lumbar region without neurogenic claudication    Uncomplicated alcohol dependence (H)    Onychomycosis of left great toe    Pincer nail deformity    Complicated UTI (urinary tract infection)    Sepsis with acute renal failure (H)       Past Surgical History:   Procedure Laterality Date    AMPUTATE TOE(S) Left 6/27/2022    Procedure: Left fifth toe partial versus full amputation;  Surgeon: Sharad  Meenu Bowden DPM;  Location: [facility]    CYSTOSCOPY, TRANSURETHRAL RESECTION (TUR) PROSTATE, COMBINED N/A 10/15/2021    Procedure: CYSTOSCOPY, WITH TRANSURETHRAL RESECTION PROSTATE;  Surgeon: Indu De Leon MD;  Location: [facility]    HERNIA REPAIR      PHACOEMULSIFICATION WITH STANDARD INTRAOCULAR LENS IMPLANT Left 12/27/2021    Procedure: PHACOEMULSIFICATION CATARACT EXTRACTION POSTERIOR CHAMBER LENS LEFT EYE;  Surgeon: Phillip Maldonado MD;  Location: [facility]    PHACOEMULSIFICATION WITH STANDARD INTRAOCULAR LENS IMPLANT Right 1/11/2022    Procedure: PHACOEMULSIFICATION CATARACT EXTRACTION POSTERIOR CHAMBER LENS RIGHT EYE;  Surgeon: Phillip Maldonado MD;  Location: [facility]       Current Outpatient Medications   Medication Sig Dispense Refill    acetaminophen (TYLENOL) 500 MG tablet Take 1,000 mg by mouth 2 times daily . Limit acetaminophen to 4000 mg per day from all sources.      albuterol (PROAIR HFA/PROVENTIL HFA/VENTOLIN HFA) 108 (90 Base) MCG/ACT inhaler Inhale 2 puffs into the lungs every 4 hours as needed for cough, shortness of breath or wheezing 18 g 3    allopurinol (ZYLOPRIM) 100 MG tablet TAKE 1 TABLET BY MOUTH ONCEDAILY 90 tablet 3    amLODIPine (NORVASC) 2.5 MG tablet TAKE ONE TABLET BY MOUTH DAILY 90 tablet 1    atorvastatin (LIPITOR) 80 MG tablet TAKE ONE-HALF TABLET (40 MG) BY MOUTH EVERY EVENING 90 tablet 1    carvedilol (COREG) 25 MG tablet TAKE 1 TABLET BY MOUTH TWICE DAILY 180 tablet 1    DENTA 5000 PLUS 1.1 % CREA USE NIGHTLY,BRUSH WEEL FOR 2 MINUTES,SPIT OUT EXCESS, NO RINSING,AVOID EATING DRINKING FOR 30 MINUTES      dulaglutide (TRULICITY) 0.75 MG/0.5ML pen Inject 0.75 mg subcutaneously every 7 days. 2 mL 3    finasteride (PROSCAR) 5 MG tablet TAKE 1 TABLET (5 MG) BY MOUTH DAILY 150 tablet 4    FLUoxetine (PROZAC) 10 MG capsule TAKE 1 CAPSULE (10 MG) BY MOUTH DAILY 90 capsule 1    gabapentin (NEURONTIN) 300 MG capsule TAKE 1 CAPSULE (300 MG) BY MOUTH 3 TIMES DAILY 270 capsule 1    insulin pen needle  (ULTICARE MINI) 31G X 6 MM miscellaneous USE WITH TRULICITY 100 each 3    metFORMIN (GLUCOPHAGE XR) 500 MG 24 hr tablet TAKE 2 TABLETS BY MOUTH TWICE DAILY WITH MEALS 360 tablet 0    multivitamin, therapeutic (THERA-VIT) TABS tablet Take 1 tablet by mouth daily      sildenafil (VIAGRA) 100 MG tablet Take 1 tablet (100 mg) by mouth daily as needed (erectile dysfunction) 10 tablet 0    tamsulosin (FLOMAX) 0.4 MG capsule TAKE 1 CAPSULE (0.4 MG) BY MOUTH EVERY EVENING 90 capsule 1    vitamin B-12 (CYANOCOBALAMIN) 250 MCG tablet Take 250 mcg by mouth daily      vitamin C (ASCORBIC ACID) 500 MG tablet Take 500 mg by mouth daily      XARELTO ANTICOAGULANT 20 MG TABS tablet TAKE 1 TABLET BY MOUTH EVERY DAY WITH DINNER 90 tablet 3     No current facility-administered medications for this visit.          Allergies   Allergen Reactions    Ace Inhibitors      Per [facility]: hyperkalemia.  Pt doesn't know if he is allergic    Ceclor [Cefaclor] Hives    Sulfa Antibiotics      Pt unsure.     Tomato Flavor [Flavoring Agent]      Tomato Paste       History reviewed. No pertinent family history.    Social History     Socioeconomic History    Marital status: Single     Spouse name: None    Number of children: None    Years of education: None    Highest education level: None   Occupational History    None   Tobacco Use    Smoking status: Never Smoker    Smokeless tobacco: Former User     Types: Chew   Substance and Sexual Activity    Alcohol use: Not Currently    Drug use: Never    Sexual activity: None   Other Topics Concern    Parent/sibling w/ CABG, MI or angioplasty before 65F 55M? Not Asked   Social History Narrative    None     Social Determinants of Health     Financial Resource Strain:     Difficulty of Paying Living Expenses:    Food Insecurity:     Worried About Running Out of Food in the Last Year:     Ran Out of Food in the Last Year:    Transportation Needs:     Lack of Transportation (Medical):     Lack of Transportation  (Non-Medical):    Physical Activity:     Days of Exercise per Week:     Minutes of Exercise per Session:    Stress:     Feeling of Stress :    Social Connections:     Frequency of Communication with Friends and Family:     Frequency of Social Gatherings with Friends and Family:     Attends [employer] Services:     Active Member of Clubs or Organizations:     Attends Club or Organization Meetings:     Marital Status:    Intimate Partner Violence:     Fear of Current or Ex-Partner:     Emotionally Abused:     Physically Abused:     Sexually Abused:        ROS: 10 point ROS neg other than the symptoms noted above in the HPI.  EXAM  Constitutional: healthy, alert and no distress    Psychiatric: mentation appears normal and affect normal/bright    VASCULAR:  -Dorsalis pedis pulse +2/4 b/l  -Posterior tibial pulse +1/4 b/l  -Capillary refill time < 3 seconds to b/l hallux  -Hair growth absent to b/l anterior legs and ankles  NEURO:  -Epicritic and protective sensation absent to the bilateral foot  DERM:  -Skin temperature within normal limits to bilateral foot    -Toenails elongated, thickened, dystrophic and discolored x 9  ---LEFT hallux toenail removed  Wound Location:  LEFT medial hallux IPJ    11/26/2024  Measurement:  0.4cm x 0.3cm x 0.1cm to subcutaneous tissue layer  Drainage:  Moderate serous  Odor:  None  Undermining:  None  Edges:  Intact with no edin-wound maceration  Base: 100% viable   Surrounding Skin: Intact  No severe erythema, no ascending erythema, no calor, no purulence, no malodor, no other signs of infection.     10/30/2024  Measurement:  0.4cm x 0.6cm x 0.1cm to subcutaneous tissue layer  Drainage:  Moderate serous  Odor:  None  Undermining:  None  Edges:  Intact with no edin-wound maceration  Base: 100% viable   Surrounding Skin: Intact  No severe erythema, no ascending erythema, no calor, no purulence, no malodor, no other signs of infection.     10/17/2024:  0.9cm x 0.9cm x 0.1cm to subcutaneous  tissue layer  ---Additional wound immediately dorsal to this wound (0.2cm skin island) measuring 0.3cm x 0.3cm x 0.1cm to subcutaneous tissue  Wound open from 01/30/2024:  1.6cm x 2.3cm x 0.1cm to subcutaneous tissue until healed on 08/14/2024    MSK:  -Adductovarus rotation of the RIGHT fifth toe  -LEFT distal fifth toe amputated at the PIPJ  -Muscle strength of ankles +5/5 for dorsiflexion, plantarflexion, ABDUction and ADDuction b/l    BILATERAL ANUSHKA 05/08/2024  Impression:      Right leg: Noncompressible arteries in the right lower leg. The right  TBI is 0.78.     Left leg: Resting ANUSHKA is 1.05, normal. The left TBI is 1.17.  ============================================================    ASSESSMENT:    (E11.621,  L97.522) 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 associated with type 2 diabetes mellitus, with fat layer exposed (H)  (primary encounter diagnosis)    (E11.9) Diabetes mellitus type 2, noninsulin dependent (H)    (E11.42) Diabetic polyneuropathy associated with type 2 diabetes mellitus (H)      PLAN:  -Patient evaluated and examined. Treatment options discussed with no educational barriers noted.    -Toenails last debrided on 10/30/2024    ---------------------------------------------    Diabetic foot wound LEFT hallux:  -Outer callus cared for and necrotic tissue addressed with tissue nipper at subcutaneous tissue layer.   -Applied Iodosorb, gauze and tape.  -Patient to change the dressing every 2-3 days. His  is able to help him with this dressing.    -Travel is difficult for the patient. He would like to follow-up every few weeks. He says his  checks his foot consistently and he will call the clinic with any concerning changes or signs of infection.  -Patient was instructed to look for signs of infection (redness, swelling, pain, purulence, fever, chills, nausea, vomiting) and to return to podiatry or the Emergency Department immediately if there are any signs of infection.      -Offloading: Patient to continue wearing the post-op shoe at home to help with more offloading of the foot.    -The patient's wound is a little smaller today.    -Bilateral ANUSHKA 05/08/2024: Adequate for wound healing. Within normal limits on the LEFT and non-compressible on the RIGHT but TBI for the RIGHT foot is adequate for wound healing.    CKD: Patient's CKDis managed by their PCP. The kidney function appears to be stable. Patient's last GFR was 57 on 06/26/2024. The reduced kidney function contributes to increased edema in the foot.     Diabetes Mellitus: Patient's DM is managed by their PCP. The DM appears to be stable. Patient's last HbA1C was 5.6% on 09/30/2024.    -Patient in agreement with the above treatment plan and all of patient's questions were answered.      Return to clinic three weeks to evaluate the LEFT diabetic foot ulcer       Meenu Orourke DPM

## 2024-12-12 ENCOUNTER — OFFICE VISIT (OUTPATIENT)
Dept: PODIATRY | Facility: OTHER | Age: 84
End: 2024-12-12
Attending: PODIATRIST
Payer: MEDICARE

## 2024-12-12 VITALS
HEART RATE: 76 BPM | TEMPERATURE: 97.9 F | OXYGEN SATURATION: 93 % | DIASTOLIC BLOOD PRESSURE: 73 MMHG | SYSTOLIC BLOOD PRESSURE: 115 MMHG

## 2024-12-12 DIAGNOSIS — E11.621 DIABETIC ULCER OF TOE OF LEFT FOOT ASSOCIATED WITH TYPE 2 DIABETES MELLITUS, WITH FAT LAYER EXPOSED (H): Primary | ICD-10-CM

## 2024-12-12 DIAGNOSIS — L97.522 DIABETIC ULCER OF TOE OF LEFT FOOT ASSOCIATED WITH TYPE 2 DIABETES MELLITUS, WITH FAT LAYER EXPOSED (H): Primary | ICD-10-CM

## 2024-12-12 DIAGNOSIS — E11.9 DIABETES MELLITUS TYPE 2, NONINSULIN DEPENDENT (H): ICD-10-CM

## 2024-12-12 DIAGNOSIS — E11.42 DIABETIC POLYNEUROPATHY ASSOCIATED WITH TYPE 2 DIABETES MELLITUS (H): ICD-10-CM

## 2024-12-12 PROCEDURE — G0463 HOSPITAL OUTPT CLINIC VISIT: HCPCS

## 2024-12-12 ASSESSMENT — PAIN SCALES - GENERAL: PAINLEVEL_OUTOF10: NO PAIN (0)

## 2024-12-12 NOTE — PROGRESS NOTES
Chief complaint: Patient presents with:  Wound Check: DFU      History of Present Illness: This 84-year-old NIDDM II male is seen for a diabetic foot ulcer.    His  helps him change his dressing every two days with Iodosorb, gauze and tape. The patient says his  told him the wound was looking different.    No further pedal complaints today.        Lab Results   Component Value Date    A1C 5.6 09/30/2024    A1C 6.0 06/26/2024    A1C 5.9 03/25/2024    A1C 6.7 11/20/2023    A1C 6.0 03/09/2023    A1C 6.5 05/06/2021         /73 (BP Location: Left arm, Patient Position: Sitting, Cuff Size: Adult Regular)   Pulse 76   Temp 97.9  F (36.6  C) (Tympanic)   SpO2 93%     Patient Active Problem List   Diagnosis    Urinary retention    Generalized weakness    Type 2 diabetes mellitus with stage 3 chronic kidney disease, without long-term current use of insulin (H)    Tachycardia    Hypoxia    Other acute pulmonary embolism with acute cor pulmonale (H)    Dyspnea, unspecified type    Benign prostatic hyperplasia with urinary retention    Chronic kidney disease, stage III (moderate) (H)    Chronic obstructive lung disease (H)    Chronic painful diabetic neuropathy (H)    Erectile dysfunction    Essential hypertension    Gout, unspecified    History of CVA (cerebrovascular accident)    Hyperlipidemia    IBS (irritable bowel syndrome)    Lumbar spondylosis    Mild concentric left ventricular hypertrophy (LVH)    Moderate episode of recurrent major depressive disorder (H)    Spinal stenosis of lumbar region without neurogenic claudication    Uncomplicated alcohol dependence (H)    Onychomycosis of left great toe    Pincer nail deformity    Complicated UTI (urinary tract infection)    Sepsis with acute renal failure (H)       Past Surgical History:   Procedure Laterality Date    AMPUTATE TOE(S) Left 6/27/2022    Procedure: Left fifth toe partial versus full amputation;  Surgeon: Meenu Orourke DPM;   Location: [facility]    CYSTOSCOPY, TRANSURETHRAL RESECTION (TUR) PROSTATE, COMBINED N/A 10/15/2021    Procedure: CYSTOSCOPY, WITH TRANSURETHRAL RESECTION PROSTATE;  Surgeon: Indu De Leon MD;  Location: [facility]    HERNIA REPAIR      PHACOEMULSIFICATION WITH STANDARD INTRAOCULAR LENS IMPLANT Left 12/27/2021    Procedure: PHACOEMULSIFICATION CATARACT EXTRACTION POSTERIOR CHAMBER LENS LEFT EYE;  Surgeon: Phillip Maldonado MD;  Location: [facility]    PHACOEMULSIFICATION WITH STANDARD INTRAOCULAR LENS IMPLANT Right 1/11/2022    Procedure: PHACOEMULSIFICATION CATARACT EXTRACTION POSTERIOR CHAMBER LENS RIGHT EYE;  Surgeon: Phillip Maldonado MD;  Location: [facility]       Current Outpatient Medications   Medication Sig Dispense Refill    acetaminophen (TYLENOL) 500 MG tablet Take 1,000 mg by mouth 2 times daily . Limit acetaminophen to 4000 mg per day from all sources.      albuterol (PROAIR HFA/PROVENTIL HFA/VENTOLIN HFA) 108 (90 Base) MCG/ACT inhaler Inhale 2 puffs into the lungs every 4 hours as needed for cough, shortness of breath or wheezing 18 g 3    allopurinol (ZYLOPRIM) 100 MG tablet TAKE 1 TABLET BY MOUTH ONCEDAILY 90 tablet 3    amLODIPine (NORVASC) 2.5 MG tablet TAKE ONE TABLET BY MOUTH DAILY 90 tablet 1    atorvastatin (LIPITOR) 80 MG tablet TAKE ONE-HALF TABLET (40 MG) BY MOUTH EVERY EVENING 90 tablet 1    carvedilol (COREG) 25 MG tablet TAKE 1 TABLET BY MOUTH TWICE DAILY 180 tablet 1    DENTA 5000 PLUS 1.1 % CREA USE NIGHTLY,BRUSH WEEL FOR 2 MINUTES,SPIT OUT EXCESS, NO RINSING,AVOID EATING DRINKING FOR 30 MINUTES      dulaglutide (TRULICITY) 0.75 MG/0.5ML pen Inject 0.75 mg subcutaneously every 7 days. 2 mL 3    finasteride (PROSCAR) 5 MG tablet TAKE 1 TABLET (5 MG) BY MOUTH DAILY 150 tablet 4    FLUoxetine (PROZAC) 10 MG capsule TAKE 1 CAPSULE (10 MG) BY MOUTH DAILY 90 capsule 1    gabapentin (NEURONTIN) 300 MG capsule TAKE 1 CAPSULE (300 MG) BY MOUTH 3 TIMES DAILY 270 capsule 1    insulin pen needle (ULTICARE MINI) 31G  X 6 MM miscellaneous USE WITH TRULICITY 100 each 3    metFORMIN (GLUCOPHAGE XR) 500 MG 24 hr tablet TAKE 2 TABLETS BY MOUTH TWICE DAILY WITH MEALS 360 tablet 0    multivitamin, therapeutic (THERA-VIT) TABS tablet Take 1 tablet by mouth daily      sildenafil (VIAGRA) 100 MG tablet Take 1 tablet (100 mg) by mouth daily as needed (erectile dysfunction) 10 tablet 0    tamsulosin (FLOMAX) 0.4 MG capsule TAKE 1 CAPSULE (0.4 MG) BY MOUTH EVERY EVENING 90 capsule 1    vitamin B-12 (CYANOCOBALAMIN) 250 MCG tablet Take 250 mcg by mouth daily      vitamin C (ASCORBIC ACID) 500 MG tablet Take 500 mg by mouth daily      XARELTO ANTICOAGULANT 20 MG TABS tablet TAKE 1 TABLET BY MOUTH EVERY DAY WITH DINNER 90 tablet 3     No current facility-administered medications for this visit.          Allergies   Allergen Reactions    Ace Inhibitors      Per [facility]: hyperkalemia.  Pt doesn't know if he is allergic    Ceclor [Cefaclor] Hives    Sulfa Antibiotics      Pt unsure.     Tomato Flavor [Flavoring Agent]      Tomato Paste       History reviewed. No pertinent family history.    Social History     Socioeconomic History    Marital status: Single     Spouse name: None    Number of children: None    Years of education: None    Highest education level: None   Occupational History    None   Tobacco Use    Smoking status: Never Smoker    Smokeless tobacco: Former User     Types: Chew   Substance and Sexual Activity    Alcohol use: Not Currently    Drug use: Never    Sexual activity: None   Other Topics Concern    Parent/sibling w/ CABG, MI or angioplasty before 65F 55M? Not Asked   Social History Narrative    None     Social Determinants of Health     Financial Resource Strain:     Difficulty of Paying Living Expenses:    Food Insecurity:     Worried About Running Out of Food in the Last Year:     Ran Out of Food in the Last Year:    Transportation Needs:     Lack of Transportation (Medical):     Lack of Transportation (Non-Medical):     Physical Activity:     Days of Exercise per Week:     Minutes of Exercise per Session:    Stress:     Feeling of Stress :    Social Connections:     Frequency of Communication with Friends and Family:     Frequency of Social Gatherings with Friends and Family:     Attends [employer] Services:     Active Member of Clubs or Organizations:     Attends Club or Organization Meetings:     Marital Status:    Intimate Partner Violence:     Fear of Current or Ex-Partner:     Emotionally Abused:     Physically Abused:     Sexually Abused:        ROS: 10 point ROS neg other than the symptoms noted above in the HPI.  EXAM  Constitutional: healthy, alert and no distress    Psychiatric: mentation appears normal and affect normal/bright    VASCULAR:  -Dorsalis pedis pulse +2/4 b/l  -Posterior tibial pulse +1/4 b/l  -Capillary refill time < 3 seconds to b/l hallux  -Hair growth absent to b/l anterior legs and ankles  NEURO:  -Epicritic and protective sensation absent to the bilateral foot  DERM:  -Skin temperature within normal limits to bilateral foot    -Toenails elongated, thickened, dystrophic and discolored x 9  ---LEFT hallux toenail removed  Wound Location:  LEFT medial hallux IPJ    12/12/2024  Measurement:  0.3cm x 0.3cm x 0.1cm to subcutaneous tissue layer  Drainage:  Moderate serous  Odor:  None  Undermining:  None  Edges:  Intact with no edin-wound maceration  Base: 100% viable   Surrounding Skin: Intact  No severe erythema, no ascending erythema, no calor, no purulence, no malodor, no other signs of infection.     11/26/2024  Measurement:  0.4cm x 0.3cm x 0.1cm to subcutaneous tissue layer  Drainage:  Moderate serous  Odor:  None  Undermining:  None  Edges:  Intact with no edin-wound maceration  Base: 100% viable   Surrounding Skin: Intact  No severe erythema, no ascending erythema, no calor, no purulence, no malodor, no other signs of infection.     10/30/2024:  0.4cm x 0.6cm x 0.1cm to subcutaneous tissue  "layer    10/17/2024:  0.9cm x 0.9cm x 0.1cm to subcutaneous tissue layer  ---Additional wound immediately dorsal to this wound (0.2cm skin island) measuring 0.3cm x 0.3cm x 0.1cm to subcutaneous tissue  Wound open from 01/30/2024:  1.6cm x 2.3cm x 0.1cm to subcutaneous tissue until healed on 08/14/2024    MSK:  -Adductovarus rotation of the RIGHT fifth toe  -LEFT distal fifth toe amputated at the PIPJ  -Muscle strength of ankles +5/5 for dorsiflexion, plantarflexion, ABDUction and ADDuction b/l    BILATERAL ANUSHKA 05/08/2024  Impression:      Right leg: Noncompressible arteries in the right lower leg. The right  TBI is 0.78.     Left leg: Resting ANUSHKA is 1.05, normal. The left TBI is 1.17.  ============================================================    ASSESSMENT:    (E11.621,  L97.522) 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 associated with type 2 diabetes mellitus, with fat layer exposed (H)  (primary encounter diagnosis)    (E11.9) Diabetes mellitus type 2, noninsulin dependent (H)    (E11.42) Diabetic polyneuropathy associated with type 2 diabetes mellitus (H)      PLAN:  -Patient evaluated and examined. Treatment options discussed with no educational barriers noted.    -Toenails last debrided on 10/30/2024    ---------------------------------------------    Diabetic foot wound LEFT hallux:  -Outer callus cared for and necrotic tissue addressed with tissue nipper at subcutaneous tissue layer. The \"abnormal\" look to which the patient's  was likely referring was a dried blood blister around the wound. The skin was healed beneath the hematoma.  -Applied Iodosorb, gauze and tape.  -Patient to change the dressing every 2-3 days. His  is able to help him with this dressing.    -Travel is difficult for the patient. He would like to continue to follow-up every few weeks. He says his  checks his foot consistently and he will call the clinic with any concerning changes or signs of " infection.  -Patient was instructed to look for signs of infection (redness, swelling, pain, purulence, fever, chills, nausea, vomiting) and to return to podiatry or the Emergency Department immediately if there are any signs of infection.     -Offloading: Patient to continue wearing the post-op shoe at home to help with more offloading of the foot.  -The patient's wound is a little smaller today.    -Bilateral ANUSHKA 05/08/2024: Adequate for wound healing. Within normal limits on the LEFT and non-compressible on the RIGHT but TBI for the RIGHT foot is adequate for wound healing.    CKD: Patient's CKDis managed by their PCP. The kidney function appears to be stable. Patient's last GFR was 57 on 06/26/2024. The reduced kidney function contributes to increased edema in the foot.     Diabetes Mellitus: Patient's DM is managed by their PCP. The DM appears to be stable. Patient's last HbA1C was 5.6% on 09/30/2024.    -Patient in agreement with the above treatment plan and all of patient's questions were answered.      Return to clinic three to four weeks to evaluate the LEFT diabetic foot ulcer       Meenu Orourke DPM

## 2025-01-08 ENCOUNTER — OFFICE VISIT (OUTPATIENT)
Dept: PODIATRY | Facility: OTHER | Age: 85
End: 2025-01-08
Attending: PODIATRIST
Payer: MEDICARE

## 2025-01-08 VITALS
HEART RATE: 84 BPM | DIASTOLIC BLOOD PRESSURE: 68 MMHG | OXYGEN SATURATION: 91 % | TEMPERATURE: 98.2 F | SYSTOLIC BLOOD PRESSURE: 107 MMHG

## 2025-01-08 DIAGNOSIS — E11.42 DIABETIC POLYNEUROPATHY ASSOCIATED WITH TYPE 2 DIABETES MELLITUS (H): ICD-10-CM

## 2025-01-08 DIAGNOSIS — L97.522 DIABETIC ULCER OF TOE OF LEFT FOOT ASSOCIATED WITH TYPE 2 DIABETES MELLITUS, WITH FAT LAYER EXPOSED (H): Primary | ICD-10-CM

## 2025-01-08 DIAGNOSIS — L60.3 ONYCHODYSTROPHY: ICD-10-CM

## 2025-01-08 DIAGNOSIS — Z13.89 SCREENING FOR DIABETIC PERIPHERAL NEUROPATHY: ICD-10-CM

## 2025-01-08 DIAGNOSIS — E11.9 DIABETES MELLITUS TYPE 2, NONINSULIN DEPENDENT (H): ICD-10-CM

## 2025-01-08 DIAGNOSIS — E11.621 DIABETIC ULCER OF TOE OF LEFT FOOT ASSOCIATED WITH TYPE 2 DIABETES MELLITUS, WITH FAT LAYER EXPOSED (H): Primary | ICD-10-CM

## 2025-01-08 PROCEDURE — 11721 DEBRIDE NAIL 6 OR MORE: CPT | Performed by: PODIATRIST

## 2025-01-08 PROCEDURE — G0463 HOSPITAL OUTPT CLINIC VISIT: HCPCS

## 2025-01-08 ASSESSMENT — PAIN SCALES - GENERAL: PAINLEVEL_OUTOF10: NO PAIN (0)

## 2025-01-08 NOTE — PROGRESS NOTES
Chief complaint: Patient presents with:  Toenail: trimming      History of Present Illness: This 84-year-old NIDDM II male is seen for a diabetic foot ulcer.    His  helps him change his dressing every two days with Iodosorb, gauze and tape. The patient says he had increased bleeding from the wound yesterday morning.    He is also here for a diabetic foot exam and high risk nail debridement. He has moderate numbness in his feet.    No further pedal complaints today.        Lab Results   Component Value Date    A1C 5.6 09/30/2024    A1C 6.0 06/26/2024    A1C 5.9 03/25/2024    A1C 6.7 11/20/2023    A1C 6.0 03/09/2023    A1C 6.5 05/06/2021         /68 (BP Location: Left arm, Patient Position: Sitting, Cuff Size: Adult Large)   Pulse 84   Temp 98.2  F (36.8  C) (Tympanic)   SpO2 91%     Patient Active Problem List   Diagnosis    Urinary retention    Generalized weakness    Type 2 diabetes mellitus with stage 3 chronic kidney disease, without long-term current use of insulin (H)    Tachycardia    Hypoxia    Other acute pulmonary embolism with acute cor pulmonale (H)    Dyspnea, unspecified type    Benign prostatic hyperplasia with urinary retention    Chronic kidney disease, stage III (moderate) (H)    Chronic obstructive lung disease (H)    Chronic painful diabetic neuropathy (H)    Erectile dysfunction    Essential hypertension    Gout, unspecified    History of CVA (cerebrovascular accident)    Hyperlipidemia    IBS (irritable bowel syndrome)    Lumbar spondylosis    Mild concentric left ventricular hypertrophy (LVH)    Moderate episode of recurrent major depressive disorder (H)    Spinal stenosis of lumbar region without neurogenic claudication    Uncomplicated alcohol dependence (H)    Onychomycosis of left great toe    Pincer nail deformity    Complicated UTI (urinary tract infection)    Sepsis with acute renal failure (H)       Past Surgical History:   Procedure Laterality Date    AMPUTATE  TOE(S) Left 6/27/2022    Procedure: Left fifth toe partial versus full amputation;  Surgeon: Meenu Orourke DPM;  Location: [facility]    CYSTOSCOPY, TRANSURETHRAL RESECTION (TUR) PROSTATE, COMBINED N/A 10/15/2021    Procedure: CYSTOSCOPY, WITH TRANSURETHRAL RESECTION PROSTATE;  Surgeon: Indu De Leon MD;  Location: [facility]    HERNIA REPAIR      PHACOEMULSIFICATION WITH STANDARD INTRAOCULAR LENS IMPLANT Left 12/27/2021    Procedure: PHACOEMULSIFICATION CATARACT EXTRACTION POSTERIOR CHAMBER LENS LEFT EYE;  Surgeon: Phillip Maldonado MD;  Location: [facility]    PHACOEMULSIFICATION WITH STANDARD INTRAOCULAR LENS IMPLANT Right 1/11/2022    Procedure: PHACOEMULSIFICATION CATARACT EXTRACTION POSTERIOR CHAMBER LENS RIGHT EYE;  Surgeon: Phillip Maldonado MD;  Location: [facility]       Current Outpatient Medications   Medication Sig Dispense Refill    acetaminophen (TYLENOL) 500 MG tablet Take 1,000 mg by mouth 2 times daily . Limit acetaminophen to 4000 mg per day from all sources.      albuterol (PROAIR HFA/PROVENTIL HFA/VENTOLIN HFA) 108 (90 Base) MCG/ACT inhaler Inhale 2 puffs into the lungs every 4 hours as needed for cough, shortness of breath or wheezing 18 g 3    allopurinol (ZYLOPRIM) 100 MG tablet TAKE 1 TABLET BY MOUTH ONCEDAILY 90 tablet 3    amLODIPine (NORVASC) 2.5 MG tablet TAKE ONE TABLET BY MOUTH DAILY 90 tablet 1    atorvastatin (LIPITOR) 80 MG tablet TAKE ONE-HALF TABLET (40 MG) BY MOUTH EVERY EVENING 90 tablet 1    carvedilol (COREG) 25 MG tablet TAKE 1 TABLET BY MOUTH TWICE DAILY 180 tablet 1    DENTA 5000 PLUS 1.1 % CREA USE NIGHTLY,BRUSH WEEL FOR 2 MINUTES,SPIT OUT EXCESS, NO RINSING,AVOID EATING DRINKING FOR 30 MINUTES      dulaglutide (TRULICITY) 0.75 MG/0.5ML pen Inject 0.75 mg subcutaneously every 7 days. 2 mL 3    finasteride (PROSCAR) 5 MG tablet TAKE 1 TABLET (5 MG) BY MOUTH DAILY 150 tablet 4    FLUoxetine (PROZAC) 10 MG capsule TAKE 1 CAPSULE (10 MG) BY MOUTH DAILY 90 capsule 1    gabapentin  (NEURONTIN) 300 MG capsule TAKE 1 CAPSULE (300 MG) BY MOUTH 3 TIMES DAILY 270 capsule 1    insulin pen needle (ULTICARE MINI) 31G X 6 MM miscellaneous USE WITH TRULICITY 100 each 3    metFORMIN (GLUCOPHAGE XR) 500 MG 24 hr tablet TAKE 2 TABLETS BY MOUTH TWICE DAILY WITH MEALS 360 tablet 0    multivitamin, therapeutic (THERA-VIT) TABS tablet Take 1 tablet by mouth daily      sildenafil (VIAGRA) 100 MG tablet Take 1 tablet (100 mg) by mouth daily as needed (erectile dysfunction) 10 tablet 0    tamsulosin (FLOMAX) 0.4 MG capsule TAKE 1 CAPSULE (0.4 MG) BY MOUTH EVERY EVENING 90 capsule 1    vitamin B-12 (CYANOCOBALAMIN) 250 MCG tablet Take 250 mcg by mouth daily      vitamin C (ASCORBIC ACID) 500 MG tablet Take 500 mg by mouth daily      XARELTO ANTICOAGULANT 20 MG TABS tablet TAKE 1 TABLET BY MOUTH EVERY DAY WITH DINNER 90 tablet 3     No current facility-administered medications for this visit.          Allergies   Allergen Reactions    Ace Inhibitors      Per [facility]: hyperkalemia.  Pt doesn't know if he is allergic    Ceclor [Cefaclor] Hives    Sulfa Antibiotics      Pt unsure.     Tomato Flavor [Flavoring Agent (Non-Screening)]      Tomato Paste       No family history on file.    Social History     Socioeconomic History    Marital status: Single     Spouse name: None    Number of children: None    Years of education: None    Highest education level: None   Occupational History    None   Tobacco Use    Smoking status: Never Smoker    Smokeless tobacco: Former User     Types: Chew   Substance and Sexual Activity    Alcohol use: Not Currently    Drug use: Never    Sexual activity: None   Other Topics Concern    Parent/sibling w/ CABG, MI or angioplasty before 65F 55M? Not Asked   Social History Narrative    None     Social Determinants of Health     Financial Resource Strain:     Difficulty of Paying Living Expenses:    Food Insecurity:     Worried About Running Out of Food in the Last Year:     Ran Out of Food in  the Last Year:    Transportation Needs:     Lack of Transportation (Medical):     Lack of Transportation (Non-Medical):    Physical Activity:     Days of Exercise per Week:     Minutes of Exercise per Session:    Stress:     Feeling of Stress :    Social Connections:     Frequency of Communication with Friends and Family:     Frequency of Social Gatherings with Friends and Family:     Attends [employer] Services:     Active Member of Clubs or Organizations:     Attends Club or Organization Meetings:     Marital Status:    Intimate Partner Violence:     Fear of Current or Ex-Partner:     Emotionally Abused:     Physically Abused:     Sexually Abused:        ROS: 10 point ROS neg other than the symptoms noted above in the HPI.  EXAM  Constitutional: healthy, alert and no distress    Psychiatric: mentation appears normal and affect normal/bright    VASCULAR:  -Dorsalis pedis pulse +2/4 b/l  -Posterior tibial pulse +1/4 b/l  -Capillary refill time < 3 seconds to b/l hallux  -Hair growth absent to b/l anterior legs and ankles  NEURO:  -Epicritic and protective sensation absent to the bilateral foot  DERM:  -Skin temperature within normal limits to bilateral foot    -Toenails elongated, thickened, dystrophic and discolored x 9  ---LEFT hallux toenail removed  Wound Location:  LEFT medial hallux IPJ    01/08/2025  Measurement:  0.5cm x 1cm x 0.1cm through skin layer with the most medial 0.3 x 0.2cm and the most lateral 0.2cm x 0.2cm through subcutaneous tissue  Drainage:  Moderate serous  Odor:  None  Undermining:  None  Edges:  Intact with no edin-wound maceration  Base: 100% viable   Surrounding Skin: Intact  No severe erythema, no ascending erythema, no calor, no purulence, no malodor, no other signs of infection.     12/12/2024  Measurement:  0.3cm x 0.3cm x 0.1cm to subcutaneous tissue layer  Drainage:  Moderate serous  Odor:  None  Undermining:  None  Edges:  Intact with no edin-wound maceration  Base: 100% viable    Surrounding Skin: Intact  No severe erythema, no ascending erythema, no calor, no purulence, no malodor, no other signs of infection.     11/26/2024:  0.4cm x 0.3cm x 0.1cm to subcutaneous tissue layer  10/30/2024:  0.4cm x 0.6cm x 0.1cm to subcutaneous tissue layer    10/17/2024:  0.9cm x 0.9cm x 0.1cm to subcutaneous tissue layer  ---Additional wound immediately dorsal to this wound (0.2cm skin island) measuring 0.3cm x 0.3cm x 0.1cm to subcutaneous tissue  Wound open from 01/30/2024:  1.6cm x 2.3cm x 0.1cm to subcutaneous tissue until healed on 08/14/2024    MSK:  -Adductovarus rotation of the RIGHT fifth toe  -LEFT distal fifth toe amputated at the PIPJ  -Muscle strength of ankles +5/5 for dorsiflexion, plantarflexion, ABDUction and ADDuction b/l    BILATERAL ANUSHKA 05/08/2024  Impression:      Right leg: Noncompressible arteries in the right lower leg. The right  TBI is 0.78.     Left leg: Resting ANUSHKA is 1.05, normal. The left TBI is 1.17.  ============================================================    ASSESSMENT:    (E11.621,  L97.522) 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 associated with type 2 diabetes mellitus, with fat layer exposed (H)  (primary encounter diagnosis)    (L60.3) Onychodystrophy    (E11.9) Diabetes mellitus type 2, noninsulin dependent (H)    (E11.42) Diabetic polyneuropathy associated with type 2 diabetes mellitus (H)    (Z13.89) Screening for diabetic peripheral neuropathy      PLAN:  -Patient evaluated and examined. Treatment options discussed with no educational barriers noted.    -High risk toenail debridement x 9 toenails without incident  ---LEFT distal fifth toe amputated    ---------------------------------------------    Diabetic foot wound LEFT hallux:  -Outer callus cared for and necrotic tissue addressed with tissue nipper at subcutaneous tissue layer.   -Applied Iodosorb, Mepilex Ag and tape.  -Patient to change the dressing every 2-3 days. His  is able to help him with  this dressing.    -Travel is difficult for the patient. He would like to continue to follow-up every few weeks. He says his  checks his foot consistently and he will call the clinic with any concerning changes or signs of infection. Will not extend more than three weeks at this time because of the amount of callus that is forming over the ulcer site.  -Patient was instructed to look for signs of infection (redness, swelling, pain, purulence, fever, chills, nausea, vomiting) and to return to podiatry or the Emergency Department immediately if there are any signs of infection.     -Offloading: Patient to continue wearing the post-op shoe at home to help with more offloading of the foot.    -Bilateral ANUSHKA 05/08/2024: Adequate for wound healing. Within normal limits on the LEFT and non-compressible on the RIGHT but TBI for the RIGHT foot is adequate for wound healing.    CKD: Patient's CKDis managed by their PCP. The kidney function appears to be stable. Patient's last GFR was 57 on 06/26/2024. The reduced kidney function contributes to increased edema in the foot.     Diabetes Mellitus: Patient's DM is managed by their PCP. The DM appears to be stable. Patient's last HbA1C was 5.6% on 09/30/2024.    -Patient in agreement with the above treatment plan and all of patient's questions were answered.      Return to clinic three to three weeks to evaluate the LEFT diabetic foot ulcer       Meenu Orourke DPM

## 2025-01-20 DIAGNOSIS — I10 ESSENTIAL HYPERTENSION: ICD-10-CM

## 2025-01-20 DIAGNOSIS — N40.0 BENIGN PROSTATIC HYPERPLASIA, UNSPECIFIED WHETHER LOWER URINARY TRACT SYMPTOMS PRESENT: ICD-10-CM

## 2025-01-20 DIAGNOSIS — E11.22 TYPE 2 DIABETES MELLITUS WITH STAGE 3A CHRONIC KIDNEY DISEASE, WITHOUT LONG-TERM CURRENT USE OF INSULIN (H): ICD-10-CM

## 2025-01-20 DIAGNOSIS — N18.31 TYPE 2 DIABETES MELLITUS WITH STAGE 3A CHRONIC KIDNEY DISEASE, WITHOUT LONG-TERM CURRENT USE OF INSULIN (H): ICD-10-CM

## 2025-01-20 RX ORDER — AMLODIPINE BESYLATE 2.5 MG/1
TABLET ORAL
Qty: 90 TABLET | Refills: 1 | Status: SHIPPED | OUTPATIENT
Start: 2025-01-20

## 2025-01-20 RX ORDER — CARVEDILOL 25 MG/1
TABLET ORAL
Qty: 180 TABLET | Refills: 1 | Status: SHIPPED | OUTPATIENT
Start: 2025-01-20

## 2025-01-20 RX ORDER — DULAGLUTIDE 0.75 MG/.5ML
INJECTION, SOLUTION SUBCUTANEOUS
Qty: 2 ML | Refills: 3 | Status: SHIPPED | OUTPATIENT
Start: 2025-01-20

## 2025-01-20 RX ORDER — TAMSULOSIN HYDROCHLORIDE 0.4 MG/1
0.4 CAPSULE ORAL EVERY EVENING
Qty: 90 CAPSULE | Refills: 1 | Status: SHIPPED | OUTPATIENT
Start: 2025-01-20

## 2025-01-20 NOTE — TELEPHONE ENCOUNTER
Amlodipine (Norvasc) 2.5 MG tablet    Last Written Prescription Date:  07/24/2024  Last Fill Quantity: 90,   # refills: 1  Last Office Visit: 09/30/2024  Future Office visit:    Next 5 appointments (look out 90 days)      Jan 29, 2025 2:30 PM  (Arrive by 2:15 PM)  Return Visit with Meenu Orourke [address] [id number] [phone]     Mar 12, 2025 2:15 PM  (Arrive by 2:00 PM)  Return Visit with Meenu Orourke[address] [id number] [phone]             Routing refill request to provider for review/approval because:  Protocol failed on the Amlodipine.      Tamsulosin (Flomax) 0.4 MG capsule    Last Written Prescription Date:  07/24/2024  Last Fill Quantity: 90,   # refills: 1  Last Office Visit:   Future Office visit:    Next 5 appointments (look out 90 days)      Jan 29, 2025 2:30 PM  (Arrive by 2:15 PM)  Return Visit with Meenu Orourke [address] [id number] [phone]     Mar 12, 2025 2:15 PM  (Arrive by 2:00 PM)  Return Visit with Meenu Orourke [address] [id number] [phone]             Routing refill request to provider for review/approval because:  Protocol failed on the Tamsulosin.      Carvedilol  (Coreg) 25 MG tablet   Last Written Prescription Date:  07/24/2024  Last Fill Quantity: 180,   # refills: 1  Last Office Visit:   Future Office visit:    Next 5 appointments (look out 90 days)      Jan 29, 2025 2:30 PM  (Arrive by 2:15 PM)  Return Visit with Meenu Orourke [address] [address]  [phone]     Mar 12, 2025 2:15 PM  (Arrive by 2:00  PM)  Return Visit with Meenu Orourke DPM  [facility] ([facility] ) [address] [place] [id number] [phone]             Routing refill request to provider for review/approval because:

## 2025-01-20 NOTE — TELEPHONE ENCOUNTER
Trulicity       Last Written Prescription Date:  9/30/2024  Last Fill Quantity: 2mL,   # refills: 3  Last Office Visit: 9/30/2024  Future Office visit:    Next 5 appointments (look out 90 days)      Jan 29, 2025 2:30 PM  (Arrive by 2:15 PM)  Return Visit with Meenu Orourke DPM  [facility] ([facility] ) [address] [place] [id number] [phone]     Mar 12, 2025 2:15 PM  (Arrive by 2:00 PM)  Return Visit with Meenu Orourke DPM  [facility] ([facility] ) [address] [place] [id number] [phone]

## 2025-01-20 NOTE — TELEPHONE ENCOUNTER
GLP-1 Agonists Protocol Failed      Has GFR on file in past 12 months and most recent value is normal

## 2025-01-22 ENCOUNTER — TELEPHONE (OUTPATIENT)
Dept: FAMILY MEDICINE | Facility: OTHER | Age: 85
End: 2025-01-22

## 2025-01-23 ENCOUNTER — TELEPHONE (OUTPATIENT)
Dept: FAMILY MEDICINE | Facility: OTHER | Age: 85
End: 2025-01-23

## 2025-01-29 ENCOUNTER — OFFICE VISIT (OUTPATIENT)
Dept: PODIATRY | Facility: OTHER | Age: 85
End: 2025-01-29
Attending: PODIATRIST
Payer: MEDICARE

## 2025-01-29 VITALS
OXYGEN SATURATION: 91 % | HEART RATE: 95 BPM | SYSTOLIC BLOOD PRESSURE: 93 MMHG | TEMPERATURE: 97.5 F | DIASTOLIC BLOOD PRESSURE: 66 MMHG

## 2025-01-29 DIAGNOSIS — Z13.89 SCREENING FOR DIABETIC PERIPHERAL NEUROPATHY: ICD-10-CM

## 2025-01-29 DIAGNOSIS — Z86.31 HEALED DIABETIC FOOT ULCER: Primary | ICD-10-CM

## 2025-01-29 DIAGNOSIS — E11.9 DIABETES MELLITUS TYPE 2, NONINSULIN DEPENDENT (H): ICD-10-CM

## 2025-01-29 DIAGNOSIS — E11.42 DIABETIC POLYNEUROPATHY ASSOCIATED WITH TYPE 2 DIABETES MELLITUS (H): ICD-10-CM

## 2025-01-29 PROCEDURE — G0463 HOSPITAL OUTPT CLINIC VISIT: HCPCS

## 2025-01-29 NOTE — PROGRESS NOTES
Chief complaint: Patient presents with:  Wound Check: DFU      History of Present Illness: This 84-year-old NIDDM II male is seen for a diabetic foot ulcer.    His  helps him change his dressing every two days with Iodosorb, gauze and tape. He has no pain with his wound.    No further pedal complaints today.        Lab Results   Component Value Date    A1C 5.6 09/30/2024    A1C 6.0 06/26/2024    A1C 5.9 03/25/2024    A1C 6.7 11/20/2023    A1C 6.0 03/09/2023    A1C 6.5 05/06/2021         BP 93/66 (BP Location: Left arm, Patient Position: Sitting, Cuff Size: Adult Regular)   Pulse 95   Temp 97.5  F (36.4  C) (Tympanic)   SpO2 (!) 91%     Patient Active Problem List   Diagnosis    Urinary retention    Generalized weakness    Type 2 diabetes mellitus with stage 3 chronic kidney disease, without long-term current use of insulin (H)    Tachycardia    Hypoxia    Other acute pulmonary embolism with acute cor pulmonale (H)    Dyspnea, unspecified type    Benign prostatic hyperplasia with urinary retention    Chronic kidney disease, stage III (moderate) (H)    Chronic obstructive lung disease (H)    Chronic painful diabetic neuropathy (H)    Erectile dysfunction    Essential hypertension    Gout, unspecified    History of CVA (cerebrovascular accident)    Hyperlipidemia    IBS (irritable bowel syndrome)    Lumbar spondylosis    Mild concentric left ventricular hypertrophy (LVH)    Moderate episode of recurrent major depressive disorder (H)    Spinal stenosis of lumbar region without neurogenic claudication    Uncomplicated alcohol dependence (H)    Onychomycosis of left great toe    Pincer nail deformity    Complicated UTI (urinary tract infection)    Sepsis with acute renal failure (H)       Past Surgical History:   Procedure Laterality Date    AMPUTATE TOE(S) Left 6/27/2022    Procedure: Left fifth toe partial versus full amputation;  Surgeon: Meenu Orourke DPM;  Location: [facility]    CYSTOSCOPY,  TRANSURETHRAL RESECTION (TUR) PROSTATE, COMBINED N/A 10/15/2021    Procedure: CYSTOSCOPY, WITH TRANSURETHRAL RESECTION PROSTATE;  Surgeon: Indu De Leon MD;  Location: [facility]    HERNIA REPAIR      PHACOEMULSIFICATION WITH STANDARD INTRAOCULAR LENS IMPLANT Left 12/27/2021    Procedure: PHACOEMULSIFICATION CATARACT EXTRACTION POSTERIOR CHAMBER LENS LEFT EYE;  Surgeon: Phillip Maldonado MD;  Location: [facility]    PHACOEMULSIFICATION WITH STANDARD INTRAOCULAR LENS IMPLANT Right 1/11/2022    Procedure: PHACOEMULSIFICATION CATARACT EXTRACTION POSTERIOR CHAMBER LENS RIGHT EYE;  Surgeon: Phillip Maldonado MD;  Location: [facility]       Current Outpatient Medications   Medication Sig Dispense Refill    acetaminophen (TYLENOL) 500 MG tablet Take 1,000 mg by mouth 2 times daily . Limit acetaminophen to 4000 mg per day from all sources.      albuterol (PROAIR HFA/PROVENTIL HFA/VENTOLIN HFA) 108 (90 Base) MCG/ACT inhaler Inhale 2 puffs into the lungs every 4 hours as needed for cough, shortness of breath or wheezing 18 g 3    allopurinol (ZYLOPRIM) 100 MG tablet TAKE 1 TABLET BY MOUTH ONCEDAILY 90 tablet 3    amLODIPine (NORVASC) 2.5 MG tablet TAKE ONE TABLET BY MOUTH DAILY 90 tablet 1    atorvastatin (LIPITOR) 80 MG tablet TAKE ONE-HALF TABLET (40 MG) BY MOUTH EVERY EVENING 90 tablet 1    carvedilol (COREG) 25 MG tablet TAKE 1 TABLET BY MOUTH TWICE DAILY 180 tablet 1    DENTA 5000 PLUS 1.1 % CREA USE NIGHTLY,BRUSH WEEL FOR 2 MINUTES,SPIT OUT EXCESS, NO RINSING,AVOID EATING DRINKING FOR 30 MINUTES      finasteride (PROSCAR) 5 MG tablet TAKE 1 TABLET (5 MG) BY MOUTH DAILY 150 tablet 4    FLUoxetine (PROZAC) 10 MG capsule TAKE 1 CAPSULE (10 MG) BY MOUTH DAILY 90 capsule 1    gabapentin (NEURONTIN) 300 MG capsule TAKE 1 CAPSULE (300 MG) BY MOUTH 3 TIMES DAILY 270 capsule 1    insulin pen needle (ULTICARE MINI) 31G X 6 MM miscellaneous USE WITH TRULICITY 100 each 3    metFORMIN (GLUCOPHAGE XR) 500 MG 24 hr tablet TAKE 2 TABLETS BY MOUTH  TWICE DAILY WITH MEALS 360 tablet 0    multivitamin, therapeutic (THERA-VIT) TABS tablet Take 1 tablet by mouth daily      sildenafil (VIAGRA) 100 MG tablet Take 1 tablet (100 mg) by mouth daily as needed (erectile dysfunction) 10 tablet 0    tamsulosin (FLOMAX) 0.4 MG capsule TAKE 1 CAPSULE (0.4 MG) BY MOUTH EVERY EVENING 90 capsule 1    TRULICITY 0.75 MG/0.5ML pen INJECT 0.75 MG SUBCUTANEOUSLY EVERY 7 DAYS. 2 mL 3    vitamin B-12 (CYANOCOBALAMIN) 250 MCG tablet Take 250 mcg by mouth daily      vitamin C (ASCORBIC ACID) 500 MG tablet Take 500 mg by mouth daily      XARELTO ANTICOAGULANT 20 MG TABS tablet TAKE 1 TABLET BY MOUTH EVERY DAY WITH DINNER 90 tablet 3     No current facility-administered medications for this visit.          Allergies   Allergen Reactions    Ace Inhibitors      Per [facility]: hyperkalemia.  Pt doesn't know if he is allergic    Ceclor [Cefaclor] Hives    Sulfa Antibiotics      Pt unsure.     Tomato Flavor [Flavoring Agent (Non-Screening)]      Tomato Paste       No family history on file.    Social History     Socioeconomic History    Marital status: Single     Spouse name: None    Number of children: None    Years of education: None    Highest education level: None   Occupational History    None   Tobacco Use    Smoking status: Never Smoker    Smokeless tobacco: Former User     Types: Chew   Substance and Sexual Activity    Alcohol use: Not Currently    Drug use: Never    Sexual activity: None   Other Topics Concern    Parent/sibling w/ CABG, MI or angioplasty before 65F 55M? Not Asked   Social History Narrative    None     Social Determinants of Health     Financial Resource Strain:     Difficulty of Paying Living Expenses:    Food Insecurity:     Worried About Running Out of Food in the Last Year:     Ran Out of Food in the Last Year:    Transportation Needs:     Lack of Transportation (Medical):     Lack of Transportation (Non-Medical):    Physical Activity:     Days of Exercise per Week:      Minutes of Exercise per Session:    Stress:     Feeling of Stress :    Social Connections:     Frequency of Communication with Friends and Family:     Frequency of Social Gatherings with Friends and Family:     Attends Temple Services:     Active Member of Clubs or Organizations:     Attends Club or Organization Meetings:     Marital Status:    Intimate Partner Violence:     Fear of Current or Ex-Partner:     Emotionally Abused:     Physically Abused:     Sexually Abused:        ROS: 10 point ROS neg other than the symptoms noted above in the HPI.  EXAM  Constitutional: healthy, alert and no distress    Psychiatric: mentation appears normal and affect normal/bright    VASCULAR:  -Dorsalis pedis pulse +2/4 b/l  -Posterior tibial pulse +1/4 b/l  -Capillary refill time < 3 seconds to b/l hallux  -Hair growth absent to b/l anterior legs and ankles  NEURO:  -Epicritic and protective sensation absent to the bilateral foot  DERM:  -Skin temperature within normal limits to bilateral foot    -Toenails thickened, dystrophic and discolored x 9  ---LEFT hallux toenail removed  Wound Location:  LEFT medial hallux IPJ    01/29/2025:  Healed    01/08/2025  Measurement:  0.5cm x 1cm x 0.1cm through skin layer with the most medial 0.3 x 0.2cm and the most lateral 0.2cm x 0.2cm through subcutaneous tissue  Drainage:  Moderate serous  Odor:  None  Undermining:  None  Edges:  Intact with no edin-wound maceration  Base: 100% viable   Surrounding Skin: Intact  No severe erythema, no ascending erythema, no calor, no purulence, no malodor, no other signs of infection.     12/12/2024:  0.3cm x 0.3cm x 0.1cm to subcutaneous tissue layer  11/26/2024:  0.4cm x 0.3cm x 0.1cm to subcutaneous tissue layer  10/30/2024:  0.4cm x 0.6cm x 0.1cm to subcutaneous tissue layer    10/17/2024:  0.9cm x 0.9cm x 0.1cm to subcutaneous tissue layer  ---Additional wound immediately dorsal to this wound (0.2cm skin island) measuring 0.3cm x 0.3cm x 0.1cm  to subcutaneous tissue  Wound open from 01/30/2024:  1.6cm x 2.3cm x 0.1cm to subcutaneous tissue until healed on 08/14/2024    MSK:  -Adductovarus rotation of the RIGHT fifth toe  -LEFT distal fifth toe amputated at the PIPJ  -Muscle strength of ankles +5/5 for dorsiflexion, plantarflexion, ABDUction and ADDuction b/l    BILATERAL ANUSHKA 05/08/2024  Impression:      Right leg: Noncompressible arteries in the right lower leg. The right  TBI is 0.78.     Left leg: Resting ANUSHKA is 1.05, normal. The left TBI is 1.17.  ============================================================    ASSESSMENT:    (Z86.31) Healed diabetic foot ulcer  (primary encounter diagnosis)    (E11.9) Diabetes mellitus type 2, noninsulin dependent (H)    (E11.42) Diabetic polyneuropathy associated with type 2 diabetes mellitus (H)    (Z13.89) Screening for diabetic peripheral neuropathy      PLAN:  -Patient evaluated and examined. Treatment options discussed with no educational barriers noted.    -Toenails last debrided on 01/08/2025    ---------------------------------------------    Diabetic foot wound LEFT hallux:  -Outer callus cared for and the wound is healed.  -Applied Iodosorb, Mepilex Ag and tape.    -The following instructions given to the patient:  Instructions for LEFT toe wound as of 01/29/2025:    -The LEFT great toe wound is healed as of 01/29/2025. Please change the dressing every 2-3 days with Mepilex Ag (you do not need to use the Iodosorb) and tape.   ---If there is no drainage for three dressings in a row, then the dressings may be discontinued.   ---If the wound re-opens, resume the dressing with Iodosorb, Mepilex Ag and tape. If the wound remains healed, please continue to check the foot at least three times a week to see if there is recurrence of the wound.    --------------------------------------------    -Travel is difficult for the patient. He would like to continue to follow-up every few weeks. He says his  checks  his foot consistently and he will call the clinic with any concerning changes or signs of infection. Will not extend more than three weeks at this time because of the amount of callus that is forming over the ulcer site.  -Patient was instructed to look for signs of infection (redness, swelling, pain, purulence, fever, chills, nausea, vomiting) and to return to podiatry or the Emergency Department immediately if there are any signs of infection.     -Offloading: Patient to continue wearing the post-op shoe at home to help with more offloading of the foot.    -Bilateral ANUSHKA 05/08/2024: Adequate for wound healing. Within normal limits on the LEFT and non-compressible on the RIGHT but TBI for the RIGHT foot is adequate for wound healing.    CKD: Patient's CKDis managed by their PCP. The kidney function appears to be stable. Patient's last GFR was 57 on 06/26/2024. The reduced kidney function contributes to increased edema in the foot.     Diabetes Mellitus: Patient's DM is managed by their PCP. The DM appears to be stable. Patient's last HbA1C was 5.6% on 09/30/2024.    -Patient in agreement with the above treatment plan and all of patient's questions were answered.      Return to clinic three to three weeks to evaluate the LEFT diabetic foot ulcer   Next diabetic foot exam scheduled for 03/12/2025      Meenu Orourke DPM

## 2025-01-29 NOTE — PATIENT INSTRUCTIONS
Instructions for LEFT toe wound as of 01/29/2025:    -The LEFT great toe wound is healed as of 01/29/2025. Please change the dressing every 2-3 days with Mepilex Ag (you do not need to use the Iodosorb) and tape.   ---If there is no drainage for three dressings in a row, then the dressings may be discontinued.   ---If the wound re-opens, resume the dressing with Iodosorb, Mepilex Ag and tape. If the wound remains healed, please continue to check the foot at least three times a week to see if there is recurrence of the wound.    Thank you

## 2025-02-20 ENCOUNTER — OFFICE VISIT (OUTPATIENT)
Dept: PODIATRY | Facility: OTHER | Age: 85
End: 2025-02-20
Attending: PODIATRIST
Payer: MEDICARE

## 2025-02-20 VITALS
TEMPERATURE: 98 F | DIASTOLIC BLOOD PRESSURE: 78 MMHG | SYSTOLIC BLOOD PRESSURE: 117 MMHG | OXYGEN SATURATION: 94 % | HEART RATE: 84 BPM

## 2025-02-20 DIAGNOSIS — E11.9 DIABETES MELLITUS TYPE 2, NONINSULIN DEPENDENT (H): ICD-10-CM

## 2025-02-20 DIAGNOSIS — L60.1 ONYCHOLYSIS: ICD-10-CM

## 2025-02-20 DIAGNOSIS — Z13.89 SCREENING FOR DIABETIC PERIPHERAL NEUROPATHY: ICD-10-CM

## 2025-02-20 DIAGNOSIS — Z86.31 HEALED DIABETIC FOOT ULCER: Primary | ICD-10-CM

## 2025-02-20 DIAGNOSIS — E11.42 DIABETIC POLYNEUROPATHY ASSOCIATED WITH TYPE 2 DIABETES MELLITUS (H): ICD-10-CM

## 2025-02-20 PROCEDURE — G0463 HOSPITAL OUTPT CLINIC VISIT: HCPCS | Mod: 25

## 2025-02-20 PROCEDURE — 11730 AVULSION NAIL PLATE SIMPLE 1: CPT | Performed by: PODIATRIST

## 2025-02-20 ASSESSMENT — PAIN SCALES - GENERAL: PAINLEVEL_OUTOF10: NO PAIN (0)

## 2025-02-20 NOTE — PROGRESS NOTES
Chief complaint: No chief complaint on file.      History of Present Illness: This 84-year-old NIDDM II male is seen for a diabetic foot ulcer.    His  helped him change his dressing every two days with Iodosorb, gauze and tape until there was no drainage.     The patient says he noticed he had blood on his sock last night, but he couldn't tell what was bleeding. He did not remove the sock.     No further pedal complaints today.        Lab Results   Component Value Date    A1C 5.6 09/30/2024    A1C 6.0 06/26/2024    A1C 5.9 03/25/2024    A1C 6.7 11/20/2023    A1C 6.0 03/09/2023    A1C 6.5 05/06/2021         /78 (BP Location: Left arm, Patient Position: Sitting, Cuff Size: Adult Regular)   Pulse 84   Temp 98  F (36.7  C) (Tympanic)   SpO2 94%     Patient Active Problem List   Diagnosis    Urinary retention    Generalized weakness    Type 2 diabetes mellitus with stage 3 chronic kidney disease, without long-term current use of insulin (H)    Tachycardia    Hypoxia    Other acute pulmonary embolism with acute cor pulmonale (H)    Dyspnea, unspecified type    Benign prostatic hyperplasia with urinary retention    Chronic kidney disease, stage III (moderate) (H)    Chronic obstructive lung disease (H)    Chronic painful diabetic neuropathy (H)    Erectile dysfunction    Essential hypertension    Gout, unspecified    History of CVA (cerebrovascular accident)    Hyperlipidemia    IBS (irritable bowel syndrome)    Lumbar spondylosis    Mild concentric left ventricular hypertrophy (LVH)    Moderate episode of recurrent major depressive disorder (H)    Spinal stenosis of lumbar region without neurogenic claudication    Uncomplicated alcohol dependence (H)    Onychomycosis of left great toe    Pincer nail deformity    Complicated UTI (urinary tract infection)    Sepsis with acute renal failure (H)       Past Surgical History:   Procedure Laterality Date    AMPUTATE TOE(S) Left 6/27/2022    Procedure: Left  fifth toe partial versus full amputation;  Surgeon: Meenu Orourke DPM;  Location: [facility]    CYSTOSCOPY, TRANSURETHRAL RESECTION (TUR) PROSTATE, COMBINED N/A 10/15/2021    Procedure: CYSTOSCOPY, WITH TRANSURETHRAL RESECTION PROSTATE;  Surgeon: Indu De Leon MD;  Location: [facility]    HERNIA REPAIR      PHACOEMULSIFICATION WITH STANDARD INTRAOCULAR LENS IMPLANT Left 12/27/2021    Procedure: PHACOEMULSIFICATION CATARACT EXTRACTION POSTERIOR CHAMBER LENS LEFT EYE;  Surgeon: Phillip Maldonado MD;  Location: [facility]    PHACOEMULSIFICATION WITH STANDARD INTRAOCULAR LENS IMPLANT Right 1/11/2022    Procedure: PHACOEMULSIFICATION CATARACT EXTRACTION POSTERIOR CHAMBER LENS RIGHT EYE;  Surgeon: Phillip Maldonado MD;  Location: [facility]       Current Outpatient Medications   Medication Sig Dispense Refill    acetaminophen (TYLENOL) 500 MG tablet Take 1,000 mg by mouth 2 times daily . Limit acetaminophen to 4000 mg per day from all sources.      albuterol (PROAIR HFA/PROVENTIL HFA/VENTOLIN HFA) 108 (90 Base) MCG/ACT inhaler Inhale 2 puffs into the lungs every 4 hours as needed for cough, shortness of breath or wheezing 18 g 3    allopurinol (ZYLOPRIM) 100 MG tablet TAKE 1 TABLET BY MOUTH ONCEDAILY 90 tablet 3    amLODIPine (NORVASC) 2.5 MG tablet TAKE ONE TABLET BY MOUTH DAILY 90 tablet 1    atorvastatin (LIPITOR) 80 MG tablet TAKE ONE-HALF TABLET (40 MG) BY MOUTH EVERY EVENING 90 tablet 1    carvedilol (COREG) 25 MG tablet TAKE 1 TABLET BY MOUTH TWICE DAILY 180 tablet 1    DENTA 5000 PLUS 1.1 % CREA USE NIGHTLY,BRUSH WEEL FOR 2 MINUTES,SPIT OUT EXCESS, NO RINSING,AVOID EATING DRINKING FOR 30 MINUTES      finasteride (PROSCAR) 5 MG tablet TAKE 1 TABLET (5 MG) BY MOUTH DAILY 150 tablet 4    FLUoxetine (PROZAC) 10 MG capsule TAKE 1 CAPSULE (10 MG) BY MOUTH DAILY 90 capsule 1    gabapentin (NEURONTIN) 300 MG capsule TAKE 1 CAPSULE (300 MG) BY MOUTH 3 TIMES DAILY 270 capsule 1    insulin pen needle (ULTICARE MINI) 31G X 6 MM  miscellaneous USE WITH TRULICITY 100 each 3    metFORMIN (GLUCOPHAGE XR) 500 MG 24 hr tablet TAKE 2 TABLETS BY MOUTH TWICE DAILY WITH MEALS 360 tablet 0    multivitamin, therapeutic (THERA-VIT) TABS tablet Take 1 tablet by mouth daily      sildenafil (VIAGRA) 100 MG tablet Take 1 tablet (100 mg) by mouth daily as needed (erectile dysfunction) 10 tablet 0    tamsulosin (FLOMAX) 0.4 MG capsule TAKE 1 CAPSULE (0.4 MG) BY MOUTH EVERY EVENING 90 capsule 1    TRULICITY 0.75 MG/0.5ML pen INJECT 0.75 MG SUBCUTANEOUSLY EVERY 7 DAYS. 2 mL 3    vitamin B-12 (CYANOCOBALAMIN) 250 MCG tablet Take 250 mcg by mouth daily      vitamin C (ASCORBIC ACID) 500 MG tablet Take 500 mg by mouth daily      XARELTO ANTICOAGULANT 20 MG TABS tablet TAKE 1 TABLET BY MOUTH EVERY DAY WITH DINNER 90 tablet 3     No current facility-administered medications for this visit.          Allergies   Allergen Reactions    Ace Inhibitors      Per [facility]: hyperkalemia.  Pt doesn't know if he is allergic    Ceclor [Cefaclor] Hives    Sulfa Antibiotics      Pt unsure.     Tomato Flavor [Flavoring Agent (Non-Screening)]      Tomato Paste       No family history on file.    Social History     Socioeconomic History    Marital status: Single     Spouse name: None    Number of children: None    Years of education: None    Highest education level: None   Occupational History    None   Tobacco Use    Smoking status: Never Smoker    Smokeless tobacco: Former User     Types: Chew   Substance and Sexual Activity    Alcohol use: Not Currently    Drug use: Never    Sexual activity: None   Other Topics Concern    Parent/sibling w/ CABG, MI or angioplasty before 65F 55M? Not Asked   Social History Narrative    None     Social Determinants of Health     Financial Resource Strain:     Difficulty of Paying Living Expenses:    Food Insecurity:     Worried About Running Out of Food in the Last Year:     Ran Out of Food in the Last Year:    Transportation Needs:     Lack of  Transportation (Medical):     Lack of Transportation (Non-Medical):    Physical Activity:     Days of Exercise per Week:     Minutes of Exercise per Session:    Stress:     Feeling of Stress :    Social Connections:     Frequency of Communication with Friends and Family:     Frequency of Social Gatherings with Friends and Family:     Attends [employer] Services:     Active Member of Clubs or Organizations:     Attends Club or Organization Meetings:     Marital Status:    Intimate Partner Violence:     Fear of Current or Ex-Partner:     Emotionally Abused:     Physically Abused:     Sexually Abused:        ROS: 10 point ROS neg other than the symptoms noted above in the HPI.  EXAM  Constitutional: healthy, alert and no distress    Psychiatric: mentation appears normal and affect normal/bright    VASCULAR:  -Dorsalis pedis pulse +2/4 b/l  -Posterior tibial pulse +1/4 b/l  -Capillary refill time < 3 seconds to b/l hallux  -Hair growth absent to b/l anterior legs and ankles  NEURO:  -Epicritic and protective sensation absent to the bilateral foot  DERM:  -Skin temperature within normal limits to bilateral foot    -Toenails thickened, dystrophic and discolored x 9  ---LEFT hallux toenail removed  ---LEFT second toenail not attached to the distal 90% of the nail bed and bleeding beneath the toenail  -----No severe erythema, no ascending erythema, no calor, no purulence, no malodor, no other signs of infection.   Wound Location:  LEFT medial hallux IPJ    02/20/2025:  Healed    01/29/2025:  Healed  01/08/2025:  0.5cm x 1cm x 0.1cm through skin layer with the most medial 0.3 x 0.2cm and the most lateral 0.2cm x 0.2cm  12/12/2024:  0.3cm x 0.3cm x 0.1cm to subcutaneous tissue layer  11/26/2024:  0.4cm x 0.3cm x 0.1cm to subcutaneous tissue layer  10/30/2024:  0.4cm x 0.6cm x 0.1cm to subcutaneous tissue layer  10/17/2024:  0.9cm x 0.9cm x 0.1cm to subcutaneous tissue layer  ---Additional wound immediately dorsal to this wound  (0.2cm skin island) measuring 0.3cm x 0.3cm x 0.1cm to subcutaneous tissue  -Wound open from 01/30/2024:  1.6cm x 2.3cm x 0.1cm to subcutaneous tissue until healed on 08/14/2024    MSK:  -Adductovarus rotation of the RIGHT fifth toe  -LEFT distal fifth toe amputated at the PIPJ  -Muscle strength of ankles +5/5 for dorsiflexion, plantarflexion, ABDUction and ADDuction b/l    BILATERAL ANUSHKA 05/08/2024  Impression:      Right leg: Noncompressible arteries in the right lower leg. The right  TBI is 0.78.     Left leg: Resting ANUSHKA is 1.05, normal. The left TBI is 1.17.  ============================================================    ASSESSMENT:    (Z86.31) Healed diabetic foot ulcer  (primary encounter diagnosis)    (L60.1) Onycholysis    (E11.9) Diabetes mellitus type 2, noninsulin dependent (H)    (E11.42) Diabetic polyneuropathy associated with type 2 diabetes mellitus (H)    (Z13.89) Screening for diabetic peripheral neuropathy        PLAN:  -Patient evaluated and examined. Treatment options discussed with no educational barriers noted.    -Toenails last debrided on 01/08/2025    ---------------------------------------------    Diabetic foot wound LEFT hallux:  -Outer callus cared for and the wound is healed. Pinpoint bleeding covered with small amount of triple antibiotic ointment, gauze and tape. Patient to continue daily until there is no bleeding or drainage.    --------------------------------------------    Onycholysis and toenail avulsion:  -The LEFT second toenail was attached at the proximal nail bed, but not attached to the distal 90% of the nail bed. Patient wanted the area of bleeding treated. Due to patient's severe neuropathy, no anesthetic was required. A hemostat and nipper were used to remove the nail in total from the LEFT second nail bed. The wound bed had mild serous bloody drainage through the skin layer on the LEFT second nail bed. Covered the toe with triple antibiotic ointment, gauze and tape.  Patient to continue this every 1-2 days until healed without drainage.    -Travel is difficult for the patient. He would like to continue to follow-up every few weeks. He says his  checks his foot consistently and he will call the clinic with any concerning changes or signs of infection. Will not extend more than three weeks at this time because of the amount of callus that is forming over the ulcer site.  -Patient was instructed to look for signs of infection (redness, swelling, pain, purulence, fever, chills, nausea, vomiting) and to return to podiatry or the Emergency Department immediately if there are any signs of infection.     -Offloading: Patient to continue wearing the post-op shoe at home to help with more offloading of the foot.    -Bilateral ANUSHKA 05/08/2024: Adequate for wound healing. Within normal limits on the LEFT and non-compressible on the RIGHT but TBI for the RIGHT foot is adequate for wound healing.    CKD: Patient's CKDis managed by their PCP. The kidney function appears to be stable. Patient's last GFR was 57 on 06/26/2024. The reduced kidney function contributes to increased edema in the foot.     Diabetes Mellitus: Patient's DM is managed by their PCP. The DM appears to be stable. Patient's last HbA1C was 5.6% on 09/30/2024.    -Patient in agreement with the above treatment plan and all of patient's questions were answered.      Return to clinic three weeks for scheduled diabetic foot exam and to evaluate the LEFT second toenail avulsion site and the LEFT plantar hallux diabetic foot ulcer site      Meenu Orourke DPM

## 2025-02-20 NOTE — PATIENT INSTRUCTIONS
Dressing Instructions on 02/20/2025:   -Clean wound on the LEFT big toe and the LEFT second toe (the toenail is off the LEFT second toe).  -Cover the wounds with triple antibiotic ointment, gauze and tape until the wounds are healed without drainage.  -The LEFT great toe wound is almost healed and may not need a dressing for more than 1-2 days unless it is still draining.

## 2025-03-12 ENCOUNTER — OFFICE VISIT (OUTPATIENT)
Dept: PODIATRY | Facility: OTHER | Age: 85
End: 2025-03-12
Attending: PODIATRIST
Payer: MEDICARE

## 2025-03-12 VITALS
SYSTOLIC BLOOD PRESSURE: 125 MMHG | OXYGEN SATURATION: 94 % | DIASTOLIC BLOOD PRESSURE: 77 MMHG | HEART RATE: 86 BPM | TEMPERATURE: 97.5 F

## 2025-03-12 DIAGNOSIS — Z13.89 SCREENING FOR DIABETIC PERIPHERAL NEUROPATHY: ICD-10-CM

## 2025-03-12 DIAGNOSIS — E11.42 DIABETIC POLYNEUROPATHY ASSOCIATED WITH TYPE 2 DIABETES MELLITUS (H): ICD-10-CM

## 2025-03-12 DIAGNOSIS — L84 CALLUS OF FOOT: Primary | ICD-10-CM

## 2025-03-12 DIAGNOSIS — E11.9 DIABETES MELLITUS TYPE 2, NONINSULIN DEPENDENT (H): ICD-10-CM

## 2025-03-12 DIAGNOSIS — L60.3 ONYCHODYSTROPHY: ICD-10-CM

## 2025-03-12 PROCEDURE — G0463 HOSPITAL OUTPT CLINIC VISIT: HCPCS

## 2025-03-12 PROCEDURE — 11721 DEBRIDE NAIL 6 OR MORE: CPT | Performed by: PODIATRIST

## 2025-03-12 PROCEDURE — 11055 PARING/CUTG B9 HYPRKER LES 1: CPT | Performed by: PODIATRIST

## 2025-03-12 ASSESSMENT — PAIN SCALES - GENERAL: PAINLEVEL_OUTOF10: NO PAIN (0)

## 2025-03-12 NOTE — PROGRESS NOTES
Chief complaint: Patient presents with:  Toenail: Blister on left toe and lost nail      History of Present Illness: This 84-year-old NIDDM II male is seen for a diabetic foot ulcer.    His  is helping him monitor his LEFT hallux every 1-2 days to see if he has an open wound on his foot.    His LEFT second toenail recently fell off but it is no longer draining.    No further pedal complaints today.      Lab Results   Component Value Date    A1C 5.6 09/30/2024    A1C 6.0 06/26/2024    A1C 5.9 03/25/2024    A1C 6.7 11/20/2023    A1C 6.0 03/09/2023    A1C 6.5 05/06/2021         /77 (BP Location: Left arm, Patient Position: Sitting, Cuff Size: Adult Regular)   Pulse 86   Temp 97.5  F (36.4  C) (Tympanic)   SpO2 94%     Patient Active Problem List   Diagnosis    Urinary retention    Generalized weakness    Type 2 diabetes mellitus with stage 3 chronic kidney disease, without long-term current use of insulin (H)    Tachycardia    Hypoxia    Other acute pulmonary embolism with acute cor pulmonale (H)    Dyspnea, unspecified type    Benign prostatic hyperplasia with urinary retention    Chronic kidney disease, stage III (moderate) (H)    Chronic obstructive lung disease (H)    Chronic painful diabetic neuropathy (H)    Erectile dysfunction    Essential hypertension    Gout, unspecified    History of CVA (cerebrovascular accident)    Hyperlipidemia    IBS (irritable bowel syndrome)    Lumbar spondylosis    Mild concentric left ventricular hypertrophy (LVH)    Moderate episode of recurrent major depressive disorder (H)    Spinal stenosis of lumbar region without neurogenic claudication    Uncomplicated alcohol dependence (H)    Onychomycosis of left great toe    Pincer nail deformity    Complicated UTI (urinary tract infection)    Sepsis with acute renal failure (H)       Past Surgical History:   Procedure Laterality Date    AMPUTATE TOE(S) Left 6/27/2022    Procedure: Left fifth toe partial versus full  amputation;  Surgeon: Meenu Orourke DPM;  Location: [facility]    CYSTOSCOPY, TRANSURETHRAL RESECTION (TUR) PROSTATE, COMBINED N/A 10/15/2021    Procedure: CYSTOSCOPY, WITH TRANSURETHRAL RESECTION PROSTATE;  Surgeon: Indu De Leon MD;  Location: [facility]    HERNIA REPAIR      PHACOEMULSIFICATION WITH STANDARD INTRAOCULAR LENS IMPLANT Left 12/27/2021    Procedure: PHACOEMULSIFICATION CATARACT EXTRACTION POSTERIOR CHAMBER LENS LEFT EYE;  Surgeon: Phillip Maldonado MD;  Location: [facility]    PHACOEMULSIFICATION WITH STANDARD INTRAOCULAR LENS IMPLANT Right 1/11/2022    Procedure: PHACOEMULSIFICATION CATARACT EXTRACTION POSTERIOR CHAMBER LENS RIGHT EYE;  Surgeon: Phillip Maldonado MD;  Location: [facility]       Current Outpatient Medications   Medication Sig Dispense Refill    acetaminophen (TYLENOL) 500 MG tablet Take 1,000 mg by mouth 2 times daily . Limit acetaminophen to 4000 mg per day from all sources.      albuterol (PROAIR HFA/PROVENTIL HFA/VENTOLIN HFA) 108 (90 Base) MCG/ACT inhaler Inhale 2 puffs into the lungs every 4 hours as needed for cough, shortness of breath or wheezing 18 g 3    allopurinol (ZYLOPRIM) 100 MG tablet TAKE 1 TABLET BY MOUTH ONCEDAILY 90 tablet 3    amLODIPine (NORVASC) 2.5 MG tablet TAKE ONE TABLET BY MOUTH DAILY 90 tablet 1    atorvastatin (LIPITOR) 80 MG tablet TAKE ONE-HALF TABLET (40 MG) BY MOUTH EVERY EVENING 90 tablet 1    carvedilol (COREG) 25 MG tablet TAKE 1 TABLET BY MOUTH TWICE DAILY 180 tablet 1    DENTA 5000 PLUS 1.1 % CREA USE NIGHTLY,BRUSH WEEL FOR 2 MINUTES,SPIT OUT EXCESS, NO RINSING,AVOID EATING DRINKING FOR 30 MINUTES      finasteride (PROSCAR) 5 MG tablet TAKE 1 TABLET (5 MG) BY MOUTH DAILY 150 tablet 4    FLUoxetine (PROZAC) 10 MG capsule TAKE 1 CAPSULE (10 MG) BY MOUTH DAILY 90 capsule 1    gabapentin (NEURONTIN) 300 MG capsule TAKE 1 CAPSULE (300 MG) BY MOUTH 3 TIMES DAILY 270 capsule 1    insulin pen needle (ULTICARE MINI) 31G X 6 MM miscellaneous USE WITH TRULICITY 100  each 3    metFORMIN (GLUCOPHAGE XR) 500 MG 24 hr tablet TAKE 2 TABLETS BY MOUTH TWICE DAILY WITH MEALS 360 tablet 0    multivitamin, therapeutic (THERA-VIT) TABS tablet Take 1 tablet by mouth daily      sildenafil (VIAGRA) 100 MG tablet Take 1 tablet (100 mg) by mouth daily as needed (erectile dysfunction) 10 tablet 0    tamsulosin (FLOMAX) 0.4 MG capsule TAKE 1 CAPSULE (0.4 MG) BY MOUTH EVERY EVENING 90 capsule 1    TRULICITY 0.75 MG/0.5ML pen INJECT 0.75 MG SUBCUTANEOUSLY EVERY 7 DAYS. 2 mL 3    vitamin B-12 (CYANOCOBALAMIN) 250 MCG tablet Take 250 mcg by mouth daily      vitamin C (ASCORBIC ACID) 500 MG tablet Take 500 mg by mouth daily      XARELTO ANTICOAGULANT 20 MG TABS tablet TAKE 1 TABLET BY MOUTH EVERY DAY WITH DINNER 90 tablet 3     No current facility-administered medications for this visit.          Allergies   Allergen Reactions    Ace Inhibitors      Per [facility]: hyperkalemia.  Pt doesn't know if he is allergic    Ceclor [Cefaclor] Hives    Sulfa Antibiotics      Pt unsure.     Tomato Flavor [Flavoring Agent (Non-Screening)]      Tomato Paste       No family history on file.    Social History     Socioeconomic History    Marital status: Single     Spouse name: None    Number of children: None    Years of education: None    Highest education level: None   Occupational History    None   Tobacco Use    Smoking status: Never Smoker    Smokeless tobacco: Former User     Types: Chew   Substance and Sexual Activity    Alcohol use: Not Currently    Drug use: Never    Sexual activity: None   Other Topics Concern    Parent/sibling w/ CABG, MI or angioplasty before 65F 55M? Not Asked   Social History Narrative    None     Social Determinants of Health     Financial Resource Strain:     Difficulty of Paying Living Expenses:    Food Insecurity:     Worried About Running Out of Food in the Last Year:     Ran Out of Food in the Last Year:    Transportation Needs:     Lack of Transportation (Medical):     Lack of  Transportation (Non-Medical):    Physical Activity:     Days of Exercise per Week:     Minutes of Exercise per Session:    Stress:     Feeling of Stress :    Social Connections:     Frequency of Communication with Friends and Family:     Frequency of Social Gatherings with Friends and Family:     Attends Yazidi Services:     Active Member of Clubs or Organizations:     Attends Club or Organization Meetings:     Marital Status:    Intimate Partner Violence:     Fear of Current or Ex-Partner:     Emotionally Abused:     Physically Abused:     Sexually Abused:        ROS: 10 point ROS neg other than the symptoms noted above in the HPI.  EXAM  Constitutional: healthy, alert and no distress    Psychiatric: mentation appears normal and affect normal/bright    VASCULAR:  -Dorsalis pedis pulse +2/4 b/l  -Posterior tibial pulse +1/4 b/l  -Capillary refill time < 3 seconds to b/l hallux  -Hair growth absent to b/l anterior legs and ankles  NEURO:  -Epicritic and protective sensation absent to the bilateral foot  DERM:  -Skin temperature within normal limits to bilateral foot  -Hyperkeratotic lesion on the medial plantar distal aspect of the proximal phalanx of the LEFT hallux    -Toenails thickened, dystrophic and discolored x 8  ---LEFT distal fifth toe amputated  ---LEFT foot second toenail partially regrown  MSK:  -Adductovarus rotation of the RIGHT fifth toe  -LEFT distal fifth toe amputated at the PIPJ  -Muscle strength of ankles +5/5 for dorsiflexion, plantarflexion, ABDUction and ADDuction b/l    BILATERAL ANUSHKA 05/08/2024  Impression:      Right leg: Noncompressible arteries in the right lower leg. The right  TBI is 0.78.     Left leg: Resting ANUSHKA is 1.05, normal. The left TBI is 1.17.  ============================================================    ASSESSMENT:    (L84) Callus of foot  (primary encounter diagnosis)    (L60.3) Onychodystrophy    (E11.9) Diabetes mellitus type 2, noninsulin dependent (H)    (E11.42)  Diabetic polyneuropathy associated with type 2 diabetes mellitus (H)    (Z13.89) Screening for diabetic peripheral neuropathy        PLAN:  -Patient evaluated and examined. Treatment options discussed with no educational barriers noted.    -High risk toenail debridement x 10 toenails without incident on 03/12/2025    -Callus pared x 1 to the medial plantar distal aspect of the proximal phalanx of the LEFT hallux   without incident  ---Patient reminded that the callus will likely return due to the underlying, prominent bone causing the callus while the patient is walking.  ---Continue checking the foot daily for an open wound    -Diabetic Foot Education provided. This included checking the feet daily looking for new new blisters or wounds, wearing shoes at all times when walking including around the house, and avoiding lotion application between the toes. If there are any signs of infection, the patient should present to the ED as soon as possible. Infections of the foot can be life threatening or lead to amputations of the foot or leg.    -Bilateral ANUSHKA 05/08/2024: Adequate for wound healing. Within normal limits on the LEFT and non-compressible on the RIGHT but TBI for the RIGHT foot is adequate for wound healing.    CKD: Patient's CKDis managed by their PCP. The kidney function appears to be stable. Patient's last GFR was 57 on 06/26/2024. The reduced kidney function contributes to increased edema in the foot.     Diabetes Mellitus: Patient's DM is managed by their PCP. The DM appears to be stable. Patient's last HbA1C was 5.6% on 09/30/2024.    -Patient in agreement with the above treatment plan and all of patient's questions were answered.      Return to clinic 63+ days for a diabetic foot exam an high risk nail debridement and care home between for to check for recurrence of a diabetic foot ulcer       Meenu Orourke DPM

## 2025-03-23 ENCOUNTER — HEALTH MAINTENANCE LETTER (OUTPATIENT)
Age: 85
End: 2025-03-23

## 2025-04-10 DIAGNOSIS — F41.9 ANXIETY: ICD-10-CM

## 2025-04-10 RX ORDER — FLUOXETINE 10 MG/1
10 CAPSULE ORAL DAILY
Qty: 90 CAPSULE | Refills: 0 | Status: SHIPPED | OUTPATIENT
Start: 2025-04-10

## 2025-04-10 NOTE — TELEPHONE ENCOUNTER
Fluoxetine (Prozac) 10 MG capsule     Last Written Prescription Date:  10/22/2024  Last Fill Quantity: 90,   # refills: 1  Last Office Visit: 09/30/2024  Future Office visit:    Next 5 appointments (look out 90 days)      Apr 17, 2025 2:30 PM  (Arrive by 2:15 PM)  Return Visit with Meenu Orourke DPM  [facility] ([facility] ) [address] [place] [id number] [phone]     May 15, 2025 2:15 PM  (Arrive by 2:00 PM)  Return Visit with Meenu Orourke DPM  [facility] ([facility] ) [address] [place] [id number] [phone]             Routing refill request to provider for review/approval because:  Protocol failed on the Prozac.

## 2025-04-13 ENCOUNTER — HEALTH MAINTENANCE LETTER (OUTPATIENT)
Age: 85
End: 2025-04-13

## 2025-04-14 DIAGNOSIS — E11.22 TYPE 2 DIABETES MELLITUS WITH STAGE 3A CHRONIC KIDNEY DISEASE, WITHOUT LONG-TERM CURRENT USE OF INSULIN (H): ICD-10-CM

## 2025-04-14 DIAGNOSIS — N18.31 TYPE 2 DIABETES MELLITUS WITH STAGE 3A CHRONIC KIDNEY DISEASE, WITHOUT LONG-TERM CURRENT USE OF INSULIN (H): ICD-10-CM

## 2025-04-14 RX ORDER — METFORMIN HYDROCHLORIDE 500 MG/1
TABLET, EXTENDED RELEASE ORAL
Qty: 360 TABLET | Refills: 0 | Status: SHIPPED | OUTPATIENT
Start: 2025-04-14

## 2025-04-14 NOTE — TELEPHONE ENCOUNTER
metFORMIN (GLUCOPHAGE XR) 500 MG 24 hr tablet         Last Written Prescription Date:  1/24/25  Last Fill Quantity: 360,   # refills: 0  Last Office Visit: 9/30/24  Future Office visit:    Next 5 appointments (look out 90 days)      Apr 17, 2025 2:30 PM  (Arrive by 2:15 PM)  Return Visit with Meenu Orourke DPM  [facility] ([facility] ) [address] [place] [id number]  [phone]     May 15, 2025 2:15 PM  (Arrive by 2:00 PM)  [facility] [address] [facility] [address]  [phone]             Routing refill request to provider for review/approval because:  Biguanide Agents Failed      Patient has documented A1c within the specified period of time.             Recent Labs   Lab Test 09/30/24  1438   A1C 5.6       Has GFR on file in past 12 months and most recent value is normal      Recent (6 mo) or future (90 days) visit within the authorizing provider's specialty    The patient must have completed an in-person or virtual visit within the past 6 months or has a future visit scheduled within the next 90 days with the authorizing provider s specialty.  Urgent care and e-visits do not quality as an office visit for this protocol.

## 2025-04-17 ENCOUNTER — OFFICE VISIT (OUTPATIENT)
Dept: PODIATRY | Facility: OTHER | Age: 85
End: 2025-04-17
Attending: PODIATRIST
Payer: MEDICARE

## 2025-04-17 VITALS
DIASTOLIC BLOOD PRESSURE: 71 MMHG | HEART RATE: 88 BPM | SYSTOLIC BLOOD PRESSURE: 132 MMHG | RESPIRATION RATE: 18 BRPM | TEMPERATURE: 98.4 F | OXYGEN SATURATION: 94 %

## 2025-04-17 DIAGNOSIS — Z86.31 HEALED DIABETIC FOOT ULCER: ICD-10-CM

## 2025-04-17 DIAGNOSIS — N18.31 STAGE 3A CHRONIC KIDNEY DISEASE (H): Primary | ICD-10-CM

## 2025-04-17 DIAGNOSIS — E11.42 DIABETIC POLYNEUROPATHY ASSOCIATED WITH TYPE 2 DIABETES MELLITUS (H): ICD-10-CM

## 2025-04-17 PROCEDURE — G0463 HOSPITAL OUTPT CLINIC VISIT: HCPCS | Mod: 25

## 2025-04-17 NOTE — PROGRESS NOTES
Chief complaint: No chief complaint on file.      History of Present Illness: This 84-year-old NIDDM II male is seen for a diabetic foot ulcer.    His  is helping him monitor his LEFT hallux every 1-2 days to see if he has an open wound on his foot. He has not had any bandages on the foot. He is here to check if the wound is open.    No further pedal complaints today.      Lab Results   Component Value Date    A1C 5.6 09/30/2024    A1C 6.0 06/26/2024    A1C 5.9 03/25/2024    A1C 6.7 11/20/2023    A1C 6.0 03/09/2023    A1C 6.5 05/06/2021         /71 (BP Location: Left arm, Patient Position: Sitting, Cuff Size: Adult Regular)   Pulse 88   Temp 98.4  F (36.9  C) (Tympanic)   Resp 18   SpO2 94%     Patient Active Problem List   Diagnosis    Urinary retention    Generalized weakness    Type 2 diabetes mellitus with stage 3 chronic kidney disease, without long-term current use of insulin (H)    Tachycardia    Hypoxia    Other acute pulmonary embolism with acute cor pulmonale (H)    Dyspnea, unspecified type    Benign prostatic hyperplasia with urinary retention    Chronic kidney disease, stage III (moderate) (H)    Chronic obstructive lung disease (H)    Chronic painful diabetic neuropathy (H)    Erectile dysfunction    Essential hypertension    Gout, unspecified    History of CVA (cerebrovascular accident)    Hyperlipidemia    IBS (irritable bowel syndrome)    Lumbar spondylosis    Mild concentric left ventricular hypertrophy (LVH)    Moderate episode of recurrent major depressive disorder (H)    Spinal stenosis of lumbar region without neurogenic claudication    Uncomplicated alcohol dependence (H)    Onychomycosis of left great toe    Pincer nail deformity    Complicated UTI (urinary tract infection)    Sepsis with acute renal failure (H)       Past Surgical History:   Procedure Laterality Date    AMPUTATE TOE(S) Left 6/27/2022    Procedure: Left fifth toe partial versus full amputation;  Surgeon:  Meenu Orourke DPM;  Location: [facility]    CYSTOSCOPY, TRANSURETHRAL RESECTION (TUR) PROSTATE, COMBINED N/A 10/15/2021    Procedure: CYSTOSCOPY, WITH TRANSURETHRAL RESECTION PROSTATE;  Surgeon: Indu De Leon MD;  Location: [facility]    HERNIA REPAIR      PHACOEMULSIFICATION WITH STANDARD INTRAOCULAR LENS IMPLANT Left 12/27/2021    Procedure: PHACOEMULSIFICATION CATARACT EXTRACTION POSTERIOR CHAMBER LENS LEFT EYE;  Surgeon: Phillip Maldonado MD;  Location: [facility]    PHACOEMULSIFICATION WITH STANDARD INTRAOCULAR LENS IMPLANT Right 1/11/2022    Procedure: PHACOEMULSIFICATION CATARACT EXTRACTION POSTERIOR CHAMBER LENS RIGHT EYE;  Surgeon: Phillip Maldonado MD;  Location: [facility]       Current Outpatient Medications   Medication Sig Dispense Refill    acetaminophen (TYLENOL) 500 MG tablet Take 1,000 mg by mouth 2 times daily . Limit acetaminophen to 4000 mg per day from all sources.      albuterol (PROAIR HFA/PROVENTIL HFA/VENTOLIN HFA) 108 (90 Base) MCG/ACT inhaler Inhale 2 puffs into the lungs every 4 hours as needed for cough, shortness of breath or wheezing 18 g 3    allopurinol (ZYLOPRIM) 100 MG tablet TAKE 1 TABLET BY MOUTH ONCEDAILY 90 tablet 3    amLODIPine (NORVASC) 2.5 MG tablet TAKE ONE TABLET BY MOUTH DAILY 90 tablet 1    atorvastatin (LIPITOR) 80 MG tablet TAKE ONE-HALF TABLET (40 MG) BY MOUTH EVERY EVENING 90 tablet 1    carvedilol (COREG) 25 MG tablet TAKE 1 TABLET BY MOUTH TWICE DAILY 180 tablet 1    DENTA 5000 PLUS 1.1 % CREA USE NIGHTLY,BRUSH WEEL FOR 2 MINUTES,SPIT OUT EXCESS, NO RINSING,AVOID EATING DRINKING FOR 30 MINUTES      finasteride (PROSCAR) 5 MG tablet TAKE 1 TABLET (5 MG) BY MOUTH DAILY 150 tablet 4    FLUoxetine (PROZAC) 10 MG capsule TAKE 1 CAPSULE (10 MG) BY MOUTH DAILY 90 capsule 0    gabapentin (NEURONTIN) 300 MG capsule TAKE 1 CAPSULE (300 MG) BY MOUTH 3 TIMES DAILY 270 capsule 1    insulin pen needle (ULTICARE MINI) 31G X 6 MM miscellaneous USE WITH TRULICITY 100 each 3    metFORMIN  (GLUCOPHAGE XR) 500 MG 24 hr tablet TAKE 2 TABLETS BY MOUTH TWICE A DAY WITH MEALS 360 tablet 0    multivitamin, therapeutic (THERA-VIT) TABS tablet Take 1 tablet by mouth daily      sildenafil (VIAGRA) 100 MG tablet Take 1 tablet (100 mg) by mouth daily as needed (erectile dysfunction) 10 tablet 0    tamsulosin (FLOMAX) 0.4 MG capsule TAKE 1 CAPSULE (0.4 MG) BY MOUTH EVERY EVENING 90 capsule 1    TRULICITY 0.75 MG/0.5ML pen INJECT 0.75 MG SUBCUTANEOUSLY EVERY 7 DAYS. 2 mL 3    vitamin B-12 (CYANOCOBALAMIN) 250 MCG tablet Take 250 mcg by mouth daily      vitamin C (ASCORBIC ACID) 500 MG tablet Take 500 mg by mouth daily      XARELTO ANTICOAGULANT 20 MG TABS tablet TAKE 1 TABLET BY MOUTH EVERY DAY WITH DINNER 90 tablet 3     No current facility-administered medications for this visit.          Allergies   Allergen Reactions    Ace Inhibitors      Per [facility]: hyperkalemia.  Pt doesn't know if he is allergic    Ceclor [Cefaclor] Hives    Sulfa Antibiotics      Pt unsure.     Tomato Flavor [Flavoring Agent (Non-Screening)]      Tomato Paste       No family history on file.    Social History     Socioeconomic History    Marital status: Single     Spouse name: None    Number of children: None    Years of education: None    Highest education level: None   Occupational History    None   Tobacco Use    Smoking status: Never Smoker    Smokeless tobacco: Former User     Types: Chew   Substance and Sexual Activity    Alcohol use: Not Currently    Drug use: Never    Sexual activity: None   Other Topics Concern    Parent/sibling w/ CABG, MI or angioplasty before 65F 55M? Not Asked   Social History Narrative    None     Social Determinants of Health     Financial Resource Strain:     Difficulty of Paying Living Expenses:    Food Insecurity:     Worried About Running Out of Food in the Last Year:     Ran Out of Food in the Last Year:    Transportation Needs:     Lack of Transportation (Medical):     Lack of Transportation  (Non-Medical):    Physical Activity:     Days of Exercise per Week:     Minutes of Exercise per Session:    Stress:     Feeling of Stress :    Social Connections:     Frequency of Communication with Friends and Family:     Frequency of Social Gatherings with Friends and Family:     Attends Advent Services:     Active Member of Clubs or Organizations:     Attends Club or Organization Meetings:     Marital Status:    Intimate Partner Violence:     Fear of Current or Ex-Partner:     Emotionally Abused:     Physically Abused:     Sexually Abused:        ROS: 10 point ROS neg other than the symptoms noted above in the HPI.  EXAM  Constitutional: healthy, alert and no distress    Psychiatric: mentation appears normal and affect normal/bright    VASCULAR:  -Dorsalis pedis pulse +2/4 b/l  -Posterior tibial pulse +1/4 b/l  -Capillary refill time < 3 seconds to b/l hallux  -Hair growth absent to b/l anterior legs and ankles  NEURO:  -Epicritic and protective sensation absent to the bilateral foot  DERM:  -Skin temperature within normal limits to bilateral foot  -Mild hyperkeratotic lesion on the medial plantar distal aspect of the proximal phalanx of the LEFT hallux -- no open wound    -Toenails thickened, dystrophic and discolored x 8  ---LEFT distal fifth toe amputated  ---LEFT foot second toenail partially regrown  MSK:  -Adductovarus rotation of the RIGHT fifth toe  -LEFT distal fifth toe amputated at the PIPJ  -Muscle strength of ankles +5/5 for dorsiflexion, plantarflexion, ABDUction and ADDuction b/l    BILATERAL ANUSHKA 05/08/2024  Impression:      Right leg: Noncompressible arteries in the right lower leg. The right  TBI is 0.78.     Left leg: Resting ANUSHKA is 1.05, normal. The left TBI is 1.17.  ============================================================    ASSESSMENT:    (N18.31) Stage 3a chronic kidney disease (H)  (primary encounter diagnosis)    (E11.42) Diabetic polyneuropathy associated with type 2 diabetes  mellitus (H)    (Z86.31) Healed diabetic foot ulcer      PLAN:  -Patient evaluated and examined. Treatment options discussed with no educational barriers noted.    -LEFT plantar medial hallux wound remains healed. There are no open wounds. He does not need to continue with the dressings. Will follow-up in one months for his routine diabetic foot exam and high risk nail debridement.    -Bilateral ANUSHKA 05/08/2024: Adequate for wound healing. Within normal limits on the LEFT and non-compressible on the RIGHT but TBI for the RIGHT foot is adequate for wound healing.    CKD: Patient's CKDis managed by their PCP. The kidney function appears to be stable. Patient's last GFR was 57 on 06/26/2024. The reduced kidney function contributes to increased edema in the foot.     Diabetes Mellitus: Patient's DM is managed by their PCP. The DM appears to be stable. Patient's last HbA1C was 5.6% on 09/30/2024.    -Patient in agreement with the above treatment plan and all of patient's questions were answered.      Return to clinic 63+ days for a scheduled diabetic foot exam an high risk nail debridement (scheduled for 05/15/2025)      Meenu Orourke DPM

## 2025-04-24 ENCOUNTER — OFFICE VISIT (OUTPATIENT)
Dept: PODIATRY | Facility: OTHER | Age: 85
End: 2025-04-24
Attending: PODIATRIST
Payer: MEDICARE

## 2025-04-24 VITALS
RESPIRATION RATE: 16 BRPM | SYSTOLIC BLOOD PRESSURE: 118 MMHG | HEART RATE: 90 BPM | OXYGEN SATURATION: 92 % | DIASTOLIC BLOOD PRESSURE: 71 MMHG | TEMPERATURE: 97.6 F

## 2025-04-24 DIAGNOSIS — Z86.31 HEALED DIABETIC FOOT ULCER: ICD-10-CM

## 2025-04-24 DIAGNOSIS — N18.31 STAGE 3A CHRONIC KIDNEY DISEASE (H): ICD-10-CM

## 2025-04-24 DIAGNOSIS — B00.9 HERPES SIMPLEX: Primary | ICD-10-CM

## 2025-04-24 DIAGNOSIS — E11.42 DIABETIC POLYNEUROPATHY ASSOCIATED WITH TYPE 2 DIABETES MELLITUS (H): ICD-10-CM

## 2025-04-24 PROCEDURE — G0463 HOSPITAL OUTPT CLINIC VISIT: HCPCS | Mod: 25

## 2025-04-24 ASSESSMENT — PAIN SCALES - GENERAL: PAINLEVEL_OUTOF10: MODERATE PAIN (4)

## 2025-04-24 NOTE — PROGRESS NOTES
Chief complaint: Patient presents with:  Wound Check      History of Present Illness: This 84-year-old NIDDM II male is seen for a new lesion on his RIGHT plantar foot. His  normally checks his feet every couple of days, but she has been in [place]. He presents with another friend who looked at his feet today and she was worried about a new lesion that presented on his RIGHT foot. The lesion is not painful and the patient has not noticed any drainage from the bottom surface of his foot.    No further pedal complaints today.        Lab Results   Component Value Date    A1C 5.6 09/30/2024    A1C 6.0 06/26/2024    A1C 5.9 03/25/2024    A1C 6.7 11/20/2023    A1C 6.0 03/09/2023    A1C 6.5 05/06/2021         /71   Pulse 90   Temp 97.6  F (36.4  C)   Resp 16   SpO2 92%     Patient Active Problem List   Diagnosis    Urinary retention    Generalized weakness    Type 2 diabetes mellitus with stage 3 chronic kidney disease, without long-term current use of insulin (H)    Tachycardia    Hypoxia    Other acute pulmonary embolism with acute cor pulmonale (H)    Dyspnea, unspecified type    Benign prostatic hyperplasia with urinary retention    Chronic kidney disease, stage III (moderate) (H)    Chronic obstructive lung disease (H)    Chronic painful diabetic neuropathy (H)    Erectile dysfunction    Essential hypertension    Gout, unspecified    History of CVA (cerebrovascular accident)    Hyperlipidemia    IBS (irritable bowel syndrome)    Lumbar spondylosis    Mild concentric left ventricular hypertrophy (LVH)    Moderate episode of recurrent major depressive disorder (H)    Spinal stenosis of lumbar region without neurogenic claudication    Uncomplicated alcohol dependence (H)    Onychomycosis of left great toe    Pincer nail deformity    Complicated UTI (urinary tract infection)    Sepsis with acute renal failure (H)       Past Surgical History:   Procedure Laterality Date    AMPUTATE TOE(S) Left  6/27/2022    Procedure: Left fifth toe partial versus full amputation;  Surgeon: Meenu Orourke DPM;  [facility]: [facility]    CYSTOSCOPY, TRANSURETHRAL RESECTION (TUR) PROSTATE, COMBINED N/A 10/15/2021    Procedure: CYSTOSCOPY, WITH TRANSURETHRAL RESECTION PROSTATE;  Surgeon: Indu De Leon MD;  Location: [facility]    HERNIA REPAIR      PHACOEMULSIFICATION WITH STANDARD INTRAOCULAR LENS IMPLANT Left 12/27/2021    Procedure: PHACOEMULSIFICATION CATARACT EXTRACTION POSTERIOR CHAMBER LENS LEFT EYE;  Surgeon: Phillip Maldonado MD;  [facility]    PHACOEMULSIFICATION WITH STANDARD INTRAOCULAR LENS IMPLANT Right 1/11/2022    Procedure: PHACOEMULSIFICATION CATARACT EXTRACTION POSTERIOR CHAMBER LENS RIGHT EYE;  Surgeon: Phillip Maldonado MD;  Location[facility]       Current Outpatient Medications   Medication Sig Dispense Refill    acetaminophen (TYLENOL) 500 MG tablet Take 1,000 mg by mouth 2 times daily . Limit acetaminophen to 4000 mg per day from all sources.      albuterol (PROAIR HFA/PROVENTIL HFA/VENTOLIN HFA) 108 (90 Base) MCG/ACT inhaler INHALE 2 PUFFS INTO THE LUNGS EVERY 4 HOURS AS NEEDED FOR COUGH SHORTNESS OF BREATH OR WHEEZING 8.5 g 3    allopurinol (ZYLOPRIM) 100 MG tablet TAKE 1 TABLET BY MOUTH ONCEDAILY 90 tablet 3    amLODIPine (NORVASC) 2.5 MG tablet TAKE ONE TABLET BY MOUTH DAILY 90 tablet 1    atorvastatin (LIPITOR) 80 MG tablet TAKE ONE-HALF TABLET (40 MG) BY MOUTH EVERY EVENING 90 tablet 1    carvedilol (COREG) 25 MG tablet TAKE 1 TABLET BY MOUTH TWICE DAILY 180 tablet 1    DENTA 5000 PLUS 1.1 % CREA USE NIGHTLY,BRUSH WEEL FOR 2 MINUTES,SPIT OUT EXCESS, NO RINSING,AVOID EATING DRINKING FOR 30 MINUTES      finasteride (PROSCAR) 5 MG tablet TAKE 1 TABLET (5 MG) BY MOUTH DAILY 150 tablet 4    FLUoxetine (PROZAC) 10 MG capsule TAKE 1 CAPSULE (10 MG) BY MOUTH DAILY 90 capsule 0    gabapentin (NEURONTIN) 300 MG capsule TAKE 1 CAPSULE (300 MG) BY MOUTH 3 TIMES DAILY 270 capsule 1    insulin pen needle  (ULTICARE MINI) 31G X 6 MM miscellaneous USE WITH TRULICITY 100 each 3    metFORMIN (GLUCOPHAGE XR) 500 MG 24 hr tablet TAKE 2 TABLETS BY MOUTH TWICE A DAY WITH MEALS 360 tablet 0    multivitamin, therapeutic (THERA-VIT) TABS tablet Take 1 tablet by mouth daily      sildenafil (VIAGRA) 100 MG tablet Take 1 tablet (100 mg) by mouth daily as needed (erectile dysfunction) 10 tablet 0    tamsulosin (FLOMAX) 0.4 MG capsule TAKE 1 CAPSULE (0.4 MG) BY MOUTH EVERY EVENING 90 capsule 1    TRULICITY 0.75 MG/0.5ML pen INJECT 0.75 MG SUBCUTANEOUSLY EVERY 7 DAYS. 2 mL 3    vitamin B-12 (CYANOCOBALAMIN) 250 MCG tablet Take 250 mcg by mouth daily      vitamin C (ASCORBIC ACID) 500 MG tablet Take 500 mg by mouth daily      XARELTO ANTICOAGULANT 20 MG TABS tablet TAKE 1 TABLET BY MOUTH EVERY DAY WITH DINNER 90 tablet 3     No current facility-administered medications for this visit.          Allergies   Allergen Reactions    Ace Inhibitors      Per [facility]: hyperkalemia.  Pt doesn't know if he is allergic    Ceclor [Cefaclor] Hives    Sulfa Antibiotics      Pt unsure.     Tomato Flavor [Flavoring Agent (Non-Screening)]      Tomato Paste       History reviewed. No pertinent family history.    Social History     Socioeconomic History    Marital status: Single     Spouse name: None    Number of children: None    Years of education: None    Highest education level: None   Occupational History    None   Tobacco Use    Smoking status: Never Smoker    Smokeless tobacco: Former User     Types: Chew   Substance and Sexual Activity    Alcohol use: Not Currently    Drug use: Never    Sexual activity: None   Other Topics Concern    Parent/sibling w/ CABG, MI or angioplasty before 65F 55M? Not Asked   Social History Narrative    None     Social Determinants of Health     Financial Resource Strain:     Difficulty of Paying Living Expenses:    Food Insecurity:     Worried About Running Out of Food in the Last Year:     Ran Out of Food in the Last  Year:    Transportation Needs:     Lack of Transportation (Medical):     Lack of Transportation (Non-Medical):    Physical Activity:     Days of Exercise per Week:     Minutes of Exercise per Session:    Stress:     Feeling of Stress :    Social Connections:     Frequency of Communication with Friends and Family:     Frequency of Social Gatherings with Friends and Family:     Attends [employer] Services:     Active Member of Clubs or Organizations:     Attends Club or Organization Meetings:     Marital Status:    Intimate Partner Violence:     Fear of Current or Ex-Partner:     Emotionally Abused:     Physically Abused:     Sexually Abused:        ROS: 10 point ROS neg other than the symptoms noted above in the HPI.  EXAM  Constitutional: healthy, alert and no distress    Psychiatric: mentation appears normal and affect normal/bright    VASCULAR:  -Dorsalis pedis pulse +2/4 b/l  -Posterior tibial pulse +1/4 b/l  -Capillary refill time < 3 seconds to b/l hallux  -Hair growth absent to b/l anterior legs and ankles  NEURO:  -Epicritic and protective sensation absent to the bilateral foot  DERM:  -Skin temperature within normal limits to bilateral foot  -Toenails thickened, dystrophic and discolored x 8  ---LEFT distal fifth toe amputated  ---LEFT foot second toenail partially regrown  -Small pustules x 9 lesions within an area of the RIGHT plantar midfoot measuring 1.2cm x 0.6cm x +0.1cm  ---No open wounds, no drainage  ---No severe erythema, no ascending erythema, no calor, no purulence, no malodor, no other signs of infection.   MSK:  -Adductovarus rotation of the RIGHT fifth toe  -LEFT distal fifth toe amputated at the PIPJ  -Muscle strength of ankles +5/5 for dorsiflexion, plantarflexion, ABDUction and ADDuction b/l    BILATERAL ANUSHKA 05/08/2024  Impression:      Right leg: Noncompressible arteries in the right lower leg. The right  TBI is 0.78.     Left leg: Resting ANUSHKA is 1.05, normal. The left TBI is  1.17.  ============================================================    ASSESSMENT:    (B00.9) Herpes simplex  (primary encounter diagnosis)    (N18.31) Stage 3a chronic kidney disease (H)    (E11.42) Diabetic polyneuropathy associated with type 2 diabetes mellitus (H)    (Z86.31) Healed diabetic foot ulcer      PLAN:  -Patient evaluated and examined. Treatment options discussed with no educational barriers noted.    -LEFT plantar medial hallux wound remains healed.    -Bilateral ANUSHKA 05/08/2024: Adequate for wound healing. Within normal limits on the LEFT and non-compressible on the RIGHT but TBI for the RIGHT foot is adequate for wound healing.    CKD: Patient's CKDis managed by their PCP. The kidney function appears to be stable. Patient's last GFR was 57 on 06/26/2024. The reduced kidney function contributes to increased edema in the foot.     Diabetes Mellitus: Patient's DM is managed by their PCP. The DM appears to be stable. Patient's last HbA1C was 5.6% on 09/30/2024.    --------------------------------------------------------    -The small pustules and lesions on the RIGHT plantar midfoot have a herpes appearance. There are not currently open wounds or active drainage. The patient has moderate neuropathy so he does not have pain from the lesions on the foot. Patient should check these lesions daily for open wounds. Do not attempt to pop the lesions.  ---Painted the lesions with betadine. Covered with dry gauze and tape. He should change the dressing every two day. If the lesions spread, open, or become painful, call the clinic.  ----Patient was instructed to look for signs of infection (redness, swelling, pain, purulence, fever, chills, nausea, vomiting) and to return to podiatry or the emergency department immediately if there are any signs of infection.   -This is an acute, uncomplicated illness/injury with OTC treatment options reviewed.     -Patient in agreement with the above treatment plan and all of  patient's questions were answered.      Return to clinic for a scheduled diabetic foot exam and high risk nail debridement (scheduled for 05/15/2025)      Meenu Orourke DPM

## 2025-05-15 ENCOUNTER — OFFICE VISIT (OUTPATIENT)
Dept: PODIATRY | Facility: OTHER | Age: 85
End: 2025-05-15
Attending: PODIATRIST
Payer: MEDICARE

## 2025-05-15 VITALS
TEMPERATURE: 99 F | DIASTOLIC BLOOD PRESSURE: 69 MMHG | HEART RATE: 86 BPM | RESPIRATION RATE: 18 BRPM | SYSTOLIC BLOOD PRESSURE: 108 MMHG | OXYGEN SATURATION: 92 %

## 2025-05-15 DIAGNOSIS — Z86.31 HEALED DIABETIC FOOT ULCER: ICD-10-CM

## 2025-05-15 DIAGNOSIS — L60.3 ONYCHODYSTROPHY: ICD-10-CM

## 2025-05-15 DIAGNOSIS — E11.42 DIABETIC POLYNEUROPATHY ASSOCIATED WITH TYPE 2 DIABETES MELLITUS (H): Primary | ICD-10-CM

## 2025-05-15 DIAGNOSIS — L84 CALLUS OF FOOT: ICD-10-CM

## 2025-05-15 DIAGNOSIS — N18.31 STAGE 3A CHRONIC KIDNEY DISEASE (H): ICD-10-CM

## 2025-05-15 DIAGNOSIS — Z13.89 SCREENING FOR DIABETIC PERIPHERAL NEUROPATHY: ICD-10-CM

## 2025-05-15 PROCEDURE — G0463 HOSPITAL OUTPT CLINIC VISIT: HCPCS

## 2025-05-15 PROCEDURE — 11055 PARING/CUTG B9 HYPRKER LES 1: CPT | Performed by: PODIATRIST

## 2025-05-15 PROCEDURE — 11721 DEBRIDE NAIL 6 OR MORE: CPT | Performed by: PODIATRIST

## 2025-05-15 ASSESSMENT — PAIN SCALES - GENERAL: PAINLEVEL_OUTOF10: NO PAIN (0)

## 2025-05-15 NOTE — PROGRESS NOTES
Chief complaint: Patient presents with:  dfe      History of Present Illness: This 84-year-old NIDDM II male is seen for a follow-up fr a diabetic foot exam and high risk nail debridement. He has had an ulceration on his LEFT foot, but he does not think it has recently been open. He says the blisters on his RIGHT foot disappeared.    He has moderate numbness in his feet.    No further pedal complaints today.        Lab Results   Component Value Date    A1C 5.6 09/30/2024    A1C 6.0 06/26/2024    A1C 5.9 03/25/2024    A1C 6.7 11/20/2023    A1C 6.0 03/09/2023    A1C 6.5 05/06/2021         /69   Pulse 86   Temp 99  F (37.2  C)   Resp 18   SpO2 92%     Patient Active Problem List   Diagnosis    Urinary retention    Generalized weakness    Type 2 diabetes mellitus with stage 3 chronic kidney disease, without long-term current use of insulin (H)    Tachycardia    Hypoxia    Other acute pulmonary embolism with acute cor pulmonale (H)    Dyspnea, unspecified type    Benign prostatic hyperplasia with urinary retention    Chronic kidney disease, stage III (moderate) (H)    Chronic obstructive lung disease (H)    Chronic painful diabetic neuropathy (H)    Erectile dysfunction    Essential hypertension    Gout, unspecified    History of CVA (cerebrovascular accident)    Hyperlipidemia    IBS (irritable bowel syndrome)    Lumbar spondylosis    Mild concentric left ventricular hypertrophy (LVH)    Moderate episode of recurrent major depressive disorder (H)    Spinal stenosis of lumbar region without neurogenic claudication    Uncomplicated alcohol dependence (H)    Onychomycosis of left great toe    Pincer nail deformity    Complicated UTI (urinary tract infection)    Sepsis with acute renal failure (H)       Past Surgical History:   Procedure Laterality Date    AMPUTATE TOE(S) Left 6/27/2022    Procedure: Left fifth toe partial versus full amputation;  Surgeon: Meenu Orourke DPM;  [facility]    CYSTOSCOPY,  TRANSURETHRAL RESECTION (TUR) PROSTATE, COMBINED N/A 10/15/2021    Procedure: CYSTOSCOPY, WITH TRANSURETHRAL RESECTION PROSTATE;  Surgeon: Indu De Leon MD;  Location: [facility]    HERNIA REPAIR      PHACOEMULSIFICATION WITH STANDARD INTRAOCULAR LENS IMPLANT Left 12/27/2021    Procedure: PHACOEMULSIFICATION CATARACT EXTRACTION POSTERIOR CHAMBER LENS LEFT EYE;  Surgeon: Phillip Maldonado MD;  [facility]    PHACOEMULSIFICATION WITH STANDARD INTRAOCULAR LENS IMPLANT Right 1/11/2022    Procedure: PHACOEMULSIFICATION CATARACT EXTRACTION POSTERIOR CHAMBER LENS RIGHT EYE;  Surgeon: Phillip Maldonado MD;  Location[facility]       Current Outpatient Medications   Medication Sig Dispense Refill    acetaminophen (TYLENOL) 500 MG tablet Take 1,000 mg by mouth 2 times daily . Limit acetaminophen to 4000 mg per day from all sources.      albuterol (PROAIR HFA/PROVENTIL HFA/VENTOLIN HFA) 108 (90 Base) MCG/ACT inhaler INHALE 2 PUFFS INTO THE LUNGS EVERY 4 HOURS AS NEEDED FOR COUGH SHORTNESS OF BREATH OR WHEEZING 8.5 g 3    allopurinol (ZYLOPRIM) 100 MG tablet TAKE 1 TABLET BY MOUTH ONCEDAILY 90 tablet 3    amLODIPine (NORVASC) 2.5 MG tablet TAKE ONE TABLET BY MOUTH DAILY 90 tablet 1    atorvastatin (LIPITOR) 80 MG tablet TAKE ONE-HALF TABLET (40 MG) BY MOUTH EVERY EVENING 90 tablet 1    carvedilol (COREG) 25 MG tablet TAKE 1 TABLET BY MOUTH TWICE DAILY 180 tablet 1    DENTA 5000 PLUS 1.1 % CREA USE NIGHTLY,BRUSH WEEL FOR 2 MINUTES,SPIT OUT EXCESS, NO RINSING,AVOID EATING DRINKING FOR 30 MINUTES      finasteride (PROSCAR) 5 MG tablet TAKE 1 TABLET (5 MG) BY MOUTH DAILY 150 tablet 4    FLUoxetine (PROZAC) 10 MG capsule TAKE 1 CAPSULE (10 MG) BY MOUTH DAILY 90 capsule 0    gabapentin (NEURONTIN) 300 MG capsule TAKE 1 CAPSULE (300 MG) BY MOUTH 3 TIMES DAILY 270 capsule 1    insulin pen needle (ULTICARE MINI) 31G X 6 MM miscellaneous USE WITH TRULICITY 100 each 3    metFORMIN (GLUCOPHAGE XR) 500 MG 24 hr tablet TAKE 2 TABLETS BY MOUTH  TWICE A DAY WITH MEALS 360 tablet 0    multivitamin, therapeutic (THERA-VIT) TABS tablet Take 1 tablet by mouth daily      sildenafil (VIAGRA) 100 MG tablet Take 1 tablet (100 mg) by mouth daily as needed (erectile dysfunction) 10 tablet 0    tamsulosin (FLOMAX) 0.4 MG capsule TAKE 1 CAPSULE (0.4 MG) BY MOUTH EVERY EVENING 90 capsule 1    TRULICITY 0.75 MG/0.5ML pen INJECT 0.75 MG SUBCUTANEOUSLY EVERY 7 DAYS. 2 mL 3    vitamin B-12 (CYANOCOBALAMIN) 250 MCG tablet Take 250 mcg by mouth daily      vitamin C (ASCORBIC ACID) 500 MG tablet Take 500 mg by mouth daily      XARELTO ANTICOAGULANT 20 MG TABS tablet TAKE 1 TABLET BY MOUTH EVERY DAY WITH DINNER 90 tablet 3     No current facility-administered medications for this visit.          Allergies   Allergen Reactions    Ace Inhibitors      Per [facility]: hyperkalemia.  Pt doesn't know if he is allergic    Ceclor [Cefaclor] Hives    Sulfa Antibiotics      Pt unsure.     Tomato Flavor [Flavoring Agent (Non-Screening)]      Tomato Paste       History reviewed. No pertinent family history.    Social History     Socioeconomic History    Marital status: Single     Spouse name: None    Number of children: None    Years of education: None    Highest education level: None   Occupational History    None   Tobacco Use    Smoking status: Never Smoker    Smokeless tobacco: Former User     Types: Chew   Substance and Sexual Activity    Alcohol use: Not Currently    Drug use: Never    Sexual activity: None   Other Topics Concern    Parent/sibling w/ CABG, MI or angioplasty before 65F 55M? Not Asked   Social History Narrative    None     Social Determinants of Health     Financial Resource Strain:     Difficulty of Paying Living Expenses:    Food Insecurity:     Worried About Running Out of Food in the Last Year:     Ran Out of Food in the Last Year:    Transportation Needs:     Lack of Transportation (Medical):     Lack of Transportation (Non-Medical):    Physical Activity:     Days  of Exercise per Week:     Minutes of Exercise per Session:    Stress:     Feeling of Stress :    Social Connections:     Frequency of Communication with Friends and Family:     Frequency of Social Gatherings with Friends and Family:     Attends [employer] Services:     Active Member of Clubs or Organizations:     Attends Club or Organization Meetings:     Marital Status:    Intimate Partner Violence:     Fear of Current or Ex-Partner:     Emotionally Abused:     Physically Abused:     Sexually Abused:        ROS: 10 point ROS neg other than the symptoms noted above in the HPI.  EXAM  Constitutional: healthy, alert and no distress    Psychiatric: mentation appears normal and affect normal/bright    VASCULAR:  -Dorsalis pedis pulse +2/4 b/l  -Posterior tibial pulse +1/4 b/l  -Capillary refill time < 3 seconds to b/l hallux  -Hair growth absent to b/l anterior legs and ankles  NEURO:  -Epicritic and protective sensation absent to the bilateral foot  DERM:  -Skin temperature within normal limits to bilateral foot  -Toenails thickened, dystrophic and discolored x 8  ---LEFT distal fifth toe amputated  ---LEFT foot second toenail partially regrown  -Mild hyperkeratotic lesion on the medial plantar distal aspect of the proximal phalanx of the LEFT hallux  -No further pustules x 9 lesions within an area of the RIGHT plantar midfoot  MSK:  -Adductovarus rotation of the RIGHT fifth toe  -LEFT distal fifth toe amputated at the PIPJ  -Muscle strength of ankles +5/5 for dorsiflexion, plantarflexion, ABDUction and ADDuction b/l    BILATERAL ANUSHKA 05/08/2024  Impression:      Right leg: Noncompressible arteries in the right lower leg. The right  TBI is 0.78.     Left leg: Resting ANUSHKA is 1.05, normal. The left TBI is 1.17.  ============================================================    ASSESSMENT:    (E11.42) Diabetic polyneuropathy associated with type 2 diabetes mellitus (H)  (primary encounter diagnosis)    (N18.31) Stage 3a  chronic kidney disease (H)    (L60.3) Onychodystrophy    (L84) Callus of foot    (Z86.31) Healed diabetic foot ulcer    (Z13.89) Screening for diabetic peripheral neuropathy      PLAN:  -Patient evaluated and examined. Treatment options discussed with no educational barriers noted.    -LEFT plantar medial hallux wound remains healed.    -Bilateral ANUSHKA 05/08/2024: Adequate for wound healing. Within normal limits on the LEFT and non-compressible on the RIGHT but TBI for the RIGHT foot is adequate for wound healing.    CKD: Patient's CKDis managed by their PCP. The kidney function appears to be stable. Patient's last GFR was 57 on 06/26/2024. The reduced kidney function contributes to increased edema in the foot.     Diabetes Mellitus: Patient's DM is managed by their PCP. The DM appears to be stable. Patient's last HbA1C was 5.6% on 09/30/2024.    -High risk toenail debridement x 10 toenails without incident    -Callus pared x 1 to the medial plantar distal aspect of the proximal phalanx of the LEFT hallux without incident  ---Patient reminded that the callus will likely return due to the underlying, prominent bone causing the callus while the patient is walking.    -Diabetic Foot Education provided. This included checking the feet daily looking for new new blisters or wounds, wearing shoes at all times when walking including around the house, and avoiding lotion application between the toes. If there are any signs of infection, the patient should present to the ED as soon as possible. Infections of the foot can be life threatening or lead to amputations of the foot or leg.    -DM shoes: Referral placed on 05/15/2025    -There are no further pustules on the RIGHT plantar foot.    -Patient in agreement with the above treatment plan and all of patient's questions were answered.      Return to clinic 63+ days for a diabetic foot exam and high risk nail debridement       Meenu Orourke DPM

## 2025-05-19 DIAGNOSIS — E11.22 TYPE 2 DIABETES MELLITUS WITH STAGE 3A CHRONIC KIDNEY DISEASE, WITHOUT LONG-TERM CURRENT USE OF INSULIN (H): ICD-10-CM

## 2025-05-19 DIAGNOSIS — N18.31 TYPE 2 DIABETES MELLITUS WITH STAGE 3A CHRONIC KIDNEY DISEASE, WITHOUT LONG-TERM CURRENT USE OF INSULIN (H): ICD-10-CM

## 2025-05-19 RX ORDER — DULAGLUTIDE 0.75 MG/.5ML
INJECTION, SOLUTION SUBCUTANEOUS
Qty: 2 ML | Refills: 3 | Status: SHIPPED | OUTPATIENT
Start: 2025-05-19

## 2025-05-19 NOTE — TELEPHONE ENCOUNTER
Trulicity 0.75 MG/0.5 ML      Last Written Prescription Date:  01/20/25  Last Fill Quantity: 2ml,   # refills: 3  Last Office Visit: 09/300/24  Future Office visit:    Next 5 appointments (look out 90 days)      May 22, 2025 11:15 AM  (Arrive by 11:00 AM)  Provider Visit with Zoila Jones MD  [facility] ([facility] ) [address] [place] [id number]  [phone]     Jul 22, 2025 12:45 PM  (Arrive by 12:30 PM)  Return Visit with Meenu Orourke DPM  [facility] ([facility] ) [address] [place] [id number] [phone]             Routing refill request to provider for review/approval because:  GLP-1 Agonists Protocol Failed    Rerun Protocol (5/19/2025 9:13 AM)    HgbA1C in past 3 or 6 months    If HgbA1C is 8 or greater, it needs to be on file within the past 3 months.  If less than 8, must be on file within the past 6 months.          Recent Labs   Lab Test 09/30/24  1438   A1C 5.6       Has GFR on file in past 12 months and most recent value is normal  GFR Estimate   Date Value Ref Range Status   06/26/2024 57 (L) >60 mL/min/1.73m2 Final     Comment:     eGFR calculated using 2021 CKD-EPI equation.   04/29/2021 66 >60 mL/min/[1.73_m2] Final     Comment:     Non  GFR Calc  Starting 12/18/2018, serum creatinine based estimated GFR (eGFR) will be   calculated using the Chronic Kidney Disease Epidemiology Collaboration   (CKD-EPI) equation.

## 2025-06-15 ENCOUNTER — HEALTH MAINTENANCE LETTER (OUTPATIENT)
Age: 85
End: 2025-06-15

## 2025-06-23 NOTE — PROGRESS NOTES
Assessment & Plan     Type 2 diabetes mellitus with stage 3a chronic kidney disease, without long-term current use of insulin (H)  No home readings.  Due for A1c.  Reports poor diet recently, lots of chocolate.  On Trulicity and metformin.    Labs drawn.  Foot cares with Dr Orourke.  - Hemoglobin A1c; Future  - Uric acid; Future  - Lipid Profile (Chol, Trig, HDL, LDL calc); Future  - CBC with platelets and differential; Future  - Comprehensive metabolic panel (BMP + Alb, Alk Phos, ALT, AST, Total. Bili, TP); Future  - Adult Eye  Referral; Future    Essential hypertension  Hypotensive today.  Asymptomatic.  Stop the Norvasc 2.5 mg.  Continue coreg for now.  Recheck in office 1 month.    Hyperlipidemia, unspecified hyperlipidemia type  Updating labs - though non fasting.  Is on max lipitor - mostly due to strong family history of CAD.    Chronic obstructive pulmonary disease, unspecified COPD type (H)  Stable.    History of CVA (cerebrovascular accident)  Secondary prevention, risk factor modification.    Moderate episode of recurrent major depressive disorder (H)  Stable.    Continue Prozac 10 mg.    Anemia, unspecified type  Update labs today.  No noted bleeding.    Benign prostatic hyperplasia, unspecified whether lower urinary tract symptoms present  Stable.  Continue Flomax.    Degeneration of intervertebral disc of lumbar region, unspecified whether pain present  Back pain - radiating to thighs.  Spinal stenosis?  Requesting referral to PT - [facility].    Xray today - be sure no lytic lesions, compression fractures, etc.  - XR Lumbar Spine 2/3 Views; Future  - Physical Therapy  Referral; Future    Hearing loss, unspecified hearing loss type, unspecified laterality  Partially from wax. Ear lavage performed.  Referral to audiology.  - Adult Audiology  Referral; Future    Bilateral impacted cerumen  As above.    The longitudinal plan of care for the diagnosis(es)/condition(s) as  documented were addressed during this visit. Due to the added complexity in care, I will continue to support Anthony in the subsequent management and with ongoing continuity of care.  Follow-up  Return in about 1 month (around 7/25/2025) for BP Recheck.    Pj Cruz is a 84 year old, presenting for the following health issues:  Depression, Anxiety, Diabetes, Lipids, and Hypertension        6/25/2025    10:54 AM   Additional Questions   Roomed by Jj Weiner LPN   Accompanied by Self         6/25/2025    10:54 AM   Patient Reported Additional Medications   Patient reports taking the following new medications None     HPI        Covid booster - declines today - has dental extraction    Labs - non fasting today    Stable weight.  Low BP today  Meals on wheels.    Specialty care - eye, dental.  Using tylenol for dental pain.  Dr Orourke - podiatry.  Audiology - would like referral.  Hearing has decreased.        Wanting referral to PT for sore back - [facility].  Radiates to thighs.  Not below knees?  Unsure.  No recent imaging.  Worsening symptoms past 2 weeks or so.  Walks limited distances.  In wheelchair today.    Diabetes Follow-up  How often are you checking your blood sugar? Not at all  What concerns do you have today about your diabetes? Other: wondering about A1C   Do you have any of these symptoms? (Select all that apply)  Blurry vision  Have you had a diabetic eye exam in the last 12 months? Yes- Date of last eye exam: Last year,  Location: [facility]  Due for eye exam -   Trulicity 0.75  Metformin max  Chocolate - likes it; expects to have A1c high  Not fasting - today- pancakes, 1/2 chocolate bar, mandarin oranges            Hyperlipidemia Follow-Up  Are you regularly taking any medication or supplement to lower your cholesterol?   Yes- Atorvastatin  Are you having muscle aches or other side effects that you think could be caused by your cholesterol lowering medication?  No    Hypertension Follow-up  - coreg 25 bid, norvasc 2.5 mg  Do you check your blood pressure regularly outside of the clinic? No   Are you following a low salt diet? No  Are your blood pressures ever more than 140 on the top number (systolic) OR more   than 90 on the bottom number (diastolic), for example 140/90? N/A  Low today  Denies dizziness      BP Readings from Last 2 Encounters:   06/25/25 (!) 84/54   05/15/25 108/69     Hemoglobin A1C (%)   Date Value   09/30/2024 5.6   06/26/2024 6.0 (H)   05/06/2021 6.5 (H)     LDL Cholesterol Calculated (mg/dL)   Date Value   03/25/2024 48   03/09/2023 52         Depression and Anxiety   How are you doing with your depression since your last visit? No change  How are you doing with your anxiety since your last visit?  No change  Are you having other symptoms that might be associated with depression or anxiety? Yes:  Had nightmare about dying the other night  Have you had a significant life event? No   Do you have any concerns with your use of alcohol or other drugs? No    Social History     Tobacco Use    Smoking status: Never     Passive exposure: Never    Smokeless tobacco: Former     Types: Chew   Vaping Use    Vaping status: Never Used   Substance Use Topics    Alcohol use: Yes     Comment: daily, 4 today    Drug use: Never         11/20/2023     9:45 AM 9/30/2024     2:14 PM 6/25/2025    11:07 AM   PHQ   PHQ-9 Total Score 0 0 8   Q9: Thoughts of better off dead/self-harm past 2 weeks Not at all Not at all Not at all         11/20/2023     9:46 AM 8/14/2024    12:00 PM 6/25/2025    11:10 AM   SHANNON-7 SCORE   Total Score 7 2 4         6/25/2025    11:07 AM   Last PHQ-9   1.  Little interest or pleasure in doing things 2   2.  Feeling down, depressed, or hopeless 2   3.  Trouble falling or staying asleep, or sleeping too much 1   4.  Feeling tired or having little energy 2   5.  Poor appetite or overeating 0   6.  Feeling bad about yourself 1   7.  Trouble concentrating 0   8.  Moving slowly or  "restless 0   Q9: Thoughts of better off dead/self-harm past 2 weeks 0   PHQ-9 Total Score 8   Difficulty at work, home, or with people Somewhat difficult         6/25/2025    11:10 AM   SHANNON-7    1. Feeling nervous, anxious, or on edge 1   2. Not being able to stop or control worrying 1   3. Worrying too much about different things 1   4. Trouble relaxing 0   5. Being so restless that it is hard to sit still 1   6. Becoming easily annoyed or irritable 0   7. Feeling afraid, as if something awful might happen 0   SHANNON-7 Total Score 4   If you checked any problems, how difficult have they made it for you to do your work, take care of things at home, or get along with other people? Not difficult at all       Suicide Assessment Five-step Evaluation and Treatment (SAFE-T)      Chronic Kidney Disease Follow-up  Do you take any over the counter pain medicine?: Yes  What over the counter medicine are you taking for your pain?:  Tylenol    How often do you take this medicine?:  as needed      Review of Systems  Constitutional, HEENT, cardiovascular, pulmonary, gi and gu systems are negative, except as otherwise noted.      Objective    BP (!) 84/54   Pulse 87   Temp 98.1  F (36.7  C) (Tympanic)   Resp 16   Ht 1.854 m (6' 1\")   Wt 78.9 kg (173 lb 14.4 oz)   SpO2 95%   BMI 22.94 kg/m    Body mass index is 22.94 kg/m .  Physical Exam   GENERAL: alert, no distress, and elderly, in wheelchair; has cane  EYES: Eyes grossly normal to inspection, PERRL and conjunctivae and sclerae normal  HENT: normal cephalic/atraumatic, both ears: occluded with wax, nose and mouth without ulcers or lesions, oropharynx clear, and oral mucous membranes moist  NECK: no adenopathy, no asymmetry, masses, or scars  RESP: lungs clear to auscultation - no rales, rhonchi or wheezes  CV: regular rate and rhythm, normal S1 S2, no S3 or S4, no murmur, click or rub, no peripheral edema  ABDOMEN: soft, nontender, no hepatosplenomegaly, no masses and bowel " sounds normal  MS: no gross musculoskeletal defects noted, no edema  SKIN: no suspicious lesions or rashes  NEURO: Normal strength and tone, mentation intact and speech normal  PSYCH: mentation appears normal, affect normal/bright    Labs and xray pending (lumbar spine).        Signed Electronically by: Zoila Azul MD

## 2025-06-25 ENCOUNTER — ANCILLARY PROCEDURE (OUTPATIENT)
Dept: GENERAL RADIOLOGY | Facility: OTHER | Age: 85
End: 2025-06-25
Attending: FAMILY MEDICINE
Payer: MEDICARE

## 2025-06-25 ENCOUNTER — RESULTS FOLLOW-UP (OUTPATIENT)
Dept: FAMILY MEDICINE | Facility: OTHER | Age: 85
End: 2025-06-25

## 2025-06-25 ENCOUNTER — OFFICE VISIT (OUTPATIENT)
Dept: FAMILY MEDICINE | Facility: OTHER | Age: 85
End: 2025-06-25
Attending: FAMILY MEDICINE
Payer: MEDICARE

## 2025-06-25 VITALS
SYSTOLIC BLOOD PRESSURE: 90 MMHG | BODY MASS INDEX: 23.05 KG/M2 | OXYGEN SATURATION: 95 % | TEMPERATURE: 98.1 F | HEART RATE: 87 BPM | DIASTOLIC BLOOD PRESSURE: 60 MMHG | HEIGHT: 73 IN | RESPIRATION RATE: 16 BRPM | WEIGHT: 173.9 LBS

## 2025-06-25 DIAGNOSIS — I10 ESSENTIAL HYPERTENSION: ICD-10-CM

## 2025-06-25 DIAGNOSIS — D64.9 ANEMIA, UNSPECIFIED TYPE: ICD-10-CM

## 2025-06-25 DIAGNOSIS — Z86.73 HISTORY OF CVA (CEREBROVASCULAR ACCIDENT): ICD-10-CM

## 2025-06-25 DIAGNOSIS — J44.9 CHRONIC OBSTRUCTIVE PULMONARY DISEASE, UNSPECIFIED COPD TYPE (H): ICD-10-CM

## 2025-06-25 DIAGNOSIS — M51.369 DEGENERATION OF INTERVERTEBRAL DISC OF LUMBAR REGION, UNSPECIFIED WHETHER PAIN PRESENT: ICD-10-CM

## 2025-06-25 DIAGNOSIS — N18.31 TYPE 2 DIABETES MELLITUS WITH STAGE 3A CHRONIC KIDNEY DISEASE, WITHOUT LONG-TERM CURRENT USE OF INSULIN (H): Primary | ICD-10-CM

## 2025-06-25 DIAGNOSIS — F33.1 MODERATE EPISODE OF RECURRENT MAJOR DEPRESSIVE DISORDER (H): ICD-10-CM

## 2025-06-25 DIAGNOSIS — E78.5 HYPERLIPIDEMIA, UNSPECIFIED HYPERLIPIDEMIA TYPE: ICD-10-CM

## 2025-06-25 DIAGNOSIS — H91.90 HEARING LOSS, UNSPECIFIED HEARING LOSS TYPE, UNSPECIFIED LATERALITY: ICD-10-CM

## 2025-06-25 DIAGNOSIS — E11.22 TYPE 2 DIABETES MELLITUS WITH STAGE 3A CHRONIC KIDNEY DISEASE, WITHOUT LONG-TERM CURRENT USE OF INSULIN (H): Primary | ICD-10-CM

## 2025-06-25 DIAGNOSIS — N40.0 BENIGN PROSTATIC HYPERPLASIA, UNSPECIFIED WHETHER LOWER URINARY TRACT SYMPTOMS PRESENT: ICD-10-CM

## 2025-06-25 DIAGNOSIS — H61.23 BILATERAL IMPACTED CERUMEN: ICD-10-CM

## 2025-06-25 LAB
ALBUMIN SERPL BCG-MCNC: 4.2 G/DL (ref 3.5–5.2)
ALP SERPL-CCNC: 68 U/L (ref 40–150)
ALT SERPL W P-5'-P-CCNC: 10 U/L (ref 0–70)
ANION GAP SERPL CALCULATED.3IONS-SCNC: 12 MMOL/L (ref 7–15)
AST SERPL W P-5'-P-CCNC: 20 U/L (ref 0–45)
BASOPHILS # BLD AUTO: 0 10E3/UL (ref 0–0.2)
BASOPHILS NFR BLD AUTO: 0 %
BILIRUB SERPL-MCNC: 0.4 MG/DL
BUN SERPL-MCNC: 15 MG/DL (ref 8–23)
CALCIUM SERPL-MCNC: 9.2 MG/DL (ref 8.8–10.4)
CHLORIDE SERPL-SCNC: 98 MMOL/L (ref 98–107)
CHOLEST SERPL-MCNC: 128 MG/DL
CREAT SERPL-MCNC: 1.14 MG/DL (ref 0.67–1.17)
EGFRCR SERPLBLD CKD-EPI 2021: 64 ML/MIN/1.73M2
EOSINOPHIL # BLD AUTO: 0 10E3/UL (ref 0–0.7)
EOSINOPHIL NFR BLD AUTO: 0 %
ERYTHROCYTE [DISTWIDTH] IN BLOOD BY AUTOMATED COUNT: 12.6 % (ref 10–15)
EST. AVERAGE GLUCOSE BLD GHB EST-MCNC: 111 MG/DL
FASTING STATUS PATIENT QL REPORTED: NO
FASTING STATUS PATIENT QL REPORTED: NO
GLUCOSE SERPL-MCNC: 159 MG/DL (ref 70–99)
HBA1C MFR BLD: 5.5 %
HCO3 SERPL-SCNC: 24 MMOL/L (ref 22–29)
HCT VFR BLD AUTO: 41.4 % (ref 40–53)
HDLC SERPL-MCNC: 62 MG/DL
HGB BLD-MCNC: 14 G/DL (ref 13.3–17.7)
IMM GRANULOCYTES # BLD: 0 10E3/UL
IMM GRANULOCYTES NFR BLD: 0 %
LDLC SERPL CALC-MCNC: 44 MG/DL
LYMPHOCYTES # BLD AUTO: 0.8 10E3/UL (ref 0.8–5.3)
LYMPHOCYTES NFR BLD AUTO: 11 %
MCH RBC QN AUTO: 33.3 PG (ref 26.5–33)
MCHC RBC AUTO-ENTMCNC: 33.8 G/DL (ref 31.5–36.5)
MCV RBC AUTO: 99 FL (ref 78–100)
MONOCYTES # BLD AUTO: 0.5 10E3/UL (ref 0–1.3)
MONOCYTES NFR BLD AUTO: 7 %
NEUTROPHILS # BLD AUTO: 5.5 10E3/UL (ref 1.6–8.3)
NEUTROPHILS NFR BLD AUTO: 81 %
NONHDLC SERPL-MCNC: 66 MG/DL
NRBC # BLD AUTO: 0 10E3/UL
NRBC BLD AUTO-RTO: 0 /100
PLATELET # BLD AUTO: 206 10E3/UL (ref 150–450)
POTASSIUM SERPL-SCNC: 4.8 MMOL/L (ref 3.4–5.3)
PROT SERPL-MCNC: 6.6 G/DL (ref 6.4–8.3)
RBC # BLD AUTO: 4.2 10E6/UL (ref 4.4–5.9)
SODIUM SERPL-SCNC: 134 MMOL/L (ref 135–145)
TRIGL SERPL-MCNC: 110 MG/DL
URATE SERPL-MCNC: 4.5 MG/DL (ref 3.4–7)
WBC # BLD AUTO: 6.8 10E3/UL (ref 4–11)

## 2025-06-25 PROCEDURE — 36415 COLL VENOUS BLD VENIPUNCTURE: CPT | Mod: ZL | Performed by: FAMILY MEDICINE

## 2025-06-25 PROCEDURE — G0463 HOSPITAL OUTPT CLINIC VISIT: HCPCS

## 2025-06-25 PROCEDURE — 84155 ASSAY OF PROTEIN SERUM: CPT | Mod: ZL | Performed by: FAMILY MEDICINE

## 2025-06-25 PROCEDURE — 82465 ASSAY BLD/SERUM CHOLESTEROL: CPT | Mod: ZL | Performed by: FAMILY MEDICINE

## 2025-06-25 PROCEDURE — 72100 X-RAY EXAM L-S SPINE 2/3 VWS: CPT | Mod: TC

## 2025-06-25 PROCEDURE — 83036 HEMOGLOBIN GLYCOSYLATED A1C: CPT | Mod: ZL | Performed by: FAMILY MEDICINE

## 2025-06-25 PROCEDURE — 72100 X-RAY EXAM L-S SPINE 2/3 VWS: CPT | Mod: 26 | Performed by: RADIOLOGY

## 2025-06-25 PROCEDURE — 84550 ASSAY OF BLOOD/URIC ACID: CPT | Mod: ZL | Performed by: FAMILY MEDICINE

## 2025-06-25 PROCEDURE — 85004 AUTOMATED DIFF WBC COUNT: CPT | Mod: ZL | Performed by: FAMILY MEDICINE

## 2025-06-25 ASSESSMENT — ANXIETY QUESTIONNAIRES
7. FEELING AFRAID AS IF SOMETHING AWFUL MIGHT HAPPEN: NOT AT ALL
GAD7 TOTAL SCORE: 4
IF YOU CHECKED OFF ANY PROBLEMS ON THIS QUESTIONNAIRE, HOW DIFFICULT HAVE THESE PROBLEMS MADE IT FOR YOU TO DO YOUR WORK, TAKE CARE OF THINGS AT HOME, OR GET ALONG WITH OTHER PEOPLE: NOT DIFFICULT AT ALL
4. TROUBLE RELAXING: NOT AT ALL
3. WORRYING TOO MUCH ABOUT DIFFERENT THINGS: SEVERAL DAYS
2. NOT BEING ABLE TO STOP OR CONTROL WORRYING: SEVERAL DAYS
6. BECOMING EASILY ANNOYED OR IRRITABLE: NOT AT ALL
1. FEELING NERVOUS, ANXIOUS, OR ON EDGE: SEVERAL DAYS
5. BEING SO RESTLESS THAT IT IS HARD TO SIT STILL: SEVERAL DAYS
GAD7 TOTAL SCORE: 4

## 2025-06-25 ASSESSMENT — PAIN SCALES - GENERAL: PAINLEVEL_OUTOF10: MILD PAIN (3)

## 2025-06-25 ASSESSMENT — PATIENT HEALTH QUESTIONNAIRE - PHQ9: SUM OF ALL RESPONSES TO PHQ QUESTIONS 1-9: 8

## 2025-06-25 NOTE — PATIENT INSTRUCTIONS
Will notify of lab and xray.    Referral for annual eye exam to [facility].  Referral to [facility].  They will call you.  Referral to audiology.    Ongoing follow up with Dr Orourke - scheduled in July.    Consider COVID booster after dental work complete.  Stop Norvasc/amlodipine - BP running too low.    Follow up 1 month.

## 2025-07-09 DIAGNOSIS — M10.9 GOUT, UNSPECIFIED CAUSE, UNSPECIFIED CHRONICITY, UNSPECIFIED SITE: ICD-10-CM

## 2025-07-09 DIAGNOSIS — N40.0 BENIGN PROSTATIC HYPERPLASIA, UNSPECIFIED WHETHER LOWER URINARY TRACT SYMPTOMS PRESENT: ICD-10-CM

## 2025-07-09 DIAGNOSIS — I10 ESSENTIAL HYPERTENSION: ICD-10-CM

## 2025-07-09 RX ORDER — ALLOPURINOL 100 MG/1
100 TABLET ORAL DAILY
Qty: 90 TABLET | Refills: 2 | Status: SHIPPED | OUTPATIENT
Start: 2025-07-09

## 2025-07-09 RX ORDER — TAMSULOSIN HYDROCHLORIDE 0.4 MG/1
0.4 CAPSULE ORAL EVERY EVENING
Qty: 90 CAPSULE | Refills: 1 | Status: SHIPPED | OUTPATIENT
Start: 2025-07-09

## 2025-07-09 RX ORDER — CARVEDILOL 25 MG/1
25 TABLET ORAL
Qty: 180 TABLET | Refills: 1 | Status: SHIPPED | OUTPATIENT
Start: 2025-07-09

## 2025-07-09 NOTE — TELEPHONE ENCOUNTER
tamsulosin (FLOMAX) 0.4 MG capsule         Last Written Prescription Date:  1/20/25  Last Fill Quantity: 90,   # refills: 1  Last Office Visit: 6/25/25  Future Office visit:    Next 5 appointments (look out 90 days)      Jul 22, 2025 12:45 PM  (Arrive by 12:30 PM)  Return Visit with Meenu Orourke [facility] ([facility] ) [address] [place] [id number] [phone]     Jul 31, 2025 2:30 PM  (Arrive by 2:15 PM)  Provider Visit with Zoila Jones MD  [facility] ([facility] ) [address] [place] [id number]  [phone]             Routing refill request to provider for review/approval because:  Alpha Blockers Failed      Patient does not have Tadalafil, Vardenafil, or Sildenafil on their medication list        carvedilol       Last Written Prescription Date:  1/20/25  Last Fill Quantity: 180,   # refills: 1  Last Office Visit: 6/25/25  Future Office visit:    Next 5 appointments (look out 90 days)      Jul 22, 2025 12:45 PM  (Arrive by 12:30 PM)  Return Visit with Meenu Orourke [facility] ([facility] ) [address] [place] [id number] [phone]     Jul 31, 2025 2:30 PM  (Arrive by 2:15 PM)  Provider Visit with Zoila Jones MD  [facility] ([facility] ) [address] [place] [id number]  [phone]             Routing refill request to provider for review/approval because:  Drug not on the [facility], [facility] or [facility] refill protocol or controlled substance

## 2025-07-13 DIAGNOSIS — F41.9 ANXIETY: ICD-10-CM

## 2025-07-13 DIAGNOSIS — E11.22 TYPE 2 DIABETES MELLITUS WITH STAGE 3A CHRONIC KIDNEY DISEASE, WITHOUT LONG-TERM CURRENT USE OF INSULIN (H): ICD-10-CM

## 2025-07-13 DIAGNOSIS — N18.31 TYPE 2 DIABETES MELLITUS WITH STAGE 3A CHRONIC KIDNEY DISEASE, WITHOUT LONG-TERM CURRENT USE OF INSULIN (H): ICD-10-CM

## 2025-07-14 RX ORDER — METFORMIN HYDROCHLORIDE 500 MG/1
1000 TABLET, EXTENDED RELEASE ORAL 2 TIMES DAILY WITH MEALS
Qty: 360 TABLET | Refills: 1 | Status: SHIPPED | OUTPATIENT
Start: 2025-07-14

## 2025-07-14 RX ORDER — FLUOXETINE 10 MG/1
10 CAPSULE ORAL DAILY
Qty: 90 CAPSULE | Refills: 1 | Status: SHIPPED | OUTPATIENT
Start: 2025-07-14

## 2025-07-21 DIAGNOSIS — M10.9 GOUT, UNSPECIFIED CAUSE, UNSPECIFIED CHRONICITY, UNSPECIFIED SITE: ICD-10-CM

## 2025-07-21 RX ORDER — ALLOPURINOL 100 MG/1
100 TABLET ORAL DAILY
Qty: 90 TABLET | Refills: 3 | Status: SHIPPED | OUTPATIENT
Start: 2025-07-21

## 2025-07-28 NOTE — PROGRESS NOTES
Assessment & Plan     Type 2 diabetes mellitus with stage 3a chronic kidney disease, without long-term current use of insulin (H)  A1c over controlled last visit and metformin reduced.  Consider stopping it once next A1c reviewed.  Continues on trulicity.  - metFORMIN (GLUCOPHAGE XR) 500 MG 24 hr tablet; Take 1 tablet (500 mg) by mouth 2 times daily (with meals).    Essential hypertension  Improved hypotension off norvasc - remain off.    Hyperlipidemia, unspecified hyperlipidemia type  Annual lipids when due.  Continue statin.    Constipation, unspecified constipation type  Add stool softener.  Stay hydrated.  Movement, physical activity - as best he can  - docusate sodium (COLACE) 100 MG capsule; Take 1 capsule (100 mg) by mouth 2 times daily as needed for constipation.    Declines covid vaccine today.    The longitudinal plan of care for the diagnosis(es)/condition(s) as documented were addressed during this visit. Due to the added complexity in care, I will continue to support Anthony in the subsequent management and with ongoing continuity of care.    Follow-up  Return in about 2 months (around 9/30/2025) for diabetes.    Subjective   Anthony is a 84 year old, presenting for the following health issues:  Hypertension, Lipids, Diabetes, Depression, and Anxiety    HPI      Covid - defers until fall with flu shot    Ambulation - minimal - uses cane.    Xray lumbar spine.    PT referral done - but didn't go.  Did see chiro - Dr Dye  Pain in thighs at times.  Consider MRI.    Has  - 2-3 times per week.    Large bowel movements lately.  Has had to use suppository past couple months.  Daily to every other day.  No bleeding.  No pain.    Hypertension Follow-up - BP was running low - 90/60. Stopped norvasc 1 month ago at last visit - feels fine; occasional dizziness with getting up  Do you check your blood pressure regularly outside of the clinic? No   Are you following a low salt diet? Yes  Are your  blood pressures ever more than 140 on the top number (systolic) OR more   than 90 on the bottom number (diastolic), for example 140/90? No    Diabetes Follow-up  How often are you checking your blood sugar? Not at all  What concerns do you have today about your diabetes? None   Do you have any of these symptoms? (Select all that apply)  Numbness in feet and Burning in feet  Have you had a diabetic eye exam in the last 12 months? No  A1c 5-6  Metformin max reduced to 1000 mg daily last month  [facility]  [facility]       Hyperlipidemia Follow-Up  Are you regularly taking any medication or supplement to lower your cholesterol?   Yes- atorvastatin 80mg  Are you having muscle aches or other side effects that you think could be caused by your cholesterol lowering medication?  No      BP Readings from Last 2 Encounters:   07/31/25 120/68   06/25/25 90/60     Hemoglobin A1C (%)   Date Value   06/25/2025 5.5   09/30/2024 5.6   05/06/2021 6.5 (H)     LDL Cholesterol Calculated (mg/dL)   Date Value   06/25/2025 44   03/25/2024 48         Depression and Anxiety   How are you doing with your depression since your last visit? No change  How are you doing with your anxiety since your last visit?  No change  Are you having other symptoms that might be associated with depression or anxiety? No  Have you had a significant life event? No   Do you have any concerns with your use of alcohol or other drugs? No    Social History     Tobacco Use    Smoking status: Never     Passive exposure: Never    Smokeless tobacco: Former     Types: Chew   Vaping Use    Vaping status: Never Used   Substance Use Topics    Alcohol use: Yes     Comment: daily, 4 today    Drug use: Never         9/30/2024     2:14 PM 6/25/2025    11:07 AM 7/31/2025     2:32 PM   PHQ   PHQ-9 Total Score 0 8 2   Q9: Thoughts of better off dead/self-harm past 2 weeks Not at all Not at all Not at all         8/14/2024    12:00 PM 6/25/2025    11:10 AM  "7/31/2025     2:32 PM   SHANNON-7 SCORE   Total Score 2 4 2         7/31/2025     2:32 PM   Last PHQ-9   1.  Little interest or pleasure in doing things 0   2.  Feeling down, depressed, or hopeless 0   3.  Trouble falling or staying asleep, or sleeping too much 0   4.  Feeling tired or having little energy 2   5.  Poor appetite or overeating 0   6.  Feeling bad about yourself 0   7.  Trouble concentrating 0   8.  Moving slowly or restless 0   Q9: Thoughts of better off dead/self-harm past 2 weeks 0   PHQ-9 Total Score 2   Difficulty at work, home, or with people Not difficult at all         7/31/2025     2:32 PM   SHANNON-7    1. Feeling nervous, anxious, or on edge 0   2. Not being able to stop or control worrying 1   3. Worrying too much about different things 1   4. Trouble relaxing 0   5. Being so restless that it is hard to sit still 0   6. Becoming easily annoyed or irritable 0   7. Feeling afraid, as if something awful might happen 0   SHANNON-7 Total Score 2       Suicide Assessment Five-step Evaluation and Treatment (SAFE-T)      Review of Systems  Constitutional, HEENT, cardiovascular, pulmonary, gi and gu systems are negative, except as otherwise noted.      Objective    /68 (BP Location: Right arm, Patient Position: Sitting, Cuff Size: Adult Large)   Pulse 85   Temp 98.3  F (36.8  C) (Tympanic)   Resp 16   Ht 1.854 m (6' 1\")   Wt 81.2 kg (179 lb)   SpO2 97%   BMI 23.62 kg/m    Body mass index is 23.62 kg/m .  Physical Exam   GENERAL: alert, no distress, elderly, and in wheel chair  NECK: no adenopathy, no asymmetry, masses, or scars  RESP: lungs clear to auscultation - no rales, rhonchi or wheezes  CV: regular rate and rhythm, normal S1 S2, no S3 or S4, no murmur, click or rub, no peripheral edema  ABDOMEN: soft, nontender, no hepatosplenomegaly, no masses and bowel sounds normal  MS: no gross musculoskeletal defects noted, no edema  PSYCH: mentation appears normal, affect normal/bright        "     Signed Electronically by: Zoila Azul MD

## 2025-07-31 ENCOUNTER — OFFICE VISIT (OUTPATIENT)
Dept: FAMILY MEDICINE | Facility: OTHER | Age: 85
End: 2025-07-31
Attending: FAMILY MEDICINE
Payer: MEDICARE

## 2025-07-31 VITALS
TEMPERATURE: 98.3 F | RESPIRATION RATE: 16 BRPM | HEART RATE: 85 BPM | DIASTOLIC BLOOD PRESSURE: 68 MMHG | OXYGEN SATURATION: 97 % | SYSTOLIC BLOOD PRESSURE: 120 MMHG | WEIGHT: 179 LBS | BODY MASS INDEX: 23.72 KG/M2 | HEIGHT: 73 IN

## 2025-07-31 DIAGNOSIS — E11.22 TYPE 2 DIABETES MELLITUS WITH STAGE 3A CHRONIC KIDNEY DISEASE, WITHOUT LONG-TERM CURRENT USE OF INSULIN (H): Primary | ICD-10-CM

## 2025-07-31 DIAGNOSIS — N18.31 TYPE 2 DIABETES MELLITUS WITH STAGE 3A CHRONIC KIDNEY DISEASE, WITHOUT LONG-TERM CURRENT USE OF INSULIN (H): Primary | ICD-10-CM

## 2025-07-31 DIAGNOSIS — E78.5 HYPERLIPIDEMIA, UNSPECIFIED HYPERLIPIDEMIA TYPE: ICD-10-CM

## 2025-07-31 DIAGNOSIS — K59.00 CONSTIPATION, UNSPECIFIED CONSTIPATION TYPE: ICD-10-CM

## 2025-07-31 DIAGNOSIS — I10 ESSENTIAL HYPERTENSION: ICD-10-CM

## 2025-07-31 PROCEDURE — G0463 HOSPITAL OUTPT CLINIC VISIT: HCPCS

## 2025-07-31 RX ORDER — DOCUSATE SODIUM 100 MG/1
100 CAPSULE, LIQUID FILLED ORAL 2 TIMES DAILY PRN
Qty: 60 CAPSULE | Refills: 11 | Status: SHIPPED | OUTPATIENT
Start: 2025-07-31

## 2025-07-31 RX ORDER — METFORMIN HYDROCHLORIDE 500 MG/1
500 TABLET, EXTENDED RELEASE ORAL 2 TIMES DAILY WITH MEALS
COMMUNITY
Start: 2025-06-26

## 2025-07-31 ASSESSMENT — PAIN SCALES - GENERAL: PAINLEVEL_OUTOF10: NO PAIN (0)

## 2025-07-31 ASSESSMENT — ANXIETY QUESTIONNAIRES
1. FEELING NERVOUS, ANXIOUS, OR ON EDGE: NOT AT ALL
GAD7 TOTAL SCORE: 2
5. BEING SO RESTLESS THAT IT IS HARD TO SIT STILL: NOT AT ALL
6. BECOMING EASILY ANNOYED OR IRRITABLE: NOT AT ALL
3. WORRYING TOO MUCH ABOUT DIFFERENT THINGS: SEVERAL DAYS
2. NOT BEING ABLE TO STOP OR CONTROL WORRYING: SEVERAL DAYS
7. FEELING AFRAID AS IF SOMETHING AWFUL MIGHT HAPPEN: NOT AT ALL
GAD7 TOTAL SCORE: 2

## 2025-07-31 ASSESSMENT — PATIENT HEALTH QUESTIONNAIRE - PHQ9
5. POOR APPETITE OR OVEREATING: NOT AT ALL
SUM OF ALL RESPONSES TO PHQ QUESTIONS 1-9: 2

## 2025-07-31 NOTE — PATIENT INSTRUCTIONS
Follow up 2 months.  Recheck A1c then and see if can stop metformin all together.  Add stool softener - script sent.  BP better off the Norvasc.  COVID vaccine deferred.

## 2025-08-04 DIAGNOSIS — E78.5 HYPERLIPIDEMIA, UNSPECIFIED HYPERLIPIDEMIA TYPE: ICD-10-CM

## 2025-08-04 RX ORDER — ATORVASTATIN CALCIUM 40 MG/1
TABLET, FILM COATED ORAL
Qty: 180 TABLET | Refills: 1 | Status: SHIPPED | OUTPATIENT
Start: 2025-08-04

## 2025-08-07 DIAGNOSIS — E11.22 TYPE 2 DIABETES MELLITUS WITH STAGE 3A CHRONIC KIDNEY DISEASE, WITHOUT LONG-TERM CURRENT USE OF INSULIN (H): ICD-10-CM

## 2025-08-07 DIAGNOSIS — N18.31 TYPE 2 DIABETES MELLITUS WITH STAGE 3A CHRONIC KIDNEY DISEASE, WITHOUT LONG-TERM CURRENT USE OF INSULIN (H): ICD-10-CM

## 2025-08-07 RX ORDER — METFORMIN HYDROCHLORIDE 500 MG/1
500 TABLET, EXTENDED RELEASE ORAL 2 TIMES DAILY WITH MEALS
Qty: 180 TABLET | Refills: 0 | Status: SHIPPED | OUTPATIENT
Start: 2025-08-07

## 2025-08-26 ENCOUNTER — OFFICE VISIT (OUTPATIENT)
Dept: PODIATRY | Facility: OTHER | Age: 85
End: 2025-08-26
Attending: PODIATRIST
Payer: MEDICARE

## 2025-08-26 VITALS
TEMPERATURE: 98.2 F | OXYGEN SATURATION: 90 % | DIASTOLIC BLOOD PRESSURE: 78 MMHG | SYSTOLIC BLOOD PRESSURE: 136 MMHG | HEART RATE: 104 BPM

## 2025-08-26 DIAGNOSIS — Z86.31 HEALED DIABETIC FOOT ULCER: ICD-10-CM

## 2025-08-26 DIAGNOSIS — E11.42 DIABETIC POLYNEUROPATHY ASSOCIATED WITH TYPE 2 DIABETES MELLITUS (H): Primary | ICD-10-CM

## 2025-08-26 DIAGNOSIS — L97.521 DIABETIC ULCER OF TOE OF LEFT FOOT ASSOCIATED WITH TYPE 2 DIABETES MELLITUS, LIMITED TO BREAKDOWN OF SKIN (H): ICD-10-CM

## 2025-08-26 DIAGNOSIS — Z13.89 SCREENING FOR DIABETIC PERIPHERAL NEUROPATHY: ICD-10-CM

## 2025-08-26 DIAGNOSIS — L60.3 ONYCHODYSTROPHY: ICD-10-CM

## 2025-08-26 DIAGNOSIS — E11.621 DIABETIC ULCER OF TOE OF LEFT FOOT ASSOCIATED WITH TYPE 2 DIABETES MELLITUS, LIMITED TO BREAKDOWN OF SKIN (H): ICD-10-CM

## 2025-08-26 DIAGNOSIS — N18.31 STAGE 3A CHRONIC KIDNEY DISEASE (H): ICD-10-CM

## 2025-08-26 PROCEDURE — G0463 HOSPITAL OUTPT CLINIC VISIT: HCPCS

## 2025-08-26 PROCEDURE — 11721 DEBRIDE NAIL 6 OR MORE: CPT | Performed by: PODIATRIST

## 2025-08-26 ASSESSMENT — PAIN SCALES - GENERAL: PAINLEVEL_OUTOF10: NO PAIN (0)

## 2025-08-29 ENCOUNTER — TELEPHONE (OUTPATIENT)
Dept: FAMILY MEDICINE | Facility: OTHER | Age: 85
End: 2025-08-29

## 2109-09-20 ENCOUNTER — TRANSFERRED RECORDS (OUTPATIENT)
Dept: HEALTH INFORMATION MANAGEMENT | Facility: HOSPITAL | Age: OVER 89
End: 2109-09-20
Payer: MEDICARE

## (undated) DEVICE — BLADE-SAW 25.0MM X 9.0MM

## (undated) DEVICE — GLV-7.0 PROTEXIS PI BLUE W/NEU-THERA LF/PF

## (undated) DEVICE — DRSG-SPONGE STERILE 4 X 4

## (undated) DEVICE — APPLICATOR-CHLORAPREP 26ML TINTED CHG 2%+ 70% IPA-SURGICAL

## (undated) DEVICE — DRAPE-EXTREMITY SHEET

## (undated) DEVICE — LIGHT HANDLE COVER FOR SKYTRON LIGHTS

## (undated) DEVICE — SET-TUR Y-TYPE BLADDER IRRIGATION

## (undated) DEVICE — BIN-LENS IMPLANT CART

## (undated) DEVICE — GLV-7.5 PROTEXIS PI CLASSIC LF/PF

## (undated) DEVICE — CUFF-DISP STERILE 18IN SINGLE BLADDER

## (undated) DEVICE — SHOE-POST OP MEN LG.

## (undated) DEVICE — KNIFE-MICRO UNITOME 5.0MM

## (undated) DEVICE — IRRIGATION-NACL 1000ML

## (undated) DEVICE — BETADINE 5% STERILE OPHTHALMIC SOLUTION 1 OZ.

## (undated) DEVICE — GLV-7.0 PROTEXIS PI CLASSIC LF/PF

## (undated) DEVICE — INSTRUMENT WIPE-VISIWIPE

## (undated) DEVICE — LABEL-STERILE PREPRINTED FOR OR

## (undated) DEVICE — GLV-6.5 PROTEXIS PI CLASSIC LF/PF

## (undated) DEVICE — SCD SLEEVE-KNEE REG.

## (undated) DEVICE — VALVE-LUER ACTIVATED-ONELINK

## (undated) DEVICE — CANNULA-VISCOFLOW 25G 9MM 45 DEGREE ANGLE

## (undated) DEVICE — TRAY-SKIN PREP POVIDONE/IODINE

## (undated) DEVICE — Device

## (undated) DEVICE — PACK-PHACO STELLARIS

## (undated) DEVICE — KNIFE-KERATOME SLIT 2.8MM

## (undated) DEVICE — BIN-TECNIS DCB00 LENSES

## (undated) DEVICE — IRRIGATION-H2O 1000ML

## (undated) DEVICE — LENS DELIVERY SYSTEM-SOFPORT LI61AO (EZ-28)

## (undated) DEVICE — COVER-TABLE SHEET

## (undated) DEVICE — BIN-CATARACT BIN

## (undated) DEVICE — DRSG-ABDOMINAL 5 X 9

## (undated) DEVICE — PACK-BASIN SET-UP

## (undated) DEVICE — PACK-GYN CYSTO-CUSTOM

## (undated) DEVICE — CATH TRAY-ADD A FOLEY-2000ML DRAIN BAG & STATLOCK

## (undated) DEVICE — HANDPIECE-CAPSULEGUARD I/A STELLARIS

## (undated) DEVICE — CATH-URETERAL 5FR 70CM OPEN END

## (undated) DEVICE — CANNULA-NUCLEUS HYDRODISSECTOR

## (undated) DEVICE — BLADE-SCALPEL #15

## (undated) DEVICE — COVER-X-RAY CASSETTE Z-DRAPE

## (undated) DEVICE — DRSG-NON ADHERING 3 X 4 TELFA

## (undated) DEVICE — LUBRICANT JELLY 2OZ. TUBE

## (undated) DEVICE — CONTAINER-STERILE 4OZ WRAPPED (OR)

## (undated) DEVICE — GOWN-SURG XL LVL 3 REINFORCED

## (undated) DEVICE — ZIPWIRE-STRAIGHT 0.035" X 150CM

## (undated) DEVICE — PACK-LAPAROTOMY-CUSTOM

## (undated) DEVICE — SYRINGE-TOOMEY-70CC W/REMOVABLE TIP

## (undated) DEVICE — BUR-EGG BUR 4.0MM

## (undated) DEVICE — IRRIGATION-NACL 3000ML (BAG)

## (undated) DEVICE — CYSTOTOME-IRRIGATING  25G

## (undated) DEVICE — DRSG-NON-ADHERING 3 X 3 ADAPTIC

## (undated) DEVICE — CAUTERY PAD-POLYHESIVE II ADULT

## (undated) DEVICE — DRSG-SPONGE STERILE 4X4 10/SOFT PK

## (undated) DEVICE — CANNULA-VISCOFLOW 30G 9MM BEND

## (undated) DEVICE — SUTURE-ETHILON 3-0 PS-2 1669H

## (undated) DEVICE — BDG-ESMARK 4 INCH X 9 FT

## (undated) DEVICE — DRAPE-STERI 45X60CM #1010

## (undated) DEVICE — DRSG-SPONGE X-RAY 4 X 4

## (undated) DEVICE — DRSG-KERLIX 6 X 6 3/4 FLUFF

## (undated) DEVICE — BDG-ELASTIC 4 INCH DBL.

## (undated) DEVICE — ELECTRODE-PLASMALOOP-MEDIUM BIPOLAR 30 DEGREE

## (undated) DEVICE — BIN-MINI, MAXI, MICRO DRIVER BLADES BIN

## (undated) DEVICE — PACK-EYE-CUSTOM

## (undated) DEVICE — DRSG-XEROFORM 1X8

## (undated) DEVICE — BAG-FLUID COLLECTOR FOR UROLOGY NON-STERILE

## (undated) DEVICE — SUTURE-ETHILON 4-0 FS-1 1629H

## (undated) DEVICE — BIN-UROLOGY / CYSTO

## (undated) RX ORDER — LIDOCAINE HYDROCHLORIDE 20 MG/ML
INJECTION, SOLUTION EPIDURAL; INFILTRATION; INTRACAUDAL; PERINEURAL
Status: DISPENSED
Start: 2022-06-27

## (undated) RX ORDER — FENTANYL CITRATE 50 UG/ML
INJECTION, SOLUTION INTRAMUSCULAR; INTRAVENOUS
Status: DISPENSED
Start: 2021-12-27

## (undated) RX ORDER — PROPOFOL 10 MG/ML
INJECTION, EMULSION INTRAVENOUS
Status: DISPENSED
Start: 2022-06-27

## (undated) RX ORDER — FENTANYL CITRATE 50 UG/ML
INJECTION, SOLUTION INTRAMUSCULAR; INTRAVENOUS
Status: DISPENSED
Start: 2022-06-27

## (undated) RX ORDER — ONDANSETRON 2 MG/ML
INJECTION INTRAMUSCULAR; INTRAVENOUS
Status: DISPENSED
Start: 2021-10-15

## (undated) RX ORDER — LIDOCAINE HYDROCHLORIDE 20 MG/ML
INJECTION, SOLUTION EPIDURAL; INFILTRATION; INTRACAUDAL; PERINEURAL
Status: DISPENSED
Start: 2021-10-15

## (undated) RX ORDER — FENTANYL CITRATE 50 UG/ML
INJECTION, SOLUTION INTRAMUSCULAR; INTRAVENOUS
Status: DISPENSED
Start: 2021-10-15

## (undated) RX ORDER — DEXAMETHASONE SODIUM PHOSPHATE 10 MG/ML
INJECTION, SOLUTION INTRAMUSCULAR; INTRAVENOUS
Status: DISPENSED
Start: 2021-10-15

## (undated) RX ORDER — PROPOFOL 10 MG/ML
INJECTION, EMULSION INTRAVENOUS
Status: DISPENSED
Start: 2021-10-15